# Patient Record
Sex: MALE | Race: WHITE | NOT HISPANIC OR LATINO | Employment: OTHER | URBAN - METROPOLITAN AREA
[De-identification: names, ages, dates, MRNs, and addresses within clinical notes are randomized per-mention and may not be internally consistent; named-entity substitution may affect disease eponyms.]

---

## 2017-04-13 ENCOUNTER — TELEPHONE (OUTPATIENT)
Dept: MEDICAL GROUP | Facility: MEDICAL CENTER | Age: 72
End: 2017-04-13

## 2017-04-13 NOTE — TELEPHONE ENCOUNTER
1. Caller Name: Enoch Mistry                        Call Back Number: [phone] (home)       2. Message: Pt wants to switch to [facility] for his oxygen. Having issues with [facility] and their billing.  Please D/C 02 from [facility]. I advised pt that he was not a [facility] pt, that his chart says [facility]. He said he was told last time he was here that [facility] would not accept him and his Dr would be [facility]. Says his card from [facility] says [facility] is his provider. Sending this to both Dr's.     3. Patient approves office to leave a detailed voicemail/MyChart message: no

## 2017-04-13 NOTE — TELEPHONE ENCOUNTER
I have seen the patient over one year ago, he needs to make a follow-up appointment, he has been seen Candy Mccarthy, and I am not his PCP, his insurance card in [facility] states  is his PCP.  He can follow up with Candy Mccarthy to re-establish  He can follow up with me to renew his oxygen but I am not taking new patients, and he will need to establish with somebody else

## 2017-04-18 ENCOUNTER — OFFICE VISIT (OUTPATIENT)
Dept: MEDICAL GROUP | Facility: MEDICAL CENTER | Age: 72
End: 2017-04-18
Payer: MEDICARE

## 2017-04-18 VITALS
HEIGHT: 66 IN | HEART RATE: 116 BPM | SYSTOLIC BLOOD PRESSURE: 156 MMHG | WEIGHT: 245 LBS | TEMPERATURE: 98.2 F | DIASTOLIC BLOOD PRESSURE: 92 MMHG | BODY MASS INDEX: 39.37 KG/M2 | OXYGEN SATURATION: 88 %

## 2017-04-18 DIAGNOSIS — R91.1 PULMONARY NODULE: ICD-10-CM

## 2017-04-18 DIAGNOSIS — I10 ESSENTIAL HYPERTENSION: Chronic | ICD-10-CM

## 2017-04-18 DIAGNOSIS — Z12.5 PROSTATE CANCER SCREENING: ICD-10-CM

## 2017-04-18 DIAGNOSIS — E78.5 DYSLIPIDEMIA: ICD-10-CM

## 2017-04-18 PROCEDURE — 4040F PNEUMOC VAC/ADMIN/RCVD: CPT | Mod: 8P | Performed by: INTERNAL MEDICINE

## 2017-04-18 PROCEDURE — G8419 CALC BMI OUT NRM PARAM NOF/U: HCPCS | Performed by: INTERNAL MEDICINE

## 2017-04-18 PROCEDURE — 1101F PT FALLS ASSESS-DOCD LE1/YR: CPT | Performed by: INTERNAL MEDICINE

## 2017-04-18 PROCEDURE — 3017F COLORECTAL CA SCREEN DOC REV: CPT | Mod: 8P | Performed by: INTERNAL MEDICINE

## 2017-04-18 PROCEDURE — G8432 DEP SCR NOT DOC, RNG: HCPCS | Performed by: INTERNAL MEDICINE

## 2017-04-18 PROCEDURE — 99213 OFFICE O/P EST LOW 20 MIN: CPT | Performed by: INTERNAL MEDICINE

## 2017-04-18 PROCEDURE — 1036F TOBACCO NON-USER: CPT | Performed by: INTERNAL MEDICINE

## 2017-04-18 RX ORDER — AMLODIPINE BESYLATE 5 MG/1
5 TABLET ORAL DAILY
Qty: 30 TAB | Refills: 2 | Status: SHIPPED | OUTPATIENT
Start: 2017-04-18 | End: 2017-07-13 | Stop reason: SDUPTHER

## 2017-04-18 ASSESSMENT — PATIENT HEALTH QUESTIONNAIRE - PHQ9: CLINICAL INTERPRETATION OF PHQ2 SCORE: 0

## 2017-04-18 NOTE — PROGRESS NOTES
Subjective:      Enoch Mistry is a 71 y.o. male who presents with oxygen          HPI     Dr. Garrido was initial PCP but has not seen him in the past. He saw Candy aguilar initially. Patient has been on oxygen 2 liters per [facility], would like to change to [facility], on oxygen 2 liters continuous. Was smoking but quit 9 years ago, smoked 1 pack per day at least for over 30 years, patient does have shortness of breath without oxygen, denies any cough, productive sputum, fevers, chills, night sweats.  I saw him one year ago and discussed starting lisinopril for blood pressure, he did not do so, I asked him to get an echocardiogram and CT thorax lung follow-up nodule, he did not do so, and he did not follow-up with his PCP.  Chronic lower extremity edema.  No regular exercise.  Tries to limit sodium.  History of bladder cancer has been seen by urology previously  No tobacco, no alcohol           Current Outpatient Prescriptions   Medication Sig Dispense Refill   • lisinopril (PRINIVIL) 10 MG Tab Take 1 Tab by mouth every day. 30 Tab 3   • sulfamethoxazole-trimethoprim (BACTRIM DS,SEPTRA DS) 800-160 MG Tab oral tablet Take 1 Tab by mouth every 12 hours. 28 Tab 0     No current facility-administered medications for this visit.     Patient Active Problem List    Diagnosis Date Noted   • Obesity 01/07/2016   • Preventative health care 01/07/2016   • Hypoxia 01/07/2016   • Adrenal adenoma 01/07/2016   • Asymptomatic cholelithiasis 01/07/2016   • Pulmonary nodule 04/03/2015   • History of bladder cancer 04/02/2015   • Hypertension 04/01/2015     Depression Screening    Little interest or pleasure in doing things?  0 - not at all  Feeling down, depressed , or hopeless? 0 - not at all  Patient Health Questionnaire Score: 0       ROS       Objective:     Room air oxygen saturation 88%  2 L oxygen at rest 93% saturation    Physical Exam   Constitutional: He appears well-developed and well-nourished. No distress.   HENT:    Head: Normocephalic and atraumatic.   Eyes: Conjunctivae are normal. Right eye exhibits no discharge. Left eye exhibits no discharge.   Neck: No thyromegaly present.   Cardiovascular: Normal rate and regular rhythm.    No murmur heard.  Pulmonary/Chest: Effort normal and breath sounds normal.   Abdominal: He exhibits no distension.   Musculoskeletal: He exhibits edema.   Skin: He is not diaphoretic.   Psychiatric: He has a normal mood and affect. His behavior is normal.   Nursing note and vitals reviewed.    Lower extremity edema, chronic venous stasis changes, no open sores, lesions, ulcerations          Assessment/Plan:     Assessment  #!  Chronic hypoxia question related to COPD with extensive smoking history, patient is on 2 L of oxygen continuously, room air saturation 88%, with 2 L oxygen, saturation 93%, declines echo, pulmonary function testing    #2 Hypertension, patient did not start lisinopril, patient did not get labs last year    #3 pulmonary nodule did not get follow-up CT scan as ordered last year    #4 obesity BMI 38.9    #5 lower extremity edema question cor pulmonale, consider diastolic or systolic dysfunction, did not get echo    #6 history of bladder cancer followed by urology    #7 chronic respiratory failure on oxygen 2 L through [facility]      Plan  #! Needs to follow up with  or [facility] aguilar, or establish with a new provider I am not his PCP, I expressed to him the importance of follow-up with the PCP on a regular basis given his medical history. This was related to him over a year ago and again today    #2 continue oxygen 2 liters and change to [facility]    #3 for blood pressure I recommend starting Norvasc since he has declined to get labs, I am not sure what his renal function is, we will start with Norvasc 5 mg understand this could contribute to lower extremity edema, check blood pressure daily and record, medication may cause edema, constipation, lightheadedness, follow-up with  PCP     #4 declines all vaccines    #5 declines colonoscopy    #6 declines pulmonary function testing, echo, CT follow-up pulmonary nodule importance of these tests were expressed to him and recommended he declines    #7 follow-up urology    #8 labs ordered again fasting    #9 follow-up PCP 4 weeks    #10 continue no tobacco    #11 diet, exercise, weight loss emphasized with regards to high blood pressure, development of cardiovascular disease

## 2017-04-18 NOTE — MR AVS SNAPSHOT
"        Enoch Mistry   2017 7:40 AM   Office Visit   MRN: [id number]    Department:  [facility]   Dept Phone:  [phone]    Description:  Male : 1945   Provider:  Navin Wilcox M.D.           Allergies as of 2017     No Known Allergies      You were diagnosed with     Essential hypertension   [5922828]       Dyslipidemia   [105204]       Prostate cancer screening   [481172]       Body mass index (BMI) of 38.0-38.9 in adult   [406315]       Pulmonary nodule   [998205]         Vital Signs     Blood Pressure Pulse Temperature Height Weight Body Mass Index    156/92 mmHg 116 36.8 °C (98.2 °F) 1.689 m (5' 6.5\") 111.131 kg (245 lb) 38.96 kg/m2    Oxygen Saturation Smoking Status                88% Former Smoker          Basic Information     Date Of Birth Sex Race Ethnicity Preferred Language    1945 Male White Non- English      Problem List              ICD-10-CM Priority Class Noted - Resolved    Hypertension (Chronic) I10   2015 - Present    History of bladder cancer Z85.51   2015 - Present    Pulmonary nodule R91.1   4/3/2015 - Present    Obesity E66.9   2016 - Present    Preventative health care Z00.00   2016 - Present    Hypoxia R09.02   2016 - Present    Adrenal adenoma D35.00   2016 - Present    Asymptomatic cholelithiasis K80.20   2016 - Present      Health Maintenance        Date Due Completion Dates    IMM DTaP/Tdap/Td Vaccine (1 - Tdap) 10/11/1964 ---    IMM ZOSTER VACCINE 10/11/2005 ---    IMM PNEUMOCOCCAL 65+ (ADULT) LOW/MEDIUM RISK SERIES (1 of 2 - PCV13) 10/11/2010 ---    COLONOSCOPY 2016 (Declined)    Override on 2015: Patient Declined            Current Immunizations     No immunizations on file.      Below and/or attached are the medications your provider expects you to take. Review all of your home medications and newly ordered medications with your provider and/or pharmacist. Follow medication " instructions as directed by your provider and/or pharmacist. Please keep your medication list with you and share with your provider. Update the information when medications are discontinued, doses are changed, or new medications (including over-the-counter products) are added; and carry medication information at all times in the event of emergency situations     Allergies:  No Known Allergies          Medications  Valid as of: April 18, 2017 -  8:40 AM    Generic Name Brand Name Tablet Size Instructions for use    AmLODIPine Besylate (Tab) NORVASC 5 MG Take 1 Tab by mouth every day.        Sulfamethoxazole-Trimethoprim (Tab) BACTRIM DS,SEPTRA -160 MG Take 1 Tab by mouth every 12 hours.        .                 Medicines prescribed today were sent to:     [facility] [address] [facility]    [address] [place] [address]    Phone: [phone] Fax: [phone]    Open 24 Hours?: \[facility]\"" [facility] #[id number] [facility], [place] - [address] [facility] [address]    [address] [facility] [address]    Phone: [phone] Fax: [phone]    Open 24 Hours?: No      Medication refill instructions:       If your prescription bottle indicates you have medication refills left, it is not necessary to call your provider’s office. Please contact your pharmacy and they will refill your medication.    If your prescription bottle indicates you do not have any refills left, you may request refills at any time through one of the following ways: The online MTPV system (except Urgent Care), by calling your provider’s office, or by asking your pharmacy to contact your provider’s office with a refill request. Medication refills are processed only during regular business hours and may not be available until the next business day. Your provider may request additional information or to have a follow-up visit with you prior to refilling your medication.   *Please Note: Medication refills are assigned a new  Rx number when refilled electronically. Your pharmacy may indicate that no refills were authorized even though a new prescription for the same medication is available at the pharmacy. Please request the medicine by name with the pharmacy before contacting your provider for a refill.        Your To Do List     Future Labs/Procedures Complete By Expires    CBC WITH DIFFERENTIAL  As directed 4/19/2018    COMP METABOLIC PANEL  As directed 4/19/2018    LIPID PROFILE  As directed 4/19/2018    MICROALBUMIN CREAT RATIO URINE  As directed 4/19/2018    PROSTATE SPECIFIC AG SCREENING  As directed 4/19/2018    TSH  As directed 4/19/2018    URINALYSIS,CULTURE IF INDICATED  As directed 4/19/2018      Other Notes About Your Plan     1/7/16 CT thorax follow up nodule, pulmonary function testing, echo pending           [employer] Access Code: [email or web address]  Expires: 5/18/2017  8:40 AM    [employer]  A secure, online tool to manage your health information     [employer]® is a secure, online tool that connects you to your personalized health information from the privacy of your home -- day or night - making it very easy for you to manage your healthcare. Once the activation process is completed, you can even access your medical information using the [employer] emily, which is available for free in the Apple Emily store or Google Play store.     [employer] provides the following levels of access (as shown below):   My Chart Features   [facility] Doctor [facility]  Specialists [facility]  Urgent  Care Non-[facility]  Primary Care  Doctor   Email your healthcare team securely and privately 24/7 X X X    Manage appointments: schedule your next appointment; view details of past/upcoming appointments X      Request prescription refills. X      View recent personal medical records, including lab and immunizations X X X X   View health record, including health history, allergies, medications X X X X   Read reports about your outpatient visits,  procedures, consult and ER notes X X X X   See your discharge summary, which is a recap of your hospital and/or ER visit that includes your diagnosis, lab results, and care plan. X X       How to register for [employer]:  1. Go to  [email or web address].  2. Click on the Sign Up Now box, which takes you to the New Member Sign Up page. You will need to provide the following information:  a. Enter your [employer] Access Code exactly as it appears at the top of this page. (You will not need to use this code after you’ve completed the sign-up process. If you do not sign up before the expiration date, you must request a new code.)   b. Enter your date of birth.   c. Enter your home email address.   d. Click Submit, and follow the next screen’s instructions.  3. Create a RingTut ID. This will be your RingTut login ID and cannot be changed, so think of one that is secure and easy to remember.  4. Create a RingTut password. You can change your password at any time.  5. Enter your Password Reset Question and Answer. This can be used at a later time if you forget your password.   6. Enter your e-mail address. This allows you to receive e-mail notifications when new information is available in [employer].  7. Click Sign Up. You can now view your health information.    For assistance activating your [employer] account, call [phone]

## 2017-05-31 ENCOUNTER — HOSPITAL ENCOUNTER (OUTPATIENT)
Dept: LAB | Facility: MEDICAL CENTER | Age: 72
End: 2017-05-31
Attending: FAMILY MEDICINE
Payer: MEDICARE

## 2017-05-31 DIAGNOSIS — Z12.5 PROSTATE CANCER SCREENING: ICD-10-CM

## 2017-05-31 DIAGNOSIS — I10 ESSENTIAL HYPERTENSION: Chronic | ICD-10-CM

## 2017-05-31 DIAGNOSIS — E78.5 DYSLIPIDEMIA: ICD-10-CM

## 2017-05-31 LAB
ALBUMIN SERPL BCP-MCNC: 4.1 G/DL (ref 3.2–4.9)
ALBUMIN/GLOB SERPL: 1.3 G/DL
ALP SERPL-CCNC: 60 U/L (ref 30–99)
ALT SERPL-CCNC: 19 U/L (ref 2–50)
ANION GAP SERPL CALC-SCNC: 10 MMOL/L (ref 0–11.9)
AST SERPL-CCNC: 20 U/L (ref 12–45)
BASOPHILS # BLD AUTO: 0.9 % (ref 0–1.8)
BASOPHILS # BLD: 0.09 K/UL (ref 0–0.12)
BILIRUB SERPL-MCNC: 1.6 MG/DL (ref 0.1–1.5)
BUN SERPL-MCNC: 12 MG/DL (ref 8–22)
CALCIUM SERPL-MCNC: 9.2 MG/DL (ref 8.5–10.5)
CHLORIDE SERPL-SCNC: 104 MMOL/L (ref 96–112)
CHOLEST SERPL-MCNC: 99 MG/DL (ref 100–199)
CO2 SERPL-SCNC: 24 MMOL/L (ref 20–33)
CREAT SERPL-MCNC: 0.67 MG/DL (ref 0.5–1.4)
EOSINOPHIL # BLD AUTO: 0.25 K/UL (ref 0–0.51)
EOSINOPHIL NFR BLD: 2.6 % (ref 0–6.9)
ERYTHROCYTE [DISTWIDTH] IN BLOOD BY AUTOMATED COUNT: 42.8 FL (ref 35.9–50)
GFR SERPL CREATININE-BSD FRML MDRD: >60 ML/MIN/1.73 M 2
GLOBULIN SER CALC-MCNC: 3.2 G/DL (ref 1.9–3.5)
GLUCOSE SERPL-MCNC: 239 MG/DL (ref 65–99)
HCT VFR BLD AUTO: 48.8 % (ref 42–52)
HDLC SERPL-MCNC: 35 MG/DL
HGB BLD-MCNC: 16.5 G/DL (ref 14–18)
IMM GRANULOCYTES # BLD AUTO: 0.02 K/UL (ref 0–0.11)
IMM GRANULOCYTES NFR BLD AUTO: 0.2 % (ref 0–0.9)
LDLC SERPL CALC-MCNC: 39 MG/DL
LYMPHOCYTES # BLD AUTO: 2.2 K/UL (ref 1–4.8)
LYMPHOCYTES NFR BLD: 23.1 % (ref 22–41)
MCH RBC QN AUTO: 29.7 PG (ref 27–33)
MCHC RBC AUTO-ENTMCNC: 33.8 G/DL (ref 33.7–35.3)
MCV RBC AUTO: 87.9 FL (ref 81.4–97.8)
MONOCYTES # BLD AUTO: 0.62 K/UL (ref 0–0.85)
MONOCYTES NFR BLD AUTO: 6.5 % (ref 0–13.4)
NEUTROPHILS # BLD AUTO: 6.33 K/UL (ref 1.82–7.42)
NEUTROPHILS NFR BLD: 66.7 % (ref 44–72)
NRBC # BLD AUTO: 0 K/UL
NRBC BLD AUTO-RTO: 0 /100 WBC
PLATELET # BLD AUTO: 201 K/UL (ref 164–446)
PMV BLD AUTO: 11.5 FL (ref 9–12.9)
POTASSIUM SERPL-SCNC: 4 MMOL/L (ref 3.6–5.5)
PROT SERPL-MCNC: 7.3 G/DL (ref 6–8.2)
PSA SERPL-MCNC: 5.88 NG/ML (ref 0–4)
RBC # BLD AUTO: 5.55 M/UL (ref 4.7–6.1)
SODIUM SERPL-SCNC: 138 MMOL/L (ref 135–145)
TRIGL SERPL-MCNC: 124 MG/DL (ref 0–149)
TSH SERPL DL<=0.005 MIU/L-ACNC: 2.66 UIU/ML (ref 0.3–3.7)
WBC # BLD AUTO: 9.5 K/UL (ref 4.8–10.8)

## 2017-05-31 PROCEDURE — 80061 LIPID PANEL: CPT

## 2017-05-31 PROCEDURE — 80053 COMPREHEN METABOLIC PANEL: CPT

## 2017-05-31 PROCEDURE — 85025 COMPLETE CBC W/AUTO DIFF WBC: CPT

## 2017-05-31 PROCEDURE — 36415 COLL VENOUS BLD VENIPUNCTURE: CPT

## 2017-05-31 PROCEDURE — 84153 ASSAY OF PSA TOTAL: CPT

## 2017-05-31 PROCEDURE — 84443 ASSAY THYROID STIM HORMONE: CPT

## 2017-06-01 ENCOUNTER — TELEPHONE (OUTPATIENT)
Dept: MEDICAL GROUP | Facility: MEDICAL CENTER | Age: 72
End: 2017-06-01

## 2017-06-01 NOTE — TELEPHONE ENCOUNTER
----- Message from Navin Wilcox M.D. sent at 5/31/2017  8:06 PM PDT -----  Please notify the patient that his blood sugar is elevated at 239 which is diabetic range.  His liver function, kidney function, thyroid test, cholesterol are within range  His PSA prostate cancer blood test is slightly high  Have him keep his appointment with his new primary care provider, he had been seeing Candy Mccarthy in our office and has an upcoming appointment with Dr. Duke upstairs next Tuesday.  He can discuss the results with  at that visit.  Please make sure the patient understands importance of keeping that appointment given his elevated blood sugar and elevated PSA level.  In the interim, the patient should avoid sweets, candies, sodas, fruit juices, and limit his carbohydrate portions serving sizes until he is seen next week.

## 2017-06-06 ENCOUNTER — TELEPHONE (OUTPATIENT)
Dept: MEDICAL GROUP | Facility: MEDICAL CENTER | Age: 72
End: 2017-06-06

## 2017-06-06 ENCOUNTER — OFFICE VISIT (OUTPATIENT)
Dept: MEDICAL GROUP | Facility: MEDICAL CENTER | Age: 72
End: 2017-06-06
Payer: MEDICARE

## 2017-06-06 VITALS
BODY MASS INDEX: 38.12 KG/M2 | SYSTOLIC BLOOD PRESSURE: 158 MMHG | DIASTOLIC BLOOD PRESSURE: 68 MMHG | HEIGHT: 66 IN | TEMPERATURE: 99.5 F | HEART RATE: 101 BPM | OXYGEN SATURATION: 90 % | WEIGHT: 237.2 LBS

## 2017-06-06 DIAGNOSIS — I10 ESSENTIAL HYPERTENSION: ICD-10-CM

## 2017-06-06 DIAGNOSIS — J96.11 CHRONIC HYPOXEMIC RESPIRATORY FAILURE (HCC): ICD-10-CM

## 2017-06-06 DIAGNOSIS — N40.1 BENIGN NON-NODULAR PROSTATIC HYPERPLASIA WITH LOWER URINARY TRACT SYMPTOMS: ICD-10-CM

## 2017-06-06 DIAGNOSIS — R60.0 BILATERAL LOWER EXTREMITY EDEMA: ICD-10-CM

## 2017-06-06 DIAGNOSIS — R73.01 ELEVATED FASTING BLOOD SUGAR: ICD-10-CM

## 2017-06-06 DIAGNOSIS — E11.8 TYPE 2 DIABETES MELLITUS WITH COMPLICATION, WITHOUT LONG-TERM CURRENT USE OF INSULIN (HCC): ICD-10-CM

## 2017-06-06 DIAGNOSIS — Z99.81 REQUIRES CONTINUOUS AT HOME SUPPLEMENTAL OXYGEN: ICD-10-CM

## 2017-06-06 DIAGNOSIS — S46.811A STRAIN OF RIGHT TRAPEZIUS MUSCLE, INITIAL ENCOUNTER: ICD-10-CM

## 2017-06-06 LAB
HBA1C MFR BLD: 10.4 % (ref ?–5.8)
INT CON NEG: NEGATIVE
INT CON POS: POSITIVE

## 2017-06-06 PROCEDURE — 92250 FUNDUS PHOTOGRAPHY W/I&R: CPT | Performed by: FAMILY MEDICINE

## 2017-06-06 PROCEDURE — 83036 HEMOGLOBIN GLYCOSYLATED A1C: CPT | Performed by: FAMILY MEDICINE

## 2017-06-06 PROCEDURE — 99215 OFFICE O/P EST HI 40 MIN: CPT | Performed by: FAMILY MEDICINE

## 2017-06-06 RX ORDER — BLOOD-GLUCOSE METER
1 KIT MISCELLANEOUS DAILY
Qty: 1 KIT | Refills: 0 | Status: SHIPPED | OUTPATIENT
Start: 2017-06-06 | End: 2017-09-11

## 2017-06-06 NOTE — ASSESSMENT & PLAN NOTE
Patient with recent fasting blood sugar elevation to 239 on CMP drawn on 5/31/2017. Patient denies any obvious signs of hyperglycemia. Dietary issues as below.    ROS is NEGATIVE for blurred vision, polydipsia, polyuria, diaphoresis, palpitations, fatigue, irritability, flank pain, BLE paresthesias.    B: Coffee, Toast OR English Muffin (either one, with butter or jam)  L: (Hawkins & Eggs) OR (Burger + [place])  S: Possibly microwave popcorn   D: Meat (Steak, pork, or chicken), Potato (Baked, mashed), sweet corn on the cob

## 2017-06-06 NOTE — ASSESSMENT & PLAN NOTE
Patient has bilateral lower extremity edema, right greater than left, and has presented to the hospital for this. When patient was in the hospital, patient was given antibiotics, 2 different types. Thereafter he had follow-up with annette as well as Dr. Millan, gave him different antibiotics each time, to no effective resolution of bilateral lower extremity edema and erythema. Patient suspects, as do I, that this is not due to cellulitis, but rather bilateral lower extremity venous stasis/dermatitis secondary to diabetes as well as possible chronic congestive heart failure.     Of note, patient has not had any diagnostic imaging performed echocardiogram. Patient has not had a cardiac stress test on file either. At present, patient denies any chest pain with her typical or atypical at rest or with exertion.    ROS is NEGATIVE for dizziness, generalized weakness/fatigue, vision/hearing changes, jaw pain/paresthesias, BUE pain/paresthesias/numbness/weakness, chest pain/pressure, palpitations, dyspnea, RUQ abdominal pain, oliguria/anuria, BLE edema.

## 2017-06-06 NOTE — PATIENT INSTRUCTIONS
"Dr. Duke's tips for \"Lifestyle Medicine:\"     Check out the talk/documentary on \"How not to die\" by Dr. Johan Jaffe (on his website [email or web address], he also authored a book with this title).       1) Make SMART lifestyle changes: Specific, Measurable, Attainable, Relevant, Time-sensitive.  The lifestyle changes that you need to make are with regards to: nutrition, cardiovascular exercise, sleep, stress management.  Make these changes every 2 weeks, revisiting the previous goals and perhaps revising them and/or setting new ones.       2) Nutrition: Make as many changes as you can to increase the amount of whole-foods (not Whole Foods, necessarily!  ;-)), plant-based diet as possible:   A) Books: Eat to Live (Dr. Miguel Patel), The Spectrum (Dr. Navin Sibley), The Starch Solution (Dr. Chandler Yeung)      B) Documentaries (can usually be found on Alere): [place].  Fat, Sick, and Nearly Dead.  Fed Up.           3) Cardiovascular Exercise: The center for disease control recommends a minimum of 150 minutes per week of moderate intensity cardiovascular exercise for weight maintenance and cardiovascular health.  Set this as your initial goal, with at least 30 minutes per session. Types of exercise can include 30 minutes of elliptical, 30 minutes of decently fast jog, 30 minutes of swimming, 30 minutes of heavy gardening (lifting big bags of fertilizer, digging deep holes/ditches).  He can cut down the minute requirements to half, by doing higher intensity sports such as a game of tennis, or soccer.  He notes the library and check out with they have for home exercise programs, as well.       4) Sleep:    A) Goal: Obtain a minimum of 7-8hours of continuous, uninterrupted, restful sleep per night.    B) Tips for Sleep Hygiene:    I) Go to bed and wake up at consistent times whether work/school day or not.     II) Keep room dark, quiet, and comfortable.  Increase exposure to sunlight during awake times and " "avoid bright lights (especially anything with a backlight) at least the last 1-2hours before going to sleep.     III) Don't nap.     IV) Avoid stimulant or caffeine use more than 4 hours after wake time.        5) Stress Management: You cannot change the stresses of life dizziness necessarily, but you can change how he responds of them. One good way to manage stress is to write things down in order to help you process how to approach things in general or specifically. Another good way is to talk it out with someone you trusts, specifically your significant other or good friend. A definite great way to deal with stress is to have cardiovascular exercise!    DASH Eating Plan  DASH stands for \"Dietary Approaches to Stop Hypertension.\" The DASH eating plan is a healthy eating plan that has been shown to reduce high blood pressure (hypertension). Additional health benefits may include reducing the risk of type 2 diabetes mellitus, heart disease, and stroke. The DASH eating plan may also help with weight loss.  WHAT DO I NEED TO KNOW ABOUT THE DASH EATING PLAN?  For the DASH eating plan, you will follow these general guidelines:  · Choose foods with a percent daily value for sodium of less than 5% (as listed on the food label).  · Use salt-free seasonings or herbs instead of table salt or sea salt.  · Check with your health care provider or pharmacist before using salt substitutes.  · Eat lower-sodium products, often labeled as \"lower sodium\" or \"no salt added.\"  · Eat fresh foods.  · Eat more vegetables, fruits, and low-fat dairy products.  · Choose whole grains. Look for the word \"whole\" as the first word in the ingredient list.  · Choose fish and skinless chicken or turkey more often than red meat. Limit fish, poultry, and meat to 6 oz (170 g) each day.  · Limit sweets, desserts, sugars, and sugary drinks.  · Choose heart-healthy fats.  · Limit cheese to 1 oz (28 g) per day.  · Eat more home-cooked food and less " restaurant, buffet, and fast food.  · Limit fried foods.  · Cook foods using methods other than frying.  · Limit canned vegetables. If you do use them, rinse them well to decrease the sodium.  · When eating at a restaurant, ask that your food be prepared with less salt, or no salt if possible.  WHAT FOODS CAN I EAT?  Seek help from a dietitian for individual calorie needs.  Grains  Whole grain or whole wheat bread. Brown rice. Whole grain or whole wheat pasta. Quinoa, bulgur, and whole grain cereals. Low-sodium cereals. Corn or whole wheat flour tortillas. Whole grain cornbread. Whole grain crackers. Low-sodium crackers.  Vegetables  Fresh or frozen vegetables (raw, steamed, roasted, or grilled). Low-sodium or reduced-sodium tomato and vegetable juices. Low-sodium or reduced-sodium tomato sauce and paste. Low-sodium or reduced-sodium canned vegetables.   Fruits  All fresh, canned (in natural juice), or frozen fruits.  Meat and Other Protein Products  Ground beef (85% or leaner), grass-fed beef, or beef trimmed of fat. Skinless chicken or turkey. Ground chicken or turkey. Pork trimmed of fat. All fish and seafood. Eggs. Dried beans, peas, or lentils. Unsalted nuts and seeds. Unsalted canned beans.  Dairy  Low-fat dairy products, such as skim or 1% milk, 2% or reduced-fat cheeses, low-fat ricotta or cottage cheese, or plain low-fat yogurt. Low-sodium or reduced-sodium cheeses.  Fats and Oils  Tub margarines without trans fats. Light or reduced-fat mayonnaise and salad dressings (reduced sodium). Avocado. Safflower, olive, or canola oils. Natural peanut or almond butter.  Other  Unsalted popcorn and pretzels.  The items listed above may not be a complete list of recommended foods or beverages. Contact your dietitian for more options.  WHAT FOODS ARE NOT RECOMMENDED?  Grains  White bread. White pasta. White rice. Refined cornbread. Bagels and croissants. Crackers that contain trans fat.  Vegetables  Creamed or fried  vegetables. Vegetables in a cheese sauce. Regular canned vegetables. Regular canned tomato sauce and paste. Regular tomato and vegetable juices.  Fruits  Dried fruits. Canned fruit in light or heavy syrup. Fruit juice.  Meat and Other Protein Products  Fatty cuts of meat. Ribs, chicken wings, marino, sausage, bologna, salami, chitterlings, fatback, hot dogs, bratwurst, and packaged luncheon meats. Salted nuts and seeds. Canned beans with salt.  Dairy  Whole or 2% milk, cream, half-and-half, and cream cheese. Whole-fat or sweetened yogurt. Full-fat cheeses or blue cheese. Nondairy creamers and whipped toppings. Processed cheese, cheese spreads, or cheese curds.  Condiments  Onion and garlic salt, seasoned salt, table salt, and sea salt. Canned and packaged gravies. Worcestershire sauce. Tartar sauce. Barbecue sauce. Teriyaki sauce. Soy sauce, including reduced sodium. Steak sauce. Fish sauce. Oyster sauce. Cocktail sauce. Horseradish. Ketchup and mustard. Meat flavorings and tenderizers. Bouillon cubes. Hot sauce. Tabasco sauce. Marinades. Taco seasonings. Relishes.  Fats and Oils  Butter, stick margarine, lard, shortening, ghee, and marino fat. Coconut, palm kernel, or palm oils. Regular salad dressings.  Other  Pickles and olives. Salted popcorn and pretzels.  The items listed above may not be a complete list of foods and beverages to avoid. Contact your dietitian for more information.  WHERE CAN I FIND MORE INFORMATION?  National Heart, Lung, and Blood [place]: www.nhlbi.nih.gov/health/health-topics/topics/dash/     This information is not intended to replace advice given to you by your health care provider. Make sure you discuss any questions you have with your health care provider.     Document Released: 12/06/2012 Document Revised: 01/08/2016 Document Reviewed: 10/22/2014  [employer] Interactive Patient Education ©2016 [employer]

## 2017-06-06 NOTE — TELEPHONE ENCOUNTER
Brand is whatever they have there that's covered by [facility].  Check his blood sugar once daily, first thing in the morning, fasting.

## 2017-06-06 NOTE — ASSESSMENT & PLAN NOTE
Patient with chronic hypoxemic respiratory failure requiring continuous use of supplemental oxygen. At home, patient reports that when on oxygen concentrator his O2 saturations go up to 95-96%. However, on his small oxygen tank while ambulating outside the house, he is typically around 90%.    Of note, patient does have significant smoking history, please see social history as documented elsewhere in this note. Additionally, patient cannot recall having had PFTs last 1-2 years, but believes he has had in the past. Patient is not taking any medications to control his lung conditions, patient denies any recent hospitalizations for lung infections.     ROS is negative for fevers, chills, cough, wheezing/rhonchi/rales, chest congestion, nausea, emesis, generalized weakness/fatigue above normal.

## 2017-06-06 NOTE — MR AVS SNAPSHOT
"        Enoch Mistry   2017 7:40 AM   Office Visit   MRN: [id number]    Department:  [place] [address]   Dept Phone:  [phone]    Description:  Male : 1945   Provider:  Charles Duke M.D.           Reason for Visit     Establish Care     Medication Refill     Hypertension           Allergies as of 2017     No Known Allergies      You were diagnosed with     Type 2 diabetes mellitus with complication, without long-term current use of insulin (CMS-HCC)   [7170555]       Elevated fasting blood sugar   [752350]       Bilateral lower extremity edema   [808138]       Chronic hypoxemic respiratory failure (CMS-HCC)   [706272]       Requires continuous at home supplemental oxygen   [6538269]       Benign non-nodular prostatic hyperplasia with lower urinary tract symptoms   [6373851]       Essential hypertension   [8650071]         Vital Signs     Blood Pressure Pulse Temperature Height Weight Body Mass Index    158/68 mmHg 101 37.5 °C (99.5 °F) 1.689 m (5' 6.5\") 107.593 kg (237 lb 3.2 oz) 37.72 kg/m2    Oxygen Saturation Smoking Status                90% Former Smoker          Basic Information     Date Of Birth Sex Race Ethnicity Preferred Language    1945 Male White Non- English      Your appointments     2017  8:00 AM   Established Patient with Charles Duke M.D.   [facility])    [address]  [address]  [facility] [id number] [phone]           You will be receiving a confirmation call a few days before your appointment from our automated call confirmation system.              Problem List              ICD-10-CM Priority Class Noted - Resolved    Essential hypertension I10   2015 - Present    History of bladder cancer Z85.51   2015 - Present    Pulmonary nodule R91.1   4/3/2015 - Present    Obesity E66.9   2016 - Present    Preventative health care Z00.00   2016 - Present    Chronic hypoxemic " respiratory failure (CMS-HCC) J96.11   1/7/2016 - Present    Adrenal adenoma D35.00   1/7/2016 - Present    Asymptomatic cholelithiasis K80.20   1/7/2016 - Present    Requires continuous at home supplemental oxygen Z99.81   6/6/2017 - Present    Type 2 diabetes mellitus with complication, without long-term current use of insulin (CMS-HCC) E11.8   6/6/2017 - Present    Benign non-nodular prostatic hyperplasia with lower urinary tract symptoms N40.1   6/6/2017 - Present    Bilateral lower extremity edema R60.0   6/6/2017 - Present      Health Maintenance        Date Due Completion Dates    IMM DTaP/Tdap/Td Vaccine (1 - Tdap) 10/11/1964 ---    IMM ZOSTER VACCINE 10/11/2005 ---    IMM PNEUMOCOCCAL 65+ (ADULT) LOW/MEDIUM RISK SERIES (1 of 2 - PCV13) 10/11/2010 ---    COLONOSCOPY 4/20/2016 4/20/2015 (Declined)    Override on 4/20/2015: Patient Declined            Results     POCT Hemoglobin A1C      Component    Glycohemoglobin    10.4    Internal Control Negative    Negative    Internal Control Positive    Positive                        Current Immunizations     No immunizations on file.      Below and/or attached are the medications your provider expects you to take. Review all of your home medications and newly ordered medications with your provider and/or pharmacist. Follow medication instructions as directed by your provider and/or pharmacist. Please keep your medication list with you and share with your provider. Update the information when medications are discontinued, doses are changed, or new medications (including over-the-counter products) are added; and carry medication information at all times in the event of emergency situations     Allergies:  No Known Allergies          Medications  Valid as of: June 06, 2017 -  8:36 AM    Generic Name Brand Name Tablet Size Instructions for use    AmLODIPine Besylate (Tab) NORVASC 5 MG Take 1 Tab by mouth every day.        Blood Glucose Monitoring Suppl (Kit) B-D  LATITUDE DIABETES  1 Units by Does not apply route every day.        .                 Medicines prescribed today were sent to:     [id number] - [place], [place] - [address]    [address] [place] [address]    Phone: [phone] Fax: [phone]    Open 24 Hours?: No    [facility] #[id number] - [place], [place] - [address]    [address] [place] [address]    Phone: [phone] Fax: [phone]    Open 24 Hours?: No      Medication refill instructions:       If your prescription bottle indicates you have medication refills left, it is not necessary to call your provider’s office. Please contact your pharmacy and they will refill your medication.    If your prescription bottle indicates you do not have any refills left, you may request refills at any time through one of the following ways: The online Adm[employer] system (except Urgent Care), by calling your provider’s office, or by asking your pharmacy to contact your provider’s office with a refill request. Medication refills are processed only during regular business hours and may not be available until the next business day. Your provider may request additional information or to have a follow-up visit with you prior to refilling your medication.   *Please Note: Medication refills are assigned a new Rx number when refilled electronically. Your pharmacy may indicate that no refills were authorized even though a new prescription for the same medication is available at the pharmacy. Please request the medicine by name with the pharmacy before contacting your provider for a refill.        Your To Do List     Future Labs/Procedures Complete By Expires    ECHOCARDIOGRAM COMP W/O CONT  As directed 6/6/2018    Scheduling Instructions:    Echocardiogram for evaluation of R heart pressures due to history of TRENA.      Referral     A referral request has been sent to our patient care coordination department. Please allow 3-5 business  "days for us to process this request and contact you either by phone or mail. If you do not hear from us by the 5th business day, please call us at [phone].        Instructions    Dr. Duke's tips for \"Lifestyle Medicine:\"     Check out the talk/documentary on \"How not to die\" by Dr. Johan Jaffe (on his website [email or web address], he also authored a book with this title).       1) Make SMART lifestyle changes: Specific, Measurable, Attainable, Relevant, Time-sensitive.  The lifestyle changes that you need to make are with regards to: nutrition, cardiovascular exercise, sleep, stress management.  Make these changes every 2 weeks, revisiting the previous goals and perhaps revising them and/or setting new ones.       2) Nutrition: Make as many changes as you can to increase the amount of whole-foods (not Whole Foods, necessarily!  ;-)), plant-based diet as possible:   A) Books: Eat to Live (Dr. Miguel Patel), The Spectrum (Dr. Navin Sibley), The Starch Solution (Dr. Chandler Yeung)      B) Documentaries (can usually be found on [employer]): [place].  Fat, Sick, and Nearly Dead.  Fed Up.           3) Cardiovascular Exercise: The center for disease control recommends a minimum of 150 minutes per week of moderate intensity cardiovascular exercise for weight maintenance and cardiovascular health.  Set this as your initial goal, with at least 30 minutes per session. Types of exercise can include 30 minutes of elliptical, 30 minutes of decently fast jog, 30 minutes of swimming, 30 minutes of heavy gardening (lifting big bags of fertilizer, digging deep holes/ditches).  He can cut down the minute requirements to half, by doing higher intensity sports such as a game of tennis, or soccer.  He notes the library and check out with they have for home exercise programs, as well.       4) Sleep:    A) Goal: Obtain a minimum of 7-8hours of continuous, uninterrupted, restful sleep per night.    B) Tips for Sleep " "Hygiene:    I) Go to bed and wake up at consistent times whether work/school day or not.     II) Keep room dark, quiet, and comfortable.  Increase exposure to sunlight during awake times and avoid bright lights (especially anything with a backlight) at least the last 1-2hours before going to sleep.     III) Don't nap.     IV) Avoid stimulant or caffeine use more than 4 hours after wake time.        5) Stress Management: You cannot change the stresses of life dizziness necessarily, but you can change how he responds of them. One good way to manage stress is to write things down in order to help you process how to approach things in general or specifically. Another good way is to talk it out with someone you trusts, specifically your significant other or good friend. A definite great way to deal with stress is to have cardiovascular exercise!    DASH Eating Plan  DASH stands for \"Dietary Approaches to Stop Hypertension.\" The DASH eating plan is a healthy eating plan that has been shown to reduce high blood pressure (hypertension). Additional health benefits may include reducing the risk of type 2 diabetes mellitus, heart disease, and stroke. The DASH eating plan may also help with weight loss.  WHAT DO I NEED TO KNOW ABOUT THE DASH EATING PLAN?  For the DASH eating plan, you will follow these general guidelines:  · Choose foods with a percent daily value for sodium of less than 5% (as listed on the food label).  · Use salt-free seasonings or herbs instead of table salt or sea salt.  · Check with your health care provider or pharmacist before using salt substitutes.  · Eat lower-sodium products, often labeled as \"lower sodium\" or \"no salt added.\"  · Eat fresh foods.  · Eat more vegetables, fruits, and low-fat dairy products.  · Choose whole grains. Look for the word \"whole\" as the first word in the ingredient list.  · Choose fish and skinless chicken or turkey more often than red meat. Limit fish, poultry, and meat to " 6 oz (170 g) each day.  · Limit sweets, desserts, sugars, and sugary drinks.  · Choose heart-healthy fats.  · Limit cheese to 1 oz (28 g) per day.  · Eat more home-cooked food and less restaurant, buffet, and fast food.  · Limit fried foods.  · Cook foods using methods other than frying.  · Limit canned vegetables. If you do use them, rinse them well to decrease the sodium.  · When eating at a restaurant, ask that your food be prepared with less salt, or no salt if possible.  WHAT FOODS CAN I EAT?  Seek help from a dietitian for individual calorie needs.  Grains  Whole grain or whole wheat bread. Brown rice. Whole grain or whole wheat pasta. Quinoa, bulgur, and whole grain cereals. Low-sodium cereals. Corn or whole wheat flour tortillas. Whole grain cornbread. Whole grain crackers. Low-sodium crackers.  Vegetables  Fresh or frozen vegetables (raw, steamed, roasted, or grilled). Low-sodium or reduced-sodium tomato and vegetable juices. Low-sodium or reduced-sodium tomato sauce and paste. Low-sodium or reduced-sodium canned vegetables.   Fruits  All fresh, canned (in natural juice), or frozen fruits.  Meat and Other Protein Products  Ground beef (85% or leaner), grass-fed beef, or beef trimmed of fat. Skinless chicken or turkey. Ground chicken or turkey. Pork trimmed of fat. All fish and seafood. Eggs. Dried beans, peas, or lentils. Unsalted nuts and seeds. Unsalted canned beans.  Dairy  Low-fat dairy products, such as skim or 1% milk, 2% or reduced-fat cheeses, low-fat ricotta or cottage cheese, or plain low-fat yogurt. Low-sodium or reduced-sodium cheeses.  Fats and Oils  Tub margarines without trans fats. Light or reduced-fat mayonnaise and salad dressings (reduced sodium). Avocado. Safflower, olive, or canola oils. Natural peanut or almond butter.  Other  Unsalted popcorn and pretzels.  The items listed above may not be a complete list of recommended foods or beverages. Contact your dietitian for more  options.  WHAT FOODS ARE NOT RECOMMENDED?  Grains  White bread. White pasta. White rice. Refined cornbread. Bagels and croissants. Crackers that contain trans fat.  Vegetables  Creamed or fried vegetables. Vegetables in a cheese sauce. Regular canned vegetables. Regular canned tomato sauce and paste. Regular tomato and vegetable juices.  Fruits  Dried fruits. Canned fruit in light or heavy syrup. Fruit juice.  Meat and Other Protein Products  Fatty cuts of meat. Ribs, chicken wings, marino, sausage, bologna, salami, chitterlings, fatback, hot dogs, bratwurst, and packaged luncheon meats. Salted nuts and seeds. Canned beans with salt.  Dairy  Whole or 2% milk, cream, half-and-half, and cream cheese. Whole-fat or sweetened yogurt. Full-fat cheeses or blue cheese. Nondairy creamers and whipped toppings. Processed cheese, cheese spreads, or cheese curds.  Condiments  Onion and garlic salt, seasoned salt, table salt, and sea salt. Canned and packaged gravies. Worcestershire sauce. Tartar sauce. Barbecue sauce. Teriyaki sauce. Soy sauce, including reduced sodium. Steak sauce. Fish sauce. Oyster sauce. Cocktail sauce. Horseradish. Ketchup and mustard. Meat flavorings and tenderizers. Bouillon cubes. Hot sauce. Tabasco sauce. Marinades. Taco seasonings. Relishes.  Fats and Oils  Butter, stick margarine, lard, shortening, ghee, and marino fat. Coconut, palm kernel, or palm oils. Regular salad dressings.  Other  Pickles and olives. Salted popcorn and pretzels.  The items listed above may not be a complete list of foods and beverages to avoid. Contact your dietitian for more information.  WHERE CAN I FIND MORE INFORMATION?  National Heart, Lung, and [employer] [place]: www.nhlbi.nih.gov/health/health-topics/topics/dash/     This information is not intended to replace advice given to you by your health care provider. Make sure you discuss any questions you have with your health care provider.     Document Released: 12/06/2012  Document Revised: 01/08/2016 Document Reviewed: 10/22/2014  [employer] Interactive Patient Education ©2016 [employer]         Other Notes About Your Plan     1/7/16 CT thorax follow up nodule, pulmonary function testing, echo pending           [employer] Access Code: [email or web address]  Expires: 7/6/2017  8:36 AM    [employer]  A secure, online tool to manage your health information     [employer]® is a secure, online tool that connects you to your personalized health information from the privacy of your home -- day or night - making it very easy for you to manage your healthcare. Once the activation process is completed, you can even access your medical information using the [employer] emily, which is available for free in the Apple Emily store or Google Play store.     [employer] provides the following levels of access (as shown below):   My Chart Features   [facility] Primary Care Doctor [facility]  Specialists [facility]  Urgent  Care Non-[facility]  Primary Care  Doctor   Email your healthcare team securely and privately 24/7 X X X    Manage appointments: schedule your next appointment; view details of past/upcoming appointments X      Request prescription refills. X      View recent personal medical records, including lab and immunizations X X X X   View health record, including health history, allergies, medications X X X X   Read reports about your outpatient visits, procedures, consult and ER notes X X X X   See your discharge summary, which is a recap of your hospital and/or ER visit that includes your diagnosis, lab results, and care plan. X X       How to register for [employer]:  1. Go to  [email or web address].  2. Click on the Sign Up Now box, which takes you to the New Member Sign Up page. You will need to provide the following information:  a. Enter your [employer] Access Code exactly as it appears at the top of this page. (You will not need to use this code after you’ve completed the sign-up process. If you do not  sign up before the expiration date, you must request a new code.)   b. Enter your date of birth.   c. Enter your home email address.   d. Click Submit, and follow the next screen’s instructions.  3. Create a Appiterate ID. This will be your App[employer] login ID and cannot be changed, so think of one that is secure and easy to remember.  4. Create a [employer] password. You can change your password at any time.  5. Enter your Password Reset Question and Answer. This can be used at a later time if you forget your password.   6. Enter your e-mail address. This allows you to receive e-mail notifications when new information is available in Appiterate.  7. Click Sign Up. You can now view your health information.    For assistance activating your [employer] account, call [phone]

## 2017-06-06 NOTE — PROGRESS NOTES
Subjective:     Chief Complaint   Patient presents with   • Establish Care   • Medication Refill   • Hypertension       History of Present Illness:  Enoch Mistry is a 71 y.o. male established patient who presents today to establish primary medical care with me:    Type 2 diabetes mellitus with complication, without long-term current use of insulin (CMS-HCC)  Patient with recent fasting blood sugar elevation to 239 on CMP drawn on 5/31/2017. Patient denies any obvious signs of hyperglycemia. Dietary issues as below.    ROS is NEGATIVE for blurred vision, polydipsia, polyuria, diaphoresis, palpitations, fatigue, irritability, flank pain, BLE paresthesias.    B: Coffee, Toast OR English Muffin (either one, with butter or jam)  L: (Hawkins & Eggs) OR (Burger + [place])  S: Possibly microwave popcorn   D: Meat (Steak, pork, or chicken), Potato (Baked, mashed), sweet corn on the cob    Strain of right trapezius muscle  Patient concerned about right-sided neck pain that he has been experiencing for the past few weeks. Patient has reduced rotation of neck to right, however does not have any pain that radiates down his bilateral upper extremity is, and also does not have any bilateral upper extremity weakness/numbness/paresthesias.    Patient is not taking medications to address this. Patient cannot recall any clear history of trauma/accidents/falls.    Bilateral lower extremity edema  Patient has bilateral lower extremity edema, right greater than left, and has presented to the hospital for this. When patient was in the hospital, patient was given antibiotics, 2 different types. Thereafter he had follow-up with caking as well as Dr. Millan, gave him different antibiotics each time, to no effective resolution of bilateral lower extremity edema and erythema. Patient suspects, as do I, that this is not due to cellulitis, but rather bilateral lower extremity venous stasis/dermatitis secondary to diabetes as well as  "possible chronic congestive heart failure.     Of note, patient has not had any diagnostic imaging performed echocardiogram. Patient has not had a cardiac stress test on file either. At present, patient denies any chest pain with her typical or atypical at rest or with exertion.    ROS is NEGATIVE for dizziness, generalized weakness/fatigue, vision/hearing changes, jaw pain/paresthesias, BUE pain/paresthesias/numbness/weakness, chest pain/pressure, palpitations, dyspnea, RUQ abdominal pain, oliguria/anuria, BLE edema.    Chronic hypoxemic respiratory failure (CMS-[facility])  Patient with chronic hypoxemic respiratory failure requiring continuous use of supplemental oxygen. At home, patient reports that when on oxygen concentrator his O2 saturations go up to 95-96%. However, on his small oxygen tank while ambulating outside the house, he is typically around 90%.    Of note, patient does have significant smoking history, please see social history as documented elsewhere in this note. Additionally, patient cannot recall having had PFTs last 1-2 years, but believes he has had in the past. Patient is not taking any medications to control his lung conditions, patient denies any recent hospitalizations for lung infections.     ROS is negative for fevers, chills, cough, wheezing/rhonchi/rales, chest congestion, nausea, emesis, generalized weakness/fatigue above normal.    Requires continuous at home supplemental oxygen  Please see note from same date of service 06/06/17 regarding chronic hypoxemic respiratory failure    Benign non-nodular prostatic hyperplasia with lower urinary tract symptoms  Former diagnosis of BPH + history of bladder cancer with recent PSA on 05/31/17 of 5.88.  Patient has had history of transurethral resection by \"green laser.\"    ROS is POSITIVE for nocturia, urinary hesitancy, NEGATIVE for polyuria, increased frequency of urination, feeling of incomplete voiding, difficulty initiation urine stream, " hematuria, pyuria    Essential hypertension  Patient with hypertension, wasn't trialed on ACE-Inhibitor or ARB d/t previously refusing to get labs.  Patient brought in his blood pressure log over the last 3 weeks, which reveals that his blood pressure does dip down into the somewhat acceptable range of 140-150 systolic over 60s to 70s diastolic.    ROS is NEGATIVE for dizziness, generalized weakness/fatigue, vision/hearing changes, jaw pain/paresthesias, BUE pain/paresthesias/numbness/weakness, chest pain/pressure, palpitations, dyspnea, RUQ abdominal pain, oliguria/anuria, BLE edema.      Patient Active Problem List    Diagnosis Date Noted   • Requires continuous at home supplemental oxygen 06/06/2017   • Type 2 diabetes mellitus with complication, without long-term current use of insulin (CMS-HCC) 06/06/2017   • Benign non-nodular prostatic hyperplasia with lower urinary tract symptoms 06/06/2017   • Bilateral lower extremity edema 06/06/2017   • Strain of right trapezius muscle 06/06/2017   • Obesity 01/07/2016   • Preventative health care 01/07/2016   • Chronic hypoxemic respiratory failure (CMS-HCC) 01/07/2016   • Adrenal adenoma 01/07/2016   • Asymptomatic cholelithiasis 01/07/2016   • Pulmonary nodule 04/03/2015   • History of bladder cancer 04/02/2015   • Essential hypertension 04/01/2015       Additional History:   Allergies:    Review of patient's allergies indicates no known allergies.     Current Medications:     Current Outpatient Prescriptions   Medication Sig Dispense Refill   • Blood Glucose Monitoring Suppl (B-D LATITUDE DIABETES) Kit 1 Units by Does not apply route every day. 1 Kit 0   • amlodipine (NORVASC) 5 MG Tab Take 1 Tab by mouth every day. 30 Tab 2     No current facility-administered medications for this visit.        Social History:     Social History   Substance Use Topics   • Smoking status: Former Smoker -- 2.00 packs/day for 40 years     Types: Cigarettes     Quit date: 01/01/2006   •  "Smokeless tobacco: Never Used   • Alcohol Use: 0.0 oz/week     0 Standard drinks or equivalent per week      Comment: 7 beers per week       ROS:     - NOTE: All other systems reviewed and are negative, except as in HPI.     Objective:   Physical Exam:    Vitals: Blood pressure 158/68, pulse 101, temperature 37.5 °C (99.5 °F), height 1.689 m (5' 6.5\"), weight 107.593 kg (237 lb 3.2 oz), SpO2 90 %.   BMI: Body mass index is 37.72 kg/(m^2).   General/Constitutional: Vitals as above, Well nourished, well developed male in no acute distress   Head/Eyes: Head is grossly normal & atraumatic, bilateral conjunctivae clear and not injected, bilateral EOMI, bilateral PERRL   ENT: Bilateral external ears grossly normal in appearance, Hearing grossly intact, External nares normal in appearance and without discharge/bleeding   Respiratory: No respiratory distress, bilateral lungs are clear to ausculation in all lung fields (anterior/lateral/posterior), no wheezing/rhonchi/rales   Cardiovascular: Regular rate and rhythm without murmur/gallops/rubs, distal pulses are intact and equal bilaterally (radial, posterior tibial), no bilateral lower extremity edema   MSK: Tenderness to palpation of the upper portion of right trapezius beginning at right occiput and extending to distal lateral margin near the acromion, no tenderness to palpation nor abnormal step-offs of the vertebrae from C-spine down to T-spine, BUE gross motor movement intact, Gait grossly normal & not antalgic   Integumentary: No apparent rashes   Psych: Judgment grossly appropriate, no apparent depression/anxiety    Health Maintenance:      - Patient declines vaccinations as well as colonoscopy at this time, we will discuss this at next visit.    Imaging/Labs:      - Point-of-care A1c elevated at 10.4%, indicative of diabetes type 2 that is uncontrolled     - PSA level slightly high but <10, likely exacerbation of BPH     - CMP WNL --> liver function & kidney function " within normal limits     - TSH WNL --> likely no thyroid abnormality     - Total and LDL cholesterol levels are within normal limits, good cholesterol (HDL) low    Component Value Ref Range & Units Status     Cholesterol,Tot 99 (L) 100 - 199 mg/dL Final     Triglycerides 124 0 - 149 mg/dL Final     HDL 35 (A) >=40 mg/dL Final     LDL 39 <100 mg/dL Final     Assessment and Plan:   1. Type 2 diabetes mellitus with complication, without long-term current use of insulin (CMS-[facility])  2. Elevated fasting blood sugar  Uncontrolled.   A) Education: Patient and I discussed the importance of lifestyle changes, with particular emphasis on plant-based nutrition (for the purposes of weight loss, general health, DM2, DLD, BPH), as well as cardiovascular exercise, proper sleep, and stress management.  This discussion is briefly summarized in the patient instruction section below.  Patient verbalized understanding.     B) Evaluation: A1c elevated, patient to schedule retinal eye exam, as well as obtain urine microalbumin to creatinine ratio.    - POCT Hemoglobin A1C    - POCT Retinal Eye Exam    - MICROALB/CREAT RATIO RAND. UR   C) Management: Patient to check fasting blood sugars every day, and to log this, that we will review at next visit. Additionally, referring patient to diabetic education.    - Blood Glucose Monitoring Suppl (B-D LATITUDE DIABETES) Kit; 1 Units by Does not apply route every day.  Dispense: 1 Kit; Refill: 0    - REFERRAL TO DIABETIC EDUCATION Diabetes Self Management Education / Training (DSME/T) and Medical Nutrition Therapy (MNT): Initial Group DSME/MNT as authorized by payor; DSME/T Content: Monitoring Diabetes, Diabetes as disease process, Psychological     3. Strain of right trapezius muscle, initial encounter  Strain of her trapezius muscle, patient given conservative care instructions, including cautious use of NSAIDs (ibuprofen 400-600mg every 6 hours as needed), use of warm and cold compresses, gentle  stretching as well as gentle massages. We will follow-up on this in approximately 2 weeks when we review the rest of his labs as well as possibly his echocardiogram.    4. Bilateral lower extremity edema  Uncontrolled, likely secondary to diabetes, diabetic venous stasis dermatitis, as well as possible chronic congestive heart failure. Evaluate with echocardiogram.   - ECHOCARDIOGRAM COMP W/O CONT; Future    5. Chronic hypoxemic respiratory failure (CMS-HCC)  6. Requires continuous at home supplemental oxygen  Unknown control, patient to obtain PFTs. Patient advised that he should use supplemental oxygen to maintain oxygen levels at or above 90-92%.   - PULMONARY FUNCTION TESTS Test requested: Complete Pulmonary Function Test    7. Benign non-nodular prostatic hyperplasia with lower urinary tract symptoms  Uncontrolled, with elevated PSA to 5.88. Patient probably has BPH, but is currently refusing alpha-1 receptor antagonists as well as 5 alpha reductase inhibitors.  Therefore, I am suggesting and patient agrees to follow up with urologist specialist.   - REFERRAL TO UROLOGY    8. Essential hypertension  Uncontrolled, patient not in hypertensive urgency versus emergency ranges, and otherwise asymptomatic. I'll consider an increase of his medication by also using an ACE inhibitor at a relatively low to medium dose. We will reevaluate this at next clinic visit.    NOTE: A total of 40minutes was spent in direct face-to-face time with the patient, of which over 50% of the time was spent in counseling and/or coordination of care, the contents of which are described in this note.    RTC: 2 weeks for labs follow, imaging follow-up, and PFTs follow-up.    PLEASE NOTE: This dictation was created using voice recognition software. I have made every reasonable attempt to correct obvious errors, but I expect that there are errors of grammar and possibly content that I did not discover before finalizing the note.

## 2017-06-06 NOTE — ASSESSMENT & PLAN NOTE
Patient concerned about right-sided neck pain that he has been experiencing for the past few weeks. Patient has reduced rotation of neck to right, however does not have any pain that radiates down his bilateral upper extremity is, and also does not have any bilateral upper extremity weakness/numbness/paresthesias.    Patient is not taking medications to address this. Patient cannot recall any clear history of trauma/accidents/falls.

## 2017-06-06 NOTE — ASSESSMENT & PLAN NOTE
Please see note from same date of service 06/06/17 regarding chronic hypoxemic respiratory failure

## 2017-06-06 NOTE — ASSESSMENT & PLAN NOTE
"Former diagnosis of BPH + history of bladder cancer with recent PSA on 05/31/17 of 5.88.  Patient has had history of transurethral resection by \"green laser.\"    ROS is POSITIVE for nocturia, urinary hesitancy, NEGATIVE for polyuria, increased frequency of urination, feeling of incomplete voiding, difficulty initiation urine stream, hematuria, pyuria  "

## 2017-06-06 NOTE — TELEPHONE ENCOUNTER
Pt wants his glucose monitor to be sent to [facility] [place]. Also, I tried to call in the Rx but the pharmacy requires the brand and the frequency that the pt needs to test his glucose.

## 2017-06-06 NOTE — ASSESSMENT & PLAN NOTE
Patient with hypertension, wasn't trialed on ACE-Inhibitor or ARB d/t previously refusing to get labs.  Patient brought in his blood pressure log over the last 3 weeks, which reveals that his blood pressure does dip down into the somewhat acceptable range of 140-150 systolic over 60s to 70s diastolic.    ROS is NEGATIVE for dizziness, generalized weakness/fatigue, vision/hearing changes, jaw pain/paresthesias, BUE pain/paresthesias/numbness/weakness, chest pain/pressure, palpitations, dyspnea, RUQ abdominal pain, oliguria/anuria, BLE edema.

## 2017-06-07 NOTE — TELEPHONE ENCOUNTER
Rx was called in to pharmacy. Onetouch glucometer. Check fasting blood sugar once daily in the morning, fasting. No refill

## 2017-06-08 ENCOUNTER — NON-PROVIDER VISIT (OUTPATIENT)
Dept: MEDICAL GROUP | Facility: MEDICAL CENTER | Age: 72
End: 2017-06-08
Payer: MEDICARE

## 2017-06-08 NOTE — PROGRESS NOTES
Enoch Mitsry is a 71 y.o. male here for a non-provider visit for Retinal Exam.     If abnormal was an in office provider notified today (if so, indicate provider)? Yes  Routed to PCP? No    Pt notified his PCP would contact him for results.

## 2017-06-08 NOTE — MR AVS SNAPSHOT
Enoch Mistry   2017 8:00 AM   Non-Provider Visit   MRN: [id number]    Department:  [facility]   Dept Phone:  [phone]    Description:  Male : 1945   Provider:  [facility] [place] [facility]           Reason for Visit     Eye Exam Retinal       Allergies as of 2017     No Known Allergies      Vital Signs     Smoking Status                   Former Smoker           Basic Information     Date Of Birth Sex Race Ethnicity Preferred Language    1945 Male White Non- English      Your appointments     Josue 15, 2017  9:00 AM   Pulmonary Function Test with PULMONARY FUNCTION LAB   [place] [facility]    [address]  [place] [address]   [phone]            2017  8:00 AM   Established Patient with Charles Duke M.D.   [facility])    [address]  [address]  [place] [id number] [phone]           You will be receiving a confirmation call a few days before your appointment from our automated call confirmation system.            2017  9:15 AM   ECHO with ECHO [facility]   ECHOCARDIOLOGY [facility] ([facility])    [address]  [place] [address]   [phone]              Problem List              ICD-10-CM Priority Class Noted - Resolved    Essential hypertension I10   2015 - Present    History of bladder cancer Z85.51   2015 - Present    Pulmonary nodule R91.1   4/3/2015 - Present    Obesity E66.9   2016 - Present    Preventative health care Z00.00   2016 - Present    Chronic hypoxemic respiratory failure (CMS-HCC) J96.11   2016 - Present    Adrenal adenoma D35.00   2016 - Present    Asymptomatic cholelithiasis K80.20   2016 - Present    Requires continuous at home supplemental oxygen Z99.81   2017 - Present    Type 2 diabetes mellitus with complication, without long-term current use of insulin (CMS-HCC) E11.8   2017 - Present    Benign non-nodular prostatic  hyperplasia with lower urinary tract symptoms N40.1   6/6/2017 - Present    Bilateral lower extremity edema R60.0   6/6/2017 - Present    Strain of right trapezius muscle S46.811A   6/6/2017 - Present      Health Maintenance        Date Due Completion Dates    DIABETES MONOFILAMENT / LE EXAM 4/11/1946 ---    RETINAL SCREENING 10/11/1963 ---    URINE ACR / MICROALBUMIN 10/11/1963 ---    IMM DTaP/Tdap/Td Vaccine (1 - Tdap) 10/11/1964 ---    IMM ZOSTER VACCINE 10/11/2005 ---    IMM PNEUMOCOCCAL 65+ (ADULT) LOW/MEDIUM RISK SERIES (1 of 2 - PCV13) 10/11/2010 ---    COLONOSCOPY 4/20/2016 4/20/2015 (Declined)    Override on 4/20/2015: Patient Declined    A1C SCREENING 12/6/2017 6/6/2017    FASTING LIPID PROFILE 5/31/2018 5/31/2017    SERUM CREATININE 5/31/2018 5/31/2017, 6/19/2015, 5/8/2015, 5/5/2015, 4/6/2015, 4/5/2015, 4/3/2015, 4/2/2015, 4/1/2015, 4/18/2014, 8/4/2013            Current Immunizations     No immunizations on file.      Below and/or attached are the medications your provider expects you to take. Review all of your home medications and newly ordered medications with your provider and/or pharmacist. Follow medication instructions as directed by your provider and/or pharmacist. Please keep your medication list with you and share with your provider. Update the information when medications are discontinued, doses are changed, or new medications (including over-the-counter products) are added; and carry medication information at all times in the event of emergency situations     Allergies:  No Known Allergies          Medications  Valid as of: June 08, 2017 -  8:14 AM    Generic Name Brand Name Tablet Size Instructions for use    AmLODIPine Besylate (Tab) NORVASC 5 MG Take 1 Tab by mouth every day.        Blood Glucose Monitoring Suppl (Kit) B-D LATITUDE DIABETES  1 Units by Does not apply route every day.        .                 Medicines prescribed today were sent to:     [facility][id number] - [place]  [address]    [address] [place] [address]    Phone: [phone] Fax: [phone]    Open 24 Hours?: No      Medication refill instructions:       If your prescription bottle indicates you have medication refills left, it is not necessary to call your provider’s office. Please contact your pharmacy and they will refill your medication.    If your prescription bottle indicates you do not have any refills left, you may request refills at any time through one of the following ways: The online E-Generator system (except Urgent Care), by calling your provider’s office, or by asking your pharmacy to contact your provider’s office with a refill request. Medication refills are processed only during regular business hours and may not be available until the next business day. Your provider may request additional information or to have a follow-up visit with you prior to refilling your medication.   *Please Note: Medication refills are assigned a new Rx number when refilled electronically. Your pharmacy may indicate that no refills were authorized even though a new prescription for the same medication is available at the pharmacy. Please request the medicine by name with the pharmacy before contacting your provider for a refill.        Other Notes About Your Plan     1/7/16 CT thorax follow up nodule, pulmonary function testing, echo pending           E-Generator Access Code: [email or web address]  Expires: 7/6/2017  8:36 AM    E-Generator  A secure, online tool to manage your health information     [employer]’s E-Generator® is a secure, online tool that connects you to your personalized health information from the privacy of your home -- day or night - making it very easy for you to manage your healthcare. Once the activation process is completed, you can even access your medical information using the E-Generator emily, which is available for free in the Apple Emily store or Google Play store.     E-Generator provides the  following levels of access (as shown below):   My Chart Features   [facility] Doctor [facility]  Specialists [facility]  Urgent  Care Non-[facility]  Primary Care  Doctor   Email your healthcare team securely and privately 24/7 X X X    Manage appointments: schedule your next appointment; view details of past/upcoming appointments X      Request prescription refills. X      View recent personal medical records, including lab and immunizations X X X X   View health record, including health history, allergies, medications X X X X   Read reports about your outpatient visits, procedures, consult and ER notes X X X X   See your discharge summary, which is a recap of your hospital and/or ER visit that includes your diagnosis, lab results, and care plan. X X       How to register for [employer]:  1. Go to  [email or web address].  2. Click on the Sign Up Now box, which takes you to the New Member Sign Up page. You will need to provide the following information:  a. Enter your [employer]on Access Code exactly as it appears at the top of this page. (You will not need to use this code after you’ve completed the sign-up process. If you do not sign up before the expiration date, you must request a new code.)   b. Enter your date of birth.   c. Enter your home email address.   d. Click Submit, and follow the next screen’s instructions.  3. Create a Avieon ID. This will be your [employer]on login ID and cannot be changed, so think of one that is secure and easy to remember.  4. Create a [employer]on password. You can change your password at any time.  5. Enter your Password Reset Question and Answer. This can be used at a later time if you forget your password.   6. Enter your e-mail address. This allows you to receive e-mail notifications when new information is available in [employer]on.  7. Click Sign Up. You can now view your health information.    For assistance activating your [employer] account, call [phone]

## 2017-06-15 ENCOUNTER — HOSPITAL ENCOUNTER (OUTPATIENT)
Dept: OTHER | Facility: MEDICAL CENTER | Age: 72
End: 2017-06-15
Attending: FAMILY MEDICINE
Payer: MEDICARE

## 2017-06-15 DIAGNOSIS — J96.11 CHRONIC HYPOXEMIC RESPIRATORY FAILURE (HCC): ICD-10-CM

## 2017-06-15 DIAGNOSIS — Z99.81 REQUIRES CONTINUOUS AT HOME SUPPLEMENTAL OXYGEN: ICD-10-CM

## 2017-06-15 PROCEDURE — 94729 DIFFUSING CAPACITY: CPT | Mod: 26 | Performed by: INTERNAL MEDICINE

## 2017-06-15 PROCEDURE — 94729 DIFFUSING CAPACITY: CPT

## 2017-06-15 PROCEDURE — 94726 PLETHYSMOGRAPHY LUNG VOLUMES: CPT

## 2017-06-15 PROCEDURE — 94060 EVALUATION OF WHEEZING: CPT

## 2017-06-15 PROCEDURE — 94060 EVALUATION OF WHEEZING: CPT | Mod: 26 | Performed by: INTERNAL MEDICINE

## 2017-06-15 PROCEDURE — 94726 PLETHYSMOGRAPHY LUNG VOLUMES: CPT | Mod: 26 | Performed by: INTERNAL MEDICINE

## 2017-06-15 ASSESSMENT — PULMONARY FUNCTION TESTS
FEV1/FVC_PERCENT_PREDICTED: 87
FVC_PREDICTED: 3.63
FEV1_PERCENT_CHANGE: 8
FVC_PERCENT_PREDICTED: 95
FVC: 3.18
FVC: 3.44
FEV1_PERCENT_PREDICTED: 83
FEV1/FVC_PERCENT_CHANGE: 100
FEV1_PREDICTED: 2.65
FEV1: 2.04
FVC_PERCENT_PREDICTED: 88
FEV1/FVC: 63.95
FEV1: 2.2
FEV1_PERCENT_PREDICTED: 77
FEV1/FVC_PERCENT_PREDICTED: 73
FEV1/FVC: 64
FEV1/FVC_PERCENT_PREDICTED: 88
FEV1_PERCENT_CHANGE: 8

## 2017-06-18 NOTE — PROCEDURES
DATE OF SERVICE:  06/15/2017    FINDINGS:  1.  Baseline FEV1 is 2.07 liters, which is 77% of predicted.  The FEV1/FVC   ratio is reduced at 64%.  MVV is reduced at 58% of predicted.  After   administration of an inhaled bronchodilator, there is a relative 8%   improvement in FEV1.  2.  Lung volumes demonstrate hyperinflation.  3.  DLCO is 62% of predicted.  4.  Airway resistance is normal.    IMPRESSION:  Mild-to-moderate obstructive lung disease.  The decrease in DLCO   would be consistent with a clinical diagnosis of emphysema.       ____________________________________     MD JUSTA SHELDON / JOAQUÍN    DD:  06/18/2017 06:55:46  DT:  06/18/2017 07:21:08    D#:  [id number]  Job#:  [id number]

## 2017-06-19 ENCOUNTER — TELEPHONE (OUTPATIENT)
Dept: MEDICAL GROUP | Facility: MEDICAL CENTER | Age: 72
End: 2017-06-19

## 2017-06-19 NOTE — TELEPHONE ENCOUNTER
Phone Number Called: [phone]    Message: Left message for patient to call back.     Left Message for patient to call back: yes

## 2017-06-19 NOTE — TELEPHONE ENCOUNTER
----- Message from Charles Duke M.D. sent at 6/16/2017  5:35 PM PDT -----  MA to call patient to relate the following:     - No diabetic retinal changes detected.       - If patient would like to discuss them in further detail, we can discuss this at clinic visit on 06/21/17@8am.

## 2017-06-20 ENCOUNTER — TELEPHONE (OUTPATIENT)
Dept: MEDICAL GROUP | Facility: MEDICAL CENTER | Age: 72
End: 2017-06-20

## 2017-06-20 NOTE — TELEPHONE ENCOUNTER
ESTABLISHED PATIENT PRE-VISIT PLANNING     Note: Patient will not be contacted if there is no indication to call.     1.  Reviewed notes from the last few office visits within the medical group: Yes    2.  If any orders were placed at last visit or intended to be done for this visit (i.e. 6 mos follow-up), do we have Results/Consult Notes?        •  Labs - Labs ordered, completed and results are in chart.       •  Imaging - Echo scheduled        •  Referrals - Urology pending     3. Is this appointment scheduled as a Hospital Follow-Up? Yes, visit was at [facility].     4.  Immunizations were updated in Epic using WebIZ?: No WebIZ record       •  Web Iz Recommendations: n/a    5.  Patient is due for the following Health Maintenance Topics:   Health Maintenance Due   Topic Date Due   • Annual Wellness Visit  1945   • DIABETES MONOFILAMENT / LE EXAM  04/11/1946   • URINE ACR / MICROALBUMIN  10/11/1963   • IMM DTaP/Tdap/Td Vaccine (1 - Tdap) 10/11/1964   • IMM ZOSTER VACCINE  10/11/2005   • IMM PNEUMOCOCCAL 65+ (ADULT) LOW/MEDIUM RISK SERIES (1 of 2 - PCV13) 10/11/2010   • COLONOSCOPY  04/20/2016           6.  Patient was informed to arrive 15 min prior to their scheduled appointment and bring in their medication bottles.      Left msg for patient.

## 2017-06-20 NOTE — TELEPHONE ENCOUNTER
Phone Number Called: [phone] (home)     Message: left msg for patient to call back.    Left Message for patient to call back: yes

## 2017-06-21 ENCOUNTER — OFFICE VISIT (OUTPATIENT)
Dept: MEDICAL GROUP | Facility: MEDICAL CENTER | Age: 72
End: 2017-06-21
Payer: MEDICARE

## 2017-06-21 VITALS
HEART RATE: 103 BPM | WEIGHT: 235 LBS | OXYGEN SATURATION: 89 % | SYSTOLIC BLOOD PRESSURE: 144 MMHG | BODY MASS INDEX: 37.77 KG/M2 | TEMPERATURE: 99.1 F | DIASTOLIC BLOOD PRESSURE: 60 MMHG | HEIGHT: 66 IN

## 2017-06-21 DIAGNOSIS — E11.8 TYPE 2 DIABETES MELLITUS WITH COMPLICATION, WITHOUT LONG-TERM CURRENT USE OF INSULIN (HCC): ICD-10-CM

## 2017-06-21 DIAGNOSIS — J44.9 STAGE 2 MODERATE COPD BY GOLD CLASSIFICATION (HCC): ICD-10-CM

## 2017-06-21 DIAGNOSIS — Z78.9 HEALTH MAINTENANCE ALTERATION: ICD-10-CM

## 2017-06-21 DIAGNOSIS — S46.811A STRAIN OF RIGHT TRAPEZIUS MUSCLE, INITIAL ENCOUNTER: ICD-10-CM

## 2017-06-21 DIAGNOSIS — J96.11 CHRONIC HYPOXEMIC RESPIRATORY FAILURE (HCC): ICD-10-CM

## 2017-06-21 PROCEDURE — 99215 OFFICE O/P EST HI 40 MIN: CPT | Performed by: FAMILY MEDICINE

## 2017-06-21 RX ORDER — BLOOD-GLUCOSE METER
KIT MISCELLANEOUS
COMMUNITY
Start: 2017-06-07 | End: 2017-08-15 | Stop reason: SDUPTHER

## 2017-06-21 RX ORDER — TIOTROPIUM BROMIDE 18 UG/1
18 CAPSULE ORAL; RESPIRATORY (INHALATION) DAILY
Qty: 90 CAP | Refills: 1 | Status: SHIPPED | OUTPATIENT
Start: 2017-06-21 | End: 2017-09-11

## 2017-06-21 RX ORDER — LANCETS 28 GAUGE
EACH MISCELLANEOUS
COMMUNITY
Start: 2017-06-07 | End: 2017-09-11

## 2017-06-21 RX ORDER — BLOOD-GLUCOSE METER
KIT MISCELLANEOUS
COMMUNITY
Start: 2017-06-07 | End: 2017-09-11

## 2017-06-21 NOTE — ASSESSMENT & PLAN NOTE
"Patient has brought in his diabetic fasting blood sugar logs. Patient's fasting blood sugars range around 180, but has a low 156 and a high of 203.  Patient states he has made some changes (has cut out all excess sugars, as no longer drinking juice or service, and is \"trying to eat more veggies and more healthier.\").  We titrated historical A1c should he maintain these changes, and his A1c should be between 8.3  and 8.7%.    ROS is NEGATIVE for blurred vision, polydipsia, polyuria, diaphoresis, palpitations, fatigue, irritability, flank pain, BLE paresthesias.  "

## 2017-06-21 NOTE — ASSESSMENT & PLAN NOTE
Pain is mildly improved since last visit, however patient states that this is still able to wake him up at night. He is not waking up at night gasping for air, is not waking up with pounding headaches or respiratory distress.    Patient states he has been taking ibuprofen either 400 or 600 mg by mouth.  Patient has not been doing massages, states that warm and cold compresses don't really help, and is declining physical therapy.    Patient rotation of neck to right is improved.    ROS is NEGATIVE for bilateral upper extremity radicular pain, bilateral upper extremity weakness/numbness/paresthesias.

## 2017-06-21 NOTE — PROGRESS NOTES
"Subjective:     Chief Complaint   Patient presents with   • Diabetes     follow-up visit       History of Present Illness:  Enoch Mistry is a 71 y.o. male established patient who presents today to follow-up re: diabetes & COPD management:    Stage 2 moderate COPD by GOLD classification (CMS-HCC)  Patient with PFTs on Sasha 15 217 which showed an FEV1 over FVC ratio of 64%, an FEV1 of 77%. This qualifies him for stage II gold criteria, and CAT scoring here in clinic today was 20, which is class B.      Patient continues to have chronic hypoxemic respiratory failure, needing to use supplemental oxygen at all times. Patient is currently using 4 L/m, and getting an O2 sat of 89%. Patient states he typically saturates at 90% outside of the house on his oxygen tank, however can get up to 95% with his oxygen culture at home.    ROS is negative for fevers, chills, cough, wheezing/rhonchi/rales, chest congestion, nausea, emesis, generalized weakness/fatigue above normal.    Chronic hypoxemic respiratory failure (CMS-HCC)  Please see notes from same date of service 6/21/2017 regarding Stage 2 moderate COPD.    Type 2 diabetes mellitus with complication, without long-term current use of insulin (CMS-HCC)  Patient has brought in his diabetic fasting blood sugar logs. Patient's fasting blood sugars range around 180, but has a low 156 and a high of 203.  Patient states he has made some changes (has cut out all excess sugars, as no longer drinking juice or service, and is \"trying to eat more veggies and more healthier.\").  We titrated historical A1c should he maintain these changes, and his A1c should be between 8.3  and 8.7%.    ROS is NEGATIVE for blurred vision, polydipsia, polyuria, diaphoresis, palpitations, fatigue, irritability, flank pain, BLE paresthesias.    Strain of right trapezius muscle  Pain is mildly improved since last visit, however patient states that this is still able to wake him up at night. He is not " waking up at night gasping for air, is not waking up with pounding headaches or respiratory distress.    Patient states he has been taking ibuprofen either 400 or 600 mg by mouth.  Patient has not been doing massages, states that warm and cold compresses don't really help, and is declining physical therapy.    Patient rotation of neck to right is improved.    ROS is NEGATIVE for bilateral upper extremity radicular pain, bilateral upper extremity weakness/numbness/paresthesias.      Patient Active Problem List    Diagnosis Date Noted   • Stage 2 moderate COPD by GOLD classification (CMS-HCC) 06/21/2017   • Health maintenance alteration 06/21/2017   • Requires continuous at home supplemental oxygen 06/06/2017   • Type 2 diabetes mellitus with complication, without long-term current use of insulin (CMS-HCC) 06/06/2017   • Benign non-nodular prostatic hyperplasia with lower urinary tract symptoms 06/06/2017   • Bilateral lower extremity edema 06/06/2017   • Strain of right trapezius muscle 06/06/2017   • Obesity 01/07/2016   • Preventative health care 01/07/2016   • Chronic hypoxemic respiratory failure (CMS-HCC) 01/07/2016   • Adrenal adenoma 01/07/2016   • Asymptomatic cholelithiasis 01/07/2016   • Pulmonary nodule 04/03/2015   • History of bladder cancer 04/02/2015   • Essential hypertension 04/01/2015       Additional History:   Allergies:    Review of patient's allergies indicates no known allergies.     Current Medications:     Current Outpatient Prescriptions   Medication Sig Dispense Refill   • tiotropium (SPIRIVA) 18 MCG Cap Inhale 1 Cap by mouth every day. 90 Cap 1   • Albuterol Sulfate 108 (90 BASE) MCG/ACT AEROSOL POWDER, BREATH ACTIVATED Inhale 2 Puffs by mouth every 6 hours as needed. 1 Each 3   • Blood Glucose Monitoring Suppl (FREESTYLE LITE) Device      • FREESTYLE LITE strip      • FREESTYLE LANCETS Misc      • Blood Glucose Monitoring Suppl (B-D LATITUDE DIABETES) Kit 1 Units by Does not apply route  "every day. 1 Kit 0   • amlodipine (NORVASC) 5 MG Tab Take 1 Tab by mouth every day. 30 Tab 2     No current facility-administered medications for this visit.        Social History:     Social History   Substance Use Topics   • Smoking status: Former Smoker -- 2.00 packs/day for 40 years     Types: Cigarettes     Quit date: 01/01/2006   • Smokeless tobacco: Never Used   • Alcohol Use: 0.0 oz/week     0 Standard drinks or equivalent per week      Comment: 7 beers per week       ROS:     - NOTE: All other systems reviewed and are negative, except as in HPI.     Objective:   Physical Exam:    Vitals: Blood pressure 144/60, pulse 103, temperature 37.3 °C (99.1 °F), height 1.689 m (5' 6.5\"), weight 106.595 kg (235 lb), SpO2 89 %.   BMI: Body mass index is 37.37 kg/(m^2).   General/Constitutional: Vitals as above, Well nourished, well developed male in no acute distress   Head/Eyes: Head is grossly normal & atraumatic, bilateral conjunctivae clear and not injected, bilateral EOMI, bilateral PERRL   ENT: Bilateral external ears grossly normal in appearance, Hearing grossly intact, External nares normal in appearance and without discharge/bleeding   Respiratory: No respiratory distress, bilateral lungs are clear to ausculation in all lung fields (anterior/lateral/posterior), no wheezing/rhonchi/rales, nasal canula in place   Cardiovascular: Regular rate and rhythm without murmur/gallops/rubs, distal pulses are intact and equal bilaterally (radial, posterior tibial), no bilateral lower extremity edema   MSK: Minimal tenderness to palpation of the upper portion of right trapezius beginning at right occiput and extending to distal lateral margin near the acromion, no tenderness to palpation nor abnormal step-offs of the vertebrae from C-spine down to T-spine, BUE gross motor movement intact, Gait grossly normal & not antalgic   Integumentary: No apparent rashes   Psych: Judgment grossly appropriate, no apparent " depression/anxiety    Health Maintenance:      - declines vaccines + colonoscopy    Imaging/Labs:      - 06/13/17 -- No DM Retinopathy     - PFTs discussed as in history of present illness above    Assessment and Plan:   1. Stage 2 moderate COPD by GOLD classification (CMS-HCC)  2. Chronic hypoxemic respiratory failure (CMS-HCC)  Uncontrolled. Patient to continue the supplemental oxygen. Based off of COPD staging, I am recommended patient start Spiriva on a daily basis, and to use albuterol as rescue inhaler as needed.   - tiotropium (SPIRIVA) 18 MCG Cap; Inhale 1 Cap by mouth every day.  Dispense: 90 Cap; Refill: 1   - Albuterol Sulfate 108 (90 BASE) MCG/ACT AEROSOL POWDER, BREATH ACTIVATED; Inhale 2 Puffs by mouth every 6 hours as needed.  Dispense: 1 Each; Refill: 3    3. Type 2 diabetes mellitus with complication, without long-term current use of insulin (CMS-HCC)  Uncontrolled, however improving based off of rough coagulations as in history of present illness above. Patient given instructions to use resources such as nutrition facts that ordered as well as eat to live to help pursue further changes to his nutrition.    4. Strain of right trapezius muscle, initial encounter  Uncontrolled, improving, with improved range of motion and decreased pain. Patient continues to defer/decline PT.    5. Health maintenance alteration  See health maintenance as an objective section above.      RTC:  3 months for general checkup including diabetes management and COPD management.    PLEASE NOTE: This dictation was created using voice recognition software. I have made every reasonable attempt to correct obvious errors, but I expect that there are errors of grammar and possibly content that I did not discover before finalizing the note.

## 2017-06-21 NOTE — PATIENT INSTRUCTIONS
UROLOGY [address]     [place]  [address]     Phone:[phone]     Some resources for you to make more healthy changes to diet:     1) https://nutritionfacts.org/  -- Online videos about nutrition, short (2-5minutes), easy to understand     2) Eat to Live by Dr. Miguel Patel

## 2017-06-21 NOTE — MR AVS SNAPSHOT
"        Enoch Mistry   2017 8:00 AM   Office Visit   MRN: [id number]    Department:  [place] [address]   Dept Phone:  [phone]    Description:  Male : 1945   Provider:  Charles Duke M.D.           Reason for Visit     Diabetes follow-up visit      Allergies as of 2017     No Known Allergies      You were diagnosed with     Stage 2 moderate COPD by GOLD classification (CMS-HCC)   [8589201]       Health maintenance alteration   [424552]       Type 2 diabetes mellitus with complication, without long-term current use of insulin (CMS-HCC)   [8236325]       Strain of right trapezius muscle, initial encounter   [133580]       Chronic hypoxemic respiratory failure (CMS-HCC)   [616473]         Vital Signs     Blood Pressure Pulse Temperature Height Weight Body Mass Index    144/60 mmHg 103 37.3 °C (99.1 °F) 1.689 m (5' 6.5\") 106.595 kg (235 lb) 37.37 kg/m2    Oxygen Saturation Smoking Status                89% Former Smoker          Basic Information     Date Of Birth Sex Race Ethnicity Preferred Language    1945 Male White Non- English      Your appointments     2017  9:15 AM   ECHO with [facility]   [facility]ARD[facility]OLOGY [facility])    [address]  [place] [address]   [phone]            Sep 21, 2017  8:00 AM   Established Patient with Charles Duke M.D.   [facility])    [address]  [address]  [place] [id number] [phone]           You will be receiving a confirmation call a few days before your appointment from our automated call confirmation system.              Problem List              ICD-10-CM Priority Class Noted - Resolved    Essential hypertension I10   2015 - Present    History of bladder cancer Z85.51   2015 - Present    Pulmonary nodule R91.1   4/3/2015 - Present    Obesity E66.9   2016 - Present    Preventative health care Z00.00   2016 - Present    Chronic hypoxemic " respiratory failure (CMS-HCC) J96.11   1/7/2016 - Present    Adrenal adenoma D35.00   1/7/2016 - Present    Asymptomatic cholelithiasis K80.20   1/7/2016 - Present    Requires continuous at home supplemental oxygen Z99.81   6/6/2017 - Present    Type 2 diabetes mellitus with complication, without long-term current use of insulin (CMS-HCC) E11.8   6/6/2017 - Present    Benign non-nodular prostatic hyperplasia with lower urinary tract symptoms N40.1   6/6/2017 - Present    Bilateral lower extremity edema R60.0   6/6/2017 - Present    Strain of right trapezius muscle S46.811A   6/6/2017 - Present    Stage 2 moderate COPD by GOLD classification (CMS-HCC) J44.9   6/21/2017 - Present    Health maintenance alteration Z78.9   6/21/2017 - Present      Health Maintenance        Date Due Completion Dates    DIABETES MONOFILAMENT / LE EXAM 4/11/1946 ---    URINE ACR / MICROALBUMIN 10/11/1963 ---    IMM DTaP/Tdap/Td Vaccine (1 - Tdap) 10/11/1964 ---    IMM ZOSTER VACCINE 10/11/2005 ---    IMM PNEUMOCOCCAL 65+ (ADULT) LOW/MEDIUM RISK SERIES (1 of 2 - PCV13) 10/11/2010 ---    COLONOSCOPY 4/20/2016 4/20/2015 (Declined)    Override on 4/20/2015: Patient Declined    A1C SCREENING 12/6/2017 6/6/2017    FASTING LIPID PROFILE 5/31/2018 5/31/2017    SERUM CREATININE 5/31/2018 5/31/2017, 6/19/2015, 5/8/2015, 5/5/2015, 4/6/2015, 4/5/2015, 4/3/2015, 4/2/2015, 4/1/2015, 4/18/2014, 8/4/2013    RETINAL SCREENING 6/8/2018 6/8/2017            Current Immunizations     No immunizations on file.      Below and/or attached are the medications your provider expects you to take. Review all of your home medications and newly ordered medications with your provider and/or pharmacist. Follow medication instructions as directed by your provider and/or pharmacist. Please keep your medication list with you and share with your provider. Update the information when medications are discontinued, doses are changed, or new medications (including over-the-counter  products) are added; and carry medication information at all times in the event of emergency situations     Allergies:  No Known Allergies          Medications  Valid as of: June 21, 2017 -  8:53 AM    Generic Name Brand Name Tablet Size Instructions for use    Albuterol Sulfate (AEROSOL POWDER, BREATH ACTIVATED) Albuterol Sulfate 108 (90 BASE) MCG/ACT Inhale 2 Puffs by mouth every 6 hours as needed.        AmLODIPine Besylate (Tab) NORVASC 5 MG Take 1 Tab by mouth every day.        Blood Glucose Monitoring Suppl (Kit) B-D LATITUDE DIABETES  1 Units by Does not apply route every day.        Blood Glucose Monitoring Suppl (Device) FREESTYLE LITE          Glucose Blood (Strip) FREESTYLE LITE          Lancets (Misc) FREESTYLE LANCETS          Tiotropium Bromide Monohydrate (Cap) SPIRIVA 18 MCG Inhale 1 Cap by mouth every day.        .                 Medicines prescribed today were sent to:     [facility][id number] - [place], [place] - [address]    [address] [place] [address]    Phone: [phone] Fax: [phone]    Open 24 Hours?: No      Medication refill instructions:       If your prescription bottle indicates you have medication refills left, it is not necessary to call your provider’s office. Please contact your pharmacy and they will refill your medication.    If your prescription bottle indicates you do not have any refills left, you may request refills at any time through one of the following ways: The online [employer] system (except Urgent Care), by calling your provider’s office, or by asking your pharmacy to contact your provider’s office with a refill request. Medication refills are processed only during regular business hours and may not be available until the next business day. Your provider may request additional information or to have a follow-up visit with you prior to refilling your medication.   *Please Note: Medication refills are assigned a new Rx number when refilled  electronically. Your pharmacy may indicate that no refills were authorized even though a new prescription for the same medication is available at the pharmacy. Please request the medicine by name with the pharmacy before contacting your provider for a refill.        Instructions       UROLOGY [address]     [place]  [address]     Phone:[phone]     Some resources for you to make more healthy changes to diet:     1) https://nutritionfacts.org/  -- Online videos about nutrition, short (2-5minutes), easy to understand     2) Eat to Live by Dr. Miguel Patel       Other Notes About Your Plan     1/7/16 CT thorax follow up nodule, pulmonary function testing, echo pending           [employer] Access Code: [email or web address]  Expires: 7/6/2017  8:36 AM    [employer]  A secure, online tool to manage your health information     [employer]’s [employer]® is a secure, online tool that connects you to your personalized health information from the privacy of your home -- day or night - making it very easy for you to manage your healthcare. Once the activation process is completed, you can even access your medical information using the [employer] emily, which is available for free in the Apple Emily store or Google Play store.     [employer] provides the following levels of access (as shown below):   My Chart Features   [facility] Doctor [facility]  Specialists [facility]  Urgent  Care Non-[facility]  Primary Care  Doctor   Email your healthcare team securely and privately 24/7 X X X    Manage appointments: schedule your next appointment; view details of past/upcoming appointments X      Request prescription refills. X      View recent personal medical records, including lab and immunizations X X X X   View health record, including health history, allergies, medications X X X X   Read reports about your outpatient visits, procedures, consult and ER notes X X X X   See your discharge summary, which is a recap of your  hospital and/or ER visit that includes your diagnosis, lab results, and care plan. X X       How to register for [employer]:  1. Go to  [email or web address].  2. Click on the Sign Up Now box, which takes you to the New Member Sign Up page. You will need to provide the following information:  a. Enter your [employer] Access Code exactly as it appears at the top of this page. (You will not need to use this code after you’ve completed the sign-up process. If you do not sign up before the expiration date, you must request a new code.)   b. Enter your date of birth.   c. Enter your home email address.   d. Click Submit, and follow the next screen’s instructions.  3. Create a [employer] ID. This will be your [employer] login ID and cannot be changed, so think of one that is secure and easy to remember.  4. Create a [employer] password. You can change your password at any time.  5. Enter your Password Reset Question and Answer. This can be used at a later time if you forget your password.   6. Enter your e-mail address. This allows you to receive e-mail notifications when new information is available in [employer].  7. Click Sign Up. You can now view your health information.    For assistance activating your [employer] account, call [phone]

## 2017-06-21 NOTE — ASSESSMENT & PLAN NOTE
Patient with PFTs on Sasha 15 217 which showed an FEV1 over FVC ratio of 64%, an FEV1 of 77%. This qualifies him for stage II gold criteria, and CAT scoring here in clinic today was 20, which is class B.      Patient continues to have chronic hypoxemic respiratory failure, needing to use supplemental oxygen at all times. Patient is currently using 4 L/m, and getting an O2 sat of 89%. Patient states he typically saturates at 90% outside of the house on his oxygen tank, however can get up to 95% with his oxygen culture at home.    ROS is negative for fevers, chills, cough, wheezing/rhonchi/rales, chest congestion, nausea, emesis, generalized weakness/fatigue above normal.

## 2017-07-13 RX ORDER — AMLODIPINE BESYLATE 5 MG/1
5 TABLET ORAL DAILY
Qty: 90 TAB | Refills: 1 | Status: SHIPPED | OUTPATIENT
Start: 2017-07-13 | End: 2018-01-12 | Stop reason: SDUPTHER

## 2017-07-13 NOTE — TELEPHONE ENCOUNTER
Was the patient seen in the last year in this department? Yes     Does patient have an active prescription for medications requested? Yes     Received Request Via: Pharmacy     Last office visit: 06/17/2017  Last labs: 06/06/2017

## 2017-07-14 ENCOUNTER — HOSPITAL ENCOUNTER (OUTPATIENT)
Dept: CARDIOLOGY | Facility: MEDICAL CENTER | Age: 72
End: 2017-07-14
Attending: FAMILY MEDICINE
Payer: MEDICARE

## 2017-07-14 DIAGNOSIS — R60.0 BILATERAL LOWER EXTREMITY EDEMA: ICD-10-CM

## 2017-07-14 PROCEDURE — 93306 TTE W/DOPPLER COMPLETE: CPT

## 2017-07-15 LAB
LV EJECT FRACT MOD 2C 99903: 67.77
LV EJECT FRACT MOD 4C 99902: 65.55
LV EJECT FRACT MOD BP 99901: 66.08

## 2017-08-07 ENCOUNTER — PATIENT MESSAGE (OUTPATIENT)
Dept: MEDICAL GROUP | Facility: MEDICAL CENTER | Age: 72
End: 2017-08-07

## 2017-08-07 NOTE — TELEPHONE ENCOUNTER
From: Enoch Mistry  To: Charles Duke M.D.  Sent: 8/7/2017 1:00 PM PDT  Subject: Non-Urgent Medical Question    I recently sent blood pressure records, are they close enough to expected, should I stay on the same dose of medications, increase or decrease? Do I need to take two a day readings?

## 2017-08-08 NOTE — TELEPHONE ENCOUNTER
Please let patient know that BP appears to be well-controlled.  Continue medications at current dosage, and I'll see him at next visit on 09/21/17 @8am

## 2017-08-15 RX ORDER — BLOOD-GLUCOSE METER
KIT MISCELLANEOUS
Qty: 100 STRIP | Refills: 2 | Status: SHIPPED | OUTPATIENT
Start: 2017-08-15 | End: 2017-09-11

## 2017-08-15 NOTE — TELEPHONE ENCOUNTER
Was the patient seen in the last year in this department? Yes     Does patient have an active prescription for medications requested? Yes     Received Request Via: Pharmacy       Last office visit: 06/21/2017  Last labs: 06/06/2017

## 2017-09-11 ENCOUNTER — HOSPITAL ENCOUNTER (OUTPATIENT)
Facility: MEDICAL CENTER | Age: 72
End: 2017-09-13
Attending: EMERGENCY MEDICINE | Admitting: INTERNAL MEDICINE
Payer: MEDICARE

## 2017-09-11 ENCOUNTER — RESOLUTE PROFESSIONAL BILLING HOSPITAL PROF FEE (OUTPATIENT)
Dept: HOSPITALIST | Facility: MEDICAL CENTER | Age: 72
End: 2017-09-11
Payer: MEDICARE

## 2017-09-11 ENCOUNTER — APPOINTMENT (OUTPATIENT)
Dept: RADIOLOGY | Facility: MEDICAL CENTER | Age: 72
End: 2017-09-11
Attending: EMERGENCY MEDICINE
Payer: MEDICARE

## 2017-09-11 DIAGNOSIS — R31.9 HEMATURIA: ICD-10-CM

## 2017-09-11 PROBLEM — E11.9 TYPE 2 DIABETES MELLITUS WITHOUT COMPLICATION (HCC): Status: ACTIVE | Noted: 2017-09-11

## 2017-09-11 PROBLEM — Z87.438 HISTORY OF BPH: Status: ACTIVE | Noted: 2017-09-11

## 2017-09-11 LAB
ALBUMIN SERPL BCP-MCNC: 4.1 G/DL (ref 3.2–4.9)
ALBUMIN/GLOB SERPL: 1.2 G/DL
ALP SERPL-CCNC: 71 U/L (ref 30–99)
ALT SERPL-CCNC: 35 U/L (ref 2–50)
ANION GAP SERPL CALC-SCNC: 8 MMOL/L (ref 0–11.9)
APPEARANCE UR: ABNORMAL
APTT PPP: 57.2 SEC (ref 24.7–36)
AST SERPL-CCNC: 35 U/L (ref 12–45)
BACTERIA #/AREA URNS HPF: ABNORMAL /HPF
BASOPHILS # BLD AUTO: 0.7 % (ref 0–1.8)
BASOPHILS # BLD: 0.07 K/UL (ref 0–0.12)
BILIRUB SERPL-MCNC: 1.9 MG/DL (ref 0.1–1.5)
BILIRUB UR QL STRIP.AUTO: ABNORMAL
BUN SERPL-MCNC: 9 MG/DL (ref 8–22)
CALCIUM SERPL-MCNC: 9.1 MG/DL (ref 8.4–10.2)
CHLORIDE SERPL-SCNC: 105 MMOL/L (ref 96–112)
CO2 SERPL-SCNC: 24 MMOL/L (ref 20–33)
COLOR UR: ABNORMAL
CREAT SERPL-MCNC: 0.87 MG/DL (ref 0.5–1.4)
CULTURE IF INDICATED INDCX: YES UA CULTURE
EOSINOPHIL # BLD AUTO: 0.22 K/UL (ref 0–0.51)
EOSINOPHIL NFR BLD: 2.1 % (ref 0–6.9)
ERYTHROCYTE [DISTWIDTH] IN BLOOD BY AUTOMATED COUNT: 40.8 FL (ref 35.9–50)
GFR SERPL CREATININE-BSD FRML MDRD: >60 ML/MIN/1.73 M 2
GLOBULIN SER CALC-MCNC: 3.4 G/DL (ref 1.9–3.5)
GLUCOSE BLD-MCNC: 165 MG/DL (ref 65–99)
GLUCOSE BLD-MCNC: 205 MG/DL (ref 65–99)
GLUCOSE SERPL-MCNC: 212 MG/DL (ref 65–99)
GLUCOSE UR STRIP.AUTO-MCNC: 100 MG/DL
HCT VFR BLD AUTO: 47.5 % (ref 42–52)
HGB BLD-MCNC: 16.3 G/DL (ref 14–18)
IMM GRANULOCYTES # BLD AUTO: 0.04 K/UL (ref 0–0.11)
IMM GRANULOCYTES NFR BLD AUTO: 0.4 % (ref 0–0.9)
INR PPP: 0.97 (ref 0.87–1.13)
KETONES UR STRIP.AUTO-MCNC: NEGATIVE MG/DL
LEUKOCYTE ESTERASE UR QL STRIP.AUTO: ABNORMAL
LYMPHOCYTES # BLD AUTO: 2.28 K/UL (ref 1–4.8)
LYMPHOCYTES NFR BLD: 21.6 % (ref 22–41)
MCH RBC QN AUTO: 29.7 PG (ref 27–33)
MCHC RBC AUTO-ENTMCNC: 34.3 G/DL (ref 33.7–35.3)
MCV RBC AUTO: 86.5 FL (ref 81.4–97.8)
MICRO URNS: ABNORMAL
MONOCYTES # BLD AUTO: 0.6 K/UL (ref 0–0.85)
MONOCYTES NFR BLD AUTO: 5.7 % (ref 0–13.4)
NEUTROPHILS # BLD AUTO: 7.34 K/UL (ref 1.82–7.42)
NEUTROPHILS NFR BLD: 69.5 % (ref 44–72)
NITRITE UR QL STRIP.AUTO: POSITIVE
NRBC # BLD AUTO: 0 K/UL
NRBC BLD AUTO-RTO: 0 /100 WBC
PH UR STRIP.AUTO: 6.5 [PH]
PLATELET # BLD AUTO: 266 K/UL (ref 164–446)
PMV BLD AUTO: 11.3 FL (ref 9–12.9)
POTASSIUM SERPL-SCNC: 3.9 MMOL/L (ref 3.6–5.5)
PROT SERPL-MCNC: 7.5 G/DL (ref 6–8.2)
PROT UR QL STRIP: >=300 MG/DL
PROTHROMBIN TIME: 12.7 SEC (ref 12–14.6)
RBC # BLD AUTO: 5.49 M/UL (ref 4.7–6.1)
RBC # URNS HPF: >150 /HPF
RBC UR QL AUTO: ABNORMAL
SODIUM SERPL-SCNC: 137 MMOL/L (ref 135–145)
SP GR UR STRIP.AUTO: 1.02
WBC # BLD AUTO: 10.6 K/UL (ref 4.8–10.8)

## 2017-09-11 PROCEDURE — 501329 HCHG SET, CYSTO IRRIG Y TUR: Performed by: UROLOGY

## 2017-09-11 PROCEDURE — 700111 HCHG RX REV CODE 636 W/ 250 OVERRIDE (IP): Performed by: EMERGENCY MEDICINE

## 2017-09-11 PROCEDURE — 85610 PROTHROMBIN TIME: CPT

## 2017-09-11 PROCEDURE — 500509 HCHG ELECTRODE, ROLLER: Performed by: UROLOGY

## 2017-09-11 PROCEDURE — 99285 EMERGENCY DEPT VISIT HI MDM: CPT

## 2017-09-11 PROCEDURE — 160009 HCHG ANES TIME/MIN: Performed by: UROLOGY

## 2017-09-11 PROCEDURE — G0378 HOSPITAL OBSERVATION PER HR: HCPCS

## 2017-09-11 PROCEDURE — 700111 HCHG RX REV CODE 636 W/ 250 OVERRIDE (IP): Performed by: INTERNAL MEDICINE

## 2017-09-11 PROCEDURE — 700105 HCHG RX REV CODE 258: Performed by: EMERGENCY MEDICINE

## 2017-09-11 PROCEDURE — 96365 THER/PROPH/DIAG IV INF INIT: CPT | Mod: XU

## 2017-09-11 PROCEDURE — A9270 NON-COVERED ITEM OR SERVICE: HCPCS | Performed by: INTERNAL MEDICINE

## 2017-09-11 PROCEDURE — 700102 HCHG RX REV CODE 250 W/ 637 OVERRIDE(OP): Performed by: INTERNAL MEDICINE

## 2017-09-11 PROCEDURE — 700101 HCHG RX REV CODE 250

## 2017-09-11 PROCEDURE — 700111 HCHG RX REV CODE 636 W/ 250 OVERRIDE (IP)

## 2017-09-11 PROCEDURE — 160039 HCHG SURGERY MINUTES - EA ADDL 1 MIN LEVEL 3: Performed by: UROLOGY

## 2017-09-11 PROCEDURE — A4346 CATH INDW FOLEY 3 WAY: HCPCS | Performed by: UROLOGY

## 2017-09-11 PROCEDURE — 82962 GLUCOSE BLOOD TEST: CPT

## 2017-09-11 PROCEDURE — 160028 HCHG SURGERY MINUTES - 1ST 30 MINS LEVEL 3: Performed by: UROLOGY

## 2017-09-11 PROCEDURE — 85025 COMPLETE CBC W/AUTO DIFF WBC: CPT

## 2017-09-11 PROCEDURE — 304561 HCHG STAT O2

## 2017-09-11 PROCEDURE — 87086 URINE CULTURE/COLONY COUNT: CPT

## 2017-09-11 PROCEDURE — 36415 COLL VENOUS BLD VENIPUNCTURE: CPT

## 2017-09-11 PROCEDURE — 83036 HEMOGLOBIN GLYCOSYLATED A1C: CPT

## 2017-09-11 PROCEDURE — 74176 CT ABD & PELVIS W/O CONTRAST: CPT

## 2017-09-11 PROCEDURE — 304562 HCHG STAT O2 MASK/CANNULA

## 2017-09-11 PROCEDURE — 80053 COMPREHEN METABOLIC PANEL: CPT

## 2017-09-11 PROCEDURE — 160002 HCHG RECOVERY MINUTES (STAT): Performed by: UROLOGY

## 2017-09-11 PROCEDURE — 160035 HCHG PACU - 1ST 60 MINS PHASE I: Performed by: UROLOGY

## 2017-09-11 PROCEDURE — 500879 HCHG PACK, CYSTO: Performed by: UROLOGY

## 2017-09-11 PROCEDURE — 160048 HCHG OR STATISTICAL LEVEL 1-5: Performed by: UROLOGY

## 2017-09-11 PROCEDURE — 99219 PR INITIAL OBSERVATION CARE,LEVL II: CPT | Performed by: INTERNAL MEDICINE

## 2017-09-11 PROCEDURE — 81001 URINALYSIS AUTO W/SCOPE: CPT

## 2017-09-11 PROCEDURE — 85730 THROMBOPLASTIN TIME PARTIAL: CPT

## 2017-09-11 PROCEDURE — 500042 HCHG BAG, URINARY DRAINAGE (CLOSED): Performed by: UROLOGY

## 2017-09-11 RX ORDER — CEFTRIAXONE 2 G/1
2 INJECTION, POWDER, FOR SOLUTION INTRAMUSCULAR; INTRAVENOUS ONCE
Status: COMPLETED | OUTPATIENT
Start: 2017-09-11 | End: 2017-09-11

## 2017-09-11 RX ORDER — SODIUM CHLORIDE 9 MG/ML
INJECTION, SOLUTION INTRAVENOUS CONTINUOUS
Status: DISCONTINUED | OUTPATIENT
Start: 2017-09-11 | End: 2017-09-13 | Stop reason: HOSPADM

## 2017-09-11 RX ORDER — AMLODIPINE BESYLATE 5 MG/1
5 TABLET ORAL DAILY
Status: DISCONTINUED | OUTPATIENT
Start: 2017-09-11 | End: 2017-09-13 | Stop reason: HOSPADM

## 2017-09-11 RX ORDER — ONDANSETRON 2 MG/ML
4 INJECTION INTRAMUSCULAR; INTRAVENOUS EVERY 4 HOURS PRN
Status: DISCONTINUED | OUTPATIENT
Start: 2017-09-11 | End: 2017-09-13 | Stop reason: HOSPADM

## 2017-09-11 RX ORDER — AMOXICILLIN 250 MG
2 CAPSULE ORAL 2 TIMES DAILY
Status: DISCONTINUED | OUTPATIENT
Start: 2017-09-11 | End: 2017-09-13 | Stop reason: HOSPADM

## 2017-09-11 RX ORDER — SODIUM CHLORIDE 9 MG/ML
INJECTION, SOLUTION INTRAVENOUS ONCE
Status: COMPLETED | OUTPATIENT
Start: 2017-09-11 | End: 2017-09-12

## 2017-09-11 RX ORDER — ALBUTEROL SULFATE 90 UG/1
2 AEROSOL, METERED RESPIRATORY (INHALATION) EVERY 6 HOURS PRN
Status: DISCONTINUED | OUTPATIENT
Start: 2017-09-11 | End: 2017-09-13 | Stop reason: HOSPADM

## 2017-09-11 RX ORDER — ACETAMINOPHEN 325 MG/1
650 TABLET ORAL EVERY 6 HOURS PRN
Status: DISCONTINUED | OUTPATIENT
Start: 2017-09-11 | End: 2017-09-13 | Stop reason: HOSPADM

## 2017-09-11 RX ORDER — DEXTROSE MONOHYDRATE 25 G/50ML
25 INJECTION, SOLUTION INTRAVENOUS
Status: DISCONTINUED | OUTPATIENT
Start: 2017-09-11 | End: 2017-09-13 | Stop reason: HOSPADM

## 2017-09-11 RX ORDER — POLYETHYLENE GLYCOL 3350 17 G/17G
1 POWDER, FOR SOLUTION ORAL
Status: DISCONTINUED | OUTPATIENT
Start: 2017-09-11 | End: 2017-09-11

## 2017-09-11 RX ORDER — ONDANSETRON 4 MG/1
4 TABLET, ORALLY DISINTEGRATING ORAL EVERY 4 HOURS PRN
Status: DISCONTINUED | OUTPATIENT
Start: 2017-09-11 | End: 2017-09-13 | Stop reason: HOSPADM

## 2017-09-11 RX ORDER — BISACODYL 10 MG
10 SUPPOSITORY, RECTAL RECTAL
Status: DISCONTINUED | OUTPATIENT
Start: 2017-09-11 | End: 2017-09-13 | Stop reason: HOSPADM

## 2017-09-11 RX ORDER — POLYETHYLENE GLYCOL 3350 17 G/17G
1 POWDER, FOR SOLUTION ORAL
Status: DISCONTINUED | OUTPATIENT
Start: 2017-09-11 | End: 2017-09-13 | Stop reason: HOSPADM

## 2017-09-11 RX ORDER — AMOXICILLIN 250 MG
2 CAPSULE ORAL 2 TIMES DAILY
Status: DISCONTINUED | OUTPATIENT
Start: 2017-09-11 | End: 2017-09-11

## 2017-09-11 RX ORDER — BISACODYL 10 MG
10 SUPPOSITORY, RECTAL RECTAL
Status: DISCONTINUED | OUTPATIENT
Start: 2017-09-11 | End: 2017-09-11

## 2017-09-11 RX ADMIN — CEFTRIAXONE SODIUM 2 G: 2 INJECTION, POWDER, FOR SOLUTION INTRAMUSCULAR; INTRAVENOUS at 11:50

## 2017-09-11 RX ADMIN — SODIUM CHLORIDE: 9 INJECTION, SOLUTION INTRAVENOUS at 11:50

## 2017-09-11 ASSESSMENT — ENCOUNTER SYMPTOMS
HEARTBURN: 0
NERVOUS/ANXIOUS: 0
FOCAL WEAKNESS: 0
SHORTNESS OF BREATH: 0
EYE PAIN: 0
WEIGHT LOSS: 0
SEIZURES: 0
STRIDOR: 0
NECK PAIN: 0
BACK PAIN: 0
NAUSEA: 0
INSOMNIA: 0
DEPRESSION: 0
DIARRHEA: 0
SPUTUM PRODUCTION: 0
COUGH: 0
MYALGIAS: 0
EYE DISCHARGE: 0
HEADACHES: 0
EYE REDNESS: 0
BLURRED VISION: 0
CHILLS: 0
VOMITING: 0
ORTHOPNEA: 0
FEVER: 0
ABDOMINAL PAIN: 0
PALPITATIONS: 0
DIZZINESS: 0

## 2017-09-11 ASSESSMENT — PAIN SCALES - GENERAL
PAINLEVEL_OUTOF10: 0

## 2017-09-11 ASSESSMENT — PATIENT HEALTH QUESTIONNAIRE - PHQ9
1. LITTLE INTEREST OR PLEASURE IN DOING THINGS: NOT AT ALL
SUM OF ALL RESPONSES TO PHQ QUESTIONS 1-9: 0
2. FEELING DOWN, DEPRESSED, IRRITABLE, OR HOPELESS: NOT AT ALL
SUM OF ALL RESPONSES TO PHQ9 QUESTIONS 1 AND 2: 0

## 2017-09-11 ASSESSMENT — LIFESTYLE VARIABLES
DO YOU DRINK ALCOHOL: NO
EVER_SMOKED: YES

## 2017-09-11 NOTE — ED NOTES
Continuous bladder irrigation still flowing, patient provided coffee, okay by ERP, patient stated he had no other needs at this time. ERP informed of irrigation status.

## 2017-09-11 NOTE — OP REPORT
DATE OF SERVICE:  09/11/2017    PREOPERATIVE DIAGNOSIS:  Gross hematuria with clot retention.    POSTOPERATIVE DIAGNOSIS:  Gross hematuria with clot retention.    PROCEDURE PERFORMED:  Cystoscopy, clot evacuation of the bladder, and   fulguration of bleeding prostatic varices.    SURGEON:  Srini Giles MD    ANESTHESIOLOGIST:  Finesse Conway MD    ANESTHESIA:  General.    INDICATIONS FOR PROCEDURE:  The patient is a 71-year-old male with a history   of bladder tumor, now with gross hematuria and clot retention.    DESCRIPTION OF PROCEDURE:  After obtaining informed consent, the patient was   brought to the operating room.  After the induction of general anesthesia, the   patient was placed in dorsal lithotomy position and his genitalia were   prepped and draped in sterile fashion.  Patient has already received   ceftriaxone prior to coming to the operating room, was adequately covered for   this procedure.  A 26-Cambodian resectoscope was then passed per urethra into the   bladder under direct vision.  There was moderate amount of bleeding from the   prostate, high bladder neck, and moderately enlarged prostate.  The scope was   passed into the bladder where a moderate amount of clot was seen.  The clot   was irrigated clear of his bladder and the bladder was examined closely and no   tumors were seen.  There were several bleeding vessels of the prostate.  A   rollerball was used to cauterize the bladder neck and prostatic urethra back   to the level of the verumontanum.  The patient's bladder was then irrigated   out again and bleeding points within the prostatic urethra were again   cauterized.  There was good hemostasis at this point of the case.  The scope   was removed and a 24-Cambodian 3-way Best catheter was passed using a catheter   guide, 30 mL of sterile water placed in the balloon and irrigated to clear and   hooked up to continuous his bladder irrigation.  Patient was then awoken from   anesthesia and  taken to recovery room in stable condition.    COMPLICATIONS:  None.    ESTIMATED BLOOD LOSS:  100 mL    SPECIMENS:  None.    DRAINS:  24-[place]ian 3-way Best catheter.       ____________________________________     MD DEANNA Mata / JOAQUÍN    DD:  09/11/2017 16:33:23  DT:  09/11/2017 16:45:58    D#:  [id number]  Job#:  [id number]

## 2017-09-11 NOTE — PROGRESS NOTES
Urology Pre-op Note:  70 yo M with h/o bladder cancer - last tumor resection 5/7/15 - low grade superficial tumor.  Pt did not follow up over the past 2 years.  Now presents to ER with several day h/o gross hematuria with clots - retention today.  20Fr perera in place with CBI running.  I hand irrigated a few small clots, but hematuria still dark red without CBI.  CT shows clot in bladder, no other identifiable source of bleeding.    After discussing options with pt, he has elected to go to OR for cysto, clot evacuation, fulgeration.      Care discussed with pt, pt's nurse, and OR.

## 2017-09-11 NOTE — ED NOTES
Called house Supervisor and notified him that we need a 3- way perera for bladder irrigation. House supervisor unable to obtain at this time, called and left message with patient supply. Awaiting return phone call.

## 2017-09-11 NOTE — OR NURSING
1515- Pt to Pre-op holding, prepared for surgery.  Dr. Giles and Elizabeth are in with Pt.  All questions answered, Pt states no allergies.    1520- taken to surgery.

## 2017-09-11 NOTE — PROGRESS NOTES
Arrived to unit.  CBI running cherry red with no noted clots at present. Awake and alert, Settled into bed and oriented to unit.

## 2017-09-11 NOTE — ASSESSMENT & PLAN NOTE
Continuous bladder irrigation  Cystoscopy, clot evacuation of the bladder, and   fulguration of bleeding prostatic varices.  UROLOGY INPUT IS NOTED  CBC IN AM

## 2017-09-11 NOTE — ED NOTES
Bladder catheter bag emptied with 2300ml pale red fluid, minimal clots now. Patient alerted no food or drink until further notice.

## 2017-09-11 NOTE — H&P
" Hospital Medicine History and Physical      Date of Service  9/11/2017    Chief Complaint  Chief Complaint   Patient presents with   • Blood in Urine     Since Saturday, worsened this am, pt states \"I am passing clots.\" Hx Bladder CA       History of Presenting Illness  Fede is a 71 y.o. male PMH of Bladder tumor, BPH who presents with hematuria for the past few weeks. The patient said he has history of hematuria in the past in 2015. He said it is bright red in color with blood clot in it. The patient was started on continuous bladder irrigation in ER. Urology was consulted and recommended to admit the patient to the hospital.  the patient denied any fever or chills abdominal pain.    Primary Care Physician  Charles Duke M.D.      Code Status  Full code    Review of Systems  Review of Systems   Constitutional: Negative for chills, fever and weight loss.   HENT: Negative for congestion and nosebleeds.    Eyes: Negative for blurred vision, pain, discharge and redness.   Respiratory: Negative for cough, sputum production, shortness of breath and stridor.    Cardiovascular: Negative for chest pain, palpitations and orthopnea.   Gastrointestinal: Negative for abdominal pain, diarrhea, heartburn, nausea and vomiting.   Genitourinary: Positive for hematuria. Negative for dysuria, frequency and urgency.   Musculoskeletal: Negative for back pain, myalgias and neck pain.   Skin: Negative for itching and rash.   Neurological: Negative for dizziness, focal weakness, seizures and headaches.   Psychiatric/Behavioral: Negative for depression. The patient is not nervous/anxious and does not have insomnia.      Please see HPI, all other systems were reviewed and are negative (AMA/CMS criteria)     Past Medical History  Past Medical History:   Diagnosis Date   • Cancer (CMS-HCC)     bladder cancer   • Hiatus hernia syndrome    • Hypertension    • Other specified disorder of intestines     constipation   • Urinary bladder " disorder     retention, perera cath       Surgical History  Past Surgical History:   Procedure Laterality Date   • CYSTOSCOPY  2015    Procedure: CYSTOSCOPY [57.32] ;  Surgeon: Srini Giles M.D.;  Location: SURGERY [facility];  Service:    • TRANS URETHRAL RESECTION PROSTATE  2015    Procedure: TRANS URETHRAL RESECTION PROSTATE [60.20];  Surgeon: Srini Giles M.D.;  Location: SURGERY [facility];  Service:    • TRANS URETHRAL RESECTION BLADDER  2015    Procedure: TRANS URETHRAL RESECTION BLADDER [57.49A];  Surgeon: Srini Giles M.D.;  Location: SURGERY [facility];  Service:        Medications  No current facility-administered medications on file prior to encounter.      Current Outpatient Prescriptions on File Prior to Encounter   Medication Sig Dispense Refill   • amlodipine (NORVASC) 5 MG Tab Take 1 Tab by mouth every day. 90 Tab 1   • Albuterol Sulfate 108 (90 BASE) MCG/ACT AEROSOL POWDER, BREATH ACTIVATED Inhale 2 Puffs by mouth every 6 hours as needed. 1 Each 3     Family History  History reviewed. No pertinent family history.      Social History  Social History   Substance Use Topics   • Smoking status: Former Smoker     Packs/day: 2.00     Years: 40.00     Types: Cigarettes     Quit date: 2006   • Smokeless tobacco: Never Used   • Alcohol use 0.0 oz/week      Comment: 7 beers per week       Allergies  No Known Allergies     Physical Exam  Laboratory   Hemodynamics  Temp (24hrs), Av °C (98.6 °F), Min:37 °C (98.6 °F), Max:37 °C (98.6 °F)   Temperature: 37 °C (98.6 °F)  Pulse  Av  Min: 77  Max: 111    Blood Pressure : (!) 165/72, NIBP: 128/66      Respiratory      Respiration: 18, Pulse Oximetry: 93 %             Physical Exam   Constitutional: He is oriented to person, place, and time. No distress.   HENT:   Head: Normocephalic and atraumatic.   Mouth/Throat: Oropharynx is clear and moist.   Eyes: Conjunctivae and EOM are normal. Pupils are equal, round, and  reactive to light.   Neck: Normal range of motion. Neck supple. No tracheal deviation present. No thyromegaly present.   Cardiovascular: Normal rate and regular rhythm.    No murmur heard.  Pulmonary/Chest: Effort normal and breath sounds normal. No respiratory distress. He has no wheezes.   Abdominal: Soft. Bowel sounds are normal. He exhibits no distension. There is no tenderness.   Musculoskeletal: He exhibits no edema or tenderness.   Neurological: He is alert and oriented to person, place, and time. No cranial nerve deficit.   Skin: Skin is warm and dry. He is not diaphoretic. No erythema.   Psychiatric: He has a normal mood and affect. His behavior is normal. Thought content normal.       Recent Labs      09/11/17   0611   WBC  10.6   RBC  5.49   HEMOGLOBIN  16.3   HEMATOCRIT  47.5   MCV  86.5   MCH  29.7   MCHC  34.3   RDW  40.8   PLATELETCT  266   MPV  11.3     Recent Labs      09/11/17   0611   SODIUM  137   POTASSIUM  3.9   CHLORIDE  105   CO2  24   GLUCOSE  212*   BUN  9   CREATININE  0.87   CALCIUM  9.1     Recent Labs      09/11/17   0611   ALTSGPT  35   ASTSGOT  35   ALKPHOSPHAT  71   TBILIRUBIN  1.9*   GLUCOSE  212*     Recent Labs      09/11/17   0611   APTT  57.2*   INR  0.97             Lab Results   Component Value Date    TROPONINI 0.02 04/01/2015       Imaging  CT-RENAL COLIC EVALUATION(A/P W/O)   Final Result      No urolithiasis or hydronephrosis. There is hemorrhage in the decompressed bladder with Best catheter present      Stable marked prostate gland enlargement is most likely from BPH. Clinical correlation is recommended      Cholelithiasis      Right adrenal mass most likely is an adenoma given minimal change from April 2015      Left renal cyst is also without significant change             Assessment/Plan     I anticipate this patient is appropriate for observation status at this time.    Hematuria- (present on admission)   Assessment & Plan    Continuous bladder irrigation  Urology  consulted and will likely need cystoscopy  Follow CBC closely        Type 2 diabetes mellitus without complication (CMS-HCC)   Assessment & Plan    Check a1c  On insulin management.        History of BPH- (present on admission)   Assessment & Plan    Follow-up urology as an outpatient        History of bladder cancer- (present on admission)   Assessment & Plan    Status post chemotherapy in 2009  Urology consulted            Prophylaxis:  SCDs

## 2017-09-11 NOTE — ED NOTES
Dr notified of continued bleeding with clots  Bladder irrigation with 5000 CC continuously done and another bag hung.

## 2017-09-11 NOTE — ED PROVIDER NOTES
"ED Provider Note    CHIEF COMPLAINT  Bloody urine    HPI  Enoch Mistry is a 71 y.o. male who presents complaining of blood in the urine. He noticed this on Saturday, some blood-tinged urine. However last night, he had an incredible amount of pressure in his abdomen, lower down, could not urinate other than a few dribbles. Upon getting here however, everything gushed out. He now feels significantly better, since about normal. He has been having some pain in his right side which radiates to his right flank. Nothing makes it better or worse. He's been eating well, his appetite has been good, there is been no nausea or vomiting. He has had no stool changes. No fever. The patient has a history of what sounds to bladder cancer which was treated and he is told that they \"got it all.\" He's had some swelling in his legs for the last 2 years, really no changes here. The patient has been diagnosed with elevated blood sugar/diabetes, and has been losing weight intentionally after this diagnosis. He denies any antiplatelet or anticoagulant medication. There is no other complaint.    PAST MEDICAL HISTORY  Past Medical History:   Diagnosis Date   • Cancer (CMS-HCC)     bladder cancer   • Hiatus hernia syndrome    • Hypertension    • Other specified disorder of intestines     constipation   • Urinary bladder disorder     retention, perera cath       FAMILY HISTORY  No family history on file.    SOCIAL HISTORY  Social History   Substance Use Topics   • Smoking status: Former Smoker     Packs/day: 2.00     Years: 40.00     Types: Cigarettes     Quit date: 1/1/2006   • Smokeless tobacco: Never Used   • Alcohol use 0.0 oz/week      Comment: 7 beers per week         SURGICAL HISTORY  Past Surgical History:   Procedure Laterality Date   • CYSTOSCOPY  5/7/2015    Procedure: CYSTOSCOPY [57.32] ;  Surgeon: Srini Giles M.D.;  Location: SURGERY [facility];  Service:    • TRANS URETHRAL RESECTION PROSTATE  5/7/2015    Procedure: " "TRANS URETHRAL RESECTION PROSTATE [60.20];  Surgeon: Srini Giles M.D.;  Location: SURGERY [facility];  Service:    • TRANS URETHRAL RESECTION BLADDER  5/7/2015    Procedure: TRANS URETHRAL RESECTION BLADDER [57.49A];  Surgeon: Srini Giles M.D.;  Location: SURGERY [facility];  Service:        CURRENT MEDICATIONS    I have reviewed the nurses notes and/or the list brought with the patient.    ALLERGIES  No Known Allergies    REVIEW OF SYSTEMS  See HPI for further details. Review of systems as above, otherwise all other systems are negative.     PHYSICAL EXAM  VITAL SIGNS: BP (!) 165/72   Pulse (!) 111   Temp 37 °C (98.6 °F)   Resp 18   Ht 1.702 m (5' 7\")   Wt 104.2 kg (229 lb 11.5 oz)   SpO2 91% Comment: pt. takes home 02 at 2lpm  BMI 35.98 kg/m²   Constitutional: Well appearing patient in no acute distress.  Not toxic, nor ill in appearance. Beside the bed, there are some soaked sheets with what appears to be blood, which apparently was urination.  HENT: Mucus membranes moist.  Oropharynx is clear.  Eyes: Pupils equally round.  No scleral icterus.   Neck: Full nontender range of motion.  Lymphatic: No cervical lymphadenopathy noted.   Cardiovascular: Mildly tachycardic, 102 on the monitor.  No murmurs, rubs, nor gallop appreciated.   Thorax & Lungs: Chest is nontender.  Lungs are clear to auscultation with good air movement bilaterally.  No wheeze, rhonchi, nor rales.   Abdomen: Soft, with no tenderness, rebound nor guarding.  No mass, pulsatile mass, nor hepatosplenomegaly appreciated.No rash. No Wolf sign. No CVA tenderness  : Benign scrotum. Phallus is unremarkable.  Skin: No purpura nor petechia noted.  Extremities/Musculoskeletal: No sign of trauma.  Calves are nontender with no cords. There is brawny bilateral edema with chronic vascular changes.  No Aj's sign.  Pulses are intact all around.   Neurologic: Alert & oriented.  Strength and sensation is intact all around.  Gait is " normal.  Psychiatric: Normal affect appropriate for the clinical situation.      LABS  Labs Reviewed   URINALYSIS,CULTURE IF INDICATED - Abnormal; Notable for the following:        Result Value    Character Turbid (*)     Glucose 100 (*)     Protein >=300 (*)     Bilirubin Small (*)     Nitrite Positive (*)     Leukocyte Esterase Trace (*)     Occult Blood Large (*)     All other components within normal limits   CBC WITH DIFFERENTIAL - Abnormal; Notable for the following:     Lymphocytes 21.60 (*)     All other components within normal limits    Narrative:     Indicate which anticoagulants the patient is on:->NONE   COMP METABOLIC PANEL - Abnormal; Notable for the following:     Glucose 212 (*)     Total Bilirubin 1.9 (*)     All other components within normal limits    Narrative:     Indicate which anticoagulants the patient is on:->NONE   APTT - Abnormal; Notable for the following:     APTT 57.2 (*)     All other components within normal limits    Narrative:     Indicate which anticoagulants the patient is on:->NONE   URINE MICROSCOPIC (W/UA) - Abnormal; Notable for the following:     RBC >150 (*)     Bacteria Few (*)     All other components within normal limits   PROTHROMBIN TIME    Narrative:     Indicate which anticoagulants the patient is on:->NONE   ESTIMATED GFR    Narrative:     Indicate which anticoagulants the patient is on:->NONE   URINE CULTURE(NEW)   HEMOGLOBIN A1C         RADIOLOGY/PROCEDURES  I have reviewed the patient's film interpretations myself, and they are read out by the radiologist as:   CT-RENAL COLIC EVALUATION(A/P W/O)   Final Result      No urolithiasis or hydronephrosis. There is hemorrhage in the decompressed bladder with Best catheter present      Stable marked prostate gland enlargement is most likely from BPH. Clinical correlation is recommended      Cholelithiasis      Right adrenal mass most likely is an adenoma given minimal change from April 2015      Left renal cyst is also  without significant change        .    MEDICAL RECORD  I have reviewed patient's medical record and pertinent results are listed above.    COURSE & MEDICAL DECISION MAKING  I have reviewed any medical record information, laboratory studies and radiographic results as noted above.  This patient presents with abdominal discomforts and some difficult with urination. He does. Significant hematuria. We have placed continuous bladder irrigation and been doing this for several hours, however he still has fairly bright red blood that is draining from his catheter. Nursing notes that he has had several clots that they've had to manually removed. As noted, he is not on any medications to give him a bleeding diaphysis. A CT scan is obtained which shows no evidence of acute mass or inflammatory changes. There is no evidence of stone. His basic chemistries are unrevealing. At this point I think that we should admit the patient to hospital for ongoing bladder irrigation. The urinalysis does show nitrites and some bacteria, we will go ahead and treat him empirically for urinary tract infection. I discussed the patient's case with the [facility] hospitalist, Dr. Falk, will be admitting him. Also discussed with Dr. Walden, urology, who will be seeing him shortly. He is asked the patient be kept nothing by mouth in case he is taken to the operating room today.    FINAL IMPRESSION  1. Hematuria           This dictation was created using voice recognition software.    Electronically signed by: Sandip Cruz, 9/11/2017 6:04 AM

## 2017-09-11 NOTE — OR NURSING
1620: To PACU from OR via bed, sleeping, respirations spontaneous and non-labored via LMA. CBI switched to 0.9% Saline irrigant from water. CBI return pale pink.   1635: Rouses to name, LMA d/lala, pt very drowsy but denies pain before returning to sleep  1640: S/b surgeon who decreased rate of CBI due to pale return  1650: Sitting up in bed at pt request, taking po water  1705: CBI returns remains pale pink w/o clots, tolerates po water, meets criteria to transfer to [facility]

## 2017-09-11 NOTE — ED NOTES
"Chief Complaint   Patient presents with   • Blood in Urine     Since Saturday, worsened this am, pt states \"I am passing clots.\" Hx Bladder CA     BP (!) 165/72   Pulse (!) 111   Temp 37 °C (98.6 °F)   Resp 18   Ht 1.702 m (5' 7\")   Wt 104.2 kg (229 lb 11.5 oz)   SpO2 91% Comment: pt. takes home 02 at 2lpm  BMI 35.98 kg/m²     "

## 2017-09-11 NOTE — ED NOTES
Continuous bladder irrigation in progress, progress slowed by numerous clot. Clots removed manually by syringe. 2700ml emptied from catheter bag.

## 2017-09-11 NOTE — OR SURGEON
Operative Report    PreOp Diagnosis: hematuria with clot retention    PostOp Diagnosis: same    Procedure(s):  CYSTOSCOPY- CLOT EVACUATION, FULGURATION, OF PROSTATE - Wound Class: Contaminated    Surgeon(s):  Srini Giles M.D.    Anesthesiologist/Type of Anesthesia:  Anesthesiologist: Chris Conway M.D./General    Surgical Staff:  Circulator: Rin Carolina R.N.  Scrub Person: Nakul Castellano    Specimens:  none    Estimated Blood Loss: 100ml    Findings: moderate amount of clot in the bladder; no bladder tumor seen; bleeding prostate varices cauterized    Complications: none    Drain:  24Fr 3-way perera with CBI running    9/11/2017 4:20 PM Srini Giles

## 2017-09-12 PROBLEM — N30.90 CYSTITIS: Status: ACTIVE | Noted: 2017-09-12

## 2017-09-12 LAB
ALBUMIN SERPL BCP-MCNC: 3.3 G/DL (ref 3.2–4.9)
ALBUMIN/GLOB SERPL: 1.1 G/DL
ALP SERPL-CCNC: 62 U/L (ref 30–99)
ALT SERPL-CCNC: 26 U/L (ref 2–50)
ANION GAP SERPL CALC-SCNC: 8 MMOL/L (ref 0–11.9)
AST SERPL-CCNC: 24 U/L (ref 12–45)
BILIRUB SERPL-MCNC: 1.4 MG/DL (ref 0.1–1.5)
BUN SERPL-MCNC: 8 MG/DL (ref 8–22)
CALCIUM SERPL-MCNC: 8.3 MG/DL (ref 8.4–10.2)
CHLORIDE SERPL-SCNC: 107 MMOL/L (ref 96–112)
CO2 SERPL-SCNC: 23 MMOL/L (ref 20–33)
CREAT SERPL-MCNC: 0.72 MG/DL (ref 0.5–1.4)
ERYTHROCYTE [DISTWIDTH] IN BLOOD BY AUTOMATED COUNT: 40.8 FL (ref 35.9–50)
EST. AVERAGE GLUCOSE BLD GHB EST-MCNC: 206 MG/DL
GFR SERPL CREATININE-BSD FRML MDRD: >60 ML/MIN/1.73 M 2
GLOBULIN SER CALC-MCNC: 2.9 G/DL (ref 1.9–3.5)
GLUCOSE BLD-MCNC: 156 MG/DL (ref 65–99)
GLUCOSE BLD-MCNC: 157 MG/DL (ref 65–99)
GLUCOSE BLD-MCNC: 166 MG/DL (ref 65–99)
GLUCOSE BLD-MCNC: 192 MG/DL (ref 65–99)
GLUCOSE SERPL-MCNC: 221 MG/DL (ref 65–99)
HBA1C MFR BLD: 8.8 % (ref 0–5.6)
HCT VFR BLD AUTO: 43.5 % (ref 42–52)
HGB BLD-MCNC: 14.6 G/DL (ref 14–18)
MCH RBC QN AUTO: 29.3 PG (ref 27–33)
MCHC RBC AUTO-ENTMCNC: 33.6 G/DL (ref 33.7–35.3)
MCV RBC AUTO: 87.3 FL (ref 81.4–97.8)
PLATELET # BLD AUTO: 225 K/UL (ref 164–446)
PMV BLD AUTO: 12 FL (ref 9–12.9)
POTASSIUM SERPL-SCNC: 4 MMOL/L (ref 3.6–5.5)
PROT SERPL-MCNC: 6.2 G/DL (ref 6–8.2)
RBC # BLD AUTO: 4.98 M/UL (ref 4.7–6.1)
SODIUM SERPL-SCNC: 138 MMOL/L (ref 135–145)
WBC # BLD AUTO: 13.7 K/UL (ref 4.8–10.8)

## 2017-09-12 PROCEDURE — 99225 PR SUBSEQUENT OBSERVATION CARE,LEVEL II: CPT | Performed by: FAMILY MEDICINE

## 2017-09-12 PROCEDURE — 36415 COLL VENOUS BLD VENIPUNCTURE: CPT

## 2017-09-12 PROCEDURE — 700105 HCHG RX REV CODE 258: Performed by: FAMILY MEDICINE

## 2017-09-12 PROCEDURE — A9270 NON-COVERED ITEM OR SERVICE: HCPCS | Performed by: INTERNAL MEDICINE

## 2017-09-12 PROCEDURE — 94760 N-INVAS EAR/PLS OXIMETRY 1: CPT

## 2017-09-12 PROCEDURE — 85027 COMPLETE CBC AUTOMATED: CPT

## 2017-09-12 PROCEDURE — 80053 COMPREHEN METABOLIC PANEL: CPT

## 2017-09-12 PROCEDURE — 700111 HCHG RX REV CODE 636 W/ 250 OVERRIDE (IP): Performed by: FAMILY MEDICINE

## 2017-09-12 PROCEDURE — 82962 GLUCOSE BLOOD TEST: CPT | Mod: 91

## 2017-09-12 PROCEDURE — 96366 THER/PROPH/DIAG IV INF ADDON: CPT

## 2017-09-12 PROCEDURE — 700102 HCHG RX REV CODE 250 W/ 637 OVERRIDE(OP): Performed by: INTERNAL MEDICINE

## 2017-09-12 PROCEDURE — G0378 HOSPITAL OBSERVATION PER HR: HCPCS

## 2017-09-12 RX ADMIN — CEFTRIAXONE 2 G: 2 INJECTION, POWDER, FOR SOLUTION INTRAMUSCULAR; INTRAVENOUS at 12:09

## 2017-09-12 RX ADMIN — AMLODIPINE BESYLATE 5 MG: 5 TABLET ORAL at 08:00

## 2017-09-12 ASSESSMENT — ENCOUNTER SYMPTOMS
VOMITING: 0
HEMOPTYSIS: 0
ORTHOPNEA: 0
DOUBLE VISION: 0
BLURRED VISION: 0
COUGH: 0
MYALGIAS: 0
TINGLING: 0
BACK PAIN: 1
NECK PAIN: 0
WEIGHT LOSS: 0
DIZZINESS: 0
PHOTOPHOBIA: 0
SPUTUM PRODUCTION: 0
NAUSEA: 0
TREMORS: 0
HEADACHES: 0
PALPITATIONS: 0
FEVER: 0
HEARTBURN: 0
CHILLS: 0

## 2017-09-12 ASSESSMENT — PAIN SCALES - GENERAL
PAINLEVEL_OUTOF10: 0
PAINLEVEL_OUTOF10: 0

## 2017-09-12 ASSESSMENT — PATIENT HEALTH QUESTIONNAIRE - PHQ9
SUM OF ALL RESPONSES TO PHQ QUESTIONS 1-9: 0
1. LITTLE INTEREST OR PLEASURE IN DOING THINGS: NOT AT ALL
SUM OF ALL RESPONSES TO PHQ9 QUESTIONS 1 AND 2: 0
2. FEELING DOWN, DEPRESSED, IRRITABLE, OR HOPELESS: NOT AT ALL

## 2017-09-12 NOTE — CONSULTS
DATE OF SERVICE:  09/11/2017    REQUESTING PHYSICIAN:  Sandip Cruz MD, from the emergency room.    CONSULTING PHYSICIAN:  Srini Giles MD with urology.    REASON FOR CONSULTATION:  Gross hematuria with clot retention.    HISTORY OF PRESENT ILLNESS:  The patient is a 71-year-old male with a history   of bladder cancer and BPH status post transurethral resection of bladder tumor   and of the prostate 2 years ago in May 2015.  The patient did not follow up   for further cystoscopies afterwards, but presented today with a several day   history of gross hematuria and clot retention.  A 20-Faroese Best catheter was   placed in the ER and irrigated to clear and was started on continuous bladder   irrigation.  However, when I irrigated it today, it was still grossly bloody   with some clots in it.  The patient denies any fevers, chills, chest pain,   shortness of breath, nausea, vomiting, dysuria, just endorses the gross   hematuria and difficulty voiding.    PAST MEDICAL HISTORY:  Patient's past medical history is significant for   hypertension, diabetes, COPD, and tobacco use.    PAST SURGICAL HISTORY:  Transurethral resection of bladder tumor and prostate   on 05/07/2015.    ALLERGIES:  No known drug allergies.    MEDICATIONS:  On admission are amlodipine 5 mg daily and albuterol.  Of note,   the patient got ceftriaxone in the emergency room.    REVIEW OF SYSTEMS:  See HPI, otherwise complete review of systems is negative.    PHYSICAL EXAMINATION:  VITAL SIGNS:  Showed temperature of 36.7, pulse 92, blood pressure 152/77,   respiratory rate was not recorded and saturations 93% on 2 liters nasal   cannula.  Of note, the patient is on oxygen at home.  GENERAL:  Patient is alert, in no acute distress.  Breathing is nonlabored.    Pulse is regular.  ABDOMEN:  Obese, soft, nontender, nondistended.  No CVA tenderness.  GENITOURINARY:  Shows uncircumcised phallus with some phimosis.  Bilaterally   testes are down, no  masses.  EXTREMITIES:  The patient moves all extremities  normally.  NEUROLOGICAL:  Grossly intact.    LABORATORY DATA:  Show white blood cell count of 10.6, hemoglobin of 16.3,   hematocrit of 47%, and platelets 266.  Sodium 137, potassium 3.9, chloride   105, bicarbonate 24, BUN 9 and creatinine 0.87 with a glucose of 212.    Urinalysis shows a large amount of blood, trace leukocyte esterase, positive   nitrites.    CT of the abdomen and pelvis without contrast shows no stones and no hydro.    There is some clot within the bladder, enlarged prostate,  right adrenal   adenoma, and a left renal cyst.    ASSESSMENT AND PLAN:  This is a 71-year-old male with clot urinary retention   from gross hematuria.  Plan is to continue with the continuous bladder   irrigation and take the patient to the operating room for cystoscopy, clot   evacuation and fulguration of bleeding vessels and possible tumor resection if   there is a bladder tumor seen.  Alternatives have been discussed with the   patient, his nurse and with the operating room.       ____________________________________     MD DEANNA Mata / JOAQUÍN    DD:  09/11/2017 16:30:49  DT:  09/11/2017 18:19:17    D#:  [id number]  Job#:  [id number]

## 2017-09-12 NOTE — DIETARY
Nutrition Services Consult for diabetic diet education.  Visited with patient.  Discussed diabetic diet.  Given handouts on carbohydrate counting, portion sizes, label reading, and sample menu.  No questions asked.

## 2017-09-12 NOTE — RESPIRATORY CARE
COPD EDUCATION by COPD CLINICAL EDUCATOR  9/12/2017 at 4:17 PM by Clair Vinson     Patient interviewed by COPD education team. Patient refused COPD program at this time.

## 2017-09-12 NOTE — PROGRESS NOTES
1900 Received report from regino Daigle RN. Pt is comfortable. No current complaints.  W/ CBI running, initial output color =  Cherry red    2000 CBI output now running watermelon red. No pain.  Glucose Accucheck result = 205, pt refused insulin despite education provided  Placed on 4L of o2 via nasal cannula, now saturating low 90s.     Will continue to monitor.

## 2017-09-12 NOTE — CARE PLAN
Problem: Safety  Goal: Will remain free from injury  Outcome: PROGRESSING AS EXPECTED  Bed locked and low, controls on call light button within reach. Pt calls appropriately for needs.  Will continue to monitor.    Problem: Infection  Goal: Will remain free from infection  Remains afebrile.    Problem: Knowledge Deficit  Goal: Knowledge of disease process/condition, treatment plan, diagnostic tests, and medications will improve  Outcome: PROGRESSING SLOWER THAN EXPECTED  Updated with POCs, questions answered.  Educated about insulin regimen and DM care regimen.

## 2017-09-12 NOTE — PROGRESS NOTES
Pt requesting RN call Dr. Giles to see if MD will be rounding on pt today as he is anxious to go home. Perera draining clear yellow urine, no clots or blood noted. Page to Dr. Giles's office regarding pt updates. Spoke with Dr. Giles, new orders received for voiding trial. Requesting RN to clamp perera and let bladder fill from CBI, when pt feels full pull perera and have pt void. If pt able to void post perera removal, ok to d/c pt urology standpoint. RN instructed to call MD with any further updates or needs.     1410: Spoke with Dr. Ramon, pt to stay another night for elevated WBC and rocephin. Call to Dr. Chan MD updated on pt staying overnight. MD requesting bladder training program this evening, leaving decision up to pt if he would like to go home with perera or d/c before home. Pt requesting to have perera d/c'd before going home but understands if he needs to have one to go home. If pt still having bleeding, clots, or retention, orders from Dr. Giles to place 20F coude.

## 2017-09-12 NOTE — PROGRESS NOTES
Report received, pt up in recliner chair. VSS this AM. No signs of distress noted. Best to DD with CBI running. Urine slightly pink, no clots noted. Dr. Ramon at the bedside. IS placed a the bedside and use encouraged.

## 2017-09-12 NOTE — PROGRESS NOTES
[facility] Hospitalist Progress Note    Date of Service: 2017    Chief Complaint  71 y.o. male admitted 2017 with HEMATURIA AND CYSTITIS  AND HYPERGLYCEMIA.    Interval Problem Update  NONE    Consultants/Specialty  UROLOGY    Disposition  PENDING        Review of Systems   Constitutional: Negative for chills, fever and weight loss.   HENT: Negative for hearing loss and tinnitus.    Eyes: Negative for blurred vision, double vision and photophobia.   Respiratory: Negative for cough, hemoptysis and sputum production.    Cardiovascular: Negative for chest pain, palpitations and orthopnea.   Gastrointestinal: Negative for heartburn, nausea and vomiting.   Genitourinary: Positive for hematuria.   Musculoskeletal: Positive for back pain. Negative for myalgias and neck pain.   Skin: Negative for itching and rash.   Neurological: Negative for dizziness, tingling, tremors and headaches.      Physical Exam  Laboratory/Imaging   Hemodynamics  Temp (24hrs), Av.8 °C (98.2 °F), Min:36.3 °C (97.3 °F), Max:37 °C (98.6 °F)   Temperature: 36.8 °C (98.3 °F)  Pulse  Av.3  Min: 77  Max: 111 Heart Rate (Monitored): 86  Blood Pressure : 130/56, NIBP: 118/51      Respiratory      Respiration: 18, Pulse Oximetry: 91 %             Fluids    Intake/Output Summary (Last 24 hours) at 17 1022  Last data filed at 17 0841   Gross per 24 hour   Intake             3040 ml   Output            56408 ml   Net           -96185 ml       Nutrition  Orders Placed This Encounter   Procedures   • DIET ORDER     Standing Status:   Standing     Number of Occurrences:   1     Order Specific Question:   Diet:     Answer:   Diabetic [3]     Physical Exam   Constitutional: He is oriented to person, place, and time. No distress.   HENT:   Head: Normocephalic and atraumatic.   Eyes: Right eye exhibits no discharge. Left eye exhibits no discharge.   Neck: Neck supple. No JVD present.   Cardiovascular: Normal rate and regular rhythm.     Pulmonary/Chest: No stridor. No respiratory distress. He has no wheezes. He has no rales.   Abdominal: Soft. He exhibits no distension. There is no tenderness. There is no rebound.   Musculoskeletal: He exhibits no edema or tenderness.   Neurological: He is alert and oriented to person, place, and time.   Skin: Skin is warm and dry. He is not diaphoretic.       Recent Labs      09/11/17 0611 09/12/17   0425   WBC  10.6  13.7*   RBC  5.49  4.98   HEMOGLOBIN  16.3  14.6   HEMATOCRIT  47.5  43.5   MCV  86.5  87.3   MCH  29.7  29.3   MCHC  34.3  33.6*   RDW  40.8  40.8   PLATELETCT  266  225   MPV  11.3  12.0     Recent Labs      09/11/17 0611 09/12/17 0425   SODIUM  137  138   POTASSIUM  3.9  4.0   CHLORIDE  105  107   CO2  24  23   GLUCOSE  212*  221*   BUN  9  8   CREATININE  0.87  0.72   CALCIUM  9.1  8.3*     Recent Labs      09/11/17 0611   APTT  57.2*   INR  0.97                  Assessment/Plan     Hematuria- (present on admission)   Assessment & Plan    Continuous bladder irrigation  Cystoscopy, clot evacuation of the bladder, and   fulguration of bleeding prostatic varices.  UROLOGY INPUT IS NOTED  CBC IN AM        Cystitis   Assessment & Plan    STARTED THE PT ON ROCEPHIN        Type 2 diabetes mellitus without complication (CMS-HCC)   Assessment & Plan    HEMA1C IS PENDING  S.S.INSULIN        History of BPH- (present on admission)   Assessment & Plan    Follow-up urology as an outpatient        History of bladder cancer- (present on admission)   Assessment & Plan    Status post chemotherapy in 2009  Urology consulted          Quality-Core Measures

## 2017-09-12 NOTE — PROGRESS NOTES
Primary RN unable to obtain IV access, second RN Evelin attempted as well with no access. Call to PICC RN Saray, will be up in the next 15 minutes to retry IV access.    1150: Saray PICC RN at bedside.

## 2017-09-12 NOTE — DISCHARGE PLANNING
Care Transition Team Assessment    SW intern met with patient and wife at bedside. No Advance Directive on file. Patient will provide at later date. Patient discharge plan is to return home with wife when medically able. Patient has no question or concerns at this time.     Information Source  Orientation : Oriented x 4  Information Given By: Patient  Informant's Name: Enoch Mistry   Who is responsible for making decisions for patient? : Patient    Readmission Evaluation  Is this a readmission?: No    Elopement Risk  Legal Hold: No  Ambulatory or Self Mobile in Wheelchair: Yes  Disoriented: No  Psychiatric Symptoms: None  History of Wandering: No  Elopement this Admit: No  Vocalizing Wanting to Leave: No  Displays Behaviors, Body Language Wanting to Leave: No-Not at Risk for Elopement  Elopement Risk: Not at Risk for Elopement    Interdisciplinary Discharge Planning  Does Admitting Nurse Feel This Could be a Complex Discharge?: No  Primary Care Physician: chuckie  Lives with - Patient's Self Care Capacity: Spouse  Patient or legal guardian wants to designate a caregiver (see row info): No  Support Systems: Family Member(s), Friends / Neighbors  Housing / Facility: 1 Story Apartment / Condo  Do You Take your Prescribed Medications Regularly: Yes  Able to Return to Previous ADL's: Yes  Mobility Issues: No    Discharge Preparedness  What is your plan after discharge?: Home with help  What are your discharge supports?: Spouse  Prior Functional Level: Independent with Activities of Daily Living  Difficulity with ADLs: None  Difficulity with IADLs: None    Functional Assesment  Prior Functional Level: Independent with Activities of Daily Living    Finances  Financial Barriers to Discharge: No  Prescription Coverage: Yes    Vision / Hearing Impairment  Vision Impairment : Yes  Right Eye Vision: Wears Glasses  Left Eye Vision: Wears Glasses  Hearing Impairment : No         Advance Directive  Advance Directive?: None (Patient has  AD left at home will bring another time. )    Domestic Abuse  Have you ever been the victim of abuse or violence?: No  Physical Abuse or Sexual Abuse: No  Verbal Abuse or Emotional Abuse: No  Possible Abuse Reported to:: Not Applicable    Psychological Assessment  History of Substance Abuse: None  History of Psychiatric Problems: No  Non-compliant with Treatment: No  Newly Diagnosed Illness: No    Discharge Risks or Barriers  Discharge risks or barriers?: No    Anticipated Discharge Information  Anticipated discharge disposition: Home  Discharge Address: [address] [place] [address]  [address]  Discharge Contact Phone Number: [phone]      This note has been reviewed by Marjan Sanchez SW

## 2017-09-12 NOTE — PROGRESS NOTES
Assumed care of pt, report received from Yanique OGDEN. Pt A&Ox4, sitting up in recliner. VSS, IV infiltrated, pt requesting to wait on new IV placement and encouraged po intake. Best CDI, CBI running, draining clear yellow urine, no clots noted. Pt taught to inform nurse if experiencing pain above comfort goal, SOB, chest pain, ambulating out of bed, or for any further assistance. Fall precautions in place, call light within reach. Will continue with care.

## 2017-09-13 VITALS
RESPIRATION RATE: 18 BRPM | TEMPERATURE: 97.8 F | HEIGHT: 67 IN | BODY MASS INDEX: 36.06 KG/M2 | WEIGHT: 229.72 LBS | HEART RATE: 59 BPM | SYSTOLIC BLOOD PRESSURE: 140 MMHG | DIASTOLIC BLOOD PRESSURE: 66 MMHG | OXYGEN SATURATION: 94 %

## 2017-09-13 LAB
ANION GAP SERPL CALC-SCNC: 7 MMOL/L (ref 0–11.9)
BACTERIA UR CULT: NORMAL
BASOPHILS # BLD AUTO: 0.4 % (ref 0–1.8)
BASOPHILS # BLD: 0.06 K/UL (ref 0–0.12)
BUN SERPL-MCNC: 13 MG/DL (ref 8–22)
CALCIUM SERPL-MCNC: 8.5 MG/DL (ref 8.4–10.2)
CHLORIDE SERPL-SCNC: 106 MMOL/L (ref 96–112)
CO2 SERPL-SCNC: 25 MMOL/L (ref 20–33)
CREAT SERPL-MCNC: 0.76 MG/DL (ref 0.5–1.4)
EOSINOPHIL # BLD AUTO: 0.13 K/UL (ref 0–0.51)
EOSINOPHIL NFR BLD: 0.9 % (ref 0–6.9)
ERYTHROCYTE [DISTWIDTH] IN BLOOD BY AUTOMATED COUNT: 40.8 FL (ref 35.9–50)
GFR SERPL CREATININE-BSD FRML MDRD: >60 ML/MIN/1.73 M 2
GLUCOSE BLD-MCNC: 175 MG/DL (ref 65–99)
GLUCOSE SERPL-MCNC: 176 MG/DL (ref 65–99)
HCT VFR BLD AUTO: 43.6 % (ref 42–52)
HGB BLD-MCNC: 14.9 G/DL (ref 14–18)
IMM GRANULOCYTES # BLD AUTO: 0.05 K/UL (ref 0–0.11)
IMM GRANULOCYTES NFR BLD AUTO: 0.3 % (ref 0–0.9)
LYMPHOCYTES # BLD AUTO: 3.02 K/UL (ref 1–4.8)
LYMPHOCYTES NFR BLD: 20.4 % (ref 22–41)
MCH RBC QN AUTO: 29.1 PG (ref 27–33)
MCHC RBC AUTO-ENTMCNC: 34.2 G/DL (ref 33.7–35.3)
MCV RBC AUTO: 85.2 FL (ref 81.4–97.8)
MONOCYTES # BLD AUTO: 1.16 K/UL (ref 0–0.85)
MONOCYTES NFR BLD AUTO: 7.8 % (ref 0–13.4)
NEUTROPHILS # BLD AUTO: 10.42 K/UL (ref 1.82–7.42)
NEUTROPHILS NFR BLD: 70.2 % (ref 44–72)
NRBC # BLD AUTO: 0 K/UL
NRBC BLD AUTO-RTO: 0 /100 WBC
PLATELET # BLD AUTO: 266 K/UL (ref 164–446)
PMV BLD AUTO: 11.2 FL (ref 9–12.9)
POTASSIUM SERPL-SCNC: 3.7 MMOL/L (ref 3.6–5.5)
RBC # BLD AUTO: 5.12 M/UL (ref 4.7–6.1)
SIGNIFICANT IND 70042: NORMAL
SITE SITE: NORMAL
SODIUM SERPL-SCNC: 138 MMOL/L (ref 135–145)
SOURCE SOURCE: NORMAL
WBC # BLD AUTO: 14.8 K/UL (ref 4.8–10.8)

## 2017-09-13 PROCEDURE — 82962 GLUCOSE BLOOD TEST: CPT

## 2017-09-13 PROCEDURE — 36415 COLL VENOUS BLD VENIPUNCTURE: CPT

## 2017-09-13 PROCEDURE — G0378 HOSPITAL OBSERVATION PER HR: HCPCS

## 2017-09-13 PROCEDURE — 96366 THER/PROPH/DIAG IV INF ADDON: CPT

## 2017-09-13 PROCEDURE — 80048 BASIC METABOLIC PNL TOTAL CA: CPT

## 2017-09-13 PROCEDURE — 700105 HCHG RX REV CODE 258: Performed by: INTERNAL MEDICINE

## 2017-09-13 PROCEDURE — 700105 HCHG RX REV CODE 258: Performed by: FAMILY MEDICINE

## 2017-09-13 PROCEDURE — A9270 NON-COVERED ITEM OR SERVICE: HCPCS | Performed by: INTERNAL MEDICINE

## 2017-09-13 PROCEDURE — 99217 PR OBSERVATION CARE DISCHARGE: CPT | Performed by: FAMILY MEDICINE

## 2017-09-13 PROCEDURE — 700102 HCHG RX REV CODE 250 W/ 637 OVERRIDE(OP): Performed by: INTERNAL MEDICINE

## 2017-09-13 PROCEDURE — 700111 HCHG RX REV CODE 636 W/ 250 OVERRIDE (IP): Performed by: FAMILY MEDICINE

## 2017-09-13 PROCEDURE — 85025 COMPLETE CBC W/AUTO DIFF WBC: CPT

## 2017-09-13 RX ORDER — CEFDINIR 300 MG/1
300 CAPSULE ORAL 2 TIMES DAILY
Qty: 14 CAP | Refills: 0 | Status: SHIPPED | OUTPATIENT
Start: 2017-09-13 | End: 2017-09-21

## 2017-09-13 RX ADMIN — SODIUM CHLORIDE: 9 INJECTION, SOLUTION INTRAVENOUS at 04:00

## 2017-09-13 RX ADMIN — CEFTRIAXONE 2 G: 2 INJECTION, POWDER, FOR SOLUTION INTRAMUSCULAR; INTRAVENOUS at 08:49

## 2017-09-13 RX ADMIN — AMLODIPINE BESYLATE 5 MG: 5 TABLET ORAL at 08:49

## 2017-09-13 RX ADMIN — STANDARDIZED SENNA CONCENTRATE AND DOCUSATE SODIUM 2 TABLET: 8.6; 5 TABLET, FILM COATED ORAL at 08:49

## 2017-09-13 ASSESSMENT — LIFESTYLE VARIABLES: EVER_SMOKED: YES

## 2017-09-13 NOTE — DISCHARGE SUMMARY
"CHIEF COMPLAINT ON ADMISSION  Chief Complaint   Patient presents with   • Blood in Urine     Since Saturday, worsened this am, pt states \"I am passing clots.\" Hx Bladder CA     PT IS LEAVING AMA.  CODE STATUS  Full Code    HPI & HOSPITAL COURSE  This is a 71 y.o. male here with   Hematuria- (present on admission)   Assessment & Plan       Cystoscopy, clot evacuation of the bladder, and   fulguration of bleeding prostatic varices.  UROLOGY HAS CLEARED THE PT    LEUKOCYTOSIS  WAS STARTED ON ROCEPHIN  I SPOKE to the pt and explained to him about HIS ELEVATED WBC AND THAT HE NEEDS TO STAY IN THE HOSPITAL UNTIL HIS WBC STARTS COMING DOWN.HE REFUSED AND STATED THAT HE UNDERSTANDS THAT HE CAN GET SICKER AND WANTS TO LEAVE ANY WAYS AMA.       Therefore, he is discharged in guarded and stable condition with close outpatient follow-up.    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 *      FOLLOW UP  Future Appointments  Date Time Provider Department Center   9/21/2017 8:00 AM LYNDA Stewart M.D.  [address] [place] [address]  [phone]    Schedule an appointment as soon as possible for a visit in 2 weeks  Urology follow up      MEDICATIONS ON DISCHARGE   Enoch Mistry   Home Medication Instructions ADORE:[id number]    Printed on:09/13/17 0831   Medication Information                      Albuterol Sulfate 108 (90 BASE) MCG/ACT AEROSOL POWDER, BREATH ACTIVATED  Inhale 2 Puffs by mouth every 6 hours as needed.             amlodipine (NORVASC) 5 MG Tab  Take 1 Tab by mouth every day.             cefdinir (OMNICEF) 300 MG Cap  Take 1 Cap by mouth 2 times a day.                 DIET  Orders Placed This Encounter   Procedures   • DIET ORDER     Standing Status:   Standing     Number of Occurrences:   1     Order Specific Question:   Diet:     Answer:   Diabetic [3]       ACTIVITY  As tolerated.  Weight bearing as tolerated      CONSULTATIONS  UROLOGY    PROCEDURES  ystoscopy, clot evacuation of the bladder, and   fulguration of bleeding prostatic varices.      LABORATORY  Lab Results   Component Value Date/Time    SODIUM 138 09/13/2017 05:31 AM    POTASSIUM 3.7 09/13/2017 05:31 AM    CHLORIDE 106 09/13/2017 05:31 AM    CO2 25 09/13/2017 05:31 AM    GLUCOSE 176 (H) 09/13/2017 05:31 AM    BUN 13 09/13/2017 05:31 AM    CREATININE 0.76 09/13/2017 05:31 AM        Lab Results   Component Value Date/Time    WBC 14.8 (H) 09/13/2017 05:31 AM    HEMOGLOBIN 14.9 09/13/2017 05:31 AM    HEMATOCRIT 43.6 09/13/2017 05:31 AM    PLATELETCT 266 09/13/2017 05:31 AM      10:05 AM  I WAS JUST NOTIFIED BY THE NURSE THAT PT IS RETAINING URINE  600CC.I ASKED HER TO CALL UROLOGIST TO SEE THEIR RECOMMENDATION.I HAVE ASKED THE PT TO STAY AGAIN AND HE IS REFUSING.  Total time of the discharge process exceeds 40 minutes

## 2017-09-13 NOTE — CARE PLAN
Problem: Safety  Goal: Will remain free from injury  Outcome: PROGRESSING AS EXPECTED  Bed locked and low, controls on call light button within reach. Pt instructed to call appropriately for needs.  Will continue to monitor    Problem: Infection  Goal: Will remain free from infection  Outcome: PROGRESSING AS EXPECTED  Remains afebrile.    Problem: Knowledge Deficit  Goal: Knowledge of disease process/condition, treatment plan, diagnostic tests, and medications will improve  Outcome: PROGRESSING AS EXPECTED  Pt updated with POCs, questions answered will continue to monitor.

## 2017-09-13 NOTE — PROGRESS NOTES
Patient cleared for discharge, and was given all instructions regarding follow-up care with urology, Dr. Giles and primary care Dr. Duke. Patient understands discharge instructions. All personal belongings are with patient, he was discharged into the care of his spouse via wheelchair escorted by CNA.

## 2017-09-13 NOTE — PROGRESS NOTES
"Bladder scan ranges from 350 to 450 mL. Discussed with the pt replacing perera for home. The pt states \"No I am going just fine. I do not want that thing for home. I'm peeing more now and there are no clots or blood.\" The pt is adamant that he will not go home with a perera catheter. Encouraged the pt to monitor his urine output and color and to call the Urologist if his urine slows or if any blood is noted. Pt states understanding.   "

## 2017-09-13 NOTE — PROGRESS NOTES
Dr. Ramon at bedside, discussed discharge plan regarding urination prior to discharge vs. Going home with catheter. Patient wants to go home but is continue to try urinating. Also discussed elevated white blood cell count. Dr. Ramon cleared patient for discharge in guarded and stable condition with close follow-up.

## 2017-09-13 NOTE — PROGRESS NOTES
Pt has void-ed multiple times since bladder scan. Has voided enough to make up for scan amount. Urine is clear, jean carlos colored, no blood or clots noted.

## 2017-09-13 NOTE — DISCHARGE INSTRUCTIONS
Discharge Instructions    Discharged to home by car with relative. Discharged via wheelchair, hospital escort: Yes.  Special equipment needed: Not Applicable    Be sure to schedule a follow-up appointment with your primary care doctor or any specialists as instructed.     Discharge Plan:   Diet Plan: Discussed  Activity Level: Discussed  Smoking Cessation Offered: Patient Refused  Confirmed Follow up Appointment: Patient to Call and Schedule Appointment  Confirmed Symptoms Management: Discussed  Medication Reconciliation Updated: Yes  Influenza Vaccine Indication: Patient Refuses    I understand that a diet low in cholesterol, fat, and sodium is recommended for good health. Unless I have been given specific instructions below for another diet, I accept this instruction as my diet prescription.   Other diet:Diabetic    Special Instructions:     · Is patient discharged on Warfarin / Coumadin?   No     · Is patient Post Blood Transfusion?  No      Hematuria, Adult  Hematuria is blood in your urine. It can be caused by a bladder infection, kidney infection, prostate infection, kidney stone, or cancer of your urinary tract. Infections can usually be treated with medicine, and a kidney stone usually will pass through your urine. If neither of these is the cause of your hematuria, further workup to find out the reason may be needed.  It is very important that you tell your health care provider about any blood you see in your urine, even if the blood stops without treatment or happens without causing pain. Blood in your urine that happens and then stops and then happens again can be a symptom of a very serious condition. Also, pain is not a symptom in the initial stages of many urinary cancers.  HOME CARE INSTRUCTIONS   · Drink lots of fluid, 3-4 quarts a day. If you have been diagnosed with an infection, cranberry juice is especially recommended, in addition to large amounts of water.  · Avoid caffeine, tea, and carbonated  beverages because they tend to irritate the bladder.  · Avoid alcohol because it may irritate the prostate.  · Take all medicines as directed by your health care provider.  · If you were prescribed an antibiotic medicine, finish it all even if you start to feel better.  · If you have been diagnosed with a kidney stone, follow your health care provider's instructions regarding straining your urine to catch the stone.  · Empty your bladder often. Avoid holding urine for long periods of time.  · After a bowel movement, women should cleanse front to back. Use each tissue only once.  · Empty your bladder before and after sexual intercourse if you are a female.  SEEK MEDICAL CARE IF:  · You develop back pain.  · You have a fever.  · You have a feeling of sickness in your stomach (nausea) or vomiting.  · Your symptoms are not better in 3 days. Return sooner if you are getting worse.  SEEK IMMEDIATE MEDICAL CARE IF:   · You develop severe vomiting and are unable to keep the medicine down.  · You develop severe back or abdominal pain despite taking your medicines.  · You begin passing a large amount of blood or clots in your urine.  · You feel extremely weak or faint, or you pass out.  MAKE SURE YOU:   · Understand these instructions.  · Will watch your condition.  · Will get help right away if you are not doing well or get worse.     This information is not intended to replace advice given to you by your health care provider. Make sure you discuss any questions you have with your health care provider.     Document Released: 12/18/2006 Document Revised: 01/08/2016 Document Reviewed: 08/18/2014  [employer] Interactive Patient Education ©2016 [employer]      Depression / Suicide Risk    As you are discharged from this [facility] facility, it is important to learn how to keep safe from harming yourself.    Recognize the warning signs:  · Abrupt changes in personality, positive or negative- including increase in energy    · Giving away possessions  · Change in eating patterns- significant weight changes-  positive or negative  · Change in sleeping patterns- unable to sleep or sleeping all the time   · Unwillingness or inability to communicate  · Depression  · Unusual sadness, discouragement and loneliness  · Talk of wanting to die  · Neglect of personal appearance   · Rebelliousness- reckless behavior  · Withdrawal from people/activities they love  · Confusion- inability to concentrate     If you or a loved one observes any of these behaviors or has concerns about self-harm, here's what you can do:  · Talk about it- your feelings and reasons for harming yourself  · Remove any means that you might use to hurt yourself (examples: pills, rope, extension cords, firearm)  · Get professional help from the community (Mental Health, Substance Abuse, psychological counseling)  · Do not be alone:Call your Safe Contact- someone whom you trust who will be there for you.  · Call your local CRISIS HOTLINE 810-8578 or 813-196-7509  · Call your local Children's Mobile Crisis Response Team [place] (747) 514-8158 or www.[employer]  · Call the toll free National Suicide Prevention Hotlines   · National Suicide Prevention Lifeline 513-155-RNYZ (3042)  · National Hope Line Network 800-SUICIDE (072-2291)

## 2017-09-13 NOTE — CARE PLAN
Problem: Safety  Goal: Will remain free from injury  Outcome: PROGRESSING AS EXPECTED  Bed in low position, treaded slipper socks on, and call light in reach. Pt instructed to call nurse if ambulating out of bed.     Problem: Fluid Volume:  Goal: Will maintain balanced intake and output  Outcome: PROGRESSING AS EXPECTED  I/Os per protocol, pt maintaining adequate po intake, CBI running with clear urine.

## 2017-09-13 NOTE — PROGRESS NOTES
1900 Received report from day RN. Plans for bladder training tonight. Still with 3way perera, CBI clamped.  Pt called since his bladder was feeling full, pt called, perera repositioned, pt able to have urine flow down to perera bag but is blood tinged.    Pt updated with the bladder training plans for tonight and possible perera placement.  pt refuses to do bladder training tonight. Pt states he would rather do it tomorrow and wants to continue CBI for tonight.    1940 Pt called this RN in, stated that he will call when he is ready to clamp it later tonight.    2100 CBI clamped.     0040 Perera clamped.Pt instructed to let this RN know if feeling full    0312 Pt feeling full. Perera unclamped, jean carlos urine passed freely, no clots noted, pt reports relief.     0415 3 way Perera taken out, void of 400 jean carlos urine. Waiting for next void. If w/ clots or any bleeding, will put 20F coude. Pt made aware and verbalized understanding.

## 2017-09-21 ENCOUNTER — OFFICE VISIT (OUTPATIENT)
Dept: MEDICAL GROUP | Facility: MEDICAL CENTER | Age: 72
End: 2017-09-21
Payer: MEDICARE

## 2017-09-21 VITALS
TEMPERATURE: 98.6 F | SYSTOLIC BLOOD PRESSURE: 158 MMHG | WEIGHT: 217 LBS | RESPIRATION RATE: 18 BRPM | HEIGHT: 67 IN | OXYGEN SATURATION: 88 % | HEART RATE: 108 BPM | BODY MASS INDEX: 34.06 KG/M2 | DIASTOLIC BLOOD PRESSURE: 80 MMHG

## 2017-09-21 DIAGNOSIS — R31.9 HEMATURIA: ICD-10-CM

## 2017-09-21 DIAGNOSIS — K80.20 ASYMPTOMATIC CHOLELITHIASIS: ICD-10-CM

## 2017-09-21 DIAGNOSIS — E11.8 TYPE 2 DIABETES MELLITUS WITH COMPLICATION, WITHOUT LONG-TERM CURRENT USE OF INSULIN (HCC): ICD-10-CM

## 2017-09-21 DIAGNOSIS — Z85.51 HISTORY OF BLADDER CANCER: ICD-10-CM

## 2017-09-21 DIAGNOSIS — R97.20 ELEVATED PSA: ICD-10-CM

## 2017-09-21 PROCEDURE — 99215 OFFICE O/P EST HI 40 MIN: CPT | Performed by: FAMILY MEDICINE

## 2017-09-29 ENCOUNTER — PATIENT OUTREACH (OUTPATIENT)
Dept: HEALTH INFORMATION MANAGEMENT | Facility: OTHER | Age: 72
End: 2017-09-29

## 2017-09-29 PROBLEM — N30.90 CYSTITIS: Status: RESOLVED | Noted: 2017-09-12 | Resolved: 2017-09-29

## 2017-09-29 PROBLEM — E11.9 TYPE 2 DIABETES MELLITUS WITHOUT COMPLICATION (HCC): Status: RESOLVED | Noted: 2017-09-11 | Resolved: 2017-09-29

## 2017-09-29 NOTE — PROGRESS NOTES
Subjective:   Chief Complaint/History of Present Illness:  Enoch Mistry is a 71 y.o. male established patient who presents today for Posthospitalization follow-up as well as discussion of diabetes management:    Hematuria  Patient was recently admitted to the hospital for hematuria, also with passing clots. Patient then underwent bladder irrigation as well as fulguration of prostatic varices.  Patient was then discharged home with outpatient oral antibiotic therapy. Patient has since completed this therapy. Patient is unsure if he had follow-up scheduled with his urologist.    Given that patient has a history of bladder cancer, as well as having elevated PSA, I'm recommending that he follow up with urologist for further evaluation and management. Patient is in agreement.    Incidentally, during evaluation for his hematuria, patient was also found to have asymptomatic cholelithiasis on CT of abdomen.    ROS is NEGATIVE for fevers, chills, rigors, flank pain, dysuria, hematuria, pyuria, polyuria, increased frequency of urination, diarrhea, constipation.     ROS is NEGATIVE for generalized weakness/fatigue, generalized pruritis, jaundice, RUQ pain (colicky, sharp, or otherwise).    History of bladder cancer  Please see notes from same date of service re: Hematuria.    Elevated PSA  Please see notes from same date of service re: Hematuria.    Uncontrolled type 2 diabetes mellitus (CMS-[facility])  Unstable, however improving with interval improvement of A1c from 10.4% down to 8.8%.      ROS is NEGATIVE for blurred vision, polydipsia, polyuria, diaphoresis, palpitations, fatigue, irritability, flank pain, BLE paresthesias.      Patient Active Problem List    Diagnosis Date Noted   • Hematuria 09/11/2017     Priority: High   • Elevated PSA 09/21/2017   • History of BPH 09/11/2017   • Stage 2 moderate COPD by GOLD classification (CMS-[facility]) 06/21/2017   • Health maintenance alteration - declines vaccines + colonoscopy  "06/21/2017   • Requires continuous at home supplemental oxygen 06/06/2017   • Uncontrolled type 2 diabetes mellitus (CMS-HCC) 06/06/2017   • Benign non-nodular prostatic hyperplasia with lower urinary tract symptoms 06/06/2017   • Bilateral lower extremity edema 06/06/2017   • Strain of right trapezius muscle 06/06/2017   • Obesity 01/07/2016   • Preventative health care 01/07/2016   • Chronic hypoxemic respiratory failure (CMS-HCC) 01/07/2016   • Adrenal adenoma 01/07/2016   • Asymptomatic cholelithiasis 01/07/2016   • Pulmonary nodule 04/03/2015   • History of bladder cancer 04/02/2015   • Essential hypertension 04/01/2015       Additional History:   Allergies:    Review of patient's allergies indicates no known allergies.     Current Medications:     Current Outpatient Prescriptions   Medication Sig Dispense Refill   • amlodipine (NORVASC) 5 MG Tab Take 1 Tab by mouth every day. 90 Tab 1   • Albuterol Sulfate 108 (90 BASE) MCG/ACT AEROSOL POWDER, BREATH ACTIVATED Inhale 2 Puffs by mouth every 6 hours as needed. 1 Each 3     No current facility-administered medications for this visit.         Social History:     Social History   Substance Use Topics   • Smoking status: Former Smoker     Packs/day: 2.00     Years: 40.00     Types: Cigarettes     Quit date: 1/1/2006   • Smokeless tobacco: Never Used   • Alcohol use 0.0 oz/week      Comment: 7 beers per week       ROS:     - NOTE: All other systems reviewed and are negative, except as in HPI.     Objective:   Physical Exam:    Vitals: Blood pressure 158/80, pulse (!) 108, temperature 37 °C (98.6 °F), resp. rate 18, height 1.689 m (5' 6.5\"), weight 98.4 kg (217 lb), SpO2 88 %.   BMI: Body mass index is 34.5 kg/m².   General/Constitutional: Vitals as above, Well nourished, well developed male in no acute distress   Head/Eyes: Head is grossly normal & atraumatic, bilateral conjunctivae clear and not injected, bilateral EOMI, bilateral PERRL   ENT: Bilateral external " ears grossly normal in appearance, Hearing grossly intact, External nares normal in appearance and without discharge/bleeding   Respiratory: No respiratory distress, bilateral lungs are clear to ausculation in all lung fields (anterior/lateral/posterior), no wheezing/rhonchi/rales   Cardiovascular: Regular rate and rhythm without murmur/gallops/rubs, distal pulses are intact and equal bilaterally (radial, posterior tibial), no bilateral lower extremity edema   MSK: Gait grossly normal & not antalgic   Integumentary: No apparent rashes   Psych: Judgment grossly appropriate, no apparent depression/anxiety    Health Maintenance:      - Declines flu vaccine at present    Imaging/Labs:      - Reviewed and interpreted as in history of present illness above    Assessment and Plan:   1. Hematuria  2. History of bladder cancer  3. Elevated PSA  Uncontrolled, PSA to track changes, and referral to urology for further evaluation and management due to recent prostatic varices as well as history of prostate cancer and elevated PSA.   - REFERRAL TO UROLOGY   - PROSTATE SPECIFIC AG DIAGNOSTIC; Future    4. Asymptomatic cholelithiasis  Uncontrolled. Patient advised to improve his diet, to decrease proportion and dietary fat. Patient verbalizes understanding.    5. Uncontrolled type 2 diabetes mellitus with complication, without long-term current use of insulin (CMS-HCC)  Uncontrolled, however improving. Urine microalbumin to creatinine ratio to evaluate for diabetic nephropathy   - MICROALBUMIN CREAT RATIO URINE; Future    RTC: in 3 months for general check-up.    PLEASE NOTE: This dictation was created using voice recognition software. I have made every reasonable attempt to correct obvious errors, but I expect that there are errors of grammar and possibly content that I did not discover before finalizing the note.

## 2017-09-29 NOTE — PROGRESS NOTES
Outcome: No Answer    Please transfer to Patient Outreach Team at [phone] when patient returns call.    WebIZ Checked & Epic Updated:  yes    [facility] Verified: yes    Attempt # 1

## 2017-09-29 NOTE — ASSESSMENT & PLAN NOTE
Unstable, however improving with interval improvement of A1c from 10.4% down to 8.8%.      ROS is NEGATIVE for blurred vision, polydipsia, polyuria, diaphoresis, palpitations, fatigue, irritability, flank pain, BLE paresthesias.

## 2017-09-29 NOTE — ASSESSMENT & PLAN NOTE
Patient was recently admitted to the hospital for hematuria, also with passing clots. Patient then underwent bladder irrigation as well as fulguration of prostatic varices.  Patient was then discharged home with outpatient oral antibiotic therapy. Patient has since completed this therapy. Patient is unsure if he had follow-up scheduled with his urologist.    Given that patient has a history of bladder cancer, as well as having elevated PSA, I'm recommending that he follow up with urologist for further evaluation and management. Patient is in agreement.    Incidentally, during evaluation for his hematuria, patient was also found to have asymptomatic cholelithiasis on CT of abdomen.    ROS is NEGATIVE for fevers, chills, rigors, flank pain, dysuria, hematuria, pyuria, polyuria, increased frequency of urination, diarrhea, constipation.     ROS is NEGATIVE for generalized weakness/fatigue, generalized pruritis, jaundice, RUQ pain (colicky, sharp, or otherwise).

## 2017-10-03 NOTE — PROGRESS NOTES
Outcome: No Answer    Please transfer to Patient Outreach Team at [phone] when patient returns call.    Attempt # 2

## 2017-10-05 NOTE — PROGRESS NOTES
Outcome: No Answer    Please transfer to Patient Outreach Team at [phone] when patient returns call.    Attempt # 3

## 2017-10-06 NOTE — PROGRESS NOTES
Outcome: No Answer    Please transfer to Patient Outreach Team at [phone] when patient returns call.    Attempt # 4

## 2017-12-17 RX ORDER — LANCETS 28 GAUGE
EACH MISCELLANEOUS
Qty: 100 EACH | Refills: 3 | Status: SHIPPED | OUTPATIENT
Start: 2017-12-17 | End: 2018-09-12

## 2017-12-26 ENCOUNTER — OFFICE VISIT (OUTPATIENT)
Dept: MEDICAL GROUP | Facility: MEDICAL CENTER | Age: 72
End: 2017-12-26
Payer: MEDICARE

## 2017-12-26 VITALS
WEIGHT: 223 LBS | TEMPERATURE: 99.7 F | SYSTOLIC BLOOD PRESSURE: 140 MMHG | BODY MASS INDEX: 35.45 KG/M2 | OXYGEN SATURATION: 90 % | HEART RATE: 99 BPM | DIASTOLIC BLOOD PRESSURE: 68 MMHG

## 2017-12-26 DIAGNOSIS — E66.09 CLASS 2 OBESITY DUE TO EXCESS CALORIES WITHOUT SERIOUS COMORBIDITY WITH BODY MASS INDEX (BMI) OF 35.0 TO 35.9 IN ADULT: ICD-10-CM

## 2017-12-26 DIAGNOSIS — Z12.11 COLON CANCER SCREENING: ICD-10-CM

## 2017-12-26 LAB
HBA1C MFR BLD: 8.2 % (ref ?–5.8)
INT CON NEG: NEGATIVE
INT CON POS: POSITIVE

## 2017-12-26 PROCEDURE — 99214 OFFICE O/P EST MOD 30 MIN: CPT | Performed by: FAMILY MEDICINE

## 2017-12-26 PROCEDURE — 83036 HEMOGLOBIN GLYCOSYLATED A1C: CPT | Performed by: FAMILY MEDICINE

## 2017-12-26 NOTE — PROGRESS NOTES
Subjective:   Chief Complaint/History of Present Illness:  Enoch Mistry is a 72 y.o. male established patient who presents today to discuss the following medical conditions:    Uncontrolled type 2 diabetes mellitus (CMS-[facility])  We discussed that patient is diagnosed with diabetes, uncontrolled, given that the point of care A1c today is:   Lab Results   Component Value Date/Time    HBA1C 8.2 12/26/2017 09:37 AM      Of note, patient states that he believes that his fasting blood sugars at home were approximately 150s to 158. While this is typically equivalent to an A1c of approximately 7.5%, this is not consistent with A1c listed above. Therefore, patient I discussed that he needs to be more mindful what foods he is eating, may need to check his blood sugars more regularly. Patient verbalizes understanding, and although we did discuss pharmacotherapy including sulfonylureas, metformin, and insulin, patient is declining medication prescriptions at this time.    We discussed that prediabetes/diabetes is a medical condition of lifestyle habits (less than optimal dietary choices, insufficient cardiovascular exercise).     ROS is NEGATIVE for blurred vision, polydipsia, polyuria, diaphoresis, palpitations, fatigue, irritability, flank pain, BLE paresthesias.    Obesity  Chronic, uncontrolled, patient declines dietitian referral to this time. Overall, patient's weight is less than in June 2017, however is slightly more than in September 2017    ROS is NEGATIVE for: cold or heat intolerance, anxiety/depression, chest pain/pressure, palpitations, hair thickening/coarsening/falling out/thinning, skin changes, diarrhea/constipation, unexpected weight change.    ROS is NEGATIVE for blurred vision, polydipsia, polyuria, diaphoresis, palpitations, fatigue, irritability, flank pain, BLE paresthesias.      Patient Active Problem List    Diagnosis Date Noted   • Hematuria 09/11/2017     Priority: High   • Elevated PSA  09/21/2017   • History of BPH 09/11/2017   • Stage 2 moderate COPD by GOLD classification (CMS-HCC) 06/21/2017   • Health maintenance alteration - declines vaccines + colonoscopy 06/21/2017   • Requires continuous at home supplemental oxygen 06/06/2017   • Uncontrolled type 2 diabetes mellitus (CMS-HCC) 06/06/2017   • Benign non-nodular prostatic hyperplasia with lower urinary tract symptoms 06/06/2017   • Bilateral lower extremity edema 06/06/2017   • Strain of right trapezius muscle 06/06/2017   • Obesity 01/07/2016   • Preventative health care 01/07/2016   • Chronic hypoxemic respiratory failure (CMS-HCC) 01/07/2016   • Adrenal adenoma 01/07/2016   • Asymptomatic cholelithiasis 01/07/2016   • Pulmonary nodule 04/03/2015   • History of bladder cancer 04/02/2015   • Essential hypertension 04/01/2015       Additional History:   Allergies:    Patient has no known allergies.     Current Medications:     Current Outpatient Prescriptions   Medication Sig Dispense Refill   • FREESTYLE LANCETS Misc USE EVERY MORNING TO TEST FASTING BLOOD SUGAR 100 Each 3   • amlodipine (NORVASC) 5 MG Tab Take 1 Tab by mouth every day. 90 Tab 1   • Albuterol Sulfate 108 (90 BASE) MCG/ACT AEROSOL POWDER, BREATH ACTIVATED Inhale 2 Puffs by mouth every 6 hours as needed. 1 Each 3     No current facility-administered medications for this visit.         Social History:     Social History   Substance Use Topics   • Smoking status: Former Smoker     Packs/day: 2.00     Years: 40.00     Types: Cigarettes     Quit date: 1/1/2006   • Smokeless tobacco: Never Used   • Alcohol use 0.0 oz/week      Comment: 7 beers per week       ROS:     - NOTE: All other systems reviewed and are negative, except as in HPI.     Objective:   Physical Exam:    Vitals: Blood pressure 140/68, pulse 99, temperature 37.6 °C (99.7 °F), weight 101.2 kg (223 lb), SpO2 90 %.   BMI: Body mass index is 35.45 kg/m².   General/Constitutional: Vitals as above, Well nourished, well  developed male in no acute distress   Head/Eyes: Head is grossly normal & atraumatic, bilateral conjunctivae clear and not injected, bilateral EOMI, bilateral PERRL   ENT: Bilateral external ears grossly normal in appearance, Hearing grossly intact, External nares normal in appearance and without discharge/bleeding   Respiratory: No respiratory distress, bilateral lungs are clear to ausculation in all lung fields (anterior/lateral/posterior), no wheezing/rhonchi/rales   Cardiovascular: Regular rate and rhythm without murmur/gallops/rubs, distal pulses are intact and equal bilaterally (radial, posterior tibial), no bilateral lower extremity edema   MSK: Gait grossly normal & not antalgic   Diabetic Foot Exam: No ulcers/laceration/blisters present on bilateral feet, normal gross anatomy of bilateral feet without abnormal curvature or arch, no toe deformities, increased toenail thickness due to long nails, no ingrown toenails, no increase in skin temperature bilaterally, mild skin  erythema to bilateral feet, bilateral dorsalis pedis and posterior tibial pulses 2+ and equal, Refill less than 2 seconds bilaterally, Jack 10 g monofilament testing normal in right foot (great toe, ball of foot at medial/lateral/mid ball of foot) but diminished in left foot at medial & mid ball of foot   Integumentary: No apparent rashes   Psych: Judgment grossly appropriate, no apparent depression/anxiety    Health Maintenance:     - Cancer screening ordered as below by FIT    - Diabetic foot exam as above    - Urine microalbumin to creatinine ratio as above    Imaging/Labs:     - Point of care A1c reviewed as above    Assessment and Plan:   1. Uncontrolled type 2 diabetes mellitus with complication, without long-term current use of insulin (CMS-[facility])  Uncontrolled, patient declines pharmacotherapy, declines dietitian referral.  Urine test strongly encouraged, but patient states that he is unlikely to complete this.   - POCT  A1C   -  MICROALBUMIN CREAT RATIO URINE; Future    2. Class 2 obesity due to excess calories without serious comorbidity with body mass index (BMI) of 35.0 to 35.9 in adult  Uncontrolled, Patient and I discussed the importance of lifestyle changes, with particular emphasis on plant-based nutrition (for the purposes of weight loss, general health, diabetes), as well as cardiovascular exercise, proper sleep, and stress management.  This discussion is briefly summarized in the patient instruction section below.  Patient verbalized understanding.    3. Colon cancer screening  Unknown control, FIT ordered as below, patient strongly encouraged to complete this test, but he states he is unlikely to do so.   - OCCULT BLOOD FECES IMMUNOASSAY; Future    RTC: in 3months for diabetes management.    PLEASE NOTE: This dictation was created using voice recognition software. I have made every reasonable attempt to correct obvious errors, but I expect that there are errors of grammar and possibly content that I did not discover before finalizing the note.

## 2017-12-26 NOTE — ASSESSMENT & PLAN NOTE
Chronic, uncontrolled, patient declines dietitian referral to this time. Overall, patient's weight is less than in June 2017, however is slightly more than in September 2017    ROS is NEGATIVE for: cold or heat intolerance, anxiety/depression, chest pain/pressure, palpitations, hair thickening/coarsening/falling out/thinning, skin changes, diarrhea/constipation, unexpected weight change.    ROS is NEGATIVE for blurred vision, polydipsia, polyuria, diaphoresis, palpitations, fatigue, irritability, flank pain, BLE paresthesias.

## 2017-12-26 NOTE — ASSESSMENT & PLAN NOTE
We discussed that patient is diagnosed with diabetes, uncontrolled, given that the point of care A1c today is:   Lab Results   Component Value Date/Time    HBA1C 8.2 12/26/2017 09:37 AM      Of note, patient states that he believes that his fasting blood sugars at home were approximately 150s to 158. While this is typically equivalent to an A1c of approximately 7.5%, this is not consistent with A1c listed above. Therefore, patient I discussed that he needs to be more mindful what foods he is eating, may need to check his blood sugars more regularly. Patient verbalizes understanding, and although we did discuss pharmacotherapy including sulfonylureas, metformin, and insulin, patient is declining medication prescriptions at this time.    We discussed that prediabetes/diabetes is a medical condition of lifestyle habits (less than optimal dietary choices, insufficient cardiovascular exercise).     ROS is NEGATIVE for blurred vision, polydipsia, polyuria, diaphoresis, palpitations, fatigue, irritability, flank pain, BLE paresthesias.

## 2018-01-12 DIAGNOSIS — I10 ESSENTIAL HYPERTENSION: ICD-10-CM

## 2018-01-12 RX ORDER — AMLODIPINE BESYLATE 5 MG/1
TABLET ORAL
Qty: 90 TAB | Refills: 2 | Status: SHIPPED | OUTPATIENT
Start: 2018-01-12 | End: 2018-10-02

## 2018-05-18 ENCOUNTER — PATIENT OUTREACH (OUTPATIENT)
Dept: HEALTH INFORMATION MANAGEMENT | Facility: OTHER | Age: 73
End: 2018-05-18

## 2018-08-06 NOTE — PROGRESS NOTES
Outcome:No answer    WebIZ Checked & Epic Updated:  yes    [facility] Verified: yes    Attempt # 1

## 2018-09-03 NOTE — PROGRESS NOTES
Outcome: no answer    Please transfer to Patient Outreach Team at [phone] when patient returns call.    Attempt # 2

## 2018-09-12 ENCOUNTER — APPOINTMENT (OUTPATIENT)
Dept: RADIOLOGY | Facility: MEDICAL CENTER | Age: 73
DRG: 853 | End: 2018-09-12
Attending: EMERGENCY MEDICINE
Payer: MEDICARE

## 2018-09-12 ENCOUNTER — HOSPITAL ENCOUNTER (INPATIENT)
Facility: MEDICAL CENTER | Age: 73
LOS: 13 days | DRG: 853 | End: 2018-09-25
Attending: EMERGENCY MEDICINE | Admitting: INTERNAL MEDICINE
Payer: MEDICARE

## 2018-09-12 DIAGNOSIS — E66.09 CLASS 2 OBESITY DUE TO EXCESS CALORIES WITHOUT SERIOUS COMORBIDITY WITH BODY MASS INDEX (BMI) OF 35.0 TO 35.9 IN ADULT: ICD-10-CM

## 2018-09-12 DIAGNOSIS — K81.9 CHOLECYSTITIS: ICD-10-CM

## 2018-09-12 DIAGNOSIS — R79.89 ELEVATED LFTS: ICD-10-CM

## 2018-09-12 DIAGNOSIS — K83.09 CHOLANGITIS: ICD-10-CM

## 2018-09-12 DIAGNOSIS — J96.11 CHRONIC HYPOXEMIC RESPIRATORY FAILURE (HCC): ICD-10-CM

## 2018-09-12 DIAGNOSIS — K26.4 GASTROINTESTINAL HEMORRHAGE ASSOCIATED WITH DUODENAL ULCER: ICD-10-CM

## 2018-09-12 DIAGNOSIS — A41.9 SEPSIS, DUE TO UNSPECIFIED ORGANISM: ICD-10-CM

## 2018-09-12 DIAGNOSIS — E11.00 UNCONTROLLED TYPE 2 DIABETES MELLITUS WITH HYPEROSMOLARITY WITHOUT COMA, WITHOUT LONG-TERM CURRENT USE OF INSULIN (HCC): ICD-10-CM

## 2018-09-12 DIAGNOSIS — R60.0 LOWER EXTREMITY EDEMA: ICD-10-CM

## 2018-09-12 DIAGNOSIS — J18.9 PNEUMONIA DUE TO INFECTIOUS ORGANISM, UNSPECIFIED LATERALITY, UNSPECIFIED PART OF LUNG: ICD-10-CM

## 2018-09-12 PROBLEM — Z91.199 NON-COMPLIANCE: Status: ACTIVE | Noted: 2018-09-12

## 2018-09-12 PROBLEM — K76.0 HEPATIC STEATOSIS: Status: ACTIVE | Noted: 2018-09-12

## 2018-09-12 PROBLEM — I82.509 CHRONIC DEEP VEIN THROMBOSIS (DVT) (HCC): Status: ACTIVE | Noted: 2018-09-12

## 2018-09-12 PROBLEM — J96.20 ACUTE ON CHRONIC RESPIRATORY FAILURE (HCC): Status: ACTIVE | Noted: 2018-09-12

## 2018-09-12 LAB
ALBUMIN SERPL BCP-MCNC: 3.6 G/DL (ref 3.2–4.9)
ALBUMIN/GLOB SERPL: 1.2 G/DL
ALP SERPL-CCNC: 109 U/L (ref 30–99)
ALT SERPL-CCNC: 178 U/L (ref 2–50)
AMMONIA PLAS-SCNC: 29 UMOL/L (ref 11–45)
ANION GAP SERPL CALC-SCNC: 13 MMOL/L (ref 0–11.9)
APPEARANCE UR: CLEAR
APTT PPP: 47.8 SEC (ref 24.7–36)
APTT PPP: 99.8 SEC (ref 24.7–36)
AST SERPL-CCNC: 166 U/L (ref 12–45)
BACTERIA #/AREA URNS HPF: NEGATIVE /HPF
BASE EXCESS BLDA CALC-SCNC: -4 MMOL/L (ref -4–3)
BASOPHILS # BLD AUTO: 0.2 % (ref 0–1.8)
BASOPHILS # BLD: 0.05 K/UL (ref 0–0.12)
BILIRUB CONJ SERPL-MCNC: 7.8 MG/DL (ref 0.1–0.5)
BILIRUB SERPL-MCNC: 14.2 MG/DL (ref 0.1–1.5)
BILIRUB UR QL STRIP.AUTO: ABNORMAL
BODY TEMPERATURE: 36.7 CENTIGRADE
BUN SERPL-MCNC: 18 MG/DL (ref 8–22)
CALCIUM SERPL-MCNC: 8.3 MG/DL (ref 8.4–10.2)
CHLORIDE SERPL-SCNC: 99 MMOL/L (ref 96–112)
CO2 SERPL-SCNC: 19 MMOL/L (ref 20–33)
COLOR UR: YELLOW
CORTIS SERPL-MCNC: 34.8 UG/DL (ref 0–23)
CREAT SERPL-MCNC: 0.95 MG/DL (ref 0.5–1.4)
DEPRECATED D DIMER PPP IA-ACNC: 954 NG/ML(D-DU)
EKG IMPRESSION: NORMAL
EOSINOPHIL # BLD AUTO: 0.01 K/UL (ref 0–0.51)
EOSINOPHIL NFR BLD: 0 % (ref 0–6.9)
EPI CELLS #/AREA URNS HPF: ABNORMAL /HPF
ERYTHROCYTE [DISTWIDTH] IN BLOOD BY AUTOMATED COUNT: 41.7 FL (ref 35.9–50)
FLUAV RNA SPEC QL NAA+PROBE: NEGATIVE
FLUAV+FLUBV AG SPEC QL IA: NORMAL
FLUBV RNA SPEC QL NAA+PROBE: NEGATIVE
GLOBULIN SER CALC-MCNC: 3.1 G/DL (ref 1.9–3.5)
GLUCOSE SERPL-MCNC: 367 MG/DL (ref 65–99)
GLUCOSE UR STRIP.AUTO-MCNC: >=1000 MG/DL
HCO3 BLDA-SCNC: 21 MMOL/L (ref 17–25)
HCT VFR BLD AUTO: 44.3 % (ref 42–52)
HGB BLD-MCNC: 15.2 G/DL (ref 14–18)
IMM GRANULOCYTES # BLD AUTO: 0.15 K/UL (ref 0–0.11)
IMM GRANULOCYTES NFR BLD AUTO: 0.7 % (ref 0–0.9)
INR PPP: 1.3 (ref 0.87–1.13)
KETONES UR STRIP.AUTO-MCNC: 15 MG/DL
LACTATE BLD-SCNC: 1.8 MMOL/L (ref 0.5–2)
LACTATE BLD-SCNC: 1.9 MMOL/L (ref 0.5–2)
LACTATE BLD-SCNC: 3.9 MMOL/L (ref 0.5–2)
LACTATE BLD-SCNC: 5.3 MMOL/L (ref 0.5–2)
LEUKOCYTE ESTERASE UR QL STRIP.AUTO: NEGATIVE
LYMPHOCYTES # BLD AUTO: 0.56 K/UL (ref 1–4.8)
LYMPHOCYTES NFR BLD: 2.7 % (ref 22–41)
MAGNESIUM SERPL-MCNC: 1.5 MG/DL (ref 1.5–2.5)
MAGNESIUM SERPL-MCNC: 1.7 MG/DL (ref 1.5–2.5)
MCH RBC QN AUTO: 29.5 PG (ref 27–33)
MCHC RBC AUTO-ENTMCNC: 34.3 G/DL (ref 33.7–35.3)
MCV RBC AUTO: 85.9 FL (ref 81.4–97.8)
MICRO URNS: ABNORMAL
MONOCYTES # BLD AUTO: 1.22 K/UL (ref 0–0.85)
MONOCYTES NFR BLD AUTO: 5.9 % (ref 0–13.4)
NEUTROPHILS # BLD AUTO: 18.61 K/UL (ref 1.82–7.42)
NEUTROPHILS NFR BLD: 90.5 % (ref 44–72)
NITRITE UR QL STRIP.AUTO: NEGATIVE
NRBC # BLD AUTO: 0 K/UL
NRBC BLD-RTO: 0 /100 WBC
PCO2 BLDA: 34.9 MMHG (ref 26–37)
PCO2 TEMP ADJ BLDA: 34.4 MMHG (ref 26–37)
PH BLDA: 7.39 [PH] (ref 7.4–7.5)
PH TEMP ADJ BLDA: 7.39 [PH] (ref 7.4–7.5)
PH UR STRIP.AUTO: 5.5 [PH]
PHOSPHATE SERPL-MCNC: 1.6 MG/DL (ref 2.5–4.5)
PHOSPHATE SERPL-MCNC: 1.8 MG/DL (ref 2.5–4.5)
PLATELET # BLD AUTO: 156 K/UL (ref 164–446)
PMV BLD AUTO: 11.4 FL (ref 9–12.9)
PO2 BLDA: 62.9 MMHG (ref 64–87)
PO2 TEMP ADJ BLDA: 61.6 MMHG (ref 64–87)
POTASSIUM SERPL-SCNC: 3.4 MMOL/L (ref 3.6–5.5)
PROCALCITONIN SERPL-MCNC: 6.73 NG/ML
PROT SERPL-MCNC: 6.7 G/DL (ref 6–8.2)
PROT UR QL STRIP: NEGATIVE MG/DL
PROTHROMBIN TIME: 16.1 SEC (ref 12–14.6)
RBC # BLD AUTO: 5.16 M/UL (ref 4.7–6.1)
RBC # URNS HPF: ABNORMAL /HPF
RBC UR QL AUTO: ABNORMAL
SAO2 % BLDA: 92.5 % (ref 93–99)
SIGNIFICANT IND 70042: NORMAL
SITE SITE: NORMAL
SODIUM SERPL-SCNC: 131 MMOL/L (ref 135–145)
SOURCE SOURCE: NORMAL
SP GR UR STRIP.AUTO: 1.01
TROPONIN I SERPL-MCNC: 0.03 NG/ML (ref 0–0.04)
TROPONIN I SERPL-MCNC: 0.04 NG/ML (ref 0–0.04)
TROPONIN I SERPL-MCNC: 0.04 NG/ML (ref 0–0.04)
TSH SERPL DL<=0.005 MIU/L-ACNC: 1.19 UIU/ML (ref 0.38–5.33)
WBC # BLD AUTO: 20.6 K/UL (ref 4.8–10.8)
WBC #/AREA URNS HPF: ABNORMAL /HPF

## 2018-09-12 PROCEDURE — 700111 HCHG RX REV CODE 636 W/ 250 OVERRIDE (IP): Performed by: EMERGENCY MEDICINE

## 2018-09-12 PROCEDURE — A9270 NON-COVERED ITEM OR SERVICE: HCPCS | Performed by: INTERNAL MEDICINE

## 2018-09-12 PROCEDURE — 700102 HCHG RX REV CODE 250 W/ 637 OVERRIDE(OP): Performed by: INTERNAL MEDICINE

## 2018-09-12 PROCEDURE — 85025 COMPLETE CBC W/AUTO DIFF WBC: CPT

## 2018-09-12 PROCEDURE — 84484 ASSAY OF TROPONIN QUANT: CPT

## 2018-09-12 PROCEDURE — 99221 1ST HOSP IP/OBS SF/LOW 40: CPT | Performed by: INTERNAL MEDICINE

## 2018-09-12 PROCEDURE — 93308 TTE F-UP OR LMTD: CPT | Mod: 26 | Performed by: INTERNAL MEDICINE

## 2018-09-12 PROCEDURE — 82248 BILIRUBIN DIRECT: CPT

## 2018-09-12 PROCEDURE — 700111 HCHG RX REV CODE 636 W/ 250 OVERRIDE (IP): Performed by: INTERNAL MEDICINE

## 2018-09-12 PROCEDURE — 700101 HCHG RX REV CODE 250: Performed by: INTERNAL MEDICINE

## 2018-09-12 PROCEDURE — 85379 FIBRIN DEGRADATION QUANT: CPT

## 2018-09-12 PROCEDURE — 99291 CRITICAL CARE FIRST HOUR: CPT

## 2018-09-12 PROCEDURE — 71045 X-RAY EXAM CHEST 1 VIEW: CPT

## 2018-09-12 PROCEDURE — 80053 COMPREHEN METABOLIC PANEL: CPT

## 2018-09-12 PROCEDURE — 85610 PROTHROMBIN TIME: CPT

## 2018-09-12 PROCEDURE — 94640 AIRWAY INHALATION TREATMENT: CPT

## 2018-09-12 PROCEDURE — 700105 HCHG RX REV CODE 258: Performed by: INTERNAL MEDICINE

## 2018-09-12 PROCEDURE — 700105 HCHG RX REV CODE 258: Performed by: EMERGENCY MEDICINE

## 2018-09-12 PROCEDURE — 87502 INFLUENZA DNA AMP PROBE: CPT

## 2018-09-12 PROCEDURE — 81001 URINALYSIS AUTO W/SCOPE: CPT

## 2018-09-12 PROCEDURE — 96365 THER/PROPH/DIAG IV INF INIT: CPT

## 2018-09-12 PROCEDURE — 85730 THROMBOPLASTIN TIME PARTIAL: CPT

## 2018-09-12 PROCEDURE — 770022 HCHG ROOM/CARE - ICU (200)

## 2018-09-12 PROCEDURE — 82533 TOTAL CORTISOL: CPT

## 2018-09-12 PROCEDURE — 76705 ECHO EXAM OF ABDOMEN: CPT

## 2018-09-12 PROCEDURE — 82140 ASSAY OF AMMONIA: CPT

## 2018-09-12 PROCEDURE — 99223 1ST HOSP IP/OBS HIGH 75: CPT | Mod: AI | Performed by: INTERNAL MEDICINE

## 2018-09-12 PROCEDURE — 93005 ELECTROCARDIOGRAM TRACING: CPT | Performed by: EMERGENCY MEDICINE

## 2018-09-12 PROCEDURE — 83735 ASSAY OF MAGNESIUM: CPT

## 2018-09-12 PROCEDURE — 87641 MR-STAPH DNA AMP PROBE: CPT

## 2018-09-12 PROCEDURE — 36600 WITHDRAWAL OF ARTERIAL BLOOD: CPT

## 2018-09-12 PROCEDURE — 84443 ASSAY THYROID STIM HORMONE: CPT

## 2018-09-12 PROCEDURE — 84145 PROCALCITONIN (PCT): CPT

## 2018-09-12 PROCEDURE — 94760 N-INVAS EAR/PLS OXIMETRY 1: CPT

## 2018-09-12 PROCEDURE — 83036 HEMOGLOBIN GLYCOSYLATED A1C: CPT

## 2018-09-12 PROCEDURE — 304561 HCHG STAT O2

## 2018-09-12 PROCEDURE — 82803 BLOOD GASES ANY COMBINATION: CPT

## 2018-09-12 PROCEDURE — 87077 CULTURE AEROBIC IDENTIFY: CPT | Mod: 91

## 2018-09-12 PROCEDURE — 93970 EXTREMITY STUDY: CPT

## 2018-09-12 PROCEDURE — 87040 BLOOD CULTURE FOR BACTERIA: CPT

## 2018-09-12 PROCEDURE — 83605 ASSAY OF LACTIC ACID: CPT

## 2018-09-12 PROCEDURE — 82962 GLUCOSE BLOOD TEST: CPT

## 2018-09-12 PROCEDURE — 87640 STAPH A DNA AMP PROBE: CPT

## 2018-09-12 PROCEDURE — 84100 ASSAY OF PHOSPHORUS: CPT

## 2018-09-12 PROCEDURE — 93005 ELECTROCARDIOGRAM TRACING: CPT

## 2018-09-12 PROCEDURE — 87186 SC STD MICRODIL/AGAR DIL: CPT

## 2018-09-12 PROCEDURE — 87400 INFLUENZA A/B EACH AG IA: CPT

## 2018-09-12 PROCEDURE — 36415 COLL VENOUS BLD VENIPUNCTURE: CPT

## 2018-09-12 PROCEDURE — 87086 URINE CULTURE/COLONY COUNT: CPT

## 2018-09-12 RX ORDER — HEPARIN SODIUM 1000 [USP'U]/ML
6000 INJECTION, SOLUTION INTRAVENOUS; SUBCUTANEOUS ONCE
Status: COMPLETED | OUTPATIENT
Start: 2018-09-12 | End: 2018-09-12

## 2018-09-12 RX ORDER — SODIUM CHLORIDE 9 MG/ML
30 INJECTION, SOLUTION INTRAVENOUS
Status: COMPLETED | OUTPATIENT
Start: 2018-09-12 | End: 2018-09-12

## 2018-09-12 RX ORDER — DEXTROSE MONOHYDRATE 25 G/50ML
25 INJECTION, SOLUTION INTRAVENOUS
Status: DISCONTINUED | OUTPATIENT
Start: 2018-09-12 | End: 2018-09-13

## 2018-09-12 RX ORDER — AMOXICILLIN 250 MG
2 CAPSULE ORAL 2 TIMES DAILY
Status: DISCONTINUED | OUTPATIENT
Start: 2018-09-12 | End: 2018-09-16

## 2018-09-12 RX ORDER — POTASSIUM CHLORIDE 7.45 MG/ML
10 INJECTION INTRAVENOUS
Status: DISCONTINUED | OUTPATIENT
Start: 2018-09-12 | End: 2018-09-12

## 2018-09-12 RX ORDER — IPRATROPIUM BROMIDE AND ALBUTEROL SULFATE 2.5; .5 MG/3ML; MG/3ML
3 SOLUTION RESPIRATORY (INHALATION)
Status: DISCONTINUED | OUTPATIENT
Start: 2018-09-12 | End: 2018-09-25 | Stop reason: HOSPADM

## 2018-09-12 RX ORDER — HEPARIN SODIUM 1000 [USP'U]/ML
3200 INJECTION, SOLUTION INTRAVENOUS; SUBCUTANEOUS PRN
Status: DISCONTINUED | OUTPATIENT
Start: 2018-09-12 | End: 2018-09-15

## 2018-09-12 RX ORDER — SODIUM CHLORIDE AND POTASSIUM CHLORIDE 150; 900 MG/100ML; MG/100ML
INJECTION, SOLUTION INTRAVENOUS CONTINUOUS
Status: DISCONTINUED | OUTPATIENT
Start: 2018-09-12 | End: 2018-09-14

## 2018-09-12 RX ORDER — INSULIN GLARGINE 100 [IU]/ML
0.2 INJECTION, SOLUTION SUBCUTANEOUS EVERY EVENING
Status: DISCONTINUED | OUTPATIENT
Start: 2018-09-12 | End: 2018-09-13

## 2018-09-12 RX ORDER — BUDESONIDE AND FORMOTEROL FUMARATE DIHYDRATE 160; 4.5 UG/1; UG/1
2 AEROSOL RESPIRATORY (INHALATION)
Status: DISCONTINUED | OUTPATIENT
Start: 2018-09-12 | End: 2018-09-17

## 2018-09-12 RX ORDER — POLYETHYLENE GLYCOL 3350 17 G/17G
1 POWDER, FOR SOLUTION ORAL
Status: DISCONTINUED | OUTPATIENT
Start: 2018-09-12 | End: 2018-09-16

## 2018-09-12 RX ORDER — SODIUM CHLORIDE 9 MG/ML
500 INJECTION, SOLUTION INTRAVENOUS
Status: DISCONTINUED | OUTPATIENT
Start: 2018-09-12 | End: 2018-09-25 | Stop reason: HOSPADM

## 2018-09-12 RX ORDER — BISACODYL 10 MG
10 SUPPOSITORY, RECTAL RECTAL
Status: DISCONTINUED | OUTPATIENT
Start: 2018-09-12 | End: 2018-09-16

## 2018-09-12 RX ORDER — IPRATROPIUM BROMIDE AND ALBUTEROL SULFATE 2.5; .5 MG/3ML; MG/3ML
3 SOLUTION RESPIRATORY (INHALATION)
Status: DISCONTINUED | OUTPATIENT
Start: 2018-09-12 | End: 2018-09-16

## 2018-09-12 RX ADMIN — PIPERACILLIN SODIUM AND TAZOBACTAM SODIUM 4.5 G: 4; .5 INJECTION, POWDER, FOR SOLUTION INTRAVENOUS at 15:07

## 2018-09-12 RX ADMIN — SODIUM CHLORIDE 2760 ML: 9 INJECTION, SOLUTION INTRAVENOUS at 09:30

## 2018-09-12 RX ADMIN — HEPARIN SODIUM 1200 UNITS: 5000 INJECTION, SOLUTION INTRAVENOUS at 11:47

## 2018-09-12 RX ADMIN — INSULIN GLARGINE 20 UNITS: 100 INJECTION, SOLUTION SUBCUTANEOUS at 18:30

## 2018-09-12 RX ADMIN — PIPERACILLIN SODIUM AND TAZOBACTAM SODIUM 4.5 G: 4; .5 INJECTION, POWDER, FOR SOLUTION INTRAVENOUS at 22:10

## 2018-09-12 RX ADMIN — INSULIN LISPRO 1 UNITS: 100 INJECTION, SOLUTION INTRAVENOUS; SUBCUTANEOUS at 23:33

## 2018-09-12 RX ADMIN — IPRATROPIUM BROMIDE AND ALBUTEROL SULFATE 3 ML: .5; 3 SOLUTION RESPIRATORY (INHALATION) at 19:45

## 2018-09-12 RX ADMIN — VANCOMYCIN HYDROCHLORIDE 1500 MG: 10 INJECTION, POWDER, LYOPHILIZED, FOR SOLUTION INTRAVENOUS at 23:21

## 2018-09-12 RX ADMIN — SODIUM CHLORIDE 3 G: 900 INJECTION INTRAVENOUS at 10:11

## 2018-09-12 RX ADMIN — SODIUM CHLORIDE 2760 ML: 9 INJECTION, SOLUTION INTRAVENOUS at 11:25

## 2018-09-12 RX ADMIN — POTASSIUM CHLORIDE AND SODIUM CHLORIDE: 900; 150 INJECTION, SOLUTION INTRAVENOUS at 20:57

## 2018-09-12 RX ADMIN — INSULIN LISPRO 2 UNITS: 100 INJECTION, SOLUTION INTRAVENOUS; SUBCUTANEOUS at 18:28

## 2018-09-12 RX ADMIN — POTASSIUM CHLORIDE AND SODIUM CHLORIDE: 900; 150 INJECTION, SOLUTION INTRAVENOUS at 11:26

## 2018-09-12 RX ADMIN — IPRATROPIUM BROMIDE AND ALBUTEROL SULFATE 3 ML: .5; 3 SOLUTION RESPIRATORY (INHALATION) at 16:38

## 2018-09-12 RX ADMIN — HEPARIN SODIUM 6000 UNITS: 1000 INJECTION, SOLUTION INTRAVENOUS; SUBCUTANEOUS at 11:42

## 2018-09-12 RX ADMIN — BUDESONIDE AND FORMOTEROL FUMARATE DIHYDRATE 2 PUFF: 160; 4.5 AEROSOL RESPIRATORY (INHALATION) at 16:33

## 2018-09-12 RX ADMIN — INSULIN LISPRO 2 UNITS: 100 INJECTION, SOLUTION INTRAVENOUS; SUBCUTANEOUS at 15:13

## 2018-09-12 RX ADMIN — VANCOMYCIN HYDROCHLORIDE 2300 MG: 5 INJECTION, POWDER, LYOPHILIZED, FOR SOLUTION INTRAVENOUS at 11:24

## 2018-09-12 RX ADMIN — POTASSIUM PHOSPHATE, MONOBASIC AND POTASSIUM PHOSPHATE, DIBASIC 30 MMOL: 224; 236 INJECTION, SOLUTION, CONCENTRATE INTRAVENOUS at 15:19

## 2018-09-12 ASSESSMENT — COGNITIVE AND FUNCTIONAL STATUS - GENERAL
SUGGESTED CMS G CODE MODIFIER DAILY ACTIVITY: CI
MOVING TO AND FROM BED TO CHAIR: A LITTLE
DAILY ACTIVITIY SCORE: 23
DRESSING REGULAR LOWER BODY CLOTHING: A LITTLE
MOVING TO AND FROM BED TO CHAIR: A LITTLE
SUGGESTED CMS G CODE MODIFIER MOBILITY: CI
MOBILITY SCORE: 23

## 2018-09-12 ASSESSMENT — ENCOUNTER SYMPTOMS
EYE REDNESS: 1
PALPITATIONS: 0
CONSTIPATION: 0
ROS GI COMMENTS: NO APPETITE
FEVER: 1
DIZZINESS: 1
VOMITING: 1
FOCAL WEAKNESS: 0
WHEEZING: 0
SPUTUM PRODUCTION: 0
MYALGIAS: 0
LOSS OF CONSCIOUSNESS: 0
DIAPHORESIS: 0
DIARRHEA: 0
CHILLS: 1
COUGH: 1
SORE THROAT: 0
ABDOMINAL PAIN: 1
WEAKNESS: 1
NAUSEA: 1
SHORTNESS OF BREATH: 1
FALLS: 0
EYE DISCHARGE: 0
HEADACHES: 1

## 2018-09-12 ASSESSMENT — LIFESTYLE VARIABLES
SUBSTANCE_ABUSE: 0
EVER_SMOKED: YES

## 2018-09-12 ASSESSMENT — PATIENT HEALTH QUESTIONNAIRE - PHQ9
SUM OF ALL RESPONSES TO PHQ9 QUESTIONS 1 AND 2: 0
2. FEELING DOWN, DEPRESSED, IRRITABLE, OR HOPELESS: NOT AT ALL
1. LITTLE INTEREST OR PLEASURE IN DOING THINGS: NOT AT ALL

## 2018-09-12 ASSESSMENT — COPD QUESTIONNAIRES
COPD SCREENING SCORE: 7
DO YOU EVER COUGH UP ANY MUCUS OR PHLEGM?: YES, A FEW DAYS A WEEK OR MONTH
DURING THE PAST 4 WEEKS HOW MUCH DID YOU FEEL SHORT OF BREATH: SOME OF THE TIME
IN THE PAST 12 MONTHS DO YOU DO LESS THAN YOU USED TO BECAUSE OF YOUR BREATHING PROBLEMS: AGREE
HAVE YOU SMOKED AT LEAST 100 CIGARETTES IN YOUR ENTIRE LIFE: YES

## 2018-09-12 ASSESSMENT — PAIN SCALES - GENERAL
PAINLEVEL_OUTOF10: 2
PAINLEVEL_OUTOF10: 0
PAINLEVEL_OUTOF10: 0
PAINLEVEL_OUTOF10: 2
PAINLEVEL_OUTOF10: 0

## 2018-09-12 NOTE — ED NOTES
Admitting provider is at bedside and state to start the Unasyn even though he has not given a urine sample. Pt refused perera.

## 2018-09-12 NOTE — ED NOTES
First set of blood cultures and blood work sent to the lab. WALTER Arteaga is aware to draw 2 nd srt

## 2018-09-12 NOTE — ED PROVIDER NOTES
ED Provider Note    CHIEF COMPLAINT  Chief Complaint   Patient presents with   • Leg Swelling     for about 3-4 yrs   • N/V     yesterday       HPI  Enoch Mistry is a 72 y.o. male who presents complaining of his oxygen saturations dropping over the last few days.  He states that he has had a cough that is nonproductive.  He has had intermittent fevers at home.  He also is complaining of this constant right upper quadrant abdominal pain for the last several days.  He has not had any diarrhea or vomiting.  He is on 2 L of oxygen all the time and states he has had to turn it up over the last few days.  He has not vomited.  He does have chronic leg swelling for the last few years with chronic DVTs in the past.  He denies taking any blood thinning medications.  Currently he states his pain is 5/10.  Nothing makes it better or worse.  He feels short of breath with laying flat.  He denies any chest pains.    REVIEW OF SYSTEMS    HEENT:  No ear pain, congestion or sore throat   EYES: no discharge redness or vision changes  CARDIAC: no chest pain, palpitations    PULMONARY: Positive dyspnea, cough or congestion   GI: Positive vomiting no diarrhea positive abdominal pain   : no dysuria, back pain or hematuria   Neuro: no weakness, numbness aphasia or headache  Musculoskeletal: Positive for chronic bilateral lower extremity swelling no deformity or pain no joint swelling  Endocrine: Positive fevers, no sweating, weight loss   SKIN: no rash, erythema or contusions     See history of present illness all other systems are negative     PAST MEDICAL HISTORY  Past Medical History:   Diagnosis Date   • Cancer (HCC)     bladder cancer   • Hiatus hernia syndrome    • Hypertension    • Other specified disorder of intestines     constipation   • Urinary bladder disorder     retention, perera cath       FAMILY HISTORY  History reviewed. No pertinent family history.    SOCIAL HISTORY  Social History     Social History   • Marital  "status:      Spouse name: N/A   • Number of children: N/A   • Years of education: N/A     Social History Main Topics   • Smoking status: Former Smoker     Packs/day: 2.00     Years: 40.00     Types: Cigarettes     Quit date: 1/1/2006   • Smokeless tobacco: Never Used   • Alcohol use 0.0 oz/week      Comment: 7 beers per week   • Drug use: No   • Sexual activity: Not Currently     Partners: Female     Other Topics Concern   • Not on file     Social History Narrative   • No narrative on file       SURGICAL HISTORY  Past Surgical History:   Procedure Laterality Date   • CYSTOSCOPY N/A 9/11/2017    Procedure: CYSTOSCOPY- CLOT EVACUATION, FULGURATION, OF PROSTATE;  Surgeon: Srini Giles M.D.;  Location: [facility];  Service: Urology   • CYSTOSCOPY  5/7/2015    Procedure: CYSTOSCOPY [57.32] ;  Surgeon: Srini Giles M.D.;  Location: [facility];  Service:    • TRANS URETHRAL RESECTION PROSTATE  5/7/2015    Procedure: TRANS URETHRAL RESECTION PROSTATE [60.20];  Surgeon: Srini Giles M.D.;  Location: SURGERY [facility];  Service:    • TRANS URETHRAL RESECTION BLADDER  5/7/2015    Procedure: TRANS URETHRAL RESECTION BLADDER [57.49A];  Surgeon: Srini Giles M.D.;  Location: SURGERY [facility];  Service:        CURRENT MEDICATIONS  Home Medications     Reviewed by Jordana Valdez (Pharmacy Tech) on 09/12/18 at 0945  Med List Status: Complete   Medication Last Dose Status   amlodipine (NORVASC) 5 MG Tab 9/11/2018 Active                ALLERGIES  No Known Allergies    PHYSICAL EXAM  VITAL SIGNS: /68   Pulse (!) 117   Temp 37 °C (98.6 °F)   Resp 20   Ht 1.702 m (5' 7\")   Wt 100.4 kg (221 lb 5.5 oz)   SpO2 91%   BMI 34.67 kg/m²   Constitutional: Well developed, Well nourished, chronically ill-appearing  HEENT: Normocephalic, Atraumatic,  external ears normal, pharynx pink,  Mucous  Membranes moist, No rhinorrhea or mucosal edema  Eyes: PERRL, EOMI, " Conjunctiva normal, No discharge.   Neck: Normal range of motion, No tenderness, Supple, No stridor.   Lymphatic: No lymphadenopathy    Cardiovascular: Tachycardic regular rhythm no murmurs rubs or gallops  Thorax & Lungs: Diffuse rhonchi in all lung fields bilaterally speaking full sentences without respiratory distress   Abdomen: Bowel sounds normal, Soft, positive right upper quadrant tenderness to palpation positive obesity, non distended,  No pulsatile masses., no rebound guarding or peritoneal signs.   Skin: Warm, Dry, No erythema, No rash,   Back:  No CVA tenderness,  No spinal tenderness, bony crepitance step offs or instability.   Extremities: Equal, intact distal pulses, No cyanosis, clubbing or edema,  No tenderness.   Musculoskeletal: Good range of motion in all major joints. No tenderness to palpation or major deformities noted.   Neurologic: Alert & oriented x 3,No focal deficits noted.   Psychiatric: Affect normal, Judgment normal, Mood normal.           RADIOLOGY/PROCEDURES  LE VENOUS DUPLEX (Specify in Comments Left, Right Or Bilateral)   Final Result      DX-CHEST-PORTABLE (1 VIEW)   Final Result      1.  Cardiomegaly.      2.  Bibasilar atelectasis.      CT-CTA CHEST PULMONARY ARTERY W/ RECONS    (Results Pending)   US-GALLBLADDER    (Results Pending)   ECHOCARDIOGRAM-COMP W/ CONT    (Results Pending)         COURSE & MEDICAL DECISION MAKING  Pertinent Labs & Imaging studies reviewed. (See chart for details)  Differential diagnosis: Sepsis, pneumonia, cholecystitis, pulmonary embolism, congestive heart failure    Results for orders placed or performed during the hospital encounter of 09/12/18   Lactic acid (lactate)   Result Value Ref Range    Lactic Acid 5.3 (HH) 0.5 - 2.0 mmol/L   Lactic acid (lactate)   Result Value Ref Range    Lactic Acid 3.9 (H) 0.5 - 2.0 mmol/L   CBC WITH DIFFERENTIAL   Result Value Ref Range    WBC 20.6 (H) 4.8 - 10.8 K/uL    RBC 5.16 4.70 - 6.10 M/uL    Hemoglobin 15.2  14.0 - 18.0 g/dL    Hematocrit 44.3 42.0 - 52.0 %    MCV 85.9 81.4 - 97.8 fL    MCH 29.5 27.0 - 33.0 pg    MCHC 34.3 33.7 - 35.3 g/dL    RDW 41.7 35.9 - 50.0 fL    Platelet Count 156 (L) 164 - 446 K/uL    MPV 11.4 9.0 - 12.9 fL    Neutrophils-Polys 90.50 (H) 44.00 - 72.00 %    Lymphocytes 2.70 (L) 22.00 - 41.00 %    Monocytes 5.90 0.00 - 13.40 %    Eosinophils 0.00 0.00 - 6.90 %    Basophils 0.20 0.00 - 1.80 %    Immature Granulocytes 0.70 0.00 - 0.90 %    Nucleated RBC 0.00 /100 WBC    Neutrophils (Absolute) 18.61 (H) 1.82 - 7.42 K/uL    Lymphs (Absolute) 0.56 (L) 1.00 - 4.80 K/uL    Monos (Absolute) 1.22 (H) 0.00 - 0.85 K/uL    Eos (Absolute) 0.01 0.00 - 0.51 K/uL    Baso (Absolute) 0.05 0.00 - 0.12 K/uL    Immature Granulocytes (abs) 0.15 (H) 0.00 - 0.11 K/uL    NRBC (Absolute) 0.00 K/uL   COMP METABOLIC PANEL   Result Value Ref Range    Sodium 131 (L) 135 - 145 mmol/L    Potassium 3.4 (L) 3.6 - 5.5 mmol/L    Chloride 99 96 - 112 mmol/L    Co2 19 (L) 20 - 33 mmol/L    Anion Gap 13.0 (H) 0.0 - 11.9    Glucose 367 (H) 65 - 99 mg/dL    Bun 18 8 - 22 mg/dL    Creatinine 0.95 0.50 - 1.40 mg/dL    Calcium 8.3 (L) 8.4 - 10.2 mg/dL    AST(SGOT) 166 (H) 12 - 45 U/L    ALT(SGPT) 178 (H) 2 - 50 U/L    Alkaline Phosphatase 109 (H) 30 - 99 U/L    Total Bilirubin 14.2 (H) 0.1 - 1.5 mg/dL    Albumin 3.6 3.2 - 4.9 g/dL    Total Protein 6.7 6.0 - 8.2 g/dL    Globulin 3.1 1.9 - 3.5 g/dL    A-G Ratio 1.2 g/dL   URINALYSIS   Result Value Ref Range    Color Yellow     Character Clear     Specific Gravity 1.010 <1.035    Ph 5.5 5.0 - 8.0    Glucose >=1000 (A) Negative mg/dL    Ketones 15 (A) Negative mg/dL    Protein Negative Negative mg/dL    Bilirubin Large (A) Negative    Nitrite Negative Negative    Leukocyte Esterase Negative Negative    Occult Blood Small (A) Negative    Micro Urine Req Microscopic    D-DIMER   Result Value Ref Range    D-Dimer Screen 954 (H) <250 ng/mL(D-DU)   TROPONIN   Result Value Ref Range    Troponin I  0.04 0.00 - 0.04 ng/mL   PHOSPHORUS   Result Value Ref Range    Phosphorus 1.8 (L) 2.5 - 4.5 mg/dL   MAGNESIUM   Result Value Ref Range    Magnesium 1.7 1.5 - 2.5 mg/dL   Influenza Rapid   Result Value Ref Range    Significant Indicator NEG     Source RESP     Site RESPIRATORY     Rapid Influenza A-B       Negative for Influenza A and Influenza B antigens.  Infection due to influenza A or B cannot be ruled out  since the antigen present in the specimen may be below the  detection limit of the test. Culture confirmation of  negative samples is recommended.     Influenza By PCR, A/B   Result Value Ref Range    Influenza virus A RNA Negative Negative    Influenza virus B, PCR Negative Negative   PHOSPHORUS   Result Value Ref Range    Phosphorus 1.6 (L) 2.5 - 4.5 mg/dL   MAGNESIUM   Result Value Ref Range    Magnesium 1.5 1.5 - 2.5 mg/dL   CORTISOL   Result Value Ref Range    Cortisol 34.8 (H) 0.0 - 23.0 ug/dL   ESTIMATED GFR   Result Value Ref Range    GFR If African American >60 >60 mL/min/1.73 m 2    GFR If Non African American >60 >60 mL/min/1.73 m 2   AMMONIA   Result Value Ref Range    Ammonia 29 11 - 45 umol/L   PT/INR   Result Value Ref Range    PT 16.1 (H) 12.0 - 14.6 sec    INR 1.30 (H) 0.87 - 1.13   PTT   Result Value Ref Range    APTT 47.8 (H) 24.7 - 36.0 sec   TSH (Thyroid Stimulating Hormone)   Result Value Ref Range    TSH 1.190 0.380 - 5.330 uIU/mL   URINE MICROSCOPIC (W/UA)   Result Value Ref Range    WBC 0-2 (A) /hpf    RBC 2-5 (A) /hpf    Bacteria Negative None /hpf    Epithelial Cells Few Few /hpf   EKG NOW   Result Value Ref Range    Report       [facility] Emergency Dept.    Test Date:  2018  Pt Name:    MATTHIEU MCKEON                   Department: [facility]  MRN:        [id number]                      Room:       -ROOM 8  Gender:     Male                         Technician: MIGUEL  :        1945                   Requested By:ER TRIAGE PROTOCOL  Order #:    [id number]                     Reading MD: KELECHI MCDONALD MD    Measurements  Intervals                                Axis  Rate:       127                          P:          58  WY:         141                          QRS:        -41  QRSD:       104                          T:          39  QT:         320  QTc:        466    Interpretive Statements  Sinus tachycardia  Ventricular premature complex  Incomplete RBBB and LAFB  RSR' in V1 or V2, probably normal variant  Compared to ECG 05/05/2015 14:31:26  Ventricular premature complex(es) now present  Left anterior fascicular block now present  Incomplete right bundle-branch block now present  Ri ght bundle-branch block now present  Sinus rhythm no longer present  Atrial premature complex(es) no longer present  Myocardial infarct finding no longer present    Electronically Signed On 9- 10:35:52 PDT by KELECHI MCDONALD MD          Intravenous fluids ordered for sepsis protocol.  I also gave him antibiotics per sepsis protocol as well.    10:18 AM Fluid response is positive with the patient being less tachycardic than before.    10:18 AM I spoke with the hospitalist Dr. Hidalgo who has accepted the patient for admission to the intensive care unit.  If his ultrasound is positive surgery will be consulted for cholecystectomy.    Critical Care  Due to the real possibility of a deterioration of this patient's condition required the highest level of my preparedness for sudden emergent intervention. I provided critical care services which included medication orders, frequent reevaluations of the patient's condition and response to treatment, ordering and reviewing test results and discussing the case with various consultants. The critical care time associated with the care of the patient was 40 minutes. Review chart for interventions. This time is exclusive of any other billable procedures.    FINAL IMPRESSION  1. Sepsis, due to unspecified organism (HCC)    2. Pneumonia due to  infectious organism, unspecified laterality, unspecified part of lung    3. Cholecystitis    4. Elevated LFTs           PLAN/DISPOSITION  Admitted in serious condition to ICU          Electronically signed by: Britni Shah, 9/12/2018 10:18 AM

## 2018-09-12 NOTE — ED NOTES
Yifan from Lab called with critical result of Lactic Acid 5.3 at 0939. Critical lab result read back to Yifan.   Dr. Shah notified of critical lab result at 0939.  Critical lab result read back by Dr. Shah.

## 2018-09-12 NOTE — ED NOTES
Pt was [facility] ambulance with CO of right leg swelling, and N/V. Pt sates that he has had leg swelling for about 3-4 yrs. Pt states that he has vomited a few times over the past few days. Pt BS by EMS was 360. SpO2 was 89 % at home on 2 L O2. Pt is on 2 L O2 24/7

## 2018-09-12 NOTE — CONSULTS
Critical Care/Pulmonary Consultation    Date of Service: 9/12/2018    Date of Admission:  9/12/2018  8:58 AM    Consulting Physician: BOSSMAN Morales*    Chief Complaint:  Leg Swelling (for about 3-4 yrs) and N/V (yesterday)      History of Present Illness: Enoch Mistry is a 72 y.o. male with a past medical history of chronic alcohol use, alcoholic liver disease, chronic DVT, COPD on home oxygen at 2 L/min, and obesity presented complaining of dyspnea at rest as well as right upper quadrant pain.  The patient was found to be septic with sinus tachycardia, white blood cell of 20, and worsening liver function.  He denies change in the pattern of cough or sputum production.  He states that his pain is located in the right upper quadrant with no radiation, colicky in nature, moderate in severity, with no relieving or aggravating factors.  He denies history of any abdominal surgery.  He does not take any inhaler for his COPD and does not have a pulmonologist.  He is unable to provide further history.    Review of Systems   Unable to perform ROS: Critical illness       Home Medications     Reviewed by Jordana Valdez (Pharmacy Tech) on 09/12/18 at 0945  Med List Status: Complete   Medication Last Dose Status   amlodipine (NORVASC) 5 MG Tab 9/11/2018 Active                Social History   Substance Use Topics   • Smoking status: Former Smoker     Packs/day: 2.00     Years: 40.00     Types: Cigarettes     Quit date: 1/1/2006   • Smokeless tobacco: Never Used   • Alcohol use 0.0 oz/week      Comment: 7 beers per week        Past Medical History:   Diagnosis Date   • Cancer (HCC)     bladder cancer   • Hiatus hernia syndrome    • Hypertension    • Other specified disorder of intestines     constipation   • Urinary bladder disorder     retention, perera cath       Past Surgical History:   Procedure Laterality Date   • CYSTOSCOPY N/A 9/11/2017    Procedure: CYSTOSCOPY- CLOT EVACUATION, FULGURATION, OF  "PROSTATE;  Surgeon: Srini Giles M.D.;  Location: SURGERY [facility];  Service: Urology   • CYSTOSCOPY  5/7/2015    Procedure: CYSTOSCOPY [57.32] ;  Surgeon: Srini Giles M.D.;  Location: SURGERY [facility];  Service:    • TRANS URETHRAL RESECTION PROSTATE  5/7/2015    Procedure: TRANS URETHRAL RESECTION PROSTATE [60.20];  Surgeon: Srini Giles M.D.;  Location: SURGERY [facility];  Service:    • TRANS URETHRAL RESECTION BLADDER  5/7/2015    Procedure: TRANS URETHRAL RESECTION BLADDER [57.49A];  Surgeon: Srini Giles M.D.;  Location: SURGERY [facility];  Service:        Allergies: Patient has no known allergies.    History reviewed. No pertinent family history.    Encounter Vitals  Standard Vitals  Vitals  Blood Pressure : 137/68  Temperature: 37 °C (98.6 °F)  Pulse: (!) 117  Respiration: 20  Pulse Oximetry: 91 %  Height: 170.2 cm (5' 7\")  Weight: 100.4 kg (221 lb 5.5 oz)              Physical Examination  General: Looks slightly uncomfortable, tachypneic  Neuro/Psych: Conscious alert and oriented x3, lethargic, takes some time to answer questions, follows commands and appropriate.  HEENT: No cervical or supraclavicular lymphadenopathy, trachea midline no thyromegaly.  Jaundiced.  CVS: Tachycardic, normal S1 plus S2 no murmurs  Respiratory: Good air entry bilaterally with no added sounds  Abdomen/: Soft, lax, with right upper quadrant thick tenderness on superficial palpation, reduced bowel sounds  Extremities: +1 edema bilateral lower extremities, varicose veins both feet, intact peripheral pulses  Skin: No erythema or rash      Intake/Output Summary (Last 24 hours) at 09/12/18 1232  Last data filed at 09/12/18 1200   Gross per 24 hour   Intake           3390.2 ml   Output              250 ml   Net           3140.2 ml       Recent Labs      09/12/18   0922   WBC  20.6*   NEUTSPOLYS  90.50*   LYMPHOCYTES  2.70*   MONOCYTES  5.90   EOSINOPHILS  0.00   BASOPHILS  0.20   ASTSGOT  166* "   ALTSGPT  178*   ALKPHOSPHAT  109*   TBILIRUBIN  14.2*     Recent Labs      09/12/18   0922   SODIUM  131*   POTASSIUM  3.4*   CHLORIDE  99   CO2  19*   BUN  18   CREATININE  0.95   MAGNESIUM  1.7  1.5   PHOSPHORUS  1.8*  1.6*   CALCIUM  8.3*     Recent Labs      09/12/18   0922   ALTSGPT  178*   ASTSGOT  166*   ALKPHOSPHAT  109*   TBILIRUBIN  14.2*   GLUCOSE  367*           Invalid input(s): [id number]  LE VENOUS DUPLEX (Specify in Comments Left, Right Or Bilateral)   Final Result      DX-CHEST-PORTABLE (1 VIEW)   Final Result      1.  Cardiomegaly.      2.  Bibasilar atelectasis.      CT-CTA CHEST PULMONARY ARTERY W/ RECONS    (Results Pending)   US-GALLBLADDER    (Results Pending)   ECHOCARDIOGRAM-COMP W/ CONT    (Results Pending)       Patient Active Problem List   Diagnosis   • Essential hypertension   • History of bladder cancer   • Pulmonary nodule   • Obesity   • Preventative health care   • Chronic hypoxemic respiratory failure (HCC)   • Adrenal adenoma   • Asymptomatic cholelithiasis   • Requires continuous at home supplemental oxygen   • Uncontrolled type 2 diabetes mellitus (HCC)   • Benign non-nodular prostatic hyperplasia with lower urinary tract symptoms   • Bilateral lower extremity edema   • Strain of right trapezius muscle   • Stage 2 moderate COPD by GOLD classification (HCC)   • Health maintenance alteration - declines vaccines + colonoscopy   • Hematuria   • History of BPH   • Elevated PSA       CT abdomen today:    No urolithiasis or hydronephrosis. There is hemorrhage in the decompressed bladder with Best catheter present    Stable marked prostate gland enlargement is most likely from BPH. Clinical correlation is recommended    Cholelithiasis    Right adrenal mass most likely is an adenoma given minimal change from April 2015    Left renal cyst is also without significant change    PFT 7/2017  FINDINGS:  1.  Baseline FEV1 is 2.07 liters, which is 77% of predicted.  The FEV1/FVC   ratio  is reduced at 64%.  MVV is reduced at 58% of predicted.  After   administration of an inhaled bronchodilator, there is a relative 8%   improvement in FEV1.  2.  Lung volumes demonstrate hyperinflation.  3.  DLCO is 62% of predicted.  4.  Airway resistance is normal.     IMPRESSION:  Mild-to-moderate obstructive lung disease.  The decrease in DLCO   would be consistent with a clinical diagnosis of emphysema.      Assessment and Plan:  1. Sepsis, suspect biliary source, SOFA score 6  Agree with Greg & Ernie for now  F/U cultures  F/U GBUS  F/U with surgery   Lactate q4, started to trend down with IV fluids     2. GOLD C-D COPD/emphysema on home oxygen   Not on inhalers at home;  Nebulized DuoNeb every 6 as needed  Start symbicort 160/4.5 bid     3. Chronic DVT on Heparin gtt  4. Lactic acidosis 2ry to sepsis, improving from 5 to 3.9 this afternoon- per 1  5. Chronic alcoholic liver disease, Discriminant score 37 however sepsis precludes steroid use   6.  Chronic hypoxemic respiratory failure on home oxygen 2 L/min & currently at 3 LPM  ABG for A-a gradient and pH     7. Ex-smoker, quit 10 years ago     Critical Care Time not including procedures: 45

## 2018-09-12 NOTE — PROGRESS NOTES
"Pharmacy Kinetics 72 y.o. male on vancomycin day # 1 2018    Currently on Vancomycin 2300 mg iv loading dose given 1124    Indication for Treatment: Pneumonia    Pertinent history per medical record: Admitted on 2018 for right leg swelling and N/V.  Home O2 2 L with SpO2 89% with non-productive cough.  Sepsis with pneumonia, cholecystitis and elevated LFTs.  PMH: DVTs (no current anticoagulation),    Other antibiotics: Zosyn 4.5 gm IV Q8H extended infusion    Allergies: Patient has no known allergies.     List concerns for renal function: nephrotoxic drugs (Vanc/Zosyn combination)    Pertinent cultures to date:    BCp x 2 pending   UC pending   Nasal swab pending    Recent Labs      18   09   WBC  20.6*   NEUTSPOLYS  90.50*     Recent Labs      18   0922   BUN  18   CREATININE  0.95   ALBUMIN  3.6     No results for input(s): VANCOTROUGH, VANCOPEAK, VANCORANDOM in the last 72 hours.  Intake/Output Summary (Last 24 hours) at 18 1351  Last data filed at 18 1300   Gross per 24 hour   Intake           3390.2 ml   Output              700 ml   Net           2690.2 ml      Blood pressure 137/68, pulse (!) 117, temperature 37 °C (98.6 °F), resp. rate 20, height 1.702 m (5' 7\"), weight 100.4 kg (221 lb 5.5 oz), SpO2 91 %. Temp (24hrs), Av.6 °C (99.6 °F), Min:37 °C (98.6 °F), Max:38.1 °C (100.5 °F)      A/P   1. Vancomycin dose change: 1500 mg IV Q12H to start this evening  2. Next vancomycin level:  1030  3. Goal trough: 16 - 20 mcg/ml  4. Comments: Plan to check trough  and adjust dose if needed per protocol. Will monitor daily per protocol.    DAVID SpenceD    "

## 2018-09-12 NOTE — FLOWSHEET NOTE
09/12/18 1234   Events/Summary/Plan   Events/Summary/Plan Initial RCP assessment   Interdisciplinary Plan of Care-Goals (Indications)   Bronchodilator Indications History / Diagnosis   Hyperinflation Protocol Indications Atelectasis Documented by Chest X-Ray   Interdisciplinary Plan of Care-Outcomes    Hyperinflation Protocol Goals/Outcome Improvement in Repeat CXR;Stable Vital Capacity x24 hrs and Patient Understands / uses I.S.   Education   Education Yes - Pt. / Family has been Instructed in use of Respiratory Equipment   Incentive Spirometry Group   Incentive Spirometry Instruction Yes   Breathing Exercises Yes   Incentive Spirometer Volume 1000 mL   Chest Exam   Work Of Breathing / Effort Mild   Respiration 20   Pulse (!) 117   Heart Rate (Monitored) (!) 117   Breath Sounds   Pre/Post Intervention Post Intervention Assessment   RUL Breath Sounds Clear;Diminished   RML Breath Sounds Clear;Diminished   RLL Breath Sounds Diminished   LORNA Breath Sounds Clear;Diminished   LLL Breath Sounds Diminished   Oximetry   #Pulse Oximetry (Single Determination) Yes   Oxygen   Home O2 Use Prior To Admission? Yes   Home O2 LPM Flow 2 LPM   Home O2 Delivery Method Nasal Cannula   Home O2 Frequency of Use Continuous   Pulse Oximetry 91 %   O2 (LPM) 3.5   O2 Daily Delivery Respiratory  Nasal Cannula

## 2018-09-12 NOTE — CARE PLAN
Problem: Safety  Goal: Will remain free from injury  Outcome: PROGRESSING AS EXPECTED  Call bell within reach, bed in low and locked position. All appropriate safety precautions in place.     Problem: Bowel/Gastric:  Goal: Normal bowel function is maintained or improved  Outcome: PROGRESSING SLOWER THAN EXPECTED  Pt NPO at this time education given on diet status. Awaiting US of abdomen. Pt denies N/V at this time. C/O dull achy pain in RUQ.

## 2018-09-12 NOTE — PROGRESS NOTES
Pt arrived from ER via stretcher. Pt denies pain at this time. Belongings including glasses and cell phone. Pt SOB with exertion and requiring to sit up on 4L NC lungs diminished throughout no cough present. Pt appears to be jaundice throughout body including sclarea. The remainder of sepsis protocol infusion without complication. Bed in low and locked position, call bell within reach, bedside table within reach. Updated pt on POC no questions at this time.

## 2018-09-13 ENCOUNTER — APPOINTMENT (OUTPATIENT)
Dept: RADIOLOGY | Facility: MEDICAL CENTER | Age: 73
DRG: 853 | End: 2018-09-13
Attending: INTERNAL MEDICINE
Payer: MEDICARE

## 2018-09-13 ENCOUNTER — APPOINTMENT (OUTPATIENT)
Dept: RADIOLOGY | Facility: MEDICAL CENTER | Age: 73
DRG: 853 | End: 2018-09-13
Attending: EMERGENCY MEDICINE
Payer: MEDICARE

## 2018-09-13 LAB
ALBUMIN SERPL BCP-MCNC: 3 G/DL (ref 3.2–4.9)
ALBUMIN/GLOB SERPL: 1.2 G/DL
ALP SERPL-CCNC: 86 U/L (ref 30–99)
ALT SERPL-CCNC: 123 U/L (ref 2–50)
AMYLASE SERPL-CCNC: 160 U/L (ref 25–125)
ANION GAP SERPL CALC-SCNC: 5 MMOL/L (ref 0–11.9)
APTT PPP: 86.1 SEC (ref 24.7–36)
APTT PPP: 87.1 SEC (ref 24.7–36)
AST SERPL-CCNC: 88 U/L (ref 12–45)
BASE EXCESS BLDA CALC-SCNC: -5 MMOL/L (ref -4–3)
BILIRUB SERPL-MCNC: 10.9 MG/DL (ref 0.1–1.5)
BNP SERPL-MCNC: 122 PG/ML (ref 0–100)
BODY TEMPERATURE: 36.8 CENTIGRADE
BUN SERPL-MCNC: 11 MG/DL (ref 8–22)
CALCIUM SERPL-MCNC: 7.3 MG/DL (ref 8.4–10.2)
CHLORIDE SERPL-SCNC: 111 MMOL/L (ref 96–112)
CO2 SERPL-SCNC: 22 MMOL/L (ref 20–33)
CREAT SERPL-MCNC: 0.57 MG/DL (ref 0.5–1.4)
ERYTHROCYTE [DISTWIDTH] IN BLOOD BY AUTOMATED COUNT: 43.6 FL (ref 35.9–50)
EST. AVERAGE GLUCOSE BLD GHB EST-MCNC: 226 MG/DL
GLOBULIN SER CALC-MCNC: 2.6 G/DL (ref 1.9–3.5)
GLUCOSE BLD-MCNC: 192 MG/DL (ref 65–99)
GLUCOSE BLD-MCNC: 197 MG/DL (ref 65–99)
GLUCOSE BLD-MCNC: 259 MG/DL (ref 65–99)
GLUCOSE SERPL-MCNC: 176 MG/DL (ref 65–99)
HBA1C MFR BLD: 9.5 % (ref 0–5.6)
HCO3 BLDA-SCNC: 19 MMOL/L (ref 17–25)
HCT VFR BLD AUTO: 40.5 % (ref 42–52)
HGB BLD-MCNC: 13.8 G/DL (ref 14–18)
LIPASE SERPL-CCNC: 51 U/L (ref 7–58)
LV EJECT FRACT  99904: 65
MAGNESIUM SERPL-MCNC: 1.7 MG/DL (ref 1.5–2.5)
MCH RBC QN AUTO: 29.6 PG (ref 27–33)
MCHC RBC AUTO-ENTMCNC: 34.1 G/DL (ref 33.7–35.3)
MCV RBC AUTO: 86.7 FL (ref 81.4–97.8)
O2/TOTAL GAS SETTING VFR VENT: 50 % (ref 30–60)
PCO2 BLDA: 35.8 MMHG (ref 26–37)
PCO2 TEMP ADJ BLDA: 35.5 MMHG (ref 26–37)
PH BLDA: 7.35 [PH] (ref 7.4–7.5)
PH TEMP ADJ BLDA: 7.35 [PH] (ref 7.4–7.5)
PHOSPHATE SERPL-MCNC: 2 MG/DL (ref 2.5–4.5)
PLATELET # BLD AUTO: 132 K/UL (ref 164–446)
PMV BLD AUTO: 11.7 FL (ref 9–12.9)
PO2 BLDA: 74.6 MMHG (ref 64–87)
PO2 TEMP ADJ BLDA: 73.6 MMHG (ref 64–87)
POTASSIUM SERPL-SCNC: 3.7 MMOL/L (ref 3.6–5.5)
PROT SERPL-MCNC: 5.6 G/DL (ref 6–8.2)
RBC # BLD AUTO: 4.67 M/UL (ref 4.7–6.1)
SAO2 % BLDA: 95.1 % (ref 93–99)
SCCMEC + MECA PNL NOSE NAA+PROBE: NEGATIVE
SCCMEC + MECA PNL NOSE NAA+PROBE: NEGATIVE
SODIUM SERPL-SCNC: 138 MMOL/L (ref 135–145)
TROPONIN I SERPL-MCNC: 0.05 NG/ML (ref 0–0.04)
WBC # BLD AUTO: 10.9 K/UL (ref 4.8–10.8)

## 2018-09-13 PROCEDURE — 700102 HCHG RX REV CODE 250 W/ 637 OVERRIDE(OP): Performed by: INTERNAL MEDICINE

## 2018-09-13 PROCEDURE — 83690 ASSAY OF LIPASE: CPT

## 2018-09-13 PROCEDURE — 83735 ASSAY OF MAGNESIUM: CPT

## 2018-09-13 PROCEDURE — 99233 SBSQ HOSP IP/OBS HIGH 50: CPT | Performed by: INTERNAL MEDICINE

## 2018-09-13 PROCEDURE — 700111 HCHG RX REV CODE 636 W/ 250 OVERRIDE (IP): Performed by: INTERNAL MEDICINE

## 2018-09-13 PROCEDURE — 85730 THROMBOPLASTIN TIME PARTIAL: CPT

## 2018-09-13 PROCEDURE — 71045 X-RAY EXAM CHEST 1 VIEW: CPT

## 2018-09-13 PROCEDURE — 770022 HCHG ROOM/CARE - ICU (200)

## 2018-09-13 PROCEDURE — 700111 HCHG RX REV CODE 636 W/ 250 OVERRIDE (IP): Performed by: EMERGENCY MEDICINE

## 2018-09-13 PROCEDURE — 700105 HCHG RX REV CODE 258: Performed by: INTERNAL MEDICINE

## 2018-09-13 PROCEDURE — 85027 COMPLETE CBC AUTOMATED: CPT

## 2018-09-13 PROCEDURE — 36600 WITHDRAWAL OF ARTERIAL BLOOD: CPT

## 2018-09-13 PROCEDURE — 84484 ASSAY OF TROPONIN QUANT: CPT

## 2018-09-13 PROCEDURE — 94640 AIRWAY INHALATION TREATMENT: CPT

## 2018-09-13 PROCEDURE — 82803 BLOOD GASES ANY COMBINATION: CPT

## 2018-09-13 PROCEDURE — 82962 GLUCOSE BLOOD TEST: CPT | Mod: 91

## 2018-09-13 PROCEDURE — 94760 N-INVAS EAR/PLS OXIMETRY 1: CPT

## 2018-09-13 PROCEDURE — 82150 ASSAY OF AMYLASE: CPT

## 2018-09-13 PROCEDURE — 83880 ASSAY OF NATRIURETIC PEPTIDE: CPT

## 2018-09-13 PROCEDURE — 700101 HCHG RX REV CODE 250: Performed by: INTERNAL MEDICINE

## 2018-09-13 PROCEDURE — 700105 HCHG RX REV CODE 258: Performed by: EMERGENCY MEDICINE

## 2018-09-13 PROCEDURE — 84100 ASSAY OF PHOSPHORUS: CPT

## 2018-09-13 PROCEDURE — 93306 TTE W/DOPPLER COMPLETE: CPT

## 2018-09-13 PROCEDURE — 80053 COMPREHEN METABOLIC PANEL: CPT

## 2018-09-13 RX ORDER — AZITHROMYCIN 500 MG/1
500 INJECTION, POWDER, LYOPHILIZED, FOR SOLUTION INTRAVENOUS DAILY
Status: DISCONTINUED | OUTPATIENT
Start: 2018-09-13 | End: 2018-09-13

## 2018-09-13 RX ORDER — DEXTROSE MONOHYDRATE 25 G/50ML
25 INJECTION, SOLUTION INTRAVENOUS
Status: DISCONTINUED | OUTPATIENT
Start: 2018-09-13 | End: 2018-09-15

## 2018-09-13 RX ORDER — INSULIN GLARGINE 100 [IU]/ML
25 INJECTION, SOLUTION SUBCUTANEOUS EVERY EVENING
Status: DISCONTINUED | OUTPATIENT
Start: 2018-09-14 | End: 2018-09-15

## 2018-09-13 RX ORDER — METHYLPREDNISOLONE SODIUM SUCCINATE 40 MG/ML
40 INJECTION, POWDER, LYOPHILIZED, FOR SOLUTION INTRAMUSCULAR; INTRAVENOUS EVERY 6 HOURS
Status: DISCONTINUED | OUTPATIENT
Start: 2018-09-13 | End: 2018-09-14

## 2018-09-13 RX ORDER — ONDANSETRON 2 MG/ML
4 INJECTION INTRAMUSCULAR; INTRAVENOUS EVERY 4 HOURS PRN
Status: DISCONTINUED | OUTPATIENT
Start: 2018-09-13 | End: 2018-09-25 | Stop reason: HOSPADM

## 2018-09-13 RX ADMIN — INSULIN LISPRO 1 UNITS: 100 INJECTION, SOLUTION INTRAVENOUS; SUBCUTANEOUS at 06:15

## 2018-09-13 RX ADMIN — IPRATROPIUM BROMIDE AND ALBUTEROL SULFATE 3 ML: .5; 3 SOLUTION RESPIRATORY (INHALATION) at 10:34

## 2018-09-13 RX ADMIN — HEPARIN SODIUM 1200 UNITS/HR: 5000 INJECTION, SOLUTION INTRAVENOUS at 09:33

## 2018-09-13 RX ADMIN — IPRATROPIUM BROMIDE AND ALBUTEROL SULFATE 3 ML: .5; 3 SOLUTION RESPIRATORY (INHALATION) at 06:31

## 2018-09-13 RX ADMIN — POTASSIUM CHLORIDE AND SODIUM CHLORIDE: 900; 150 INJECTION, SOLUTION INTRAVENOUS at 06:22

## 2018-09-13 RX ADMIN — METHYLPREDNISOLONE SODIUM SUCCINATE 40 MG: 40 INJECTION, POWDER, FOR SOLUTION INTRAMUSCULAR; INTRAVENOUS at 23:59

## 2018-09-13 RX ADMIN — PIPERACILLIN SODIUM AND TAZOBACTAM SODIUM 4.5 G: 4; .5 INJECTION, POWDER, FOR SOLUTION INTRAVENOUS at 12:27

## 2018-09-13 RX ADMIN — INSULIN LISPRO 2 UNITS: 100 INJECTION, SOLUTION INTRAVENOUS; SUBCUTANEOUS at 11:59

## 2018-09-13 RX ADMIN — IPRATROPIUM BROMIDE AND ALBUTEROL SULFATE 3 ML: .5; 3 SOLUTION RESPIRATORY (INHALATION) at 18:28

## 2018-09-13 RX ADMIN — PIPERACILLIN SODIUM AND TAZOBACTAM SODIUM 4.5 G: 4; .5 INJECTION, POWDER, FOR SOLUTION INTRAVENOUS at 20:55

## 2018-09-13 RX ADMIN — PIPERACILLIN SODIUM AND TAZOBACTAM SODIUM 4.5 G: 4; .5 INJECTION, POWDER, FOR SOLUTION INTRAVENOUS at 05:59

## 2018-09-13 RX ADMIN — BUDESONIDE AND FORMOTEROL FUMARATE DIHYDRATE 2 PUFF: 160; 4.5 AEROSOL RESPIRATORY (INHALATION) at 06:31

## 2018-09-13 RX ADMIN — INSULIN GLARGINE 20 UNITS: 100 INJECTION, SOLUTION SUBCUTANEOUS at 17:38

## 2018-09-13 RX ADMIN — AZITHROMYCIN MONOHYDRATE 500 MG: 500 INJECTION, POWDER, LYOPHILIZED, FOR SOLUTION INTRAVENOUS at 09:33

## 2018-09-13 RX ADMIN — POTASSIUM CHLORIDE AND SODIUM CHLORIDE: 900; 150 INJECTION, SOLUTION INTRAVENOUS at 15:56

## 2018-09-13 RX ADMIN — METHYLPREDNISOLONE SODIUM SUCCINATE 40 MG: 40 INJECTION, POWDER, FOR SOLUTION INTRAMUSCULAR; INTRAVENOUS at 17:35

## 2018-09-13 RX ADMIN — VANCOMYCIN HYDROCHLORIDE 1500 MG: 10 INJECTION, POWDER, LYOPHILIZED, FOR SOLUTION INTRAVENOUS at 12:27

## 2018-09-13 RX ADMIN — ONDANSETRON HYDROCHLORIDE 4 MG: 2 INJECTION INTRAMUSCULAR; INTRAVENOUS at 09:32

## 2018-09-13 RX ADMIN — IPRATROPIUM BROMIDE AND ALBUTEROL SULFATE 3 ML: .5; 3 SOLUTION RESPIRATORY (INHALATION) at 14:52

## 2018-09-13 RX ADMIN — BUDESONIDE AND FORMOTEROL FUMARATE DIHYDRATE 2 PUFF: 160; 4.5 AEROSOL RESPIRATORY (INHALATION) at 18:29

## 2018-09-13 RX ADMIN — METHYLPREDNISOLONE SODIUM SUCCINATE 40 MG: 40 INJECTION, POWDER, FOR SOLUTION INTRAMUSCULAR; INTRAVENOUS at 11:59

## 2018-09-13 RX ADMIN — METHYLPREDNISOLONE SODIUM SUCCINATE 40 MG: 40 INJECTION, POWDER, FOR SOLUTION INTRAMUSCULAR; INTRAVENOUS at 08:09

## 2018-09-13 RX ADMIN — INSULIN LISPRO 3 UNITS: 100 INJECTION, SOLUTION INTRAVENOUS; SUBCUTANEOUS at 17:36

## 2018-09-13 ASSESSMENT — ENCOUNTER SYMPTOMS
FEVER: 0
NAUSEA: 0
DEPRESSION: 0
ABDOMINAL PAIN: 1
DIZZINESS: 0
EYE REDNESS: 0
PALPITATIONS: 0
COUGH: 1
SINUS PAIN: 0
SHORTNESS OF BREATH: 1
SORE THROAT: 0
WEAKNESS: 1
HEADACHES: 0
SPUTUM PRODUCTION: 0
VOMITING: 0
CONSTIPATION: 0
EYE DISCHARGE: 0

## 2018-09-13 ASSESSMENT — PAIN SCALES - GENERAL
PAINLEVEL_OUTOF10: 0

## 2018-09-13 ASSESSMENT — PATIENT HEALTH QUESTIONNAIRE - PHQ9
2. FEELING DOWN, DEPRESSED, IRRITABLE, OR HOPELESS: NOT AT ALL
1. LITTLE INTEREST OR PLEASURE IN DOING THINGS: NOT AT ALL
SUM OF ALL RESPONSES TO PHQ9 QUESTIONS 1 AND 2: 0

## 2018-09-13 NOTE — PROGRESS NOTES
Received report from Moon OGDEN. Patient a/o x4, no complaints of pain or discomfort. Fluids and heparin drip verified. Patient sat up to edge of bed for 15 minutes with assistance of one staff member, provided cool wash cloth for comfort. Patient then repositioned on right side to sleep for the night, encouraged to call for assistance.

## 2018-09-13 NOTE — PROGRESS NOTES
2 RN skin check. Pt has blanchable redness noted on coccyx. Bilateral shins are reddened luis shiny shins. Right shin has dry dark scabs on anterior surface. Small areas of blanchable redness noted behind ears.

## 2018-09-13 NOTE — ASSESSMENT & PLAN NOTE
S/p ercp and stent.   Repeat ERCP as outpatient in 4-6 weeks for sphincterotomy and stone removal.
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
## 2018-09-13 NOTE — FLOWSHEET NOTE
09/13/18 1035   Events/Summary/Plan   Events/Summary/Plan pt sitting up in cardiac chair, svn given   Interdisciplinary Plan of Care-Goals (Indications)   Bronchodilator Indications History / Diagnosis   Hyperinflation Protocol Indications Atelectasis Documented by Chest X-Ray   Interdisciplinary Plan of Care-Outcomes    Bronchodilator Outcome Improved Vital Signs and Measures of Gas Exchange;Patient at Stable Baseline   Hyperinflation Protocol Goals/Outcome Improvement in Repeat CXR;Stable Vital Capacity x24 hrs and Patient Understands / uses I.S.   SVN Group   #SVN Performed Yes   Given By: Mouthpiece   Date SVN Next Change Due (Q 7 Days) 09/19/18   Incentive Spirometry Group   Breathing Exercises (refused IS)   Heated Hi Flow Nasal Cannula   Heated Hi Flow Nasal Cannula (HHFNC) Yes   FiO2 (HHFNC) 50   Flowrate (HHFNC) 50   Humidifier Temperature (HHFNC) 31   Chest Exam   Work Of Breathing / Effort Mild   Respiration 20  (post 20)   Pulse (!) 104  (post 108)   Heart Rate (Monitored) (!) 104   Breath Sounds   Pre/Post Intervention Pre Intervention Assessment  (no significant increase in aeration following svn)   RUL Breath Sounds Diminished   RML Breath Sounds Diminished   RLL Breath Sounds Diminished   LORNA Breath Sounds Diminished   LLL Breath Sounds Diminished   Oximetry   #Pulse Oximetry (Single Determination) Yes   Oxygen   Pulse Oximetry 97 %   O2 (LPM) 50   FiO2% 50 %   O2 Daily Delivery Respiratory  Highflow Nasal Cannula

## 2018-09-13 NOTE — RESPIRATORY CARE
COPD EDUCATION by COPD CLINICAL EDUCATOR  9/13/2018 at 10:20 AM by Krysta Holguin     Patient interviewed by COPD education team. Patient refused COPD program at this time.

## 2018-09-13 NOTE — CARE PLAN
Problem: Safety  Goal: Will remain free from injury  Outcome: PROGRESSING AS EXPECTED  All safety measures in place at this time. Pt calls appropriately for assistance, all belongings within reach. Pt more comfortable in chair.     Problem: Bowel/Gastric:  Goal: Normal bowel function is maintained or improved  Outcome: PROGRESSING AS EXPECTED  Pt has hypoactive BS x 4, small episode of nausea this AM PRN medication given see MAR with good effect. Pt had BM overnight. Tenderness in RUQ has improved.

## 2018-09-13 NOTE — PROGRESS NOTES
Report received from Lisa OGDEN pt placed on high flow. Pt appears to be diaphrotic and anxious at this time. Pt requesting water advised and educated on NPO diet. Offered mouth swabs. All safety protocols in place at this time. Heparin gtt and all IVF checked at bedside with off going RN.

## 2018-09-13 NOTE — PROGRESS NOTES
Morning medications administered, FSBS 192. Patient is slightly dyspneic, satting from 87-89%. Placed on high flow oxygen per Intensivist Cecilia.

## 2018-09-13 NOTE — PROGRESS NOTES
Report to Laverne OGDEN, POC discussed. Respiratory therapist present at bedside, aptt being drawn by lab. Heparin rate verified.

## 2018-09-13 NOTE — FLOWSHEET NOTE
09/12/18 1633   Events/Summary/Plan   Events/Summary/Plan SVN and MDI given   Interdisciplinary Plan of Care-Goals (Indications)   Bronchodilator Indications History / Diagnosis   Hyperinflation Protocol Indications Atelectasis Documented by Chest X-Ray   Interdisciplinary Plan of Care-Outcomes    Bronchodilator Outcome Improved Vital Signs and Measures of Gas Exchange   Hyperinflation Protocol Goals/Outcome Improvement in Repeat CXR   Education   Education Yes - Pt. / Family has been Instructed in use of Respiratory Equipment   SVN Group   #SVN Performed Yes   Given By: Mask   Date SVN Last Changed 09/12/18   Date SVN Next Change Due (Q 7 Days) 09/19/18   MDI/DPI Group   #MDI/DPI Given MDI/DPI x 1   Incentive Spirometry Group   Incentive Spirometer Volume 1000 mL   Chest Exam   Work Of Breathing / Effort Moderate;Shallow   Respiration (!) 24  (post 22)   Pulse (!) 103  (post 106)   Heart Rate (Monitored) (!) 103   Breath Sounds   Pre/Post Intervention Pre Intervention Assessment   RUL Breath Sounds Diminished   RML Breath Sounds Diminished   RLL Breath Sounds Diminished   LORNA Breath Sounds Diminished   LLL Breath Sounds Diminished   Oximetry   #Pulse Oximetry (Single Determination) Yes   Oxygen   Pulse Oximetry 90 %  (02 sat using finger ox, 93% using ear ox)   O2 (LPM) 4   O2 Daily Delivery Respiratory  Nasal Cannula

## 2018-09-13 NOTE — PROGRESS NOTES
Pulmonary Critical Care Progress Note        Chief Complaint: Shortness of breath    History of Present Illness: 72 y.o. male admitted for sepsis with sinus tachycardia, white blood cell of 20, and worsening liver function.  He denies change in the pattern of cough or sputum production.  He states that his pain is located in the right upper quadrant with no radiation, colicky in nature, moderate in severity, with no relieving or aggravating factors.  He denies history of any abdominal surgery.  He does not take any inhaler for his COPD and does not have a pulmonologist.  He is unable to provide further history.     ROS:  Respiratory: negative cough, Cardiac: negative chest pain, GI: negative heartburn, negative nausea, negative vomiting and positive abdominal pain.  All other systems negative.    Interval Events:  new hypoxia    PFSH:  No change.    Respiratory:     Pulse Oximetry: 90 %  Chest Tube Drains:          Exam: tachypnea, labored breathing and wheezing bibasilar  Imaging chest x-ray from this morning shows an evolving right middle lobe infiltrate  Recent Labs      09/12/18   1626   BINHN50W  7.39*   [id number]  34.9   [id number]  62.9*   [id number]  92.5*   ARTHCO3  21   ARTBE  -4       HemoDynamics:  Pulse: (!) 127, Heart Rate (Monitored): (!) 122  Blood Pressure : 137/68, NIBP: 108/48       Exam: regular rate and rhythm, regular rhythm (Sinus), no ectopy  Imaging: None - Reviewed  Recent Labs      09/12/18   1455  09/12/18   1842  09/13/18   0055   TROPONINI  0.03  0.04  0.05*   BNPBTYPENAT   --    --   122*       Neuro:  GCS  15       Exam: no focal deficits noted, conscious alert and oriented, fully verbal.  Imaging: None - Reviewed    Fluids:  Intake/Output       09/11/18 0700 - 09/12/18 0659 (Not Admitted) 09/12/18 0700 - 09/13/18 0659 09/13/18 0700 - 09/14/18 0659      [id number] 9488-5696 Total 7370-7795 7152-7575 Total 2684-5052 1305-0316 Total       Intake    I.V.  --  -- --  4556.2  2772 7328.2  --   -- --    Heparin Volume -- -- -- 149.2 240 389.2 -- -- --    IV Piggyback Volume (IV Piggyback) -- -- -- 600 -- 600 -- -- --    IV Piggyback Volume (Antibiotics) -- -- -- -- 700 700 -- -- --    IV Volume (NS) -- -- -- 2760 -- 2760 -- -- --    IV Volume (NS 20 KCL) -- -- -- 875 1500 2375 -- -- --    IV Volume (Phos) -- -- -- 172 332 504 -- -- --    Total Intake -- -- -- 4556.2 2772 7328.2 -- -- --       Output    Urine  --  -- --  1850  1250 3100  --  -- --    Urine Void (mL) (non-catheter) -- -- -- 1850 1250 3100 -- -- --    Stool  --  -- --  --  -- --  --  -- --    Number of Times Stooled -- -- -- -- 1 x 1 x -- -- --    Total Output -- -- -- 1850 1250 3100 -- -- --       Net I/O     -- -- -- 2706.2 1522 4228.2 -- -- --        Weight: 100.4 kg (221 lb 5.5 oz)  Recent Labs      18   0055   SODIUM  131*  138   POTASSIUM  3.4*  3.7   CHLORIDE  99  111   CO2  19*  22   BUN  18  11   CREATININE  0.95  0.57   MAGNESIUM  1.7  1.5  1.7   PHOSPHORUS  1.8*  1.6*  2.0*   CALCIUM  8.3*  7.3*       GI/Nutrition:  Exam: normal active bowel sounds, diffusely tender  Imaging: None - Reviewed  NPO  Liver Function  Recent Labs      18   0918   1842  18   0055   ALTSGPT  178*   --   123*   ASTSGOT  166*   --   88*   ALKPHOSPHAT  109*   --   86   TBILIRUBIN  14.2*   --   10.9*   DBILIRUBIN   --   7.8*   --    GLUCOSE  367*   --   176*       Heme:  Recent Labs      18   0918   1842  18   RBC  5.16   --   4.67*   HEMOGLOBIN  15.2   --   13.8*   HEMATOCRIT  44.3   --   40.5*   PLATELETCT  156*   --   132*   PROTHROMBTM  16.1*   --    --    APTT  47.8*  99.8*  86.1*   INR  1.30*   --    --        Infectious Disease:  Temp  Av.2 °C (99 °F)  Min: 36.7 °C (98.1 °F)  Max: 38.1 °C (100.5 °F)  Micro: none  Recent Labs      18   WBC  20.6*  10.9*   NEUTSPOLYS  90.50*   --    LYMPHOCYTES  2.70*   --    MONOCYTES  5.90   --    EOSINOPHILS   0.00   --    BASOPHILS  0.20   --    ASTSGOT  166*  88*   ALTSGPT  178*  123*   ALKPHOSPHAT  109*  86   TBILIRUBIN  14.2*  10.9*     Current Facility-Administered Medications   Medication Dose Frequency Provider Last Rate Last Dose   • MD Alert...Vancomycin per Pharmacy   pharmacy to dose Sharon Hidalgo M.D.       • senna-docusate (PERICOLACE or SENOKOT S) 8.6-50 MG per tablet 2 Tab  2 Tab BID Sharon Hidalgo M.D.   Stopped at 09/12/18 1800    And   • polyethylene glycol/lytes (MIRALAX) PACKET 1 Packet  1 Packet QDAY PRN Sharon Hidalgo M.D.        And   • magnesium hydroxide (MILK OF MAGNESIA) suspension 30 mL  30 mL QDAY PRN Sharon Hidalgo M.D.        And   • bisacodyl (DULCOLAX) suppository 10 mg  10 mg QDAY PRN Sharon Hidalgo M.D.       • Respiratory Care per Protocol   Continuous RT Sharon Hidalgo M.D.       • 0.9 % NaCl with KCl 20 mEq infusion   Continuous Sharon Hidalgo M.D. 125 mL/hr at 09/13/18 0622     • heparin injection 3,200 Units  3,200 Units PRN Sharon Hidalgo M.D.        And   • heparin infusion 25,000 units in 500 ml 0.45% nacl   Continuous Sharon Hidalgo M.D. 24 mL/hr at 09/13/18 0701 1,200 Units/hr at 09/13/18 0701   • NS (BOLUS) infusion 500 mL  500 mL Once PRN Sharon Hidalgo M.D.       • piperacillin-tazobactam (ZOSYN) 4.5 g in  mL IVPB  4.5 g Once Britni Shah M.D.   Stopped at 09/12/18 1100    And   • piperacillin-tazobactam (ZOSYN) 4.5 g in  mL IVPB  4.5 g Q8HRS Britni Shah M.D. 25 mL/hr at 09/13/18 0559 4.5 g at 09/13/18 0559   • vancomycin 1,500 mg in  mL IVPB  15 mg/kg Q12HR Sharon Hidalgo M.D.   Stopped at 09/13/18 0121   • ipratropium-albuterol (DUONEB) nebulizer solution  3 mL Q4H PRN (RT) Sharon Hidalgo M.D.   3 mL at 09/12/18 1638   • budesonide-formoterol (SYMBICORT) 160-4.5 MCG/ACT inhaler 2 Puff  2 Puff BID (RT) Ceferino Urena M.D.   2 Puff at 09/13/18 0631   • insulin  glargine (LANTUS) injection 20 Units  0.2 Units/kg/day Q EVENING Sharon Hidalgo M.D.   20 Units at 09/12/18 1830    And   • insulin lispro (HUMALOG) injection 1-6 Units  1-6 Units Q6HRS Sharon Hidalgo M.D.   1 Units at 09/13/18 0615    And   • glucose 4 g chewable tablet 16 g  16 g Q15 MIN PRN Sharon Hidalgo M.D.        And   • dextrose 50% (D50W) injection 25 mL  25 mL Q15 MIN PRN Sharon Hidalgo M.D.       • ipratropium-albuterol (DUONEB) nebulizer solution  3 mL 4X/DAY (RT) Sharon Hidalgo M.D.   3 mL at 09/13/18 0631     Last reviewed on 9/12/2018  9:45 AM by Jordana Valdez    Quality  Measures:  Core Measures & Quality Metrics      Assessment / Plan  1. Sepsis, suspect biliary source, SOFA score 6  Continue with Vanc & Zosyn for now  F/U cultures, so far blood no growth 2/2  GBUS no evidence of acute cholecystitis, waiting on HIDA scan   Add amylase and lipase to AM labs   F/U with surgery   Lactate normalized last evening     2.  Acute on chronic hypoxemic respiratory failure  Desaturating on 6 L nasal cannula to 85%  Start high flow nasal cannula at 55%  STAT CXR, ABG  CXR shows a new RML evolving infiltrate, pt covered with abx     3. GOLD C-D COPD/emphysema on home oxygen   Currently in exacerbation  Already on antibiotics; vancomycin and Zosyn  Add azithromycin to cover atypicals   Start Solu-Medrol 40 mg IV every 6  Continue to nebulizers  ABG yesterday showed 7.39/35/63/21     4. Chronic DVT on Heparin gtt  5. Lactic acidosis 2ry to sepsis,  Resolved   6. Chronic alcoholic liver disease, Discriminant score 37  7. Ex-smoker, quit 10 years ago        The patient remains critically ill.  Critical care time = 30 minutes in directly providing and coordinating critical care and extensive data review.  No time overlap and excludes procedures.

## 2018-09-13 NOTE — DISCHARGE PLANNING
Care Transition Team Assessment    Information Source Patient  Orientation : Oriented x 4  Information Given By: Patient  Who is responsible for making decisions for patient? : Patient    Elopement Risk  Legal Hold: No  Ambulatory or Self Mobile in Wheelchair: No-Not an Elopement Risk  Elopement Risk: Not at Risk for Elopement    Interdisciplinary Discharge Planning  Patient or legal guardian wants to designate a caregiver (see row info): No  Durable Medical Equipment: Home Oxygen  DME Provider / Phone:  (Preferred)    Discharge Preparedness  What is your plan after discharge?: Home with help  What are your discharge supports?: Spouse  Prior Functional Level: Ambulatory    Functional Assesment  Prior Functional Level: Ambulatory    Finances  Financial Barriers to Discharge: No  Prescription Coverage: Yes    Vision / Hearing Impairment  Right Eye Vision: Wears Glasses  Left Eye Vision: Wears Glasses    Advance Directive  Advance Directive?: None  Advance Directive offered?: AD Booklet refused/Pt has one at home already.   SW requested that pt bring it in to be notarized and scanned into chart.    Domestic Abuse  Have you ever been the victim of abuse or violence?: No    Discharge Risks or Barriers  Discharge risks or barriers?: No    Anticipated Discharge Information  Anticipated discharge disposition: HHC, Home, SNF

## 2018-09-13 NOTE — PROGRESS NOTES
Report given to Salome OGDEN. Checked IVF and heparin gtt at bedside. All safety precautions in place. Photos taken of wound and uploaded. Pt denies pain at this time.

## 2018-09-13 NOTE — CARE PLAN
Problem: Communication  Goal: The ability to communicate needs accurately and effectively will improve  Outcome: PROGRESSING AS EXPECTED    Intervention: [place] patient and significant other/support system to call light to alert staff of needs  Patient oriented to call light system and all relevant hospital policies. Call light within reach and used appropriately when in need of assistance.        Problem: Mobility  Goal: Risk for activity intolerance will decrease  Outcome: PROGRESSING AS EXPECTED    Intervention: Assess and monitor signs of activity intolerance  Activity for patient includes sitting edge of bed, which requires assistance from staff. Patient tolerates well and is provided ample rest periods.

## 2018-09-13 NOTE — FLOWSHEET NOTE
09/13/18 1232   Events/Summary/Plan   Events/Summary/Plan FI02 titrated to 45%   Heated Hi Flow Nasal Cannula   FiO2 (HHFNC) 45   Flowrate (HHFNC) 50   Oximetry   #Pulse Oximetry (Single Determination) Yes   Oxygen   Pulse Oximetry 97 %   O2 (LPM) 50   FiO2% 50 %   O2 Daily Delivery Respiratory  Highflow Nasal Cannula

## 2018-09-13 NOTE — FLOWSHEET NOTE
09/12/18 1946   Events/Summary/Plan   Events/Summary/Plan SVN    Interdisciplinary Plan of Care-Goals (Indications)   Bronchodilator Indications History / Diagnosis   Hyperinflation Protocol Indications Atelectasis Documented by Chest X-Ray   Interdisciplinary Plan of Care-Outcomes    Bronchodilator Outcome Improved Vital Signs and Measures of Gas Exchange   Hyperinflation Protocol Goals/Outcome Improvement in Repeat CXR   Education   Education Yes - Pt. / Family has been Instructed in use of Respiratory Equipment;Yes - Pt. / Family has been Instructed in use of Respiratory Medications and Adverse Reactions   RT Assessment of Delivered Medications   Evaluation of Medication Delivery Daily Yes-- Pt /Family has been Instructed in use of Respiratory Medications and Adverse Reactions   SVN Group   #SVN Performed Yes   Given By: Mask   Incentive Spirometry Group   Incentive Spirometry Instruction Yes   Breathing Exercises Yes   Incentive Spirometer Volume 1250 mL   Respiratory WDL   Respiratory (WDL) X   Chest Exam   Respiration (!) 27   Pulse (!) 101   Heart Rate (Monitored) (!) 101   Breath Sounds   Pre/Post Intervention Post Intervention Assessment   RUL Breath Sounds Diminished   RML Breath Sounds Diminished   RLL Breath Sounds Diminished   LORNA Breath Sounds Diminished   LLL Breath Sounds Diminished   Oximetry   #Pulse Oximetry (Single Determination) Yes   Oxygen   Pulse Oximetry 95 %   O2 (LPM) 4   O2 Daily Delivery Respiratory  Nasal Cannula

## 2018-09-13 NOTE — PROGRESS NOTES
Updated Dr. Hidalgo that NM is unable to do the Vicki scan because there is no CCK and that the pt bilirubin is to high. Advised that PE study not done as well MD aware on heparin gtt.  Updated  That pt HR continues to be tachycardiac. No new orders at this time.

## 2018-09-13 NOTE — FLOWSHEET NOTE
09/13/18 0630   Events/Summary/Plan   Events/Summary/Plan Dr Agustin requested pt be placed on HHF. He stated he had increased to 02 to 10L, 10L=86-87%. SVN and mdi given after HHFNC setup done   Interdisciplinary Plan of Care-Goals (Indications)   Bronchodilator Indications History / Diagnosis   Hyperinflation Protocol Indications Atelectasis Documented by Chest X-Ray   Interdisciplinary Plan of Care-Outcomes    Bronchodilator Outcome Improved Vital Signs and Measures of Gas Exchange   Hyperinflation Protocol Goals/Outcome Improvement in Repeat CXR   SVN Group   #SVN Performed Yes   Given By: Mask   Date SVN Next Change Due (Q 7 Days) 09/19/18   MDI/DPI Group   #MDI/DPI Given MDI/DPI x 1   Incentive Spirometry Group   Breathing Exercises Yes   Incentive Spirometer Volume 1000 mL   Heated Hi Flow Nasal Cannula   Heated Hi Flow Nasal Cannula (HHFNC) Yes   FiO2 (HHFNC) 58.8   Flowrate (HHFNC) 50   Humidifier Temperature (FNC) 28   Date of Last Circuit Change 09/13/18   Circuit Change Next Due Date (Q 7 Days) 09/20/18   Respiratory WDL   Respiratory (WDL) X   Chest Exam   Work Of Breathing / Effort Moderate;Shallow   Respiration (!) 22  (post 22)   Pulse 65  (post 64)   Heart Rate (Monitored) 65   Breath Sounds   Pre/Post Intervention Pre Intervention Assessment  (increased aeration following svn and IS)   RUL Breath Sounds Diminished   RML Breath Sounds Diminished   RLL Breath Sounds Diminished   LORNA Breath Sounds Diminished   LLL Breath Sounds Diminished   Oximetry   #Pulse Oximetry (Single Determination) Yes   Oxygen   Home O2 Use Prior To Admission? Yes   Home O2 LPM Flow 2 LPM   Home O2 Delivery Method Nasal Cannula   Home O2 Frequency of Use Continuous   Pulse Oximetry 97 %   O2 (LPM) 50   FiO2% 58.8 %   O2 Daily Delivery Respiratory  Highflow Nasal Cannula

## 2018-09-13 NOTE — FLOWSHEET NOTE
09/13/18 0753   Blood Gas / Site draw   Blood Gas / Site draw  #R Radial   Site Pressure Held X 5 Min Yes

## 2018-09-13 NOTE — PROGRESS NOTES
Maria M from Lab called with critical result of positive blood cultures with gram negative rods at 0120. Critical lab result read back to Maria M. Moon OGDEN received this call.   Dr. Tom notified of critical lab result at 0132.  Critical lab result read back by Dr. Tom. Will continue current treatment.

## 2018-09-13 NOTE — PROGRESS NOTES
Assumed pt care. AAOx4. Pt denied pain or SOB at rest. Assessment completed. Repositioned pt in bed. Pt denied any needs. Reminded pt to call for assistance. Call light within reach, bed in lowest position, bed alarm on, will continue to monitor.

## 2018-09-13 NOTE — CARE PLAN
Problem: Knowledge Deficit  Goal: Knowledge of disease process/condition, treatment plan, diagnostic tests, and medications will improve    Intervention: Explain information regarding disease process/condition, treatment plan, diagnostic tests, and medications and document in education  Updated pt on POC. Explained the purpose of pm meds, labs, and monitoring equipment. Answered pt's questions. Pt verbalized understanding.       Problem: Respiratory:  Goal: Respiratory status will improve    Intervention: Administer and titrate oxygen therapy  Pt became anxious around 2200. Pt normally sleeps on the couch on his side. Pt said he couldn't breathe comfortably in the ICU bed. Pt's SpO2 dropped to the mid 70s. Pt's oxygen was increased to 6L nasal cannula. Lung sounds were just diminished throughout. Staff boosted pt up in bed and helped him turn onto his side. Pt's SpO2 improved to 95% and pt said he felt much better. Pt remains on 6L O2 via nasal cannula.

## 2018-09-13 NOTE — DIETARY
"Nutrition services: Day 1 of admit. Enoch Mistry is a 72 y.o. male with admitting dx of sepsis.    Pt noted to have poor PO PTA d/t diminished appetite with increasing SOB and RUQ pain. Pt also found to be jaundiced and with chronic leg swelling. He is currently NPO x1 day. Per MD notes, suspect that source of sepsis is biliary I nature, however ultrasound was not conclusive and HIDA scan cannot be completed at this time d/t elevated bilirubin. Current wt is 100.4kg and BMI is 34.67 indicating that he is obese. Will CTM clinical course for diet advancement and provide additional recommendations as appropriate.    Assessment:  Height: 170.2 cm (5' 7\")  Weight: 100.4 kg (221 lb 5.5 oz)  Body mass index is 34.67 kg/m² - obese class I  Diet/Intake: NPO x1 day    Evaluation:   Labs: Calcium 7.3, AST 88, , Tbili 10.9, Albumin 3.0, Phosphorus 2.0, Amylase 160, , FSBS (1 reading) = 192mg/dL, A1C 9.5%   Meds: Zithromax, Lantus, Humalog 1-6U Q 6 hours, Solu-medrol, Zosyn, Pericolace, Vancomycin, (PRN: Heparin, Miralax, MOM, Dulcolax, Zofran)  Fluids: 0.9% NaCl with KCl 20mEq infusion @ 125mL/hr  Skin: Venous ulcer R anterior shin POA  GI/: last BM 9/13, UOP orange  I/O's: None recorded yet today (per records, +4.2L fluid positive since admit)    Malnutrition Risk: Hx of chronic EtOH use, ALD, DVT, COPD, uncontrolled T2DM with A1C 9.5%, obesity, medical no-compliance.    Recommendations/Plan:  1. Diet advancement when medically feasible.   2. Monitor weight.  3. Once diet is advanced, nutrition rep to see patient for ongoing meal and snack preferences.     RD monitoring.    "

## 2018-09-13 NOTE — ASSESSMENT & PLAN NOTE
2/2 cholecystitis/cholangitis  Cholecystectomy performed on 9/20 then ERCP with biliary stent and biopsies on 9/21.  Biopsies negative for H. pylori   Continue IV abx per ID  Repeat cultures neg to date.   Estimated stop date 10/01/18 with transition to PO upon discharge.

## 2018-09-13 NOTE — FLOWSHEET NOTE
09/12/18 1626   Blood Gas / Site draw   Blood Gas / Site draw  #R Radial   Site Pressure Held X 5 Min Yes

## 2018-09-14 PROBLEM — E83.39 HYPOPHOSPHATEMIA: Status: ACTIVE | Noted: 2018-09-14

## 2018-09-14 LAB
ALBUMIN SERPL BCP-MCNC: 3 G/DL (ref 3.2–4.9)
ALBUMIN/GLOB SERPL: 0.9 G/DL
ALP SERPL-CCNC: 115 U/L (ref 30–99)
ALT SERPL-CCNC: 109 U/L (ref 2–50)
ANION GAP SERPL CALC-SCNC: 11 MMOL/L (ref 0–11.9)
APTT PPP: 56.8 SEC (ref 24.7–36)
AST SERPL-CCNC: 68 U/L (ref 12–45)
BASOPHILS # BLD AUTO: 0.1 % (ref 0–1.8)
BASOPHILS # BLD: 0.01 K/UL (ref 0–0.12)
BILIRUB SERPL-MCNC: 9.2 MG/DL (ref 0.1–1.5)
BNP SERPL-MCNC: 293 PG/ML (ref 0–100)
BUN SERPL-MCNC: 16 MG/DL (ref 8–22)
CALCIUM SERPL-MCNC: 7.6 MG/DL (ref 8.4–10.2)
CHLORIDE SERPL-SCNC: 106 MMOL/L (ref 96–112)
CO2 SERPL-SCNC: 21 MMOL/L (ref 20–33)
CREAT SERPL-MCNC: 0.74 MG/DL (ref 0.5–1.4)
EOSINOPHIL # BLD AUTO: 0 K/UL (ref 0–0.51)
EOSINOPHIL NFR BLD: 0 % (ref 0–6.9)
ERYTHROCYTE [DISTWIDTH] IN BLOOD BY AUTOMATED COUNT: 45.5 FL (ref 35.9–50)
GLOBULIN SER CALC-MCNC: 3.4 G/DL (ref 1.9–3.5)
GLUCOSE BLD-MCNC: 202 MG/DL (ref 65–99)
GLUCOSE BLD-MCNC: 226 MG/DL (ref 65–99)
GLUCOSE BLD-MCNC: 231 MG/DL (ref 65–99)
GLUCOSE BLD-MCNC: 235 MG/DL (ref 65–99)
GLUCOSE BLD-MCNC: 256 MG/DL (ref 65–99)
GLUCOSE BLD-MCNC: 270 MG/DL (ref 65–99)
GLUCOSE BLD-MCNC: 322 MG/DL (ref 65–99)
GLUCOSE SERPL-MCNC: 232 MG/DL (ref 65–99)
HCT VFR BLD AUTO: 43.4 % (ref 42–52)
HGB BLD-MCNC: 14.4 G/DL (ref 14–18)
IMM GRANULOCYTES # BLD AUTO: 0.05 K/UL (ref 0–0.11)
IMM GRANULOCYTES NFR BLD AUTO: 0.5 % (ref 0–0.9)
LYMPHOCYTES # BLD AUTO: 0.47 K/UL (ref 1–4.8)
LYMPHOCYTES NFR BLD: 5.1 % (ref 22–41)
MAGNESIUM SERPL-MCNC: 2.3 MG/DL (ref 1.5–2.5)
MCH RBC QN AUTO: 29.2 PG (ref 27–33)
MCHC RBC AUTO-ENTMCNC: 33.2 G/DL (ref 33.7–35.3)
MCV RBC AUTO: 88 FL (ref 81.4–97.8)
MONOCYTES # BLD AUTO: 0.34 K/UL (ref 0–0.85)
MONOCYTES NFR BLD AUTO: 3.7 % (ref 0–13.4)
NEUTROPHILS # BLD AUTO: 8.37 K/UL (ref 1.82–7.42)
NEUTROPHILS NFR BLD: 90.6 % (ref 44–72)
NRBC # BLD AUTO: 0 K/UL
NRBC BLD-RTO: 0 /100 WBC
PHOSPHATE SERPL-MCNC: 1.6 MG/DL (ref 2.5–4.5)
PLATELET # BLD AUTO: 151 K/UL (ref 164–446)
PMV BLD AUTO: 11.9 FL (ref 9–12.9)
POTASSIUM SERPL-SCNC: 3.5 MMOL/L (ref 3.6–5.5)
PROT SERPL-MCNC: 6.4 G/DL (ref 6–8.2)
RBC # BLD AUTO: 4.93 M/UL (ref 4.7–6.1)
SODIUM SERPL-SCNC: 138 MMOL/L (ref 135–145)
WBC # BLD AUTO: 9.2 K/UL (ref 4.8–10.8)

## 2018-09-14 PROCEDURE — 80053 COMPREHEN METABOLIC PANEL: CPT

## 2018-09-14 PROCEDURE — 700111 HCHG RX REV CODE 636 W/ 250 OVERRIDE (IP): Performed by: INTERNAL MEDICINE

## 2018-09-14 PROCEDURE — 85025 COMPLETE CBC W/AUTO DIFF WBC: CPT

## 2018-09-14 PROCEDURE — 700105 HCHG RX REV CODE 258: Performed by: EMERGENCY MEDICINE

## 2018-09-14 PROCEDURE — 83880 ASSAY OF NATRIURETIC PEPTIDE: CPT

## 2018-09-14 PROCEDURE — 94640 AIRWAY INHALATION TREATMENT: CPT

## 2018-09-14 PROCEDURE — 84100 ASSAY OF PHOSPHORUS: CPT

## 2018-09-14 PROCEDURE — 700101 HCHG RX REV CODE 250: Performed by: INTERNAL MEDICINE

## 2018-09-14 PROCEDURE — 85730 THROMBOPLASTIN TIME PARTIAL: CPT

## 2018-09-14 PROCEDURE — 99233 SBSQ HOSP IP/OBS HIGH 50: CPT | Performed by: INTERNAL MEDICINE

## 2018-09-14 PROCEDURE — 700102 HCHG RX REV CODE 250 W/ 637 OVERRIDE(OP): Performed by: INTERNAL MEDICINE

## 2018-09-14 PROCEDURE — 82962 GLUCOSE BLOOD TEST: CPT

## 2018-09-14 PROCEDURE — 99232 SBSQ HOSP IP/OBS MODERATE 35: CPT | Performed by: INTERNAL MEDICINE

## 2018-09-14 PROCEDURE — A9270 NON-COVERED ITEM OR SERVICE: HCPCS | Performed by: INTERNAL MEDICINE

## 2018-09-14 PROCEDURE — 700105 HCHG RX REV CODE 258: Performed by: INTERNAL MEDICINE

## 2018-09-14 PROCEDURE — 770022 HCHG ROOM/CARE - ICU (200)

## 2018-09-14 PROCEDURE — 83735 ASSAY OF MAGNESIUM: CPT

## 2018-09-14 PROCEDURE — 700111 HCHG RX REV CODE 636 W/ 250 OVERRIDE (IP): Performed by: EMERGENCY MEDICINE

## 2018-09-14 PROCEDURE — 94760 N-INVAS EAR/PLS OXIMETRY 1: CPT

## 2018-09-14 PROCEDURE — 94762 N-INVAS EAR/PLS OXIMTRY CONT: CPT

## 2018-09-14 RX ORDER — FUROSEMIDE 10 MG/ML
30 INJECTION INTRAMUSCULAR; INTRAVENOUS
Status: COMPLETED | OUTPATIENT
Start: 2018-09-14 | End: 2018-09-15

## 2018-09-14 RX ORDER — METHYLPREDNISOLONE SODIUM SUCCINATE 40 MG/ML
40 INJECTION, POWDER, LYOPHILIZED, FOR SOLUTION INTRAMUSCULAR; INTRAVENOUS EVERY 12 HOURS
Status: DISCONTINUED | OUTPATIENT
Start: 2018-09-14 | End: 2018-09-17

## 2018-09-14 RX ORDER — FUROSEMIDE 10 MG/ML
40 INJECTION INTRAMUSCULAR; INTRAVENOUS ONCE
Status: COMPLETED | OUTPATIENT
Start: 2018-09-14 | End: 2018-09-14

## 2018-09-14 RX ADMIN — FUROSEMIDE 30 MG: 10 INJECTION, SOLUTION INTRAMUSCULAR; INTRAVENOUS at 16:30

## 2018-09-14 RX ADMIN — PIPERACILLIN SODIUM AND TAZOBACTAM SODIUM 4.5 G: 4; .5 INJECTION, POWDER, FOR SOLUTION INTRAVENOUS at 12:27

## 2018-09-14 RX ADMIN — METHYLPREDNISOLONE SODIUM SUCCINATE 40 MG: 40 INJECTION, POWDER, FOR SOLUTION INTRAMUSCULAR; INTRAVENOUS at 05:16

## 2018-09-14 RX ADMIN — BUDESONIDE AND FORMOTEROL FUMARATE DIHYDRATE 2 PUFF: 160; 4.5 AEROSOL RESPIRATORY (INHALATION) at 06:58

## 2018-09-14 RX ADMIN — IPRATROPIUM BROMIDE AND ALBUTEROL SULFATE 3 ML: .5; 3 SOLUTION RESPIRATORY (INHALATION) at 06:50

## 2018-09-14 RX ADMIN — POTASSIUM PHOSPHATE, MONOBASIC AND POTASSIUM PHOSPHATE, DIBASIC 30 MMOL: 224; 236 INJECTION, SOLUTION, CONCENTRATE INTRAVENOUS at 15:09

## 2018-09-14 RX ADMIN — HEPARIN SODIUM 1200 UNITS/HR: 5000 INJECTION, SOLUTION INTRAVENOUS at 06:21

## 2018-09-14 RX ADMIN — METHYLPREDNISOLONE SODIUM SUCCINATE 40 MG: 40 INJECTION, POWDER, FOR SOLUTION INTRAMUSCULAR; INTRAVENOUS at 17:49

## 2018-09-14 RX ADMIN — POTASSIUM CHLORIDE AND SODIUM CHLORIDE: 900; 150 INJECTION, SOLUTION INTRAVENOUS at 00:11

## 2018-09-14 RX ADMIN — BUDESONIDE AND FORMOTEROL FUMARATE DIHYDRATE 2 PUFF: 160; 4.5 AEROSOL RESPIRATORY (INHALATION) at 18:56

## 2018-09-14 RX ADMIN — IPRATROPIUM BROMIDE AND ALBUTEROL SULFATE 3 ML: .5; 3 SOLUTION RESPIRATORY (INHALATION) at 18:55

## 2018-09-14 RX ADMIN — DILTIAZEM HYDROCHLORIDE 30 MG: 30 TABLET, FILM COATED ORAL at 17:49

## 2018-09-14 RX ADMIN — IPRATROPIUM BROMIDE AND ALBUTEROL SULFATE 3 ML: .5; 3 SOLUTION RESPIRATORY (INHALATION) at 10:57

## 2018-09-14 RX ADMIN — INSULIN GLARGINE 25 UNITS: 100 INJECTION, SOLUTION SUBCUTANEOUS at 17:44

## 2018-09-14 RX ADMIN — FUROSEMIDE 40 MG: 10 INJECTION, SOLUTION INTRAMUSCULAR; INTRAVENOUS at 07:22

## 2018-09-14 RX ADMIN — PIPERACILLIN SODIUM AND TAZOBACTAM SODIUM 4.5 G: 4; .5 INJECTION, POWDER, FOR SOLUTION INTRAVENOUS at 05:16

## 2018-09-14 RX ADMIN — DILTIAZEM HYDROCHLORIDE 30 MG: 30 TABLET, FILM COATED ORAL at 12:13

## 2018-09-14 RX ADMIN — IPRATROPIUM BROMIDE AND ALBUTEROL SULFATE 3 ML: .5; 3 SOLUTION RESPIRATORY (INHALATION) at 14:43

## 2018-09-14 RX ADMIN — PIPERACILLIN SODIUM AND TAZOBACTAM SODIUM 4.5 G: 4; .5 INJECTION, POWDER, FOR SOLUTION INTRAVENOUS at 21:07

## 2018-09-14 ASSESSMENT — ENCOUNTER SYMPTOMS
VOMITING: 0
SORE THROAT: 0
DEPRESSION: 0
EYE REDNESS: 0
WEAKNESS: 0
SHORTNESS OF BREATH: 1
EYE DISCHARGE: 0
HEADACHES: 0
SINUS PAIN: 0
FEVER: 0
SPUTUM PRODUCTION: 0
DIZZINESS: 0
PALPITATIONS: 0
NAUSEA: 0
CONSTIPATION: 0

## 2018-09-14 ASSESSMENT — PAIN SCALES - GENERAL
PAINLEVEL_OUTOF10: 0

## 2018-09-14 NOTE — PROGRESS NOTES
[facility] Hospitalist Progress Note    Date of Service: 2018    Chief Complaint  72 y.o. male with past medical history of hypertension, COPD, diabetes and noncompliant with meds and diet admitted 2018 with right upper quadrant pain, hypoxemia, sepsis    Interval Problem Update  Overnight O2 requirements increased and he was started on high flow nasal cannula  He is unable to lie flat so further studies such as MRI and CT have been deferred  Discussed with critical care.  Patient overall looks better this morning and less toxic however    Consultants/Specialty  Critical care    Disposition  TBD        Review of Systems   Constitutional: Positive for malaise/fatigue (Improved). Negative for fever.   HENT: Negative for sinus pain and sore throat.    Eyes: Negative for discharge and redness.   Respiratory: Positive for cough and shortness of breath (Mild). Negative for sputum production.    Cardiovascular: Negative for chest pain and palpitations.   Gastrointestinal: Positive for abdominal pain (Improved). Negative for constipation, nausea and vomiting.        Large BM last night   Genitourinary: Negative for dysuria.   Musculoskeletal: Negative for joint pain.   Skin: Negative for itching and rash.   Neurological: Positive for weakness (Improved). Negative for dizziness and headaches.   Psychiatric/Behavioral: Negative for depression.      Physical Exam  Laboratory/Imaging   Hemodynamics  Temp (24hrs), Av.8 °C (98.3 °F), Min:36.6 °C (97.8 °F), Max:37 °C (98.6 °F)   Temperature: 36.9 °C (98.4 °F)  Pulse  Av.9  Min: 65  Max: 128 Heart Rate (Monitored): (!) 102  NIBP: 109/69      Respiratory      Respiration: 16, Pulse Oximetry: 91 %, O2 Daily Delivery Respiratory : Highflow Nasal Cannula     Given By:: Mouthpiece, #MDI/DPI Given: MDI/DPI x 1, Work Of Breathing / Effort: Mild  RUL Breath Sounds: Diminished, RML Breath Sounds: Diminished, RLL Breath Sounds: Diminished, LORNA Breath Sounds: Diminished, LLL  Breath Sounds: Diminished    Fluids    Intake/Output Summary (Last 24 hours) at 09/13/18 2059  Last data filed at 09/13/18 2000   Gross per 24 hour   Intake             6442 ml   Output             2225 ml   Net             4217 ml       Nutrition  Orders Placed This Encounter   Procedures   • Diet Order Clear Liquids - No Red Foods (advance as tolerated ), Full Liquid, Hepatic     Standing Status:   Standing     Number of Occurrences:   1     Order Specific Question:   Diet:     Answer:   Clear Liquids - No Red Foods [12]     Comments:   advance as tolerated      Order Specific Question:   Diet:     Answer:   Full Liquid [11]     Order Specific Question:   Diet:     Answer:   Hepatic [9]     Physical Exam   Constitutional: He is oriented to person, place, and time. He appears well-developed and well-nourished.   Less toxic and ill-appearing more alert   HENT:   Head: Normocephalic and atraumatic.   Right Ear: External ear normal.   Left Ear: External ear normal.   Nose: Nose normal.   Mouth/Throat: Oropharynx is clear and moist. No oropharyngeal exudate.   High flow nasal cannula in place   Eyes: Pupils are equal, round, and reactive to light. Conjunctivae and EOM are normal. Right eye exhibits no discharge. Left eye exhibits no discharge.   Neck: Neck supple.   Cardiovascular: Normal rate and regular rhythm.    Pulmonary/Chest: Effort normal. No respiratory distress. He has no wheezes. He has no rales. He exhibits no tenderness.   Remains markedly diminished particularly in the bases   Abdominal: Soft. He exhibits distension (Still distended but softer). He exhibits no mass. There is tenderness (Minimal right upper quadrant much improved). There is no rebound and no guarding.   Normal bowel sounds   Musculoskeletal: He exhibits no edema (Improved to resolve) or tenderness.   Neurological: He is alert and oriented to person, place, and time. No cranial nerve deficit.   Skin: Skin is warm and dry. He is not  diaphoretic.   Stasis dermatitis   Psychiatric: He has a normal mood and affect.   Much less irritable   Nursing note and vitals reviewed.      Recent Labs      09/12/18 0922 09/13/18   0055   WBC  20.6*  10.9*   RBC  5.16  4.67*   HEMOGLOBIN  15.2  13.8*   HEMATOCRIT  44.3  40.5*   MCV  85.9  86.7   MCH  29.5  29.6   MCHC  34.3  34.1   RDW  41.7  43.6   PLATELETCT  156*  132*   MPV  11.4  11.7     Recent Labs      09/12/18 0922 09/13/18   0055   SODIUM  131*  138   POTASSIUM  3.4*  3.7   CHLORIDE  99  111   CO2  19*  22   GLUCOSE  367*  176*   BUN  18  11   CREATININE  0.95  0.57   CALCIUM  8.3*  7.3*     Recent Labs      09/12/18 0922 09/12/18   1842  09/13/18   0055  09/13/18   0702   APTT  47.8*  99.8*  86.1*  87.1*   INR  1.30*   --    --    --      Recent Labs      09/13/18 0055   BNPBTYPENAT  122*              Assessment/Plan     Chronic deep vein thrombosis (DVT) ([facility])   Assessment & Plan    On heparin drip  CTA canceled as it does not         Abnormal LFTs   Assessment & Plan    Patient with known gallstones, sonographic positive Wolf sign and right upper quadrant pain and tenderness  Continue to monitor        Hepatic steatosis   Assessment & Plan    Due to morbid obesity and uncontrolled diabetes  Does not explain current level of LFT abnormalities        Acute on chronic respiratory failure (HCC)   Assessment & Plan    Patient chronically on 2 L oxygen at home  Now on high flow nasal cannula  Bibasilar fluffy infiltrates and positive pro calcitonin but lack of significant sputum or wheezing        Non-compliance   Assessment & Plan    Does not follow up with outpatient physician instructions          Sepsis due to undetermined organism ([facility])   Assessment & Plan    This is sepsis (without associated acute organ dysfunction).   2 out of 2 cultures positive for gram-negative rods  Vancomycin discontinued  Still suspect GI source although his abdominal symptoms are improved  Due  to respiratory failure and high flow oxygen he is not a good surgical candidate, nor is there convincing evidence of acute cholecystitis for a surgeon  May try to get CT versus MRI when he is able to lie flat  Continue to trend LFTs  Amylase mildly elevated        Stage 2 moderate COPD by GOLD classification ([facility])- (present on admission)   Assessment & Plan    Mild exacerbation with worsening respiratory failure  RT protocol increased        Uncontrolled type 2 diabetes mellitus (HCC)- (present on admission)   Assessment & Plan    Noncompliant with follow-up and home fingersticks  A1c 9.5  Sugars 200s-Lantus increased          Quality-Core Measures   Reviewed items::  Labs reviewed and Radiology images reviewed  Best catheter::  No Best  DVT prophylaxis pharmacological::  Heparin  Ulcer Prophylaxis::  Not indicated  Antibiotics:  Treating active infection/contamination beyond 24 hours perioperative coverage  Assessed for rehabilitation services:  Patient unable to tolerate rehabilitation therapeutic regimen

## 2018-09-14 NOTE — FLOWSHEET NOTE
09/14/18 0650   Interdisciplinary Plan of Care-Goals (Indications)   Bronchodilator Indications History / Diagnosis   RT Assessment of Delivered Medications   Evaluation of Medication Delivery Daily Yes-- Pt /Family has been Instructed in use of Respiratory Medications and Adverse Reactions   SVN Group   #SVN Performed Yes   Given By: Mouthpiece   MDI/DPI Group   #MDI/DPI Given MDI/DPI x 1   Incentive Spirometry Group   Breathing Exercises Yes   Incentive Spirometer Volume 1500 mL  (1700 max)   Chest Exam   Respiration 19   Pulse 96   Heart Rate (Monitored) (!) 103   Breath Sounds   RUL Breath Sounds Diminished   RML Breath Sounds Diminished   RLL Breath Sounds Diminished   LORNA Breath Sounds Diminished   LLL Breath Sounds Diminished   Oxygen   Home O2 Use Prior To Admission? Yes   Home O2 LPM Flow 2 LPM   Home O2 Delivery Method Nasal Cannula   Home O2 Frequency of Use Continuous   Pulse Oximetry 93 %   O2 (LPM) 3   O2 Daily Delivery Respiratory  Silicone Nasal Cannula

## 2018-09-14 NOTE — FLOWSHEET NOTE
09/13/18 1520   Oxygen   O2 (LPM) 45   FiO2% 35 %   O2 Daily Delivery Respiratory  Highflow Nasal Cannula

## 2018-09-14 NOTE — FLOWSHEET NOTE
09/13/18 1831   Interdisciplinary Plan of Care-Goals (Indications)   Bronchodilator Indications History / Diagnosis   Hyperinflation Protocol Indications Atelectasis Documented by Chest X-Ray   Interdisciplinary Plan of Care-Outcomes    Bronchodilator Outcome Patient at Stable Baseline   Hyperinflation Protocol Goals/Outcome Improvement in Repeat CXR   RT Assessment of Delivered Medications   Evaluation of Medication Delivery Daily Yes-- Pt /Family has been Instructed in use of Respiratory Medications and Adverse Reactions   SVN Group   #SVN Performed Yes   Given By: Mouthpiece   MDI/DPI Group   #MDI/DPI Given MDI/DPI x 1   Heated Hi Flow Nasal Cannula   Heated Hi Flow Nasal Cannula (HHFNC) Yes   FiO2 (HHFNC) 35   Flowrate (HHFNC) 45   Humidifier Temperature (HHFNC) 31   Respiratory WDL   Respiratory (WDL) X   Chest Exam   Respiration 20   Pulse (!) 106   Heart Rate (Monitored) (!) 105   Breath Sounds   Pre/Post Intervention Pre Intervention Assessment   RUL Breath Sounds Diminished   RML Breath Sounds Diminished   RLL Breath Sounds Diminished   LORNA Breath Sounds Diminished   LLL Breath Sounds Diminished   Oximetry   #Pulse Oximetry (Single Determination) Yes   Oxygen   Pulse Oximetry 93 %   O2 (LPM) 45   FiO2% 35 %   O2 Daily Delivery Respiratory  Highflow Nasal Cannula

## 2018-09-14 NOTE — CARE PLAN
Problem: Safety  Goal: Will remain free from falls    Intervention: Implement fall precautions  Bed in low position, pt near nurses station, treaded slipper socks in place, personal belongings in place, and call light in reach. Pt demonstrates correct use of call light. Pt will remain free from falls.       Problem: Venous Thromboembolism (VTW)/Deep Vein Thrombosis (DVT) Prevention:  Goal: Patient will participate in Venous Thrombosis (VTE)/Deep Vein Thrombosis (DVT)Prevention Measures    Intervention: Assess and monitor for anticoagulation complications  Heparin weight based protcol in place. ptt therapeutic, monitor daily ptt.       Problem: Knowledge Deficit  Goal: Knowledge of disease process/condition, treatment plan, diagnostic tests, and medications will improve    Intervention: Explain information regarding disease process/condition, treatment plan, diagnostic tests, and medications and document in education  POC discussed with pt by RN and Dr. Urena.

## 2018-09-14 NOTE — CARE PLAN
Problem: Oxygenation:  Goal: Maintain adequate oxygenation dependent on patient condition  Outcome: PROGRESSING SLOWER THAN EXPECTED  Pt placed on HHFNC per VO from Dr Hdez. Pt initially placed on 58.8% FI02, 50L flow. 02 saturation 97%. FI02 has been titrated to 35%, Flow is at 45L. Per VO from DR Hdez 02 sat is to be [id number]%. Placed pt on 4L NC at 1309. He stated he felt like his WOB had increased. He was then placed back on HHFNC  Intervention: Levels of oxygenation will improve to baseline  Pt states he continually uses 2 liters of 02. Will continue to titrate 02 to get pt to his baseline as he is able to tolerate      Problem: Bronchoconstriction:  Goal: Improve in air movement and diminished wheezing  Outcome: PROGRESSING AS EXPECTED  BS diminished throughout with increased aeration following svn and IS  Intervention: Implement inhaled treatments  Duoneb Qid and prn. Symbicort bid  Intervention: Evaluate and manage medication effects  Pt states benefit from treatments. Strong subjective/objective improvement      Problem: Hyperinflation:  Goal: Prevent or improve atelectasis  Outcome: PROGRESSING SLOWER THAN EXPECTED  IS is being administered QID following svn. [id number]  Intervention: Instruct incentive spirometry usage  IS has been instructed to be used Q1

## 2018-09-14 NOTE — PROGRESS NOTES
Updated Dr. Hidalgo on pt troponin of .05 which was an increase from .04.  No new orders at this time Pt remains asymptomatic.

## 2018-09-14 NOTE — FLOWSHEET NOTE
09/14/18 1057   SVN Group   #SVN Performed Yes   Given By: Mouthpiece   Chest Exam   Respiration 18   Pulse (!) 103   Heart Rate (Monitored) (!) 102   Breath Sounds   RUL Breath Sounds Diminished   RML Breath Sounds Diminished   RLL Breath Sounds Diminished   LORNA Breath Sounds Diminished   LLL Breath Sounds Diminished   Oxygen   Home O2 LPM Flow 2 LPM   Pulse Oximetry 94 %   O2 (LPM) 3   O2 Daily Delivery Respiratory  Silicone Nasal Cannula

## 2018-09-14 NOTE — FLOWSHEET NOTE
09/13/18 1515   Heated Hi Flow Nasal Cannula   FiO2 (HHFNC) 45   Flowrate (HHFNC) 40   Humidifier Temperature (FNC) 31   Oximetry   #Pulse Oximetry (Single Determination) Yes   Oxygen   Pulse Oximetry 98 %   O2 (LPM) 40  (pt stated he wanted more flow, increased flow to 45)   FiO2% 45 %   O2 Daily Delivery Respiratory  Highflow Nasal Cannula

## 2018-09-14 NOTE — CARE PLAN
Problem: Respiratory:  Goal: Respiratory status will improve    Intervention: Assess and monitor pulmonary status  Pt appears to be resting comfortably on HFNC at 35% and 45L.   Intervention: Educate and encourage incentive spirometry usage  Encouraged IS use. Pt demonstrated proper use and verbalized understanding of its purpose.

## 2018-09-14 NOTE — PROGRESS NOTES
Report received from night RN, POC discussed. Pt sitting up in chair. Dr. Urnea at bedside this AM, POC discussed. New orders placed and implemented. Assessment completed. Lines and gtts verified. No c/o pain. Pt up in chair eating breakfast. Call light in reach, pt demonstrates correct use of call light.

## 2018-09-14 NOTE — PROGRESS NOTES
"Critical care progress Note    Patient ID:   Name:             Enoch Mistry   YOB: 1945  Age:                 72 y.o.  male   MRN:               [id number]                                                  Subjective: Patient is feeling better today, breathing easier and abdominal pain has resolved.  Complaining of urinary and fecal incontinence.    Interval Changes: No evidence overnight, blood pressure on the higher side in the 150s.    Objective:   Vitals/ General Appearance:   Weight/BMI: Body mass index is 36.46 kg/m².  Blood pressure 137/68, pulse 96, temperature 36.8 °C (98.2 °F), resp. rate 19, height 1.702 m (5' 7\"), weight 105.6 kg (232 lb 12.9 oz), SpO2 93 %.  Vitals:    09/14/18 0400 09/14/18 0500 09/14/18 0600 09/14/18 0650   BP:       Pulse: 96 100 (!) 102 96   Resp: 15 (!) 22 (!) 21 19   Temp: 36.8 °C (98.2 °F)      SpO2: 94% 92% 92% 93%   Weight:       Height:         Oxygen Therapy:  Pulse Oximetry: 93 %, O2 (LPM): 3, O2 Delivery: Silicone Nasal Cannula    Constitutional:   Well developed, Well nourished, No acute distress  HENMT:  Normocephalic, Atraumatic, Oropharynx moist mucous membranes, No oral exudates, Nose normal.  No thyromegaly.  Neck:  Normal range of motion, No cervical tenderness,  no JVD.  Cardiovascular:  Normal heart rate, Normal rhythm, No murmurs, No rubs, No gallops.   Extremitites with intact distal pulses, but +2 edema noticed bilateral lower ext.   Lungs:  Normal breath sounds, breath sounds clear to auscultation bilaterally,  Bibasilar inspiratory rales   Abdomen: Soft, No tenderness, No guarding, No rebound, No masses, distended.   Skin: Warm, Dry, No erythema, No rash, no induration.  Neurologic: Alert & oriented x 3, No focal deficits noted, cranial nerves II through X are grossly intact.  Psychiatric: Affect normal, Judgment normal, Mood normal.    Labs:  Recent Labs      09/12/18   0922  09/13/18   0055  09/14/18   0445   WBC  20.6*  10.9*  9.2 "   RBC  5.16  4.67*  4.93   HEMOGLOBIN  15.2  13.8*  14.4   HEMATOCRIT  44.3  40.5*  43.4   MCV  85.9  86.7  88.0   MCH  29.5  29.6  29.2   MCHC  34.3  34.1  33.2*   RDW  41.7  43.6  45.5   PLATELETCT  156*  132*  151*   MPV  11.4  11.7  11.9     Recent Labs      09/12/18   0922   09/13/18   0055  09/13/18   0702  09/14/18   0445   APTT  47.8*   < >  86.1*  87.1*  56.8*   INR  1.30*   --    --    --    --     < > = values in this interval not displayed.     Recent Labs      09/13/18   0055  09/14/18   0445   BNPBTYPENAT  122*  293*     Recent Labs      09/12/18   1455  09/12/18   1842  09/13/18   0055  09/14/18   0445   TROPONINI  0.03  0.04  0.05*   --    BNPBTYPENAT   --    --   122*  293*     Recent Labs      09/12/18   0922  09/13/18   0055  09/14/18   0445   SODIUM  131*  138  138   POTASSIUM  3.4*  3.7  3.5*   CHLORIDE  99  111  106   CO2  19*  22  21   GLUCOSE  367*  176*  232*   BUN  18  11  16     Recent Labs      09/12/18   0922  09/13/18   0055  09/14/18   0445   SODIUM  131*  138  138   POTASSIUM  3.4*  3.7  3.5*   CHLORIDE  99  111  106   CO2  19*  22  21   BUN  18  11  16   CREATININE  0.95  0.57  0.74   MAGNESIUM  1.7  1.5  1.7  2.3   PHOSPHORUS  1.8*  1.6*  2.0*  1.6*   CALCIUM  8.3*  7.3*  7.6*     Results for orders placed or performed during the hospital encounter of 07/14/17   ECHOCARDIOGRAM COMP W/O CONT   Result Value Ref Range    Eject.Frac. MOD BP 66.08     Eject.Frac. MOD 4C 65.55     Eject.Frac. MOD 2C 67.77          Imaging:   ECHOCARDIOGRAM-COMP W/ CONT   Final Result      DX-CHEST-PORTABLE (1 VIEW)   Final Result      Mild pulmonary edema and bibasilar atelectasis, without significant change.      US-GALLBLADDER   Final Result      1.  Limited evaluation of pancreas and abdominal aorta.   2.  Echogenic liver consistent with fatty infiltration with focal sparing adjacent to gallbladder fossa.   3.  Cholelithiasis with pain over the gallbladder.  No other evidence to indicate acute  cholecystitis.  No biliary dilation.   4.  Distended bladder and mild RIGHT hydronephrosis, possibly indicating urinary retention.      LE VENOUS DUPLEX (Specify in Comments Left, Right Or Bilateral)   Final Result      DX-CHEST-PORTABLE (1 VIEW)   Final Result      1.  Cardiomegaly.      2.  Bibasilar atelectasis.      NM-BILIARY (HIDA) SCAN WITH CCK    (Results Pending)       Hospital Medications:    Current Facility-Administered Medications:   •  [START ON 12/7/2018] methylPREDNISolone (SOLU-MEDROL) 40 MG injection 40 mg, 40 mg, Intravenous, EVERY 12 WEEKS, Ceferino Urena M.D.  •  furosemide (LASIX) injection 40 mg, 40 mg, Intravenous, Once, Ceferino Urena M.D.  •  ondansetron (ZOFRAN) syringe/vial injection 4 mg, 4 mg, Intravenous, Q4HRS PRN, Ceferino Urena M.D., 4 mg at 09/13/18 0932  •  insulin glargine (LANTUS) injection 25 Units, 25 Units, Subcutaneous, Q EVENING **AND** insulin lispro (HUMALOG) injection 1-6 Units, 1-6 Units, Subcutaneous, Q6HRS, 2 Units at 09/14/18 0619 **AND** Accu-Chek Q6 if NPO, , , Q6H **AND** NOTIFY MD and PharmD, , , Once **AND** glucose 4 g chewable tablet 16 g, 16 g, Oral, Q15 MIN PRN **AND** dextrose 50% (D50W) injection 25 mL, 25 mL, Intravenous, Q15 MIN PRN, Sharon Hidalgo M.D.  •  senna-docusate (PERICOLACE or SENOKOT S) 8.6-50 MG per tablet 2 Tab, 2 Tab, Oral, BID, Stopped at 09/12/18 1800 **AND** polyethylene glycol/lytes (MIRALAX) PACKET 1 Packet, 1 Packet, Oral, QDAY PRN **AND** magnesium hydroxide (MILK OF MAGNESIA) suspension 30 mL, 30 mL, Oral, QDAY PRN **AND** bisacodyl (DULCOLAX) suppository 10 mg, 10 mg, Rectal, QDAY PRN, Sharon Hidalgo M.D.  •  Respiratory Care per Protocol, , Nebulization, Continuous RT, Sharon Hidalgo M.D.  •  [COMPLETED] heparin injection 6,000 Units, 6,000 Units, Intravenous, Once, 6,000 Units at 09/12/18 1142 **AND** heparin injection 3,200 Units, 3,200 Units, Intravenous, PRN **AND** heparin infusion 25,000 units in 500 ml  0.45% nacl, , Intravenous, Continuous, Last Rate: 24 mL/hr at 18 0621, 1,200 Units/hr at 18 0621 **AND** Protocol 440 Heparin Weight Based DO NOT GIVE ANY HEPARIN BOLUS TO STROKE PATIENT, , , CONTINUOUS **AND** Protocol 440 Heparin Weight Based Discontinue Enoxaparin (Lovenox), Dabigatran (Pradaxa), Rivaroxaban (Xarelto), Apixaban (Eliquis), Edoxaban (Savaysa, Lixiana), Fondaparinux (Arixtra) and Argatroban prior to heparin administration, , , CONTINUOUS **AND** Protocol 440 Heparin Weight Based Draw baseline aPTT, PT, and platelet count if not already done, , , CONTINUOUS **AND** Protocol 440 Heparin Weight Based Draw aPTT 6 hours after beginning infusion. , , , CONTINUOUS **AND** Protocol 440 Heparin Weight Based Draw Platelet count every three days. Contact MD if platelet is 50% lower than baseline count., , , CONTINUOUS **AND** Protocol 440 Heparin Weight Based Record Patient Data, , , CONTINUOUS **AND** Protocol 440 Heparin Weight Based INSTRUCTIONS, , , CONTINUOUS **AND** Protocol 440 Heparin Weight Based Review aPTT results 6 hours after infusion is begun as detailed, , , CONTINUOUS **AND** Protocol 440 Heparin Weight Based Adjust heparin to maintain aPTT between 55-96 sec, , , CONTINUOUS **AND** Protocol 440 Heparin Weight Based Order aPTT 6 hours after any rate change or hold until aPTT is therapeutic (55-96 seconds), , , CONTINUOUS **AND** Protocol 440 Heparin Weight Based Documentation and verification, , , CONTINUOUS, Sharon Hidalgo M.D.  •  NS (BOLUS) infusion 500 mL, 500 mL, Intravenous, Once PRN, Sharon Hidalgo M.D.  •  [] piperacillin-tazobactam (ZOSYN) 4.5 g in  mL IVPB, 4.5 g, Intravenous, Once, Stopped at 18 1100 **AND** piperacillin-tazobactam (ZOSYN) 4.5 g in  mL IVPB, 4.5 g, Intravenous, Q8HRS, Britni Shah M.D., Last Rate: 25 mL/hr at 18, 4.5 g at 18  •  ipratropium-albuterol (DUONEB) nebulizer solution, 3 mL,  Nebulization, Q4H PRN (RT), Sharon Hidalgo M.D., 3 mL at 09/12/18 1638  •  budesonide-formoterol (SYMBICORT) 160-4.5 MCG/ACT inhaler 2 Puff, 2 Puff, Inhalation, BID (RT), Ceferino Urena M.D., 2 Puff at 09/14/18 0658  •  ipratropium-albuterol (DUONEB) nebulizer solution, 3 mL, Nebulization, 4X/DAY (RT), Sharon Hidalgo M.D., 3 mL at 09/14/18 0650    Current Outpatient Medications:  Prescriptions Prior to Admission   Medication Sig Dispense Refill Last Dose   • amlodipine (NORVASC) 5 MG Tab TAKE ONE TABLET BY MOUTH DAILY (NORVASC) 90 Tab 2 9/11/2018 at am       Medication Allergy:  No Known Allergies      Assessment / Plan  1. Gram negative rods bacteremia/Sepsis, suspect biliary/intra-abdominal source, SOFA score 6  D/C Vancomycin  Continue zosyn, f/u final culture results   F/U cultures  GBUS no evidence of acute cholecystitis, waiting on HIDA scan   Add amylase and lipase to AM labs   F/U with surgery   Lactate normalized 9/12/18  I will scan his abdomen to look for ascites today     2.  Acute on chronic hypoxemic respiratory failure  Improved today, down from HFNC to NC 3-4 LPM     3. 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 Volume overload by exam & I/O of 4 liters +ve  D/C IVF   One time lasix 40 mg IV, pt's SBP is in the 150s      5. Chronic DVT on Heparin gtt  6. Lactic acidosis 2ry to sepsis,  Resolved   7. Chronic alcoholic liver disease, Discriminant score 37, receiving steroids for COPD exacerbation   8. Ex-smoker, quit 10 years ago

## 2018-09-14 NOTE — PROGRESS NOTES
[facility] Hospitalist Progress Note    Date of Service: 2018    Chief Complaint  72 y.o. male with past medical history of hypertension, COPD, diabetes and noncompliant with meds and diet admitted 2018 with right upper quadrant pain, hypoxemia, sepsis    Interval Problem Update  Looks much better  Discussed POC  Discussed w/ ID  DIscussed DC planning and f/u      Consultants/Specialty  Critical care    Disposition  TBD        Review of Systems   Constitutional: Negative for fever and malaise/fatigue.   HENT: Negative for sinus pain and sore throat.    Eyes: Negative for discharge and redness.   Respiratory: Positive for cough (minmal) and shortness of breath (improved). Negative for sputum production and wheezing.    Cardiovascular: Positive for leg swelling (continues). Negative for chest pain and palpitations.   Gastrointestinal: Negative for abdominal pain, constipation, nausea and vomiting.   Genitourinary: Negative for dysuria.   Musculoskeletal: Negative for joint pain.   Skin: Negative for itching and rash.   Neurological: Negative for dizziness, weakness and headaches.   Psychiatric/Behavioral: Negative for depression. The patient is not nervous/anxious.       Physical Exam  Laboratory/Imaging   Hemodynamics  Temp (24hrs), Av.8 °C (98.2 °F), Min:36.5 °C (97.7 °F), Max:36.9 °C (98.5 °F)   Temperature: 36.9 °C (98.5 °F)  Pulse  Av.1  Min: 65  Max: 128 Heart Rate (Monitored): (!) 103  Blood Pressure : 146/72, NIBP: 142/73      Respiratory      Respiration: 18, Pulse Oximetry: 93 %, O2 Daily Delivery Respiratory : Silicone Nasal Cannula     Given By:: Mouthpiece, #MDI/DPI Given: MDI/DPI x 1, Work Of Breathing / Effort: Mild  RUL Breath Sounds: Clear, RML Breath Sounds: Diminished, RLL Breath Sounds: Diminished, LORNA Breath Sounds: Clear, LLL Breath Sounds: Diminished    Fluids    Intake/Output Summary (Last 24 hours) at 18 1520  Last data filed at 18 1500   Gross per 24 hour   Intake              4166 ml   Output             4200 ml   Net              -34 ml       Nutrition  Orders Placed This Encounter   Procedures   • Diet Order Full Liquid (advance as tolerated ), Hepatic, Diabetic     Standing Status:   Standing     Number of Occurrences:   1     Order Specific Question:   Diet:     Answer:   Full Liquid [11]     Comments:   advance as tolerated      Order Specific Question:   Diet:     Answer:   Hepatic [9]     Order Specific Question:   Diet:     Answer:   Diabetic [3]     Physical Exam   Constitutional: He is oriented to person, place, and time. He appears well-developed and well-nourished.   HENT:   Head: Normocephalic and atraumatic.   Right Ear: External ear normal.   Left Ear: External ear normal.   Nose: Nose normal.   Mouth/Throat: Oropharynx is clear and moist. No oropharyngeal exudate.   High flow nasal cannula in place   Eyes: Pupils are equal, round, and reactive to light. Conjunctivae and EOM are normal. Right eye exhibits no discharge. Left eye exhibits no discharge. Scleral icterus is present.   Neck: Neck supple.   Cardiovascular: Normal rate and regular rhythm.    Pulmonary/Chest: Effort normal. No respiratory distress. He has no wheezes. He has no rales. He exhibits no tenderness.   Improved breath sounds and excursion, no wheezes   Abdominal: Soft. Bowel sounds are normal. He exhibits distension (softer). He exhibits no mass. There is no tenderness. There is no rebound and no guarding.   Musculoskeletal: He exhibits no edema (3+ bilateral) or tenderness.   Neurological: He is alert and oriented to person, place, and time. No cranial nerve deficit.   Skin: Skin is warm and dry. He is not diaphoretic.   Stasis dermatitis, trace jaundice   Psychiatric: He has a normal mood and affect.   Nursing note and vitals reviewed.      Recent Labs      09/12/18   0922  09/13/18   0055  09/14/18   0445   WBC  20.6*  10.9*  9.2   RBC  5.16  4.67*  4.93   HEMOGLOBIN  15.2  13.8*  14.4    HEMATOCRIT  44.3  40.5*  43.4   MCV  85.9  86.7  88.0   MCH  29.5  29.6  29.2   MCHC  34.3  34.1  33.2*   RDW  41.7  43.6  45.5   PLATELETCT  156*  132*  151*   MPV  11.4  11.7  11.9     Recent Labs      09/12/18 0922 09/13/18 0055  09/14/18   0445   SODIUM  131*  138  138   POTASSIUM  3.4*  3.7  3.5*   CHLORIDE  99  111  106   CO2  19*  22  21   GLUCOSE  367*  176*  232*   BUN  18  11  16   CREATININE  0.95  0.57  0.74   CALCIUM  8.3*  7.3*  7.6*     Recent Labs      09/12/18 0922 09/13/18 0055 09/13/18   0702  09/14/18   0445   APTT  47.8*   < >  86.1*  87.1*  56.8*   INR  1.30*   --    --    --    --     < > = values in this interval not displayed.     Recent Labs      09/13/18 0055 09/14/18   0445   BNPBTYPENAT  122*  293*              Assessment/Plan     Acute on chronic respiratory failure (HCC)   Assessment & Plan    Patient chronically on 2 L oxygen at home and was up to high flow nasal cannula.  Now down to 3 L  COPD, improving          Sepsis due to Enterobacter species ([facility])   Assessment & Plan    This is sepsis (without associated acute organ dysfunction).   2 out of 2 cultures positive for Enterobacter  Likely GI source, with obstructive picture with LFTs and t bili- still jaundiced  Pt refused HIDA scan- will repeat sono- discussed w/ patient  WBC increased w/ levaquin> ID called> changed to fortaz/ flagyl          Diarrhea   Assessment & Plan    c diff negative  improved        Hyponatremia   Assessment & Plan    resolved        Hypokalemia   Assessment & Plan    resolved          Hypophosphatemia   Assessment & Plan    resolved        Abnormal LFTs   Assessment & Plan    Presenting with RUQ pain, now resolved  US with gallstone but no sign of inflammation/fluid  LFTs and T bili down trending without intervention  Repeat sono        Uncontrolled type 2 diabetes mellitus (HCC)- (present on admission)   Assessment & Plan    Noncompliant with follow-up and home fingersticks  A1c  9.5  Changed to 20 BID  Notified patient will need insulin at DC        Chronic deep vein thrombosis (DVT) (HCC)   Assessment & Plan    On eliquis now        Hepatic steatosis   Assessment & Plan    Due to morbid obesity and uncontrolled diabetes          Non-compliance   Assessment & Plan    Discussed importance of compliance and f/u        Stage 2 moderate COPD by GOLD classification ([facility])- (present on admission)   Assessment & Plan    On 2 L home oxygen  Improved  Change to PO steroids          Quality-Core Measures   Reviewed items::  Labs reviewed and Medications reviewed  Best catheter::  No Best  DVT: eliquis.  Ulcer Prophylaxis::  Not indicated  Antibiotics:  Treating active infection/contamination beyond 24 hours perioperative coverage  Assessed for rehabilitation services:  Patient unable to tolerate rehabilitation therapeutic regimen

## 2018-09-14 NOTE — PROGRESS NOTES
Assumed pt care. AAOx4. Pt denied pain or SOB at rest. Assessment completed. Pt sitting up in a recliner. Reminded pt to call for assistance back to bed. Call light within reach. Will continue to monitor.

## 2018-09-14 NOTE — FLOWSHEET NOTE
"   09/13/18 2724   Events/Summary/Plan   Events/Summary/Plan pt resting in cardiac chair states that he is getting enough \"air\" and is not sob   Heated Hi Flow Nasal Cannula   FiO2 (HHFNC) 35   Flowrate (HHFNC) 45   Humidifier Temperature (HHFNC) 31   Oxygen   Pulse Oximetry 91 %   O2 (LPM) 45   FiO2% 35 %   O2 Daily Delivery Respiratory  Highflow Nasal Cannula     "

## 2018-09-15 PROBLEM — R19.7 DIARRHEA: Status: ACTIVE | Noted: 2018-09-15

## 2018-09-15 PROBLEM — E87.1 HYPONATREMIA: Status: ACTIVE | Noted: 2018-09-15

## 2018-09-15 PROBLEM — E87.6 HYPOKALEMIA: Status: ACTIVE | Noted: 2018-09-15

## 2018-09-15 PROBLEM — A41.59 SEPSIS DUE TO ENTEROBACTER SPECIES (HCC): Status: ACTIVE | Noted: 2018-09-12

## 2018-09-15 LAB
ALBUMIN SERPL BCP-MCNC: 3.1 G/DL (ref 3.2–4.9)
ALBUMIN/GLOB SERPL: 0.9 G/DL
ALP SERPL-CCNC: 119 U/L (ref 30–99)
ALT SERPL-CCNC: 93 U/L (ref 2–50)
ANION GAP SERPL CALC-SCNC: 10 MMOL/L (ref 0–11.9)
APTT PPP: 52.8 SEC (ref 24.7–36)
AST SERPL-CCNC: 48 U/L (ref 12–45)
BACTERIA BLD CULT: ABNORMAL
BACTERIA UR CULT: ABNORMAL
BACTERIA UR CULT: ABNORMAL
BASOPHILS # BLD AUTO: 0.1 % (ref 0–1.8)
BASOPHILS # BLD: 0.01 K/UL (ref 0–0.12)
BILIRUB SERPL-MCNC: 6.8 MG/DL (ref 0.1–1.5)
BNP SERPL-MCNC: 104 PG/ML (ref 0–100)
BUN SERPL-MCNC: 19 MG/DL (ref 8–22)
C DIFF DNA SPEC QL NAA+PROBE: NEGATIVE
C DIFF TOX GENS STL QL NAA+PROBE: NEGATIVE
CALCIUM SERPL-MCNC: 8.2 MG/DL (ref 8.4–10.2)
CHLORIDE SERPL-SCNC: 101 MMOL/L (ref 96–112)
CO2 SERPL-SCNC: 23 MMOL/L (ref 20–33)
CREAT SERPL-MCNC: 0.7 MG/DL (ref 0.5–1.4)
EOSINOPHIL # BLD AUTO: 0 K/UL (ref 0–0.51)
EOSINOPHIL NFR BLD: 0 % (ref 0–6.9)
ERYTHROCYTE [DISTWIDTH] IN BLOOD BY AUTOMATED COUNT: 43.6 FL (ref 35.9–50)
GLOBULIN SER CALC-MCNC: 3.3 G/DL (ref 1.9–3.5)
GLUCOSE BLD-MCNC: 239 MG/DL (ref 65–99)
GLUCOSE BLD-MCNC: 260 MG/DL (ref 65–99)
GLUCOSE BLD-MCNC: 300 MG/DL (ref 65–99)
GLUCOSE BLD-MCNC: 319 MG/DL (ref 65–99)
GLUCOSE BLD-MCNC: 322 MG/DL (ref 65–99)
GLUCOSE SERPL-MCNC: 252 MG/DL (ref 65–99)
HCT VFR BLD AUTO: 42.4 % (ref 42–52)
HGB BLD-MCNC: 14.6 G/DL (ref 14–18)
IMM GRANULOCYTES # BLD AUTO: 0.09 K/UL (ref 0–0.11)
IMM GRANULOCYTES NFR BLD AUTO: 0.8 % (ref 0–0.9)
LYMPHOCYTES # BLD AUTO: 0.72 K/UL (ref 1–4.8)
LYMPHOCYTES NFR BLD: 6 % (ref 22–41)
MAGNESIUM SERPL-MCNC: 2.4 MG/DL (ref 1.5–2.5)
MCH RBC QN AUTO: 29.4 PG (ref 27–33)
MCHC RBC AUTO-ENTMCNC: 34.4 G/DL (ref 33.7–35.3)
MCV RBC AUTO: 85.3 FL (ref 81.4–97.8)
MONOCYTES # BLD AUTO: 0.62 K/UL (ref 0–0.85)
MONOCYTES NFR BLD AUTO: 5.2 % (ref 0–13.4)
NEUTROPHILS # BLD AUTO: 10.5 K/UL (ref 1.82–7.42)
NEUTROPHILS NFR BLD: 87.9 % (ref 44–72)
NRBC # BLD AUTO: 0 K/UL
NRBC BLD-RTO: 0 /100 WBC
PHOSPHATE SERPL-MCNC: 2 MG/DL (ref 2.5–4.5)
PLATELET # BLD AUTO: 178 K/UL (ref 164–446)
PMV BLD AUTO: 11.8 FL (ref 9–12.9)
POTASSIUM SERPL-SCNC: 3 MMOL/L (ref 3.6–5.5)
PROT SERPL-MCNC: 6.4 G/DL (ref 6–8.2)
RBC # BLD AUTO: 4.97 M/UL (ref 4.7–6.1)
SIGNIFICANT IND 70042: ABNORMAL
SITE SITE: ABNORMAL
SODIUM SERPL-SCNC: 134 MMOL/L (ref 135–145)
SOURCE SOURCE: ABNORMAL
WBC # BLD AUTO: 11.9 K/UL (ref 4.8–10.8)

## 2018-09-15 PROCEDURE — 700105 HCHG RX REV CODE 258: Performed by: EMERGENCY MEDICINE

## 2018-09-15 PROCEDURE — 94760 N-INVAS EAR/PLS OXIMETRY 1: CPT

## 2018-09-15 PROCEDURE — A9270 NON-COVERED ITEM OR SERVICE: HCPCS | Performed by: HOSPITALIST

## 2018-09-15 PROCEDURE — 85730 THROMBOPLASTIN TIME PARTIAL: CPT

## 2018-09-15 PROCEDURE — 83880 ASSAY OF NATRIURETIC PEPTIDE: CPT

## 2018-09-15 PROCEDURE — 700102 HCHG RX REV CODE 250 W/ 637 OVERRIDE(OP): Performed by: HOSPITALIST

## 2018-09-15 PROCEDURE — 700101 HCHG RX REV CODE 250: Performed by: INTERNAL MEDICINE

## 2018-09-15 PROCEDURE — 87493 C DIFF AMPLIFIED PROBE: CPT

## 2018-09-15 PROCEDURE — 700102 HCHG RX REV CODE 250 W/ 637 OVERRIDE(OP): Performed by: INTERNAL MEDICINE

## 2018-09-15 PROCEDURE — 700111 HCHG RX REV CODE 636 W/ 250 OVERRIDE (IP): Performed by: INTERNAL MEDICINE

## 2018-09-15 PROCEDURE — 700111 HCHG RX REV CODE 636 W/ 250 OVERRIDE (IP): Performed by: EMERGENCY MEDICINE

## 2018-09-15 PROCEDURE — 94640 AIRWAY INHALATION TREATMENT: CPT

## 2018-09-15 PROCEDURE — A9270 NON-COVERED ITEM OR SERVICE: HCPCS | Performed by: INTERNAL MEDICINE

## 2018-09-15 PROCEDURE — 82962 GLUCOSE BLOOD TEST: CPT | Mod: 91

## 2018-09-15 PROCEDURE — 84100 ASSAY OF PHOSPHORUS: CPT

## 2018-09-15 PROCEDURE — 770020 HCHG ROOM/CARE - TELE (206)

## 2018-09-15 PROCEDURE — 99233 SBSQ HOSP IP/OBS HIGH 50: CPT | Performed by: HOSPITALIST

## 2018-09-15 PROCEDURE — 97161 PT EVAL LOW COMPLEX 20 MIN: CPT

## 2018-09-15 PROCEDURE — 99233 SBSQ HOSP IP/OBS HIGH 50: CPT | Performed by: INTERNAL MEDICINE

## 2018-09-15 PROCEDURE — 83735 ASSAY OF MAGNESIUM: CPT

## 2018-09-15 PROCEDURE — 306397 CUSHION, WAFFLE ADULT 19X19: Performed by: INTERNAL MEDICINE

## 2018-09-15 PROCEDURE — 80053 COMPREHEN METABOLIC PANEL: CPT

## 2018-09-15 PROCEDURE — 85025 COMPLETE CBC W/AUTO DIFF WBC: CPT

## 2018-09-15 RX ORDER — POTASSIUM CHLORIDE 20 MEQ/1
40 TABLET, EXTENDED RELEASE ORAL 2 TIMES DAILY
Status: DISCONTINUED | OUTPATIENT
Start: 2018-09-15 | End: 2018-09-19

## 2018-09-15 RX ORDER — DEXTROSE MONOHYDRATE 25 G/50ML
25 INJECTION, SOLUTION INTRAVENOUS
Status: DISCONTINUED | OUTPATIENT
Start: 2018-09-15 | End: 2018-09-19

## 2018-09-15 RX ORDER — INSULIN GLARGINE 100 [IU]/ML
30 INJECTION, SOLUTION SUBCUTANEOUS EVERY EVENING
Status: DISCONTINUED | OUTPATIENT
Start: 2018-09-15 | End: 2018-09-17

## 2018-09-15 RX ADMIN — INSULIN HUMAN 5 UNITS: 100 INJECTION, SOLUTION PARENTERAL at 21:54

## 2018-09-15 RX ADMIN — DIBASIC SODIUM PHOSPHATE, MONOBASIC POTASSIUM PHOSPHATE AND MONOBASIC SODIUM PHOSPHATE 1 TABLET: 852; 155; 130 TABLET ORAL at 23:56

## 2018-09-15 RX ADMIN — APIXABAN 5 MG: 5 TABLET, FILM COATED ORAL at 18:25

## 2018-09-15 RX ADMIN — APIXABAN 5 MG: 5 TABLET, FILM COATED ORAL at 07:06

## 2018-09-15 RX ADMIN — DILTIAZEM HYDROCHLORIDE 30 MG: 30 TABLET, FILM COATED ORAL at 05:33

## 2018-09-15 RX ADMIN — IPRATROPIUM BROMIDE AND ALBUTEROL SULFATE 3 ML: .5; 3 SOLUTION RESPIRATORY (INHALATION) at 06:30

## 2018-09-15 RX ADMIN — DILTIAZEM HYDROCHLORIDE 30 MG: 30 TABLET, FILM COATED ORAL at 23:56

## 2018-09-15 RX ADMIN — METHYLPREDNISOLONE SODIUM SUCCINATE 40 MG: 40 INJECTION, POWDER, FOR SOLUTION INTRAMUSCULAR; INTRAVENOUS at 18:24

## 2018-09-15 RX ADMIN — POTASSIUM CHLORIDE 40 MEQ: 1500 TABLET, EXTENDED RELEASE ORAL at 08:40

## 2018-09-15 RX ADMIN — HEPARIN SODIUM 1200 UNITS/HR: 5000 INJECTION, SOLUTION INTRAVENOUS at 04:24

## 2018-09-15 RX ADMIN — FUROSEMIDE 30 MG: 10 INJECTION, SOLUTION INTRAMUSCULAR; INTRAVENOUS at 05:33

## 2018-09-15 RX ADMIN — PIPERACILLIN SODIUM AND TAZOBACTAM SODIUM 4.5 G: 4; .5 INJECTION, POWDER, FOR SOLUTION INTRAVENOUS at 12:47

## 2018-09-15 RX ADMIN — PIPERACILLIN SODIUM AND TAZOBACTAM SODIUM 4.5 G: 4; .5 INJECTION, POWDER, FOR SOLUTION INTRAVENOUS at 05:49

## 2018-09-15 RX ADMIN — INSULIN GLARGINE 30 UNITS: 100 INJECTION, SOLUTION SUBCUTANEOUS at 21:53

## 2018-09-15 RX ADMIN — DILTIAZEM HYDROCHLORIDE 30 MG: 30 TABLET, FILM COATED ORAL at 12:12

## 2018-09-15 RX ADMIN — DIBASIC SODIUM PHOSPHATE, MONOBASIC POTASSIUM PHOSPHATE AND MONOBASIC SODIUM PHOSPHATE 1 TABLET: 852; 155; 130 TABLET ORAL at 18:25

## 2018-09-15 RX ADMIN — HEPARIN SODIUM 3200 UNITS: 1000 INJECTION, SOLUTION INTRAVENOUS; SUBCUTANEOUS at 06:16

## 2018-09-15 RX ADMIN — DILTIAZEM HYDROCHLORIDE 30 MG: 30 TABLET, FILM COATED ORAL at 18:25

## 2018-09-15 RX ADMIN — BENZOCAINE AND MENTHOL 1 LOZENGE: 15; 3.6 LOZENGE ORAL at 10:14

## 2018-09-15 RX ADMIN — PIPERACILLIN SODIUM AND TAZOBACTAM SODIUM 4.5 G: 4; .5 INJECTION, POWDER, FOR SOLUTION INTRAVENOUS at 20:52

## 2018-09-15 RX ADMIN — BUDESONIDE AND FORMOTEROL FUMARATE DIHYDRATE 2 PUFF: 160; 4.5 AEROSOL RESPIRATORY (INHALATION) at 06:30

## 2018-09-15 RX ADMIN — IPRATROPIUM BROMIDE AND ALBUTEROL SULFATE 3 ML: .5; 3 SOLUTION RESPIRATORY (INHALATION) at 10:58

## 2018-09-15 RX ADMIN — DILTIAZEM HYDROCHLORIDE 30 MG: 30 TABLET, FILM COATED ORAL at 00:59

## 2018-09-15 RX ADMIN — METHYLPREDNISOLONE SODIUM SUCCINATE 40 MG: 40 INJECTION, POWDER, FOR SOLUTION INTRAMUSCULAR; INTRAVENOUS at 05:32

## 2018-09-15 RX ADMIN — INSULIN HUMAN 6 UNITS: 100 INJECTION, SOLUTION PARENTERAL at 19:25

## 2018-09-15 RX ADMIN — POTASSIUM CHLORIDE 40 MEQ: 1500 TABLET, EXTENDED RELEASE ORAL at 18:24

## 2018-09-15 ASSESSMENT — PAIN SCALES - GENERAL
PAINLEVEL_OUTOF10: 3
PAINLEVEL_OUTOF10: 0
PAINLEVEL_OUTOF10: 0

## 2018-09-15 ASSESSMENT — ENCOUNTER SYMPTOMS
NAUSEA: 0
DOUBLE VISION: 0
DIZZINESS: 0
HEADACHES: 0
BLURRED VISION: 0
FALLS: 0
LOSS OF CONSCIOUSNESS: 0
BACK PAIN: 0
ABDOMINAL PAIN: 0
FEVER: 0
SORE THROAT: 0
COUGH: 0
DIARRHEA: 1
SHORTNESS OF BREATH: 0
HEARTBURN: 0
PALPITATIONS: 0
CHILLS: 0
SPUTUM PRODUCTION: 0

## 2018-09-15 NOTE — WOUND TEAM
"[facility]  Inpatient Services  Initial Wound and Skin Care Evaluation    Admission Date:  9/12/2018   HPI, PMH, SH: Reviewed  Unit where seen by Wound Team: 3320/00    WOUND CONSULT RELATED TO:  Bilateral leg edema, right leg discoloration    SUBJECTIVE:  \"Allie had leg swelling for a long time\"      Self Report / Pain Level:  0/10       OBJECTIVE:  Pt sitting up in chair, legs dependent    WOUND TYPE, LOCATION, CHARACTERISTICS (Pressure ulcers: location, stage, POA or date identified)    Location and type of wound:bilateral legs R>L, edema related to venous insufficiency and fluid overload         Periwound:    Varicose veins, hemosiderin staining, pitting edema,erythema along shin      Drainage:     none      Tissue Type and %:    Right leg anterior proximal shin dark purple raised crust     Wound Edges:   flat  Odor:     none  Exposed structure(s):   none  S&S of Infection:   minimal      Measurements: Girth measurements not taken 9/15/18    Vascular:    Dorsal Pedal pulses:  Right biphasic 3+, left triphasic 3+  Posterior tib pulses:  Right and left triphasic 3+    JAY:     Not taken    Lab Values:    WBC:       WBC   Date/Time Value Ref Range Status   09/15/2018 04:55 AM 11.9 (H) 4.8 - 10.8 K/uL Final     AIC:      Lab Results   Component Value Date/Time    HBA1C 9.5 (H) 09/12/2018 09:22 AM         Culture:   Completed no open wound bed to culture    INTERVENTIONS BY WOUND TEAM:  Tubi  G placed on bilateral legs base of toes to fibular head.  Instructed pt to inform RN if stocking roll down or become uncomfortable    Dressing selection:  Tubi G         Interdisciplinary consultation:  RN, patient CNA     EVALUATION:  Pt presented to the ED 9/12/18 with right UQ pain, found to be septic due to intra abdominal source and with respiratory failure. Pt with s&s of CVI and a hx of DVT right LE.  Edema related to CVI, chronic DVT and fluid overload.  Pt with a deeply pitting edema that should " respond to light compression.  Light compression selected due to recent RF and COPD exacerbation.    Scabs on right anterior leg will be allowed to slough off over time.      Factors affecting wound healing:  COPD, DM, ex smoker, CVI, hx of DVT right LE, hx of non compliance with medications  Goals:  Steady decrease in wound area and depth weekly     NURSING PLAN OF CARE ORDERS (X):    Dressing changes: See Dressing Care orders:   X  Skin care: See Skin Care orders:   Rectal tube care: See Rectal Tube Care orders:   Other orders:    RSKIN: CURRENT (X) ORDERED (O)  Q shift Johnathan:  X  Q shift pressure point assessments:  X  Pressure redistribution mattress        Waffle overlay  MARIANNE      Bariatric MARIANNE      Bariatric foam        Heel float boots       Heels floated on pillows      Barrier wipes      Barrier Cream      Barrier paste      Sacral silicone dressing      Silicone O2 tubing      Anchorfast      Trach with Optifoam split foam       Waffle cushion      Rectal tube or BMS      Antifungal tx    Turn q 2 hours     Up to chair   X  Ambulate   PT/OT     Dietician      PO     TF   TPN   NPO   # days   Other       WOUND TEAM PLAN OF CARE (X):   NPWT change 3 x week:        Dressing changes by wound team:       Follow up as needed:     X  Other (explain):    Anticipated discharge plans (X):  SNF:           Home Care:           Outpatient Wound Center:            Self Care:            Other:

## 2018-09-15 NOTE — CARE PLAN
Problem: Knowledge Deficit  Goal: Knowledge of disease process/condition, treatment plan, diagnostic tests, and medications will improve    Intervention: Explain information regarding disease process/condition, treatment plan, diagnostic tests, and medications and document in education  POC discussed with pt. Kristen ravi'can. Eliquis started. High fall risk precautions in place.       Problem: Fluid Volume:  Goal: Will maintain balanced intake and output    Intervention: Monitor, educate, and encourage compliance with therapeutic intake of liquids  Strict I/O's.       Problem: Skin Integrity  Goal: Risk for impaired skin integrity will decrease    Intervention: Assess risk factors for impaired skin integrity and/or pressure ulcers  Pt sitting up in chair, waffle cushion under patient. Pt verbalizes understanding to reposition at least every 2 hours.

## 2018-09-15 NOTE — FLOWSHEET NOTE
09/15/18 1530   Therapy Not Performed   Type of Therapy Not Performed SVN   Reason Therapy Not Performed Refused  (pt wants to go PRN, will reasses later as pt just out of ICU)   Chest Exam   Respiration 18   Pulse 98   Breath Sounds   RUL Breath Sounds Clear   RML Breath Sounds Diminished   RLL Breath Sounds Diminished   LORNA Breath Sounds Clear   LLL Breath Sounds Diminished   Oxygen   Home O2 LPM Flow 2 LPM   Home O2 Frequency of Use Continuous   Pulse Oximetry 91 %   O2 (LPM) 3   O2 Daily Delivery Respiratory  Silicone Nasal Cannula

## 2018-09-15 NOTE — FLOWSHEET NOTE
09/15/18 0630   Interdisciplinary Plan of Care-Goals (Indications)   Bronchodilator Indications History / Diagnosis   Hyperinflation Protocol Indications Atelectasis Documented by Chest X-Ray   RT Assessment of Delivered Medications   Evaluation of Medication Delivery Daily Yes-- Pt /Family has been Instructed in use of Respiratory Medications and Adverse Reactions   SVN Group   #SVN Performed Yes   Given By: Mouthseal   MDI/DPI Group   #MDI/DPI Given MDI/DPI x 1   Incentive Spirometry Group   Incentive Spirometer Volume 1750 mL   Chest Exam   Respiration 18   Pulse (!) 102   Heart Rate (Monitored) (!) 101   Breath Sounds   RUL Breath Sounds Clear;Diminished   RML Breath Sounds Diminished   RLL Breath Sounds Diminished   LORNA Breath Sounds Clear;Diminished   LLL Breath Sounds Diminished   Oximetry   Continuous Oximetry Yes   Oxygen   Home O2 Use Prior To Admission? Yes   Home O2 LPM Flow 2 LPM   Home O2 Delivery Method Nasal Cannula   Home O2 Frequency of Use Continuous   Pulse Oximetry 95 %   O2 (LPM) 3   O2 Daily Delivery Respiratory  Silicone Nasal Cannula

## 2018-09-15 NOTE — PROGRESS NOTES
Pt transferred to 306 by wheelchair with all personal belongings. Report given to ALIN Kim. Wife at bedside and aware of transfer.

## 2018-09-15 NOTE — PROGRESS NOTES
Received report from ALIN Son ICU. Patient transferred, tolerated well. Up to bathroom and then sitting up in the chair. No c/o pain. On 3L via NC. VSS.

## 2018-09-15 NOTE — PROGRESS NOTES
Received bedside report from Adelaida OGDEN.  Patient AO x 4 sitting up in a chair without signs / symptoms of distress or discomfort.  Patient is on 3L NC with spO2 97% and RR 18.  Lung sounds diminished in all fields after breathing treatment.  Gtts verified (heparin) with AM RN, POC reviewed with patient, and safety protocols in place.  Will continue to monitor and coordinate care.

## 2018-09-15 NOTE — PROGRESS NOTES
"Critical Care Progress Note    Patient ID:   Name:             Enoch Mistry   YOB: 1945  Age:                 72 y.o.  male   MRN:               [id number]                                                  Subjective: Patient feels better today but not back to baseline, still short of breath with exertion, no dyspnea at rest.  Complains of diarrhea.  Abdominal pain has resolved.    Interval Changes: No evidence overnight, oxygen has been weaned down to 3 L/min.    Objective:   Vitals/ General Appearance:   Weight/BMI: Body mass index is 35.84 kg/m².  Blood pressure 133/70, pulse 94, temperature 36.8 °C (98.2 °F), resp. rate 19, height 1.702 m (5' 7\"), weight 103.8 kg (228 lb 13.4 oz), SpO2 95 %.  Vitals:    09/15/18 0300 09/15/18 0400 09/15/18 0500 09/15/18 0600   BP:       Pulse: 89 90 94    Resp: 15 13 19    Temp:  36.7 °C (98.1 °F)  36.8 °C (98.2 °F)   SpO2:       Weight:       Height:         Oxygen Therapy:  Pulse Oximetry: 95 %, O2 (LPM): 3, O2 Delivery: Nasal Cannula    Constitutional:   Well developed, Well nourished, No acute distress  HENMT:  Normocephalic, Atraumatic, Oropharynx moist mucous membranes, No oral exudates, Nose normal.  No thyromegaly.  Neck:  Normal range of motion, No cervical tenderness,  no JVD.  Cardiovascular:  Normal heart rate, Normal rhythm, No murmurs, No rubs, No gallops.   Extremitites with intact distal pulses, no cyanosis, but +2 edema noted bilateral lower extremity.  Lungs:  Normal but slightly reduced breath sounds, breath sounds clear to auscultation bilaterally,  no crackles, no wheezing.   Abdomen: Soft, No tenderness, No guarding, No rebound, No masses.  Skin: Warm, Dry, No erythema, No rash, no induration.  Neurologic: Alert & oriented x 3, No focal deficits noted, cranial nerves II through X are grossly intact.  Psychiatric: Affect normal, Judgment normal, Mood normal.    Labs:  Recent Labs      09/13/18   0055  09/14/18   0445  09/15/18   0455 "   WBC  10.9*  9.2  11.9*   RBC  4.67*  4.93  4.97   HEMOGLOBIN  13.8*  14.4  14.6   HEMATOCRIT  40.5*  43.4  42.4   MCV  86.7  88.0  85.3   MCH  29.6  29.2  29.4   MCHC  34.1  33.2*  34.4   RDW  43.6  45.5  43.6   PLATELETCT  132*  151*  178   MPV  11.7  11.9  11.8     Recent Labs      09/12/18   0922   09/13/18   0702  09/14/18   0445  09/15/18   0455   APTT  47.8*   < >  87.1*  56.8*  52.8*   INR  1.30*   --    --    --    --     < > = values in this interval not displayed.     Recent Labs      09/13/18   0055  09/14/18   0445  09/15/18   0455   BNPBTYPENAT  122*  293*  104*     Recent Labs      09/12/18   1455  09/12/18   1842  09/13/18   0055  09/14/18 0445  09/15/18   0455   TROPONINI  0.03  0.04  0.05*   --    --    BNPBTYPENAT   --    --   122*  293*  104*     Recent Labs      09/13/18   0055  09/14/18   0445  09/15/18   0455   SODIUM  138  138  134*   POTASSIUM  3.7  3.5*  3.0*   CHLORIDE  111  106  101   CO2  22  21  23   GLUCOSE  176*  232*  252*   BUN  11  16  19     Recent Labs      09/13/18   0055  09/14/18   0445  09/15/18   0455   SODIUM  138  138  134*   POTASSIUM  3.7  3.5*  3.0*   CHLORIDE  111  106  101   CO2  22  21  23   BUN  11  16  19   CREATININE  0.57  0.74  0.70   MAGNESIUM  1.7  2.3  2.4   PHOSPHORUS  2.0*  1.6*  2.0*   CALCIUM  7.3*  7.6*  8.2*     Results for orders placed or performed during the hospital encounter of 07/14/17   ECHOCARDIOGRAM COMP W/O CONT   Result Value Ref Range    Eject.Frac. MOD BP 66.08     Eject.Frac. MOD 4C 65.55     Eject.Frac. MOD 2C 67.77          Imaging:   ECHOCARDIOGRAM-COMP W/ CONT   Final Result      DX-CHEST-PORTABLE (1 VIEW)   Final Result      Mild pulmonary edema and bibasilar atelectasis, without significant change.      US-GALLBLADDER   Final Result      1.  Limited evaluation of pancreas and abdominal aorta.   2.  Echogenic liver consistent with fatty infiltration with focal sparing adjacent to gallbladder fossa.   3.  Cholelithiasis with pain  over the gallbladder.  No other evidence to indicate acute cholecystitis.  No biliary dilation.   4.  Distended bladder and mild RIGHT hydronephrosis, possibly indicating urinary retention.      LE VENOUS DUPLEX (Specify in Comments Left, Right Or Bilateral)   Final Result      DX-CHEST-PORTABLE (1 VIEW)   Final Result      1.  Cardiomegaly.      2.  Bibasilar atelectasis.          Hospital Medications:    Current Facility-Administered Medications:   •  methylPREDNISolone (SOLU-MEDROL) 40 MG injection 40 mg, 40 mg, Intravenous, Q12HRS, Cefeirno Urena M.D., 40 mg at 09/15/18 0532  •  DILTIAZem (CARDIZEM) tablet 30 mg, 30 mg, Oral, Q6HRS, Sharon Hidalgo M.D., 30 mg at 09/15/18 0533  •  ondansetron (ZOFRAN) syringe/vial injection 4 mg, 4 mg, Intravenous, Q4HRS PRN, Ceferino Urena M.D., 4 mg at 09/13/18 0932  •  insulin glargine (LANTUS) injection 25 Units, 25 Units, Subcutaneous, Q EVENING, 25 Units at 09/14/18 1744 **AND** insulin lispro (HUMALOG) injection 1-6 Units, 1-6 Units, Subcutaneous, Q6HRS, 2 Units at 09/15/18 0546 **AND** Accu-Chek Q6 if NPO, , , Q6H **AND** NOTIFY MD and PharmD, , , Once **AND** glucose 4 g chewable tablet 16 g, 16 g, Oral, Q15 MIN PRN **AND** dextrose 50% (D50W) injection 25 mL, 25 mL, Intravenous, Q15 MIN PRN, Sharon Hidalgo M.D.  •  senna-docusate (PERICOLACE or SENOKOT S) 8.6-50 MG per tablet 2 Tab, 2 Tab, Oral, BID, Stopped at 09/12/18 1800 **AND** polyethylene glycol/lytes (MIRALAX) PACKET 1 Packet, 1 Packet, Oral, QDAY PRN **AND** magnesium hydroxide (MILK OF MAGNESIA) suspension 30 mL, 30 mL, Oral, QDAY PRN **AND** bisacodyl (DULCOLAX) suppository 10 mg, 10 mg, Rectal, QDAY PRN, Sharon Hidalgo M.D.  •  Respiratory Care per Protocol, , Nebulization, Continuous RT, Sharon Hidalgo M.D.  •  [COMPLETED] heparin injection 6,000 Units, 6,000 Units, Intravenous, Once, 6,000 Units at 09/12/18 1142 **AND** heparin injection 3,200 Units, 3,200 Units, Intravenous, PRN  **AND** heparin infusion 25,000 units in 500 ml 0.45% nacl, , Intravenous, Continuous, Last Rate: 24 mL/hr at 09/15/18 0424, 1,200 Units/hr at 09/15/18 0424 **AND** Protocol 440 Heparin Weight Based DO NOT GIVE ANY HEPARIN BOLUS TO STROKE PATIENT, , , CONTINUOUS **AND** Protocol 440 Heparin Weight Based Discontinue Enoxaparin (Lovenox), Dabigatran (Pradaxa), Rivaroxaban (Xarelto), Apixaban (Eliquis), Edoxaban (Savaysa, Lixiana), Fondaparinux (Arixtra) and Argatroban prior to heparin administration, , , CONTINUOUS **AND** Protocol 440 Heparin Weight Based Draw baseline aPTT, PT, and platelet count if not already done, , , CONTINUOUS **AND** Protocol 440 Heparin Weight Based Draw aPTT 6 hours after beginning infusion. , , , CONTINUOUS **AND** Protocol 440 Heparin Weight Based Draw Platelet count every three days. Contact MD if platelet is 50% lower than baseline count., , , CONTINUOUS **AND** Protocol 440 Heparin Weight Based Record Patient Data, , , CONTINUOUS **AND** Protocol 440 Heparin Weight Based INSTRUCTIONS, , , CONTINUOUS **AND** Protocol 440 Heparin Weight Based Review aPTT results 6 hours after infusion is begun as detailed, , , CONTINUOUS **AND** Protocol 440 Heparin Weight Based Adjust heparin to maintain aPTT between 55-96 sec, , , CONTINUOUS **AND** Protocol 440 Heparin Weight Based Order aPTT 6 hours after any rate change or hold until aPTT is therapeutic (55-96 seconds), , , CONTINUOUS **AND** Protocol 440 Heparin Weight Based Documentation and verification, , , CONTINUOUS, Sharon Hidalgo M.D.  •  NS (BOLUS) infusion 500 mL, 500 mL, Intravenous, Once PRN, Sharon Hidalgo M.D.  •  [] piperacillin-tazobactam (ZOSYN) 4.5 g in  mL IVPB, 4.5 g, Intravenous, Once, Stopped at 18 1100 **AND** piperacillin-tazobactam (ZOSYN) 4.5 g in  mL IVPB, 4.5 g, Intravenous, Q8HRS, Britni Shah M.D., Last Rate: 25 mL/hr at 09/15/18 0549, 4.5 g at 09/15/18 0549  •   ipratropium-albuterol (DUONEB) nebulizer solution, 3 mL, Nebulization, Q4H PRN (RT), Sharon Hidalgo M.D., 3 mL at 09/12/18 1638  •  budesonide-formoterol (SYMBICORT) 160-4.5 MCG/ACT inhaler 2 Puff, 2 Puff, Inhalation, BID (RT), Ceferino Urena M.D., 2 Puff at 09/14/18 1856  •  ipratropium-albuterol (DUONEB) nebulizer solution, 3 mL, Nebulization, 4X/DAY (RT), Sharon Hidalgo M.D., 3 mL at 09/14/18 1855    Current Outpatient Medications:  Prescriptions Prior to Admission   Medication Sig Dispense Refill Last Dose   • amlodipine (NORVASC) 5 MG Tab TAKE ONE TABLET BY MOUTH DAILY (NORVASC) 90 Tab 2 9/11/2018 at am       Medication Allergy:  No Known Allergies       Assessment / Plan  1. Non-lactose fermenting bacteremia/Sepsis, suspect biliary/intra-abdominal source,  Improving  Continue zosyn, f/u final culture results   GBUS no evidence of acute cholecystitis  Amylase & lipase were normal   F/U with surgery   Lactate normalized 9/12/18  May transfer to stepdown today       2.  Acute on chronic hypoxemic respiratory failure  Improved today, down from HFNC to NC 3 LPM     3. 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 Volume overload by exam & I/O of 5.5 liters +ve since admission  Improved with lasix 30 IV q12  D/C Lasix    5. Chronic unprovoked DVT on Heparin gtt  Transition to Eliquis 5 mg p.o. twice daily today.  Age-appropriate cancer screening as outpatient    6. Lactic acidosis 2ry to sepsis,  Resolved   7. Chronic alcoholic liver disease, Discriminant score 37, receiving steroids for COPD exacerbation   8. Ex-smoker, quit 10 years ago   9.  Diabetes mellitus type 2, controlled in house  A1c 9.5.    Critical care time not including procedures was 32 minutes

## 2018-09-15 NOTE — PROGRESS NOTES
[facility] Medicine Daily Progress Note    Date of Service  9/15/2018    Chief Complaint  72 y.o. Male with past medical history of hypertension, COPD, diabetes and noncompliant with meds and diet  admitted 9/12/2018 with with right upper quadrant pain, hypoxemia, sepsis    Interval Problem Update  Patient notes marked improvement in breathing since his admission.  He denies any cough, sputum production, wheezing  He also feels his abdominal pain has completely resolved  He does state he has been having frequent liquid stools which have been increasing in frequency during his admission    Consultants/Specialty  Critical care    Disposition  tbd    Review of Systems  Review of Systems   Constitutional: Negative for chills and fever.   HENT: Negative for congestion, hearing loss and sore throat.    Eyes: Negative for blurred vision and double vision.   Respiratory: Negative for cough, sputum production and shortness of breath.    Cardiovascular: Negative for chest pain and palpitations.   Gastrointestinal: Positive for diarrhea. Negative for abdominal pain, heartburn and nausea.   Genitourinary: Negative for dysuria and urgency.   Musculoskeletal: Negative for back pain and falls.   Skin: Negative for itching and rash.   Neurological: Negative for dizziness, loss of consciousness and headaches.        Physical Exam  Blood Pressure : 152/61   Temperature: 36.7 °C (98 °F)   Pulse: (!) 110   Respiration: 18   Pulse Oximetry: 95 %     Physical Exam   Constitutional: He is oriented to person, place, and time. He appears well-developed and well-nourished.   HENT:   Head: Normocephalic and atraumatic.   Eyes: Pupils are equal, round, and reactive to light. EOM are normal.   Neck: Normal range of motion. Neck supple.   Cardiovascular: Normal rate, regular rhythm and normal heart sounds.    No murmur heard.  Pulmonary/Chest: Effort normal and breath sounds normal. No respiratory distress. He has no wheezes.   Abdominal: Soft.  Bowel sounds are normal. He exhibits no distension. There is no tenderness.   Musculoskeletal: Normal range of motion. He exhibits no edema.   Neurological: He is alert and oriented to person, place, and time.   Skin: Skin is warm and dry.   Nursing note and vitals reviewed.      Fluids    Intake/Output Summary (Last 24 hours) at 09/15/18 1632  Last data filed at 09/15/18 1300   Gross per 24 hour   Intake           2282.8 ml   Output             3750 ml   Net          -1467.2 ml       Laboratory  Recent Labs      09/13/18   0055  09/14/18   0445  09/15/18   0455   WBC  10.9*  9.2  11.9*   RBC  4.67*  4.93  4.97   HEMOGLOBIN  13.8*  14.4  14.6   HEMATOCRIT  40.5*  43.4  42.4   MCV  86.7  88.0  85.3   MCH  29.6  29.2  29.4   MCHC  34.1  33.2*  34.4   RDW  43.6  45.5  43.6   PLATELETCT  132*  151*  178   MPV  11.7  11.9  11.8     Recent Labs      09/13/18   0055  09/14/18 0445  09/15/18   0455   SODIUM  138  138  134*   POTASSIUM  3.7  3.5*  3.0*   CHLORIDE  111  106  101   CO2  22  21  23   GLUCOSE  176*  232*  252*   BUN  11  16  19   CREATININE  0.57  0.74  0.70   CALCIUM  7.3*  7.6*  8.2*     Recent Labs      09/13/18   0702  09/14/18 0445  09/15/18   0455   APTT  87.1*  56.8*  52.8*     Recent Labs      09/13/18   0055  09/14/18 0445  09/15/18   0455   BNPBTYPENAT  122*  293*  104*           Imaging  ECHOCARDIOGRAM-COMP W/ CONT   Final Result      DX-CHEST-PORTABLE (1 VIEW)   Final Result      Mild pulmonary edema and bibasilar atelectasis, without significant change.      US-GALLBLADDER   Final Result      1.  Limited evaluation of pancreas and abdominal aorta.   2.  Echogenic liver consistent with fatty infiltration with focal sparing adjacent to gallbladder fossa.   3.  Cholelithiasis with pain over the gallbladder.  No other evidence to indicate acute cholecystitis.  No biliary dilation.   4.  Distended bladder and mild RIGHT hydronephrosis, possibly indicating urinary retention.      LE VENOUS DUPLEX  (Specify in Comments Left, Right Or Bilateral)   Final Result      DX-CHEST-PORTABLE (1 VIEW)   Final Result      1.  Cardiomegaly.      2.  Bibasilar atelectasis.      NM-BILIARY (HIDA) SCAN WITH CCK    (Results Pending)        Assessment/Plan  Acute on chronic respiratory failure (HCC)   Assessment & Plan    Patient chronically on 2 L oxygen at home and was up to high flow nasal cannula.  Now down to 3 L          Sepsis due to Enterobacter species ([facility])   Assessment & Plan    This is sepsis (without associated acute organ dysfunction).   2 out of 2 cultures positive for Enterobacter  GI versus pulmonary source due to concurrent hypoxemic respiratory failure          Diarrhea   Assessment & Plan    Risk factors, on abx  Will check c diff        Hyponatremia   Assessment & Plan    Mild, ctm        Hypokalemia   Assessment & Plan    ctm and replete          Hypophosphatemia   Assessment & Plan    Kphos and f/u ordered        Abnormal LFTs   Assessment & Plan    Presenting with RUQ pain, no resolved  US with gallstone but no sign of inflammation/infection  LFTs and T bili down trending without intervention  Will order HIDA scan to further evaluate        Uncontrolled type 2 diabetes mellitus (HCC)- (present on admission)   Assessment & Plan    Noncompliant with follow-up and home fingersticks  A1c 9.5  Sugars 300s-Lantus increased        Chronic deep vein thrombosis (DVT) ([facility])   Assessment & Plan    On eliquis now        Hepatic steatosis   Assessment & Plan    Due to morbid obesity and uncontrolled diabetes  Does not explain current level of LFT abnormalities        Non-compliance   Assessment & Plan    Does not follow up with outpatient physician instructions          Stage 2 moderate COPD by GOLD classification ([facility])- (present on admission)   Assessment & Plan    On 2 L home oxygen  RT protocol increased

## 2018-09-15 NOTE — CARE PLAN
Problem: Nutritional:  Goal: Achieve adequate nutritional intake  Pt will consume >/= 50% of meals consistently  Outcome: PROGRESSING AS EXPECTED  Diet advanced full liquids --> diabetic / hepatic diet this morning. PO intake still variable at this time.

## 2018-09-15 NOTE — PROGRESS NOTES
Report received from night RN, POC discussed. Pt sitting up in chair. Dr. Urena at bedside this AM, new orders placed and implemented. Heparin gtt dc'd. Assessment completed. Lines and gtts verified. POC discussed with pt. No c/o pain or SOB. Call light in reach and pt able to demonstrate correct use of call light. Special contact isolation precautions in place, pt r/o C. Diff, sample needs collecting.

## 2018-09-15 NOTE — FLOWSHEET NOTE
09/14/18 1856   Interdisciplinary Plan of Care-Goals (Indications)   Bronchodilator Indications History / Diagnosis   Hyperinflation Protocol Indications Atelectasis Documented by Chest X-Ray   Interdisciplinary Plan of Care-Outcomes    Bronchodilator Outcome Patient at Stable Baseline   Hyperinflation Protocol Goals/Outcome Greater Than 60% of Predicted I.S. Volume x 24 hrs   Education   Education Yes - Pt. / Family has been Instructed in use of Respiratory Equipment;Yes - Pt. / Family has been Instructed in use of Respiratory Medications and Adverse Reactions   RT Assessment of Delivered Medications   Evaluation of Medication Delivery Daily Yes-- Pt /Family has been Instructed in use of Respiratory Medications and Adverse Reactions   SVN Group   #SVN Performed Yes   Given By: Mouthpiece   MDI/DPI Group   #MDI/DPI Given MDI/DPI x 1   Incentive Spirometry Group   Incentive Spirometry Instruction Yes   Breathing Exercises Yes   Incentive Spirometer Volume 2000 mL   Respiratory WDL   Respiratory (WDL) X   Chest Exam   Chest Observation Barrel Chest   Respiration 15   Pulse 97   Heart Rate (Monitored) (!) 109   Breath Sounds   Pre/Post Intervention Pre Intervention Assessment   RUL Breath Sounds Diminished   RML Breath Sounds Diminished   RLL Breath Sounds Diminished   LORNA Breath Sounds Diminished   LLL Breath Sounds Diminished   Oximetry   #Pulse Oximetry (Single Determination) Yes   Continuous Oximetry Yes   Oxygen   Home O2 Use Prior To Admission? Yes   Home O2 LPM Flow 2 LPM   Home O2 Delivery Method Nasal Cannula   Home O2 Frequency of Use Continuous   Pulse Oximetry 95 %   O2 (LPM) 3   O2 Daily Delivery Respiratory  Silicone Nasal Cannula

## 2018-09-15 NOTE — FLOWSHEET NOTE
09/14/18 1443   Interdisciplinary Plan of Care-Goals (Indications)   Bronchodilator Indications History / Diagnosis   Hyperinflation Protocol Indications Atelectasis Documented by Chest X-Ray   Interdisciplinary Plan of Care-Outcomes    Bronchodilator Outcome Patient at Stable Baseline   Hyperinflation Protocol Goals/Outcome Greater Than 60% of Predicted I.S. Volume x 24 hrs   Education   Education Yes - Pt. / Family has been Instructed in use of Respiratory Equipment;Yes - Pt. / Family has been Instructed in use of Respiratory Medications and Adverse Reactions   RT Assessment of Delivered Medications   Evaluation of Medication Delivery Daily Yes-- Pt /Family has been Instructed in use of Respiratory Medications and Adverse Reactions   SVN Group   #SVN Performed Yes   Given By: Mouthpiece   Incentive Spirometry Group   Incentive Spirometer Volume 2000 mL   Incentive Spirometer Date Last Changed 09/12/18   Incentive Spirometer Next Change Date (Q 30 Days) 10/12/18   Respiratory WDL   Respiratory (WDL) X   Chest Exam   Respiration 18   Breath Sounds   RUL Breath Sounds Clear   RML Breath Sounds Diminished   RLL Breath Sounds Diminished   LORNA Breath Sounds Clear   LLL Breath Sounds Diminished   Oximetry   #Pulse Oximetry (Single Determination) Yes   Continuous Oximetry Yes   Oxygen   Home O2 Use Prior To Admission? Yes   Home O2 LPM Flow 2 LPM   Home O2 Delivery Method Nasal Cannula   Home O2 Frequency of Use Continuous   O2 (LPM) 3   O2 Daily Delivery Respiratory  Silicone Nasal Cannula

## 2018-09-15 NOTE — CARE PLAN
Problem: Safety  Goal: Will remain free from falls  Outcome: PROGRESSING AS EXPECTED  Patient spends majority of time up in chair.  Patient educated on fall prevention and uses the call light for help when appropriate.    Problem: Respiratory:  Goal: Respiratory status will improve  Outcome: PROGRESSING AS EXPECTED  Patient recently switched from HFNC to 3L NC and tolerating it well.  Lung sounds still severely diminished.  IS at bedside.  RT in collaboration administering treatments / working with patient on IS.    Problem: Fluid Volume:  Goal: Will maintain balanced intake and output  Outcome: PROGRESSING AS EXPECTED  ICU I/Os in place.  Patient is currently receiving diuretics.  Net +5.5L since admission.

## 2018-09-16 ENCOUNTER — APPOINTMENT (OUTPATIENT)
Dept: RADIOLOGY | Facility: MEDICAL CENTER | Age: 73
DRG: 853 | End: 2018-09-16
Attending: HOSPITALIST
Payer: MEDICARE

## 2018-09-16 LAB
ALBUMIN SERPL BCP-MCNC: 2.9 G/DL (ref 3.2–4.9)
ALBUMIN/GLOB SERPL: 0.9 G/DL
ALP SERPL-CCNC: 107 U/L (ref 30–99)
ALT SERPL-CCNC: 82 U/L (ref 2–50)
ANION GAP SERPL CALC-SCNC: 9 MMOL/L (ref 0–11.9)
AST SERPL-CCNC: 45 U/L (ref 12–45)
BASOPHILS # BLD AUTO: 0.2 % (ref 0–1.8)
BASOPHILS # BLD: 0.02 K/UL (ref 0–0.12)
BILIRUB SERPL-MCNC: 5.3 MG/DL (ref 0.1–1.5)
BNP SERPL-MCNC: 71 PG/ML (ref 0–100)
BUN SERPL-MCNC: 25 MG/DL (ref 8–22)
CALCIUM SERPL-MCNC: 8.1 MG/DL (ref 8.4–10.2)
CHLORIDE SERPL-SCNC: 104 MMOL/L (ref 96–112)
CO2 SERPL-SCNC: 23 MMOL/L (ref 20–33)
CREAT SERPL-MCNC: 0.68 MG/DL (ref 0.5–1.4)
EOSINOPHIL # BLD AUTO: 0 K/UL (ref 0–0.51)
EOSINOPHIL NFR BLD: 0 % (ref 0–6.9)
ERYTHROCYTE [DISTWIDTH] IN BLOOD BY AUTOMATED COUNT: 43.1 FL (ref 35.9–50)
GLOBULIN SER CALC-MCNC: 3.3 G/DL (ref 1.9–3.5)
GLUCOSE BLD-MCNC: 226 MG/DL (ref 65–99)
GLUCOSE BLD-MCNC: 233 MG/DL (ref 65–99)
GLUCOSE BLD-MCNC: 243 MG/DL (ref 65–99)
GLUCOSE BLD-MCNC: 257 MG/DL (ref 65–99)
GLUCOSE SERPL-MCNC: 218 MG/DL (ref 65–99)
HCT VFR BLD AUTO: 38.5 % (ref 42–52)
HGB BLD-MCNC: 13.2 G/DL (ref 14–18)
IMM GRANULOCYTES # BLD AUTO: 0.17 K/UL (ref 0–0.11)
IMM GRANULOCYTES NFR BLD AUTO: 1.3 % (ref 0–0.9)
LYMPHOCYTES # BLD AUTO: 1.09 K/UL (ref 1–4.8)
LYMPHOCYTES NFR BLD: 8.5 % (ref 22–41)
MAGNESIUM SERPL-MCNC: 2.4 MG/DL (ref 1.5–2.5)
MCH RBC QN AUTO: 29.3 PG (ref 27–33)
MCHC RBC AUTO-ENTMCNC: 34.3 G/DL (ref 33.7–35.3)
MCV RBC AUTO: 85.6 FL (ref 81.4–97.8)
MONOCYTES # BLD AUTO: 0.69 K/UL (ref 0–0.85)
MONOCYTES NFR BLD AUTO: 5.4 % (ref 0–13.4)
NEUTROPHILS # BLD AUTO: 10.89 K/UL (ref 1.82–7.42)
NEUTROPHILS NFR BLD: 84.6 % (ref 44–72)
NRBC # BLD AUTO: 0 K/UL
NRBC BLD-RTO: 0 /100 WBC
PHOSPHATE SERPL-MCNC: 2.3 MG/DL (ref 2.5–4.5)
PLATELET # BLD AUTO: 185 K/UL (ref 164–446)
PMV BLD AUTO: 11.9 FL (ref 9–12.9)
POTASSIUM SERPL-SCNC: 3.7 MMOL/L (ref 3.6–5.5)
PROT SERPL-MCNC: 6.2 G/DL (ref 6–8.2)
RBC # BLD AUTO: 4.5 M/UL (ref 4.7–6.1)
SODIUM SERPL-SCNC: 136 MMOL/L (ref 135–145)
WBC # BLD AUTO: 12.9 K/UL (ref 4.8–10.8)

## 2018-09-16 PROCEDURE — 82962 GLUCOSE BLOOD TEST: CPT | Mod: 91

## 2018-09-16 PROCEDURE — A9270 NON-COVERED ITEM OR SERVICE: HCPCS | Performed by: INTERNAL MEDICINE

## 2018-09-16 PROCEDURE — 80053 COMPREHEN METABOLIC PANEL: CPT

## 2018-09-16 PROCEDURE — 700111 HCHG RX REV CODE 636 W/ 250 OVERRIDE (IP): Performed by: INTERNAL MEDICINE

## 2018-09-16 PROCEDURE — 94760 N-INVAS EAR/PLS OXIMETRY 1: CPT

## 2018-09-16 PROCEDURE — 84100 ASSAY OF PHOSPHORUS: CPT

## 2018-09-16 PROCEDURE — 770001 HCHG ROOM/CARE - MED/SURG/GYN PRIV*

## 2018-09-16 PROCEDURE — A9270 NON-COVERED ITEM OR SERVICE: HCPCS | Performed by: HOSPITALIST

## 2018-09-16 PROCEDURE — 700105 HCHG RX REV CODE 258: Performed by: EMERGENCY MEDICINE

## 2018-09-16 PROCEDURE — 700102 HCHG RX REV CODE 250 W/ 637 OVERRIDE(OP): Performed by: HOSPITALIST

## 2018-09-16 PROCEDURE — 700102 HCHG RX REV CODE 250 W/ 637 OVERRIDE(OP): Performed by: INTERNAL MEDICINE

## 2018-09-16 PROCEDURE — 700111 HCHG RX REV CODE 636 W/ 250 OVERRIDE (IP): Performed by: EMERGENCY MEDICINE

## 2018-09-16 PROCEDURE — 83735 ASSAY OF MAGNESIUM: CPT

## 2018-09-16 PROCEDURE — 85025 COMPLETE CBC W/AUTO DIFF WBC: CPT

## 2018-09-16 PROCEDURE — 99232 SBSQ HOSP IP/OBS MODERATE 35: CPT | Performed by: HOSPITALIST

## 2018-09-16 PROCEDURE — 83880 ASSAY OF NATRIURETIC PEPTIDE: CPT

## 2018-09-16 RX ORDER — LEVOFLOXACIN 250 MG/1
750 TABLET, FILM COATED ORAL EVERY 24 HOURS
Status: DISCONTINUED | OUTPATIENT
Start: 2018-09-16 | End: 2018-09-17

## 2018-09-16 RX ORDER — CIPROFLOXACIN 500 MG/1
500 TABLET, FILM COATED ORAL EVERY 12 HOURS
Status: DISCONTINUED | OUTPATIENT
Start: 2018-09-16 | End: 2018-09-16

## 2018-09-16 RX ADMIN — INSULIN HUMAN 5 UNITS: 100 INJECTION, SOLUTION PARENTERAL at 11:36

## 2018-09-16 RX ADMIN — DIBASIC SODIUM PHOSPHATE, MONOBASIC POTASSIUM PHOSPHATE AND MONOBASIC SODIUM PHOSPHATE 1 TABLET: 852; 155; 130 TABLET ORAL at 11:28

## 2018-09-16 RX ADMIN — INSULIN HUMAN 3 UNITS: 100 INJECTION, SOLUTION PARENTERAL at 07:00

## 2018-09-16 RX ADMIN — METHYLPREDNISOLONE SODIUM SUCCINATE 40 MG: 40 INJECTION, POWDER, FOR SOLUTION INTRAMUSCULAR; INTRAVENOUS at 05:12

## 2018-09-16 RX ADMIN — DIBASIC SODIUM PHOSPHATE, MONOBASIC POTASSIUM PHOSPHATE AND MONOBASIC SODIUM PHOSPHATE 1 TABLET: 852; 155; 130 TABLET ORAL at 18:26

## 2018-09-16 RX ADMIN — DILTIAZEM HYDROCHLORIDE 30 MG: 30 TABLET, FILM COATED ORAL at 18:26

## 2018-09-16 RX ADMIN — BUDESONIDE AND FORMOTEROL FUMARATE DIHYDRATE 2 PUFF: 160; 4.5 AEROSOL RESPIRATORY (INHALATION) at 21:08

## 2018-09-16 RX ADMIN — APIXABAN 5 MG: 5 TABLET, FILM COATED ORAL at 05:12

## 2018-09-16 RX ADMIN — APIXABAN 5 MG: 5 TABLET, FILM COATED ORAL at 18:25

## 2018-09-16 RX ADMIN — DILTIAZEM HYDROCHLORIDE 30 MG: 30 TABLET, FILM COATED ORAL at 05:12

## 2018-09-16 RX ADMIN — INSULIN HUMAN 3 UNITS: 100 INJECTION, SOLUTION PARENTERAL at 18:31

## 2018-09-16 RX ADMIN — INSULIN GLARGINE 30 UNITS: 100 INJECTION, SOLUTION SUBCUTANEOUS at 18:33

## 2018-09-16 RX ADMIN — LEVOFLOXACIN 750 MG: 250 TABLET, FILM COATED ORAL at 11:25

## 2018-09-16 RX ADMIN — DILTIAZEM HYDROCHLORIDE 30 MG: 30 TABLET, FILM COATED ORAL at 11:26

## 2018-09-16 RX ADMIN — PIPERACILLIN SODIUM AND TAZOBACTAM SODIUM 4.5 G: 4; .5 INJECTION, POWDER, FOR SOLUTION INTRAVENOUS at 05:12

## 2018-09-16 RX ADMIN — POTASSIUM CHLORIDE 40 MEQ: 1500 TABLET, EXTENDED RELEASE ORAL at 18:25

## 2018-09-16 RX ADMIN — INSULIN HUMAN 3 UNITS: 100 INJECTION, SOLUTION PARENTERAL at 21:11

## 2018-09-16 RX ADMIN — METHYLPREDNISOLONE SODIUM SUCCINATE 40 MG: 40 INJECTION, POWDER, FOR SOLUTION INTRAMUSCULAR; INTRAVENOUS at 18:26

## 2018-09-16 ASSESSMENT — ENCOUNTER SYMPTOMS
BACK PAIN: 0
DIARRHEA: 1
DIZZINESS: 0
HEADACHES: 0
NAUSEA: 0
FEVER: 0
BLURRED VISION: 0
COUGH: 0
LOSS OF CONSCIOUSNESS: 0
DOUBLE VISION: 0
SORE THROAT: 0
SPUTUM PRODUCTION: 0
ABDOMINAL PAIN: 0
SHORTNESS OF BREATH: 0
CHILLS: 0
PALPITATIONS: 0
FALLS: 0
HEARTBURN: 0

## 2018-09-16 ASSESSMENT — PATIENT HEALTH QUESTIONNAIRE - PHQ9
1. LITTLE INTEREST OR PLEASURE IN DOING THINGS: NOT AT ALL
SUM OF ALL RESPONSES TO PHQ9 QUESTIONS 1 AND 2: 0
2. FEELING DOWN, DEPRESSED, IRRITABLE, OR HOPELESS: NOT AT ALL

## 2018-09-16 ASSESSMENT — PAIN SCALES - GENERAL
PAINLEVEL_OUTOF10: 0
PAINLEVEL_OUTOF10: 3

## 2018-09-16 NOTE — PROGRESS NOTES
Telemetry summary:    Rhythm: Sinus Tachycardia  Ectopy: frequent PVC's  HR: 100's    WI: 0.16  QRS: 0.08  QT: 0.36

## 2018-09-16 NOTE — PROGRESS NOTES
Patient up in chair most of the afternoon. Tolerating well. Educated to keep BLE elevated due to edema. Patient complied but only for a short time, states it is too hard to use the urinal due to his urinary frequency to keep them lifted up.     Report given to ALIN John. Patient resting comfortably in chair. Declines any needs at this time. Call light in reach.

## 2018-09-16 NOTE — PROGRESS NOTES
Tele Rhythm Strip    SR    HR: 80-90  TN Interval: 0.16  QRS Interval: 0.08  QT Interval: 0.40     Ectopy: F-PVC,R-bigeminy, R-to-O trigeminy, R-couplets, R-triplets

## 2018-09-16 NOTE — PROGRESS NOTES
"0700 Report received from [facility] nurseKyle, at bedside. Pt is resting in bed.    0758 Pt is informed about HIDA scan @0815 but refuses to get test done today.    0925 Order received from Dr. Almonte to \"D/c cardiac monitor\", read back.    1130 FSBS 257    1150 Tele Strip at 0710 shows SR with frequent PVCs with HR of 89  Measurements: 0.16/ 0.08/ 0.40    Tele Shift Summary:  Rhythm: SR/ST w frequent PVCs and occasional couplets  Rate: 's     1740 FSBS 226    1900 Report given to [facility] nurseLesia, at bedside.  "

## 2018-09-16 NOTE — CARE PLAN
Problem: Knowledge Deficit  Goal: Knowledge of disease process/condition, treatment plan, diagnostic tests, and medications will improve  POC discussed with pt-medications (ABX), lab (positive blood cx, negative c-diff), tests (HIDA scan), disease process-verbalized understanding.    Problem: Skin Integrity  Goal: Risk for impaired skin integrity will decrease  Assess and monitor skin integrity and appearance due to hx of DM & ulcer.

## 2018-09-16 NOTE — PROGRESS NOTES
[facility] Medicine Daily Progress Note    Date of Service  9/16/2018    Chief Complaint  72 y.o. Male with past medical history of hypertension, COPD, diabetes and noncompliant with meds and diet  admitted 9/12/2018 with with right upper quadrant pain, hypoxemia, sepsis    Interval Problem Update  Pt refused HIDA scan   LFTs stable, T bili trending down. No abdominal pain  Diarrhea stable. 1-2 watery BM per day. Non bloody. No fevers.   Breathing well, not coughing.      Consultants/Specialty  Critical care    Disposition  Likely home, lives with wife. Ambulating without difficulty    Review of Systems  Review of Systems   Constitutional: Negative for chills and fever.   HENT: Negative for congestion, hearing loss and sore throat.    Eyes: Negative for blurred vision and double vision.   Respiratory: Negative for cough, sputum production and shortness of breath.    Cardiovascular: Negative for chest pain and palpitations.   Gastrointestinal: Positive for diarrhea. Negative for abdominal pain, heartburn and nausea.   Genitourinary: Negative for dysuria and urgency.   Musculoskeletal: Negative for back pain and falls.   Skin: Negative for itching and rash.   Neurological: Negative for dizziness, loss of consciousness and headaches.        Physical Exam  Blood Pressure : 152/61   Temperature: 36.7 °C (98 °F)   Pulse: (!) 110   Respiration: 18   Pulse Oximetry: 95 %     Physical Exam   Constitutional: He is oriented to person, place, and time. He appears well-developed and well-nourished.   HENT:   Head: Normocephalic and atraumatic.   Eyes: Pupils are equal, round, and reactive to light. EOM are normal.   Neck: Normal range of motion. Neck supple.   Cardiovascular: Normal rate, regular rhythm and normal heart sounds.    No murmur heard.  Pulmonary/Chest: Effort normal and breath sounds normal. No respiratory distress. He has no wheezes.   Abdominal: Soft. Bowel sounds are normal. He exhibits no distension. There is no  tenderness.   Musculoskeletal: Normal range of motion. He exhibits no edema.   Neurological: He is alert and oriented to person, place, and time.   Skin: Skin is warm and dry.   Nursing note and vitals reviewed.      Fluids    Intake/Output Summary (Last 24 hours) at 09/16/18 0934  Last data filed at 09/16/18 0800   Gross per 24 hour   Intake              740 ml   Output              800 ml   Net              -60 ml       Laboratory  Recent Labs      09/14/18   0445  09/15/18   0455  09/16/18   0455   WBC  9.2  11.9*  12.9*   RBC  4.93  4.97  4.50*   HEMOGLOBIN  14.4  14.6  13.2*   HEMATOCRIT  43.4  42.4  38.5*   MCV  88.0  85.3  85.6   MCH  29.2  29.4  29.3   MCHC  33.2*  34.4  34.3   RDW  45.5  43.6  43.1   PLATELETCT  151*  178  185   MPV  11.9  11.8  11.9     Recent Labs      09/14/18   0445  09/15/18   0455  09/16/18   0455   SODIUM  138  134*  136   POTASSIUM  3.5*  3.0*  3.7   CHLORIDE  106  101  104   CO2  21  23  23   GLUCOSE  232*  252*  218*   BUN  16  19  25*   CREATININE  0.74  0.70  0.68   CALCIUM  7.6*  8.2*  8.1*     Recent Labs      09/14/18   0445  09/15/18   0455   APTT  56.8*  52.8*     Recent Labs      09/14/18   0445  09/15/18   0455  09/16/18   0455   BNPBTYPENAT  293*  104*  71           Imaging  ECHOCARDIOGRAM-COMP W/ CONT   Final Result      DX-CHEST-PORTABLE (1 VIEW)   Final Result      Mild pulmonary edema and bibasilar atelectasis, without significant change.      US-GALLBLADDER   Final Result      1.  Limited evaluation of pancreas and abdominal aorta.   2.  Echogenic liver consistent with fatty infiltration with focal sparing adjacent to gallbladder fossa.   3.  Cholelithiasis with pain over the gallbladder.  No other evidence to indicate acute cholecystitis.  No biliary dilation.   4.  Distended bladder and mild RIGHT hydronephrosis, possibly indicating urinary retention.      LE VENOUS DUPLEX (Specify in Comments Left, Right Or Bilateral)   Final Result      DX-CHEST-PORTABLE (1  VIEW)   Final Result      1.  Cardiomegaly.      2.  Bibasilar atelectasis.           Assessment/Plan  Acute on chronic respiratory failure (HCC)   Assessment & Plan    Patient chronically on 2 L oxygen at home and was up to high flow nasal cannula.  Now down to 3 L  Respiratory failure likely due to sepsis vs pulmonary infection. However, levaquin will also cover pulmonary source          Sepsis due to Enterobacter species ([facility])   Assessment & Plan    This is sepsis (without associated acute organ dysfunction).   2 out of 2 cultures positive for Enterobacter  Likely GI source, with obstructive picture with LFTs and t bili. Pt refusing HIDA scan  Will change zosyn to PO levofloxacin and complete 14 day course of antibiotics          Diarrhea   Assessment & Plan    c diff negative  Improving, ctm        Hyponatremia   Assessment & Plan    resolved, ctm        Hypokalemia   Assessment & Plan    ctm and replete          Hypophosphatemia   Assessment & Plan    Improving, cont Kphos        Abnormal LFTs   Assessment & Plan    Presenting with RUQ pain, now resolved  US with gallstone but no sign of inflammation/infection  LFTs and T bili down trending without intervention  Pt refused HIDA scan        Uncontrolled type 2 diabetes mellitus ([facility])- (present on admission)   Assessment & Plan    Noncompliant with follow-up and home fingersticks  A1c 9.5  Sugars better controlled with lantus 30U but will need further optimization of dose        Chronic deep vein thrombosis (DVT) ([facility])   Assessment & Plan    On eliquis now        Hepatic steatosis   Assessment & Plan    Due to morbid obesity and uncontrolled diabetes  Does not explain current level of LFT abnormalities        Non-compliance   Assessment & Plan    Does not follow up with outpatient physician instructions          Stage 2 moderate COPD by GOLD classification ([facility])- (present on admission)   Assessment & Plan    On 2 L home oxygen  RT protocol increased

## 2018-09-16 NOTE — PROGRESS NOTES
Patient resting comfortably at this time.  Call bell in reach.  Hourly rounding performed.  Safety maintained.  Hourly rounding performed.

## 2018-09-16 NOTE — PROGRESS NOTES
Patient resting comfortably in chair.  Denies chest pain, SOB, dizziness.  3L NC.  LS with fine crackles to bilateral bases.  Legs with +3 swelling, wraps in place, recommended patient keep legs elevated, patient states he will try when he sleeps.  Call bell in reach.  CDIFF rule out.

## 2018-09-16 NOTE — CARE PLAN
Problem: Respiratory:  Goal: Respiratory status will improve  Outcome: PROGRESSING AS EXPECTED  Cough/deep breathing exercises encouraged, performed.    Problem: Skin Integrity  Goal: Risk for impaired skin integrity will decrease  Outcome: PROGRESSING AS EXPECTED  Patient strongly encouraged to turn and reposition, prefers to stay in bed.  Pressure ulcer education provided.

## 2018-09-16 NOTE — PROGRESS NOTES
Patient resting comfortably at this time.  Call bell in reach.  Hourly rounding performed.  Safety maintained.  Patient encouraged on need to reposition self.

## 2018-09-16 NOTE — FLOWSHEET NOTE
09/15/18 1953   Therapy Not Performed   Type of Therapy Not Performed SVN;MDI   Reason Therapy Not Performed Refused  (encouraged patient to take treatment, still refused)

## 2018-09-17 ENCOUNTER — APPOINTMENT (OUTPATIENT)
Dept: RADIOLOGY | Facility: MEDICAL CENTER | Age: 73
DRG: 853 | End: 2018-09-17
Attending: INTERNAL MEDICINE
Payer: MEDICARE

## 2018-09-17 LAB
ALBUMIN SERPL BCP-MCNC: 3.2 G/DL (ref 3.2–4.9)
ALBUMIN/GLOB SERPL: 1.1 G/DL
ALP SERPL-CCNC: 102 U/L (ref 30–99)
ALT SERPL-CCNC: 84 U/L (ref 2–50)
ANION GAP SERPL CALC-SCNC: 9 MMOL/L (ref 0–11.9)
AST SERPL-CCNC: 47 U/L (ref 12–45)
BASOPHILS # BLD AUTO: 0.3 % (ref 0–1.8)
BASOPHILS # BLD: 0.05 K/UL (ref 0–0.12)
BILIRUB SERPL-MCNC: 4.1 MG/DL (ref 0.1–1.5)
BNP SERPL-MCNC: 25 PG/ML (ref 0–100)
BUN SERPL-MCNC: 32 MG/DL (ref 8–22)
CALCIUM SERPL-MCNC: 8.2 MG/DL (ref 8.4–10.2)
CHLORIDE SERPL-SCNC: 101 MMOL/L (ref 96–112)
CO2 SERPL-SCNC: 25 MMOL/L (ref 20–33)
CREAT SERPL-MCNC: 0.59 MG/DL (ref 0.5–1.4)
EOSINOPHIL # BLD AUTO: 0 K/UL (ref 0–0.51)
EOSINOPHIL NFR BLD: 0 % (ref 0–6.9)
ERYTHROCYTE [DISTWIDTH] IN BLOOD BY AUTOMATED COUNT: 45 FL (ref 35.9–50)
GLOBULIN SER CALC-MCNC: 3 G/DL (ref 1.9–3.5)
GLUCOSE BLD-MCNC: 239 MG/DL (ref 65–99)
GLUCOSE BLD-MCNC: 254 MG/DL (ref 65–99)
GLUCOSE BLD-MCNC: 275 MG/DL (ref 65–99)
GLUCOSE BLD-MCNC: 277 MG/DL (ref 65–99)
GLUCOSE SERPL-MCNC: 244 MG/DL (ref 65–99)
HCT VFR BLD AUTO: 37.9 % (ref 42–52)
HGB BLD-MCNC: 12.7 G/DL (ref 14–18)
IMM GRANULOCYTES # BLD AUTO: 0.49 K/UL (ref 0–0.11)
IMM GRANULOCYTES NFR BLD AUTO: 2.6 % (ref 0–0.9)
LYMPHOCYTES # BLD AUTO: 2.06 K/UL (ref 1–4.8)
LYMPHOCYTES NFR BLD: 11.1 % (ref 22–41)
MAGNESIUM SERPL-MCNC: 2.4 MG/DL (ref 1.5–2.5)
MCH RBC QN AUTO: 29.5 PG (ref 27–33)
MCHC RBC AUTO-ENTMCNC: 33.5 G/DL (ref 33.7–35.3)
MCV RBC AUTO: 87.9 FL (ref 81.4–97.8)
MONOCYTES # BLD AUTO: 0.98 K/UL (ref 0–0.85)
MONOCYTES NFR BLD AUTO: 5.3 % (ref 0–13.4)
NEUTROPHILS # BLD AUTO: 14.93 K/UL (ref 1.82–7.42)
NEUTROPHILS NFR BLD: 80.7 % (ref 44–72)
NRBC # BLD AUTO: 0 K/UL
NRBC BLD-RTO: 0 /100 WBC
PHOSPHATE SERPL-MCNC: 2.8 MG/DL (ref 2.5–4.5)
PLATELET # BLD AUTO: 244 K/UL (ref 164–446)
PMV BLD AUTO: 12 FL (ref 9–12.9)
POTASSIUM SERPL-SCNC: 4.2 MMOL/L (ref 3.6–5.5)
PROT SERPL-MCNC: 6.2 G/DL (ref 6–8.2)
RBC # BLD AUTO: 4.31 M/UL (ref 4.7–6.1)
SODIUM SERPL-SCNC: 135 MMOL/L (ref 135–145)
WBC # BLD AUTO: 18.5 K/UL (ref 4.8–10.8)

## 2018-09-17 PROCEDURE — 80053 COMPREHEN METABOLIC PANEL: CPT

## 2018-09-17 PROCEDURE — 770001 HCHG ROOM/CARE - MED/SURG/GYN PRIV*

## 2018-09-17 PROCEDURE — 700102 HCHG RX REV CODE 250 W/ 637 OVERRIDE(OP): Performed by: HOSPITALIST

## 2018-09-17 PROCEDURE — 87040 BLOOD CULTURE FOR BACTERIA: CPT

## 2018-09-17 PROCEDURE — 700102 HCHG RX REV CODE 250 W/ 637 OVERRIDE(OP): Performed by: INTERNAL MEDICINE

## 2018-09-17 PROCEDURE — A9270 NON-COVERED ITEM OR SERVICE: HCPCS | Performed by: INTERNAL MEDICINE

## 2018-09-17 PROCEDURE — 83735 ASSAY OF MAGNESIUM: CPT

## 2018-09-17 PROCEDURE — 700111 HCHG RX REV CODE 636 W/ 250 OVERRIDE (IP): Performed by: INTERNAL MEDICINE

## 2018-09-17 PROCEDURE — 85025 COMPLETE CBC W/AUTO DIFF WBC: CPT

## 2018-09-17 PROCEDURE — A9270 NON-COVERED ITEM OR SERVICE: HCPCS | Performed by: HOSPITALIST

## 2018-09-17 PROCEDURE — 82962 GLUCOSE BLOOD TEST: CPT | Mod: 91

## 2018-09-17 PROCEDURE — 700101 HCHG RX REV CODE 250: Performed by: INTERNAL MEDICINE

## 2018-09-17 PROCEDURE — 83880 ASSAY OF NATRIURETIC PEPTIDE: CPT

## 2018-09-17 PROCEDURE — 700105 HCHG RX REV CODE 258: Performed by: INTERNAL MEDICINE

## 2018-09-17 PROCEDURE — 99223 1ST HOSP IP/OBS HIGH 75: CPT | Performed by: INTERNAL MEDICINE

## 2018-09-17 PROCEDURE — 84100 ASSAY OF PHOSPHORUS: CPT

## 2018-09-17 RX ORDER — BUDESONIDE AND FORMOTEROL FUMARATE DIHYDRATE 160; 4.5 UG/1; UG/1
2 AEROSOL RESPIRATORY (INHALATION) 2 TIMES DAILY
Status: DISCONTINUED | OUTPATIENT
Start: 2018-09-17 | End: 2018-09-25 | Stop reason: HOSPADM

## 2018-09-17 RX ORDER — METHYLPREDNISOLONE 4 MG/1
4 TABLET ORAL 2 TIMES DAILY
Status: DISCONTINUED | OUTPATIENT
Start: 2018-09-17 | End: 2018-09-19

## 2018-09-17 RX ORDER — INSULIN GLARGINE 100 [IU]/ML
20 INJECTION, SOLUTION SUBCUTANEOUS 2 TIMES DAILY
Status: DISCONTINUED | OUTPATIENT
Start: 2018-09-17 | End: 2018-09-19

## 2018-09-17 RX ADMIN — DILTIAZEM HYDROCHLORIDE 30 MG: 30 TABLET, FILM COATED ORAL at 13:12

## 2018-09-17 RX ADMIN — LEVOFLOXACIN 750 MG: 250 TABLET, FILM COATED ORAL at 05:57

## 2018-09-17 RX ADMIN — APIXABAN 5 MG: 5 TABLET, FILM COATED ORAL at 17:37

## 2018-09-17 RX ADMIN — INSULIN HUMAN 5 UNITS: 100 INJECTION, SOLUTION PARENTERAL at 21:33

## 2018-09-17 RX ADMIN — METHYLPREDNISOLONE SODIUM SUCCINATE 40 MG: 40 INJECTION, POWDER, FOR SOLUTION INTRAMUSCULAR; INTRAVENOUS at 05:56

## 2018-09-17 RX ADMIN — APIXABAN 5 MG: 5 TABLET, FILM COATED ORAL at 05:57

## 2018-09-17 RX ADMIN — DILTIAZEM HYDROCHLORIDE 30 MG: 30 TABLET, FILM COATED ORAL at 00:52

## 2018-09-17 RX ADMIN — DILTIAZEM HYDROCHLORIDE 30 MG: 30 TABLET, FILM COATED ORAL at 05:57

## 2018-09-17 RX ADMIN — DIBASIC SODIUM PHOSPHATE, MONOBASIC POTASSIUM PHOSPHATE AND MONOBASIC SODIUM PHOSPHATE 1 TABLET: 852; 155; 130 TABLET ORAL at 05:57

## 2018-09-17 RX ADMIN — INSULIN HUMAN 3 UNITS: 100 INJECTION, SOLUTION PARENTERAL at 06:09

## 2018-09-17 RX ADMIN — INSULIN GLARGINE 20 UNITS: 100 INJECTION, SOLUTION SUBCUTANEOUS at 17:42

## 2018-09-17 RX ADMIN — CEFTAZIDIME 1 G: 1 INJECTION, POWDER, FOR SOLUTION INTRAMUSCULAR; INTRAVENOUS at 21:24

## 2018-09-17 RX ADMIN — CEFTAZIDIME 1 G: 1 INJECTION, POWDER, FOR SOLUTION INTRAMUSCULAR; INTRAVENOUS at 13:11

## 2018-09-17 RX ADMIN — METRONIDAZOLE 500 MG: 500 INJECTION, SOLUTION INTRAVENOUS at 22:00

## 2018-09-17 RX ADMIN — DILTIAZEM HYDROCHLORIDE 30 MG: 30 TABLET, FILM COATED ORAL at 23:26

## 2018-09-17 RX ADMIN — POTASSIUM CHLORIDE 40 MEQ: 1500 TABLET, EXTENDED RELEASE ORAL at 05:57

## 2018-09-17 RX ADMIN — INSULIN HUMAN 5 UNITS: 100 INJECTION, SOLUTION PARENTERAL at 11:58

## 2018-09-17 RX ADMIN — METHYLPREDNISOLONE 4 MG: 4 TABLET ORAL at 17:37

## 2018-09-17 RX ADMIN — DILTIAZEM HYDROCHLORIDE 30 MG: 30 TABLET, FILM COATED ORAL at 17:37

## 2018-09-17 RX ADMIN — METRONIDAZOLE 500 MG: 500 INJECTION, SOLUTION INTRAVENOUS at 13:08

## 2018-09-17 RX ADMIN — INSULIN HUMAN 5 UNITS: 100 INJECTION, SOLUTION PARENTERAL at 17:40

## 2018-09-17 RX ADMIN — DIBASIC SODIUM PHOSPHATE, MONOBASIC POTASSIUM PHOSPHATE AND MONOBASIC SODIUM PHOSPHATE 1 TABLET: 852; 155; 130 TABLET ORAL at 00:52

## 2018-09-17 ASSESSMENT — PAIN SCALES - GENERAL
PAINLEVEL_OUTOF10: 0

## 2018-09-17 ASSESSMENT — ENCOUNTER SYMPTOMS
ABDOMINAL PAIN: 0
NERVOUS/ANXIOUS: 0
COUGH: 1
WHEEZING: 0

## 2018-09-17 NOTE — CARE PLAN
Problem: Infection  Goal: Will remain free from infection  Outcome: PROGRESSING AS EXPECTED  Discussed infection prevention with patient. Pt understood and verbalized understanding.     Problem: Knowledge Deficit  Goal: Knowledge of disease process/condition, treatment plan, diagnostic tests, and medications will improve  Outcome: PROGRESSING AS EXPECTED  Pt was confused on plan of care. Plan of care was discussed with patient and all questions answered.

## 2018-09-17 NOTE — FLOWSHEET NOTE
09/16/18 2108   Interdisciplinary Plan of Care-Goals (Indications)   Bronchodilator Indications History / Diagnosis   Hyperinflation Protocol Indications Atelectasis Documented by Chest X-Ray   Interdisciplinary Plan of Care-Outcomes    Bronchodilator Outcome Patient at Stable Baseline   Hyperinflation Protocol Goals/Outcome Stable Vital Capacity x24 hrs and Patient Understands / uses I.S.   Education   Education Yes - Pt. / Family has been Instructed in use of Respiratory Equipment   RT Assessment of Delivered Medications   Evaluation of Medication Delivery Daily Yes-- Pt /Family has been Instructed in use of Respiratory Medications and Adverse Reactions   MDI/DPI Group   #MDI/DPI Given MDI/DPI x 1   Incentive Spirometry Group   Incentive Spirometry Instruction Yes   Breathing Exercises Yes   Incentive Spirometer Volume 2250 mL   Respiratory WDL   Respiratory (WDL) X   Chest Exam   Respiration 16   Pulse 94   Breath Sounds   Pre/Post Intervention Post Intervention Assessment   RUL Breath Sounds Clear   RML Breath Sounds Clear;Fine Crackles   RLL Breath Sounds Clear;Diminished   LORNA Breath Sounds Clear   LLL Breath Sounds Diminished;Fine Crackles   Oximetry   #Pulse Oximetry (Single Determination) Yes   Oxygen   Pulse Oximetry 91 %   O2 (LPM) 3   O2 Daily Delivery Respiratory  Silicone Nasal Cannula

## 2018-09-17 NOTE — PROGRESS NOTES
0815Report received, plan of care reviewed and discussed, assessment complete, oriented to room, bed alarm on, nonskid socks applied, advised to call for assistance.   1015 Discussed plan of care, encouraged and answered all questions, will continue to monitor.  1215 Up in chair, MD at bedside to discuss plan of care, all needs met at this time.  1415 Resting, IV antibiotics infusing, call light within reach.  1615 Sitting up in chair, call light within reach.  1845 Report given to next shift.

## 2018-09-17 NOTE — PROGRESS NOTES
Talked to Dr. Whitlock about tele being d/c. Got in report that the pt was d/c from tele but there was no order in the computer.     Dr. JACOBSON was ok with the pt not being on tele.

## 2018-09-17 NOTE — PROGRESS NOTES
Received report from Rosie OGDEN. Discussed plan of care and safety measures in place. No further needs at this time.

## 2018-09-17 NOTE — PROGRESS NOTES
Pt is A&Ox4, resting comfortably in bed. Assessment completed and denies any pain at this time. Due medication have been given. Bed is in lowest postion, bed alarm is on, and side rails up x2, pt calls when needs assistance and call light within reach.

## 2018-09-18 ENCOUNTER — APPOINTMENT (OUTPATIENT)
Dept: RADIOLOGY | Facility: MEDICAL CENTER | Age: 73
DRG: 853 | End: 2018-09-18
Attending: INTERNAL MEDICINE
Payer: MEDICARE

## 2018-09-18 ENCOUNTER — TELEPHONE (OUTPATIENT)
Dept: MEDICAL GROUP | Facility: MEDICAL CENTER | Age: 73
End: 2018-09-18

## 2018-09-18 ENCOUNTER — APPOINTMENT (OUTPATIENT)
Dept: RADIOLOGY | Facility: MEDICAL CENTER | Age: 73
DRG: 853 | End: 2018-09-18
Attending: HOSPITALIST
Payer: MEDICARE

## 2018-09-18 LAB
ALBUMIN SERPL BCP-MCNC: 2.9 G/DL (ref 3.2–4.9)
ALBUMIN/GLOB SERPL: 1.1 G/DL
ALP SERPL-CCNC: 84 U/L (ref 30–99)
ALT SERPL-CCNC: 68 U/L (ref 2–50)
ANION GAP SERPL CALC-SCNC: 6 MMOL/L (ref 0–11.9)
ANISOCYTOSIS BLD QL SMEAR: ABNORMAL
AST SERPL-CCNC: 39 U/L (ref 12–45)
BASOPHILS # BLD AUTO: 0 % (ref 0–1.8)
BASOPHILS # BLD: 0 K/UL (ref 0–0.12)
BILIRUB SERPL-MCNC: 2.7 MG/DL (ref 0.1–1.5)
BNP SERPL-MCNC: 15 PG/ML (ref 0–100)
BUN SERPL-MCNC: 40 MG/DL (ref 8–22)
CALCIUM SERPL-MCNC: 7.8 MG/DL (ref 8.4–10.2)
CHLORIDE SERPL-SCNC: 105 MMOL/L (ref 96–112)
CO2 SERPL-SCNC: 22 MMOL/L (ref 20–33)
CREAT SERPL-MCNC: 0.57 MG/DL (ref 0.5–1.4)
EOSINOPHIL # BLD AUTO: 0 K/UL (ref 0–0.51)
EOSINOPHIL NFR BLD: 0 % (ref 0–6.9)
ERYTHROCYTE [DISTWIDTH] IN BLOOD BY AUTOMATED COUNT: 45.4 FL (ref 35.9–50)
GLOBULIN SER CALC-MCNC: 2.6 G/DL (ref 1.9–3.5)
GLUCOSE BLD-MCNC: 222 MG/DL (ref 65–99)
GLUCOSE BLD-MCNC: 269 MG/DL (ref 65–99)
GLUCOSE BLD-MCNC: 282 MG/DL (ref 65–99)
GLUCOSE BLD-MCNC: 307 MG/DL (ref 65–99)
GLUCOSE SERPL-MCNC: 219 MG/DL (ref 65–99)
HCT VFR BLD AUTO: 34.2 % (ref 42–52)
HGB BLD-MCNC: 11.2 G/DL (ref 14–18)
LYMPHOCYTES # BLD AUTO: 4.45 K/UL (ref 1–4.8)
LYMPHOCYTES NFR BLD: 14 % (ref 22–41)
MACROCYTES BLD QL SMEAR: ABNORMAL
MAGNESIUM SERPL-MCNC: 2.3 MG/DL (ref 1.5–2.5)
MANUAL DIFF BLD: NORMAL
MCH RBC QN AUTO: 29.1 PG (ref 27–33)
MCHC RBC AUTO-ENTMCNC: 32.7 G/DL (ref 33.7–35.3)
MCV RBC AUTO: 88.8 FL (ref 81.4–97.8)
METAMYELOCYTES NFR BLD MANUAL: 1 %
MICROCYTES BLD QL SMEAR: ABNORMAL
MONOCYTES # BLD AUTO: 3.5 K/UL (ref 0–0.85)
MONOCYTES NFR BLD AUTO: 11 % (ref 0–13.4)
MYELOCYTES NFR BLD MANUAL: 1 %
NEUTROPHILS # BLD AUTO: 23.21 K/UL (ref 1.82–7.42)
NEUTROPHILS NFR BLD: 68 % (ref 44–72)
NEUTS BAND NFR BLD MANUAL: 5 % (ref 0–10)
NRBC # BLD AUTO: 0.04 K/UL
NRBC BLD-RTO: 0.1 /100 WBC
PHOSPHATE SERPL-MCNC: 3.1 MG/DL (ref 2.5–4.5)
PLATELET # BLD AUTO: 362 K/UL (ref 164–446)
PLATELET BLD QL SMEAR: NORMAL
PMV BLD AUTO: 12 FL (ref 9–12.9)
POLYCHROMASIA BLD QL SMEAR: NORMAL
POTASSIUM SERPL-SCNC: 3.8 MMOL/L (ref 3.6–5.5)
PROT SERPL-MCNC: 5.5 G/DL (ref 6–8.2)
RBC # BLD AUTO: 3.85 M/UL (ref 4.7–6.1)
RBC BLD AUTO: PRESENT
SODIUM SERPL-SCNC: 133 MMOL/L (ref 135–145)
WBC # BLD AUTO: 31.8 K/UL (ref 4.8–10.8)

## 2018-09-18 PROCEDURE — A9270 NON-COVERED ITEM OR SERVICE: HCPCS | Performed by: HOSPITALIST

## 2018-09-18 PROCEDURE — 85027 COMPLETE CBC AUTOMATED: CPT

## 2018-09-18 PROCEDURE — 700111 HCHG RX REV CODE 636 W/ 250 OVERRIDE (IP): Performed by: INTERNAL MEDICINE

## 2018-09-18 PROCEDURE — 84100 ASSAY OF PHOSPHORUS: CPT

## 2018-09-18 PROCEDURE — 770020 HCHG ROOM/CARE - TELE (206)

## 2018-09-18 PROCEDURE — A9270 NON-COVERED ITEM OR SERVICE: HCPCS | Performed by: INTERNAL MEDICINE

## 2018-09-18 PROCEDURE — 99233 SBSQ HOSP IP/OBS HIGH 50: CPT | Performed by: INTERNAL MEDICINE

## 2018-09-18 PROCEDURE — 700102 HCHG RX REV CODE 250 W/ 637 OVERRIDE(OP): Performed by: INTERNAL MEDICINE

## 2018-09-18 PROCEDURE — 71045 X-RAY EXAM CHEST 1 VIEW: CPT

## 2018-09-18 PROCEDURE — 94640 AIRWAY INHALATION TREATMENT: CPT

## 2018-09-18 PROCEDURE — 700101 HCHG RX REV CODE 250: Performed by: INTERNAL MEDICINE

## 2018-09-18 PROCEDURE — 83880 ASSAY OF NATRIURETIC PEPTIDE: CPT

## 2018-09-18 PROCEDURE — 76705 ECHO EXAM OF ABDOMEN: CPT

## 2018-09-18 PROCEDURE — 700117 HCHG RX CONTRAST REV CODE 255: Performed by: HOSPITALIST

## 2018-09-18 PROCEDURE — 700105 HCHG RX REV CODE 258: Performed by: INTERNAL MEDICINE

## 2018-09-18 PROCEDURE — 83735 ASSAY OF MAGNESIUM: CPT

## 2018-09-18 PROCEDURE — 99233 SBSQ HOSP IP/OBS HIGH 50: CPT | Performed by: HOSPITALIST

## 2018-09-18 PROCEDURE — 82962 GLUCOSE BLOOD TEST: CPT | Mod: 91

## 2018-09-18 PROCEDURE — 700102 HCHG RX REV CODE 250 W/ 637 OVERRIDE(OP): Performed by: HOSPITALIST

## 2018-09-18 PROCEDURE — 85007 BL SMEAR W/DIFF WBC COUNT: CPT

## 2018-09-18 PROCEDURE — 80053 COMPREHEN METABOLIC PANEL: CPT

## 2018-09-18 PROCEDURE — 74177 CT ABD & PELVIS W/CONTRAST: CPT

## 2018-09-18 PROCEDURE — 700111 HCHG RX REV CODE 636 W/ 250 OVERRIDE (IP): Performed by: HOSPITALIST

## 2018-09-18 PROCEDURE — 94760 N-INVAS EAR/PLS OXIMETRY 1: CPT

## 2018-09-18 RX ORDER — MORPHINE SULFATE 4 MG/ML
1-4 INJECTION, SOLUTION INTRAMUSCULAR; INTRAVENOUS
Status: DISCONTINUED | OUTPATIENT
Start: 2018-09-18 | End: 2018-09-22

## 2018-09-18 RX ORDER — LORAZEPAM 2 MG/ML
1 INJECTION INTRAMUSCULAR EVERY 4 HOURS PRN
Status: DISCONTINUED | OUTPATIENT
Start: 2018-09-18 | End: 2018-09-25 | Stop reason: HOSPADM

## 2018-09-18 RX ORDER — METRONIDAZOLE 500 MG/1
500 TABLET ORAL EVERY 8 HOURS
Status: DISCONTINUED | OUTPATIENT
Start: 2018-09-18 | End: 2018-09-19

## 2018-09-18 RX ORDER — DIPHENOXYLATE HYDROCHLORIDE AND ATROPINE SULFATE 2.5; .025 MG/1; MG/1
1 TABLET ORAL 4 TIMES DAILY PRN
Status: DISCONTINUED | OUTPATIENT
Start: 2018-09-18 | End: 2018-09-25 | Stop reason: HOSPADM

## 2018-09-18 RX ADMIN — DILTIAZEM HYDROCHLORIDE 30 MG: 30 TABLET, FILM COATED ORAL at 23:36

## 2018-09-18 RX ADMIN — METHYLPREDNISOLONE 4 MG: 4 TABLET ORAL at 17:59

## 2018-09-18 RX ADMIN — MORPHINE SULFATE 2 MG: 4 INJECTION INTRAVENOUS at 12:58

## 2018-09-18 RX ADMIN — DILTIAZEM HYDROCHLORIDE 30 MG: 30 TABLET, FILM COATED ORAL at 17:59

## 2018-09-18 RX ADMIN — METHYLPREDNISOLONE 4 MG: 4 TABLET ORAL at 08:11

## 2018-09-18 RX ADMIN — BUDESONIDE AND FORMOTEROL FUMARATE DIHYDRATE 2 PUFF: 160; 4.5 AEROSOL RESPIRATORY (INHALATION) at 17:59

## 2018-09-18 RX ADMIN — METRONIDAZOLE 500 MG: 500 TABLET ORAL at 14:43

## 2018-09-18 RX ADMIN — APIXABAN 5 MG: 5 TABLET, FILM COATED ORAL at 17:59

## 2018-09-18 RX ADMIN — INSULIN HUMAN 6 UNITS: 100 INJECTION, SOLUTION PARENTERAL at 11:00

## 2018-09-18 RX ADMIN — CEFTAZIDIME 1 G: 1 INJECTION, POWDER, FOR SOLUTION INTRAMUSCULAR; INTRAVENOUS at 05:09

## 2018-09-18 RX ADMIN — CEFTAZIDIME 1 G: 1 INJECTION, POWDER, FOR SOLUTION INTRAMUSCULAR; INTRAVENOUS at 14:44

## 2018-09-18 RX ADMIN — APIXABAN 5 MG: 5 TABLET, FILM COATED ORAL at 05:09

## 2018-09-18 RX ADMIN — INSULIN HUMAN 3 UNITS: 100 INJECTION, SOLUTION PARENTERAL at 08:05

## 2018-09-18 RX ADMIN — INSULIN GLARGINE 20 UNITS: 100 INJECTION, SOLUTION SUBCUTANEOUS at 18:00

## 2018-09-18 RX ADMIN — INSULIN GLARGINE 20 UNITS: 100 INJECTION, SOLUTION SUBCUTANEOUS at 08:05

## 2018-09-18 RX ADMIN — DILTIAZEM HYDROCHLORIDE 30 MG: 30 TABLET, FILM COATED ORAL at 12:35

## 2018-09-18 RX ADMIN — ONDANSETRON HYDROCHLORIDE 4 MG: 2 INJECTION INTRAMUSCULAR; INTRAVENOUS at 12:35

## 2018-09-18 RX ADMIN — CEFTAZIDIME 1 G: 1 INJECTION, POWDER, FOR SOLUTION INTRAMUSCULAR; INTRAVENOUS at 21:23

## 2018-09-18 RX ADMIN — INSULIN HUMAN 5 UNITS: 100 INJECTION, SOLUTION PARENTERAL at 18:02

## 2018-09-18 RX ADMIN — METRONIDAZOLE 500 MG: 500 TABLET ORAL at 21:20

## 2018-09-18 RX ADMIN — DILTIAZEM HYDROCHLORIDE 30 MG: 30 TABLET, FILM COATED ORAL at 05:09

## 2018-09-18 RX ADMIN — INSULIN HUMAN 5 UNITS: 100 INJECTION, SOLUTION PARENTERAL at 21:26

## 2018-09-18 RX ADMIN — METRONIDAZOLE 500 MG: 500 INJECTION, SOLUTION INTRAVENOUS at 05:50

## 2018-09-18 RX ADMIN — IPRATROPIUM BROMIDE AND ALBUTEROL SULFATE 3 ML: .5; 3 SOLUTION RESPIRATORY (INHALATION) at 15:02

## 2018-09-18 RX ADMIN — IOHEXOL 100 ML: 350 INJECTION, SOLUTION INTRAVENOUS at 15:59

## 2018-09-18 ASSESSMENT — ENCOUNTER SYMPTOMS
VOMITING: 0
NAUSEA: 1
TINGLING: 0
NECK PAIN: 0
SHORTNESS OF BREATH: 1
SHORTNESS OF BREATH: 0
PALPITATIONS: 0
EYE PAIN: 0
INSOMNIA: 0
WEAKNESS: 1
NAUSEA: 0
BLURRED VISION: 0
HEADACHES: 0
BACK PAIN: 0
COUGH: 0
SORE THROAT: 0
DIZZINESS: 0
FEVER: 0
CHILLS: 0
DEPRESSION: 0
ABDOMINAL PAIN: 1

## 2018-09-18 ASSESSMENT — PAIN SCALES - GENERAL
PAINLEVEL_OUTOF10: 3
PAINLEVEL_OUTOF10: 6
PAINLEVEL_OUTOF10: 0
PAINLEVEL_OUTOF10: 0

## 2018-09-18 NOTE — PROGRESS NOTES
[facility] Medicine Daily Progress Note    Date of Service  9/18/2018    Chief Complaint  72 y.o. Male admitted 9/12/2018 with with right upper quadrant pain found to have possible cholecystitis and related sepsis and bacteremia.      Interval Problem Update  We discussed possible diagnosis of cholangitis today and his willingness to proceed with care. He is reluctant but has had mor pain postprandial today.       Consultants/Specialty  ID    Disposition  Likely home, lives with wife. Ambulating without difficulty    Us abd:  1.  Cholelithiasis without other sonographic findings of biliary disease  2.  Echogenic liver, a nonspecific finding that often is found to represent steatosis.  Other infiltrative processes could have an identical appearance.      Review of Systems  Review of Systems   Constitutional: Negative for chills and fever.   HENT: Negative for sore throat.    Eyes: Negative for blurred vision and pain.   Respiratory: Negative for cough and shortness of breath.    Cardiovascular: Negative for chest pain and palpitations.   Gastrointestinal: Positive for abdominal pain. Negative for nausea and vomiting.   Genitourinary: Negative for dysuria and urgency.   Musculoskeletal: Negative for back pain and neck pain.   Skin: Negative for itching and rash.   Neurological: Negative for dizziness, tingling and headaches.   Psychiatric/Behavioral: Negative for depression. The patient does not have insomnia.    All other systems reviewed and are negative.       Physical Exam  Blood Pressure : 152/61   Temperature: 36.7 °C (98 °F)   Pulse: (!) 110   Respiration: 18   Pulse Oximetry: 95 %     Physical Exam   Constitutional: He is oriented to person, place, and time. He appears well-developed and well-nourished. No distress.   Patient seen and examined  Plan discussed with ALIN NARANJO:   Right Ear: External ear normal.   Left Ear: External ear normal.   Nose: Nose normal.   Eyes: Right eye exhibits no discharge. Left eye  exhibits no discharge. No scleral icterus.   Neck: No JVD present. No tracheal deviation present.   Cardiovascular: Normal rate, normal heart sounds and intact distal pulses.    No murmur heard.  Cap refill 2sec  Pulses 2+ throughout     Pulmonary/Chest: Effort normal and breath sounds normal. No respiratory distress. He has no wheezes. He has no rales.   Abdominal: Soft. Bowel sounds are normal. He exhibits no distension. There is tenderness. There is no guarding.   Positive tomlinson's   Musculoskeletal: He exhibits no edema or tenderness.   Neurological: He is alert and oriented to person, place, and time.   Skin: Skin is warm and dry. He is not diaphoretic. No erythema.   Normal skin color   Psychiatric: He has a normal mood and affect. His behavior is normal.   Nursing note and vitals reviewed.      Fluids    Intake/Output Summary (Last 24 hours) at 09/18/18 0900  Last data filed at 09/18/18 0756   Gross per 24 hour   Intake             1540 ml   Output             2745 ml   Net            -1205 ml       Laboratory  Recent Labs      09/16/18 0455 09/17/18 0518 09/18/18   0445   WBC  12.9*  18.5*  31.8*   RBC  4.50*  4.31*  3.85*   HEMOGLOBIN  13.2*  12.7*  11.2*   HEMATOCRIT  38.5*  37.9*  34.2*   MCV  85.6  87.9  88.8   MCH  29.3  29.5  29.1   MCHC  34.3  33.5*  32.7*   RDW  43.1  45.0  45.4   PLATELETCT  185  244  362   MPV  11.9  12.0  12.0     Recent Labs      09/16/18 0455 09/17/18 0518 09/18/18   0445   SODIUM  136  135  133*   POTASSIUM  3.7  4.2  3.8   CHLORIDE  104  101  105   CO2  23  25  22   GLUCOSE  218*  244*  219*   BUN  25*  32*  40*   CREATININE  0.68  0.59  0.57   CALCIUM  8.1*  8.2*  7.8*         Recent Labs      09/16/18 0455 09/17/18 0518 09/18/18   0445   BNPBTYPENAT  71  25  15           Imaging  US-GALLBLADDER   Final Result      1.  Cholelithiasis without other sonographic findings of biliary disease   2.  Echogenic liver, a nonspecific finding that often is found to  represent steatosis.  Other infiltrative processes could have an identical appearance.      ECHOCARDIOGRAM-COMP W/ CONT   Final Result      DX-CHEST-PORTABLE (1 VIEW)   Final Result      Mild pulmonary edema and bibasilar atelectasis, without significant change.      US-GALLBLADDER   Final Result      1.  Limited evaluation of pancreas and abdominal aorta.   2.  Echogenic liver consistent with fatty infiltration with focal sparing adjacent to gallbladder fossa.   3.  Cholelithiasis with pain over the gallbladder.  No other evidence to indicate acute cholecystitis.  No biliary dilation.   4.  Distended bladder and mild RIGHT hydronephrosis, possibly indicating urinary retention.      LE VENOUS DUPLEX (Specify in Comments Left, Right Or Bilateral)   Final Result      DX-CHEST-PORTABLE (1 VIEW)   Final Result      1.  Cardiomegaly.      2.  Bibasilar atelectasis.           Assessment/Plan  Acute on chronic respiratory failure (HCC)   Assessment & Plan    Patient chronically on 2 L oxygen at home and was up to high flow nasal cannula.  Now down to 3 L  COPD, improving          Sepsis due to Enterobacter species (HCC)   Assessment & Plan    This is sepsis (without associated acute organ dysfunction).   2 out of 2 cultures positive for Enterobacter  Likely GI source, with obstructive picture with LFTs and t bili  Suspect possible passed stone and ?cholangitis  LFT are improved today. Possible passed stone.   Discussed with ID- ct abd recommended and ordered  MRCP ordered- GI consulted  Consider surgery consult but ultrasound looks ok            Diarrhea   Assessment & Plan    c diff negative  improved        Hyponatremia   Assessment & Plan    resolved        Hypokalemia   Assessment & Plan    resolved          Hypophosphatemia   Assessment & Plan    resolved        Abnormal LFTs   Assessment & Plan    Presenting with RUQ pain, now resolved  US with gallstone but no sign of inflammation/fluid  LFTs and T bili down  trending without intervention  Repeat sono        Uncontrolled type 2 diabetes mellitus (HCC)- (present on admission)   Assessment & Plan    Noncompliant with follow-up and home fingersticks  With hyperglycemia and vascular complications.   A1c 9.5  Changed to 20 BID  Notified patient will need insulin at DC        Chronic deep vein thrombosis (DVT) ([facility])   Assessment & Plan    On eliquis now        Hepatic steatosis   Assessment & Plan    Due to morbid obesity and uncontrolled diabetes          Non-compliance   Assessment & Plan    Discussed importance of compliance and f/u        Stage 2 moderate COPD by GOLD classification ([facility])- (present on admission)   Assessment & Plan    On 2 L home oxygen  Improved  Change to PO steroids

## 2018-09-18 NOTE — FLOWSHEET NOTE
09/18/18 1502   Events/Summary/Plan   Events/Summary/Plan Patient called for TX   Interdisciplinary Plan of Care-Goals (Indications)   Bronchodilator Indications History / Diagnosis   Interdisciplinary Plan of Care-Outcomes    Bronchodilator Outcome Patient at Stable Baseline   Education   Education Yes - Pt. / Family has been Instructed in use of Respiratory Equipment;Yes - Pt. / Family has been Instructed in use of Respiratory Medications and Adverse Reactions   RT Assessment of Delivered Medications   Evaluation of Medication Delivery Daily Yes-- Pt /Family has been Instructed in use of Respiratory Medications and Adverse Reactions   SVN Group   #SVN Performed Yes   Given By: Mouthpiece   Respiratory WDL   Respiratory (WDL) X   Chest Exam   Heart Rate (Monitored) (!) 102   Breath Sounds   RUL Breath Sounds Diminished   RML Breath Sounds Diminished   RLL Breath Sounds Diminished   LORNA Breath Sounds Diminished   LLL Breath Sounds Diminished   Oximetry   #Pulse Oximetry (Single Determination) Yes   Oxygen   Pulse Oximetry 94 %   O2 (LPM) 3   O2 Daily Delivery Respiratory  Silicone Nasal Cannula

## 2018-09-18 NOTE — CARE PLAN
Problem: Respiratory:  Goal: Respiratory status will improve  Outcome: PROGRESSING AS EXPECTED  Satting 94% on 3L 02, which is patients baseline at home, 02 dependent

## 2018-09-18 NOTE — FLOWSHEET NOTE
09/18/18 0528   Therapy Not Performed   Type of Therapy Not Performed SVN   Reason Therapy Not Performed Refused

## 2018-09-18 NOTE — CARE PLAN
Problem: Safety  Goal: Will remain free from falls  Outcome: PROGRESSING AS EXPECTED  No falls this shift. Up ad yumiko in room to bathroom and OOB to chair. Gait steady, ambulating independently.Patient calls   appropriately for assistance. Falls precautions in place: bed in lowest and locked position, side rails raised x 2, call light   within reach, nonslip socks on bilaterally, room near nurses station, purposeful hourly rounding.     Problem: Infection  Goal: Will remain free from infection  Outcome: PROGRESSING AS EXPECTED  Afebrile throughout shift. Remains on scheduled IV Flagyl and IV Fortaz.     Problem: Skin Integrity  Goal: Risk for impaired skin integrity will decrease  Outcome: PROGRESSING AS EXPECTED  No further s/s of skin breakdown noted at this time. Wound to RLE DELFIN as patient is refusing ordered tubigrip.   Small wound, dry, no drainage. Sacrum erythematous, blanching. Patient refusing prophylactic mepilex. Encouraging   patient to turn/reposition frequently in bed and chair. Patient agreeable.

## 2018-09-18 NOTE — PROGRESS NOTES
Patient A+Ox4, sitting up in chair. On 3L 02 satting 94%. No chest pain or SOB at this time. M/S status, no monitor. Denies pain, denies nausea. NPO from gallbladder ultrasound. Voiding in urinal. Using call bell approp, whiteboard updated, patient updated on plan of care, bed locked and in lowest position.

## 2018-09-18 NOTE — PROGRESS NOTES
0620  Phone call from Lab called with critical result of WBC at 31.8. Critical lab result read back to Dania in Lab.   Page sent to Dr. Mcmahon at 0628  Dr. Mcmahon notified of critical lab result at 0633.  Critical lab result read back by Dr. Mcmahon. No new orders at this time.

## 2018-09-18 NOTE — CONSULTS
[facility] INFECTIOUS DISEASES INPATIENT CONSULT NOTE     Date of Service: 9/17/2018    Consult Requested By: Sharon Hidalgo M.D.    Reason for Consultation: Bacteremia    Chief Complaint: Abdominal pain    History of Present Illness:     Enoch Mistry is a 72 y.o. male admitted 9/12/2018.  Patient has a history of poorly controlled diabetes and hypertension, COPD on home oxygen.  Patient presented with right upper quadrant pain, shortness of breath, fevers and chills for the prior few days.  He was found to have a significant conjugated hyperbilirubinemia.  He reports that he has had off-and-on abdominal pain secondary to gallstones for years.  Ultrasound showed cholelithiasis with pain over the gallbladder, but no evidence of acute cholecystitis.  Blood culture growing Enterobacter.  Patient currently on ceftazidime and Flagyl.  He reports that he feels significantly better compared to admission.  The hyperbilirubinemia is improving. An attempt was made yesterday to switch to p.o. fluoroquinolone but his white count increased this morning.  He is also on IV methylprednisolone for COPD exacerbation.    Review of Systems:  All other systems reviewed and are negative expect as noted in HPI    Past Medical History:   Diagnosis Date   • Cancer (HCC)     bladder cancer   • Hiatus hernia syndrome    • Hypertension    • Other specified disorder of intestines     constipation   • Urinary bladder disorder     retention, perera cath       Past Surgical History:   Procedure Laterality Date   • CYSTOSCOPY N/A 9/11/2017    Procedure: CYSTOSCOPY- CLOT EVACUATION, FULGURATION, OF PROSTATE;  Surgeon: Srini Giles M.D.;  Location: SURGERY [facility];  Service: Urology   • CYSTOSCOPY  5/7/2015    Procedure: CYSTOSCOPY [57.32] ;  Surgeon: Srini Giles M.D.;  Location: SURGERY [facility];  Service:    • TRANS URETHRAL RESECTION PROSTATE  5/7/2015    Procedure: TRANS URETHRAL RESECTION PROSTATE [60.20];   Surgeon: Srini Giles M.D.;  Location: SURGERY [facility];  Service:    • TRANS URETHRAL RESECTION BLADDER  5/7/2015    Procedure: TRANS URETHRAL RESECTION BLADDER [57.49A];  Surgeon: Srini Giles M.D.;  Location: SURGERY [facility];  Service:        History reviewed. No pertinent family history.    Social History     Social History   • Marital status:      Spouse name: N/A   • Number of children: N/A   • Years of education: N/A     Occupational History   • Not on file.     Social History Main Topics   • Smoking status: Former Smoker     Packs/day: 2.00     Years: 40.00     Types: Cigarettes     Quit date: 1/1/2006   • Smokeless tobacco: Never Used   • Alcohol use 0.0 oz/week      Comment: 7 beers per week   • Drug use: No   • Sexual activity: Not Currently     Partners: Female     Other Topics Concern   • Not on file     Social History Narrative   • No narrative on file       No Known Allergies    Medications:    Current Facility-Administered Medications:   •  budesonide-formoterol (SYMBICORT) 160-4.5 MCG/ACT inhaler 2 Puff, 2 Puff, Inhalation, BID, Sharon Hidalgo M.D.  •  insulin glargine (LANTUS) injection 20 Units, 20 Units, Subcutaneous, BID, 20 Units at 09/17/18 1742 **AND** [DISCONTINUED] insulin lispro (HUMALOG) injection 1-6 Units, 1-6 Units, Subcutaneous, Q6HRS, 4 Units at 09/15/18 1215 **AND** [CANCELED] Accu-Chek Q6 if NPO, , , Q6H **AND** [CANCELED] NOTIFY MD and PharmD, , , Once **AND** [DISCONTINUED] glucose 4 g chewable tablet 16 g, 16 g, Oral, Q15 MIN PRN **AND** [DISCONTINUED] dextrose 50% (D50W) injection 25 mL, 25 mL, Intravenous, Q15 MIN PRN, Shraon Hidalgo M.D.  •  ceftAZIDime (FORTAZ) 1 g in  mL IVPB, 1 g, Intravenous, Q8HRS, Sharon Hidalgo M.D., Stopped at 09/17/18 1341  •  metroNIDAZOLE (FLAGYL) IVPB 500 mg, 500 mg, Intravenous, Q8HRS, Sharon Hidalgo M.D., Stopped at 09/17/18 1408  •  methylPREDNISolone (MEDROL) tablet 4 mg, 4 mg,  "Oral, BID, Sharon Hidalgo M.D., 4 mg at 18  •  [CANCELED] Special Contact Isolation, , , CONTINUOUS **AND** C Diff by PCR rflx Toxin, , , Once **AND** Pharmacy Consult Request, 1 Each, Other, PRN, Ceferino Urena M.D.  •  benzocaine-menthol (CEPACOL) lozenge 1 Lozenge, 1 Lozenge, Mouth/Throat, Q2HRS PRN, Ceferino Urena M.D., 1 Lozenge at 09/15/18 1014  •  apixaban (ELIQUIS) tablet 5 mg, 5 mg, Oral, BID, Ceferino Urena M.D., 5 mg at 18  •  potassium chloride SA (Kdur) tablet 40 mEq, 40 mEq, Oral, BID, Ceferino Urena M.D., 40 mEq at 18 0557  •  insulin regular (HUMULIN R) injection 2-9 Units, 2-9 Units, Subcutaneous, 4X/DAY ACHS, 5 Units at 18 1740 **AND** Accu-Chek ACHS, , , Q AC AND BEDTIME(S) **AND** NOTIFY MD and PharmD, , , Once **AND** glucose 4 g chewable tablet 16 g, 16 g, Oral, Q15 MIN PRN **AND** dextrose 50% (D50W) injection 25 mL, 25 mL, Intravenous, Q15 MIN PRN, Narciso Almonte M.D.  •  phosphorus (K-PHOS-NEUTRAL, PHOSPHA 250 NEUTRAL) per tablet 1 Tab, 1 Tab, Oral, Q6HRS, Narciso Almonte M.D., 1 Tab at 18 0557  •  DILTIAZem (CARDIZEM) tablet 30 mg, 30 mg, Oral, Q6HRS, Sharon Hidalgo M.D., 30 mg at 18  •  ondansetron (ZOFRAN) syringe/vial injection 4 mg, 4 mg, Intravenous, Q4HRS PRN, Ceferino Urena M.D., 4 mg at 18 0932  •  Respiratory Care per Protocol, , Nebulization, Continuous RT, Sharon Hidalgo M.D.  •  NS (BOLUS) infusion 500 mL, 500 mL, Intravenous, Once PRN, Sharon Hidalgo M.D.  •  ipratropium-albuterol (DUONEB) nebulizer solution, 3 mL, Nebulization, Q4H PRN (RT), Sharon Hidalgo M.D., 3 mL at 18 1638    Physical Exam:   Vital Signs: /54   Pulse (!) 102   Temp 36.4 °C (97.5 °F)   Resp 18   Ht 1.702 m (5' 7\")   Wt 102.2 kg (225 lb 5 oz)   SpO2 93%   BMI 35.29 kg/m²   Temp  Av.8 °C (98.2 °F)  Min: 36.2 °C (97.2 °F)  Max: 38.1 °C (100.5 °F)  Vital signs reviewed  Constitutional: Patient is " oriented to person, place, and time. Appears well-developed and well-nourished. No distress  Head: Atraumatic, normocephalic  Eyes: Conjunctivae normal, EOM intact. Pupils are equal, round, and reactive to light.  Icterus+  Mouth/Throat: Lips without lesions, oropharynx is clear and moist.  Neck: Neck supple. No masses/lymphadenopathy  Cardiovascular: Tachycardic, regular rhythm, normal S1S2 and intact distal pulses. No murmur, gallop, or friction rub.  Bilateral pedal edema 3+  Pulmonary/Chest: No respiratory distress. Unlabored respiratory effort, lungs clear to auscultation. No wheezes or rales.   Abdominal: Soft, protuberant, right upper quadrant tenderness on deep palpation, Wolf's negative. BS + x 4. No masses or hepatosplenomegaly appreciable.   Musculoskeletal: No joint tenderness, swelling, erythema, or restriction of motion noted.  Neurological: Alert and oriented to person, place, and time. No gross cranial nerve deficit. No focal neural deficit noted  Skin: Skin is warm and dry. No rashes or embolic phenomena noted on exposed skin  Psychiatric: Normal mood and affect. Behavior is normal.     LABS:  Recent Labs      09/15/18   0455  09/16/18   0455  09/17/18   0518   WBC  11.9*  12.9*  18.5*      Recent Labs      09/15/18   0455  09/16/18   0455  09/17/18   0518   HEMOGLOBIN  14.6  13.2*  12.7*   HEMATOCRIT  42.4  38.5*  37.9*   MCV  85.3  85.6  87.9   MCH  29.4  29.3  29.5   PLATELETCT  178  185  244       Recent Labs      09/15/18   0455  09/16/18   0455  09/17/18   0518   SODIUM  134*  136  135   POTASSIUM  3.0*  3.7  4.2   CHLORIDE  101  104  101   CO2  23  23  25   CREATININE  0.70  0.68  0.59        Recent Labs      09/15/18   0455  09/16/18   0455  09/17/18   0518   ALBUMIN  3.1*  2.9*  3.2        MICRO:  Blood Culture   Date Value Ref Range Status   09/12/2018 (A)  Final    Blood culture testing and Gram stain, if indicated, are  performed at [facility],  "[id number]  [place], [place].  Positive blood cultures are  sent to [facility], [address] [place] [address], for organism identification and  susceptibility testing.  Growth detected by Bactec instrument.     09/12/2018 (A)  Final    Enterobacter cloacae  See previous culture for sensitivity report.          Latest pertinent labs were reviewed    IMAGING STUDIES:  9/12 ultrasound \"1.  Limited evaluation of pancreas and abdominal aorta.  2.  Echogenic liver consistent with fatty infiltration with focal sparing adjacent to gallbladder fossa.  3.  Cholelithiasis with pain over the gallbladder.  No other evidence to indicate acute cholecystitis.  No biliary dilation.  4.  Distended bladder and mild RIGHT hydronephrosis, possibly indicating urinary retention.\"    Hospital Course/Assessment:   Enoch Mistry is a 72 y.o. male with a history of poorly controlled diabetes, hypertension, known cholelithiasis, who presented with abdominal pain and significant conjugated hyperbilirubinemia.  His blood culture is positive for Enterobacter.  The hyperbilirubinemia is spontaneously improving, suggestive of a gallstone that passed.    Pertinent Diagnoses:  1.  Sepsis  2.  Enterobacter bacteremia   3.  Acute cholecystitis, likely passed an obstructive gallstone  4.  Conjugated hyperbilirubinemia, improving  5.  Poorly controlled diabetes  6.  Cholelithiasis      Plan:   -Agree with IV ceftazidime and Flagyl  -Repeat blood cultures  -Trend LFTs  -Would consider CT scan if patient clinically worsens.  However he reports that he has significant orthopnea.  If patient refuses, may perform repeat ultrasound    Plan was discussed with the primary team, Dr. Hidalgo    ID will follow.  Please feel free to call with questions.    Frankie Olivares M.D.    "

## 2018-09-18 NOTE — PROGRESS NOTES
Infectious Disease Progress Note    Author: Frankie Olivares M.D. Date & Time of service: 2018  1:47 PM    Chief Complaint:  Follow-up of Enterobacter bacteremia    Interval History:   afebrile, white count now up to 31.8.  Reports that he was feeling terrible this morning after breakfast, feels a little bit better now.    Labs Reviewed and Medications Reviewed.    Review of Systems:  Review of Systems   Constitutional: Positive for malaise/fatigue. Negative for fever.   Respiratory: Positive for shortness of breath.    Cardiovascular: Positive for leg swelling.   Gastrointestinal: Positive for abdominal pain and nausea.   Genitourinary: Negative for dysuria.   Skin: Negative for itching and rash.   Neurological: Positive for weakness.   All other systems reviewed and are negative.      Hemodynamics:  Temp (24hrs), Av.3 °C (97.3 °F), Min:36.2 °C (97.1 °F), Max:36.4 °C (97.5 °F)  Temperature: 36.3 °C (97.3 °F)  Pulse  Av.4  Min: 65  Max: 128  Blood Pressure : 125/52       Physical Exam:  Physical Exam   Constitutional: He is oriented to person, place, and time. He appears well-developed and well-nourished.   HENT:   Head: Normocephalic and atraumatic.   Eyes: Pupils are equal, round, and reactive to light. Scleral icterus ( Improved) is present.   Neck: Normal range of motion.   Cardiovascular: Regular rhythm and normal heart sounds.  Exam reveals no gallop and no friction rub.    No murmur heard.  Tachycardic   Pulmonary/Chest: Effort normal and breath sounds normal. He has no wheezes. He has no rales.   Abdominal: Soft. He exhibits distension. There is tenderness ( Right upper quadrant, negative Wolf sign.). There is no rebound.   Musculoskeletal: Normal range of motion. He exhibits edema ( 3+ bilateral lower extremity).   Lymphadenopathy:     He has no cervical adenopathy.   Neurological: He is alert and oriented to person, place, and time. No cranial nerve deficit.   No gross focal neuro  deficit   Skin: Skin is warm and dry. No rash noted.   Psychiatric: He has a normal mood and affect. His behavior is normal.       Meds:    Current Facility-Administered Medications:   •  diphenoxylate-atropine  •  morphine injection  •  LORazepam  •  budesonide-formoterol  •  insulin glargine **AND** [DISCONTINUED] insulin lispro **AND** [CANCELED] Accu-Chek Q6 if NPO **AND** [CANCELED] NOTIFY MD and PharmD **AND** [DISCONTINUED] glucose 4 g **AND** [DISCONTINUED] dextrose 50%  •  cefTAZidime  •  metroNIDAZOLE  •  methylPREDNISolone  •  [CANCELED] Special Contact Isolation **AND** C Diff by PCR rflx Toxin **AND** Pharmacy  •  benzocaine-menthol  •  apixaban  •  potassium chloride SA  •  insulin regular **AND** Accu-Chek ACHS **AND** NOTIFY MD and PharmD **AND** glucose 4 g **AND** dextrose 50%  •  phosphorus  •  DILTIAZem  •  ondansetron  •  Respiratory Care per Protocol  •  NS  •  ipratropium-albuterol    Labs:  Recent Labs      09/16/18 0455 09/17/18 0518 09/18/18   0445   WBC  12.9*  18.5*  31.8*   RBC  4.50*  4.31*  3.85*   HEMOGLOBIN  13.2*  12.7*  11.2*   HEMATOCRIT  38.5*  37.9*  34.2*   MCV  85.6  87.9  88.8   MCH  29.3  29.5  29.1   RDW  43.1  45.0  45.4   PLATELETCT  185  244  362   MPV  11.9  12.0  12.0   NEUTSPOLYS  84.60*  80.70*  68.00   LYMPHOCYTES  8.50*  11.10*  14.00*   MONOCYTES  5.40  5.30  11.00   EOSINOPHILS  0.00  0.00  0.00   BASOPHILS  0.20  0.30  0.00   RBCMORPHOLO   --    --   Present     Recent Labs      09/16/18 0455  09/17/18 0518 09/18/18   0445   SODIUM  136  135  133*   POTASSIUM  3.7  4.2  3.8   CHLORIDE  104  101  105   CO2  23  25  22   GLUCOSE  218*  244*  219*   BUN  25*  32*  40*     Recent Labs      09/16/18   0455  09/17/18   0518  09/18/18   0445   ALBUMIN  2.9*  3.2  2.9*   TBILIRUBIN  5.3*  4.1*  2.7*   ALKPHOSPHAT  107*  102*  84   TOTPROTEIN  6.2  6.2  5.5*   ALTSGPT  82*  84*  68*   ASTSGOT  45  47*  39   CREATININE  0.68  0.59  0.57        Imaging:  Dx-chest-portable (1 View)    Result Date: 9/13/2018 9/13/2018 6:39 AM HISTORY/REASON FOR EXAM:  Shortness of Breath TECHNIQUE/EXAM DESCRIPTION AND NUMBER OF VIEWS: Single portable view of the chest. COMPARISON: Yesterday FINDINGS: Interstitial opacities persist in the mid and lower lung fields bilaterally, and there is bibasilar atelectasis. Heart and mediastinum are normal.     Mild pulmonary edema and bibasilar atelectasis, without significant change.    Dx-chest-portable (1 View)    Result Date: 9/12/2018 9/12/2018 9:10 AM HISTORY/REASON FOR EXAM: Rapid heart rate. TECHNIQUE/EXAM DESCRIPTION AND NUMBER OF VIEWS: Single portable view of the chest. COMPARISON: 5/5/2015 FINDINGS: There is bibasilar atelectasis. There is no pleural effusion. The heart is enlarged.     1.  Cardiomegaly. 2.  Bibasilar atelectasis.    Us-gallbladder    Result Date: 9/18/2018 9/18/2018 6:28 AM HISTORY/REASON FOR EXAM: Pain, leukocytosis TECHNIQUE/EXAM DESCRIPTION AND NUMBER OF VIEWS:  Real-time sonography of the gallbladder. COMPARISON: 9/12/2018 FINDINGS: There is no ascites. The liver is normal in contour. There is no evidence of solid mass lesion. The liver is diffusely increased in echogenicity. Gallstones are present. There is a wall echo shadow sign. The gallbladder is not well distended. Its wall measures 2 mm in thickness. There is no sonographic Wolf sign. 0.23 cm There is no pericholecystic fluid. The common duct measures 1 cm. The visualized pancreas is unremarkable. The visualized aorta is normal in caliber. Intrahepatic IVC is patent. The portal vein is patent with hepatopetal flow. The right kidney measures 11.6 cm. There is RIGHT pelvocaliectasis.     1.  Cholelithiasis without other sonographic findings of biliary disease 2.  Echogenic liver, a nonspecific finding that often is found to represent steatosis.  Other infiltrative processes could have an identical appearance.    Us-gallbladder    Result  Date: 9/12/2018 9/12/2018 2:37 PM HISTORY/REASON FOR EXAM:  Abdominal pain, nausea and vomiting. TECHNIQUE/EXAM DESCRIPTION AND NUMBER OF VIEWS:  Real-time sonography of the gallbladder. COMPARISON: 4/18/2014 FINDINGS: There is no ascites. The liver is diffusely echogenic with decreased echogenicity in the gallbladder fossa. The liver measures 24.33 cm. Gallbladder shows echogenic shadowing focus consistent with stone.  Gallbladder is contracted.  Gallbladder wall measures 0.32 cm.  Pain reported with scanning over the gallbladder. There is no pericholecystic fluid. The common duct measures 0.50 cm. The pancreas is obscured by bowel gas. The aorta is obscured by bowel gas. Intrahepatic IVC is patent. The portal vein is patent with hepatopetal flow. The right kidney measures 12.94 cm and shows mildly prominent collecting system. Note made of distended bladder.     1.  Limited evaluation of pancreas and abdominal aorta. 2.  Echogenic liver consistent with fatty infiltration with focal sparing adjacent to gallbladder fossa. 3.  Cholelithiasis with pain over the gallbladder.  No other evidence to indicate acute cholecystitis.  No biliary dilation. 4.  Distended bladder and mild RIGHT hydronephrosis, possibly indicating urinary retention.    Echocardiogram-comp W/ Cont    Result Date: 9/13/2018  Transthoracic Echo Report Echocardiography Laboratory CONCLUSIONS Prior echo 7/4/17.  No significant changes noted. Normal left ventricular systolic function. Left ventricular ejection fraction is visually estimated to be 65%. A reliable estimation of diastolic function cannot be made due to tachycardia. Dilated inferior vena cava with inspiratory collapse. MATTHIEU MCKEON Exam Date:         09/13/2018                    07:16 Exam Location:     Inpatient Priority:          Routine Ordering Physician:        VIKI TORO Referring Physician:       [id number] Oneal Sonographer:               Chandler  Reji ANGELINA Age:    72     Gender:    M MRN:    [id number] :    1945 BSA:    2.03   Ht (in):    67     Wt (lb):    202 Exam Type:     Complete, Contrast Indications:     Dyspnea ICD Codes:       786.09 CPT Codes:       31725,  BP:          /          HR: Technical Quality:       Technically difficult study -                          adequate information is obtained MEASUREMENTS  (Male / Female) Normal Values 2D ECHO LV Diastolic Diameter PLAX        5.5 cm                4.2 - 5.9 / 3.9 - 5.3 cm LV Systolic Diameter PLAX         2.8 cm                2.1 - 4.0 cm IVS Diastolic Thickness           1.2 cm                LVPW Diastolic Thickness          0.92 cm               LVOT Diameter                     2.3 cm                Estimated LV Ejection Fraction    65 %                  DOPPLER AV Peak Velocity                  1.2 m/s               AV Peak Gradient                  6.2 mmHg              AV Mean Gradient                  3.5 mmHg              LVOT Peak Velocity                1.1 m/s               AV Area Cont Eq vti               3 cm²                 * Indicates values subject to auto-interpretation LV EF:  65    % FINDINGS Left Ventricle Normal left ventricular chamber size. Normal left ventricular wall thickness. Normal left ventricular systolic function. Left ventricular ejection fraction is visually estimated to be 65%. Normal regional wall motion. A reliable estimation of diastolic function cannot be made due to tachycardia. Right Ventricle Right ventricle not well visualized. Right Atrium Normal right atrial size. Dilated inferior vena cava with inspiratory collapse. Left Atrium Normal left atrial size. Left atrial volume index is 25  mL/sq m. Mitral Valve Structurally normal mitral valve. No mitral stenosis. Trace mitral regurgitation. Aortic Valve The aortic valve is not well visualized. No aortic stenosis. No aortic insufficiency. Tricuspid Valve The tricuspid valve is not well  visualized.  No tricuspid regurgitation. Pulmonic Valve The pulmonic valve is not well visualized. Pericardium Normal pericardium without effusion. Aorta Aortic root not well visualized.  Ascending aorta not well visualized. Whitley Costello MD (Electronically Signed) Final Date:     2018                 08:17    Le Venous Duplex (specify In Comments Left, Right Or Bilateral)    Result Date: 2018   Vascular Laboratory  CONCLUSIONS  No acute thrombosis is identified.  Chronic deep venous thrombosis in the distal right superficial femoral vein  and popliteal vein.  Suspect chronic deep venous thrombosis in the proximal peroneal and  posterior tibial veins, though veins were not well visualized.  MATTHIEU MCKEON  Exam Date:     2018 10:01  Room #:     Inpatient  Priority:     Stat  Ht (in):             Wt (lb):  Ordering Physician:        KELECHI MCDONALD  Referring Physician:       [id number] Hurtado  Sonographer:               Stephanie Hills RVT  Study Type:                Complete Bilateral  Technical Quality:         Adequate  Age:    72    Gender:     M  MRN:    [id number]  :    1945      BSA:  Indications:     Edema, unspecified  CPT Codes:       11969  ICD Codes:       R609  History:         Edema and swelling of both legs for 3-4 years. No prior                   exams.  Limitations:  PROCEDURES:  Bilateral lower extremity venous duplex imaging.  The following venous structures were evaluated: common femoral, profunda  femoral, greater saphenous, femoral, popliteal, peroneal and posterior  tibial veins.  Serial compression, augmentation maneuvers, color and spectral Doppler flow  evaluations were performed.  FINDINGS:  Right lower extremity -  Chronic deep venous thrombosis in the distal superficial femoral vein and  popliteal vein.  Suspect chronic deep venous thrombosis in the proximal peroneal and  posterior tibial veins, though veins were not well visualized.  All other veins of the right  "lower extremity demonstrate normal flow  dynamics with no evidence of deep vein thrombosis or superficial phlebitis.  Left lower extremity -  Complete color filling and compressibility with normal venous flow dynamics  including spontaneous flow, response to augmentation maneuvers, and  respiratory phasicity.  No evidence of superficial or deep venous thrombosis.  Antonio Alvares MD  (Electronically Signed)  Final Date:      12 September 2018                   11:01      Micro:  Results     Procedure Component Value Units Date/Time    BLOOD CULTURE [[id number]] Collected:  09/17/18 1509    Order Status:  Completed Specimen:  Blood from Peripheral Updated:  09/18/18 0621     Significant Indicator NEG     Source BLD     Site Peripheral     Blood Culture No Growth    Note: Blood cultures are incubated for 5 days and  are monitored continuously.Positive blood cultures  are called to the RN and reported as soon as  they are identified.     Blood culture testing and Gram stain, if indicated, are  performed at [facility], [facility]  [place], [place].  Positive blood cultures are  sent to [facility], [address] [place] [address], for organism identification and  susceptibility testing.      Narrative:       Per Hospital Policy: Only change Specimen Src: to \"Line\" if  specified by physician order.    BLOOD CULTURE [[id number]] Collected:  09/17/18 1509    Order Status:  Completed Specimen:  Blood from Peripheral Updated:  09/18/18 0621     Significant Indicator NEG     Source BLD     Site Peripheral     Blood Culture No Growth    Note: Blood cultures are incubated for 5 days and  are monitored continuously.Positive blood cultures  are called to the RN and reported as soon as  they are identified.     Blood culture testing and Gram stain, if indicated, are  performed at [facility], [facility]  [employer] [facility].[place], [place].  Positive blood cultures " "are  sent to [facility], [address] [place] [address], for organism identification and  susceptibility testing.      Narrative:       Per Hospital Policy: Only change Specimen Src: to \"Line\" if  specified by physician order.    C Diff by PCR rflx Toxin [[id number]] Collected:  09/15/18 1155    Order Status:  Completed Specimen:  Stool from Stool Updated:  09/15/18 2225     C Diff by PCR Negative     Comment: C. difficile NOT detected by PCR.  Treatment not indicated per guidelines.  Repeat testing not indicated within 7 days.          027-NAP1-BI Presumptive Negative     Comment: Presumptive 027/NAP1/BI target DNA sequences are NOT DETECTED.       Narrative:       Special Contact IsolationSURMXC LENORE HERNANDEZ  Does this patient have risk factors for C-diff?->Yes  C-Diff Risk Factors->antibiotic exposure    URINE CULTURE(NEW) [[id number]]  (Abnormal) Collected:  09/12/18 1216    Order Status:  Completed Specimen:  Urine from Urine, Clean Catch Updated:  09/15/18 0943     Significant Indicator POS (POS)     Source UR     Site URINE, CLEAN CATCH     Urine Culture Mixed skin tay 10-50,000 cfu/mL (A)      Yeast  <10,000 cfu/mL   (A)    Narrative:       Indication for culture:->Emergency Room Patient    BLOOD CULTURE [[id number]]  (Abnormal) Collected:  09/12/18 0958    Order Status:  Completed Specimen:  Blood from Peripheral Updated:  09/15/18 0837     Significant Indicator POS (POS)     Source BLD     Site PERIPHERAL     Blood Culture Blood culture testing and Gram stain, if indicated, are  performed at [facility], [address] [place] [address].  Positive blood cultures are  sent to [facility], [address] [place] [address], for organism identification and  susceptibility testing.  Growth detected by Bactec instrument.   (A)      Enterobacter cloacae  See previous culture for sensitivity report.   (A)    Narrative:       CALL  [facility]  ICU tel. " ",  CALLED  ICU tel.  09/13/2018, 03:56, RB PERF. RESULTS CALLED TO: [phone] rn  Per Hospital Policy: Only change Specimen Src: to \"Line\" if  specified by physician order.    BLOOD CULTURE [[id number]]  (Abnormal)  (Susceptibility) Collected:  09/12/18 0922    Order Status:  Completed Specimen:  Blood from Peripheral Updated:  09/15/18 0835     Significant Indicator POS (POS)     Source BLD     Site Peripheral     Blood Culture Blood culture testing and Gram stain, if indicated, are  performed at [facility], [address] [place] [address].  Positive blood cultures are  sent to [facility], [address] [place] [address], for organism identification and  susceptibility testing.  Growth detected by Bactec instrument.   (A)      Enterobacter cloacae (A)    Narrative:       CALL  [facility]  ICU tel. ,  CALLED  ICU tel.  09/13/2018, 01:19, RB PERF. RESULTS CALLED TO: [id number], rn  Per Hospital Policy: Only change Specimen Src: to \"Line\" if  specified by physician order.    Culture & Susceptibility     ENTEROBACTER CLOACAE     Antibiotic Sensitivity Microscan Unit Status    Ampicillin Resistant >16 mcg/mL Final    Method: SENSITIVITY, YANELY    Cefepime Sensitive <=8 mcg/mL Final    Method: SENSITIVITY, YANELY    Cefotaxime Sensitive <=2 mcg/mL Final    Method: SENSITIVITY, YANELY    Cefotetan Resistant >32 mcg/mL Final    Method: SENSITIVITY, YANELY    Ceftazidime Sensitive 4 mcg/mL Final    Method: SENSITIVITY, YANELY    Ceftriaxone Sensitive <=8 mcg/mL Final    Method: SENSITIVITY, YANELY    Cefuroxime Intermediate 16 mcg/mL Final    Method: SENSITIVITY, YANELY    Ciprofloxacin Sensitive <=1 mcg/mL Final    Method: SENSITIVITY, YANELY    Ertapenem Sensitive <=1 mcg/mL Final    Method: SENSITIVITY, YANELY    Gentamicin Sensitive <=4 mcg/mL Final    Method: SENSITIVITY, YANELY    Pip/Tazobactam Sensitive <=16 mcg/mL Final    Method: SENSITIVITY, YANELY    Tigecycline Sensitive <=2 mcg/mL Final    Method: " SENSITIVITY, YANELY    Tobramycin Sensitive <=4 mcg/mL Final    Method: SENSITIVITY, YANELY    Trimeth/Sulfa Sensitive <=2/38 mcg/mL Final    Method: SENSITIVITY, YANELY                       S. AUREUS BY PCR, NASAL COMPLETE [[id number]] Collected:  09/12/18 1530    Order Status:  Completed Updated:  09/13/18 1345     Staph aureus by PCR Negative     MRSA by PCR Negative    URINALYSIS [[id number]]  (Abnormal) Collected:  09/12/18 1216    Order Status:  Completed Specimen:  Urine from Urine, Clean Catch Updated:  09/12/18 1311     Color Yellow     Character Clear     Specific Gravity 1.010     Ph 5.5     Glucose >=1000 (A) mg/dL      Ketones 15 (A) mg/dL      Protein Negative mg/dL      Bilirubin Large (A)     Nitrite Negative     Leukocyte Esterase Negative     Occult Blood Small (A)     Micro Urine Req Microscopic    Narrative:       Indication for culture:->Emergency Room Patient    Influenza By PCR, A/B [[id number]] Collected:  09/12/18 0958    Order Status:  Completed Specimen:  Respirate from Nasopharyngeal Updated:  09/12/18 1155     Influenza virus A RNA Negative     Influenza virus B, PCR Negative    Influenza Rapid [[id number]] Collected:  09/12/18 0958    Order Status:  Completed Specimen:  Respirate from Respiratory Updated:  09/12/18 1025     Significant Indicator NEG     Source RESP     Site RESPIRATORY     Rapid Influenza A-B Negative for Influenza A and Influenza B antigens.  Infection due to influenza A or B cannot be ruled out  since the antigen present in the specimen may be below the  detection limit of the test. Culture confirmation of  negative samples is recommended.      INFLUENZA A/B BY PCR [[id number]] Collected:  09/12/18 0000    Order Status:  Canceled Specimen:  Nasal from Nasopharyngeal     Urinalysis [[id number]] Collected:  09/12/18 0000    Order Status:  Canceled Specimen:  Urine     Culture Respiratory W/ GRM STN [[id number]] Collected:  09/12/18 0000    Order Status:  Canceled Specimen:  Sputum  from Sputum           Assessment:  Enoch Mistry is a 72 y.o. male with a history of poorly controlled diabetes, hypertension, known cholelithiasis, who presented with abdominal pain and significant conjugated hyperbilirubinemia.  His blood culture is positive for Enterobacter.  The hyperbilirubinemia is spontaneously improving, suggestive of a gallstone that passed.  Unfortunately, his white count continues to worsen, now up to 30,000.  This is concerning for an acute cholangitis or intra-abdominal abscess.     Pertinent Diagnoses:  1.  Sepsis, worsening  2.  Enterobacter bacteremia   3.  Acute cholecystitis, likely passed an obstructive gallstone  4.  Conjugated hyperbilirubinemia, improving  5.  Poorly controlled diabetes  6.  Cholelithiasis    Active Hospital Problems    Diagnosis   • Sepsis due to Enterobacter species ([facility]) [A41.59]   • Acute on chronic respiratory failure ([facility]) [J96.20]   • Hypokalemia [E87.6]   • Hyponatremia [E87.1]   • Diarrhea [R19.7]   • Hypophosphatemia [E83.39]   • Abnormal LFTs [R94.5]   • Uncontrolled type 2 diabetes mellitus ([facility]) [E11.65]   • Non-compliance [Z91.19]   • Hepatic steatosis [K76.0]   • Chronic deep vein thrombosis (DVT) ([facility]) [I82.509]   • Stage 2 moderate COPD by GOLD classification ([facility]) [J44.9]       Plan:  Worsening sepsis  Will obtain CT abdomen and pelvis (ordered)  MRCP is also planned.  Patient unsure if he can lay flat that long but will try  Continue IV ceftazidime.  Switch IV Flagyl to p.o. (done)  Surveillance blood cultures from 9/17 are no growth to date    Enterobacter bacteremia  As above  Follow surveillance blood cultures    Acute cholecystitis, likely passed an obstructive gallstone  LFTs and T bili continue to improve  Worsening leukocytosis is concerning for possible cholangitis or abscess.  Plan as above    Poorly controlled diabetes  A1c 9.5  Contributing to immunosuppression    Discussed with internal medicine, Dr. Omalley

## 2018-09-18 NOTE — DIETARY
"Nutrition services: Day 6 of admit.  Enoch Mistry is a 72 y.o. male with admitting DX of sepsis.     Pt with diet upgraded on 9/15 to Hepatic Diabetic Regular. PO intake was poor; 25-50% x 3 and <25% x 2 and inadequate. This RD visited pt in room with RN (Jana) present. Pt actively taking anti-emetic and nurse noted pt has been having some nausea after eating. Pt stated the portions are too large for him and he may classify himself more as a \"snacker\". Pt requesting small portions and amenable to boost GC. During interview, nurse noted pt made NPO for testing/procedure. RD to continue to follow pt and adjust interventions as appropriate.     Assessment:  Height: 170.2 cm (5' 7\")  Weight: 106 kg (233 lb 11 oz)  Body mass index is 36.6 kg/m².   Diet/Intake: NPO on day #1     Eval:   1. Labs (WBC 31.8 H Hgb 11.2 L Na 133 L Glucose 219 H BUN 40 H Ca 7.8 L Alb 2.9 L ALT 68 H Tot Protein 5.5L  2. MAR (methylprednisolone 4mg BID, Flagyl starting this afternoon, Phosphorus refused 1235, Kdur 40 mEq BID, SSI Humulin QID, Lantus 20 units, Fortaz  3. Chart reviewed (last BM 9/18 small/loose/watery).     Malnutrition Risk: Poor PO intake in acute.     Recommendations/Plan:  1. Nutrition per MD discretion. Advance diet when medically feasible.   2. RD to follow Pt's clinical course.           "

## 2018-09-18 NOTE — PROGRESS NOTES
1915: Bedside report received from ALIN Delacruz. Patient resting comfortably in chair. No questions/concerns/needs verbalized at this time. Falls precautions remain in place.    2015: Rounded on patient, sitting up in chair, watching TV. Alert and oriented x 4. VSS and afebrile. SpO2 WNL on RA. Denies pain/discomfort at this time. IV patent and SL'd. Ambulating in room independently with steady gait. Plan of care discussed and questions answered. Needs addressed. Falls precautions in place. No further questions or concerns at this time. Will continue to monitor.     0635: US Gallbladder being done at bedside.

## 2018-09-18 NOTE — CARE PLAN
Problem: Nutritional:  Goal: Achieve adequate nutritional intake  Pt will consume >/= 50% of meals consistently   Outcome: NOT MET

## 2018-09-18 NOTE — CARE PLAN
Problem: Communication  Goal: The ability to communicate needs accurately and effectively will improve  Outcome: MET Date Met: 09/18/18  Patient calls approp for needs, A+ox4, english speaking, uses call bell

## 2018-09-18 NOTE — TELEPHONE ENCOUNTER
VOICEMAIL  1. Caller Name: Enoch Mistry                          Call Back Number: [phone] (home)         2. Message: Pt called stating her  is in the hospital and and was told we needed to change his oxygen from a on demand to a full flow unit.    3. Patient approves office to leave a detailed voicemail/MyChart message: N\A

## 2018-09-19 ENCOUNTER — APPOINTMENT (OUTPATIENT)
Dept: RADIOLOGY | Facility: MEDICAL CENTER | Age: 73
DRG: 853 | End: 2018-09-19
Attending: INTERNAL MEDICINE
Payer: MEDICARE

## 2018-09-19 ENCOUNTER — APPOINTMENT (OUTPATIENT)
Dept: RADIOLOGY | Facility: MEDICAL CENTER | Age: 73
DRG: 853 | End: 2018-09-19
Attending: HOSPITALIST
Payer: MEDICARE

## 2018-09-19 PROBLEM — K92.2 GI BLEEDING: Status: ACTIVE | Noted: 2018-09-19

## 2018-09-19 PROBLEM — K83.09 CHOLANGITIS: Status: ACTIVE | Noted: 2018-09-12

## 2018-09-19 PROBLEM — R65.21 SEVERE SEPSIS WITH SEPTIC SHOCK (CODE) (HCC): Status: ACTIVE | Noted: 2018-09-12

## 2018-09-19 PROBLEM — K80.50 COMMON BILE DUCT STONE: Status: ACTIVE | Noted: 2018-09-19

## 2018-09-19 LAB
ABO GROUP BLD: NORMAL
ABO GROUP BLD: NORMAL
ALBUMIN SERPL BCP-MCNC: 2.3 G/DL (ref 3.2–4.9)
ALBUMIN/GLOB SERPL: 1.2 G/DL
ALP SERPL-CCNC: 57 U/L (ref 30–99)
ALT SERPL-CCNC: 54 U/L (ref 2–50)
ANION GAP SERPL CALC-SCNC: 5 MMOL/L (ref 0–11.9)
ANION GAP SERPL CALC-SCNC: 8 MMOL/L (ref 0–11.9)
ANISOCYTOSIS BLD QL SMEAR: ABNORMAL
ANISOCYTOSIS BLD QL SMEAR: ABNORMAL
AST SERPL-CCNC: 32 U/L (ref 12–45)
BARCODED ABORH UBTYP: 600
BARCODED PRD CODE UBPRD: NORMAL
BARCODED UNIT NUM UBUNT: NORMAL
BASO STIPL BLD QL SMEAR: NORMAL
BASOPHILS # BLD AUTO: 0 % (ref 0–1.8)
BASOPHILS # BLD AUTO: 0 % (ref 0–1.8)
BASOPHILS # BLD: 0 K/UL (ref 0–0.12)
BASOPHILS # BLD: 0 K/UL (ref 0–0.12)
BILIRUB SERPL-MCNC: 1.9 MG/DL (ref 0.1–1.5)
BLD GP AB SCN SERPL QL: NORMAL
BUN SERPL-MCNC: 51 MG/DL (ref 8–22)
BUN SERPL-MCNC: 53 MG/DL (ref 8–22)
CALCIUM SERPL-MCNC: 7.4 MG/DL (ref 8.4–10.2)
CALCIUM SERPL-MCNC: 7.7 MG/DL (ref 8.4–10.2)
CHLORIDE SERPL-SCNC: 105 MMOL/L (ref 96–112)
CHLORIDE SERPL-SCNC: 106 MMOL/L (ref 96–112)
CO2 SERPL-SCNC: 20 MMOL/L (ref 20–33)
CO2 SERPL-SCNC: 22 MMOL/L (ref 20–33)
COMPONENT R 8504R: NORMAL
CREAT SERPL-MCNC: 0.78 MG/DL (ref 0.5–1.4)
CREAT SERPL-MCNC: 0.87 MG/DL (ref 0.5–1.4)
EOSINOPHIL # BLD AUTO: 0 K/UL (ref 0–0.51)
EOSINOPHIL # BLD AUTO: 0 K/UL (ref 0–0.51)
EOSINOPHIL NFR BLD: 0 % (ref 0–6.9)
EOSINOPHIL NFR BLD: 0 % (ref 0–6.9)
ERYTHROCYTE [DISTWIDTH] IN BLOOD BY AUTOMATED COUNT: 44.1 FL (ref 35.9–50)
ERYTHROCYTE [DISTWIDTH] IN BLOOD BY AUTOMATED COUNT: 46.5 FL (ref 35.9–50)
ERYTHROCYTE [DISTWIDTH] IN BLOOD BY AUTOMATED COUNT: 49 FL (ref 35.9–50)
GIANT PLATELETS BLD QL SMEAR: NORMAL
GLOBULIN SER CALC-MCNC: 1.9 G/DL (ref 1.9–3.5)
GLUCOSE BLD-MCNC: 244 MG/DL (ref 65–99)
GLUCOSE BLD-MCNC: 281 MG/DL (ref 65–99)
GLUCOSE BLD-MCNC: 304 MG/DL (ref 65–99)
GLUCOSE BLD-MCNC: 320 MG/DL (ref 65–99)
GLUCOSE BLD-MCNC: 332 MG/DL (ref 65–99)
GLUCOSE BLD-MCNC: 343 MG/DL (ref 65–99)
GLUCOSE BLD-MCNC: 352 MG/DL (ref 65–99)
GLUCOSE BLD-MCNC: 357 MG/DL (ref 65–99)
GLUCOSE BLD-MCNC: 375 MG/DL (ref 65–99)
GLUCOSE SERPL-MCNC: 276 MG/DL (ref 65–99)
GLUCOSE SERPL-MCNC: 334 MG/DL (ref 65–99)
HCT VFR BLD AUTO: 18 % (ref 42–52)
HCT VFR BLD AUTO: 22.5 % (ref 42–52)
HCT VFR BLD AUTO: 24.4 % (ref 42–52)
HGB BLD-MCNC: 5.7 G/DL (ref 14–18)
HGB BLD-MCNC: 7.5 G/DL (ref 14–18)
HGB BLD-MCNC: 8.4 G/DL (ref 14–18)
LG PLATELETS BLD QL SMEAR: NORMAL
LYMPHOCYTES # BLD AUTO: 6.96 K/UL (ref 1–4.8)
LYMPHOCYTES # BLD AUTO: 7.48 K/UL (ref 1–4.8)
LYMPHOCYTES NFR BLD: 13 % (ref 22–41)
LYMPHOCYTES NFR BLD: 17 % (ref 22–41)
MANUAL DIFF BLD: NORMAL
MANUAL DIFF BLD: NORMAL
MCH RBC QN AUTO: 29.5 PG (ref 27–33)
MCH RBC QN AUTO: 30 PG (ref 27–33)
MCH RBC QN AUTO: 30.2 PG (ref 27–33)
MCHC RBC AUTO-ENTMCNC: 31.5 G/DL (ref 33.7–35.3)
MCHC RBC AUTO-ENTMCNC: 33.3 G/DL (ref 33.7–35.3)
MCHC RBC AUTO-ENTMCNC: 34.4 G/DL (ref 33.7–35.3)
MCV RBC AUTO: 87.1 FL (ref 81.4–97.8)
MCV RBC AUTO: 90.7 FL (ref 81.4–97.8)
MCV RBC AUTO: 93.8 FL (ref 81.4–97.8)
METAMYELOCYTES NFR BLD MANUAL: 1 %
METAMYELOCYTES NFR BLD MANUAL: 3 %
MICROCYTES BLD QL SMEAR: ABNORMAL
MONOCYTES # BLD AUTO: 3.52 K/UL (ref 0–0.85)
MONOCYTES # BLD AUTO: 3.75 K/UL (ref 0–0.85)
MONOCYTES NFR BLD AUTO: 7 % (ref 0–13.4)
MONOCYTES NFR BLD AUTO: 8 % (ref 0–13.4)
MYELOCYTES NFR BLD MANUAL: 1 %
NEUTROPHILS # BLD AUTO: 32.56 K/UL (ref 1.82–7.42)
NEUTROPHILS # BLD AUTO: 40.66 K/UL (ref 1.82–7.42)
NEUTROPHILS NFR BLD: 72 % (ref 44–72)
NEUTROPHILS NFR BLD: 76 % (ref 44–72)
NEUTS BAND NFR BLD MANUAL: 2 % (ref 0–10)
NRBC # BLD AUTO: 0.4 K/UL
NRBC # BLD AUTO: 0.87 K/UL
NRBC BLD-RTO: 0.9 /100 WBC
NRBC BLD-RTO: 1.6 /100 WBC
PLATELET # BLD AUTO: 357 K/UL (ref 164–446)
PLATELET # BLD AUTO: 376 K/UL (ref 164–446)
PLATELET # BLD AUTO: 412 K/UL (ref 164–446)
PLATELET BLD QL SMEAR: NORMAL
PLATELET BLD QL SMEAR: NORMAL
PMV BLD AUTO: 12 FL (ref 9–12.9)
PMV BLD AUTO: 12.2 FL (ref 9–12.9)
PMV BLD AUTO: 12.2 FL (ref 9–12.9)
POLYCHROMASIA BLD QL SMEAR: NORMAL
POLYCHROMASIA BLD QL SMEAR: NORMAL
POTASSIUM SERPL-SCNC: 3.4 MMOL/L (ref 3.6–5.5)
POTASSIUM SERPL-SCNC: 4.3 MMOL/L (ref 3.6–5.5)
PRODUCT TYPE UPROD: NORMAL
PROT SERPL-MCNC: 4.2 G/DL (ref 6–8.2)
RBC # BLD AUTO: 1.93 M/UL (ref 4.7–6.1)
RBC # BLD AUTO: 2.48 M/UL (ref 4.7–6.1)
RBC # BLD AUTO: 2.8 M/UL (ref 4.7–6.1)
RBC BLD AUTO: PRESENT
RBC BLD AUTO: PRESENT
RH BLD: NORMAL
RH BLD: NORMAL
SMUDGE CELLS BLD QL SMEAR: NORMAL
SODIUM SERPL-SCNC: 130 MMOL/L (ref 135–145)
SODIUM SERPL-SCNC: 136 MMOL/L (ref 135–145)
TOXIC GRANULES BLD QL SMEAR: SLIGHT
UNIT STATUS USTAT: NORMAL
VARIANT LYMPHS BLD QL SMEAR: NORMAL
WBC # BLD AUTO: 44 K/UL (ref 4.8–10.8)
WBC # BLD AUTO: 45 K/UL (ref 4.8–10.8)
WBC # BLD AUTO: 53.5 K/UL (ref 4.8–10.8)

## 2018-09-19 PROCEDURE — 02HV33Z INSERTION OF INFUSION DEVICE INTO SUPERIOR VENA CAVA, PERCUTANEOUS APPROACH: ICD-10-PCS | Performed by: INTERNAL MEDICINE

## 2018-09-19 PROCEDURE — 160036 HCHG PACU - EA ADDL 30 MINS PHASE I: Performed by: INTERNAL MEDICINE

## 2018-09-19 PROCEDURE — 700105 HCHG RX REV CODE 258: Performed by: HOSPITALIST

## 2018-09-19 PROCEDURE — 36415 COLL VENOUS BLD VENIPUNCTURE: CPT

## 2018-09-19 PROCEDURE — 700102 HCHG RX REV CODE 250 W/ 637 OVERRIDE(OP): Performed by: INTERNAL MEDICINE

## 2018-09-19 PROCEDURE — 80048 BASIC METABOLIC PNL TOTAL CA: CPT

## 2018-09-19 PROCEDURE — 86923 COMPATIBILITY TEST ELECTRIC: CPT

## 2018-09-19 PROCEDURE — 71045 X-RAY EXAM CHEST 1 VIEW: CPT

## 2018-09-19 PROCEDURE — C1751 CATH, INF, PER/CENT/MIDLINE: HCPCS

## 2018-09-19 PROCEDURE — 700111 HCHG RX REV CODE 636 W/ 250 OVERRIDE (IP): Performed by: INTERNAL MEDICINE

## 2018-09-19 PROCEDURE — 80500 HCHG CLINICAL PATH CONSULT-LIMITED: CPT

## 2018-09-19 PROCEDURE — 86850 RBC ANTIBODY SCREEN: CPT

## 2018-09-19 PROCEDURE — 160009 HCHG ANES TIME/MIN: Performed by: INTERNAL MEDICINE

## 2018-09-19 PROCEDURE — 99233 SBSQ HOSP IP/OBS HIGH 50: CPT | Performed by: INTERNAL MEDICINE

## 2018-09-19 PROCEDURE — 770022 HCHG ROOM/CARE - ICU (200)

## 2018-09-19 PROCEDURE — 500066 HCHG BITE BLOCK, ECT: Performed by: INTERNAL MEDICINE

## 2018-09-19 PROCEDURE — 700105 HCHG RX REV CODE 258: Performed by: INTERNAL MEDICINE

## 2018-09-19 PROCEDURE — 85027 COMPLETE CBC AUTOMATED: CPT | Mod: 91

## 2018-09-19 PROCEDURE — C9113 INJ PANTOPRAZOLE SODIUM, VIA: HCPCS | Performed by: HOSPITALIST

## 2018-09-19 PROCEDURE — 700101 HCHG RX REV CODE 250: Performed by: INTERNAL MEDICINE

## 2018-09-19 PROCEDURE — 36556 INSERT NON-TUNNEL CV CATH: CPT

## 2018-09-19 PROCEDURE — 0W3P8ZZ CONTROL BLEEDING IN GASTROINTESTINAL TRACT, VIA NATURAL OR ARTIFICIAL OPENING ENDOSCOPIC: ICD-10-PCS | Performed by: INTERNAL MEDICINE

## 2018-09-19 PROCEDURE — 86901 BLOOD TYPING SEROLOGIC RH(D): CPT

## 2018-09-19 PROCEDURE — 700111 HCHG RX REV CODE 636 W/ 250 OVERRIDE (IP): Performed by: HOSPITALIST

## 2018-09-19 PROCEDURE — 700111 HCHG RX REV CODE 636 W/ 250 OVERRIDE (IP): Performed by: ANESTHESIOLOGY

## 2018-09-19 PROCEDURE — 74181 MRI ABDOMEN W/O CONTRAST: CPT

## 2018-09-19 PROCEDURE — 160035 HCHG PACU - 1ST 60 MINS PHASE I: Performed by: INTERNAL MEDICINE

## 2018-09-19 PROCEDURE — 700102 HCHG RX REV CODE 250 W/ 637 OVERRIDE(OP): Performed by: HOSPITALIST

## 2018-09-19 PROCEDURE — 85007 BL SMEAR W/DIFF WBC COUNT: CPT | Mod: 91

## 2018-09-19 PROCEDURE — 160002 HCHG RECOVERY MINUTES (STAT): Performed by: INTERNAL MEDICINE

## 2018-09-19 PROCEDURE — 160203 HCHG ENDO MINUTES - 1ST 30 MINS LEVEL 4: Performed by: INTERNAL MEDICINE

## 2018-09-19 PROCEDURE — B548ZZA ULTRASONOGRAPHY OF SUPERIOR VENA CAVA, GUIDANCE: ICD-10-PCS | Performed by: INTERNAL MEDICINE

## 2018-09-19 PROCEDURE — 99291 CRITICAL CARE FIRST HOUR: CPT | Mod: 25 | Performed by: INTERNAL MEDICINE

## 2018-09-19 PROCEDURE — 36430 TRANSFUSION BLD/BLD COMPNT: CPT

## 2018-09-19 PROCEDURE — 82962 GLUCOSE BLOOD TEST: CPT | Mod: 91

## 2018-09-19 PROCEDURE — 86900 BLOOD TYPING SEROLOGIC ABO: CPT

## 2018-09-19 PROCEDURE — 160048 HCHG OR STATISTICAL LEVEL 1-5: Performed by: INTERNAL MEDICINE

## 2018-09-19 PROCEDURE — P9016 RBC LEUKOCYTES REDUCED: HCPCS

## 2018-09-19 PROCEDURE — 99291 CRITICAL CARE FIRST HOUR: CPT | Performed by: HOSPITALIST

## 2018-09-19 PROCEDURE — 80053 COMPREHEN METABOLIC PANEL: CPT

## 2018-09-19 PROCEDURE — 700111 HCHG RX REV CODE 636 W/ 250 OVERRIDE (IP)

## 2018-09-19 PROCEDURE — 30233N1 TRANSFUSION OF NONAUTOLOGOUS RED BLOOD CELLS INTO PERIPHERAL VEIN, PERCUTANEOUS APPROACH: ICD-10-PCS | Performed by: HOSPITALIST

## 2018-09-19 PROCEDURE — 503369 HCHG GOLDPROBE, 7 FR: Performed by: INTERNAL MEDICINE

## 2018-09-19 PROCEDURE — 36556 INSERT NON-TUNNEL CV CATH: CPT | Mod: RT | Performed by: INTERNAL MEDICINE

## 2018-09-19 RX ORDER — ONDANSETRON 2 MG/ML
4 INJECTION INTRAMUSCULAR; INTRAVENOUS
Status: DISCONTINUED | OUTPATIENT
Start: 2018-09-19 | End: 2018-09-19 | Stop reason: HOSPADM

## 2018-09-19 RX ORDER — NALOXONE HYDROCHLORIDE 0.4 MG/ML
0.1 INJECTION, SOLUTION INTRAMUSCULAR; INTRAVENOUS; SUBCUTANEOUS PRN
Status: DISCONTINUED | OUTPATIENT
Start: 2018-09-19 | End: 2018-09-19 | Stop reason: HOSPADM

## 2018-09-19 RX ORDER — HALOPERIDOL 5 MG/ML
1 INJECTION INTRAMUSCULAR
Status: DISCONTINUED | OUTPATIENT
Start: 2018-09-19 | End: 2018-09-19 | Stop reason: HOSPADM

## 2018-09-19 RX ORDER — DIPHENHYDRAMINE HYDROCHLORIDE 50 MG/ML
12.5 INJECTION INTRAMUSCULAR; INTRAVENOUS
Status: DISCONTINUED | OUTPATIENT
Start: 2018-09-19 | End: 2018-09-19 | Stop reason: HOSPADM

## 2018-09-19 RX ORDER — INSULIN GLARGINE 100 [IU]/ML
25 INJECTION, SOLUTION SUBCUTANEOUS 2 TIMES DAILY
Status: DISCONTINUED | OUTPATIENT
Start: 2018-09-19 | End: 2018-09-19

## 2018-09-19 RX ORDER — SUCRALFATE ORAL 1 G/10ML
1 SUSPENSION ORAL EVERY 6 HOURS
Status: DISCONTINUED | OUTPATIENT
Start: 2018-09-19 | End: 2018-09-25 | Stop reason: HOSPADM

## 2018-09-19 RX ORDER — DEXTROSE MONOHYDRATE 25 G/50ML
25 INJECTION, SOLUTION INTRAVENOUS
Status: DISCONTINUED | OUTPATIENT
Start: 2018-09-19 | End: 2018-09-25 | Stop reason: HOSPADM

## 2018-09-19 RX ORDER — POTASSIUM CHLORIDE 7.45 MG/ML
10 INJECTION INTRAVENOUS
Status: COMPLETED | OUTPATIENT
Start: 2018-09-19 | End: 2018-09-20

## 2018-09-19 RX ORDER — SODIUM CHLORIDE 9 MG/ML
INJECTION, SOLUTION INTRAVENOUS ONCE
Status: COMPLETED | OUTPATIENT
Start: 2018-09-19 | End: 2018-09-19

## 2018-09-19 RX ADMIN — METRONIDAZOLE 500 MG: 500 INJECTION, SOLUTION INTRAVENOUS at 16:58

## 2018-09-19 RX ADMIN — SODIUM CHLORIDE 5 UNITS/HR: 9 INJECTION, SOLUTION INTRAVENOUS at 17:23

## 2018-09-19 RX ADMIN — LORAZEPAM 1 MG: 2 INJECTION INTRAMUSCULAR; INTRAVENOUS at 08:13

## 2018-09-19 RX ADMIN — SODIUM CHLORIDE 8 MG/HR: 9 INJECTION, SOLUTION INTRAVENOUS at 16:59

## 2018-09-19 RX ADMIN — CEFTAZIDIME 1 G: 1 INJECTION, POWDER, FOR SOLUTION INTRAMUSCULAR; INTRAVENOUS at 21:38

## 2018-09-19 RX ADMIN — POTASSIUM CHLORIDE 10 MEQ: 7.46 INJECTION, SOLUTION INTRAVENOUS at 23:51

## 2018-09-19 RX ADMIN — INSULIN HUMAN 6 UNITS: 100 INJECTION, SOLUTION PARENTERAL at 06:17

## 2018-09-19 RX ADMIN — ONDANSETRON HYDROCHLORIDE 4 MG: 2 INJECTION INTRAMUSCULAR; INTRAVENOUS at 03:20

## 2018-09-19 RX ADMIN — MORPHINE SULFATE 4 MG: 4 INJECTION INTRAVENOUS at 06:15

## 2018-09-19 RX ADMIN — SODIUM CHLORIDE 4 UNITS/HR: 9 INJECTION, SOLUTION INTRAVENOUS at 16:32

## 2018-09-19 RX ADMIN — CEFTAZIDIME 1 G: 1 INJECTION, POWDER, FOR SOLUTION INTRAMUSCULAR; INTRAVENOUS at 16:56

## 2018-09-19 RX ADMIN — SODIUM CHLORIDE: 9 INJECTION, SOLUTION INTRAVENOUS at 12:52

## 2018-09-19 RX ADMIN — INSULIN HUMAN 12 UNITS: 100 INJECTION, SOLUTION PARENTERAL at 12:49

## 2018-09-19 RX ADMIN — METRONIDAZOLE 500 MG: 500 INJECTION, SOLUTION INTRAVENOUS at 21:06

## 2018-09-19 ASSESSMENT — ENCOUNTER SYMPTOMS
SHORTNESS OF BREATH: 0
SORE THROAT: 0
BLURRED VISION: 0
DEPRESSION: 0
BACK PAIN: 0
DIARRHEA: 1
INSOMNIA: 0
TINGLING: 0
PALPITATIONS: 0
EYE PAIN: 0
NAUSEA: 1
WEAKNESS: 1
NECK PAIN: 0
HEADACHES: 0
SHORTNESS OF BREATH: 1
COUGH: 0
ABDOMINAL PAIN: 1
VOMITING: 0
DIZZINESS: 0
CHILLS: 0
FEVER: 0

## 2018-09-19 ASSESSMENT — PAIN SCALES - GENERAL
PAINLEVEL_OUTOF10: 0
PAINLEVEL_OUTOF10: 0
PAINLEVEL_OUTOF10: 3
PAINLEVEL_OUTOF10: 1
PAINLEVEL_OUTOF10: 7
PAINLEVEL_OUTOF10: 0
PAINLEVEL_OUTOF10: 0
PAINLEVEL_OUTOF10: 2
PAINLEVEL_OUTOF10: 0

## 2018-09-19 ASSESSMENT — PATIENT HEALTH QUESTIONNAIRE - PHQ9
1. LITTLE INTEREST OR PLEASURE IN DOING THINGS: NOT AT ALL
2. FEELING DOWN, DEPRESSED, IRRITABLE, OR HOPELESS: NOT AT ALL
SUM OF ALL RESPONSES TO PHQ9 QUESTIONS 1 AND 2: 0

## 2018-09-19 NOTE — CARE PLAN
Problem: Safety  Goal: Will remain free from falls  Outcome: PROGRESSING AS EXPECTED  Pt encouraged to call for assistance as needed. Safety precautions in place. Regular rounding.    Problem: Pain Management  Goal: Pain level will decrease to patient's comfort goal  Outcome: PROGRESSING AS EXPECTED  Pain assessed throughout shift. Non pharmacologic interventions offered. Meds refused per MAR.

## 2018-09-19 NOTE — PROCEDURES
"Date: 9/19/2018    Procedure:  Central Venous Catheter Insertion    Indication: acute blood loss anaemia with hypotension and one IV    Physician:  La Garcia M.D.    Consent:  Obtained from patient and included in the medical record; time out performed    Procedure:  The patient was placed in the [place] position.  Appropriate \"time out\" was performed.  The right neck was prepped and draped in the usual sterile fashion.  Under full body sterile barrier precautions, a triple lumen central venous catheter was placed without difficulty in the right IJ position.  US was used for localization and placement.  All lumens were flushed with sterile saline and sterile caps were applied.  The line was sutured in place and a sterile dressing was applied.  There were no immediate complications.  A post-procedure CXR will be reviewed.  Estimated blood loss < 5cc.   "

## 2018-09-19 NOTE — CONSULTS
Consult  Pulmonary & Critical Care Medicine     Consulting Physician: Dr. RUBEN Omalley  Reason for consult: GI bleed and acute blood loss anaemia    Code status: DNAR, Ok to intubate    Assessment & Plan   Severe sepsis with septic shock (CODE) ([facility])   Assessment & Plan    Entorobact in blood 9/12 repeat negative 9.17  Cefepime and flagyl  Continue to have abdominal pain even though LFTs improved  3 stones in CBD and not dilated 5mm        Uncontrolled type 2 diabetes mellitus ([facility])   Assessment & Plan    FS q 4 and may need insulin drip to maintain FS < 180        GI bleeding   Assessment & Plan    Upper with melena and now hyptensive  Has been on eloquis and steroids  Both on hold  Gi bleed likely from PUdz verus gastritis  2 units stat RBCS  H and H q 6  NPO  IV fluids  GI aware        Chronic deep vein thrombosis (DVT) ([facility])   Assessment & Plan    Due to bleeding will hold eloquis        Stage 2 moderate COPD by GOLD classification ([facility])   Assessment & Plan    Not in exacerbation  Continue home inhalers            Feeding:  npo  Analgesia: morphine for pain prn  Sedation:  n/a  Thromboembolic prophylaxis:  scds  Head of bed elevation:  30 degrees  Ulcer prophylaxis: PPI drip   Glucose:  FS q 4 if maintains > 200 will start insulin drip  Spontaneous breathing trial:  n/a  Bowel regimen:  Yes -- melena today   Indwelling catheter:  yes  Descalation of Abx:no as +cholangitis  Code Status: DNAR ok to intubate   Disposition:  ICU  Restraints:  none    HPI  Enoch Mistry is an 72 y.o. male admitted on  9/12/18 with fevers, chills, shortness of breath,   and abdominal pain and elevated LFTS (elevated conjugated hyperbilirubinemia) ascending cholangitis. Ultrasound showed cholelithiasis with pain over the gallbladder, but no evidence of acute cholecystitis. Blood culture growing Enterobacter.  Her developed a copd exacerbation in hospital and was rx with steroids with improvement  He progressed with progressive  "abdominal pain  9/19 pt developed melena and has been on eloquis for chronic DVT right leg  Gi and surgery consulted and plan was for cholecystectomy 9/20/18 am. Pt had an MRI today which showed \"No significant intrahepatic ductal dilatation is identified. The common bile duct measures approximately 5 mm in diameter. There are at least 3 low signal foci in the distal common bile duct\"    Rx with ceftazidine and flagyl    Pt transferred to ICU with acute blood loss anaemia with hypotension  Stat right IJ placed  2 units of blood given for 5.7      9/12/18\" Bacterial cx enterobact cloaca and last blood cx 9/17/18 is negative  Hx of: history of chronic alcohol use, alcoholic liver disease, chronic DVT, poorly controlled diabetes, COPD on home oxygen at 2 L/min, and obesity    ROS:  Pt complains of nausea, abdominal pain and weakness. No dyspnea and no SOB. Rest of ROS is negative    Past Medical History:   Diagnosis Date   • Cancer (HCC)     bladder cancer   • Hiatus hernia syndrome    • Hypertension    • Other specified disorder of intestines     constipation   • Urinary bladder disorder     retention, perera cath       Past Surgical History:   Procedure Laterality Date   • CYSTOSCOPY N/A 9/11/2017    Procedure: CYSTOSCOPY- CLOT EVACUATION, FULGURATION, OF PROSTATE;  Surgeon: Srini Giles M.D.;  Location: [facility];  Service: Urology   • CYSTOSCOPY  5/7/2015    Procedure: CYSTOSCOPY [57.32] ;  Surgeon: Srini Giles M.D.;  Location: [facility];  Service:    • TRANS URETHRAL RESECTION PROSTATE  5/7/2015    Procedure: TRANS URETHRAL RESECTION PROSTATE [60.20];  Surgeon: Srini Giles M.D.;  Location: [facility];  Service:    • TRANS URETHRAL RESECTION BLADDER  5/7/2015    Procedure: TRANS URETHRAL RESECTION BLADDER [57.49A];  Surgeon: Srini Giles M.D.;  Location: [facility];  Service:         History reviewed. No pertinent family history.    Social History " "    Social History   • Marital status:      Spouse name: N/A   • Number of children: N/A   • Years of education: N/A     Occupational History   • Not on file.     Social History Main Topics   • Smoking status: Former Smoker     Packs/day: 2.00     Years: 40.00     Types: Cigarettes     Quit date: 1/1/2006   • Smokeless tobacco: Never Used   • Alcohol use 0.0 oz/week      Comment: 7 beers per week   • Drug use: No   • Sexual activity: Not Currently     Partners: Female     Other Topics Concern   • Not on file     Social History Narrative   • No narrative on file          Current Facility-Administered Medications:   •  insulin regular **AND** Accu-Chek ACHS **AND** NOTIFY MD and PharmD **AND** glucose 4 g **AND** dextrose 50%  •  pantoprazole (PROTONIX) infusion  •  metroNIDAZOLE  •  diphenoxylate-atropine  •  morphine injection  •  LORazepam  •  budesonide-formoterol  •  cefTAZidime  •  benzocaine-menthol  •  ondansetron  •  Respiratory Care per Protocol  •  NS  •  ipratropium-albuterol    Allergies: Patient has no known allergies.     Objective     PHYSICAL EXAM:   /59   Pulse (!) 105   Temp 36.3 °C (97.4 °F)   Resp 16   Ht 1.702 m (5' 7\")   Wt 100.5 kg (221 lb 9 oz)   SpO2 100%   BMI 34.70 kg/m²     Intake/Output Summary (Last 24 hours) at 09/19/18 1231  Last data filed at 09/19/18 0500   Gross per 24 hour   Intake              300 ml   Output              804 ml   Net             -504 ml       Gen: pale appearing male NAD  HEENT: Normocephalic atraumatic, PERRL, oropharynx clear no erythema or exudates; Nares patents, no discharge from the eyes and no discharge from either ear  No adenopathy and trachea midlines  Pale conjuctiva  Cardiovascular: tachycardic, no murmurs or rubs  Pulmonary: No apparent wheezes, rales or rhonchi   Abdomen: Soft, nondistended, tender on deep palpation but no guarding, normoactive bowel sounds; no organomegaly  Gu: no perera catheter present  Extremities: L > r lower " extremity lower extremity edema bilaterally, peripheral pulses +2 in all 4 extremities, no calf tenderness  Neuro: Alert and oriented ×3, moving all extremities equally, no focal deficits   Skin: stasis dermatitis      Labs:  Lab Results   Component Value Date/Time    SODIUM 130 (L) 09/19/2018 07:12 AM    POTASSIUM 4.3 09/19/2018 07:12 AM    CHLORIDE 105 09/19/2018 07:12 AM    CO2 20 09/19/2018 07:12 AM    GLUCOSE 334 (H) 09/19/2018 07:12 AM    BUN 53 (H) 09/19/2018 07:12 AM    CREATININE 0.78 09/19/2018 07:12 AM        Lab Results   Component Value Date/Time    WBC 45.0 (HH) 09/19/2018 12:59 PM    RBC 1.93 (L) 09/19/2018 12:59 PM    HEMOGLOBIN 5.7 (LL) 09/19/2018 12:59 PM    HEMATOCRIT 18.0 (L) 09/19/2018 12:59 PM    MCV 93.8 09/19/2018 12:59 PM    MCH 29.5 09/19/2018 12:59 PM    MCHC 31.5 (L) 09/19/2018 12:59 PM    MPV 12.0 09/19/2018 12:59 PM    NEUTSPOLYS 72.00 09/19/2018 07:12 AM    LYMPHOCYTES 17.00 (L) 09/19/2018 07:12 AM    MONOCYTES 8.00 09/19/2018 07:12 AM    EOSINOPHILS 0.00 09/19/2018 07:12 AM    BASOPHILS 0.00 09/19/2018 07:12 AM    ANISOCYTOSIS 1+ 09/19/2018 07:12 AM         My Critical Care Time not including procedures: 45 mins see assessment and plan above    La Garcia

## 2018-09-19 NOTE — ASSESSMENT & PLAN NOTE
"EGD 9/19/18 showed \"1. 1cm duodenal apex ulcer w/ bleeding vessel 2. Multiple shallow DUs 3. Gr D esophagitis\"  Continue PPI changed to bid and sucralafate  3 Units total of PRBCs and holding  "

## 2018-09-19 NOTE — PROGRESS NOTES
Telemetry Summary    Rhythm SR/ST  HR 90s-110s  Ectopy Frequent PVC, Rare Couplets, 45 beat run Vtach  MI 0.16  QRS 0.10  QT 0.36

## 2018-09-19 NOTE — ASSESSMENT & PLAN NOTE
From cholecystitis and cholelithiasis  Entorobact in blood 9/12 repeat negative 9/17  Cefepime and flagyl -- will discuss with ID how many days of Abx  Gall bladder removed on 9/20 and 9/21 ERCP with stent to decompress biliary system 9/21 - PPI drip recommended and prevpac 14 days  due to the multiple ulcers in ampulla  Wbc coming down  H pylori tests 9/21 is negative

## 2018-09-19 NOTE — PROGRESS NOTES
Patient left unit stable and in no distress in bed to ICU, IV infusing without any difficulties,O2 at 3 liters,report given to [place] ICU nurse.

## 2018-09-19 NOTE — PROGRESS NOTES
Infectious Disease Progress Note    Author: Frankie Olivares M.D. Date & Time of service: 2018  8:30 AM    Chief Complaint:  Follow-up of Enterobacter bacteremia    Interval History:   afebrile, white count now up to 31.8.  Reports that he was feeling terrible this morning after breakfast, feels a little bit better now.   afebrile, white count up to 44,000. CT with no e/o abscess or ductal dilatation. MRCP planned and he is scheduled for cholecystectomy tomorrow at 3 PM. His prostate is 6cm in size. No abscess.  Hemoglobin dropped from 11-7.5.  Patient reports off and on abdominal pain    Labs Reviewed and Medications Reviewed.    Review of Systems:  Review of Systems   Constitutional: Positive for malaise/fatigue. Negative for fever.   Respiratory: Positive for shortness of breath.    Cardiovascular: Positive for leg swelling.   Gastrointestinal: Positive for abdominal pain ( Off and on), diarrhea and nausea.   Genitourinary: Negative for dysuria.   Skin: Negative for itching and rash.   Neurological: Positive for weakness.   All other systems reviewed and are negative.      Hemodynamics:  Temp (24hrs), Av.2 °C (97.2 °F), Min:36.2 °C (97.2 °F), Max:36.3 °C (97.3 °F)  Temperature: 36.3 °C (97.3 °F)  Pulse  Av.4  Min: 65  Max: 128Heart Rate (Monitored): (!) 102  Blood Pressure : 113/52       Physical Exam:  Physical Exam   Constitutional: He is oriented to person, place, and time. He appears well-developed and well-nourished.   Appears quite pale today   HENT:   Head: Normocephalic and atraumatic.   Eyes: Pupils are equal, round, and reactive to light. Scleral icterus ( Improved) is present.   Neck: Normal range of motion.   Cardiovascular: Regular rhythm and normal heart sounds.  Exam reveals no gallop and no friction rub.    No murmur heard.  Tachycardic   Pulmonary/Chest: Effort normal and breath sounds normal. He has no wheezes. He has no rales.   Abdominal: Soft. He exhibits distension.  There is tenderness ( Right upper quadrant, negative Wolf sign.). There is no rebound.   Musculoskeletal: Normal range of motion. He exhibits edema ( 3+ bilateral lower extremity).   Lymphadenopathy:     He has no cervical adenopathy.   Neurological: He is alert and oriented to person, place, and time. No cranial nerve deficit.   No gross focal neuro deficit   Skin: Skin is warm and dry. No rash noted.   Psychiatric: He has a normal mood and affect. His behavior is normal.       Meds:    Current Facility-Administered Medications:   •  diphenoxylate-atropine  •  morphine injection  •  LORazepam  •  metroNIDAZOLE  •  budesonide-formoterol  •  insulin glargine **AND** [DISCONTINUED] insulin lispro **AND** [CANCELED] Accu-Chek Q6 if NPO **AND** [CANCELED] NOTIFY MD and PharmD **AND** [DISCONTINUED] glucose 4 g **AND** [DISCONTINUED] dextrose 50%  •  cefTAZidime  •  methylPREDNISolone  •  benzocaine-menthol  •  apixaban  •  potassium chloride SA  •  insulin regular **AND** Accu-Chek ACHS **AND** NOTIFY MD and PharmD **AND** glucose 4 g **AND** dextrose 50%  •  phosphorus  •  DILTIAZem  •  ondansetron  •  Respiratory Care per Protocol  •  NS  •  ipratropium-albuterol    Labs:  Recent Labs      09/17/18 0518 09/18/18 0445   WBC  18.5*  31.8*   RBC  4.31*  3.85*   HEMOGLOBIN  12.7*  11.2*   HEMATOCRIT  37.9*  34.2*   MCV  87.9  88.8   MCH  29.5  29.1   RDW  45.0  45.4   PLATELETCT  244  362   MPV  12.0  12.0   NEUTSPOLYS  80.70*  68.00   LYMPHOCYTES  11.10*  14.00*   MONOCYTES  5.30  11.00   EOSINOPHILS  0.00  0.00   BASOPHILS  0.30  0.00   RBCMORPHOLO   --   Present     Recent Labs      09/17/18 0518 09/18/18 0445  09/19/18   0712   SODIUM  135  133*  130*   POTASSIUM  4.2  3.8  4.3   CHLORIDE  101  105  105   CO2  25  22  20   GLUCOSE  244*  219*  334*   BUN  32*  40*  53*     Recent Labs      09/17/18 0518 09/18/18   0445  09/19/18   0712   ALBUMIN  3.2  2.9*  2.3*   TBILIRUBIN  4.1*  2.7*  1.9*    ALKPHOSPHAT  102*  84  57   TOTPROTEIN  6.2  5.5*  4.2*   ALTSGPT  84*  68*  54*   ASTSGOT  47*  39  32   CREATININE  0.59  0.57  0.78       Imaging:  Dx-chest-portable (1 View)    Result Date: 9/13/2018 9/13/2018 6:39 AM HISTORY/REASON FOR EXAM:  Shortness of Breath TECHNIQUE/EXAM DESCRIPTION AND NUMBER OF VIEWS: Single portable view of the chest. COMPARISON: Yesterday FINDINGS: Interstitial opacities persist in the mid and lower lung fields bilaterally, and there is bibasilar atelectasis. Heart and mediastinum are normal.     Mild pulmonary edema and bibasilar atelectasis, without significant change.    Dx-chest-portable (1 View)    Result Date: 9/12/2018 9/12/2018 9:10 AM HISTORY/REASON FOR EXAM: Rapid heart rate. TECHNIQUE/EXAM DESCRIPTION AND NUMBER OF VIEWS: Single portable view of the chest. COMPARISON: 5/5/2015 FINDINGS: There is bibasilar atelectasis. There is no pleural effusion. The heart is enlarged.     1.  Cardiomegaly. 2.  Bibasilar atelectasis.    Us-gallbladder    Result Date: 9/18/2018 9/18/2018 6:28 AM HISTORY/REASON FOR EXAM: Pain, leukocytosis TECHNIQUE/EXAM DESCRIPTION AND NUMBER OF VIEWS:  Real-time sonography of the gallbladder. COMPARISON: 9/12/2018 FINDINGS: There is no ascites. The liver is normal in contour. There is no evidence of solid mass lesion. The liver is diffusely increased in echogenicity. Gallstones are present. There is a wall echo shadow sign. The gallbladder is not well distended. Its wall measures 2 mm in thickness. There is no sonographic Wolf sign. 0.23 cm There is no pericholecystic fluid. The common duct measures 1 cm. The visualized pancreas is unremarkable. The visualized aorta is normal in caliber. Intrahepatic IVC is patent. The portal vein is patent with hepatopetal flow. The right kidney measures 11.6 cm. There is RIGHT pelvocaliectasis.     1.  Cholelithiasis without other sonographic findings of biliary disease 2.  Echogenic liver, a nonspecific finding  that often is found to represent steatosis.  Other infiltrative processes could have an identical appearance.    Us-gallbladder    Result Date: 9/12/2018 9/12/2018 2:37 PM HISTORY/REASON FOR EXAM:  Abdominal pain, nausea and vomiting. TECHNIQUE/EXAM DESCRIPTION AND NUMBER OF VIEWS:  Real-time sonography of the gallbladder. COMPARISON: 4/18/2014 FINDINGS: There is no ascites. The liver is diffusely echogenic with decreased echogenicity in the gallbladder fossa. The liver measures 24.33 cm. Gallbladder shows echogenic shadowing focus consistent with stone.  Gallbladder is contracted.  Gallbladder wall measures 0.32 cm.  Pain reported with scanning over the gallbladder. There is no pericholecystic fluid. The common duct measures 0.50 cm. The pancreas is obscured by bowel gas. The aorta is obscured by bowel gas. Intrahepatic IVC is patent. The portal vein is patent with hepatopetal flow. The right kidney measures 12.94 cm and shows mildly prominent collecting system. Note made of distended bladder.     1.  Limited evaluation of pancreas and abdominal aorta. 2.  Echogenic liver consistent with fatty infiltration with focal sparing adjacent to gallbladder fossa. 3.  Cholelithiasis with pain over the gallbladder.  No other evidence to indicate acute cholecystitis.  No biliary dilation. 4.  Distended bladder and mild RIGHT hydronephrosis, possibly indicating urinary retention.    Echocardiogram-comp W/ Cont    Result Date: 9/13/2018  Transthoracic Echo Report Echocardiography Laboratory CONCLUSIONS Prior echo 7/4/17.  No significant changes noted. Normal left ventricular systolic function. Left ventricular ejection fraction is visually estimated to be 65%. A reliable estimation of diastolic function cannot be made due to tachycardia. Dilated inferior vena cava with inspiratory collapse. MATTHIEU MCKEON Exam Date:         09/13/2018                    07:16 Exam Location:     Inpatient Priority:          Routine Ordering  Physician:        VIKI TORO Referring Physician:       [id number] Oneal Sonographer:               Chandler Mendoza RDCS Age:    72     Gender:    M MRN:    [id number] :    1945 BSA:    2.03   Ht (in):    67     Wt (lb):    202 Exam Type:     Complete, Contrast Indications:     Dyspnea ICD Codes:       786.09 CPT Codes:       26225,  BP:          /          HR: Technical Quality:       Technically difficult study -                          adequate information is obtained MEASUREMENTS  (Male / Female) Normal Values 2D ECHO LV Diastolic Diameter PLAX        5.5 cm                4.2 - 5.9 / 3.9 - 5.3 cm LV Systolic Diameter PLAX         2.8 cm                2.1 - 4.0 cm IVS Diastolic Thickness           1.2 cm                LVPW Diastolic Thickness          0.92 cm               LVOT Diameter                     2.3 cm                Estimated LV Ejection Fraction    65 %                  DOPPLER AV Peak Velocity                  1.2 m/s               AV Peak Gradient                  6.2 mmHg              AV Mean Gradient                  3.5 mmHg              LVOT Peak Velocity                1.1 m/s               AV Area Cont Eq vti               3 cm²                 * Indicates values subject to auto-interpretation LV EF:  65    % FINDINGS Left Ventricle Normal left ventricular chamber size. Normal left ventricular wall thickness. Normal left ventricular systolic function. Left ventricular ejection fraction is visually estimated to be 65%. Normal regional wall motion. A reliable estimation of diastolic function cannot be made due to tachycardia. Right Ventricle Right ventricle not well visualized. Right Atrium Normal right atrial size. Dilated inferior vena cava with inspiratory collapse. Left Atrium Normal left atrial size. Left atrial volume index is 25  mL/sq m. Mitral Valve Structurally normal mitral valve. No mitral stenosis. Trace mitral regurgitation. Aortic  Valve The aortic valve is not well visualized. No aortic stenosis. No aortic insufficiency. Tricuspid Valve The tricuspid valve is not well visualized.  No tricuspid regurgitation. Pulmonic Valve The pulmonic valve is not well visualized. Pericardium Normal pericardium without effusion. Aorta Aortic root not well visualized.  Ascending aorta not well visualized. Whitley Costello MD (Electronically Signed) Final Date:     2018                 08:17    Le Venous Duplex (specify In Comments Left, Right Or Bilateral)    Result Date: 2018   Vascular Laboratory  CONCLUSIONS  No acute thrombosis is identified.  Chronic deep venous thrombosis in the distal right superficial femoral vein  and popliteal vein.  Suspect chronic deep venous thrombosis in the proximal peroneal and  posterior tibial veins, though veins were not well visualized.  MATTHIEU MCKEON  Exam Date:     2018 10:01  Room #:     Inpatient  Priority:     Stat  Ht (in):             Wt (lb):  Ordering Physician:        KELECHI MCDONALD  Referring Physician:       [id number] Hurtado  Sonographer:               Stephanie Hills RVT  Study Type:                Complete Bilateral  Technical Quality:         Adequate  Age:    72    Gender:     M  MRN:    [id number]  :    1945      BSA:  Indications:     Edema, unspecified  CPT Codes:       18154  ICD Codes:       R609  History:         Edema and swelling of both legs for 3-4 years. No prior                   exams.  Limitations:  PROCEDURES:  Bilateral lower extremity venous duplex imaging.  The following venous structures were evaluated: common femoral, profunda  femoral, greater saphenous, femoral, popliteal, peroneal and posterior  tibial veins.  Serial compression, augmentation maneuvers, color and spectral Doppler flow  evaluations were performed.  FINDINGS:  Right lower extremity -  Chronic deep venous thrombosis in the distal superficial femoral vein and  popliteal vein.  Suspect chronic deep  "venous thrombosis in the proximal peroneal and  posterior tibial veins, though veins were not well visualized.  All other veins of the right lower extremity demonstrate normal flow  dynamics with no evidence of deep vein thrombosis or superficial phlebitis.  Left lower extremity -  Complete color filling and compressibility with normal venous flow dynamics  including spontaneous flow, response to augmentation maneuvers, and  respiratory phasicity.  No evidence of superficial or deep venous thrombosis.  Antonio Alvares MD  (Electronically Signed)  Final Date:      12 September 2018                   11:01      Micro:  Results     Procedure Component Value Units Date/Time    BLOOD CULTURE [[id number]] Collected:  09/17/18 1509    Order Status:  Completed Specimen:  Blood from Peripheral Updated:  09/18/18 0621     Significant Indicator NEG     Source BLD     Site Peripheral     Blood Culture No Growth    Note: Blood cultures are incubated for 5 days and  are monitored continuously.Positive blood cultures  are called to the RN and reported as soon as  they are identified.     Blood culture testing and Gram stain, if indicated, are  performed at [facility], [address] [place] [address].  Positive blood cultures are  sent to [facility], [address] [place] [address], for organism identification and  susceptibility testing.      Narrative:       Per Hospital Policy: Only change Specimen Src: to \"Line\" if  specified by physician order.    BLOOD CULTURE [[id number]] Collected:  09/17/18 1509    Order Status:  Completed Specimen:  Blood from Peripheral Updated:  09/18/18 0621     Significant Indicator NEG     Source BLD     Site Peripheral     Blood Culture No Growth    Note: Blood cultures are incubated for 5 days and  are monitored continuously.Positive blood cultures  are called to the RN and reported as soon as  they are identified.     Blood culture testing and Gram " "stain, if indicated, are  performed at [facility], [address]  [address] [facility].[place], [place].  Positive blood cultures are  sent to [facility], [address] [place] [address], for organism identification and  susceptibility testing.      Narrative:       Per Hospital Policy: Only change Specimen Src: to \"Line\" if  specified by physician order.    C Diff by PCR rflx Toxin [[id number]] Collected:  09/15/18 1155    Order Status:  Completed Specimen:  Stool from Stool Updated:  09/15/18 2225     C Diff by PCR Negative     Comment: C. difficile NOT detected by PCR.  Treatment not indicated per guidelines.  Repeat testing not indicated within 7 days.          027-NAP1-BI Presumptive Negative     Comment: Presumptive 027/NAP1/BI target DNA sequences are NOT DETECTED.       Narrative:       Special Contact IsolationSURMXC LENORE HERNANDEZ  Does this patient have risk factors for C-diff?->Yes  C-Diff Risk Factors->antibiotic exposure    URINE CULTURE(NEW) [[id number]]  (Abnormal) Collected:  09/12/18 1216    Order Status:  Completed Specimen:  Urine from Urine, Clean Catch Updated:  09/15/18 0943     Significant Indicator POS (POS)     Source UR     Site URINE, CLEAN CATCH     Urine Culture Mixed skin tay 10-50,000 cfu/mL (A)      Yeast  <10,000 cfu/mL   (A)    Narrative:       Indication for culture:->Emergency Room Patient    BLOOD CULTURE [[id number]]  (Abnormal) Collected:  09/12/18 0958    Order Status:  Completed Specimen:  Blood from Peripheral Updated:  09/15/18 0837     Significant Indicator POS (POS)     Source BLD     Site PERIPHERAL     Blood Culture Blood culture testing and Gram stain, if indicated, are  performed at [facility], [address] [facility].[place], [place].  Positive blood cultures are  sent to [facility], [address] [place] [address], for organism identification and  susceptibility testing.  Growth detected by " "Bactec instrument.   (A)      Enterobacter cloacae  See previous culture for sensitivity report.   (A)    Narrative:       CALL  [facility]  ICU tel. ,  CALLED  ICU tel.  09/13/2018, 03:56, RB PERF. RESULTS CALLED TO: [id number] rn  Per Hospital Policy: Only change Specimen Src: to \"Line\" if  specified by physician order.    BLOOD CULTURE [[id number]]  (Abnormal)  (Susceptibility) Collected:  09/12/18 0922    Order Status:  Completed Specimen:  Blood from Peripheral Updated:  09/15/18 0835     Significant Indicator POS (POS)     Source BLD     Site Peripheral     Blood Culture Blood culture testing and Gram stain, if indicated, are  performed at [facility], [address] [place] [address].  Positive blood cultures are  sent to [facility], [address] [place] [address], for organism identification and  susceptibility testing.  Growth detected by Bactec instrument.   (A)      Enterobacter cloacae (A)    Narrative:       CALL  [facility]  ICU tel. ,  CALLED  ICU tel.  09/13/2018, 01:19, RB PERF. RESULTS CALLED TO: [id number], rn  Per Hospital Policy: Only change Specimen Src: to \"Line\" if  specified by physician order.    Culture & Susceptibility     ENTEROBACTER CLOACAE     Antibiotic Sensitivity Microscan Unit Status    Ampicillin Resistant >16 mcg/mL Final    Method: SENSITIVITY, YANELY    Cefepime Sensitive <=8 mcg/mL Final    Method: SENSITIVITY, YANELY    Cefotaxime Sensitive <=2 mcg/mL Final    Method: SENSITIVITY, YANELY    Cefotetan Resistant >32 mcg/mL Final    Method: SENSITIVITY, YANELY    Ceftazidime Sensitive 4 mcg/mL Final    Method: SENSITIVITY, YANELY    Ceftriaxone Sensitive <=8 mcg/mL Final    Method: SENSITIVITY, YANELY    Cefuroxime Intermediate 16 mcg/mL Final    Method: SENSITIVITY, YANELY    Ciprofloxacin Sensitive <=1 mcg/mL Final    Method: SENSITIVITY, YANELY    Ertapenem Sensitive <=1 mcg/mL Final    Method: SENSITIVITY, YANELY    Gentamicin Sensitive <=4 mcg/mL Final    Method: " SENSITIVITY, YANELY    Pip/Tazobactam Sensitive <=16 mcg/mL Final    Method: SENSITIVITY, YANELY    Tigecycline Sensitive <=2 mcg/mL Final    Method: SENSITIVITY, YANELY    Tobramycin Sensitive <=4 mcg/mL Final    Method: SENSITIVITY, YANELY    Trimeth/Sulfa Sensitive <=2/38 mcg/mL Final    Method: SENSITIVITY, YANELY                       S. AUREUS BY PCR, NASAL COMPLETE [[id number]] Collected:  09/12/18 1530    Order Status:  Completed Updated:  09/13/18 1345     Staph aureus by PCR Negative     MRSA by PCR Negative    URINALYSIS [[id number]]  (Abnormal) Collected:  09/12/18 1216    Order Status:  Completed Specimen:  Urine from Urine, Clean Catch Updated:  09/12/18 1311     Color Yellow     Character Clear     Specific Gravity 1.010     Ph 5.5     Glucose >=1000 (A) mg/dL      Ketones 15 (A) mg/dL      Protein Negative mg/dL      Bilirubin Large (A)     Nitrite Negative     Leukocyte Esterase Negative     Occult Blood Small (A)     Micro Urine Req Microscopic    Narrative:       Indication for culture:->Emergency Room Patient    Influenza By PCR, A/B [[id number]] Collected:  09/12/18 0958    Order Status:  Completed Specimen:  Respirate from Nasopharyngeal Updated:  09/12/18 1155     Influenza virus A RNA Negative     Influenza virus B, PCR Negative    Influenza Rapid [[id number]] Collected:  09/12/18 0958    Order Status:  Completed Specimen:  Respirate from Respiratory Updated:  09/12/18 1025     Significant Indicator NEG     Source RESP     Site RESPIRATORY     Rapid Influenza A-B Negative for Influenza A and Influenza B antigens.  Infection due to influenza A or B cannot be ruled out  since the antigen present in the specimen may be below the  detection limit of the test. Culture confirmation of  negative samples is recommended.            Assessment:  Enoch Mistry is a 72 y.o. male with a history of poorly controlled diabetes, hypertension, known cholelithiasis, who presented with abdominal pain and significant  conjugated hyperbilirubinemia.  His blood culture is positive for Enterobacter.  The hyperbilirubinemia is spontaneously improving, suggestive of a gallstone that passed.  Unfortunately, his white count continues to worsen, now up to 44,000.  No intra-abdominal abscess or ductal dilatation on CT. MRCP planned.  The working diagnosis is acute cholangitis at this time.  He is scheduled for a cholecystectomy tomorrow at 3 PM.    Of note, he had a significant drop in hemoglobin today and appears pale.  Transfer to ICU is being considered for possible GI bleed and acute blood loss anemia.     Pertinent Diagnoses:  1.  Sepsis, worsening, possibly secondary to acute cholangitis  2.  Enterobacter bacteremia   3.  Conjugated hyperbilirubinemia, improving, likely passed an obstructive gallstone  4.  Poorly controlled diabetes  5.  Cholelithiasis  6.  Acute blood loss anemia    Active Hospital Problems    Diagnosis   • Sepsis due to Enterobacter species ([facility]) [A41.59]   • Acute on chronic respiratory failure ([facility]) [J96.20]   • Hypokalemia [E87.6]   • Hyponatremia [E87.1]   • Diarrhea [R19.7]   • Hypophosphatemia [E83.39]   • Abnormal LFTs [R94.5]   • Uncontrolled type 2 diabetes mellitus ([facility]) [E11.65]   • Non-compliance [Z91.19]   • Hepatic steatosis [K76.0]   • Chronic deep vein thrombosis (DVT) ([facility]) [I82.509]   • Stage 2 moderate COPD by GOLD classification ([facility]) [J44.9]       Plan:  Worsening sepsis  CT with no acute findings as above  MRCP pending  Continue IV ceftazidime, PO Flagyl  Surveillance blood cultures from 9/17 are no growth to date    Enterobacter bacteremia  As above  Follow surveillance blood cultures    Conjugated hyperbilirubinemia, improved, likely passed an obstructive gallstone  LFTs and T bili continue to improve  Plan as above    Poorly controlled diabetes  A1c 9.5  Contributing to immunosuppression    Acute blood loss anemia  Eliquis was discontinued.  Transfer to ICU is being considered    Markedly  enlarged prostate  No [place], no e/o abscess  Will check PSA    Discussed with internal medicine, Dr. Omalley

## 2018-09-19 NOTE — PROGRESS NOTES
Central line placed assisted Dr. Augustine.  Chest x-ray line in place. 2 units of red blood cells transfused.  Two liters of NS given per MD order.  GI consulted by Dr. Omalley.  Patient started on insulin ICU protocol.

## 2018-09-19 NOTE — PROGRESS NOTES
Hospital Medicine Daily Progress Note    Date of Service  9/19/2018    Chief Complaint  72 y.o. Male admitted 9/12/2018 with with right upper quadrant pain found to have possible cholecystitis and related sepsis and bacteremia.      Interval Problem Update  Went to MRI this am. Late rin the day had melena BM thqat was large. Drop in hgb noted.   Still with abdominal pain. Discussed with surgery this am and he has been scheduled for surgery tomorrow. Discussed with GI x2 today as well regarding MRI findings, labs and GI bleeding.   Patient became hypotensive later in PM today. Transferred emergently to ICU, bloused fluids.       Consultants/Specialty  ID  GI  Surgery    Disposition  unclear- ICU    critical care time with active management of severe sepsis, shock, GI bleeding 35min. No overlap.     Us abd:  1.  Cholelithiasis without other sonographic findings of biliary disease  2.  Echogenic liver, a nonspecific finding that often is found to represent steatosis.  Other infiltrative processes could have an identical appearance.    Ct abd:  6.6 mm noncalcified subpleural nodule in the right lower lobe.  [Prominent liver size.  Small gallbladder containing a small amount of stone material.  Stable 12 mm right adrenal gland nodule.  Stable 3 cm left renal cyst.  Atherosclerotic vascular disease.  Enlarged prostate gland.  No focal abscess appreciated.    MRI abdomen:  1.  Cholelithiasis with choledocholithiasis. No significant ductal dilatation is appreciated at this time.  2.  Indeterminate small right adrenal nodule as described on the recent CT.  3.  Bilateral renal cysts.          Review of Systems  Review of Systems   Constitutional: Positive for malaise/fatigue. Negative for chills and fever.   HENT: Negative for sore throat.    Eyes: Negative for blurred vision and pain.   Respiratory: Negative for cough and shortness of breath.    Cardiovascular: Negative for chest pain and palpitations.   Gastrointestinal:  Positive for abdominal pain and nausea. Negative for vomiting.   Genitourinary: Negative for dysuria and urgency.   Musculoskeletal: Negative for back pain and neck pain.   Skin: Negative for itching and rash.   Neurological: Positive for weakness. Negative for dizziness, tingling and headaches.   Psychiatric/Behavioral: Negative for depression. The patient does not have insomnia.    All other systems reviewed and are negative.       Physical Exam  Blood Pressure : 152/61   Temperature: 36.7 °C (98 °F)   Pulse: (!) 110   Respiration: 18   Pulse Oximetry: 95 %     Physical Exam   Constitutional: He is oriented to person, place, and time. He appears well-developed and well-nourished. No distress.   Patient seen and examined  Plan discussed with RN   HENT:   Right Ear: External ear normal.   Left Ear: External ear normal.   Nose: Nose normal.   Eyes: Right eye exhibits no discharge. Left eye exhibits no discharge. No scleral icterus.   Neck: No JVD present. No tracheal deviation present.   Cardiovascular: Normal rate, normal heart sounds and intact distal pulses.    No murmur heard.       Pulmonary/Chest: Effort normal and breath sounds normal. No respiratory distress. He has no rales.   Abdominal: Soft. Bowel sounds are normal. He exhibits no distension. There is tenderness.   Musculoskeletal: He exhibits no edema or tenderness.   Neurological: He is alert and oriented to person, place, and time.   Skin: Skin is warm and dry. He is not diaphoretic. No erythema. There is pallor.   Psychiatric: He has a normal mood and affect. His behavior is normal.   Nursing note and vitals reviewed.      Fluids    Intake/Output Summary (Last 24 hours) at 09/19/18 0836  Last data filed at 09/19/18 0500   Gross per 24 hour   Intake              400 ml   Output             1179 ml   Net             -779 ml       Laboratory  Recent Labs      09/17/18   0518  09/18/18   0445   WBC  18.5*  31.8*   RBC  4.31*  3.85*   HEMOGLOBIN  12.7*   11.2*   HEMATOCRIT  37.9*  34.2*   MCV  87.9  88.8   MCH  29.5  29.1   MCHC  33.5*  32.7*   RDW  45.0  45.4   PLATELETCT  244  362   MPV  12.0  12.0     Recent Labs      09/17/18   0518  09/18/18   0445  09/19/18   0712   SODIUM  135  133*  130*   POTASSIUM  4.2  3.8  4.3   CHLORIDE  101  105  105   CO2  25  22  20   GLUCOSE  244*  219*  334*   BUN  32*  40*  53*   CREATININE  0.59  0.57  0.78   CALCIUM  8.2*  7.8*  7.7*         Recent Labs      09/17/18   0518  09/18/18   0445   BNPBTYPENAT  25  15           Imaging  CT-ABDOMEN-PELVIS WITH   Final Result         6.6 mm noncalcified subpleural nodule in the right lower lobe.         [Prominent liver size.      Small gallbladder containing a small amount of stone material.      Stable 12 mm right adrenal gland nodule.      Stable 3 cm left renal cyst.      Atherosclerotic vascular disease.      Enlarged prostate gland.      No focal abscess appreciated.         DX-CHEST-PORTABLE (1 VIEW)   Final Result      Improved bibasilar atelectasis and pulmonary edema.      US-GALLBLADDER   Final Result      1.  Cholelithiasis without other sonographic findings of biliary disease   2.  Echogenic liver, a nonspecific finding that often is found to represent steatosis.  Other infiltrative processes could have an identical appearance.      ECHOCARDIOGRAM-COMP W/ CONT   Final Result      DX-CHEST-PORTABLE (1 VIEW)   Final Result      Mild pulmonary edema and bibasilar atelectasis, without significant change.      US-GALLBLADDER   Final Result      1.  Limited evaluation of pancreas and abdominal aorta.   2.  Echogenic liver consistent with fatty infiltration with focal sparing adjacent to gallbladder fossa.   3.  Cholelithiasis with pain over the gallbladder.  No other evidence to indicate acute cholecystitis.  No biliary dilation.   4.  Distended bladder and mild RIGHT hydronephrosis, possibly indicating urinary retention.      LE VENOUS DUPLEX (Specify in Comments Left, Right Or  Bilateral)   Final Result      DX-CHEST-PORTABLE (1 VIEW)   Final Result      1.  Cardiomegaly.      2.  Bibasilar atelectasis.      HR-GHVAJCM-E/O    (Results Pending)        Assessment/Plan  Acute on chronic respiratory failure (HCC)   Assessment & Plan    Patient chronically on 2 L oxygen   No wheezing now and given GI bleeding will stop steroids.           Severe sepsis with septic shock (CODE) ([facility])   Assessment & Plan    This is sepsis (without associated acute organ dysfunction).   2 out of 2 cultures positive for Enterobacter  GI source, with obstructive picture with LFTs and t bili initially markedly elevated and now improving.   MRI with 3 CBD stones. - paged GI and made them aware.   Discussed with ID- IV abx ongoing.   Discussed with surgery and on schedule for tomorrow.             Diarrhea   Assessment & Plan    c diff negative  improved        Hyponatremia   Assessment & Plan    Worse. Down to 130 today. Bolus fluids. Trend.         Hypokalemia   Assessment & Plan    Trend and replace          Hypophosphatemia   Assessment & Plan    resolved        Cholangitis   Assessment & Plan    With cholecystitis clinically.   GI aware and following.         Uncontrolled type 2 diabetes mellitus ([facility])- (present on admission)   Assessment & Plan    Noncompliant with follow-up and home fingersticks  With hyperglycemia and vascular complications.   A1c 9.5  Hold long acting and increase ssi given NPO          GI bleeding   Assessment & Plan    Suspect this is Upper GI bleed given presentation. HGB dropped from 11.2 to 7.5 then to 5.7 after a unit of blood. Transfused additional  2 Units and paged GI stat.         Chronic deep vein thrombosis (DVT) ([facility])   Assessment & Plan    Stop eliquis given bleeding.         Hepatic steatosis   Assessment & Plan    Due to morbid obesity and uncontrolled diabetes          Non-compliance   Assessment & Plan    Discussed importance of compliance and f/u        Stage 2 moderate COPD  by GOLD classification (HCC)- (present on admission)   Assessment & Plan    Stop steroids.

## 2018-09-19 NOTE — PROGRESS NOTES
Bedside report received from Jana OGDEN. POC discussed. Pt resting in bed with safety precautions in place.

## 2018-09-19 NOTE — PROGRESS NOTES
Page placed to Dr. Linda to inform her that pt had a 45 beat run of [employer]. Pt was nauseous with abdominal pain before and after run. Pt stated that he felt slightly light headed during run. No new orders at this time.

## 2018-09-19 NOTE — PROGRESS NOTES
Pt complaining of nausea. Pt requesting drinks. Pt informed he is still NPO for imaging in the AM. Meds given per MAR.

## 2018-09-19 NOTE — PROGRESS NOTES
Gastroenterology Progress Note     Author: Darnell Hernández   Date & Time Created: 9/19/2018 4:16 PM    Chief Complaint:      Interval History:  Developed GI bleed this afternoon manifest by melena, worsening anemia and hypotension. Transferred to ICU and resuscitated with IV fluids/transfusion.  Denies prior history of GI bleeding.    Review of Systems:  Review of Systems   Gastrointestinal: Positive for abdominal pain and melena.       Physical Exam:  Physical Exam   Constitutional: He is oriented to person, place, and time. No distress.   Cardiovascular: Normal rate and regular rhythm.    Pulmonary/Chest: Effort normal and breath sounds normal.   Abdominal: Soft. Bowel sounds are normal. He exhibits no distension. There is tenderness. There is no rebound.   Neurological: He is alert and oriented to person, place, and time.   Nursing note and vitals reviewed.      Labs:        Invalid input(s): [id number]  Recent Labs      09/17/18 0518 09/18/18 0445   BNPBTYPENAT  25  15     Recent Labs      09/17/18 0518 09/18/18 0445 09/19/18   0712   SODIUM  135  133*  130*   POTASSIUM  4.2  3.8  4.3   CHLORIDE  101  105  105   CO2  25  22  20   BUN  32*  40*  53*   CREATININE  0.59  0.57  0.78   MAGNESIUM  2.4  2.3   --    PHOSPHORUS  2.8  3.1   --    CALCIUM  8.2*  7.8*  7.7*     Recent Labs      09/17/18 0518 09/18/18 0445 09/19/18   0712   ALTSGPT  84*  68*  54*   ASTSGOT  47*  39  32   ALKPHOSPHAT  102*  84  57   TBILIRUBIN  4.1*  2.7*  1.9*   GLUCOSE  244*  219*  334*     Recent Labs      09/18/18 0445 09/19/18   0712  09/19/18   1259   RBC  3.85*  2.48*  1.93*   HEMOGLOBIN  11.2*  7.5*  5.7*   HEMATOCRIT  34.2*  22.5*  18.0*   PLATELETCT  362  412  376     Recent Labs      09/17/18 0518 09/18/18 0445 09/19/18   0712  09/19/18   1259   WBC  18.5*  31.8*  44.0*  45.0*   NEUTSPOLYS  80.70*  68.00  72.00   --    LYMPHOCYTES  11.10*  14.00*  17.00*   --    MONOCYTES  5.30  11.00  8.00   --     EOSINOPHILS  0.00  0.00  0.00   --    BASOPHILS  0.30  0.00  0.00   --    ASTSGOT  47*  39  32   --    ALTSGPT  84*  68*  54*   --    ALKPHOSPHAT  102*  84  57   --    TBILIRUBIN  4.1*  2.7*  1.9*   --      Hemodynamics:  Temp (24hrs), Av.3 °C (97.3 °F), Min:36.2 °C (97.1 °F), Max:36.5 °C (97.7 °F)  Temperature: 36.4 °C (97.6 °F)  Pulse  Av.2  Min: 65  Max: 128Heart Rate (Monitored): (!) 105  Blood Pressure : 133/59, NIBP: 133/59     Respiratory:    Respiration: 16, Pulse Oximetry: 100 %     Work Of Breathing / Effort: Mild  RUL Breath Sounds: Diminished, RML Breath Sounds: Diminished, RLL Breath Sounds: Diminished, LORNA Breath Sounds: Diminished, LLL Breath Sounds: Diminished  Fluids:    Intake/Output Summary (Last 24 hours) at 18 1616  Last data filed at 18 1600   Gross per 24 hour   Intake              300 ml   Output             1404 ml   Net            -1104 ml     Weight: 100.5 kg (221 lb 9 oz)  GI/Nutrition:  Orders Placed This Encounter   Procedures   • Diet NPO     Standing Status:   Standing     Number of Occurrences:   1     Order Specific Question:   Restrict to:     Answer:   Ice Chips [2]     Medical Decision Making, by Problem:  Active Hospital Problems    Diagnosis   • Severe sepsis with septic shock (CODE) ([facility]) [R65.21]   • Acute on chronic respiratory failure ([facility]) [J96.20]   • Uncontrolled type 2 diabetes mellitus ([facility]) [E11.65]   • Chronic deep vein thrombosis (DVT) ([facility]) [I82.509]   • Stage 2 moderate COPD by GOLD classification ([facility]) [J44.9]   • GI bleeding [K92.2]   • Common bile duct stone [K80.50]       Plan:  IV PPI bolus/infusion  Continue resuscitation  Serial HCTs  Urgent EGD  Will need ERCP and bud after bleeding controlled.     Quality-Core Measures

## 2018-09-19 NOTE — PROGRESS NOTES
Bedside report given to Moon OGDEN. Plan of care discussed. Pt resting in bed with safety precautions in place.

## 2018-09-19 NOTE — CONSULTS
DATE OF SERVICE:  09/18/2018    GASTROINTESTINAL CONSULTATION    REQUESTING PHYSICIAN:  Lalito Omalley MD    REASON FOR CONSULT:  Abnormal liver tests.    HISTORY OF PRESENT ILLNESS:  This is a 72-year-old male who was admitted on   09/12 after presenting with complaints of fevers, chills, shortness of breath,   and abdominal pain.    The patient is a vague historian, but describes a long history of intermittent   pain in the epigastrium and right upper quadrant that occurs after eating.    It occurs regardless of the type of food consumed and occasionally radiates   into the back.  He states this is related to his gallstones.  He is known he   has had gallstones for many years, but has never undergone surgical   intervention.    During his hospital course, he was initially found to have evidence of   obstructive jaundice.  ERCP revealed gallstones.  His bilirubin has trended   down.  However, during his hospital course, his white blood cell count has   climbed despite antibiotic therapy.  Blood cultures have been positive for   Enterobacter.    At present, the patient states he has no abdominal pain.  His only complaint   is of significant fatigue.  His baseline shortness of breath is present as   well.    REVIEW OF SYSTEMS:  PSYCHIATRIC:  He denies anxiety or depression.  MUSCULOSKELETAL:  Positive for back pain and joint stiffness.  DERMATOLOGIC:  No rash, pruritus.  GASTROINTESTINAL:  As above.  CARDIOVASCULAR:  Positive for dyspnea.  PULMONARY:  Positive for cough.  ALLERGY AND IMMUNOLOGY:  No hay fever.  ENDOCRINE:  Denies polyuria or polydipsia.  CONSTITUTIONAL:  Positive for fatigue.  Positive for subjective fevers and   chills.  GENITOURINARY:  Denies dysuria.    PAST MEDICAL HISTORY:  COPD, bladder cancer, obesity, diabetes,   cholelithiasis.    PAST SURGICAL HISTORY:  Cystoscopy, TURP, transurethral resection of bladder,   cystoscopy.    OUTPATIENT MEDICATIONS:  Amlodipine.    ALLERGIES:   None.    SOCIAL HISTORY:  He is .  He has no children.  He is not employed.    Quit smoking 12 years ago.  He drinks 6 beers per week.    FAMILY HISTORY:  Noncontributory.    PHYSICAL EXAMINATION:  VITAL SIGNS:  His temperature is 36.3, pulse 102, respirations 20, /85.  GENERAL APPEARANCE:  Obese male, in no acute distress.  He is alert and   oriented x3.  HEENT:  Sclerae are anicteric.  Oropharynx is moist.  NECK:  Supple, without adenopathy.  HEART:  Regular rate and rhythm.  LUNGS:  Have diminished breath sounds.  ABDOMEN:  Obese, soft with positive bowel sounds.  It is nontender.  There is   no guarding, rebound or rigidity.  EXTREMITIES:  Show 2+ lower extremity edema, bilateral.  SKIN:  Shows stasis dermatitis of the lower extremities.    LABORATORY DATA:  White count 31,000, hematocrit 34, platelet count 362,000,   5% bands.  Sodium 133, potassium 3.8, chloride 105, bicarbonate 22, glucose   219, BUN 40, creatinine 0.5, AST 39, ALT 68, alkaline phosphatase 84,   bilirubin 2.7, albumin 2.9, lipase 51, INR is 1.3.  Blood cultures positive   for Enterobacter.    IMAGING:  Abdominal ultrasound shows normal liver size and contour with   increased echogenicity, cholelithiasis, common bile duct measuring 1 cm.  CT   scan of the abdomen and pelvis shows a right lower lobe lung nodule,   hepatomegaly, adrenal nodule, small gallbladder with cholelithiasis, normal   bile duct.    ASSESSMENT AND PLAN:  1.  Obstructive jaundice.  The patient's bilirubin has fallen from 14 on   admission to 2.7, suggestive of resolution of an obstructive process, possibly   related to choledocholithiasis.  Most recent imaging with CT scan from today   does not show biliary ductal dilatation.  Plan:  We will proceed with MRCP to   rule out choledocholithiasis given his bacteremia and rising white count and   concern for biliary origin for his sepsis.  2.  Cholelithiasis.  His clinical picture is consistent with cholecystitis,    although he does not have a Wolf's sign on exam.  Plan:  If his MRCP is   negative, we would recommend surgical evaluation for cholecystectomy.  3.  Enterobacter bacteremia.  Plan:  He is receiving IV antibiotics and   infectious disease is evaluating the patient.  4.  Chronic obstructive pulmonary disease, O2 dependent.  5.  Obesity.       ____________________________________     SHAN PAULSON DO    PIS / NTS    DD:  09/18/2018 16:49:10  DT:  09/18/2018 18:02:59    D#:  [id number]  Job#:  [id number]

## 2018-09-20 LAB
ALBUMIN SERPL BCP-MCNC: 2.1 G/DL (ref 3.2–4.9)
ALBUMIN/GLOB SERPL: 1.2 G/DL
ALP SERPL-CCNC: 47 U/L (ref 30–99)
ALT SERPL-CCNC: 32 U/L (ref 2–50)
ANION GAP SERPL CALC-SCNC: 2 MMOL/L (ref 0–11.9)
AST SERPL-CCNC: 23 U/L (ref 12–45)
BASOPHILS # BLD AUTO: 0 % (ref 0–1.8)
BASOPHILS # BLD: 0 K/UL (ref 0–0.12)
BILIRUB SERPL-MCNC: 1.7 MG/DL (ref 0.1–1.5)
BUN SERPL-MCNC: 43 MG/DL (ref 8–22)
CALCIUM SERPL-MCNC: 7.2 MG/DL (ref 8.4–10.2)
CHLORIDE SERPL-SCNC: 109 MMOL/L (ref 96–112)
CO2 SERPL-SCNC: 24 MMOL/L (ref 20–33)
CREAT SERPL-MCNC: 0.59 MG/DL (ref 0.5–1.4)
EOSINOPHIL # BLD AUTO: 0 K/UL (ref 0–0.51)
EOSINOPHIL NFR BLD: 0 % (ref 0–6.9)
ERYTHROCYTE [DISTWIDTH] IN BLOOD BY AUTOMATED COUNT: 45.3 FL (ref 35.9–50)
GLOBULIN SER CALC-MCNC: 1.8 G/DL (ref 1.9–3.5)
GLUCOSE BLD-MCNC: 108 MG/DL (ref 65–99)
GLUCOSE BLD-MCNC: 116 MG/DL (ref 65–99)
GLUCOSE BLD-MCNC: 128 MG/DL (ref 65–99)
GLUCOSE BLD-MCNC: 135 MG/DL (ref 65–99)
GLUCOSE BLD-MCNC: 136 MG/DL (ref 65–99)
GLUCOSE BLD-MCNC: 136 MG/DL (ref 65–99)
GLUCOSE BLD-MCNC: 139 MG/DL (ref 65–99)
GLUCOSE BLD-MCNC: 143 MG/DL (ref 65–99)
GLUCOSE BLD-MCNC: 144 MG/DL (ref 65–99)
GLUCOSE BLD-MCNC: 147 MG/DL (ref 65–99)
GLUCOSE BLD-MCNC: 152 MG/DL (ref 65–99)
GLUCOSE BLD-MCNC: 164 MG/DL (ref 65–99)
GLUCOSE BLD-MCNC: 165 MG/DL (ref 65–99)
GLUCOSE BLD-MCNC: 172 MG/DL (ref 65–99)
GLUCOSE BLD-MCNC: 192 MG/DL (ref 65–99)
GLUCOSE BLD-MCNC: 199 MG/DL (ref 65–99)
GLUCOSE SERPL-MCNC: 124 MG/DL (ref 65–99)
HCT VFR BLD AUTO: 21.6 % (ref 42–52)
HGB BLD-MCNC: 7.5 G/DL (ref 14–18)
LYMPHOCYTES # BLD AUTO: 6.9 K/UL (ref 1–4.8)
LYMPHOCYTES NFR BLD: 13 % (ref 22–41)
MAGNESIUM SERPL-MCNC: 2 MG/DL (ref 1.5–2.5)
MANUAL DIFF BLD: NORMAL
MCH RBC QN AUTO: 30.5 PG (ref 27–33)
MCHC RBC AUTO-ENTMCNC: 34.7 G/DL (ref 33.7–35.3)
MCV RBC AUTO: 87.8 FL (ref 81.4–97.8)
MONOCYTES # BLD AUTO: 2.12 K/UL (ref 0–0.85)
MONOCYTES NFR BLD AUTO: 4 % (ref 0–13.4)
NEUTROPHILS # BLD AUTO: 44.07 K/UL (ref 1.82–7.42)
NEUTROPHILS NFR BLD: 81 % (ref 44–72)
NEUTS BAND NFR BLD MANUAL: 2 % (ref 0–10)
NRBC # BLD AUTO: 1.12 K/UL
NRBC BLD-RTO: 2.1 /100 WBC
PATH REV: NORMAL
PATH REV: NORMAL
PATHOLOGY CONSULT NOTE: NORMAL
PLATELET # BLD AUTO: 322 K/UL (ref 164–446)
PLATELET BLD QL SMEAR: NORMAL
PMV BLD AUTO: 11.2 FL (ref 9–12.9)
POLYCHROMASIA BLD QL SMEAR: NORMAL
POTASSIUM SERPL-SCNC: 3.9 MMOL/L (ref 3.6–5.5)
POTASSIUM SERPL-SCNC: 4.1 MMOL/L (ref 3.6–5.5)
PROT SERPL-MCNC: 3.9 G/DL (ref 6–8.2)
RBC # BLD AUTO: 2.46 M/UL (ref 4.7–6.1)
RBC BLD AUTO: PRESENT
SODIUM SERPL-SCNC: 135 MMOL/L (ref 135–145)
WBC # BLD AUTO: 53.1 K/UL (ref 4.8–10.8)

## 2018-09-20 PROCEDURE — 36430 TRANSFUSION BLD/BLD COMPNT: CPT

## 2018-09-20 PROCEDURE — 700102 HCHG RX REV CODE 250 W/ 637 OVERRIDE(OP): Performed by: INTERNAL MEDICINE

## 2018-09-20 PROCEDURE — 85027 COMPLETE CBC AUTOMATED: CPT

## 2018-09-20 PROCEDURE — 770022 HCHG ROOM/CARE - ICU (200)

## 2018-09-20 PROCEDURE — A9270 NON-COVERED ITEM OR SERVICE: HCPCS | Performed by: HOSPITALIST

## 2018-09-20 PROCEDURE — 82962 GLUCOSE BLOOD TEST: CPT | Mod: 91

## 2018-09-20 PROCEDURE — 160039 HCHG SURGERY MINUTES - EA ADDL 1 MIN LEVEL 3: Performed by: SURGERY

## 2018-09-20 PROCEDURE — 160035 HCHG PACU - 1ST 60 MINS PHASE I: Performed by: SURGERY

## 2018-09-20 PROCEDURE — 84153 ASSAY OF PSA TOTAL: CPT

## 2018-09-20 PROCEDURE — 160009 HCHG ANES TIME/MIN: Performed by: SURGERY

## 2018-09-20 PROCEDURE — 700111 HCHG RX REV CODE 636 W/ 250 OVERRIDE (IP)

## 2018-09-20 PROCEDURE — 501838 HCHG SUTURE GENERAL: Performed by: SURGERY

## 2018-09-20 PROCEDURE — 160002 HCHG RECOVERY MINUTES (STAT): Performed by: SURGERY

## 2018-09-20 PROCEDURE — 700101 HCHG RX REV CODE 250: Performed by: INTERNAL MEDICINE

## 2018-09-20 PROCEDURE — 84132 ASSAY OF SERUM POTASSIUM: CPT

## 2018-09-20 PROCEDURE — 160048 HCHG OR STATISTICAL LEVEL 1-5: Performed by: SURGERY

## 2018-09-20 PROCEDURE — 36592 COLLECT BLOOD FROM PICC: CPT

## 2018-09-20 PROCEDURE — P9016 RBC LEUKOCYTES REDUCED: HCPCS

## 2018-09-20 PROCEDURE — 700101 HCHG RX REV CODE 250

## 2018-09-20 PROCEDURE — A6402 STERILE GAUZE <= 16 SQ IN: HCPCS | Performed by: SURGERY

## 2018-09-20 PROCEDURE — 501572 HCHG TROCAR, SHIELD OBTU 5X100: Performed by: SURGERY

## 2018-09-20 PROCEDURE — A9270 NON-COVERED ITEM OR SERVICE: HCPCS | Performed by: INTERNAL MEDICINE

## 2018-09-20 PROCEDURE — 700111 HCHG RX REV CODE 636 W/ 250 OVERRIDE (IP): Performed by: HOSPITALIST

## 2018-09-20 PROCEDURE — 501399 HCHG SPECIMAN BAG, ENDO CATC: Performed by: SURGERY

## 2018-09-20 PROCEDURE — 700105 HCHG RX REV CODE 258: Performed by: ANESTHESIOLOGY

## 2018-09-20 PROCEDURE — 160028 HCHG SURGERY MINUTES - 1ST 30 MINS LEVEL 3: Performed by: SURGERY

## 2018-09-20 PROCEDURE — 700111 HCHG RX REV CODE 636 W/ 250 OVERRIDE (IP): Performed by: INTERNAL MEDICINE

## 2018-09-20 PROCEDURE — 700111 HCHG RX REV CODE 636 W/ 250 OVERRIDE (IP): Performed by: ANESTHESIOLOGY

## 2018-09-20 PROCEDURE — 700105 HCHG RX REV CODE 258: Performed by: INTERNAL MEDICINE

## 2018-09-20 PROCEDURE — 85007 BL SMEAR W/DIFF WBC COUNT: CPT

## 2018-09-20 PROCEDURE — 500800 HCHG LAPAROSCOPIC J/L HOOK: Performed by: SURGERY

## 2018-09-20 PROCEDURE — 501577 HCHG TROCAR, STEP 11MM: Performed by: SURGERY

## 2018-09-20 PROCEDURE — 700105 HCHG RX REV CODE 258: Performed by: HOSPITALIST

## 2018-09-20 PROCEDURE — 94760 N-INVAS EAR/PLS OXIMETRY 1: CPT

## 2018-09-20 PROCEDURE — C9113 INJ PANTOPRAZOLE SODIUM, VIA: HCPCS | Performed by: HOSPITALIST

## 2018-09-20 PROCEDURE — 80053 COMPREHEN METABOLIC PANEL: CPT

## 2018-09-20 PROCEDURE — 99233 SBSQ HOSP IP/OBS HIGH 50: CPT | Performed by: INTERNAL MEDICINE

## 2018-09-20 PROCEDURE — 700102 HCHG RX REV CODE 250 W/ 637 OVERRIDE(OP): Performed by: HOSPITALIST

## 2018-09-20 PROCEDURE — 0FT44ZZ RESECTION OF GALLBLADDER, PERCUTANEOUS ENDOSCOPIC APPROACH: ICD-10-PCS | Performed by: SURGERY

## 2018-09-20 PROCEDURE — 501583 HCHG TROCAR, THRD CAN&SEAL 5X100: Performed by: SURGERY

## 2018-09-20 PROCEDURE — 86923 COMPATIBILITY TEST ELECTRIC: CPT

## 2018-09-20 PROCEDURE — 88304 TISSUE EXAM BY PATHOLOGIST: CPT

## 2018-09-20 PROCEDURE — 502571 HCHG PACK, LAP CHOLE: Performed by: SURGERY

## 2018-09-20 PROCEDURE — 83735 ASSAY OF MAGNESIUM: CPT

## 2018-09-20 RX ORDER — POTASSIUM CHLORIDE 7.45 MG/ML
10 INJECTION INTRAVENOUS ONCE
Status: COMPLETED | OUTPATIENT
Start: 2018-09-20 | End: 2018-09-20

## 2018-09-20 RX ORDER — SODIUM CHLORIDE, SODIUM LACTATE, POTASSIUM CHLORIDE, CALCIUM CHLORIDE 600; 310; 30; 20 MG/100ML; MG/100ML; MG/100ML; MG/100ML
INJECTION, SOLUTION INTRAVENOUS CONTINUOUS
Status: DISCONTINUED | OUTPATIENT
Start: 2018-09-20 | End: 2018-09-20 | Stop reason: HOSPADM

## 2018-09-20 RX ORDER — ONDANSETRON 2 MG/ML
4 INJECTION INTRAMUSCULAR; INTRAVENOUS
Status: DISCONTINUED | OUTPATIENT
Start: 2018-09-20 | End: 2018-09-20 | Stop reason: HOSPADM

## 2018-09-20 RX ORDER — HALOPERIDOL 5 MG/ML
1 INJECTION INTRAMUSCULAR
Status: DISCONTINUED | OUTPATIENT
Start: 2018-09-20 | End: 2018-09-20 | Stop reason: HOSPADM

## 2018-09-20 RX ORDER — DIPHENHYDRAMINE HYDROCHLORIDE 50 MG/ML
6.25 INJECTION INTRAMUSCULAR; INTRAVENOUS
Status: DISCONTINUED | OUTPATIENT
Start: 2018-09-20 | End: 2018-09-20 | Stop reason: HOSPADM

## 2018-09-20 RX ORDER — SODIUM CHLORIDE 9 MG/ML
INJECTION, SOLUTION INTRAVENOUS CONTINUOUS
Status: DISCONTINUED | OUTPATIENT
Start: 2018-09-20 | End: 2018-09-20

## 2018-09-20 RX ORDER — BUPIVACAINE HYDROCHLORIDE AND EPINEPHRINE 2.5; 5 MG/ML; UG/ML
INJECTION, SOLUTION INFILTRATION; PERINEURAL
Status: DISCONTINUED | OUTPATIENT
Start: 2018-09-20 | End: 2018-09-20 | Stop reason: HOSPADM

## 2018-09-20 RX ORDER — MEPERIDINE HYDROCHLORIDE 25 MG/ML
12.5 INJECTION INTRAMUSCULAR; INTRAVENOUS; SUBCUTANEOUS
Status: DISCONTINUED | OUTPATIENT
Start: 2018-09-20 | End: 2018-09-20 | Stop reason: HOSPADM

## 2018-09-20 RX ORDER — DEXTROSE MONOHYDRATE 25 G/50ML
25 INJECTION, SOLUTION INTRAVENOUS
Status: DISCONTINUED | OUTPATIENT
Start: 2018-09-20 | End: 2018-09-22

## 2018-09-20 RX ORDER — ALBUTEROL SULFATE 2.5 MG/3ML
2.5 SOLUTION RESPIRATORY (INHALATION)
Status: DISCONTINUED | OUTPATIENT
Start: 2018-09-20 | End: 2018-09-20 | Stop reason: HOSPADM

## 2018-09-20 RX ORDER — NALOXONE HYDROCHLORIDE 0.4 MG/ML
0.1 INJECTION, SOLUTION INTRAMUSCULAR; INTRAVENOUS; SUBCUTANEOUS PRN
Status: DISCONTINUED | OUTPATIENT
Start: 2018-09-20 | End: 2018-09-20 | Stop reason: HOSPADM

## 2018-09-20 RX ADMIN — CEFTAZIDIME 1 G: 1 INJECTION, POWDER, FOR SOLUTION INTRAMUSCULAR; INTRAVENOUS at 05:49

## 2018-09-20 RX ADMIN — SODIUM CHLORIDE 8 MG/HR: 9 INJECTION, SOLUTION INTRAVENOUS at 14:20

## 2018-09-20 RX ADMIN — POTASSIUM CHLORIDE 10 MEQ: 7.46 INJECTION, SOLUTION INTRAVENOUS at 07:44

## 2018-09-20 RX ADMIN — METRONIDAZOLE 500 MG: 500 INJECTION, SOLUTION INTRAVENOUS at 13:40

## 2018-09-20 RX ADMIN — CEFTAZIDIME 1 G: 1 INJECTION, POWDER, FOR SOLUTION INTRAMUSCULAR; INTRAVENOUS at 22:08

## 2018-09-20 RX ADMIN — FENTANYL CITRATE 25 MCG: 50 INJECTION, SOLUTION INTRAMUSCULAR; INTRAVENOUS at 16:42

## 2018-09-20 RX ADMIN — INSULIN HUMAN 3 UNITS: 100 INJECTION, SOLUTION PARENTERAL at 23:30

## 2018-09-20 RX ADMIN — BUDESONIDE AND FORMOTEROL FUMARATE DIHYDRATE 2 PUFF: 160; 4.5 AEROSOL RESPIRATORY (INHALATION) at 21:00

## 2018-09-20 RX ADMIN — CEFTAZIDIME 1 G: 1 INJECTION, POWDER, FOR SOLUTION INTRAMUSCULAR; INTRAVENOUS at 14:16

## 2018-09-20 RX ADMIN — METRONIDAZOLE 500 MG: 500 INJECTION, SOLUTION INTRAVENOUS at 21:00

## 2018-09-20 RX ADMIN — SUCRALFATE 1 G: 1 SUSPENSION ORAL at 06:00

## 2018-09-20 RX ADMIN — SUCRALFATE 1 G: 1 SUSPENSION ORAL at 23:37

## 2018-09-20 RX ADMIN — INSULIN HUMAN 5 UNITS: 100 INJECTION, SOLUTION PARENTERAL at 18:33

## 2018-09-20 RX ADMIN — POTASSIUM CHLORIDE 10 MEQ: 7.46 INJECTION, SOLUTION INTRAVENOUS at 14:16

## 2018-09-20 RX ADMIN — SODIUM CHLORIDE 8 MG/HR: 9 INJECTION, SOLUTION INTRAVENOUS at 06:25

## 2018-09-20 RX ADMIN — SUCRALFATE 1 G: 1 SUSPENSION ORAL at 12:35

## 2018-09-20 RX ADMIN — POTASSIUM CHLORIDE 10 MEQ: 7.46 INJECTION, SOLUTION INTRAVENOUS at 00:58

## 2018-09-20 RX ADMIN — SUCRALFATE 1 G: 1 SUSPENSION ORAL at 18:34

## 2018-09-20 RX ADMIN — METRONIDAZOLE 500 MG: 500 INJECTION, SOLUTION INTRAVENOUS at 06:20

## 2018-09-20 ASSESSMENT — ENCOUNTER SYMPTOMS
FEVER: 0
CHILLS: 0
PSYCHIATRIC NEGATIVE: 1
MUSCULOSKELETAL NEGATIVE: 1
NAUSEA: 0
COUGH: 1
NEUROLOGICAL NEGATIVE: 1
WEAKNESS: 1
RESPIRATORY NEGATIVE: 1
STRIDOR: 0
EYES NEGATIVE: 1
SHORTNESS OF BREATH: 1
DIARRHEA: 1
ABDOMINAL PAIN: 1

## 2018-09-20 ASSESSMENT — PAIN SCALES - GENERAL
PAINLEVEL_OUTOF10: ASSUMED PAIN PRESENT
PAINLEVEL_OUTOF10: ASSUMED PAIN PRESENT
PAINLEVEL_OUTOF10: 2
PAINLEVEL_OUTOF10: 1
PAINLEVEL_OUTOF10: 3
PAINLEVEL_OUTOF10: 0
PAINLEVEL_OUTOF10: ASSUMED PAIN PRESENT
PAINLEVEL_OUTOF10: 3
PAINLEVEL_OUTOF10: 2

## 2018-09-20 NOTE — PROGRESS NOTES
Placed wound dressing bilateral lower extremity according to wound care instructions.  Warm blankets provided.

## 2018-09-20 NOTE — OR SURGEON
Immediate Post OP Note    PreOp Diagnosis: ac +     PostOp Diagnosis: same    Procedure(s):  DEWEY BY LAPAROSCOPY - Wound Class: Clean    Surgeon(s):  LYNDA Rangel M.D.    Anesthesiologist/Type of Anesthesia:  Anesthesiologist: Seamus Santos M.D./* No anesthesia type entered *    Surgical Staff:  Circulator: Navid Pulido R.N.  Scrub Person: Oc Payne R.N.; Kofi Albert    Specimens removed if any:  ID Type Source Tests Collected by Time Destination   A : gallbladder Tissue Gallbladder PATHOLOGY SPECIMEN Seamus Gaston M.D. 9/20/2018  3:57 PM        Estimated Blood Loss: 30    Findings: short cystic duct    Complications: o        9/20/2018 4:17 PM Seamus Gaston M.D.

## 2018-09-20 NOTE — PROGRESS NOTES
Gastroenterology Progress Note     Author: Anton Boucher   Date & Time Created: 9/20/2018 2:00 PM    Chief Complaint:  Awaiting bud    Interval History:  Bud pending  Denies pain    Review of Systems:  Review of Systems   Constitutional: Negative for chills and fever.   Respiratory: Positive for cough. Negative for stridor.    Cardiovascular: Negative for chest pain.   Musculoskeletal: Negative.    Neurological: Negative.    Endo/Heme/Allergies:        Leucocytosis   Psychiatric/Behavioral: Negative.        Physical Exam:  Physical Exam    Labs:        Invalid input(s): [id number]  Recent Labs      09/18/18 0445   BNPBTYPENAT  15     Recent Labs      09/18/18 0445 09/19/18 0712 09/19/18 2000 09/20/18 0220 09/20/18   1240   SODIUM  133*  130*  136  135   --    POTASSIUM  3.8  4.3  3.4*  4.1  3.9   CHLORIDE  105  105  106  109   --    CO2  22  20  22  24   --    BUN  40*  53*  51*  43*   --    CREATININE  0.57  0.78  0.87  0.59   --    MAGNESIUM  2.3   --    --   2.0   --    PHOSPHORUS  3.1   --    --    --    --    CALCIUM  7.8*  7.7*  7.4*  7.2*   --      Recent Labs      09/18/18 0445 09/19/18 0712 09/19/18 2000 09/20/18 0220   ALTSGPT  68*  54*   --   32   ASTSGOT  39  32   --   23   ALKPHOSPHAT  84  57   --   47   TBILIRUBIN  2.7*  1.9*   --   1.7*   GLUCOSE  219*  334*  276*  124*     Recent Labs      09/19/18 1259 09/19/18 2000 09/20/18 0220   RBC  1.93*  2.80*  2.46*   HEMOGLOBIN  5.7*  8.4*  7.5*   HEMATOCRIT  18.0*  24.4*  21.6*   PLATELETCT  376  357  322     Recent Labs      09/18/18 0445 09/19/18 0712 09/19/18 1259 09/19/18 2000 09/20/18 0220   WBC  31.8*  44.0*  45.0*  53.5*  53.1*   NEUTSPOLYS  68.00  72.00   --   76.00*  81.00*   LYMPHOCYTES  14.00*  17.00*   --   13.00*  13.00*   MONOCYTES  11.00  8.00   --   7.00  4.00   EOSINOPHILS  0.00  0.00   --   0.00  0.00   BASOPHILS  0.00  0.00   --   0.00  0.00   ASTSGOT  39  32   --    --   23    ALTSGPT  68*  54*   --    --   32   ALKPHOSPHAT  84  57   --    --   47   TBILIRUBIN  2.7*  1.9*   --    --   1.7*     Hemodynamics:  Temp (24hrs), Av.5 °C (97.7 °F), Min:36.1 °C (97 °F), Max:37.5 °C (99.5 °F)  Temperature: 37.5 °C (99.5 °F)  Pulse  Av  Min: 65  Max: 128Heart Rate (Monitored): (!) 102  Blood Pressure : 108/48, NIBP: 114/56     Respiratory:    Respiration: 15, Pulse Oximetry: 94 %, O2 Daily Delivery Respiratory : Nasal Cannula     Work Of Breathing / Effort: Mild  RUL Breath Sounds: Diminished, RML Breath Sounds: Diminished, RLL Breath Sounds: Diminished, LORNA Breath Sounds: Diminished, LLL Breath Sounds: Diminished  Fluids:    Intake/Output Summary (Last 24 hours) at 18 1400  Last data filed at 18 1200   Gross per 24 hour   Intake             4190 ml   Output             2350 ml   Net             1840 ml        GI/Nutrition:  Orders Placed This Encounter   Procedures   • Diet NPO     Standing Status:   Standing     Number of Occurrences:   8     Order Specific Question:   Restrict to:     Answer:   Strict [1]     Medical Decision Making, by Problem:  Active Hospital Problems    Diagnosis   • Severe sepsis with septic shock (CODE) ([facility]) [R65.21]   • Acute on chronic respiratory failure ([facility]) [J96.20]   • Hypokalemia [E87.6]   • Hyponatremia [E87.1]   • Diarrhea [R19.7]   • Hypophosphatemia [E83.39]   • Cholangitis [K83.0]   • Uncontrolled type 2 diabetes mellitus ([facility]) [E11.65]   • Non-compliance [Z91.19]   • Hepatic steatosis [K76.0]   • Chronic deep vein thrombosis (DVT) ([facility]) [I82.509]   • Stage 2 moderate COPD by GOLD classification ([facility]) [J44.9]   • GI bleeding [K92.2]   • Common bile duct stone [K80.50]       Plan:  Biliary obstruction ERCP tomorrow with Dr Michelle  Cholecystitis for bud today  Still as leucocytosis  GI bleed s'p EGD HGB  did drop to ~ 7 (apical bulbar ulcer and grade D esophagitis)      Procedure explained.   Continue abtx    Quality-Core Measures

## 2018-09-20 NOTE — PROGRESS NOTES
Infectious Disease Progress Note    Author: Frankie Olivares M.D. Date & Time of service: 2018  9:39 AM    Chief Complaint:  Follow-up of Enterobacter bacteremia    Interval History:   afebrile, white count now up to 31.8.  Reports that he was feeling terrible this morning after breakfast, feels a little bit better now.   afebrile, white count up to 44,000. CT with no e/o abscess or ductal dilatation. MRCP planned and he is scheduled for cholecystectomy tomorrow at 3 PM. His prostate is 6cm in size. No abscess.  Hemoglobin dropped from 11-7.5.  Patient reports off and on abdominal pain   afebrile, white count 50,000.  Patient was transferred to the ICU with hemodynamic instability and upper GI bleed.  EGD showed a bleeding duodenal ulcer.  General surgery was consulted and there is suspicion for a gangrenous cholecystitis.  He is scheduled to undergo a cholecystectomy today.  Patient reports that his abdominal pain has improved today    Labs Reviewed and Medications Reviewed.    Review of Systems:  Review of Systems   Constitutional: Positive for malaise/fatigue. Negative for fever.   Respiratory: Positive for shortness of breath.    Cardiovascular: Positive for leg swelling.   Gastrointestinal: Positive for abdominal pain ( Off and on, improved today), diarrhea and melena. Negative for nausea.   Genitourinary: Negative for dysuria.   Skin: Negative for itching and rash.   Neurological: Positive for weakness.   All other systems reviewed and are negative.    Hemodynamics:  Temp (24hrs), Av.4 °C (97.5 °F), Min:36.1 °C (97 °F), Max:37.2 °C (99 °F)  Temperature: 37.2 °C (99 °F)  Pulse  Av.7  Min: 65  Max: 128Heart Rate (Monitored): 95  Blood Pressure : 108/48, NIBP: 120/55       Physical Exam:  Physical Exam   Constitutional: He is oriented to person, place, and time. He appears well-developed and well-nourished.   Pallor, improved   HENT:   Head: Normocephalic and atraumatic.   Eyes: Pupils  are equal, round, and reactive to light. Scleral icterus ( Improved) is present.   Neck: Normal range of motion.   Cardiovascular: Regular rhythm and normal heart sounds.  Exam reveals no gallop and no friction rub.    No murmur heard.  Tachycardic   Pulmonary/Chest: Effort normal and breath sounds normal. He has no wheezes. He has no rales.   Abdominal: Soft. He exhibits distension. There is no tenderness ( Right upper quadrant, negative Wolf sign.). There is no rebound.   Musculoskeletal: Normal range of motion. He exhibits edema ( 3+ bilateral lower extremity).   Lymphadenopathy:     He has no cervical adenopathy.   Neurological: He is alert and oriented to person, place, and time. No cranial nerve deficit.   No gross focal neuro deficit   Skin: Skin is warm and dry. No rash noted.   Psychiatric: He has a normal mood and affect. His behavior is normal.       Meds:    Current Facility-Administered Medications:   •  NS  •  [DISCONTINUED] insulin regular **AND** Accu-Chek ACHS **AND** NOTIFY MD and PharmD **AND** glucose 4 g **AND** dextrose 50%  •  pantoprazole (PROTONIX) infusion  •  metroNIDAZOLE  •  insulin infusion for 150 protocol  •  sucralfate  •  K+ Scale: Goal of 4.5  •  diphenoxylate-atropine  •  morphine injection  •  LORazepam  •  budesonide-formoterol  •  cefTAZidime  •  benzocaine-menthol  •  ondansetron  •  Respiratory Care per Protocol  •  NS  •  ipratropium-albuterol    Labs:  Recent Labs      09/19/18   0712  09/19/18   1259  09/19/18 2000 09/20/18   0220   WBC  44.0*  45.0*  53.5*  53.1*   RBC  2.48*  1.93*  2.80*  2.46*   HEMOGLOBIN  7.5*  5.7*  8.4*  7.5*   HEMATOCRIT  22.5*  18.0*  24.4*  21.6*   MCV  90.7  93.8  87.1  87.8   MCH  30.2  29.5  30.0  30.5   RDW  46.5  49.0  44.1  45.3   PLATELETCT  412  376  357  322   MPV  12.2  12.0  12.2  11.2   NEUTSPOLYS  72.00   --   76.00*  81.00*   LYMPHOCYTES  17.00*   --   13.00*  13.00*   MONOCYTES  8.00   --   7.00  4.00   EOSINOPHILS  0.00   --    0.00  0.00   BASOPHILS  0.00   --   0.00  0.00   RBCMORPHOLO  Present   --   Present  Present     Recent Labs      09/19/18   0712 09/19/18 2000 09/20/18 0220   SODIUM  130*  136  135   POTASSIUM  4.3  3.4*  4.1   CHLORIDE  105  106  109   CO2  20  22  24   GLUCOSE  334*  276*  124*   BUN  53*  51*  43*     Recent Labs      09/18/18   0445  09/19/18 0712 09/19/18 2000 09/20/18 0220   ALBUMIN  2.9*  2.3*   --   2.1*   TBILIRUBIN  2.7*  1.9*   --   1.7*   ALKPHOSPHAT  84  57   --   47   TOTPROTEIN  5.5*  4.2*   --   3.9*   ALTSGPT  68*  54*   --   32   ASTSGOT  39  32   --   23   CREATININE  0.57  0.78  0.87  0.59       Imaging:  Dx-chest-portable (1 View)    Result Date: 9/13/2018 9/13/2018 6:39 AM HISTORY/REASON FOR EXAM:  Shortness of Breath TECHNIQUE/EXAM DESCRIPTION AND NUMBER OF VIEWS: Single portable view of the chest. COMPARISON: Yesterday FINDINGS: Interstitial opacities persist in the mid and lower lung fields bilaterally, and there is bibasilar atelectasis. Heart and mediastinum are normal.     Mild pulmonary edema and bibasilar atelectasis, without significant change.    Dx-chest-portable (1 View)    Result Date: 9/12/2018 9/12/2018 9:10 AM HISTORY/REASON FOR EXAM: Rapid heart rate. TECHNIQUE/EXAM DESCRIPTION AND NUMBER OF VIEWS: Single portable view of the chest. COMPARISON: 5/5/2015 FINDINGS: There is bibasilar atelectasis. There is no pleural effusion. The heart is enlarged.     1.  Cardiomegaly. 2.  Bibasilar atelectasis.    Us-gallbladder    Result Date: 9/18/2018 9/18/2018 6:28 AM HISTORY/REASON FOR EXAM: Pain, leukocytosis TECHNIQUE/EXAM DESCRIPTION AND NUMBER OF VIEWS:  Real-time sonography of the gallbladder. COMPARISON: 9/12/2018 FINDINGS: There is no ascites. The liver is normal in contour. There is no evidence of solid mass lesion. The liver is diffusely increased in echogenicity. Gallstones are present. There is a wall echo shadow sign. The gallbladder is not well distended.  Its wall measures 2 mm in thickness. There is no sonographic Wolf sign. 0.23 cm There is no pericholecystic fluid. The common duct measures 1 cm. The visualized pancreas is unremarkable. The visualized aorta is normal in caliber. Intrahepatic IVC is patent. The portal vein is patent with hepatopetal flow. The right kidney measures 11.6 cm. There is RIGHT pelvocaliectasis.     1.  Cholelithiasis without other sonographic findings of biliary disease 2.  Echogenic liver, a nonspecific finding that often is found to represent steatosis.  Other infiltrative processes could have an identical appearance.    Us-gallbladder    Result Date: 9/12/2018 9/12/2018 2:37 PM HISTORY/REASON FOR EXAM:  Abdominal pain, nausea and vomiting. TECHNIQUE/EXAM DESCRIPTION AND NUMBER OF VIEWS:  Real-time sonography of the gallbladder. COMPARISON: 4/18/2014 FINDINGS: There is no ascites. The liver is diffusely echogenic with decreased echogenicity in the gallbladder fossa. The liver measures 24.33 cm. Gallbladder shows echogenic shadowing focus consistent with stone.  Gallbladder is contracted.  Gallbladder wall measures 0.32 cm.  Pain reported with scanning over the gallbladder. There is no pericholecystic fluid. The common duct measures 0.50 cm. The pancreas is obscured by bowel gas. The aorta is obscured by bowel gas. Intrahepatic IVC is patent. The portal vein is patent with hepatopetal flow. The right kidney measures 12.94 cm and shows mildly prominent collecting system. Note made of distended bladder.     1.  Limited evaluation of pancreas and abdominal aorta. 2.  Echogenic liver consistent with fatty infiltration with focal sparing adjacent to gallbladder fossa. 3.  Cholelithiasis with pain over the gallbladder.  No other evidence to indicate acute cholecystitis.  No biliary dilation. 4.  Distended bladder and mild RIGHT hydronephrosis, possibly indicating urinary retention.    Echocardiogram-comp W/ Cont    Result Date:  2018  Transthoracic Echo Report Echocardiography Laboratory CONCLUSIONS Prior echo 17.  No significant changes noted. Normal left ventricular systolic function. Left ventricular ejection fraction is visually estimated to be 65%. A reliable estimation of diastolic function cannot be made due to tachycardia. Dilated inferior vena cava with inspiratory collapse. MATTHIEU MCKEON Exam Date:         2018                    07:16 Exam Location:     Inpatient Priority:          Routine Ordering Physician:        VIKI TORO Referring Physician:       MUNA Alvarenga Sonographer:               Chandler Mendoza RDCS Age:    72     Gender:    M MRN:    [id number] :    1945 BSA:    2.03   Ht (in):    67     Wt (lb):    202 Exam Type:     Complete, Contrast Indications:     Dyspnea ICD Codes:       786.09 CPT Codes:       55726,  BP:          /          HR: Technical Quality:       Technically difficult study -                          adequate information is obtained MEASUREMENTS  (Male / Female) Normal Values 2D ECHO LV Diastolic Diameter PLAX        5.5 cm                4.2 - 5.9 / 3.9 - 5.3 cm LV Systolic Diameter PLAX         2.8 cm                2.1 - 4.0 cm IVS Diastolic Thickness           1.2 cm                LVPW Diastolic Thickness          0.92 cm               LVOT Diameter                     2.3 cm                Estimated LV Ejection Fraction    65 %                  DOPPLER AV Peak Velocity                  1.2 m/s               AV Peak Gradient                  6.2 mmHg              AV Mean Gradient                  3.5 mmHg              LVOT Peak Velocity                1.1 m/s               AV Area Cont Eq vti               3 cm²                 * Indicates values subject to auto-interpretation LV EF:  65    % FINDINGS Left Ventricle Normal left ventricular chamber size. Normal left ventricular wall thickness. Normal left ventricular systolic  function. Left ventricular ejection fraction is visually estimated to be 65%. Normal regional wall motion. A reliable estimation of diastolic function cannot be made due to tachycardia. Right Ventricle Right ventricle not well visualized. Right Atrium Normal right atrial size. Dilated inferior vena cava with inspiratory collapse. Left Atrium Normal left atrial size. Left atrial volume index is 25  mL/sq m. Mitral Valve Structurally normal mitral valve. No mitral stenosis. Trace mitral regurgitation. Aortic Valve The aortic valve is not well visualized. No aortic stenosis. No aortic insufficiency. Tricuspid Valve The tricuspid valve is not well visualized.  No tricuspid regurgitation. Pulmonic Valve The pulmonic valve is not well visualized. Pericardium Normal pericardium without effusion. Aorta Aortic root not well visualized.  Ascending aorta not well visualized. Whitley Costello MD (Electronically Signed) Final Date:     2018                 08:17    Le Venous Duplex (specify In Comments Left, Right Or Bilateral)    Result Date: 2018   Vascular Laboratory  CONCLUSIONS  No acute thrombosis is identified.  Chronic deep venous thrombosis in the distal right superficial femoral vein  and popliteal vein.  Suspect chronic deep venous thrombosis in the proximal peroneal and  posterior tibial veins, though veins were not well visualized.  MATTHIEU MCKEON  Exam Date:     2018 10:01  Room #:     Inpatient  Priority:     Stat  Ht (in):             Wt (lb):  Ordering Physician:        KELECHI MCDONALD  Referring Physician:       [id number] Beavers  Sonographer:               Stephanie Hills RVT  Study Type:                Complete Bilateral  Technical Quality:         Adequate  Age:    72    Gender:     M  MRN:    [id number]  :    1945      BSA:  Indications:     Edema, unspecified  CPT Codes:       91409  ICD Codes:       R609  History:         Edema and swelling of both legs for 3-4 years. No prior                    exams.  Limitations:  PROCEDURES:  Bilateral lower extremity venous duplex imaging.  The following venous structures were evaluated: common femoral, profunda  femoral, greater saphenous, femoral, popliteal, peroneal and posterior  tibial veins.  Serial compression, augmentation maneuvers, color and spectral Doppler flow  evaluations were performed.  FINDINGS:  Right lower extremity -  Chronic deep venous thrombosis in the distal superficial femoral vein and  popliteal vein.  Suspect chronic deep venous thrombosis in the proximal peroneal and  posterior tibial veins, though veins were not well visualized.  All other veins of the right lower extremity demonstrate normal flow  dynamics with no evidence of deep vein thrombosis or superficial phlebitis.  Left lower extremity -  Complete color filling and compressibility with normal venous flow dynamics  including spontaneous flow, response to augmentation maneuvers, and  respiratory phasicity.  No evidence of superficial or deep venous thrombosis.  Antonio Alvares MD  (Electronically Signed)  Final Date:      12 September 2018                   11:01      Micro:  Results     Procedure Component Value Units Date/Time    BLOOD CULTURE [[id number]] Collected:  09/17/18 1509    Order Status:  Completed Specimen:  Blood from Peripheral Updated:  09/18/18 0621     Significant Indicator NEG     Source BLD     Site Peripheral     Blood Culture No Growth    Note: Blood cultures are incubated for 5 days and  are monitored continuously.Positive blood cultures  are called to the RN and reported as soon as  they are identified.     Blood culture testing and Gram stain, if indicated, are  performed at [facility], [address] [place] [address].  Positive blood cultures are  sent to [facility], [address] [place] [address], for organism identification and  susceptibility testing.      Narrative:       Per Hospital Policy: Only  "change Specimen Src: to \"Line\" if  specified by physician order.    BLOOD CULTURE [[id number]] Collected:  09/17/18 1509    Order Status:  Completed Specimen:  Blood from Peripheral Updated:  09/18/18 0621     Significant Indicator NEG     Source BLD     Site Peripheral     Blood Culture No Growth    Note: Blood cultures are incubated for 5 days and  are monitored continuously.Positive blood cultures  are called to the RN and reported as soon as  they are identified.     Blood culture testing and Gram stain, if indicated, are  performed at [facility], [address] [place] [address].  Positive blood cultures are  sent to [facility], [address] [place] [address], for organism identification and  susceptibility testing.      Narrative:       Per Hospital Policy: Only change Specimen Src: to \"Line\" if  specified by physician order.    C Diff by PCR rflx Toxin [[id number]] Collected:  09/15/18 1155    Order Status:  Completed Specimen:  Stool from Stool Updated:  09/15/18 2225     C Diff by PCR Negative     Comment: C. difficile NOT detected by PCR.  Treatment not indicated per guidelines.  Repeat testing not indicated within 7 days.          027-NAP1-BI Presumptive Negative     Comment: Presumptive 027/NAP1/BI target DNA sequences are NOT DETECTED.       Narrative:       Special Contact IsolationSURMXC LENORE HERNANDEZ  Does this patient have risk factors for C-diff?->Yes  C-Diff Risk Factors->antibiotic exposure    URINE CULTURE(NEW) [[id number]]  (Abnormal) Collected:  09/12/18 1216    Order Status:  Completed Specimen:  Urine from Urine, Clean Catch Updated:  09/15/18 0943     Significant Indicator POS (POS)     Source UR     Site URINE, CLEAN CATCH     Urine Culture Mixed skin tay 10-50,000 cfu/mL (A)      Yeast  <10,000 cfu/mL   (A)    Narrative:       Indication for culture:->Emergency Room Patient    BLOOD CULTURE [[id number]]  (Abnormal) Collected:  09/12/18 " "0958    Order Status:  Completed Specimen:  Blood from Peripheral Updated:  09/15/18 0837     Significant Indicator POS (POS)     Source BLD     Site PERIPHERAL     Blood Culture Blood culture testing and Gram stain, if indicated, are  performed at [facility], [address] [place] [address].[place], [place].  Positive blood cultures are  sent to [facility], [address] [place] [address], for organism identification and  susceptibility testing.  Growth detected by Bactec instrument.   (A)      Enterobacter cloacae  See previous culture for sensitivity report.   (A)    Narrative:       CALL  [facility]  ICU tel. ,  CALLED  ICU tel.  09/13/2018, 03:56, RB PERF. RESULTS CALLED TO: [id number] rn  Per Hospital Policy: Only change Specimen Src: to \"Line\" if  specified by physician order.    BLOOD CULTURE [[id number]]  (Abnormal)  (Susceptibility) Collected:  09/12/18 0922    Order Status:  Completed Specimen:  Blood from Peripheral Updated:  09/15/18 0835     Significant Indicator POS (POS)     Source BLD     Site Peripheral     Blood Culture Blood culture testing and Gram stain, if indicated, are  performed at [facility], [address] [place] [address].  Positive blood cultures are  sent to [facility], [address] [place] [address], for organism identification and  susceptibility testing.  Growth detected by Bactec instrument.   (A)      Enterobacter cloacae (A)    Narrative:       CALL  [facility]  ICU tel. ,  CALLED  ICU tel.  09/13/2018, 01:19, RB PERF. RESULTS CALLED TO: [id number], rn  Per Hospital Policy: Only change Specimen Src: to \"Line\" if  specified by physician order.    Culture & Susceptibility     ENTEROBACTER CLOACAE     Antibiotic Sensitivity Microscan Unit Status    Ampicillin Resistant >16 mcg/mL Final    Method: SENSITIVITY, YANELY    Cefepime Sensitive <=8 mcg/mL Final    Method: SENSITIVITY, YANELY    Cefotaxime Sensitive <=2 mcg/mL Final    " Method: SENSITIVITY, YANELY    Cefotetan Resistant >32 mcg/mL Final    Method: SENSITIVITY, YANELY    Ceftazidime Sensitive 4 mcg/mL Final    Method: SENSITIVITY, YANELY    Ceftriaxone Sensitive <=8 mcg/mL Final    Method: SENSITIVITY, YANELY    Cefuroxime Intermediate 16 mcg/mL Final    Method: SENSITIVITY, YANELY    Ciprofloxacin Sensitive <=1 mcg/mL Final    Method: SENSITIVITY, YANELY    Ertapenem Sensitive <=1 mcg/mL Final    Method: SENSITIVITY, YANELY    Gentamicin Sensitive <=4 mcg/mL Final    Method: SENSITIVITY, YANELY    Pip/Tazobactam Sensitive <=16 mcg/mL Final    Method: SENSITIVITY, YANELY    Tigecycline Sensitive <=2 mcg/mL Final    Method: SENSITIVITY, YANELY    Tobramycin Sensitive <=4 mcg/mL Final    Method: SENSITIVITY, YANELY    Trimeth/Sulfa Sensitive <=2/38 mcg/mL Final    Method: SENSITIVITY, YANELY                       S. AUREUS BY PCR, NASAL COMPLETE [[id number]] Collected:  09/12/18 1530    Order Status:  Completed Updated:  09/13/18 1345     Staph aureus by PCR Negative     MRSA by PCR Negative          Assessment:  Enoch Mistry is a 72 y.o. male with a history of poorly controlled diabetes, hypertension, known cholelithiasis, who presented with abdominal pain and significant conjugated hyperbilirubinemia.  His blood culture is positive for Enterobacter.  The hyperbilirubinemia is spontaneously improving, suggestive of a gallstone that passed.  Unfortunately, his white count continues to worsen, now up to 50,000.  No intra-abdominal abscess or ductal dilatation on CT or MRCP.  Given the worsening white count, surgery was consulted.  There is suspicion for an acute acute gangrenous cholecystitis that may not be as evident on imaging.  He is scheduled for a cholecystectomy.    Patient unfortunately also developed an acute blood loss anemia on 9/19, transferred to the ICU, found to have a bleeding duodenal ulcer.    Pertinent Diagnoses:  1.  Sepsis, worsening  2.  Enterobacter bacteremia   3.  Conjugated  hyperbilirubinemia, improving, likely passed an obstructive gallstone  4.  Poorly controlled diabetes  5.  Cholelithiasis  6.  Acute blood loss anemia, duodenal ulcer    Active Hospital Problems    Diagnosis   • Sepsis due to Enterobacter species ([facility]) [A41.59]   • Acute on chronic respiratory failure ([facility]) [J96.20]   • Hypokalemia [E87.6]   • Hyponatremia [E87.1]   • Diarrhea [R19.7]   • Hypophosphatemia [E83.39]   • Abnormal LFTs [R94.5]   • Uncontrolled type 2 diabetes mellitus ([facility]) [E11.65]   • Non-compliance [Z91.19]   • Hepatic steatosis [K76.0]   • Chronic deep vein thrombosis (DVT) ([facility]) [I82.509]   • Stage 2 moderate COPD by GOLD classification ([facility]) [J44.9]       Plan:  Worsening sepsis  Imaging unrevealing as above   Cholecystectomy planned today  continue IV ceftazidime, PO Flagyl  Surveillance blood cultures from 9/17 are no growth to date    Enterobacter bacteremia  As above  Follow surveillance blood cultures    Conjugated hyperbilirubinemia, improved, likely passed an obstructive gallstone  LFTs and T bili continue to improve  Plan as above    Poorly controlled diabetes  A1c 9.5  Contributing to immunosuppression    Acute blood loss anemia, duodenal ulcer  Eliquis was discontinued.  Bleeding control of duodenal ulcer done on EGD 9/19    Markedly enlarged prostate  No [place], no e/o abscess  PSA pending    Discussed with internal medicine, Dr. Omalley and intensivist, Dr. Augustine

## 2018-09-20 NOTE — PROGRESS NOTES
Planning to proceed with bud today   Hemodynamic status being permissive   Leukocytosis could be reaction to zosyn

## 2018-09-20 NOTE — PROGRESS NOTES
"PROGRESS NOTE  Pulmonary & Critical Care Medicine     Date of service: 9/20/2018    Assessment & Plan   Severe sepsis with septic shock (CODE) ([facility])   Assessment & Plan    Entorobact in blood 9/12 repeat negative 9/17  Cefepime and flagyl  3 stones in CBD and not dilated 5mm  Wbc continues to rise  Dr. Gaston saw the patient and very conc abt gangrenous gall bladder  Set for OR at 3:00 pm today  Will add 2 units of blood on hold        Uncontrolled type 2 diabetes mellitus ([facility])   Assessment & Plan    FS q 4 and may need insulin drip to maintain FS < 180        GI bleeding   Assessment & Plan    EGD 9/19/18 showed \"1. 1cm duodenal apex ulcer w/ bleeding vessel 2. Multiple shallow DUs 3. Gr D esophagitis\"  Continue PPI drip and sucralafate  Pt responded to 2 units of blood appropriately but as ging to OR and hgb 7.5 with events of Vtach intermittently monitored on monitor --> give one unit and hold one unit for OR        Lower extremity edema   Assessment & Plan    Progressing, he has venous stasis but this is from volume overload  Would like to give lasix but will address post OR  ECHO done 9/13 is normal EL and RV also looks nl with no valvular abnormalities        Chronic deep vein thrombosis (DVT) ([facility])   Assessment & Plan    Due to bleeding will hold eloquis        Stage 2 moderate COPD by GOLD classification ([facility])   Assessment & Plan    Not in exacerbation  Continue home inhalers            Feeding:  NPO for surgery today  Analgesia: has not needed any  Sedation:  none  Thromboembolic prophylaxis:  SCDs  Head of bed elevation:  Sitting in a chair  Ulcer prophylaxis:  PPI drip  Glucose:  Insulin drip holding at 100  Spontaneous breathing trial:  n/a  Bowel regimen:  yes  Indwelling catheter:  no  Descalation of Abx:  Code Status:  DNAR - ok to intubate  Disposition:  icu  Restraints:  n/a    Interval History  Enoch Mistry is an 72 y.o. male admitted on  9/12/18 with fevers, chills, shortness of " "breath,   and abdominal pain and elevated LFTS (elevated conjugated hyperbilirubinemia) ascending cholangitis. Ultrasound showed cholelithiasis with pain over the gallbladder, but no evidence of acute cholecystitis. Blood culture growing Enterobacter.  Her developed a copd exacerbation in hospital and was rx with steroids with improvement  He progressed with progressive abdominal pain  9/19 pt developed melena and has been on eloquis for chronic DVT right leg and transferred back to ICU on 9/19/18  EGD on 9/19/18 showed: \"1. 1cm duodenal apex ulcer w/ bleeding vessel 2. Multiple shallow DUs 3. Gr D esophagitis\"  Gi and surgery consulted and plan was for cholecystectomy 9/20/18 am. Pt had an MRI today which showed \"No significant intrahepatic ductal dilatation is identified. The common bile duct measures approximately 5 mm in diameter. There are at least 3 low signal foci in the distal common bile duct\"    General surgery consulted and concern for gangrenous bladder and going to surgery today     Rx with ceftazidine and flagyl       24-Hour Events: no events, one run of VTach on monitor 6 beats and IJ pulled out 3 cm    Review of Systems   Constitutional: Positive for malaise/fatigue.   HENT: Negative.    Eyes: Negative.    Respiratory: Negative.    Cardiovascular: Positive for leg swelling.   Gastrointestinal: Positive for abdominal pain.   Genitourinary: Negative.    Musculoskeletal: Negative.    Skin: Negative.    Neurological: Negative.    Endo/Heme/Allergies: Negative.    Psychiatric/Behavioral: Negative.              Objective     PHYSICAL EXAM:   /48   Pulse (!) 101   Temp 37.5 °C (99.5 °F)   Resp 15   Ht 1.702 m (5' 7\")   Wt 100.5 kg (221 lb 9 oz)   SpO2 94%   BMI 34.70 kg/m²     Intake/Output Summary (Last 24 hours) at 09/20/18 0794  Last data filed at 09/20/18 0600   Gross per 24 hour   Intake             4190 ml   Output             1550 ml   Net             2640 ml       Gen: no apparent " distress, resting comfortably and sitting in a chair  HEENT: Normocephalic atraumatic, PERRL, oropharynx clear no erythema or exudates; Nares patents, no discharge from the eyes and no discharge from either ear  Trachea midline, no adenopathy  Cardiovascular: Regular rate and rhythm, no murmurs or rubs  Pulmonary: No apparent wheezes, rales or rhonchi   Abdomen: Soft, nondistended, nontender, normoactive bowel sounds; no organomegaly  Gu: no perera catheter present  Extremities: ++ lower extremity edema bilaterally, peripheral pulses +2 in all 4 extremities, no calf tenderness  Neuro: Alert and oriented ×3, moving all extremities equally, no focal deficits   Skin: No rash      Labs:  Lab Results   Component Value Date/Time    SODIUM 135 09/20/2018 02:20 AM    POTASSIUM 3.9 09/20/2018 12:40 PM    CHLORIDE 109 09/20/2018 02:20 AM    CO2 24 09/20/2018 02:20 AM    GLUCOSE 124 (H) 09/20/2018 02:20 AM    BUN 43 (H) 09/20/2018 02:20 AM    CREATININE 0.59 09/20/2018 02:20 AM        Lab Results   Component Value Date/Time    WBC 53.1 (HH) 09/20/2018 02:20 AM    RBC 2.46 (L) 09/20/2018 02:20 AM    HEMOGLOBIN 7.5 (L) 09/20/2018 02:20 AM    HEMATOCRIT 21.6 (L) 09/20/2018 02:20 AM    MCV 87.8 09/20/2018 02:20 AM    MCH 30.5 09/20/2018 02:20 AM    MCHC 34.7 09/20/2018 02:20 AM    MPV 11.2 09/20/2018 02:20 AM    NEUTSPOLYS 81.00 (H) 09/20/2018 02:20 AM    LYMPHOCYTES 13.00 (L) 09/20/2018 02:20 AM    MONOCYTES 4.00 09/20/2018 02:20 AM    EOSINOPHILS 0.00 09/20/2018 02:20 AM    BASOPHILS 0.00 09/20/2018 02:20 AM    ANISOCYTOSIS 1+ 09/19/2018 08:00 PM         [place]

## 2018-09-20 NOTE — OP REPORT
DATE OF SERVICE:  09/19/2018    PREOPERATIVE DIAGNOSIS:  Acute upper gastrointestinal bleeding.    POSTOPERATIVE DIAGNOSES:  1.  1 cm duodenal apex ulcer with bleeding visible vessel.  2.  Multiple clean based duodenal ulcers.  3.  Grade D esophagitis.    PROCEDURE:  Esophagogastroduodenoscopy.    SUBPROCEDURE:  Hemostasis with epinephrine injection and Gold Probe cautery.    HISTORY:  This 72-year-old male admitted 1-week ago with evidence of biliary   sepsis.  He developed acute upper GI bleeding today.  Informed consent was   obtained after explanation of risks, benefits, and alternatives.    DESCRIPTION OF PROCEDURE:  The procedure was performed in the main endoscopy   unit at [facility].  The patient was placed under   deep sedation by anesthesiology and monitored throughout the procedure.  After   adequate sedation was achieved, the Olympus  endoscope was passed via   the oropharynx into the esophagus without difficulty.  In the distal   esophagus, grade D esophagitis was identified.  There is no bleeding from the   esophagus.  Then, the scope passed easily through the GE junction into the   stomach.  There was a small amount of old blood present within the gastric   lumen.  Otherwise, the gastric body and antrum were normal.  Retroflexion of   the endoscope revealed a normal gastric angularis, cardia, and fundus.    Endoscope was passed through the pylorus into the duodenum.  Beginning in the   duodenal bulb and extending to the second portion of the duodenum were   multiple clean based ulcers.  The endoscope was withdrawn to the duodenal   apex, where a 1 cm ulcer with actively bleeding visible vessel was identified.    Using a sclerotherapy needle, a total of 4 mL of 1:10,000 epinephrine were   injected around the vessel.  A 7-Maori Gold Probe catheter was then advanced   through the endoscope and coaptive coagulation was applied to the vessel which   was ablated.  The area  was washed and observed.  There was no further   bleeding.  The endoscope was then withdrawn.  The patient tolerated the   procedure well.  There were no apparent complications.    IMPRESSION:  A 72-year-old male with acute upper gastrointestinal bleeding.    Endoscopy shows a 1 cm duodenal apex ulcer with bleeding visible vessel,   multiple shallow duodenal ulcers and grade D esophagitis.  Recommend continue   therapy with IV proton pump inhibitor and start Carafate 1 g suspension q.i.d.   and hold all anticoagulation.       ____________________________________     SHAN PAULSON DO    PIS / NTS    DD:  09/19/2018 18:32:13  DT:  09/19/2018 20:27:48    D#:  [id number]  Job#:  [id number]

## 2018-09-20 NOTE — TELEPHONE ENCOUNTER
Why is the patient telling us to change that?  The physician (or the nurse practitioner, whomever is taking care of him) should be able to place the appropriate orders for his care after discharge OR they should be able to get home health on board.

## 2018-09-20 NOTE — OR NURSING
1822- Patient admitted to PACU from endoscopy. No distress noted at this time. Oxygen infusing via patient face mask. Breath sounds diminished throughout.     1835- Patient resting. Large incontinence bowel movement noted. Patient assisted with cleaning by staff.     1845- Patient cleansed again by nursing staff. No distress noted at this time. Liquids given per patient request and patient able to tolerate without difficulty.     1900- Patient resting in bed. Patient denies any pain or discomfort to abdomen. Patient with complaints of back pain noted prior to procedure.    1915- Patient resting in bed. Awaiting to give report to intensive care unit.     1930- Report called and patient ready for transfer to intensive care unit.     1940- Patient transferred to intensive care unit.

## 2018-09-20 NOTE — PROGRESS NOTES
Insulin check prior to patient going down to surgery.  Patient's insulin increased see MAR.  No insulin bolus given per Dr. Augustine.  Patient taken down to surgery.      1845 Report given to Laverne OGDEN. Patient still down in recovery at this time.

## 2018-09-20 NOTE — CARE PLAN
Problem: Safety  Goal: Will remain free from injury  Patient educated regarding the need for hourly blood sugar checks during Insulin Protocol. Patient verbalized understanding.      Problem: Skin Integrity  Goal: Risk for impaired skin integrity will decrease  Talked with patient about the importance of wrapping legs due to edema, weeping, and wounds. Patient verbalized understanding.

## 2018-09-20 NOTE — OR SURGEON
Immediate Post OP Note    * No Diagnosis Codes entered *    Post-Op Diagnosis Codes:     * Multiple gastric ulcers [K25.9]    Procedure(s):  GASTROSCOPY w/ bleeding control    Surgeon(s):  Darnell Hernández D.O.    Anesthesiologist/Type of Anesthesia:  Anesthesiologist: Jose Guaman M.D./* No anesthesia type entered *    Surgical Staff:  Circulator: Frankie Ladd R.N.  Endoscopy Technician: Hanna Marino    Specimens removed if any:  * No specimens in log *    Estimated Blood Loss: 5cc    Findings: 1. 1cm duodenal apex ulcer w/ bleeding vessel 2. Multiple shallow DUs 3. Gr D esophagitis    Complications: none    Rec: Continue IV PPI          Hold all anti coagulation        9/19/2018 6:22 PM Darnell Hernández D.O.

## 2018-09-20 NOTE — FLOWSHEET NOTE
09/20/18 1051   Events/Summary/Plan   Events/Summary/Plan prn check   Therapy Not Performed   Type of Therapy Not Performed SVN   Reason Therapy Not Performed PRN, No Indication   Chest Exam   Work Of Breathing / Effort Mild   Respiration 16   Pulse 98   Breath Sounds   RUL Breath Sounds Diminished   RML Breath Sounds Diminished   RLL Breath Sounds Diminished   LORNA Breath Sounds Diminished   LLL Breath Sounds Diminished   Oximetry   #Pulse Oximetry (Single Determination) Yes   Oxygen   Pulse Oximetry 97 %   O2 (LPM) 3  (02 titrated to 2 liters. RN notified)   O2 Daily Delivery Respiratory  Nasal Cannula

## 2018-09-20 NOTE — PROGRESS NOTES
Patient taken down to Pre-OP.  IV ABX and potassium running.  IV insulin stopped per Dr. Augustine, preop to check blood sugar Preop RN agreed with plan to check blood sugar.

## 2018-09-20 NOTE — OR NURSING
"1623 To PACU from OR via bed,side rails up x 3 for safety, lungs clear bilaterally, scds on patient and machine operational, 4 lap stabs noted to abdomen. Breathing easy and unlabored with OPA in place.   1635 Pt restless in bed, reports \"belly is sore\" but does not respond appropriately to questions regarding pain or pain scale. Pt lying on left side and does not follow commands.   1640 Pt continues to be restless and states \"I can't answer any of your questions until I'm more with it\". HOB elevated to 45 degrees.   1655 Pt arouses easily to voice and declines further pain medications. Pt reports pain improved \"but what I really want is one of those little emanuel drinks\". Denies nausea.   1705 Pain rated as 3-4/10 and tolerable to stomach. Tolerating clear liquids in PACU.   1710 Pain rated as tolerable and denies nausea. Meets criteria for transfer to ICU via bed with monitor.   "

## 2018-09-20 NOTE — ASSESSMENT & PLAN NOTE
Progressing, he has venous stasis but this is from volume overload  ECHO done 9/13 is normal EL and RV also looks nl with no valvular abnormalities  Now overall improved, lasix 20 mg * 1

## 2018-09-20 NOTE — CONSULTS
DATE OF SERVICE:  09/19/2018    REFERRING PHYSICIAN:  Dr. Omalley.    REASON FOR CONSULTATION:  Probable acute cholecystitis with   choledocholithiasis.    HISTORY OF PRESENT ILLNESS:  The patient is 72 years of age, was admitted a   week ago with abdominal pain and evidence of septicemia.  He did have positive   blood cultures for Enterobacter.  He was jaundiced, had elevated liver   function tests, was evaluated and had an ultrasound showing rather bland   appearing gallbladder, but a positive Wolf's sign.  Patient had leukocytosis   which subsequently has increased to fairly alarming levels for unclear   reasons, possibly related to medical therapy.  The patient was subsequently   found to have choledocholithiasis and was scheduled today for ERCP.  His   bilirubin has been decreasing over the last several days and he went to MRCP   to make sure that he had not passed the stones, which there were still   evidence of stones.  Later in the day, the patient developed melena and became   relatively hypotensive, was transferred to ICU for fluid resuscitation and   possible transfusion.  He underwent EGD this evening, which did show bleeding   duodenal ulcer.  The patient has some significant comorbidities.  These   include sleep apnea, COPD, oxygen dependency, chronic edema, possibly due to   right heart failure or cor pulmonale, essential hypertension, diabetes, which   has not been treated effectively, patient is noncompliant.  The patient has a   history of bladder cancer supposedly on admission, has not had other major   abdominal surgeries.    PAST MEDICAL HISTORY:  His medical problems should include obesity.    PAST SURGICAL HISTORY:  Primarily urologic.    FAMILY HISTORY:  Positive for diabetes.    SOCIAL HISTORY:  Quit smoking 12 years ago.  He had a fairly heavy smoking   history prior to that.  He continues to drink alcohol approximately 6 beers   per week.    ALLERGIES:  There are no known allergies to  medications.  The patient was not   on any medications at the time of admission.    REVIEW OF SYSTEMS:  Patient's review of systems is primarily positive for   melena this afternoon.  He is not obviously feeling well.  It is otherwise   negative and noncontributory.    PHYSICAL EXAMINATION:  GENERAL:  Currently shows him to have stable vital signs.  He is pale.  HEENT:  Reveals somewhat tired and fatigued looking individual.  He is   oriented to person, place and time.  He is minimally conversational.  The   patient is somewhat disheveled, unkempt in appearance.  He does not have   marked scleral icterus.  NECK:  Supple.  Neck veins are not distended.  LUNGS:  He is somewhat hypoventilatory.  He is on oxygen.  CARDIAC:  Shows a regular rhythm.  ABDOMEN:  Tender and somewhat distended, particularly tenderness in the right   upper quadrant.  He has no major extremity deformity other than chronic edema   of the lower extremities with venous stasis dermatitis.  The patient appears   to be intact neurologically.  He has untrimmed toenails.  NEUROLOGIC:  The patient's neurologic examination otherwise appears to be   intact.    LABORATORY DATA:  Show marked increase in leukocytosis into the 40,000 range.    He has had positive blood cultures for Enterobacter during this admission.    The patient's liver function tests have been improving.  He has known   choledocholithiasis.  He had a CT scan, which did not show other marked   intraabdominal pathology.    ASSESSMENT:  I suspect the patient has acute, most likely gangrenous,   cholecystitis.  His imaging is probably understates the severity of his   disease.  I think he would benefit from early cholecystectomy.  I will have   him scheduled for 3:00 o'clock tomorrow afternoon.  All of this of course is   predicated on resuscitation from his bleeding episode today.  He had a   duodenal ulcer, which was clipped and could conceivably that could also come   to surgical therapy,  although currently controlled.  The patient, I think he   has fairly significant risk for surgical intervention with unmanaged medical   problems of significant degree.  The risks and possible complications were   explained to him this evening and I will reiterate those to him tomorrow.    PLAN:  To proceed with surgical intervention provided the patient is a   candidate to proceed tomorrow.    Time of evaluation is 60 minutes on 09/19/2018.       ____________________________________     MD OMAR MANLEY / NTS    DD:  09/19/2018 19:12:42  DT:  09/19/2018 23:15:27    D#:  [id number]  Job#:  [id number]    cc: RADHA BALTAZAR MD

## 2018-09-21 ENCOUNTER — APPOINTMENT (OUTPATIENT)
Dept: RADIOLOGY | Facility: MEDICAL CENTER | Age: 73
DRG: 853 | End: 2018-09-21
Attending: INTERNAL MEDICINE
Payer: MEDICARE

## 2018-09-21 ENCOUNTER — TELEPHONE (OUTPATIENT)
Dept: MEDICAL GROUP | Facility: MEDICAL CENTER | Age: 73
End: 2018-09-21

## 2018-09-21 LAB
ANION GAP SERPL CALC-SCNC: 4 MMOL/L (ref 0–11.9)
ANISOCYTOSIS BLD QL SMEAR: ABNORMAL
BASOPHILS # BLD AUTO: 0 % (ref 0–1.8)
BASOPHILS # BLD: 0 K/UL (ref 0–0.12)
BUN SERPL-MCNC: 16 MG/DL (ref 8–22)
CALCIUM SERPL-MCNC: 7 MG/DL (ref 8.4–10.2)
CHLORIDE SERPL-SCNC: 106 MMOL/L (ref 96–112)
CO2 SERPL-SCNC: 24 MMOL/L (ref 20–33)
CREAT SERPL-MCNC: 0.72 MG/DL (ref 0.5–1.4)
EOSINOPHIL # BLD AUTO: 0 K/UL (ref 0–0.51)
EOSINOPHIL NFR BLD: 0 % (ref 0–6.9)
ERYTHROCYTE [DISTWIDTH] IN BLOOD BY AUTOMATED COUNT: 47.6 FL (ref 35.9–50)
GLUCOSE BLD-MCNC: 117 MG/DL (ref 65–99)
GLUCOSE BLD-MCNC: 183 MG/DL (ref 65–99)
GLUCOSE BLD-MCNC: 196 MG/DL (ref 65–99)
GLUCOSE BLD-MCNC: 212 MG/DL (ref 65–99)
GLUCOSE SERPL-MCNC: 195 MG/DL (ref 65–99)
HCT VFR BLD AUTO: 23.6 % (ref 42–52)
HGB BLD-MCNC: 7.9 G/DL (ref 14–18)
INR PPP: 1.49 (ref 0.87–1.13)
LYMPHOCYTES # BLD AUTO: 2.86 K/UL (ref 1–4.8)
LYMPHOCYTES NFR BLD: 6 % (ref 22–41)
MANUAL DIFF BLD: NORMAL
MCH RBC QN AUTO: 30.3 PG (ref 27–33)
MCHC RBC AUTO-ENTMCNC: 33.5 G/DL (ref 33.7–35.3)
MCV RBC AUTO: 90.4 FL (ref 81.4–97.8)
MONOCYTES # BLD AUTO: 2.38 K/UL (ref 0–0.85)
MONOCYTES NFR BLD AUTO: 5 % (ref 0–13.4)
NEUTROPHILS # BLD AUTO: 42.36 K/UL (ref 1.82–7.42)
NEUTROPHILS NFR BLD: 85 % (ref 44–72)
NEUTS BAND NFR BLD MANUAL: 4 % (ref 0–10)
NRBC # BLD AUTO: 0.48 K/UL
NRBC BLD-RTO: 1 /100 WBC
PATHOLOGY CONSULT NOTE: NORMAL
PLATELET # BLD AUTO: 290 K/UL (ref 164–446)
PLATELET BLD QL SMEAR: NORMAL
PMV BLD AUTO: 11.6 FL (ref 9–12.9)
POLYCHROMASIA BLD QL SMEAR: NORMAL
POTASSIUM SERPL-SCNC: 3.9 MMOL/L (ref 3.6–5.5)
PROTHROMBIN TIME: 17.8 SEC (ref 12–14.6)
PSA FREE MFR SERPL: ABNORMAL %
PSA FREE SERPL-MCNC: ABNORMAL NG/ML
PSA SERPL-MCNC: 49.3 NG/ML (ref 0–4)
RBC # BLD AUTO: 2.61 M/UL (ref 4.7–6.1)
RBC BLD AUTO: PRESENT
SMUDGE CELLS BLD QL SMEAR: NORMAL
SODIUM SERPL-SCNC: 134 MMOL/L (ref 135–145)
WBC # BLD AUTO: 47.6 K/UL (ref 4.8–10.8)

## 2018-09-21 PROCEDURE — C2617 STENT, NON-COR, TEM W/O DEL: HCPCS | Performed by: INTERNAL MEDICINE

## 2018-09-21 PROCEDURE — 82962 GLUCOSE BLOOD TEST: CPT

## 2018-09-21 PROCEDURE — 85027 COMPLETE CBC AUTOMATED: CPT

## 2018-09-21 PROCEDURE — 700111 HCHG RX REV CODE 636 W/ 250 OVERRIDE (IP): Performed by: HOSPITALIST

## 2018-09-21 PROCEDURE — 80048 BASIC METABOLIC PNL TOTAL CA: CPT

## 2018-09-21 PROCEDURE — 88305 TISSUE EXAM BY PATHOLOGIST: CPT

## 2018-09-21 PROCEDURE — 94760 N-INVAS EAR/PLS OXIMETRY 1: CPT

## 2018-09-21 PROCEDURE — 700111 HCHG RX REV CODE 636 W/ 250 OVERRIDE (IP): Performed by: INTERNAL MEDICINE

## 2018-09-21 PROCEDURE — 160048 HCHG OR STATISTICAL LEVEL 1-5: Performed by: INTERNAL MEDICINE

## 2018-09-21 PROCEDURE — 700105 HCHG RX REV CODE 258: Performed by: HOSPITALIST

## 2018-09-21 PROCEDURE — 160035 HCHG PACU - 1ST 60 MINS PHASE I: Performed by: INTERNAL MEDICINE

## 2018-09-21 PROCEDURE — 88312 SPECIAL STAINS GROUP 1: CPT

## 2018-09-21 PROCEDURE — 160002 HCHG RECOVERY MINUTES (STAT): Performed by: INTERNAL MEDICINE

## 2018-09-21 PROCEDURE — 700111 HCHG RX REV CODE 636 W/ 250 OVERRIDE (IP)

## 2018-09-21 PROCEDURE — 36592 COLLECT BLOOD FROM PICC: CPT

## 2018-09-21 PROCEDURE — 99233 SBSQ HOSP IP/OBS HIGH 50: CPT | Performed by: INTERNAL MEDICINE

## 2018-09-21 PROCEDURE — 99231 SBSQ HOSP IP/OBS SF/LOW 25: CPT | Performed by: INTERNAL MEDICINE

## 2018-09-21 PROCEDURE — 160203 HCHG ENDO MINUTES - 1ST 30 MINS LEVEL 4: Performed by: INTERNAL MEDICINE

## 2018-09-21 PROCEDURE — 85007 BL SMEAR W/DIFF WBC COUNT: CPT

## 2018-09-21 PROCEDURE — 770022 HCHG ROOM/CARE - ICU (200)

## 2018-09-21 PROCEDURE — 700101 HCHG RX REV CODE 250: Performed by: INTERNAL MEDICINE

## 2018-09-21 PROCEDURE — 500066 HCHG BITE BLOCK, ECT: Performed by: INTERNAL MEDICINE

## 2018-09-21 PROCEDURE — C9113 INJ PANTOPRAZOLE SODIUM, VIA: HCPCS | Performed by: HOSPITALIST

## 2018-09-21 PROCEDURE — 160208 HCHG ENDO MINUTES - EA ADDL 1 MIN LEVEL 4: Performed by: INTERNAL MEDICINE

## 2018-09-21 PROCEDURE — 700102 HCHG RX REV CODE 250 W/ 637 OVERRIDE(OP): Performed by: INTERNAL MEDICINE

## 2018-09-21 PROCEDURE — 74328 X-RAY BILE DUCT ENDOSCOPY: CPT

## 2018-09-21 PROCEDURE — 0DB78ZX EXCISION OF STOMACH, PYLORUS, VIA NATURAL OR ARTIFICIAL OPENING ENDOSCOPIC, DIAGNOSTIC: ICD-10-PCS | Performed by: INTERNAL MEDICINE

## 2018-09-21 PROCEDURE — 502240 HCHG MISC OR SUPPLY RC 0272: Performed by: INTERNAL MEDICINE

## 2018-09-21 PROCEDURE — BF101ZZ FLUOROSCOPY OF BILE DUCTS USING LOW OSMOLAR CONTRAST: ICD-10-PCS | Performed by: INTERNAL MEDICINE

## 2018-09-21 PROCEDURE — 85610 PROTHROMBIN TIME: CPT

## 2018-09-21 PROCEDURE — 700105 HCHG RX REV CODE 258: Performed by: INTERNAL MEDICINE

## 2018-09-21 PROCEDURE — A9270 NON-COVERED ITEM OR SERVICE: HCPCS | Performed by: INTERNAL MEDICINE

## 2018-09-21 PROCEDURE — 0F798DZ DILATION OF COMMON BILE DUCT WITH INTRALUMINAL DEVICE, VIA NATURAL OR ARTIFICIAL OPENING ENDOSCOPIC: ICD-10-PCS | Performed by: INTERNAL MEDICINE

## 2018-09-21 PROCEDURE — 160009 HCHG ANES TIME/MIN: Performed by: INTERNAL MEDICINE

## 2018-09-21 DEVICE — ADVANIX BILIARY CENTER BEND 10X5: Type: IMPLANTABLE DEVICE | Status: FUNCTIONAL

## 2018-09-21 RX ORDER — GLYCOPYRROLATE 0.2 MG/ML
0.2 INJECTION INTRAMUSCULAR; INTRAVENOUS
Status: DISCONTINUED | OUTPATIENT
Start: 2018-09-21 | End: 2018-09-21 | Stop reason: HOSPADM

## 2018-09-21 RX ORDER — MIDAZOLAM HYDROCHLORIDE 1 MG/ML
INJECTION INTRAMUSCULAR; INTRAVENOUS
Status: COMPLETED
Start: 2018-09-21 | End: 2018-09-21

## 2018-09-21 RX ORDER — OXYCODONE HYDROCHLORIDE AND ACETAMINOPHEN 5; 325 MG/1; MG/1
2 TABLET ORAL
Status: DISCONTINUED | OUTPATIENT
Start: 2018-09-21 | End: 2018-09-21 | Stop reason: HOSPADM

## 2018-09-21 RX ORDER — MIDAZOLAM HYDROCHLORIDE 1 MG/ML
1 INJECTION INTRAMUSCULAR; INTRAVENOUS
Status: DISCONTINUED | OUTPATIENT
Start: 2018-09-21 | End: 2018-09-21 | Stop reason: HOSPADM

## 2018-09-21 RX ORDER — DIPHENHYDRAMINE HYDROCHLORIDE 50 MG/ML
12.5 INJECTION INTRAMUSCULAR; INTRAVENOUS
Status: DISCONTINUED | OUTPATIENT
Start: 2018-09-21 | End: 2018-09-21 | Stop reason: HOSPADM

## 2018-09-21 RX ORDER — NALOXONE HYDROCHLORIDE 0.4 MG/ML
0.1 INJECTION, SOLUTION INTRAMUSCULAR; INTRAVENOUS; SUBCUTANEOUS PRN
Status: DISCONTINUED | OUTPATIENT
Start: 2018-09-21 | End: 2018-09-21 | Stop reason: HOSPADM

## 2018-09-21 RX ORDER — ONDANSETRON 2 MG/ML
4 INJECTION INTRAMUSCULAR; INTRAVENOUS
Status: DISCONTINUED | OUTPATIENT
Start: 2018-09-21 | End: 2018-09-21 | Stop reason: HOSPADM

## 2018-09-21 RX ORDER — SODIUM CHLORIDE, SODIUM LACTATE, POTASSIUM CHLORIDE, CALCIUM CHLORIDE 600; 310; 30; 20 MG/100ML; MG/100ML; MG/100ML; MG/100ML
INJECTION, SOLUTION INTRAVENOUS CONTINUOUS
Status: DISCONTINUED | OUTPATIENT
Start: 2018-09-21 | End: 2018-09-24

## 2018-09-21 RX ORDER — OXYCODONE HYDROCHLORIDE AND ACETAMINOPHEN 5; 325 MG/1; MG/1
1 TABLET ORAL
Status: DISCONTINUED | OUTPATIENT
Start: 2018-09-21 | End: 2018-09-21 | Stop reason: HOSPADM

## 2018-09-21 RX ORDER — HALOPERIDOL 5 MG/ML
1 INJECTION INTRAMUSCULAR
Status: DISCONTINUED | OUTPATIENT
Start: 2018-09-21 | End: 2018-09-21 | Stop reason: HOSPADM

## 2018-09-21 RX ORDER — SODIUM CHLORIDE, SODIUM LACTATE, POTASSIUM CHLORIDE, CALCIUM CHLORIDE 600; 310; 30; 20 MG/100ML; MG/100ML; MG/100ML; MG/100ML
INJECTION, SOLUTION INTRAVENOUS CONTINUOUS
Status: DISCONTINUED | OUTPATIENT
Start: 2018-09-21 | End: 2018-09-21 | Stop reason: HOSPADM

## 2018-09-21 RX ADMIN — MORPHINE SULFATE 2 MG: 4 INJECTION INTRAVENOUS at 20:33

## 2018-09-21 RX ADMIN — SUCRALFATE 1 G: 1 SUSPENSION ORAL at 17:36

## 2018-09-21 RX ADMIN — SODIUM CHLORIDE 8 MG/HR: 9 INJECTION, SOLUTION INTRAVENOUS at 01:38

## 2018-09-21 RX ADMIN — MORPHINE SULFATE 2 MG: 4 INJECTION INTRAVENOUS at 23:48

## 2018-09-21 RX ADMIN — BUDESONIDE AND FORMOTEROL FUMARATE DIHYDRATE 2 PUFF: 160; 4.5 AEROSOL RESPIRATORY (INHALATION) at 17:36

## 2018-09-21 RX ADMIN — SUCRALFATE 1 G: 1 SUSPENSION ORAL at 23:36

## 2018-09-21 RX ADMIN — CEFTAZIDIME 1 G: 1 INJECTION, POWDER, FOR SOLUTION INTRAMUSCULAR; INTRAVENOUS at 05:07

## 2018-09-21 RX ADMIN — SUCRALFATE 1 G: 1 SUSPENSION ORAL at 12:14

## 2018-09-21 RX ADMIN — SODIUM CHLORIDE 8 MG/HR: 9 INJECTION, SOLUTION INTRAVENOUS at 15:37

## 2018-09-21 RX ADMIN — MORPHINE SULFATE 2 MG: 4 INJECTION INTRAVENOUS at 10:37

## 2018-09-21 RX ADMIN — ONDANSETRON HYDROCHLORIDE 4 MG: 2 INJECTION INTRAMUSCULAR; INTRAVENOUS at 23:55

## 2018-09-21 RX ADMIN — INSULIN HUMAN 2 UNITS: 100 INJECTION, SOLUTION PARENTERAL at 23:39

## 2018-09-21 RX ADMIN — BUDESONIDE AND FORMOTEROL FUMARATE DIHYDRATE 2 PUFF: 160; 4.5 AEROSOL RESPIRATORY (INHALATION) at 05:57

## 2018-09-21 RX ADMIN — CEFTAZIDIME 1 G: 1 INJECTION, POWDER, FOR SOLUTION INTRAMUSCULAR; INTRAVENOUS at 14:00

## 2018-09-21 RX ADMIN — MORPHINE SULFATE 2 MG: 4 INJECTION INTRAVENOUS at 18:27

## 2018-09-21 RX ADMIN — METRONIDAZOLE 500 MG: 500 INJECTION, SOLUTION INTRAVENOUS at 14:31

## 2018-09-21 RX ADMIN — INSULIN HUMAN 2 UNITS: 100 INJECTION, SOLUTION PARENTERAL at 06:03

## 2018-09-21 RX ADMIN — SODIUM CHLORIDE, POTASSIUM CHLORIDE, SODIUM LACTATE AND CALCIUM CHLORIDE: 600; 310; 30; 20 INJECTION, SOLUTION INTRAVENOUS at 08:10

## 2018-09-21 RX ADMIN — METRONIDAZOLE 500 MG: 500 INJECTION, SOLUTION INTRAVENOUS at 05:57

## 2018-09-21 RX ADMIN — CEFTAZIDIME 1 G: 1 INJECTION, POWDER, FOR SOLUTION INTRAMUSCULAR; INTRAVENOUS at 21:12

## 2018-09-21 RX ADMIN — METRONIDAZOLE 500 MG: 500 INJECTION, SOLUTION INTRAVENOUS at 21:50

## 2018-09-21 RX ADMIN — INSULIN HUMAN 2 UNITS: 100 INJECTION, SOLUTION PARENTERAL at 17:39

## 2018-09-21 RX ADMIN — ONDANSETRON HYDROCHLORIDE 4 MG: 2 INJECTION INTRAMUSCULAR; INTRAVENOUS at 18:27

## 2018-09-21 ASSESSMENT — ENCOUNTER SYMPTOMS
PSYCHIATRIC NEGATIVE: 1
EYES NEGATIVE: 1
ABDOMINAL PAIN: 1
RESPIRATORY NEGATIVE: 1
NEUROLOGICAL NEGATIVE: 1
MUSCULOSKELETAL NEGATIVE: 1

## 2018-09-21 ASSESSMENT — PAIN SCALES - GENERAL
PAINLEVEL_OUTOF10: 0
PAINLEVEL_OUTOF10: 2
PAINLEVEL_OUTOF10: 5
PAINLEVEL_OUTOF10: 2
PAINLEVEL_OUTOF10: 0
PAINLEVEL_OUTOF10: 5
PAINLEVEL_OUTOF10: 0
PAINLEVEL_OUTOF10: 6
PAINLEVEL_OUTOF10: 3
PAINLEVEL_OUTOF10: 2
PAINLEVEL_OUTOF10: 2
PAINLEVEL_OUTOF10: 0
PAINLEVEL_OUTOF10: 5
PAINLEVEL_OUTOF10: 0

## 2018-09-21 NOTE — CARE PLAN
Problem: Nutritional:  Goal: Achieve adequate nutritional intake  Pt will consume >/= 50% of meals consistently   Outcome: PROGRESSING AS EXPECTED  Diet advanced to cardiac today. No PO records yet.

## 2018-09-21 NOTE — PROGRESS NOTES
Infectious Disease Progress Note    Author: Frankie Olivares M.D. Date & Time of service: 2018  8:38 AM    Chief Complaint:  Follow-up of Enterobacter bacteremia    Interval History:   afebrile, white count now up to 31.8.  Reports that he was feeling terrible this morning after breakfast, feels a little bit better now.   afebrile, white count up to 44,000. CT with no e/o abscess or ductal dilatation. MRCP planned and he is scheduled for cholecystectomy tomorrow at 3 PM. His prostate is 6cm in size. No abscess.  Hemoglobin dropped from 11-7.5.  Patient reports off and on abdominal pain   afebrile, white count 50,000.  Patient was transferred to the ICU with hemodynamic instability and upper GI bleed.  EGD showed a bleeding duodenal ulcer.  General surgery was consulted and there is suspicion for a gangrenous cholecystitis.  He is scheduled to undergo a cholecystectomy today.  Patient reports that his abdominal pain has improved today   afebrile, white count down some from 53,000 - 47,000.  Gallbladder was removed on .  ERCP today for multiple gallstones in common bile duct. Pt in OR    Labs Reviewed and Medications Reviewed.    Review of Systems:  Review of Systems   Unable to perform ROS: Other     Hemodynamics:  Temp (24hrs), Av.1 °C (98.7 °F), Min:36.7 °C (98.1 °F), Max:37.5 °C (99.5 °F)  Temperature: 36.9 °C (98.4 °F)  Pulse  Av.1  Min: 65  Max: 128Heart Rate (Monitored): 96  Blood Pressure : 154/55, NIBP: 137/73       Physical Exam:  Physical Exam   Nursing note and vitals reviewed.  Pt in the OR this morning. Did not examine.    Meds:    Current Facility-Administered Medications:   •  LR  •  [MAR Hold] cefTAZidime  •  [MAR Hold] insulin regular **AND** Accu-Chek Q6 if NPO **AND** NOTIFY MD and PharmD **AND** [MAR Hold] glucose 4 g **AND** [MAR Hold] dextrose 50%  •  [DISCONTINUED] insulin regular **AND** Accu-Chek ACHS **AND** NOTIFY MD and PharmD **AND** [MAR Hold] glucose 4  g **AND** [MAR Hold] dextrose 50%  •  pantoprazole (PROTONIX) infusion  •  [MAR Hold] metroNIDAZOLE  •  [MAR Hold] sucralfate  •  [MAR Hold] diphenoxylate-atropine  •  [MAR Hold] morphine injection  •  [MAR Hold] LORazepam  •  [MAR Hold] budesonide-formoterol  •  [MAR Hold] benzocaine-menthol  •  [MAR Hold] ondansetron  •  [MAR Hold] Respiratory Care per Protocol  •  [MAR Hold] NS  •  [MAR Hold] ipratropium-albuterol    Labs:  Recent Labs      09/19/18 2000 09/20/18 0220 09/21/18   0340   WBC  53.5*  53.1*  47.6*   RBC  2.80*  2.46*  2.61*   HEMOGLOBIN  8.4*  7.5*  7.9*   HEMATOCRIT  24.4*  21.6*  23.6*   MCV  87.1  87.8  90.4   MCH  30.0  30.5  30.3   RDW  44.1  45.3  47.6   PLATELETCT  357  322  290   MPV  12.2  11.2  11.6   NEUTSPOLYS  76.00*  81.00*  85.00*   LYMPHOCYTES  13.00*  13.00*  6.00*   MONOCYTES  7.00  4.00  5.00   EOSINOPHILS  0.00  0.00  0.00   BASOPHILS  0.00  0.00  0.00   RBCMORPHOLO  Present  Present  Present     Recent Labs      09/19/18 2000 09/20/18 0220 09/20/18 1240 09/21/18   0340   SODIUM  136  135   --   134*   POTASSIUM  3.4*  4.1  3.9  3.9   CHLORIDE  106  109   --   106   CO2  22  24   --   24   GLUCOSE  276*  124*   --   195*   BUN  51*  43*   --   16     Recent Labs      09/19/18 0712 09/19/18 2000 09/20/18 0220 09/21/18   0340   ALBUMIN  2.3*   --   2.1*   --    TBILIRUBIN  1.9*   --   1.7*   --    ALKPHOSPHAT  57   --   47   --    TOTPROTEIN  4.2*   --   3.9*   --    ALTSGPT  54*   --   32   --    ASTSGOT  32   --   23   --    CREATININE  0.78  0.87  0.59  0.72       Imaging:  Dx-chest-portable (1 View)    Result Date: 9/13/2018 9/13/2018 6:39 AM HISTORY/REASON FOR EXAM:  Shortness of Breath TECHNIQUE/EXAM DESCRIPTION AND NUMBER OF VIEWS: Single portable view of the chest. COMPARISON: Yesterday FINDINGS: Interstitial opacities persist in the mid and lower lung fields bilaterally, and there is bibasilar atelectasis. Heart and mediastinum are normal.     Mild  pulmonary edema and bibasilar atelectasis, without significant change.    Dx-chest-portable (1 View)    Result Date: 9/12/2018 9/12/2018 9:10 AM HISTORY/REASON FOR EXAM: Rapid heart rate. TECHNIQUE/EXAM DESCRIPTION AND NUMBER OF VIEWS: Single portable view of the chest. COMPARISON: 5/5/2015 FINDINGS: There is bibasilar atelectasis. There is no pleural effusion. The heart is enlarged.     1.  Cardiomegaly. 2.  Bibasilar atelectasis.    Us-gallbladder    Result Date: 9/18/2018 9/18/2018 6:28 AM HISTORY/REASON FOR EXAM: Pain, leukocytosis TECHNIQUE/EXAM DESCRIPTION AND NUMBER OF VIEWS:  Real-time sonography of the gallbladder. COMPARISON: 9/12/2018 FINDINGS: There is no ascites. The liver is normal in contour. There is no evidence of solid mass lesion. The liver is diffusely increased in echogenicity. Gallstones are present. There is a wall echo shadow sign. The gallbladder is not well distended. Its wall measures 2 mm in thickness. There is no sonographic Wolf sign. 0.23 cm There is no pericholecystic fluid. The common duct measures 1 cm. The visualized pancreas is unremarkable. The visualized aorta is normal in caliber. Intrahepatic IVC is patent. The portal vein is patent with hepatopetal flow. The right kidney measures 11.6 cm. There is RIGHT pelvocaliectasis.     1.  Cholelithiasis without other sonographic findings of biliary disease 2.  Echogenic liver, a nonspecific finding that often is found to represent steatosis.  Other infiltrative processes could have an identical appearance.    Us-gallbladder    Result Date: 9/12/2018 9/12/2018 2:37 PM HISTORY/REASON FOR EXAM:  Abdominal pain, nausea and vomiting. TECHNIQUE/EXAM DESCRIPTION AND NUMBER OF VIEWS:  Real-time sonography of the gallbladder. COMPARISON: 4/18/2014 FINDINGS: There is no ascites. The liver is diffusely echogenic with decreased echogenicity in the gallbladder fossa. The liver measures 24.33 cm. Gallbladder shows echogenic shadowing focus  consistent with stone.  Gallbladder is contracted.  Gallbladder wall measures 0.32 cm.  Pain reported with scanning over the gallbladder. There is no pericholecystic fluid. The common duct measures 0.50 cm. The pancreas is obscured by bowel gas. The aorta is obscured by bowel gas. Intrahepatic IVC is patent. The portal vein is patent with hepatopetal flow. The right kidney measures 12.94 cm and shows mildly prominent collecting system. Note made of distended bladder.     1.  Limited evaluation of pancreas and abdominal aorta. 2.  Echogenic liver consistent with fatty infiltration with focal sparing adjacent to gallbladder fossa. 3.  Cholelithiasis with pain over the gallbladder.  No other evidence to indicate acute cholecystitis.  No biliary dilation. 4.  Distended bladder and mild RIGHT hydronephrosis, possibly indicating urinary retention.    Echocardiogram-comp W/ Cont    Result Date: 2018  Transthoracic Echo Report Echocardiography Laboratory CONCLUSIONS Prior echo 17.  No significant changes noted. Normal left ventricular systolic function. Left ventricular ejection fraction is visually estimated to be 65%. A reliable estimation of diastolic function cannot be made due to tachycardia. Dilated inferior vena cava with inspiratory collapse. MATTHIEU MCKEON Exam Date:         2018                    07:16 Exam Location:     Inpatient Priority:          Routine Ordering Physician:        VIKI TORO Referring Physician:       [id number] Oneal Sonographer:               Chandler Mendoza RDCS Age:    72     Gender:    M MRN:    [id number] :    1945 BSA:    2.03   Ht (in):    67     Wt (lb):    202 Exam Type:     Complete, Contrast Indications:     Dyspnea ICD Codes:       786.09 CPT Codes:       64243,  BP:          /          HR: Technical Quality:       Technically difficult study -                          adequate information is obtained MEASUREMENTS  (Male /  Female) Normal Values 2D ECHO LV Diastolic Diameter PLAX        5.5 cm                4.2 - 5.9 / 3.9 - 5.3 cm LV Systolic Diameter PLAX         2.8 cm                2.1 - 4.0 cm IVS Diastolic Thickness           1.2 cm                LVPW Diastolic Thickness          0.92 cm               LVOT Diameter                     2.3 cm                Estimated LV Ejection Fraction    65 %                  DOPPLER AV Peak Velocity                  1.2 m/s               AV Peak Gradient                  6.2 mmHg              AV Mean Gradient                  3.5 mmHg              LVOT Peak Velocity                1.1 m/s               AV Area Cont Eq vti               3 cm²                 * Indicates values subject to auto-interpretation LV EF:  65    % FINDINGS Left Ventricle Normal left ventricular chamber size. Normal left ventricular wall thickness. Normal left ventricular systolic function. Left ventricular ejection fraction is visually estimated to be 65%. Normal regional wall motion. A reliable estimation of diastolic function cannot be made due to tachycardia. Right Ventricle Right ventricle not well visualized. Right Atrium Normal right atrial size. Dilated inferior vena cava with inspiratory collapse. Left Atrium Normal left atrial size. Left atrial volume index is 25  mL/sq m. Mitral Valve Structurally normal mitral valve. No mitral stenosis. Trace mitral regurgitation. Aortic Valve The aortic valve is not well visualized. No aortic stenosis. No aortic insufficiency. Tricuspid Valve The tricuspid valve is not well visualized.  No tricuspid regurgitation. Pulmonic Valve The pulmonic valve is not well visualized. Pericardium Normal pericardium without effusion. Aorta Aortic root not well visualized.  Ascending aorta not well visualized. Whitley Costello MD (Electronically Signed) Final Date:     13 September 2018                 08:17    Le Venous Duplex (specify In Comments Left, Right Or Bilateral)    Result Date:  2018   Vascular Laboratory  CONCLUSIONS  No acute thrombosis is identified.  Chronic deep venous thrombosis in the distal right superficial femoral vein  and popliteal vein.  Suspect chronic deep venous thrombosis in the proximal peroneal and  posterior tibial veins, though veins were not well visualized.  MATTHIEU MCKEON  Exam Date:     2018 10:01  Room #:     Inpatient  Priority:     Stat  Ht (in):             Wt (lb):  Ordering Physician:        KELECHI MCDONALD  Referring Physician:       [id number] Hurtado  Sonographer:               Stephanie Hills RVT  Study Type:                Complete Bilateral  Technical Quality:         Adequate  Age:    72    Gender:     M  MRN:    [id number]  :    1945      BSA:  Indications:     Edema, unspecified  CPT Codes:       68807  ICD Codes:       R609  History:         Edema and swelling of both legs for 3-4 years. No prior                   exams.  Limitations:  PROCEDURES:  Bilateral lower extremity venous duplex imaging.  The following venous structures were evaluated: common femoral, profunda  femoral, greater saphenous, femoral, popliteal, peroneal and posterior  tibial veins.  Serial compression, augmentation maneuvers, color and spectral Doppler flow  evaluations were performed.  FINDINGS:  Right lower extremity -  Chronic deep venous thrombosis in the distal superficial femoral vein and  popliteal vein.  Suspect chronic deep venous thrombosis in the proximal peroneal and  posterior tibial veins, though veins were not well visualized.  All other veins of the right lower extremity demonstrate normal flow  dynamics with no evidence of deep vein thrombosis or superficial phlebitis.  Left lower extremity -  Complete color filling and compressibility with normal venous flow dynamics  including spontaneous flow, response to augmentation maneuvers, and  respiratory phasicity.  No evidence of superficial or deep venous thrombosis.  Antonio Alvares MD  (Electronically  "Signed)  Final Date:      12 September 2018                   11:01      Micro:  Results     Procedure Component Value Units Date/Time    BLOOD CULTURE [[id number]] Collected:  09/17/18 1509    Order Status:  Completed Specimen:  Blood from Peripheral Updated:  09/18/18 0621     Significant Indicator NEG     Source BLD     Site Peripheral     Blood Culture No Growth    Note: Blood cultures are incubated for 5 days and  are monitored continuously.Positive blood cultures  are called to the RN and reported as soon as  they are identified.     Blood culture testing and Gram stain, if indicated, are  performed at [facility], [address] [place] [address].  Positive blood cultures are  sent to [facility], [address] [place] [address], for organism identification and  susceptibility testing.      Narrative:       Per Hospital Policy: Only change Specimen Src: to \"Line\" if  specified by physician order.    BLOOD CULTURE [[id number]] Collected:  09/17/18 1509    Order Status:  Completed Specimen:  Blood from Peripheral Updated:  09/18/18 0621     Significant Indicator NEG     Source BLD     Site Peripheral     Blood Culture No Growth    Note: Blood cultures are incubated for 5 days and  are monitored continuously.Positive blood cultures  are called to the RN and reported as soon as  they are identified.     Blood culture testing and Gram stain, if indicated, are  performed at [facility], [facility] [place], [place].  Positive blood cultures are  sent to [facility], [address] [place] [address], for organism identification and  susceptibility testing.      Narrative:       Per Hospital Policy: Only change Specimen Src: to \"Line\" if  specified by physician order.    C Diff by PCR rflx Toxin [[id number]] Collected:  09/15/18 1155    Order Status:  Completed Specimen:  Stool from Stool Updated:  09/15/18 2225     C Diff " "by PCR Negative     Comment: C. difficile NOT detected by PCR.  Treatment not indicated per guidelines.  Repeat testing not indicated within 7 days.          027-NAP1-BI Presumptive Negative     Comment: Presumptive 027/NAP1/BI target DNA sequences are NOT DETECTED.       Narrative:       Special Contact IsolationSURMXC LENORE HERNANDEZ  Does this patient have risk factors for C-diff?->Yes  C-Diff Risk Factors->antibiotic exposure    URINE CULTURE(NEW) [[id number]]  (Abnormal) Collected:  09/12/18 1216    Order Status:  Completed Specimen:  Urine from Urine, Clean Catch Updated:  09/15/18 0943     Significant Indicator POS (POS)     Source UR     Site URINE, CLEAN CATCH     Urine Culture Mixed skin tay 10-50,000 cfu/mL (A)      Yeast  <10,000 cfu/mL   (A)    Narrative:       Indication for culture:->Emergency Room Patient    BLOOD CULTURE [[id number]]  (Abnormal) Collected:  09/12/18 0958    Order Status:  Completed Specimen:  Blood from Peripheral Updated:  09/15/18 0837     Significant Indicator POS (POS)     Source BLD     Site PERIPHERAL     Blood Culture Blood culture testing and Gram stain, if indicated, are  performed at [facility], [address] [place] [address].  Positive blood cultures are  sent to [facility], [address] [place] [address], for organism identification and  susceptibility testing.  Growth detected by Bactec instrument.   (A)      Enterobacter cloacae  See previous culture for sensitivity report.   (A)    Narrative:       CALL  [facility]  ICU tel. ,  CALLED  ICU tel.  09/13/2018, 03:56, RB PERF. RESULTS CALLED TO: [id number] rn  Per Hospital Policy: Only change Specimen Src: to \"Line\" if  specified by physician order.    BLOOD CULTURE [[id number]]  (Abnormal)  (Susceptibility) Collected:  09/12/18 0922    Order Status:  Completed Specimen:  Blood from Peripheral Updated:  09/15/18 0835     Significant Indicator POS (POS)     Source BLD     Site " "Peripheral     Blood Culture Blood culture testing and Gram stain, if indicated, are  performed at [facility], [address] [place] [address].  Positive blood cultures are  sent to [facility], [address] [place] [address], for organism identification and  susceptibility testing.  Growth detected by Bactec instrument.   (A)      Enterobacter cloacae (A)    Narrative:       CALL  [facility]  ICU tel. ,  CALLED  ICU tel.  09/13/2018, 01:19, RB PERF. RESULTS CALLED TO: [id number], rn  Per Hospital Policy: Only change Specimen Src: to \"Line\" if  specified by physician order.    Culture & Susceptibility     ENTEROBACTER CLOACAE     Antibiotic Sensitivity Microscan Unit Status    Ampicillin Resistant >16 mcg/mL Final    Method: SENSITIVITY, YANELY    Cefepime Sensitive <=8 mcg/mL Final    Method: SENSITIVITY, YANELY    Cefotaxime Sensitive <=2 mcg/mL Final    Method: SENSITIVITY, YANELY    Cefotetan Resistant >32 mcg/mL Final    Method: SENSITIVITY, YANELY    Ceftazidime Sensitive 4 mcg/mL Final    Method: SENSITIVITY, YANELY    Ceftriaxone Sensitive <=8 mcg/mL Final    Method: SENSITIVITY, YANELY    Cefuroxime Intermediate 16 mcg/mL Final    Method: SENSITIVITY, YANELY    Ciprofloxacin Sensitive <=1 mcg/mL Final    Method: SENSITIVITY, YANELY    Ertapenem Sensitive <=1 mcg/mL Final    Method: SENSITIVITY, YANELY    Gentamicin Sensitive <=4 mcg/mL Final    Method: SENSITIVITY, YANELY    Pip/Tazobactam Sensitive <=16 mcg/mL Final    Method: SENSITIVITY, YANELY    Tigecycline Sensitive <=2 mcg/mL Final    Method: SENSITIVITY, YANELY    Tobramycin Sensitive <=4 mcg/mL Final    Method: SENSITIVITY, YANELY    Trimeth/Sulfa Sensitive <=2/38 mcg/mL Final    Method: SENSITIVITY, YANELY                             Assessment:  Enoch Mistry is a 72 y.o. male with a history of poorly controlled diabetes, hypertension, known cholelithiasis, who presented with abdominal pain and significant conjugated hyperbilirubinemia.  His " blood culture is positive for Enterobacter.  The hyperbilirubinemia is spontaneously improving, suggestive of a gallstone that passed.  Unfortunately, his white count continues to worsen, now up to 50,000.  No intra-abdominal abscess or ductal dilatation on CT or MRCP.  Given the worsening white count, surgery was consulted.  There was suspicion for an acute acute gangrenous cholecystitis that may not have been as evident on imaging.  Cholecystectomy performed on 9/20 (op note pending).  ERCP today for multiple gallstones in CBD.  White count down some today.    Patient unfortunately also developed an acute blood loss anemia on 9/19, transferred to the ICU, found to have a bleeding duodenal ulcer that was controlled.    Pertinent Diagnoses:  1.  Sepsis,  persistent  2.  Enterobacter bacteremia   3.  Conjugated hyperbilirubinemia, improving, likely passed an obstructive gallstone  4.  Poorly controlled diabetes  5.  Cholelithiasis  6.  Acute blood loss anemia, duodenal ulcer    Active Hospital Problems    Diagnosis   • Sepsis due to Enterobacter species ([facility]) [A41.59]   • Acute on chronic respiratory failure ([facility]) [J96.20]   • Hypokalemia [E87.6]   • Hyponatremia [E87.1]   • Diarrhea [R19.7]   • Hypophosphatemia [E83.39]   • Abnormal LFTs [R94.5]   • Uncontrolled type 2 diabetes mellitus ([facility]) [E11.65]   • Non-compliance [Z91.19]   • Hepatic steatosis [K76.0]   • Chronic deep vein thrombosis (DVT) ([facility]) [I82.509]   • Stage 2 moderate COPD by GOLD classification ([facility]) [J44.9]       Plan:  Persistent sepsis  Imaging unrevealing as above   Cholecystectomy performed on 9/20, op note pending  ERCP today for multiple gallstones in CBD  continue IV ceftazidime, PO Flagyl  Surveillance blood cultures from 9/17 are no growth to date    Enterobacter bacteremia  As above  Follow surveillance blood cultures    Conjugated hyperbilirubinemia, improved, likely passed an obstructive gallstone  LFTs and T bili continue to  improve  Plan as above    Poorly controlled diabetes  A1c 9.5  Contributing to immunosuppression    Acute blood loss anemia, duodenal ulcer  Eliquis was discontinued.  Bleeding control of duodenal ulcer done on EGD 9/19    Markedly enlarged prostate  No [place], no e/o abscess  PSA pending    Discussed with Dr. Augustine    ID will follow please call with questions.

## 2018-09-21 NOTE — PROGRESS NOTES
Mathew from Lab called with critical result of WBC of 47.6 at 0450. Critical lab result read back to Mathew.   This critical lab result is within parameters established by Dr. Augustine for this patient.    MDs are aware of critically high WBCs. Level is trending down.

## 2018-09-21 NOTE — PROGRESS NOTES
"PROGRESS NOTE  Pulmonary & Critical Care Medicine     Date of service: 9/21/2018    Assessment & Plan   Severe sepsis with septic shock (CODE) ([facility])   Assessment & Plan    Entorobact in blood 9/12 repeat negative 9/17  Cefepime and flagyl  3 stones in CBD and not dilated 5mm  Gall bladder removed on 9/20 and 9/21 is at ERCP due to the multiple stones  WBC still elevated but better than yesterday        Uncontrolled type 2 diabetes mellitus ([facility])   Assessment & Plan    FS q 6 off the drip        GI bleeding   Assessment & Plan    EGD 9/19/18 showed \"1. 1cm duodenal apex ulcer w/ bleeding vessel 2. Multiple shallow DUs 3. Gr D esophagitis\"  Continue PPI drip and sucralafate  3 Units total of PRBCs and holding        Lower extremity edema   Assessment & Plan    Progressing, he has venous stasis but this is from volume overload  Post EGD will give lasix -- quite fluid positive  ECHO done 9/13 is normal EL and RV also looks nl with no valvular abnormalities        Chronic deep vein thrombosis (DVT) ([facility])   Assessment & Plan    Due to bleeding will hold eloquis        Stage 2 moderate COPD by GOLD classification ([facility])   Assessment & Plan    Not in exacerbation  Continue home inhalers            Feeding:  NPO for ERCP today  Analgesia: prn morphine  Sedation:  none  Thromboembolic prophylaxis:  SCDs  Head of bed elevation:  Sitting in a chair  Ulcer prophylaxis:  PPI drip  Glucose:  FSSC  Spontaneous breathing trial:  n/a  Bowel regimen:  yes  Indwelling catheter:  no  Descalation of Abx:no  Code Status:  DNAR - ok to intubate  Disposition:  icu  Restraints:  n/a    Interval History  Enoch Mistry is an 72 y.o. male admitted on  9/12/18 with fevers, chills, shortness of breath,   and abdominal pain and elevated LFTS (elevated conjugated hyperbilirubinemia) ascending cholangitis. Ultrasound showed cholelithiasis with pain over the gallbladder, but no evidence of acute cholecystitis. Blood culture growing " "Enterobacter.  Her developed a copd exacerbation in hospital and was rx with steroids with improvement  He progressed with progressive abdominal pain  9/19 pt developed melena and has been on eloquis for chronic DVT right leg and transferred back to ICU on 9/19/18  EGD on 9/19/18 showed: \"1. 1cm duodenal apex ulcer w/ bleeding vessel 2. Multiple shallow DUs 3. Gr D esophagitis\"  Gi and surgery consulted and plan was for cholecystectomy 9/20/18 am. Pt had an MRI today which showed \"No significant intrahepatic ductal dilatation is identified. The common bile duct measures approximately 5 mm in diameter. There are at least 3 low signal foci in the distal common bile duct\"    Underwent a cholecystectomy on 9/20 and found to have a short CBD and multiple stones -- undergoing ERCP today     Rx with ceftazidine and flagyl       24-Hour Events: as above    Review of Systems   Constitutional: Positive for malaise/fatigue.   HENT: Negative.    Eyes: Negative.    Respiratory: Negative.    Cardiovascular: Positive for leg swelling.   Gastrointestinal: Positive for abdominal pain.        States his abd feels more sore   Genitourinary: Negative.    Musculoskeletal: Negative.    Skin: Negative.    Neurological: Negative.    Endo/Heme/Allergies: Negative.    Psychiatric/Behavioral: Negative.              Objective     PHYSICAL EXAM:   /55   Pulse 92   Temp 36.9 °C (98.4 °F)   Resp 14   Ht 1.702 m (5' 7\")   Wt 104 kg (229 lb 4.5 oz)   SpO2 93%   BMI 35.91 kg/m²     Intake/Output Summary (Last 24 hours) at 09/20/18 0745  Last data filed at 09/20/18 0600   Gross per 24 hour   Intake             4190 ml   Output             1550 ml   Net             2640 ml     unchanged  Gen: no apparent distress, resting comfortably and sitting in a chair  HEENT: Normocephalic atraumatic, PERRL, oropharynx clear no erythema or exudates; Nares patents, no discharge from the eyes and no discharge from either ear  Trachea midline, no " adenopathy  Cardiovascular: Regular rate and rhythm, no murmurs or rubs  Pulmonary: No apparent wheezes, rales or rhonchi   Abdomen: Soft, nondistended, + tender on palpation but no guarding or rigidity, normoactive bowel sounds; no organomegaly  Gu: no perera catheter present  Extremities: ++ lower extremity edema bilaterally, peripheral pulses +2 in all 4 extremities, no calf tenderness  Neuro: Alert and oriented ×3, moving all extremities equally, no focal deficits   Skin: No rash      Labs:  Lab Results   Component Value Date/Time    SODIUM 134 (L) 09/21/2018 03:40 AM    POTASSIUM 3.9 09/21/2018 03:40 AM    CHLORIDE 106 09/21/2018 03:40 AM    CO2 24 09/21/2018 03:40 AM    GLUCOSE 195 (H) 09/21/2018 03:40 AM    BUN 16 09/21/2018 03:40 AM    CREATININE 0.72 09/21/2018 03:40 AM        Lab Results   Component Value Date/Time    WBC 47.6 (HH) 09/21/2018 03:40 AM    RBC 2.61 (L) 09/21/2018 03:40 AM    HEMOGLOBIN 7.9 (L) 09/21/2018 03:40 AM    HEMATOCRIT 23.6 (L) 09/21/2018 03:40 AM    MCV 90.4 09/21/2018 03:40 AM    MCH 30.3 09/21/2018 03:40 AM    MCHC 33.5 (L) 09/21/2018 03:40 AM    MPV 11.6 09/21/2018 03:40 AM    NEUTSPOLYS 85.00 (H) 09/21/2018 03:40 AM    LYMPHOCYTES 6.00 (L) 09/21/2018 03:40 AM    MONOCYTES 5.00 09/21/2018 03:40 AM    EOSINOPHILS 0.00 09/21/2018 03:40 AM    BASOPHILS 0.00 09/21/2018 03:40 AM    ANISOCYTOSIS 1+ 09/21/2018 03:40 AM       INR 1.49    9/17/18 blood cultures negative    [place]

## 2018-09-21 NOTE — PROGRESS NOTES
Spoke with Dr. Augustine; new orders received to start SSI, DC continuous insulin and K scale. Pt resting comfortably in bed with eyes closed. Will continue with care.

## 2018-09-21 NOTE — FLOWSHEET NOTE
09/21/18 0551   Therapy Not Performed   Type of Therapy Not Performed SVN   Reason Therapy Not Performed PRN, No Indication   Chest Exam   Respiration 13   Pulse 89   Heart Rate (Monitored) 89   Oxygen   Pulse Oximetry 91 %

## 2018-09-21 NOTE — CARE PLAN
Problem: Skin Integrity  Goal: Risk for impaired skin integrity will decrease    Intervention: Assess and monitor skin integrity, appearance and/or temperature  BLE dressings were changed by day shift RN per report. Appropriate dressings are in place. Dressings are CDI. BLE are floating on pillows.

## 2018-09-21 NOTE — PROGRESS NOTES
Assumed pt care. AAOx4. Pt c/o 3/10 abdominal pain, but denied need for medication. Assessment completed. Pt was incontinent of loose, brown stool. CNA and RN assisted pt to the commode. Full bath and linen change was done. Pt requested to sit on the commode for a while. Reminded pt to call for assistance back to bed. Pt verbalized understanding. Call light within reach. Will continue to monitor.

## 2018-09-21 NOTE — PROGRESS NOTES
Report received. Assumed care of patient. Patient sitting up in chair with complaints of mild 2/10 pain in his abdomen, declines interventions. Pre-op nurses arrived, patient taken to surgery.

## 2018-09-21 NOTE — CARE PLAN
Problem: Knowledge Deficit  Goal: Knowledge of disease process/condition, treatment plan, diagnostic tests, and medications will improve    Intervention: Explain information regarding disease process/condition, treatment plan, diagnostic tests, and medications and document in education  Updated pt on POC. Explained the purpose of pm meds, labs, and monitoring equipment to pt. Also reminded pt that he will be NPO at midnight for ERCP in the am. Pt denied any questions and verbalized understanding.

## 2018-09-21 NOTE — FLOWSHEET NOTE
09/20/18 1740   Events/Summary/Plan   Events/Summary/Plan Pt returning from surgery   Therapy Not Performed   Type of Therapy Not Performed SVN   Reason Therapy Not Performed PRN, No Indication   Chest Exam   Work Of Breathing / Effort Mild   Respiration 18   Pulse 98   Heart Rate (Monitored) 98   Breath Sounds   RUL Breath Sounds Diminished   RML Breath Sounds Diminished   RLL Breath Sounds Diminished   LORNA Breath Sounds Diminished   LLL Breath Sounds Diminished   Oximetry   #Pulse Oximetry (Single Determination) Yes   Oxygen   Home O2 Use Prior To Admission? Yes   Home O2 LPM Flow 2 LPM   Home O2 Delivery Method Silicone Nasal Cannula   Home O2 Frequency of Use Continuous   Pulse Oximetry 91 %   O2 (LPM) 4   O2 Daily Delivery Respiratory  Nasal Cannula

## 2018-09-21 NOTE — PROGRESS NOTES
Pt arrival to room via hospital bed. VSS, pt in no apparent distress. States belly sore but declines need for pain medication. Taking sips of water without any difficulty. Lap stabs x 4, drainage to belly button lap stab. Pt resting comfortably and is instructed to notify RN with any needs. Will continue with care.

## 2018-09-21 NOTE — FLOWSHEET NOTE
09/21/18 1033   Events/Summary/Plan   Events/Summary/Plan Pt returning to the unit after stent placement   Therapy Not Performed   Type of Therapy Not Performed SVN   Reason Therapy Not Performed PRN, No Indication   Chest Exam   Work Of Breathing / Effort Mild   Respiration 16   Pulse 91   Heart Rate (Monitored) 91   Breath Sounds   RUL Breath Sounds Diminished   RML Breath Sounds Diminished   RLL Breath Sounds Diminished   LORNA Breath Sounds Diminished   LLL Breath Sounds Diminished   Oximetry   #Pulse Oximetry (Single Determination) Yes   Oxygen   Pulse Oximetry 91 %   O2 (LPM) 4   O2 Daily Delivery Respiratory  Nasal Cannula

## 2018-09-21 NOTE — CARE PLAN
Problem: Knowledge Deficit  Goal: Knowledge of disease process/condition, treatment plan, diagnostic tests, and medications will improve  POC discussed with patient. All questions answered. Patient verbalized understanding.     Problem: Pain Management  Goal: Pain level will decrease to patient's comfort goal  Patient will be medicated per MAR as needed.

## 2018-09-21 NOTE — PROGRESS NOTES
Received patient from PACU. Patient transferred from [facility] to bed via slide board. Patient complains of 5/10 pain, medicated per MAR. Patient given ice water per request and MD order. All needs are currently met. All safety precautions in place. Will continue to monitor.

## 2018-09-21 NOTE — FLOWSHEET NOTE
09/21/18 0656   Events/Summary/Plan   Events/Summary/Plan Sitting up in cardiac chair in no apparent respiratory distress at this time, PRN and 02 check   Therapy Not Performed   Type of Therapy Not Performed SVN   Reason Therapy Not Performed PRN, No Indication   Incentive Spirometry Group   Incentive Spirometer Volume 2000 mL   Chest Exam   Work Of Breathing / Effort Mild   Respiration 20   Pulse 98   Heart Rate (Monitored) 98   Breath Sounds   RUL Breath Sounds Diminished   RML Breath Sounds Diminished   RLL Breath Sounds Diminished   LORNA Breath Sounds Diminished   LLL Breath Sounds Diminished   Oximetry   #Pulse Oximetry (Single Determination) Yes   Oxygen   Pulse Oximetry 92 %   O2 (LPM) 4   O2 Daily Delivery Respiratory  Nasal Cannula

## 2018-09-21 NOTE — OP REPORT
DATE OF SERVICE:  09/21/2018    PROCEDURE PERFORMED:  Endoscopic retrograde cholangiography with common bile   duct stent placement, 10-Bulgarian 5 cm straight plastic biliary stent and biopsy   of the antrum.    PHYSICIAN:  Harman Michelle MD    ANESTHESIOLOGIST:  Bernard Strong MD    MEDICATIONS:  Deep sedation.    CONSENT:  Procedure risks and benefits reviewed thoroughly with the patient,   risks including but not limited to bleeding, perforation, side effects of   medication were informed.  The patient voiced understanding and agreed to   proceed.  Additional risks inherent to ERCP that being mild, moderate, severe   pancreatitis that could lead to postprocedural pain, prolonged   hospitalization, intensive care unit stay, and/or death were reviewed.  The   patient voiced understanding and agreed to proceed.    DESCRIPTION OF PROCEDURE:  The patient was placed in a left lateral decubitus   position after sedation was achieved.  Bite block was inserted in the mouth   and a side-viewing duodenoscope was passed carefully and easily under direct   vision to the esophagus into the stomach through a J-shaped stomach and into   the pyloric valve along duodenal sweep into the second portion of duodenum and   brought in a shortened position.  There was extensive ulcer disease   throughout the entire duodenum, erosions and ulcerations all clean based.  The   most concerning one was just superior to the ampulla.  There was no visible   vessel, but because there was an ulcer just in the trajectory where we need to   do a sphincterotomy, this could not be performed.  Therefore, a CleverCut   sphincterotome with a 0.035 wire was utilized to cannulate the ampulla and   upon first attempt, biliary cannulation was appreciated fluoroscopically and   aspiration by bile confirmed this placement.  At no time was the pancreatic   duct cannulated or pancreatogram performed.  Contrast was injected at the   distal common bile duct.   Numerous filling defects were appreciated consistent   with choledocholithiasis.  The wire was maintained, the tome was removed   because we could not do a sphincterotomy due to the ulcer located just   superior and the trajectory of where we need to do the sphincterotomy because   of risks for bleeding and perforation.  Therefore, the wire was maintained,   tome was removed and exchanged for a delivery device deployed a 10-Paraguayan 5 cm   straight plastic biliary stent with adequate drainage.  Aside from the   numerous stones in the biliary system, cholangiogram revealed no evidence for   marked right or left intrahepatic ductal dilatation.  There was mild common   bile duct dilatation.  Otherwise, no other filling defects were appreciated.    The scope was retracted.  The antrum was biopsied to rule out Helicobacter   pylori and the stomach was suctioned, desufflated, the scope was removed.    COMPLICATIONS:  None.    BLOOD LOSS:  None.    SPECIMENS:  Obtained.    RECOMMENDATIONS:  Because of the large ulcer at the location of ampulla,   sphincterotomy could not be performed and the stones could not be removed.  A   stent was placed to decompress the biliary system.  Because of numerous ulcers   in the duodenum, concern for H. pylori is raised.  We will need to review   results of biopsies.  If positive for H. pylori, we will need to review the   patient's allergies and if he has no allergies, Prevpac or equivalent x14 days   or if he has a penicillin allergy, then utilize Pylera for 10 days or   equivalent.  In the meantime, the patient should continue his proton pump   inhibitor in drip fashion.  He may start a regular diet.  The above findings   and recommendations will be reviewed thoroughly with the patient.  All   questions were answered to his satisfaction.  Orders were present in the   chart.       ____________________________________     MD MARY Davies / JOAQUÍN    DD:  09/21/2018 09:58:38  DT:   09/21/2018 10:13:46    D#:  [id number]  Job#:  [id number]

## 2018-09-21 NOTE — OP REPORT
DATE OF SERVICE:  09/20/2018    PREOPERATIVE DIAGNOSES:  Acute cholecystitis with cholelithiasis and   choledocholithiasis.    POSTOPERATIVE DIAGNOSES:  Acute cholecystitis with cholelithiasis and   choledocholithiasis.    OPERATION PERFORMED:  Laparoscopic cholecystectomy.    SURGEON:  Seamus Gaston MD    ANESTHESIA:  Seamus Santos MD    ANESTHESIA:  General anesthesia.    ASSISTANT:  Sneha Hare MD    OPERATIVE NOTE:  The patient, 72 years of age, has been ill with sepsis and   likely acute cholecystitis.  The patient's hospital course has been   complicated by GI bleed so far.  The patient had positive blood cultures.  He   was felt to be a candidate for laparoscopic cholecystectomy.  He does have   pending ERCP.  The risks and possible complications of operation were   explained to the patient in detail and he consented to proceed and signed   consents.  He was taken to the operating room and placed under anesthesia by   Dr. Santos.  Once anesthetized, the patient's abdomen was prepped with   ChloraPrep solution.  Sterile drapes were applied.  He has been on systemic   antibiotics.  Sequential stockings had been used as antiembolism prophylaxis.    The patient had a solution of 0.5% Marcaine with epinephrine infiltrated in   all wounds after time-out.  An infraumbilical incision was made.  The fascia   was elevated, was pierced with a Veress needle.  Saline drop test was   permissive to proceed with step pneumoinsufflation.  Once completely   pneumoinsufflated, a 5 mm trocar was placed.  The abdomen was surveyed.  There   was no injury related to entry.  The patient had gallbladder visualized.  A   10 mm trocar was placed in the epigastrium and two 5 mm trocars placed in   right upper quadrant.  The gallbladder was contracted and the liver was very   fatty replaced and heavy.  Gallbladder was grasped and elevated.  The patient   had this extreme turgor of the gallbladder.  This was due to inflammation and    the stones.  The patient had dissection down along the infundibulum.  The   patient had essentially a very, very short cystic duct.  The infundibulum was    from the common bile duct, doubly clipped and divided.  The cystic   artery was encountered, doubly clipped and divided.  The gallbladder was then   taken out in anterograde grade fashion using countertraction electrocautery   and ultimately delivered via an Endosac.  The patient had hemostasis secured   in the gallbladder fossa.  The right upper quadrant was irrigated and   decompressed.  Hemostasis appeared to be satisfactory.  The patient had the   trocars removed, their sites were hemostatic.  The fascia was closed in the   epigastrium using 0 Vicryl suture.  The subcutaneous tissues were irrigated   and the skin was closed using running 4-0 Monocryl subcuticular and sterile   dressings were applied.  The patient was awakened, extubated, and taken to   recovery room in stable satisfactory condition.  Estimated blood loss was 30   mL.  Sponge and instrument counts were reported as correct.  There were no   intraoperative complications.  The patient had been returned to ICU for   postoperative care.       ____________________________________     MD OMAR MANLEY / JOAQUÍN    DD:  09/20/2018 16:22:04  DT:  09/20/2018 16:59:48    D#:  [id number]  Job#:  [id number]    cc: ROX ARROYO MD, Sneha Hare MD

## 2018-09-22 LAB
ALBUMIN SERPL BCP-MCNC: 2 G/DL (ref 3.2–4.9)
ALBUMIN/GLOB SERPL: 1 G/DL
ALP SERPL-CCNC: 57 U/L (ref 30–99)
ALT SERPL-CCNC: 86 U/L (ref 2–50)
ANION GAP SERPL CALC-SCNC: 6 MMOL/L (ref 0–11.9)
AST SERPL-CCNC: 87 U/L (ref 12–45)
BACTERIA BLD CULT: NORMAL
BACTERIA BLD CULT: NORMAL
BASOPHILS # BLD AUTO: 0.2 % (ref 0–1.8)
BASOPHILS # BLD: 0.05 K/UL (ref 0–0.12)
BILIRUB SERPL-MCNC: 1.7 MG/DL (ref 0.1–1.5)
BUN SERPL-MCNC: 12 MG/DL (ref 8–22)
CALCIUM SERPL-MCNC: 7 MG/DL (ref 8.4–10.2)
CHLORIDE SERPL-SCNC: 102 MMOL/L (ref 96–112)
CO2 SERPL-SCNC: 23 MMOL/L (ref 20–33)
CREAT SERPL-MCNC: 0.73 MG/DL (ref 0.5–1.4)
EOSINOPHIL # BLD AUTO: 0.04 K/UL (ref 0–0.51)
EOSINOPHIL NFR BLD: 0.1 % (ref 0–6.9)
ERYTHROCYTE [DISTWIDTH] IN BLOOD BY AUTOMATED COUNT: 51.5 FL (ref 35.9–50)
GLOBULIN SER CALC-MCNC: 2.1 G/DL (ref 1.9–3.5)
GLUCOSE BLD-MCNC: 168 MG/DL (ref 65–99)
GLUCOSE BLD-MCNC: 175 MG/DL (ref 65–99)
GLUCOSE BLD-MCNC: 183 MG/DL (ref 65–99)
GLUCOSE BLD-MCNC: 192 MG/DL (ref 65–99)
GLUCOSE BLD-MCNC: 233 MG/DL (ref 65–99)
GLUCOSE SERPL-MCNC: 177 MG/DL (ref 65–99)
HCT VFR BLD AUTO: 24.2 % (ref 42–52)
HGB BLD-MCNC: 7.9 G/DL (ref 14–18)
IMM GRANULOCYTES # BLD AUTO: 1.42 K/UL (ref 0–0.11)
IMM GRANULOCYTES NFR BLD AUTO: 4.7 % (ref 0–0.9)
LYMPHOCYTES # BLD AUTO: 3.05 K/UL (ref 1–4.8)
LYMPHOCYTES NFR BLD: 10.1 % (ref 22–41)
MAGNESIUM SERPL-MCNC: 1.9 MG/DL (ref 1.5–2.5)
MCH RBC QN AUTO: 31.2 PG (ref 27–33)
MCHC RBC AUTO-ENTMCNC: 32.6 G/DL (ref 33.7–35.3)
MCV RBC AUTO: 95.7 FL (ref 81.4–97.8)
MONOCYTES # BLD AUTO: 1.77 K/UL (ref 0–0.85)
MONOCYTES NFR BLD AUTO: 5.9 % (ref 0–13.4)
NEUTROPHILS # BLD AUTO: 23.76 K/UL (ref 1.82–7.42)
NEUTROPHILS NFR BLD: 79 % (ref 44–72)
NRBC # BLD AUTO: 0.16 K/UL
NRBC BLD-RTO: 0.5 /100 WBC
PLATELET # BLD AUTO: 275 K/UL (ref 164–446)
PMV BLD AUTO: 12 FL (ref 9–12.9)
POTASSIUM SERPL-SCNC: 3.3 MMOL/L (ref 3.6–5.5)
POTASSIUM SERPL-SCNC: 3.3 MMOL/L (ref 3.6–5.5)
PROT SERPL-MCNC: 4.1 G/DL (ref 6–8.2)
RBC # BLD AUTO: 2.53 M/UL (ref 4.7–6.1)
SIGNIFICANT IND 70042: NORMAL
SIGNIFICANT IND 70042: NORMAL
SITE SITE: NORMAL
SITE SITE: NORMAL
SODIUM SERPL-SCNC: 131 MMOL/L (ref 135–145)
SOURCE SOURCE: NORMAL
SOURCE SOURCE: NORMAL
WBC # BLD AUTO: 30.1 K/UL (ref 4.8–10.8)

## 2018-09-22 PROCEDURE — G8978 MOBILITY CURRENT STATUS: HCPCS | Mod: CJ

## 2018-09-22 PROCEDURE — 82962 GLUCOSE BLOOD TEST: CPT

## 2018-09-22 PROCEDURE — 700111 HCHG RX REV CODE 636 W/ 250 OVERRIDE (IP): Performed by: HOSPITALIST

## 2018-09-22 PROCEDURE — 700111 HCHG RX REV CODE 636 W/ 250 OVERRIDE (IP): Performed by: INTERNAL MEDICINE

## 2018-09-22 PROCEDURE — 770020 HCHG ROOM/CARE - TELE (206)

## 2018-09-22 PROCEDURE — C9113 INJ PANTOPRAZOLE SODIUM, VIA: HCPCS | Performed by: INTERNAL MEDICINE

## 2018-09-22 PROCEDURE — 700105 HCHG RX REV CODE 258: Performed by: HOSPITALIST

## 2018-09-22 PROCEDURE — 80053 COMPREHEN METABOLIC PANEL: CPT

## 2018-09-22 PROCEDURE — 700101 HCHG RX REV CODE 250: Performed by: INTERNAL MEDICINE

## 2018-09-22 PROCEDURE — 97161 PT EVAL LOW COMPLEX 20 MIN: CPT

## 2018-09-22 PROCEDURE — 83735 ASSAY OF MAGNESIUM: CPT

## 2018-09-22 PROCEDURE — 700102 HCHG RX REV CODE 250 W/ 637 OVERRIDE(OP): Performed by: INTERNAL MEDICINE

## 2018-09-22 PROCEDURE — 99233 SBSQ HOSP IP/OBS HIGH 50: CPT | Performed by: INTERNAL MEDICINE

## 2018-09-22 PROCEDURE — G8979 MOBILITY GOAL STATUS: HCPCS | Mod: CI

## 2018-09-22 PROCEDURE — C9113 INJ PANTOPRAZOLE SODIUM, VIA: HCPCS | Performed by: HOSPITALIST

## 2018-09-22 PROCEDURE — 85025 COMPLETE CBC W/AUTO DIFF WBC: CPT

## 2018-09-22 PROCEDURE — 99232 SBSQ HOSP IP/OBS MODERATE 35: CPT | Performed by: INTERNAL MEDICINE

## 2018-09-22 PROCEDURE — 84132 ASSAY OF SERUM POTASSIUM: CPT

## 2018-09-22 PROCEDURE — A9270 NON-COVERED ITEM OR SERVICE: HCPCS | Performed by: INTERNAL MEDICINE

## 2018-09-22 PROCEDURE — 700105 HCHG RX REV CODE 258: Performed by: INTERNAL MEDICINE

## 2018-09-22 PROCEDURE — 36592 COLLECT BLOOD FROM PICC: CPT

## 2018-09-22 RX ORDER — OXYCODONE HYDROCHLORIDE AND ACETAMINOPHEN 5; 325 MG/1; MG/1
1 TABLET ORAL EVERY 4 HOURS PRN
Status: DISCONTINUED | OUTPATIENT
Start: 2018-09-22 | End: 2018-09-25 | Stop reason: HOSPADM

## 2018-09-22 RX ORDER — MAGNESIUM SULFATE 1 G/100ML
1 INJECTION INTRAVENOUS ONCE
Status: COMPLETED | OUTPATIENT
Start: 2018-09-22 | End: 2018-09-22

## 2018-09-22 RX ORDER — FUROSEMIDE 10 MG/ML
20 INJECTION INTRAMUSCULAR; INTRAVENOUS ONCE
Status: COMPLETED | OUTPATIENT
Start: 2018-09-22 | End: 2018-09-22

## 2018-09-22 RX ORDER — DEXTROSE MONOHYDRATE 25 G/50ML
25 INJECTION, SOLUTION INTRAVENOUS
Status: DISCONTINUED | OUTPATIENT
Start: 2018-09-22 | End: 2018-09-25 | Stop reason: HOSPADM

## 2018-09-22 RX ORDER — POTASSIUM CHLORIDE 20 MEQ/1
40 TABLET, EXTENDED RELEASE ORAL 2 TIMES DAILY
Status: DISCONTINUED | OUTPATIENT
Start: 2018-09-22 | End: 2018-09-22

## 2018-09-22 RX ORDER — PANTOPRAZOLE SODIUM 40 MG/10ML
40 INJECTION, POWDER, LYOPHILIZED, FOR SOLUTION INTRAVENOUS 2 TIMES DAILY
Status: DISCONTINUED | OUTPATIENT
Start: 2018-09-22 | End: 2018-09-25 | Stop reason: HOSPADM

## 2018-09-22 RX ADMIN — SUCRALFATE 1 G: 1 SUSPENSION ORAL at 11:42

## 2018-09-22 RX ADMIN — SODIUM CHLORIDE 8 MG/HR: 9 INJECTION, SOLUTION INTRAVENOUS at 01:16

## 2018-09-22 RX ADMIN — BUDESONIDE AND FORMOTEROL FUMARATE DIHYDRATE 2 PUFF: 160; 4.5 AEROSOL RESPIRATORY (INHALATION) at 06:04

## 2018-09-22 RX ADMIN — POTASSIUM CHLORIDE 40 MEQ: 1500 TABLET, EXTENDED RELEASE ORAL at 07:25

## 2018-09-22 RX ADMIN — METRONIDAZOLE 500 MG: 500 INJECTION, SOLUTION INTRAVENOUS at 05:58

## 2018-09-22 RX ADMIN — FUROSEMIDE 20 MG: 10 INJECTION, SOLUTION INTRAVENOUS at 07:24

## 2018-09-22 RX ADMIN — MAGNESIUM SULFATE IN DEXTROSE 1 G: 10 INJECTION, SOLUTION INTRAVENOUS at 19:54

## 2018-09-22 RX ADMIN — ONDANSETRON HYDROCHLORIDE 4 MG: 2 INJECTION INTRAMUSCULAR; INTRAVENOUS at 19:57

## 2018-09-22 RX ADMIN — PANTOPRAZOLE SODIUM 40 MG: 40 INJECTION, POWDER, LYOPHILIZED, FOR SOLUTION INTRAVENOUS at 17:39

## 2018-09-22 RX ADMIN — METRONIDAZOLE 500 MG: 500 INJECTION, SOLUTION INTRAVENOUS at 13:11

## 2018-09-22 RX ADMIN — MORPHINE SULFATE 2 MG: 4 INJECTION INTRAVENOUS at 09:18

## 2018-09-22 RX ADMIN — PANTOPRAZOLE SODIUM 40 MG: 40 INJECTION, POWDER, LYOPHILIZED, FOR SOLUTION INTRAVENOUS at 07:24

## 2018-09-22 RX ADMIN — POTASSIUM BICARBONATE 50 MEQ: 25 TABLET, EFFERVESCENT ORAL at 17:33

## 2018-09-22 RX ADMIN — BUDESONIDE AND FORMOTEROL FUMARATE DIHYDRATE 2 PUFF: 160; 4.5 AEROSOL RESPIRATORY (INHALATION) at 17:32

## 2018-09-22 RX ADMIN — CEFTAZIDIME 1 G: 1 INJECTION, POWDER, FOR SOLUTION INTRAMUSCULAR; INTRAVENOUS at 14:24

## 2018-09-22 RX ADMIN — CEFTAZIDIME 1 G: 1 INJECTION, POWDER, FOR SOLUTION INTRAMUSCULAR; INTRAVENOUS at 21:56

## 2018-09-22 RX ADMIN — CEFTAZIDIME 1 G: 1 INJECTION, POWDER, FOR SOLUTION INTRAMUSCULAR; INTRAVENOUS at 05:07

## 2018-09-22 RX ADMIN — SUCRALFATE 1 G: 1 SUSPENSION ORAL at 05:58

## 2018-09-22 RX ADMIN — METRONIDAZOLE 500 MG: 500 INJECTION, SOLUTION INTRAVENOUS at 22:35

## 2018-09-22 RX ADMIN — INSULIN HUMAN 2 UNITS: 100 INJECTION, SOLUTION PARENTERAL at 06:08

## 2018-09-22 RX ADMIN — SUCRALFATE 1 G: 1 SUSPENSION ORAL at 17:34

## 2018-09-22 ASSESSMENT — ENCOUNTER SYMPTOMS
NAUSEA: 0
NEUROLOGICAL NEGATIVE: 1
VOMITING: 0
SHORTNESS OF BREATH: 0
RESPIRATORY NEGATIVE: 1
CHILLS: 0
CARDIOVASCULAR NEGATIVE: 1
PSYCHIATRIC NEGATIVE: 1
GASTROINTESTINAL NEGATIVE: 1
ABDOMINAL PAIN: 1
COUGH: 0
FEVER: 0
EYES NEGATIVE: 1
MUSCULOSKELETAL NEGATIVE: 1
DIARRHEA: 1

## 2018-09-22 ASSESSMENT — PAIN SCALES - GENERAL
PAINLEVEL_OUTOF10: 1
PAINLEVEL_OUTOF10: 5
PAINLEVEL_OUTOF10: 0
PAINLEVEL_OUTOF10: 3

## 2018-09-22 ASSESSMENT — PATIENT HEALTH QUESTIONNAIRE - PHQ9
1. LITTLE INTEREST OR PLEASURE IN DOING THINGS: NOT AT ALL
SUM OF ALL RESPONSES TO PHQ9 QUESTIONS 1 AND 2: 0
1. LITTLE INTEREST OR PLEASURE IN DOING THINGS: NOT AT ALL
SUM OF ALL RESPONSES TO PHQ9 QUESTIONS 1 AND 2: 0
2. FEELING DOWN, DEPRESSED, IRRITABLE, OR HOPELESS: NOT AT ALL
2. FEELING DOWN, DEPRESSED, IRRITABLE, OR HOPELESS: NOT AT ALL

## 2018-09-22 ASSESSMENT — GAIT ASSESSMENTS
GAIT LEVEL OF ASSIST: CONTACT GUARD ASSIST
DISTANCE (FEET): 160
ASSISTIVE DEVICE: FRONT WHEEL WALKER

## 2018-09-22 NOTE — PROGRESS NOTES
Assumed pt care. Pt was asleep in the chair. Pt easily awakened. Pt denied pain or SOB at rest. Assessment completed. Reminded pt to call for assistance. Call light within reach. Will continue to monitor.

## 2018-09-22 NOTE — PROGRESS NOTES
Robin from Lab called with critical result of WBC of 30.1 at 0552. Critical lab result read back to Lexie.   This critical lab result is within parameters established by Dr. Augustine for this patient.    MDs are aware of critically high WBCs. Level is trending down.

## 2018-09-22 NOTE — CARE PLAN
Problem: Safety  Goal: Will remain free from injury  Outcome: PROGRESSING AS EXPECTED  Pt OOB to chair. All safety measures in place. Pt calls for assistance appropriately.     Problem: Bowel/Gastric:  Goal: Normal bowel function is maintained or improved  Outcome: PROGRESSING SLOWER THAN EXPECTED  Pt having loose black BM with incontinence at times.

## 2018-09-22 NOTE — PROGRESS NOTES
Received report from Moon OGDEN at bedside. TLC dressing C/D/I and infusing without complication. Pt sitting up in chair with 2.5 L NC denies difficulty breathing, and pain at this time. Call bell within reach and bedside table. All safety precautions in place at this time.

## 2018-09-22 NOTE — CARE PLAN
Problem: Infection  Goal: Will remain free from infection  CHG bath and complete linen change done.     Problem: Knowledge Deficit  Goal: Knowledge of disease process/condition, treatment plan, diagnostic tests, and medications will improve    Intervention: Explain information regarding disease process/condition, treatment plan, diagnostic tests, and medications and document in education  Updated pt on POC. Explained the purpose of pm meds, labs, and monitoring equipment to pt. Pt denied any questions and verbalized understanding.

## 2018-09-22 NOTE — PROGRESS NOTES
"PROGRESS NOTE  Pulmonary & Critical Care Medicine     Date of service: 9/22/2018    Assessment & Plan   Severe sepsis with septic shock (CODE) ([facility])   Assessment & Plan    From cholecystitis and cholelithiasis  Entorobact in blood 9/12 repeat negative 9/17  Cefepime and flagyl -- will discuss with ID how many days of Abx  Gall bladder removed on 9/20 and 9/21 ERCP with stent to decompress biliary system 9/21 - PPI drip recommended and prevpac 14 days  due to the multiple ulcers in ampulla  Wbc coming down  H pylori tests 9/21 is negative        Hypokalemia   Assessment & Plan    Added 40 mg bid Kdur        Uncontrolled type 2 diabetes mellitus ([facility])   Assessment & Plan    Glucose is adequately controlled so may be able to be diet controlled and oral agents        GI bleeding   Assessment & Plan    EGD 9/19/18 showed \"1. 1cm duodenal apex ulcer w/ bleeding vessel 2. Multiple shallow DUs 3. Gr D esophagitis\"  Continue PPI changed to bid and sucralafate  3 Units total of PRBCs and holding        Lower extremity edema   Assessment & Plan    Progressing, he has venous stasis but this is from volume overload  ECHO done 9/13 is normal EL and RV also looks nl with no valvular abnormalities  Now overall improved, lasix 20 mg * 1        Chronic deep vein thrombosis (DVT) ([facility])   Assessment & Plan    Due to gastritis and multiple ulcers  Hold eloquis for at least 2 weeks        Stage 2 moderate COPD by GOLD classification ([facility])   Assessment & Plan    Not in exacerbation  Continue home inhalers            Feeding:  Tolerating po  Analgesia: prn morphine  Sedation:  none  Thromboembolic prophylaxis:  SCDs  Head of bed elevation:  Sitting in a chair  Ulcer prophylaxis:  PPI bid  Glucose:  117 - 183 just on sliding scale  Spontaneous breathing trial:  n/a  Bowel regimen:  stooled  Indwelling catheter:  no  Descalation of Abx:no  Code Status:  DNAR - ok to intubate  Disposition:  Telemetry, PT consult and d/c planning  Restraints:  " "n/a    Interval History  Enoch Mistry is an 72 y.o. male admitted on  9/12/18 with fevers, chills, shortness of breath,   and abdominal pain and elevated LFTS (elevated conjugated hyperbilirubinemia) ascending cholangitis. Ultrasound showed cholelithiasis with pain over the gallbladder, but no evidence of acute cholecystitis. Blood culture growing Enterobacter.  Her developed a copd exacerbation in hospital and was rx with steroids with improvement  He progressed with progressive abdominal pain  9/19 pt developed melena and has been on eloquis for chronic DVT right leg and transferred back to ICU on 9/19/18  EGD on 9/19/18 showed: \"1. 1cm duodenal apex ulcer w/ bleeding vessel 2. Multiple shallow DUs 3. Gr D esophagitis\"  Gi and surgery consulted and plan was for cholecystectomy 9/20/18 am. Pt had an MRI today which showed \"No significant intrahepatic ductal dilatation is identified. The common bile duct measures approximately 5 mm in diameter. There are at least 3 low signal foci in the distal common bile duct\"    Underwent a cholecystectomy on 9/20 and found to have a short CBD and multiple stones -- undergoing ERCP 9/21 with stent place. Wbc Count coming down slowly     Rx with ceftazidine and flagyl  Holding on starting amoxacillin and clarithromycin for H pylori (pending results also ) as he is on broad spectrum     24-Hour Events: as above    Review of Systems   Constitutional: Positive for malaise/fatigue.   HENT: Negative.    Eyes: Negative.    Respiratory: Negative.    Cardiovascular: Positive for leg swelling.   Gastrointestinal:        States his abd feels more sore but def better   Genitourinary: Negative.    Musculoskeletal: Negative.    Skin: Negative.    Neurological: Negative.    Endo/Heme/Allergies: Negative.    Psychiatric/Behavioral: Negative.              Objective     PHYSICAL EXAM:   /55   Pulse (!) 105   Temp 36.8 °C (98.3 °F)   Resp 12   Ht 1.702 m (5' 7\")   Wt 104 kg (229 " lb 4.5 oz)   SpO2 90%   BMI 35.91 kg/m²     Intake/Output Summary (Last 24 hours) at 09/20/18 0745  Last data filed at 09/20/18 0600   Gross per 24 hour   Intake             4190 ml   Output             1550 ml   Net             2640 ml       Gen: no apparent distress, resting comfortably and sitting in a chair  HEENT: Normocephalic atraumatic, PERRL, oropharynx clear no erythema or exudates; Nares patents, no discharge from the eyes and no discharge from either ear  Trachea midline, no adenopathy  Cardiovascular: Regular rate and rhythm, no murmurs or rubs  Pulmonary: No apparent wheezes, rales or rhonchi   Abdomen: Soft, nondistended, + tender on palpation but no guarding or rigidity, normoactive bowel sounds; no organomegaly  Gu: no perera catheter present  Extremities: ++ lower extremity edema bilaterally, peripheral pulses +2 in all 4 extremities, no calf tenderness  Neuro: Alert and oriented ×3, moving all extremities equally, no focal deficits   Skin: No rash      Labs:  Lab Results   Component Value Date/Time    SODIUM 131 (L) 09/22/2018 04:20 AM    POTASSIUM 3.3 (L) 09/22/2018 04:20 AM    CHLORIDE 102 09/22/2018 04:20 AM    CO2 23 09/22/2018 04:20 AM    GLUCOSE 177 (H) 09/22/2018 04:20 AM    BUN 12 09/22/2018 04:20 AM    CREATININE 0.73 09/22/2018 04:20 AM        Lab Results   Component Value Date/Time    WBC 30.1 (HH) 09/22/2018 04:20 AM    RBC 2.53 (L) 09/22/2018 04:20 AM    HEMOGLOBIN 7.9 (L) 09/22/2018 04:20 AM    HEMATOCRIT 24.2 (L) 09/22/2018 04:20 AM    MCV 95.7 09/22/2018 04:20 AM    MCH 31.2 09/22/2018 04:20 AM    MCHC 32.6 (L) 09/22/2018 04:20 AM    MPV 12.0 09/22/2018 04:20 AM    NEUTSPOLYS 79.00 (H) 09/22/2018 04:20 AM    LYMPHOCYTES 10.10 (L) 09/22/2018 04:20 AM    MONOCYTES 5.90 09/22/2018 04:20 AM    EOSINOPHILS 0.10 09/22/2018 04:20 AM    BASOPHILS 0.20 09/22/2018 04:20 AM    ANISOCYTOSIS 1+ 09/21/2018 03:40 AM      Pathology 9/21 gastric mucosa: inflammation and negative for H  pylori    9/17/18 blood cultures negative    [place] Garcia

## 2018-09-22 NOTE — THERAPY
"Physical Therapy Evaluation completed.   Bed Mobility:     Transfers: Sit to Stand: Stand by Assist  Gait: Level Of Assist: Contact Guard Assist with Front-Wheel Walker       Plan of Care: Will benefit from Physical Therapy 3 times per week  Discharge Recommendations: Equipment: Will Continue to Assess for Equipment Needs. Post-acute therapy Discharge to home with outpatient or home health for additional skilled therapy services.    See \"Rehab Therapy-Acute\" Patient Summary Report for complete documentation.   Pt is a 72 y.o.m. referred to PT secondary to functional decline following prolonged hospitalization  for sepsis.  Pt lives with his wife.  He was indep with all mobility w/o AD.  Pt is alert and able to follow commands.  Pt demonstrated good safety judgement.  Pt is presents with good strength, but milding impaired balance requiring fww with gait.  Pt is not at his PLOF and would benfit from cont skilled PT in acute care setting to improve balance and gait alllowing pt to return home at his PLOF.  "

## 2018-09-22 NOTE — PROGRESS NOTES
Report given to Jeffrey OGDEN at bedside. Pt states that previous PRN pain medication given was effective. Pt moved via chair with all belongings wife at bedside. No further questions at this time.

## 2018-09-22 NOTE — PROGRESS NOTES
Infectious Disease Progress Note    Author: Marguerite uFchs M.D. Date & Time of service: 2018  12:32 PM    Chief Complaint:  Follow-up of Enterobacter bacteremia    Interval History:   afebrile, white count now up to 31.8.  Reports that he was feeling terrible this morning after breakfast, feels a little bit better now.   afebrile, white count up to 44,000. CT with no e/o abscess or ductal dilatation. MRCP planned and he is scheduled for cholecystectomy tomorrow at 3 PM. His prostate is 6cm in size. No abscess.  Hemoglobin dropped from 11-7.5.  Patient reports off and on abdominal pain   afebrile, white count 50,000.  Patient was transferred to the ICU with hemodynamic instability and upper GI bleed.  EGD showed a bleeding duodenal ulcer.  General surgery was consulted and there is suspicion for a gangrenous cholecystitis.  He is scheduled to undergo a cholecystectomy today.  Patient reports that his abdominal pain has improved today   afebrile, white count down some from 53,000 - 47,000.  Gallbladder was removed on .  ERCP today for multiple gallstones in common bile duct. Pt in OR   AF WBC 30.1 patient transferred out of the ICU today, ongoing abdominal pain but somewhat improved, status post ERCP with stent placement and biopsy yesterday, biopsy negative for H. pylori  Labs Reviewed and Medications Reviewed.    Review of Systems:  Review of Systems   Constitutional: Positive for malaise/fatigue. Negative for chills and fever.   Respiratory: Negative for cough and shortness of breath.    Gastrointestinal: Positive for abdominal pain and diarrhea. Negative for nausea and vomiting.     Hemodynamics:  Temp (24hrs), Av.9 °C (98.5 °F), Min:36.6 °C (97.8 °F), Max:37.3 °C (99.1 °F)  Temperature: 36.6 °C (97.8 °F)  Pulse  Av.6  Min: 65  Max: 128Heart Rate (Monitored): (!) 103  Blood Pressure : 130/53, NIBP: 128/55       Physical Exam:  Physical Exam   Constitutional: He is  oriented to person, place, and time. He appears well-developed.   Obese   HENT:   Head: Normocephalic and atraumatic.   Eyes: Pupils are equal, round, and reactive to light. EOM are normal.   Neck:   Right IJ catheter   Cardiovascular: Normal rate and regular rhythm.    Pulmonary/Chest: Effort normal. He has no wheezes.   Abdominal: He exhibits distension. There is tenderness.   Musculoskeletal: He exhibits edema.   Bilateral lower extremities and compression stockings   Neurological: He is alert and oriented to person, place, and time.   Nursing note and vitals reviewed.      Meds:    Current Facility-Administered Medications:   •  pantoprazole  •  potassium bicarbonate  •  insulin regular **AND** [CANCELED] Accu-Chek Q6 if NPO **AND** NOTIFY MD and PharmD **AND** glucose 4 g **AND** dextrose 50%  •  oxyCODONE-acetaminophen  •  LR  •  cefTAZidime  •  [DISCONTINUED] insulin regular **AND** Accu-Chek ACHS **AND** NOTIFY MD and PharmD **AND** glucose 4 g **AND** dextrose 50%  •  metroNIDAZOLE  •  sucralfate  •  diphenoxylate-atropine  •  LORazepam  •  budesonide-formoterol  •  benzocaine-menthol  •  ondansetron  •  Respiratory Care per Protocol  •  NS  •  ipratropium-albuterol    Labs:  Recent Labs      09/19/18 2000 09/20/18   0220  09/21/18   0340  09/22/18   0420   WBC  53.5*  53.1*  47.6*  30.1*   RBC  2.80*  2.46*  2.61*  2.53*   HEMOGLOBIN  8.4*  7.5*  7.9*  7.9*   HEMATOCRIT  24.4*  21.6*  23.6*  24.2*   MCV  87.1  87.8  90.4  95.7   MCH  30.0  30.5  30.3  31.2   RDW  44.1  45.3  47.6  51.5*   PLATELETCT  357  322  290  275   MPV  12.2  11.2  11.6  12.0   NEUTSPOLYS  76.00*  81.00*  85.00*  79.00*   LYMPHOCYTES  13.00*  13.00*  6.00*  10.10*   MONOCYTES  7.00  4.00  5.00  5.90   EOSINOPHILS  0.00  0.00  0.00  0.10   BASOPHILS  0.00  0.00  0.00  0.20   RBCMORPHOLO  Present  Present  Present   --      Recent Labs      09/20/18   0220  09/20/18   1240  09/21/18   0340  09/22/18   0420   SODIUM  135   --   134*   131*   POTASSIUM  4.1  3.9  3.9  3.3*   CHLORIDE  109   --   106  102   CO2  24   --   24  23   GLUCOSE  124*   --   195*  177*   BUN  43*   --   16  12     Recent Labs      09/20/18   0220  09/21/18   0340  09/22/18   0420   ALBUMIN  2.1*   --   2.0*   TBILIRUBIN  1.7*   --   1.7*   ALKPHOSPHAT  47   --   57   TOTPROTEIN  3.9*   --   4.1*   ALTSGPT  32   --   86*   ASTSGOT  23   --   87*   CREATININE  0.59  0.72  0.73       Imaging:  Dx-chest-portable (1 View)    Result Date: 9/13/2018 9/13/2018 6:39 AM HISTORY/REASON FOR EXAM:  Shortness of Breath TECHNIQUE/EXAM DESCRIPTION AND NUMBER OF VIEWS: Single portable view of the chest. COMPARISON: Yesterday FINDINGS: Interstitial opacities persist in the mid and lower lung fields bilaterally, and there is bibasilar atelectasis. Heart and mediastinum are normal.     Mild pulmonary edema and bibasilar atelectasis, without significant change.    Dx-chest-portable (1 View)    Result Date: 9/12/2018 9/12/2018 9:10 AM HISTORY/REASON FOR EXAM: Rapid heart rate. TECHNIQUE/EXAM DESCRIPTION AND NUMBER OF VIEWS: Single portable view of the chest. COMPARISON: 5/5/2015 FINDINGS: There is bibasilar atelectasis. There is no pleural effusion. The heart is enlarged.     1.  Cardiomegaly. 2.  Bibasilar atelectasis.    Us-gallbladder    Result Date: 9/18/2018 9/18/2018 6:28 AM HISTORY/REASON FOR EXAM: Pain, leukocytosis TECHNIQUE/EXAM DESCRIPTION AND NUMBER OF VIEWS:  Real-time sonography of the gallbladder. COMPARISON: 9/12/2018 FINDINGS: There is no ascites. The liver is normal in contour. There is no evidence of solid mass lesion. The liver is diffusely increased in echogenicity. Gallstones are present. There is a wall echo shadow sign. The gallbladder is not well distended. Its wall measures 2 mm in thickness. There is no sonographic Wolf sign. 0.23 cm There is no pericholecystic fluid. The common duct measures 1 cm. The visualized pancreas is unremarkable. The visualized aorta is  normal in caliber. Intrahepatic IVC is patent. The portal vein is patent with hepatopetal flow. The right kidney measures 11.6 cm. There is RIGHT pelvocaliectasis.     1.  Cholelithiasis without other sonographic findings of biliary disease 2.  Echogenic liver, a nonspecific finding that often is found to represent steatosis.  Other infiltrative processes could have an identical appearance.    Us-gallbladder    Result Date: 9/12/2018 9/12/2018 2:37 PM HISTORY/REASON FOR EXAM:  Abdominal pain, nausea and vomiting. TECHNIQUE/EXAM DESCRIPTION AND NUMBER OF VIEWS:  Real-time sonography of the gallbladder. COMPARISON: 4/18/2014 FINDINGS: There is no ascites. The liver is diffusely echogenic with decreased echogenicity in the gallbladder fossa. The liver measures 24.33 cm. Gallbladder shows echogenic shadowing focus consistent with stone.  Gallbladder is contracted.  Gallbladder wall measures 0.32 cm.  Pain reported with scanning over the gallbladder. There is no pericholecystic fluid. The common duct measures 0.50 cm. The pancreas is obscured by bowel gas. The aorta is obscured by bowel gas. Intrahepatic IVC is patent. The portal vein is patent with hepatopetal flow. The right kidney measures 12.94 cm and shows mildly prominent collecting system. Note made of distended bladder.     1.  Limited evaluation of pancreas and abdominal aorta. 2.  Echogenic liver consistent with fatty infiltration with focal sparing adjacent to gallbladder fossa. 3.  Cholelithiasis with pain over the gallbladder.  No other evidence to indicate acute cholecystitis.  No biliary dilation. 4.  Distended bladder and mild RIGHT hydronephrosis, possibly indicating urinary retention.    Echocardiogram-comp W/ Cont    Result Date: 9/13/2018  Transthoracic Echo Report Echocardiography Laboratory CONCLUSIONS Prior echo 7/4/17.  No significant changes noted. Normal left ventricular systolic function. Left ventricular ejection fraction is visually  estimated to be 65%. A reliable estimation of diastolic function cannot be made due to tachycardia. Dilated inferior vena cava with inspiratory collapse. MATTHIEU MCKEON Exam Date:         2018                    07:16 Exam Location:     Inpatient Priority:          Routine Ordering Physician:        VIKI TORO Referring Physician:       [id number] Oneal Sonographer:               Chandler Mendoza RDCS Age:    72     Gender:    M MRN:    [id number] :    1945 BSA:    2.03   Ht (in):    67     Wt (lb):    202 Exam Type:     Complete, Contrast Indications:     Dyspnea ICD Codes:       786.09 CPT Codes:       86023,  BP:          /          HR: Technical Quality:       Technically difficult study -                          adequate information is obtained MEASUREMENTS  (Male / Female) Normal Values 2D ECHO LV Diastolic Diameter PLAX        5.5 cm                4.2 - 5.9 / 3.9 - 5.3 cm LV Systolic Diameter PLAX         2.8 cm                2.1 - 4.0 cm IVS Diastolic Thickness           1.2 cm                LVPW Diastolic Thickness          0.92 cm               LVOT Diameter                     2.3 cm                Estimated LV Ejection Fraction    65 %                  DOPPLER AV Peak Velocity                  1.2 m/s               AV Peak Gradient                  6.2 mmHg              AV Mean Gradient                  3.5 mmHg              LVOT Peak Velocity                1.1 m/s               AV Area Cont Eq vti               3 cm²                 * Indicates values subject to auto-interpretation LV EF:  65    % FINDINGS Left Ventricle Normal left ventricular chamber size. Normal left ventricular wall thickness. Normal left ventricular systolic function. Left ventricular ejection fraction is visually estimated to be 65%. Normal regional wall motion. A reliable estimation of diastolic function cannot be made due to tachycardia. Right Ventricle Right ventricle not  well visualized. Right Atrium Normal right atrial size. Dilated inferior vena cava with inspiratory collapse. Left Atrium Normal left atrial size. Left atrial volume index is 25  mL/sq m. Mitral Valve Structurally normal mitral valve. No mitral stenosis. Trace mitral regurgitation. Aortic Valve The aortic valve is not well visualized. No aortic stenosis. No aortic insufficiency. Tricuspid Valve The tricuspid valve is not well visualized.  No tricuspid regurgitation. Pulmonic Valve The pulmonic valve is not well visualized. Pericardium Normal pericardium without effusion. Aorta Aortic root not well visualized.  Ascending aorta not well visualized. Whitley oCstello MD (Electronically Signed) Final Date:     2018                 08:17    Le Venous Duplex (specify In Comments Left, Right Or Bilateral)    Result Date: 2018   Vascular Laboratory  CONCLUSIONS  No acute thrombosis is identified.  Chronic deep venous thrombosis in the distal right superficial femoral vein  and popliteal vein.  Suspect chronic deep venous thrombosis in the proximal peroneal and  posterior tibial veins, though veins were not well visualized.  MATTHIEU MCKEON  Exam Date:     2018 10:01  Room #:     Inpatient  Priority:     Stat  Ht (in):             Wt (lb):  Ordering Physician:        KELECHI MCDONALD  Referring Physician:       [id number] Hurtado  Sonographer:               Stephanie Hills RVT  Study Type:                Complete Bilateral  Technical Quality:         Adequate  Age:    72    Gender:     M  MRN:    [id number]  :    1945      BSA:  Indications:     Edema, unspecified  CPT Codes:       29103  ICD Codes:       R609  History:         Edema and swelling of both legs for 3-4 years. No prior                   exams.  Limitations:  PROCEDURES:  Bilateral lower extremity venous duplex imaging.  The following venous structures were evaluated: common femoral, profunda  femoral, greater saphenous, femoral, popliteal, peroneal  "and posterior  tibial veins.  Serial compression, augmentation maneuvers, color and spectral Doppler flow  evaluations were performed.  FINDINGS:  Right lower extremity -  Chronic deep venous thrombosis in the distal superficial femoral vein and  popliteal vein.  Suspect chronic deep venous thrombosis in the proximal peroneal and  posterior tibial veins, though veins were not well visualized.  All other veins of the right lower extremity demonstrate normal flow  dynamics with no evidence of deep vein thrombosis or superficial phlebitis.  Left lower extremity -  Complete color filling and compressibility with normal venous flow dynamics  including spontaneous flow, response to augmentation maneuvers, and  respiratory phasicity.  No evidence of superficial or deep venous thrombosis.  Antonio Alvares MD  (Electronically Signed)  Final Date:      12 September 2018                   11:01      Micro:  Results     Procedure Component Value Units Date/Time    BLOOD CULTURE [[id number]] Collected:  09/17/18 1509    Order Status:  Completed Specimen:  Blood from Peripheral Updated:  09/18/18 0621     Significant Indicator NEG     Source BLD     Site Peripheral     Blood Culture No Growth    Note: Blood cultures are incubated for 5 days and  are monitored continuously.Positive blood cultures  are called to the RN and reported as soon as  they are identified.     Blood culture testing and Gram stain, if indicated, are  performed at [facility], [address] [place] [address].  Positive blood cultures are  sent to [facility], [address] [place] [address], for organism identification and  susceptibility testing.      Narrative:       Per Hospital Policy: Only change Specimen Src: to \"Line\" if  specified by physician order.    BLOOD CULTURE [[id number]] Collected:  09/17/18 1509    Order Status:  Completed Specimen:  Blood from Peripheral Updated:  09/18/18 0621     Significant " "Indicator NEG     Source BLD     Site Peripheral     Blood Culture No Growth    Note: Blood cultures are incubated for 5 days and  are monitored continuously.Positive blood cultures  are called to the RN and reported as soon as  they are identified.     Blood culture testing and Gram stain, if indicated, are  performed at [facility], [address] [place] [address].  Positive blood cultures are  sent to [facility], [address] [place] [address], for organism identification and  susceptibility testing.      Narrative:       Per Hospital Policy: Only change Specimen Src: to \"Line\" if  specified by physician order.    C Diff by PCR rflx Toxin [[id number]] Collected:  09/15/18 1155    Order Status:  Completed Specimen:  Stool from Stool Updated:  09/15/18 2225     C Diff by PCR Negative     Comment: C. difficile NOT detected by PCR.  Treatment not indicated per guidelines.  Repeat testing not indicated within 7 days.          027-NAP1-BI Presumptive Negative     Comment: Presumptive 027/NAP1/BI target DNA sequences are NOT DETECTED.       Narrative:       Special Contact IsolationSURMXC LENORE HERNANDEZ  Does this patient have risk factors for C-diff?->Yes  C-Diff Risk Factors->antibiotic exposure          Assessment:  Enoch Mistry is a 72 y.o. male with a history of poorly controlled diabetes, hypertension, known cholelithiasis, who presented with abdominal pain and significant conjugated hyperbilirubinemia.  His blood culture is positive for Enterobacter.  The hyperbilirubinemia is spontaneously improving, suggestive of a gallstone that passed.  Unfortunately, his white count continues to worsen, now up to 50,000.  No intra-abdominal abscess or ductal dilatation on CT or MRCP.  Given the worsening white count, surgery was consulted.  There was suspicion for an acute acute gangrenous cholecystitis that may not have been as evident on imaging.  Cholecystectomy " performed on 9/20 (op note pending).  ERCP today for multiple gallstones in CBD.  White count down some today.    Patient unfortunately also developed an acute blood loss anemia on 9/19, transferred to the ICU, found to have a bleeding duodenal ulcer that was controlled.    Pertinent Diagnoses:  1.  Sepsis,  persistent  2.  Enterobacter bacteremia   3.  Conjugated hyperbilirubinemia, improving, likely passed an obstructive gallstone  4.  Poorly controlled diabetes  5.  Cholelithiasis  6.  Acute blood loss anemia, duodenal ulcer    Active Hospital Problems    Diagnosis   • Sepsis due to Enterobacter species (HCC) [A41.59]   • Acute on chronic respiratory failure (HCC) [J96.20]   • Hypokalemia [E87.6]   • Hyponatremia [E87.1]   • Diarrhea [R19.7]   • Hypophosphatemia [E83.39]   • Abnormal LFTs [R94.5]   • Uncontrolled type 2 diabetes mellitus (HCC) [E11.65]   • Non-compliance [Z91.19]   • Hepatic steatosis [K76.0]   • Chronic deep vein thrombosis (DVT) ([facility]) [I82.509]   • Stage 2 moderate COPD by GOLD classification ([facility]) [J44.9]       Plan:  Sepsis  Low-grade temperatures  Significant leukocytosis, decreased overall  Cholecystectomy performed on 9/20,  Status post ERCP with biliary stent and biopsies on 9/21.  Biopsies negative for H. pylori   Continue IV ceftazidime, and PO Flagyl  Surveillance blood cultures from 9/17 are no growth to date    Enterobacter bacteremia  As above  blood cultures 9/17 - NGTD  Anticipate 2 weeks of antibiotics from 9/17    Conjugated hyperbilirubinemia, improved, likely passed an obstructive gallstone  LFTs and T bili continue to improve  Plan as above    Poorly controlled diabetes  A1c 9.5  Contributing to immunosuppression  Maintain blood sugars under 150 to control infection    Acute blood loss anemia, duodenal ulcer  Eliquis was discontinued.  Bleeding control of duodenal ulcer done on EGD 9/19  Hemoglobin stable  No evidence of active bleeding    Markedly enlarged prostate  No [place],  no e/o abscess  PSA pending    Discussed with Dr. Augustine

## 2018-09-23 PROBLEM — K27.9 PUD (PEPTIC ULCER DISEASE): Status: ACTIVE | Noted: 2018-09-23

## 2018-09-23 PROBLEM — R78.81 BACTEREMIA: Status: ACTIVE | Noted: 2018-09-23

## 2018-09-23 LAB
ALBUMIN SERPL BCP-MCNC: 2.2 G/DL (ref 3.2–4.9)
ALBUMIN/GLOB SERPL: 0.9 G/DL
ALP SERPL-CCNC: 60 U/L (ref 30–99)
ALT SERPL-CCNC: 65 U/L (ref 2–50)
ANION GAP SERPL CALC-SCNC: 5 MMOL/L (ref 0–11.9)
AST SERPL-CCNC: 51 U/L (ref 12–45)
BASOPHILS # BLD AUTO: 0.2 % (ref 0–1.8)
BASOPHILS # BLD: 0.04 K/UL (ref 0–0.12)
BILIRUB SERPL-MCNC: 1.3 MG/DL (ref 0.1–1.5)
BUN SERPL-MCNC: 8 MG/DL (ref 8–22)
CALCIUM SERPL-MCNC: 7 MG/DL (ref 8.4–10.2)
CHLORIDE SERPL-SCNC: 99 MMOL/L (ref 96–112)
CO2 SERPL-SCNC: 25 MMOL/L (ref 20–33)
CREAT SERPL-MCNC: 0.62 MG/DL (ref 0.5–1.4)
EOSINOPHIL # BLD AUTO: 0.12 K/UL (ref 0–0.51)
EOSINOPHIL NFR BLD: 0.5 % (ref 0–6.9)
ERYTHROCYTE [DISTWIDTH] IN BLOOD BY AUTOMATED COUNT: 57.5 FL (ref 35.9–50)
GLOBULIN SER CALC-MCNC: 2.5 G/DL (ref 1.9–3.5)
GLUCOSE BLD-MCNC: 156 MG/DL (ref 65–99)
GLUCOSE BLD-MCNC: 160 MG/DL (ref 65–99)
GLUCOSE BLD-MCNC: 167 MG/DL (ref 65–99)
GLUCOSE BLD-MCNC: 180 MG/DL (ref 65–99)
GLUCOSE SERPL-MCNC: 178 MG/DL (ref 65–99)
HCT VFR BLD AUTO: 25.4 % (ref 42–52)
HGB BLD-MCNC: 8.3 G/DL (ref 14–18)
IMM GRANULOCYTES # BLD AUTO: 0.62 K/UL (ref 0–0.11)
IMM GRANULOCYTES NFR BLD AUTO: 2.7 % (ref 0–0.9)
LYMPHOCYTES # BLD AUTO: 2.54 K/UL (ref 1–4.8)
LYMPHOCYTES NFR BLD: 11 % (ref 22–41)
MCH RBC QN AUTO: 30.9 PG (ref 27–33)
MCHC RBC AUTO-ENTMCNC: 32.7 G/DL (ref 33.7–35.3)
MCV RBC AUTO: 94.4 FL (ref 81.4–97.8)
MONOCYTES # BLD AUTO: 1.3 K/UL (ref 0–0.85)
MONOCYTES NFR BLD AUTO: 5.7 % (ref 0–13.4)
NEUTROPHILS # BLD AUTO: 18.37 K/UL (ref 1.82–7.42)
NEUTROPHILS NFR BLD: 79.9 % (ref 44–72)
NRBC # BLD AUTO: 0.05 K/UL
NRBC BLD-RTO: 0.2 /100 WBC
PLATELET # BLD AUTO: 324 K/UL (ref 164–446)
PMV BLD AUTO: 11.2 FL (ref 9–12.9)
POTASSIUM SERPL-SCNC: 3.6 MMOL/L (ref 3.6–5.5)
PROT SERPL-MCNC: 4.7 G/DL (ref 6–8.2)
RBC # BLD AUTO: 2.69 M/UL (ref 4.7–6.1)
SODIUM SERPL-SCNC: 129 MMOL/L (ref 135–145)
WBC # BLD AUTO: 23 K/UL (ref 4.8–10.8)

## 2018-09-23 PROCEDURE — A9270 NON-COVERED ITEM OR SERVICE: HCPCS | Performed by: INTERNAL MEDICINE

## 2018-09-23 PROCEDURE — 700102 HCHG RX REV CODE 250 W/ 637 OVERRIDE(OP): Performed by: HOSPITALIST

## 2018-09-23 PROCEDURE — 99232 SBSQ HOSP IP/OBS MODERATE 35: CPT | Performed by: INTERNAL MEDICINE

## 2018-09-23 PROCEDURE — 85025 COMPLETE CBC W/AUTO DIFF WBC: CPT

## 2018-09-23 PROCEDURE — 770020 HCHG ROOM/CARE - TELE (206)

## 2018-09-23 PROCEDURE — 82962 GLUCOSE BLOOD TEST: CPT

## 2018-09-23 PROCEDURE — 700105 HCHG RX REV CODE 258: Performed by: INTERNAL MEDICINE

## 2018-09-23 PROCEDURE — A9270 NON-COVERED ITEM OR SERVICE: HCPCS | Performed by: HOSPITALIST

## 2018-09-23 PROCEDURE — 94760 N-INVAS EAR/PLS OXIMETRY 1: CPT

## 2018-09-23 PROCEDURE — 700102 HCHG RX REV CODE 250 W/ 637 OVERRIDE(OP): Performed by: INTERNAL MEDICINE

## 2018-09-23 PROCEDURE — C9113 INJ PANTOPRAZOLE SODIUM, VIA: HCPCS | Performed by: INTERNAL MEDICINE

## 2018-09-23 PROCEDURE — 700111 HCHG RX REV CODE 636 W/ 250 OVERRIDE (IP): Performed by: HOSPITALIST

## 2018-09-23 PROCEDURE — 80053 COMPREHEN METABOLIC PANEL: CPT

## 2018-09-23 PROCEDURE — 99233 SBSQ HOSP IP/OBS HIGH 50: CPT | Performed by: HOSPITALIST

## 2018-09-23 PROCEDURE — 700111 HCHG RX REV CODE 636 W/ 250 OVERRIDE (IP): Performed by: INTERNAL MEDICINE

## 2018-09-23 PROCEDURE — 700101 HCHG RX REV CODE 250: Performed by: INTERNAL MEDICINE

## 2018-09-23 RX ORDER — POTASSIUM CHLORIDE 20 MEQ/1
40 TABLET, EXTENDED RELEASE ORAL 3 TIMES DAILY
Status: DISCONTINUED | OUTPATIENT
Start: 2018-09-23 | End: 2018-09-25 | Stop reason: HOSPADM

## 2018-09-23 RX ORDER — INSULIN GLARGINE 100 [IU]/ML
10 INJECTION, SOLUTION SUBCUTANEOUS EVERY EVENING
Status: DISCONTINUED | OUTPATIENT
Start: 2018-09-23 | End: 2018-09-25 | Stop reason: HOSPADM

## 2018-09-23 RX ORDER — METRONIDAZOLE 500 MG/1
500 TABLET ORAL EVERY 8 HOURS
Status: DISCONTINUED | OUTPATIENT
Start: 2018-09-23 | End: 2018-09-25 | Stop reason: HOSPADM

## 2018-09-23 RX ORDER — POTASSIUM CHLORIDE 20 MEQ/1
20 TABLET, EXTENDED RELEASE ORAL 2 TIMES DAILY
Status: DISCONTINUED | OUTPATIENT
Start: 2018-09-23 | End: 2018-09-23

## 2018-09-23 RX ORDER — FUROSEMIDE 10 MG/ML
10 INJECTION INTRAMUSCULAR; INTRAVENOUS
Status: DISCONTINUED | OUTPATIENT
Start: 2018-09-23 | End: 2018-09-25 | Stop reason: HOSPADM

## 2018-09-23 RX ADMIN — POTASSIUM CHLORIDE 40 MEQ: 1500 TABLET, EXTENDED RELEASE ORAL at 17:43

## 2018-09-23 RX ADMIN — METRONIDAZOLE 500 MG: 500 TABLET ORAL at 21:37

## 2018-09-23 RX ADMIN — OXYCODONE HYDROCHLORIDE AND ACETAMINOPHEN 1 TABLET: 5; 325 TABLET ORAL at 00:11

## 2018-09-23 RX ADMIN — METRONIDAZOLE 500 MG: 500 INJECTION, SOLUTION INTRAVENOUS at 06:25

## 2018-09-23 RX ADMIN — FUROSEMIDE 10 MG: 10 INJECTION, SOLUTION INTRAVENOUS at 17:28

## 2018-09-23 RX ADMIN — SUCRALFATE 1 G: 1 SUSPENSION ORAL at 05:35

## 2018-09-23 RX ADMIN — INSULIN GLARGINE 10 UNITS: 100 INJECTION, SOLUTION SUBCUTANEOUS at 17:32

## 2018-09-23 RX ADMIN — POTASSIUM BICARBONATE 50 MEQ: 25 TABLET, EFFERVESCENT ORAL at 05:35

## 2018-09-23 RX ADMIN — SUCRALFATE 1 G: 1 SUSPENSION ORAL at 00:11

## 2018-09-23 RX ADMIN — OXYCODONE HYDROCHLORIDE AND ACETAMINOPHEN 1 TABLET: 5; 325 TABLET ORAL at 21:47

## 2018-09-23 RX ADMIN — CEFTAZIDIME 1 G: 1 INJECTION, POWDER, FOR SOLUTION INTRAMUSCULAR; INTRAVENOUS at 13:54

## 2018-09-23 RX ADMIN — OXYCODONE HYDROCHLORIDE AND ACETAMINOPHEN 1 TABLET: 5; 325 TABLET ORAL at 10:13

## 2018-09-23 RX ADMIN — SUCRALFATE 1 G: 1 SUSPENSION ORAL at 11:34

## 2018-09-23 RX ADMIN — BUDESONIDE AND FORMOTEROL FUMARATE DIHYDRATE 2 PUFF: 160; 4.5 AEROSOL RESPIRATORY (INHALATION) at 05:46

## 2018-09-23 RX ADMIN — METRONIDAZOLE 500 MG: 500 TABLET ORAL at 13:55

## 2018-09-23 RX ADMIN — CEFTAZIDIME 1 G: 1 INJECTION, POWDER, FOR SOLUTION INTRAMUSCULAR; INTRAVENOUS at 05:36

## 2018-09-23 RX ADMIN — PANTOPRAZOLE SODIUM 40 MG: 40 INJECTION, POWDER, LYOPHILIZED, FOR SOLUTION INTRAVENOUS at 17:27

## 2018-09-23 RX ADMIN — CEFTAZIDIME 1 G: 1 INJECTION, POWDER, FOR SOLUTION INTRAMUSCULAR; INTRAVENOUS at 21:37

## 2018-09-23 RX ADMIN — BUDESONIDE AND FORMOTEROL FUMARATE DIHYDRATE 2 PUFF: 160; 4.5 AEROSOL RESPIRATORY (INHALATION) at 17:04

## 2018-09-23 RX ADMIN — SUCRALFATE 1 G: 1 SUSPENSION ORAL at 23:48

## 2018-09-23 RX ADMIN — SUCRALFATE 1 G: 1 SUSPENSION ORAL at 17:27

## 2018-09-23 RX ADMIN — PANTOPRAZOLE SODIUM 40 MG: 40 INJECTION, POWDER, LYOPHILIZED, FOR SOLUTION INTRAVENOUS at 05:35

## 2018-09-23 ASSESSMENT — ENCOUNTER SYMPTOMS
COUGH: 0
FEVER: 0
DIARRHEA: 1
CHILLS: 0
NAUSEA: 0
BLURRED VISION: 0
TINGLING: 0
WEAKNESS: 1
ABDOMINAL PAIN: 1
ABDOMINAL PAIN: 0
NECK PAIN: 0
HEADACHES: 0
BACK PAIN: 0
DEPRESSION: 0
PALPITATIONS: 0
EYE PAIN: 0
VOMITING: 0
INSOMNIA: 0
SORE THROAT: 0
DIZZINESS: 0
SHORTNESS OF BREATH: 0

## 2018-09-23 ASSESSMENT — PAIN SCALES - GENERAL
PAINLEVEL_OUTOF10: 7
PAINLEVEL_OUTOF10: 3
PAINLEVEL_OUTOF10: 0
PAINLEVEL_OUTOF10: 5
PAINLEVEL_OUTOF10: 0
PAINLEVEL_OUTOF10: 0

## 2018-09-23 NOTE — WOUND TEAM
Received call from pt's RN that dressing are saturated and that pt's legs are weeping.  RN states that legs are covered with xeroform, abd and tubigrip.  Pt currently has a consult for nail care and RN will put in a new consult for wound care.  RN states that she will inform Dr Omalley that pt is weeping from his legs.

## 2018-09-23 NOTE — PROGRESS NOTES
Dressing change to bilateral legs.  Dressing is completely saturated.  Wound care instructions do not match the dressing that is on patient.  Called wound care spoke with both Paulo and Krysta. Updated both that patient is weeping and dressing is saturated.    Both state new consult needs to be placed.  Wound consult placed.

## 2018-09-23 NOTE — PROGRESS NOTES
Updated Dr Omalley regarding bilateral weeping edema on patient, telephone order 20 meq Potassium PO BID and Lasix 10 mg IV BID.

## 2018-09-23 NOTE — PROGRESS NOTES
Report received from Laverne at pt's bedside in 318 about 1045 and pt transferred to 302-1 in the cardiac chair he was in. Lap stabs are cdi with tegaderm in place.  FS at lunchtime was 192 and he received coverage; pt not happy with lunch and ordered him cereal and milk.  Labs drawn for mag and potassium about 1420.  Pt did work with PT and they recommend home health and a walker.  Pt did sit with his legs reclined for about an hour but then he sat upright.   Dr. Michelle at bedside this evening before dinner and spoke to the pt and encouraged him to eat.  FS was 168 and pt eating a special order meal of grilled cheese and fries with tomato soup.  VSS.  Hr has been running SR to ST.

## 2018-09-23 NOTE — CARE PLAN
Problem: Safety  Goal: Will remain free from injury    Intervention: Provide assistance with mobility  1 person assist with ambulation.        Problem: Knowledge Deficit  Goal: Knowledge of disease process/condition, treatment plan, diagnostic tests, and medications will improve    Intervention: Assess knowledge level of disease process/condition, treatment plan, diagnostic tests, and medications  dg [place], work with PT/OT      Problem: Skin Integrity  Goal: Risk for impaired skin integrity will decrease    Intervention: Assess risk factors for impaired skin integrity and/or pressure ulcers  Bilateral leg dressing changes, up to chair for meals.

## 2018-09-23 NOTE — ASSESSMENT & PLAN NOTE
Severe with multiple ulcerations to the point of preventing ercp success  extensive periampullary ulcers preventing sphincterotomy

## 2018-09-23 NOTE — PROGRESS NOTES
Infectious Disease Progress Note    Author: Marguerite Fuchs M.D. Date & Time of service: 2018  2:56 PM    Chief Complaint:  Follow-up of Enterobacter bacteremia    Interval History:   afebrile, white count now up to 31.8.  Reports that he was feeling terrible this morning after breakfast, feels a little bit better now.   afebrile, white count up to 44,000. CT with no e/o abscess or ductal dilatation. MRCP planned and he is scheduled for cholecystectomy tomorrow at 3 PM. His prostate is 6cm in size. No abscess.  Hemoglobin dropped from 11-7.5.  Patient reports off and on abdominal pain   afebrile, white count 50,000.  Patient was transferred to the ICU with hemodynamic instability and upper GI bleed.  EGD showed a bleeding duodenal ulcer.  General surgery was consulted and there is suspicion for a gangrenous cholecystitis.  He is scheduled to undergo a cholecystectomy today.  Patient reports that his abdominal pain has improved today   afebrile, white count down some from 53,000 - 47,000.  Gallbladder was removed on .  ERCP today for multiple gallstones in common bile duct. Pt in OR   AF WBC 30.1 patient transferred out of the ICU today, ongoing abdominal pain but somewhat improved, status post ERCP with stent placement and biopsy yesterday, biopsy negative for H. Pylori   AF WBC 23 patient feels slightly better today but not yet back to baseline however he is anxious to go home tomorrow  Labs Reviewed and Medications Reviewed.    Review of Systems:  Review of Systems   Constitutional: Positive for malaise/fatigue. Negative for chills and fever.   Respiratory: Negative for cough and shortness of breath.    Gastrointestinal: Positive for abdominal pain and diarrhea. Negative for nausea and vomiting.     Hemodynamics:  Temp (24hrs), Av.4 °C (97.6 °F), Min:36.2 °C (97.1 °F), Max:36.8 °C (98.2 °F)  Temperature: 36.8 °C (98.2 °F)  Pulse  Av.4  Min: 65  Max: 128  Blood Pressure  : 129/46       Physical Exam:  Physical Exam   Constitutional: He is oriented to person, place, and time. He appears well-developed.   Obese   HENT:   Head: Normocephalic and atraumatic.   Eyes: Pupils are equal, round, and reactive to light. EOM are normal.   Neck:   Right IJ catheter   Cardiovascular: Normal rate and regular rhythm.    Pulmonary/Chest: Effort normal. He has no wheezes.   Abdominal: He exhibits distension. There is tenderness.   Musculoskeletal: He exhibits edema.   Bilateral lower extremities and compression stockings   Neurological: He is alert and oriented to person, place, and time.   Nursing note and vitals reviewed.      Meds:    Current Facility-Administered Medications:   •  metroNIDAZOLE  •  insulin glargine  •  pantoprazole  •  potassium bicarbonate  •  insulin regular **AND** [CANCELED] Accu-Chek Q6 if NPO **AND** NOTIFY MD and PharmD **AND** glucose 4 g **AND** dextrose 50%  •  oxyCODONE-acetaminophen  •  LR  •  cefTAZidime  •  [DISCONTINUED] insulin regular **AND** Accu-Chek ACHS **AND** NOTIFY MD and PharmD **AND** glucose 4 g **AND** dextrose 50%  •  sucralfate  •  diphenoxylate-atropine  •  LORazepam  •  budesonide-formoterol  •  benzocaine-menthol  •  ondansetron  •  Respiratory Care per Protocol  •  NS  •  ipratropium-albuterol    Labs:  Recent Labs      09/21/18   0340  09/22/18   0420  09/23/18   1015   WBC  47.6*  30.1*  23.0*   RBC  2.61*  2.53*  2.69*   HEMOGLOBIN  7.9*  7.9*  8.3*   HEMATOCRIT  23.6*  24.2*  25.4*   MCV  90.4  95.7  94.4   MCH  30.3  31.2  30.9   RDW  47.6  51.5*  57.5*   PLATELETCT  290  275  324   MPV  11.6  12.0  11.2   NEUTSPOLYS  85.00*  79.00*  79.90*   LYMPHOCYTES  6.00*  10.10*  11.00*   MONOCYTES  5.00  5.90  5.70   EOSINOPHILS  0.00  0.10  0.50   BASOPHILS  0.00  0.20  0.20   RBCMORPHOLO  Present   --    --      Recent Labs      09/21/18   0340  09/22/18   0420  09/22/18   1422  09/23/18   1015   SODIUM  134*  131*   --   129*   POTASSIUM  3.9   3.3*  3.3*  3.6   CHLORIDE  106  102   --   99   CO2  24  23   --   25   GLUCOSE  195*  177*   --   178*   BUN  16  12   --   8     Recent Labs      09/21/18   0340  09/22/18   0420  09/23/18   1015   ALBUMIN   --   2.0*  2.2*   TBILIRUBIN   --   1.7*  1.3   ALKPHOSPHAT   --   57  60   TOTPROTEIN   --   4.1*  4.7*   ALTSGPT   --   86*  65*   ASTSGOT   --   87*  51*   CREATININE  0.72  0.73  0.62       Imaging:  Dx-chest-portable (1 View)    Result Date: 9/13/2018 9/13/2018 6:39 AM HISTORY/REASON FOR EXAM:  Shortness of Breath TECHNIQUE/EXAM DESCRIPTION AND NUMBER OF VIEWS: Single portable view of the chest. COMPARISON: Yesterday FINDINGS: Interstitial opacities persist in the mid and lower lung fields bilaterally, and there is bibasilar atelectasis. Heart and mediastinum are normal.     Mild pulmonary edema and bibasilar atelectasis, without significant change.    Dx-chest-portable (1 View)    Result Date: 9/12/2018 9/12/2018 9:10 AM HISTORY/REASON FOR EXAM: Rapid heart rate. TECHNIQUE/EXAM DESCRIPTION AND NUMBER OF VIEWS: Single portable view of the chest. COMPARISON: 5/5/2015 FINDINGS: There is bibasilar atelectasis. There is no pleural effusion. The heart is enlarged.     1.  Cardiomegaly. 2.  Bibasilar atelectasis.    Us-gallbladder    Result Date: 9/18/2018 9/18/2018 6:28 AM HISTORY/REASON FOR EXAM: Pain, leukocytosis TECHNIQUE/EXAM DESCRIPTION AND NUMBER OF VIEWS:  Real-time sonography of the gallbladder. COMPARISON: 9/12/2018 FINDINGS: There is no ascites. The liver is normal in contour. There is no evidence of solid mass lesion. The liver is diffusely increased in echogenicity. Gallstones are present. There is a wall echo shadow sign. The gallbladder is not well distended. Its wall measures 2 mm in thickness. There is no sonographic Wolf sign. 0.23 cm There is no pericholecystic fluid. The common duct measures 1 cm. The visualized pancreas is unremarkable. The visualized aorta is normal in caliber.  Intrahepatic IVC is patent. The portal vein is patent with hepatopetal flow. The right kidney measures 11.6 cm. There is RIGHT pelvocaliectasis.     1.  Cholelithiasis without other sonographic findings of biliary disease 2.  Echogenic liver, a nonspecific finding that often is found to represent steatosis.  Other infiltrative processes could have an identical appearance.    Us-gallbladder    Result Date: 9/12/2018 9/12/2018 2:37 PM HISTORY/REASON FOR EXAM:  Abdominal pain, nausea and vomiting. TECHNIQUE/EXAM DESCRIPTION AND NUMBER OF VIEWS:  Real-time sonography of the gallbladder. COMPARISON: 4/18/2014 FINDINGS: There is no ascites. The liver is diffusely echogenic with decreased echogenicity in the gallbladder fossa. The liver measures 24.33 cm. Gallbladder shows echogenic shadowing focus consistent with stone.  Gallbladder is contracted.  Gallbladder wall measures 0.32 cm.  Pain reported with scanning over the gallbladder. There is no pericholecystic fluid. The common duct measures 0.50 cm. The pancreas is obscured by bowel gas. The aorta is obscured by bowel gas. Intrahepatic IVC is patent. The portal vein is patent with hepatopetal flow. The right kidney measures 12.94 cm and shows mildly prominent collecting system. Note made of distended bladder.     1.  Limited evaluation of pancreas and abdominal aorta. 2.  Echogenic liver consistent with fatty infiltration with focal sparing adjacent to gallbladder fossa. 3.  Cholelithiasis with pain over the gallbladder.  No other evidence to indicate acute cholecystitis.  No biliary dilation. 4.  Distended bladder and mild RIGHT hydronephrosis, possibly indicating urinary retention.    Echocardiogram-comp W/ Cont    Result Date: 9/13/2018  Transthoracic Echo Report Echocardiography Laboratory CONCLUSIONS Prior echo 7/4/17.  No significant changes noted. Normal left ventricular systolic function. Left ventricular ejection fraction is visually estimated to be 65%. A  reliable estimation of diastolic function cannot be made due to tachycardia. Dilated inferior vena cava with inspiratory collapse. MATTHIEU MCKEON Exam Date:         2018                    07:16 Exam Location:     Inpatient Priority:          Routine Ordering Physician:        VIKI TORO Referring Physician:       [id number] Oneal Sonographer:               Chandler Mendoza RDCS Age:    72     Gender:    M MRN:    [id number] :    1945 BSA:    2.03   Ht (in):    67     Wt (lb):    202 Exam Type:     Complete, Contrast Indications:     Dyspnea ICD Codes:       786.09 CPT Codes:       03867,  BP:          /          HR: Technical Quality:       Technically difficult study -                          adequate information is obtained MEASUREMENTS  (Male / Female) Normal Values 2D ECHO LV Diastolic Diameter PLAX        5.5 cm                4.2 - 5.9 / 3.9 - 5.3 cm LV Systolic Diameter PLAX         2.8 cm                2.1 - 4.0 cm IVS Diastolic Thickness           1.2 cm                LVPW Diastolic Thickness          0.92 cm               LVOT Diameter                     2.3 cm                Estimated LV Ejection Fraction    65 %                  DOPPLER AV Peak Velocity                  1.2 m/s               AV Peak Gradient                  6.2 mmHg              AV Mean Gradient                  3.5 mmHg              LVOT Peak Velocity                1.1 m/s               AV Area Cont Eq vti               3 cm²                 * Indicates values subject to auto-interpretation LV EF:  65    % FINDINGS Left Ventricle Normal left ventricular chamber size. Normal left ventricular wall thickness. Normal left ventricular systolic function. Left ventricular ejection fraction is visually estimated to be 65%. Normal regional wall motion. A reliable estimation of diastolic function cannot be made due to tachycardia. Right Ventricle Right ventricle not well visualized. Right  Atrium Normal right atrial size. Dilated inferior vena cava with inspiratory collapse. Left Atrium Normal left atrial size. Left atrial volume index is 25  mL/sq m. Mitral Valve Structurally normal mitral valve. No mitral stenosis. Trace mitral regurgitation. Aortic Valve The aortic valve is not well visualized. No aortic stenosis. No aortic insufficiency. Tricuspid Valve The tricuspid valve is not well visualized.  No tricuspid regurgitation. Pulmonic Valve The pulmonic valve is not well visualized. Pericardium Normal pericardium without effusion. Aorta Aortic root not well visualized.  Ascending aorta not well visualized. Whitley Costello MD (Electronically Signed) Final Date:     2018                 08:17    Le Venous Duplex (specify In Comments Left, Right Or Bilateral)    Result Date: 2018   Vascular Laboratory  CONCLUSIONS  No acute thrombosis is identified.  Chronic deep venous thrombosis in the distal right superficial femoral vein  and popliteal vein.  Suspect chronic deep venous thrombosis in the proximal peroneal and  posterior tibial veins, though veins were not well visualized.  MATTHIEU MCKEON  Exam Date:     2018 10:01  Room #:     Inpatient  Priority:     Stat  Ht (in):             Wt (lb):  Ordering Physician:        KELECHI MCDONALD  Referring Physician:       [id number] Hurtado  Sonographer:               Stephanie Hills RVT  Study Type:                Complete Bilateral  Technical Quality:         Adequate  Age:    72    Gender:     M  MRN:    [id number]  :    1945      BSA:  Indications:     Edema, unspecified  CPT Codes:       45588  ICD Codes:       R609  History:         Edema and swelling of both legs for 3-4 years. No prior                   exams.  Limitations:  PROCEDURES:  Bilateral lower extremity venous duplex imaging.  The following venous structures were evaluated: common femoral, profunda  femoral, greater saphenous, femoral, popliteal, peroneal and posterior  tibial  "veins.  Serial compression, augmentation maneuvers, color and spectral Doppler flow  evaluations were performed.  FINDINGS:  Right lower extremity -  Chronic deep venous thrombosis in the distal superficial femoral vein and  popliteal vein.  Suspect chronic deep venous thrombosis in the proximal peroneal and  posterior tibial veins, though veins were not well visualized.  All other veins of the right lower extremity demonstrate normal flow  dynamics with no evidence of deep vein thrombosis or superficial phlebitis.  Left lower extremity -  Complete color filling and compressibility with normal venous flow dynamics  including spontaneous flow, response to augmentation maneuvers, and  respiratory phasicity.  No evidence of superficial or deep venous thrombosis.  Antonio Alvares MD  (Electronically Signed)  Final Date:      12 September 2018                   11:01      Micro:  Results     Procedure Component Value Units Date/Time    BLOOD CULTURE [[id number]] Collected:  09/17/18 1509    Order Status:  Completed Specimen:  Blood from Peripheral Updated:  09/22/18 1817     Significant Indicator NEG     Source BLD     Site Peripheral     Blood Culture No growth after 5 days of incubation.  Blood culture testing and Gram stain, if indicated, are  performed at [facility], [address] [place] [address].  Positive blood cultures are  sent to [facility], [address] [place] [address], for organism identification and  susceptibility testing.      Narrative:       Per Hospital Policy: Only change Specimen Src: to \"Line\" if  specified by physician order.    BLOOD CULTURE [[id number]] Collected:  09/17/18 1509    Order Status:  Completed Specimen:  Blood from Peripheral Updated:  09/22/18 1817     Significant Indicator NEG     Source BLD     Site Peripheral     Blood Culture No growth after 5 days of incubation.  Blood culture testing and Gram stain, if indicated, " "are  performed at [facility], [address] [place] [address].  Positive blood cultures are  sent to [facility], [address] [place] [address], for organism identification and  susceptibility testing.      Narrative:       Per Hospital Policy: Only change Specimen Src: to \"Line\" if  specified by physician order.          Assessment:  Enoch Mistry is a 72 y.o. male with a history of poorly controlled diabetes, hypertension, known cholelithiasis, who presented with abdominal pain and significant conjugated hyperbilirubinemia.  His blood culture is positive for Enterobacter.  The hyperbilirubinemia is spontaneously improving, suggestive of a gallstone that passed.  Unfortunately, his white count continues to worsen, now up to 50,000.  No intra-abdominal abscess or ductal dilatation on CT or MRCP.  Given the worsening white count, surgery was consulted.  There was suspicion for an acute acute gangrenous cholecystitis that may not have been as evident on imaging.  Cholecystectomy performed on 9/20 (op note pending).  ERCP today for multiple gallstones in CBD.  White count down some today.    Patient unfortunately also developed an acute blood loss anemia on 9/19, transferred to the ICU, found to have a bleeding duodenal ulcer that was controlled.    Pertinent Diagnoses:  1.  Sepsis,  persistent  2.  Enterobacter bacteremia   3.  Conjugated hyperbilirubinemia, improving, likely passed an obstructive gallstone  4.  Poorly controlled diabetes  5.  Cholelithiasis  6.  Acute blood loss anemia, duodenal ulcer    Active Hospital Problems    Diagnosis   • Sepsis due to Enterobacter species (HCC) [A41.59]   • Acute on chronic respiratory failure (HCC) [J96.20]   • Hypokalemia [E87.6]   • Hyponatremia [E87.1]   • Diarrhea [R19.7]   • Hypophosphatemia [E83.39]   • Abnormal LFTs [R94.5]   • Uncontrolled type 2 diabetes mellitus (HCC) [E11.65]   • Non-compliance [Z91.19]   • " Hepatic steatosis [K76.0]   • Chronic deep vein thrombosis (DVT) (HCC) [I82.509]   • Stage 2 moderate COPD by GOLD classification (HCC) [J44.9]       Plan:  Sepsis  Low-grade temperatures improved  Significant leukocytosis, decreased overall  Cholecystectomy performed on 9/20,  Status post ERCP with biliary stent and biopsies on 9/21.  Biopsies negative for H. pylori   Continue IV ceftazidime, and PO Flagyl  Surveillance blood cultures from 9/17 are no growth to date    Enterobacter bacteremia  As above  blood cultures 9/17 - NGTD  Anticipate 2 weeks of antibiotics from 9/17/18  Estimated stop date 10/01/18  Anticipate transition to p.o. Cipro at discharge to complete antibiotic      Conjugated hyperbilirubinemia, improved, likely passed an obstructive gallstone  LFTs and T bili continue to improve  Plan as above    Poorly controlled diabetes  A1c 9.5  Contributing to immunosuppression  Maintain blood sugars under 150 to control infection    Acute blood loss anemia, duodenal ulcer  Eliquis was discontinued.  Bleeding control of duodenal ulcer done on EGD 9/19  Hemoglobin stable  No evidence of active bleeding    Markedly enlarged prostate  No [place], no e/o abscess  PSA pending    Patient is anxious for discharge however he is not cleared from infectious disease standpoint.  Patient still has significant leukocytosis and will like to see it more improved prior to discharge    Discussed with IM

## 2018-09-23 NOTE — CARE PLAN
Problem: Safety  Goal: Will remain free from injury  Outcome: PROGRESSING AS EXPECTED  Check on Pt hourly. Pt encouraged to call for assistance as needed. Bed in low position, upper side rails up, anti slippery socks on, call light within reach, bed alarm on.       Problem: Infection  Goal: Will remain free from infection  Outcome: PROGRESSING AS EXPECTED  Assess for signs and symptoms of infection. Monitor lab values that indicate infection. Implement standard precautions. Perform hand washing. Administer IV Abx as prescribed.

## 2018-09-23 NOTE — PROGRESS NOTES
Received report from day shift nurse. Assumed patient's cares Pt is up in cardiac chair. Pt denies any pain, only mild discomfort in abdomen at the surgical incisions, denies any needs at this time. Encouraged Pt to call for assistance if needed.

## 2018-09-23 NOTE — PROGRESS NOTES
[facility] Medicine Daily Progress Note    Date of Service  9/23/2018    Chief Complaint  72 y.o. Male admitted 9/12/2018 with with right upper quadrant pain found to have cholangitis/cholecystitis with related sepsis. Complicated by GI bleed and found to have severe PUD. S/p cholecystectomy and ercp.     Interval Problem Update  Transferred out of icu yesterday. Pain improved in abd and feeling improved in general. Discussed plan with his wife       Consultants/Specialty  ID  GI  Surgery    Disposition  Possible home with     Us abd:  1.  Cholelithiasis without other sonographic findings of biliary disease  2.  Echogenic liver, a nonspecific finding that often is found to represent steatosis.  Other infiltrative processes could have an identical appearance.    Ct abd:  6.6 mm noncalcified subpleural nodule in the right lower lobe.  [Prominent liver size.  Small gallbladder containing a small amount of stone material.  Stable 12 mm right adrenal gland nodule.  Stable 3 cm left renal cyst.  Atherosclerotic vascular disease.  Enlarged prostate gland.  No focal abscess appreciated.    MRI abdomen:  1.  Cholelithiasis with choledocholithiasis. No significant ductal dilatation is appreciated at this time.  2.  Indeterminate small right adrenal nodule as described on the recent CT.  3.  Bilateral renal cysts.          Review of Systems  Review of Systems   Constitutional: Positive for malaise/fatigue. Negative for chills and fever.   HENT: Negative for sore throat.    Eyes: Negative for blurred vision and pain.   Respiratory: Negative for cough and shortness of breath.    Cardiovascular: Negative for chest pain and palpitations.   Gastrointestinal: Negative for abdominal pain, nausea and vomiting.   Genitourinary: Negative for dysuria and urgency.   Musculoskeletal: Negative for back pain and neck pain.   Skin: Negative for itching and rash.   Neurological: Positive for weakness. Negative for dizziness, tingling and  headaches.   Psychiatric/Behavioral: Negative for depression. The patient does not have insomnia.    All other systems reviewed and are negative.       Physical Exam  Blood Pressure : 152/61   Temperature: 36.7 °C (98 °F)   Pulse: (!) 110   Respiration: 18   Pulse Oximetry: 95 %     Physical Exam   Constitutional: He is oriented to person, place, and time. He appears well-developed and well-nourished. No distress.   Patient seen and examined  Plan discussed with ALIN SOUZAT:   Right Ear: External ear normal.   Left Ear: External ear normal.   Nose: Nose normal.   Eyes: Right eye exhibits no discharge. Left eye exhibits no discharge. No scleral icterus.   Neck: No JVD present. No tracheal deviation present.   Cardiovascular: Normal rate, normal heart sounds and intact distal pulses.    No murmur heard.  Cap refill 2sec  Pulses 2+ throughout     Pulmonary/Chest: Effort normal and breath sounds normal. No respiratory distress. He has no wheezes. He has no rales.   Abdominal: Soft. Bowel sounds are normal. He exhibits distension. There is tenderness. There is no guarding.   Musculoskeletal: He exhibits edema. He exhibits no tenderness.   anasarca   Neurological: He is alert and oriented to person, place, and time.   Skin: Skin is warm and dry. He is not diaphoretic. No erythema.   Normal skin color   Psychiatric: He has a normal mood and affect. His behavior is normal.   Nursing note and vitals reviewed.      Fluids    Intake/Output Summary (Last 24 hours) at 09/23/18 0819  Last data filed at 09/23/18 0600   Gross per 24 hour   Intake             1438 ml   Output             3060 ml   Net            -1622 ml       Laboratory  Recent Labs      09/21/18   0340  09/22/18   0420   WBC  47.6*  30.1*   RBC  2.61*  2.53*   HEMOGLOBIN  7.9*  7.9*   HEMATOCRIT  23.6*  24.2*   MCV  90.4  95.7   MCH  30.3  31.2   MCHC  33.5*  32.6*   RDW  47.6  51.5*   PLATELETCT  290  275   MPV  11.6  12.0     Recent Labs      09/21/18   0340   09/22/18   0420  09/22/18   1422   SODIUM  134*  131*   --    POTASSIUM  3.9  3.3*  3.3*   CHLORIDE  106  102   --    CO2  24  23   --    GLUCOSE  195*  177*   --    BUN  16  12   --    CREATININE  0.72  0.73   --    CALCIUM  7.0*  7.0*   --      Recent Labs      09/21/18   0340   INR  1.49*               Imaging  DX-PORTABLE FLUOROSCOPY < 1 HOUR   Final Result         Portable fluoroscopy utilized for 0.1 minutes.         [id number] STENT - TUBE   Final Result      ERCP in progress      DX-CHEST-LIMITED (1 VIEW)   Final Result      No radiographic evidence of a pneumothorax following right IJ line placement.      Mild bibasilar atelectasis      [id number]   Final Result      1.  Cholelithiasis with choledocholithiasis. No significant ductal dilatation is appreciated at this time.      2.  Indeterminate small right adrenal nodule as described on the recent CT.      3.  Bilateral renal cysts.      CT-ABDOMEN-PELVIS WITH   Final Result         6.6 mm noncalcified subpleural nodule in the right lower lobe.         [Prominent liver size.      Small gallbladder containing a small amount of stone material.      Stable 12 mm right adrenal gland nodule.      Stable 3 cm left renal cyst.      Atherosclerotic vascular disease.      Enlarged prostate gland.      No focal abscess appreciated.         DX-CHEST-PORTABLE (1 VIEW)   Final Result      Improved bibasilar atelectasis and pulmonary edema.      US-GALLBLADDER   Final Result      1.  Cholelithiasis without other sonographic findings of biliary disease   2.  Echogenic liver, a nonspecific finding that often is found to represent steatosis.  Other infiltrative processes could have an identical appearance.      ECHOCARDIOGRAM-COMP W/ CONT   Final Result      DX-CHEST-PORTABLE (1 VIEW)   Final Result      Mild pulmonary edema and bibasilar atelectasis, without significant change.      US-GALLBLADDER   Final Result      1.  Limited evaluation of pancreas and  abdominal aorta.   2.  Echogenic liver consistent with fatty infiltration with focal sparing adjacent to gallbladder fossa.   3.  Cholelithiasis with pain over the gallbladder.  No other evidence to indicate acute cholecystitis.  No biliary dilation.   4.  Distended bladder and mild RIGHT hydronephrosis, possibly indicating urinary retention.      LE VENOUS DUPLEX (Specify in Comments Left, Right Or Bilateral)   Final Result      DX-CHEST-PORTABLE (1 VIEW)   Final Result      1.  Cardiomegaly.      2.  Bibasilar atelectasis.           Assessment/Plan  Acute on chronic respiratory failure (HCC)   Assessment & Plan    Patient chronically on 2 L oxygen   No wheezing now and given GI bleeding will stop steroids.           Severe sepsis with septic shock (CODE) ([facility])   Assessment & Plan    2/2 cholecystitis/cholangitis  Cholecystectomy performed on 9/20 then  ERCP with biliary stent and biopsies on 9/21.  Biopsies negative for H. pylori   Continue IV abx per ID  Repeat cultures neg to date.             Diarrhea   Assessment & Plan    resolved        Hyponatremia   Assessment & Plan    Adjust volume status as needed        Hypokalemia   Assessment & Plan    Trend and replace          Hypophosphatemia   Assessment & Plan    resolved        Cholangitis   Assessment & Plan    S/p ercp and stent.   Repeat ERCP as outpatient in 4-6 weeks for sphincterotomy and stone removal.        Uncontrolled type 2 diabetes mellitus (HCC)- (present on admission)   Assessment & Plan    Noncompliant with follow-up and home fingersticks  With hyperglycemia and vascular complications.   A1c 9.5  Continue ssi  Add back small dose of lantus.           Bacteremia   Assessment & Plan    2/2 cholangitis  Iv abx for enterobacter per ID        PUD (peptic ulcer disease)   Assessment & Plan    Severe with multiple ulcerations to the point of preventing ercp success  extensive periampullary ulcers preventing sphincterotomy              Common bile duct  stone- (present on admission)   Assessment & Plan    S/p ercp        GI bleeding   Assessment & Plan    2/2 PUD  S/p egd  ppi therapy.         Elevated PSA- (present on admission)   Assessment & Plan    Marked elevation at 49- concerning for prostate ccancer.   Outpatient workup.         Chronic deep vein thrombosis (DVT) (HCC)   Assessment & Plan    Stop eliquis given bleeding.         Hepatic steatosis   Assessment & Plan    Due to morbid obesity and uncontrolled diabetes          Non-compliance   Assessment & Plan    Discussed importance of compliance and f/u        Stage 2 moderate COPD by GOLD classification (HCC)- (present on admission)   Assessment & Plan    Stop steroids.

## 2018-09-23 NOTE — PROGRESS NOTES
Report received from NOC RN, POC discussed, walking rounds completed, pt awake sitting up in cardiac chair, all orders, medications and lines verified, no s/s distress, call light within reach and will continue to monitor.

## 2018-09-23 NOTE — PROGRESS NOTES
Telemetry summary:  Rhythm: SR, ST     HR: from 70s to 100s    KY: 0.16   QRS 0.08   QT 0.40   Ectopies: rare PAC, rare PVC, rare couplets.

## 2018-09-23 NOTE — PROGRESS NOTES
Gastroenterology Progress Note     Author: Harman Gorecade   Date & Time Created: 9/22/2018 5:14 PM    Chief Complaint:  Fevers    Interval History:  Since ERCP and stent placement marked clinical and serologic improvement.     Review of Systems:  Review of Systems   HENT: Negative.    Respiratory: Negative.    Cardiovascular: Negative.    Gastrointestinal: Negative.        Physical Exam:  Physical Exam   Constitutional: He is oriented to person, place, and time. He appears well-developed.   HENT:   Head: Normocephalic.   Cardiovascular: Normal rate.    Pulmonary/Chest: Effort normal.   Neurological: He is alert and oriented to person, place, and time.   Psychiatric: He has a normal mood and affect. His behavior is normal. Judgment and thought content normal.       Labs:        Invalid input(s): [id number]      Recent Labs      09/20/18 0220 09/21/18 0340 09/22/18 0420 09/22/18   1422   SODIUM  135   --   134*  131*   --    POTASSIUM  4.1   < >  3.9  3.3*  3.3*   CHLORIDE  109   --   106  102   --    CO2  24   --   24  23   --    BUN  43*   --   16  12   --    CREATININE  0.59   --   0.72  0.73   --    MAGNESIUM  2.0   --    --    --   1.9   CALCIUM  7.2*   --   7.0*  7.0*   --     < > = values in this interval not displayed.     Recent Labs      09/20/18 0220 09/21/18 0340 09/22/18 0420   ALTSGPT  32   --   86*   ASTSGOT  23   --   87*   ALKPHOSPHAT  47   --   57   TBILIRUBIN  1.7*   --   1.7*   GLUCOSE  124*  195*  177*     Recent Labs      09/20/18 0220 09/21/18 0340 09/22/18 0420   RBC  2.46*  2.61*  2.53*   HEMOGLOBIN  7.5*  7.9*  7.9*   HEMATOCRIT  21.6*  23.6*  24.2*   PLATELETCT  322  290  275   PROTHROMBTM   --   17.8*   --    INR   --   1.49*   --      Recent Labs      09/20/18 0220 09/21/18 0340 09/22/18 0420   WBC  53.1*  47.6*  30.1*   NEUTSPOLYS  81.00*  85.00*  79.00*   LYMPHOCYTES  13.00*  6.00*  10.10*   MONOCYTES  4.00  5.00  5.90   EOSINOPHILS  0.00  0.00   0.10   BASOPHILS  0.00  0.00  0.20   ASTSGOT  23   --   87*   ALTSGPT  32   --   86*   ALKPHOSPHAT  47   --   57   TBILIRUBIN  1.7*   --   1.7*     Hemodynamics:  Temp (24hrs), Av.9 °C (98.4 °F), Min:36.6 °C (97.8 °F), Max:37.3 °C (99.1 °F)  Temperature: 36.6 °C (97.8 °F)  Pulse  Av.6  Min: 65  Max: 128Heart Rate (Monitored): (!) 103  Blood Pressure : 130/53, NIBP: 128/55     Respiratory:    Respiration: 18, Pulse Oximetry: 93 %, O2 Daily Delivery Respiratory : Silicone Nasal Cannula     Work Of Breathing / Effort: Mild  RUL Breath Sounds: Diminished, RML Breath Sounds: Diminished, RLL Breath Sounds: Diminished, LORNA Breath Sounds: Diminished, LLL Breath Sounds: Diminished  Fluids:    Intake/Output Summary (Last 24 hours) at 18 1714  Last data filed at 18 1351   Gross per 24 hour   Intake             2465 ml   Output             2475 ml   Net              -10 ml     Weight: 104 kg (229 lb 4.5 oz)  GI/Nutrition:  Orders Placed This Encounter   Procedures   • Diet Order Diabetic     Standing Status:   Standing     Number of Occurrences:   1     Order Specific Question:   Diet:     Answer:   Diabetic [3]     Medical Decision Making, by Problem:  Active Hospital Problems    Diagnosis   • Severe sepsis with septic shock (CODE) (HCC) [R65.21]   • Hypokalemia [E87.6]   • Hyponatremia [E87.1]   • Diarrhea [R19.7]   • Hypophosphatemia [E83.39]   • Cholangitis [K83.0]   • Uncontrolled type 2 diabetes mellitus (HCC) [E11.65]   • Non-compliance [Z91.19]   • Hepatic steatosis [K76.0]   • Chronic deep vein thrombosis (DVT) (HCC) [I82.509]   • Stage 2 moderate COPD by GOLD classification (HCC) [J44.9]   • GI bleeding [K92.2]   • Common bile duct stone [K80.50]   • Lower extremity edema [R60.0]       Plan:  Abnormal liver tests secondary to choledocholithiasis. ERCP with stent placement. Reason stones could not be removed is extensive periampullary ulcers preventing sphincterotomy. Despite this biliary drainage  has improved his LTs. Repeat ERCP as outpatient in 4-6 weeks for sphincterotomy and stone removal. Email sent to [facility] for scheduling.  Until then PPI, omeprazole 40 mg po q12. Will adjust on day of procedure H. Pylori negative.   Nutrition: Advance diet.   GIB: likely secondary to extensive duodenal ulcer disease. PPI q12 as discussed.   Discussed with patient and RN.   Please call with any further questions.   Quality-Core Measures

## 2018-09-24 ENCOUNTER — PATIENT OUTREACH (OUTPATIENT)
Dept: HEALTH INFORMATION MANAGEMENT | Facility: OTHER | Age: 73
End: 2018-09-24

## 2018-09-24 ENCOUNTER — HOME HEALTH ADMISSION (OUTPATIENT)
Dept: HOME HEALTH SERVICES | Facility: HOME HEALTHCARE | Age: 73
End: 2018-09-24
Payer: MEDICARE

## 2018-09-24 LAB
ALBUMIN SERPL BCP-MCNC: 1.9 G/DL (ref 3.2–4.9)
ALBUMIN/GLOB SERPL: 0.8 G/DL
ALP SERPL-CCNC: 55 U/L (ref 30–99)
ALT SERPL-CCNC: 50 U/L (ref 2–50)
ANION GAP SERPL CALC-SCNC: 6 MMOL/L (ref 0–11.9)
AST SERPL-CCNC: 37 U/L (ref 12–45)
BASOPHILS # BLD AUTO: 0.1 % (ref 0–1.8)
BASOPHILS # BLD: 0.01 K/UL (ref 0–0.12)
BILIRUB SERPL-MCNC: 1.2 MG/DL (ref 0.1–1.5)
BUN SERPL-MCNC: <5 MG/DL (ref 8–22)
CALCIUM SERPL-MCNC: 7.1 MG/DL (ref 8.4–10.2)
CHLORIDE SERPL-SCNC: 101 MMOL/L (ref 96–112)
CO2 SERPL-SCNC: 27 MMOL/L (ref 20–33)
CREAT SERPL-MCNC: 0.64 MG/DL (ref 0.5–1.4)
EOSINOPHIL # BLD AUTO: 0.18 K/UL (ref 0–0.51)
EOSINOPHIL NFR BLD: 1.1 % (ref 0–6.9)
ERYTHROCYTE [DISTWIDTH] IN BLOOD BY AUTOMATED COUNT: 56.9 FL (ref 35.9–50)
GLOBULIN SER CALC-MCNC: 2.4 G/DL (ref 1.9–3.5)
GLUCOSE BLD-MCNC: 142 MG/DL (ref 65–99)
GLUCOSE BLD-MCNC: 145 MG/DL (ref 65–99)
GLUCOSE BLD-MCNC: 156 MG/DL (ref 65–99)
GLUCOSE BLD-MCNC: 175 MG/DL (ref 65–99)
GLUCOSE SERPL-MCNC: 149 MG/DL (ref 65–99)
HCT VFR BLD AUTO: 25.2 % (ref 42–52)
HGB BLD-MCNC: 8.2 G/DL (ref 14–18)
IMM GRANULOCYTES # BLD AUTO: 0.26 K/UL (ref 0–0.11)
IMM GRANULOCYTES NFR BLD AUTO: 1.5 % (ref 0–0.9)
LYMPHOCYTES # BLD AUTO: 2.61 K/UL (ref 1–4.8)
LYMPHOCYTES NFR BLD: 15.4 % (ref 22–41)
MAGNESIUM SERPL-MCNC: 1.9 MG/DL (ref 1.5–2.5)
MCH RBC QN AUTO: 30.5 PG (ref 27–33)
MCHC RBC AUTO-ENTMCNC: 32.5 G/DL (ref 33.7–35.3)
MCV RBC AUTO: 93.7 FL (ref 81.4–97.8)
MONOCYTES # BLD AUTO: 0.94 K/UL (ref 0–0.85)
MONOCYTES NFR BLD AUTO: 5.6 % (ref 0–13.4)
NEUTROPHILS # BLD AUTO: 12.93 K/UL (ref 1.82–7.42)
NEUTROPHILS NFR BLD: 76.3 % (ref 44–72)
NRBC # BLD AUTO: 0.02 K/UL
NRBC BLD-RTO: 0.1 /100 WBC
PLATELET # BLD AUTO: 334 K/UL (ref 164–446)
PMV BLD AUTO: 11 FL (ref 9–12.9)
POTASSIUM SERPL-SCNC: 3.1 MMOL/L (ref 3.6–5.5)
PROT SERPL-MCNC: 4.3 G/DL (ref 6–8.2)
RBC # BLD AUTO: 2.69 M/UL (ref 4.7–6.1)
SODIUM SERPL-SCNC: 134 MMOL/L (ref 135–145)
WBC # BLD AUTO: 16.9 K/UL (ref 4.8–10.8)

## 2018-09-24 PROCEDURE — 83735 ASSAY OF MAGNESIUM: CPT

## 2018-09-24 PROCEDURE — 700102 HCHG RX REV CODE 250 W/ 637 OVERRIDE(OP): Performed by: INTERNAL MEDICINE

## 2018-09-24 PROCEDURE — 82962 GLUCOSE BLOOD TEST: CPT | Mod: 91

## 2018-09-24 PROCEDURE — 80053 COMPREHEN METABOLIC PANEL: CPT

## 2018-09-24 PROCEDURE — 700105 HCHG RX REV CODE 258: Performed by: INTERNAL MEDICINE

## 2018-09-24 PROCEDURE — A9270 NON-COVERED ITEM OR SERVICE: HCPCS | Performed by: INTERNAL MEDICINE

## 2018-09-24 PROCEDURE — 700111 HCHG RX REV CODE 636 W/ 250 OVERRIDE (IP): Performed by: HOSPITALIST

## 2018-09-24 PROCEDURE — 770020 HCHG ROOM/CARE - TELE (206)

## 2018-09-24 PROCEDURE — A9270 NON-COVERED ITEM OR SERVICE: HCPCS | Performed by: HOSPITALIST

## 2018-09-24 PROCEDURE — 700102 HCHG RX REV CODE 250 W/ 637 OVERRIDE(OP): Performed by: HOSPITALIST

## 2018-09-24 PROCEDURE — 99232 SBSQ HOSP IP/OBS MODERATE 35: CPT | Performed by: INTERNAL MEDICINE

## 2018-09-24 PROCEDURE — 85025 COMPLETE CBC W/AUTO DIFF WBC: CPT

## 2018-09-24 PROCEDURE — 99233 SBSQ HOSP IP/OBS HIGH 50: CPT | Performed by: HOSPITALIST

## 2018-09-24 PROCEDURE — 76937 US GUIDE VASCULAR ACCESS: CPT

## 2018-09-24 PROCEDURE — C9113 INJ PANTOPRAZOLE SODIUM, VIA: HCPCS | Performed by: INTERNAL MEDICINE

## 2018-09-24 PROCEDURE — 700111 HCHG RX REV CODE 636 W/ 250 OVERRIDE (IP): Performed by: INTERNAL MEDICINE

## 2018-09-24 RX ORDER — POTASSIUM CHLORIDE 7.45 MG/ML
10 INJECTION INTRAVENOUS
Status: COMPLETED | OUTPATIENT
Start: 2018-09-24 | End: 2018-09-24

## 2018-09-24 RX ORDER — POTASSIUM CHLORIDE 29.8 MG/ML
40 INJECTION INTRAVENOUS ONCE
Status: DISCONTINUED | OUTPATIENT
Start: 2018-09-24 | End: 2018-09-24

## 2018-09-24 RX ADMIN — CEFTAZIDIME 1 G: 1 INJECTION, POWDER, FOR SOLUTION INTRAMUSCULAR; INTRAVENOUS at 21:56

## 2018-09-24 RX ADMIN — POTASSIUM CHLORIDE 10 MEQ: 7.46 INJECTION, SOLUTION INTRAVENOUS at 12:56

## 2018-09-24 RX ADMIN — POTASSIUM CHLORIDE 10 MEQ: 7.46 INJECTION, SOLUTION INTRAVENOUS at 11:28

## 2018-09-24 RX ADMIN — POTASSIUM CHLORIDE 10 MEQ: 7.46 INJECTION, SOLUTION INTRAVENOUS at 14:02

## 2018-09-24 RX ADMIN — POTASSIUM CHLORIDE 10 MEQ: 7.46 INJECTION, SOLUTION INTRAVENOUS at 10:11

## 2018-09-24 RX ADMIN — PANTOPRAZOLE SODIUM 40 MG: 40 INJECTION, POWDER, LYOPHILIZED, FOR SOLUTION INTRAVENOUS at 06:38

## 2018-09-24 RX ADMIN — PANTOPRAZOLE SODIUM 40 MG: 40 INJECTION, POWDER, LYOPHILIZED, FOR SOLUTION INTRAVENOUS at 17:39

## 2018-09-24 RX ADMIN — POTASSIUM CHLORIDE 40 MEQ: 1500 TABLET, EXTENDED RELEASE ORAL at 06:37

## 2018-09-24 RX ADMIN — OXYCODONE HYDROCHLORIDE AND ACETAMINOPHEN 1 TABLET: 5; 325 TABLET ORAL at 13:21

## 2018-09-24 RX ADMIN — METRONIDAZOLE 500 MG: 500 TABLET ORAL at 06:38

## 2018-09-24 RX ADMIN — SODIUM CHLORIDE, POTASSIUM CHLORIDE, SODIUM LACTATE AND CALCIUM CHLORIDE: 600; 310; 30; 20 INJECTION, SOLUTION INTRAVENOUS at 01:36

## 2018-09-24 RX ADMIN — METRONIDAZOLE 500 MG: 500 TABLET ORAL at 15:41

## 2018-09-24 RX ADMIN — SUCRALFATE 1 G: 1 SUSPENSION ORAL at 17:39

## 2018-09-24 RX ADMIN — METRONIDAZOLE 500 MG: 500 TABLET ORAL at 21:56

## 2018-09-24 RX ADMIN — INSULIN GLARGINE 10 UNITS: 100 INJECTION, SOLUTION SUBCUTANEOUS at 17:41

## 2018-09-24 RX ADMIN — POTASSIUM CHLORIDE 40 MEQ: 1500 TABLET, EXTENDED RELEASE ORAL at 17:40

## 2018-09-24 RX ADMIN — CEFTAZIDIME 1 G: 1 INJECTION, POWDER, FOR SOLUTION INTRAMUSCULAR; INTRAVENOUS at 15:45

## 2018-09-24 RX ADMIN — POTASSIUM CHLORIDE 40 MEQ: 1500 TABLET, EXTENDED RELEASE ORAL at 11:41

## 2018-09-24 RX ADMIN — SUCRALFATE 1 G: 1 SUSPENSION ORAL at 11:41

## 2018-09-24 RX ADMIN — FUROSEMIDE 10 MG: 10 INJECTION, SOLUTION INTRAVENOUS at 16:15

## 2018-09-24 RX ADMIN — CEFTAZIDIME 1 G: 1 INJECTION, POWDER, FOR SOLUTION INTRAMUSCULAR; INTRAVENOUS at 06:32

## 2018-09-24 RX ADMIN — BUDESONIDE AND FORMOTEROL FUMARATE DIHYDRATE 2 PUFF: 160; 4.5 AEROSOL RESPIRATORY (INHALATION) at 06:41

## 2018-09-24 RX ADMIN — SUCRALFATE 1 G: 1 SUSPENSION ORAL at 06:38

## 2018-09-24 RX ADMIN — FUROSEMIDE 10 MG: 10 INJECTION, SOLUTION INTRAVENOUS at 06:33

## 2018-09-24 RX ADMIN — BUDESONIDE AND FORMOTEROL FUMARATE DIHYDRATE 2 PUFF: 160; 4.5 AEROSOL RESPIRATORY (INHALATION) at 17:38

## 2018-09-24 ASSESSMENT — ENCOUNTER SYMPTOMS
SHORTNESS OF BREATH: 0
NAUSEA: 0
DIZZINESS: 0
SORE THROAT: 0
CHILLS: 0
BACK PAIN: 0
VOMITING: 0
BLURRED VISION: 0
ABDOMINAL PAIN: 1
FEVER: 0
EYE PAIN: 0
INSOMNIA: 0
NECK PAIN: 0
DEPRESSION: 0
DIARRHEA: 1
PALPITATIONS: 0
TINGLING: 0
COUGH: 0

## 2018-09-24 ASSESSMENT — PAIN SCALES - GENERAL
PAINLEVEL_OUTOF10: 3
PAINLEVEL_OUTOF10: 0
PAINLEVEL_OUTOF10: 5
PAINLEVEL_OUTOF10: 3
PAINLEVEL_OUTOF10: 3

## 2018-09-24 NOTE — PROGRESS NOTES
Infectious Disease Progress Note    Author: Marguerite Fuchs M.D. Date & Time of service: 9/24/2018  7:44 AM    Chief Complaint:  Follow-up of Enterobacter bacteremia    Interval History:  9/18 afebrile, white count now up to 31.8.  Reports that he was feeling terrible this morning after breakfast, feels a little bit better now.  9/19 afebrile, white count up to 44,000. CT with no e/o abscess or ductal dilatation. MRCP planned and he is scheduled for cholecystectomy tomorrow at 3 PM. His prostate is 6cm in size. No abscess.  Hemoglobin dropped from 11-7.5.  Patient reports off and on abdominal pain  9/20 afebrile, white count 50,000.  Patient was transferred to the ICU with hemodynamic instability and upper GI bleed.  EGD showed a bleeding duodenal ulcer.  General surgery was consulted and there is suspicion for a gangrenous cholecystitis.  He is scheduled to undergo a cholecystectomy today.  Patient reports that his abdominal pain has improved today  9/21 afebrile, white count down some from 53,000 - 47,000.  Gallbladder was removed on 9/20.  ERCP today for multiple gallstones in common bile duct. Pt in OR  9/22 AF WBC 30.1 patient transferred out of the ICU today, ongoing abdominal pain but somewhat improved, status post ERCP with stent placement and biopsy yesterday, biopsy negative for H. Pylori  9/23 AF WBC 23 patient feels slightly better today but not yet back to baseline however he is anxious to go home tomorrow  9/24 AF WBC 16.9 patient sitting up in chair and feeling much improved today, continues to have diarrhea, no nausea or vomiting over night  Labs Reviewed and Medications Reviewed.    Review of Systems:  Review of Systems   Constitutional: Positive for malaise/fatigue. Negative for chills and fever.   Respiratory: Negative for cough and shortness of breath.    Gastrointestinal: Positive for abdominal pain and diarrhea. Negative for nausea and vomiting.   Genitourinary: Negative for dysuria.      Hemodynamics:  Temp (24hrs), Av.6 °C (97.9 °F), Min:36.4 °C (97.5 °F), Max:36.8 °C (98.2 °F)  Temperature: 36.8 °C (98.2 °F)  Pulse  Av.1  Min: 65  Max: 128Heart Rate (Monitored): 88  Blood Pressure : 114/78       Physical Exam:  Physical Exam   Constitutional: He is oriented to person, place, and time. He appears well-developed.   Obese   HENT:   Head: Normocephalic and atraumatic.   Eyes: Pupils are equal, round, and reactive to light. EOM are normal.   Neck: Neck supple.   Right IJ catheter   Cardiovascular: Normal rate and regular rhythm.    Pulmonary/Chest: Effort normal. He has no wheezes.   Abdominal: He exhibits distension. There is tenderness.   Musculoskeletal: He exhibits edema.   Bilateral lower extremities and compression stockings   Neurological: He is alert and oriented to person, place, and time.   Nursing note and vitals reviewed.      Meds:    Current Facility-Administered Medications:   •  metroNIDAZOLE  •  insulin glargine  •  furosemide  •  potassium chloride SA  •  pantoprazole  •  insulin regular **AND** [CANCELED] Accu-Chek Q6 if NPO **AND** NOTIFY MD and PharmD **AND** glucose 4 g **AND** dextrose 50%  •  oxyCODONE-acetaminophen  •  LR  •  cefTAZidime  •  [DISCONTINUED] insulin regular **AND** Accu-Chek ACHS **AND** NOTIFY MD and PharmD **AND** glucose 4 g **AND** dextrose 50%  •  sucralfate  •  diphenoxylate-atropine  •  LORazepam  •  budesonide-formoterol  •  benzocaine-menthol  •  ondansetron  •  Respiratory Care per Protocol  •  NS  •  ipratropium-albuterol    Labs:  Recent Labs      18   0420  18   1015  18   0645   WBC  30.1*  23.0*  16.9*   RBC  2.53*  2.69*  2.69*   HEMOGLOBIN  7.9*  8.3*  8.2*   HEMATOCRIT  24.2*  25.4*  25.2*   MCV  95.7  94.4  93.7   MCH  31.2  30.9  30.5   RDW  51.5*  57.5*  56.9*   PLATELETCT  275  324  334   MPV  12.0  11.2  11.0   NEUTSPOLYS  79.00*  79.90*  76.30*   LYMPHOCYTES  10.10*  11.00*  15.40*   MONOCYTES  5.90  5.70   5.60   EOSINOPHILS  0.10  0.50  1.10   BASOPHILS  0.20  0.20  0.10     Recent Labs      09/22/18   0420  09/22/18   1422  09/23/18   1015  09/24/18   0645   SODIUM  131*   --   129*  134*   POTASSIUM  3.3*  3.3*  3.6  3.1*   CHLORIDE  102   --   99  101   CO2  23   --   25  27   GLUCOSE  177*   --   178*  149*   BUN  12   --   8  <5*     Recent Labs      09/22/18   0420  09/23/18   1015  09/24/18   0645   ALBUMIN  2.0*  2.2*  1.9*   TBILIRUBIN  1.7*  1.3  1.2   ALKPHOSPHAT  57  60  55   TOTPROTEIN  4.1*  4.7*  4.3*   ALTSGPT  86*  65*  50   ASTSGOT  87*  51*  37   CREATININE  0.73  0.62  0.64       Imaging:  Dx-chest-portable (1 View)    Result Date: 9/13/2018 9/13/2018 6:39 AM HISTORY/REASON FOR EXAM:  Shortness of Breath TECHNIQUE/EXAM DESCRIPTION AND NUMBER OF VIEWS: Single portable view of the chest. COMPARISON: Yesterday FINDINGS: Interstitial opacities persist in the mid and lower lung fields bilaterally, and there is bibasilar atelectasis. Heart and mediastinum are normal.     Mild pulmonary edema and bibasilar atelectasis, without significant change.    Dx-chest-portable (1 View)    Result Date: 9/12/2018 9/12/2018 9:10 AM HISTORY/REASON FOR EXAM: Rapid heart rate. TECHNIQUE/EXAM DESCRIPTION AND NUMBER OF VIEWS: Single portable view of the chest. COMPARISON: 5/5/2015 FINDINGS: There is bibasilar atelectasis. There is no pleural effusion. The heart is enlarged.     1.  Cardiomegaly. 2.  Bibasilar atelectasis.    Us-gallbladder    Result Date: 9/18/2018 9/18/2018 6:28 AM HISTORY/REASON FOR EXAM: Pain, leukocytosis TECHNIQUE/EXAM DESCRIPTION AND NUMBER OF VIEWS:  Real-time sonography of the gallbladder. COMPARISON: 9/12/2018 FINDINGS: There is no ascites. The liver is normal in contour. There is no evidence of solid mass lesion. The liver is diffusely increased in echogenicity. Gallstones are present. There is a wall echo shadow sign. The gallbladder is not well distended. Its wall measures 2 mm in  thickness. There is no sonographic Wolf sign. 0.23 cm There is no pericholecystic fluid. The common duct measures 1 cm. The visualized pancreas is unremarkable. The visualized aorta is normal in caliber. Intrahepatic IVC is patent. The portal vein is patent with hepatopetal flow. The right kidney measures 11.6 cm. There is RIGHT pelvocaliectasis.     1.  Cholelithiasis without other sonographic findings of biliary disease 2.  Echogenic liver, a nonspecific finding that often is found to represent steatosis.  Other infiltrative processes could have an identical appearance.    Us-gallbladder    Result Date: 9/12/2018 9/12/2018 2:37 PM HISTORY/REASON FOR EXAM:  Abdominal pain, nausea and vomiting. TECHNIQUE/EXAM DESCRIPTION AND NUMBER OF VIEWS:  Real-time sonography of the gallbladder. COMPARISON: 4/18/2014 FINDINGS: There is no ascites. The liver is diffusely echogenic with decreased echogenicity in the gallbladder fossa. The liver measures 24.33 cm. Gallbladder shows echogenic shadowing focus consistent with stone.  Gallbladder is contracted.  Gallbladder wall measures 0.32 cm.  Pain reported with scanning over the gallbladder. There is no pericholecystic fluid. The common duct measures 0.50 cm. The pancreas is obscured by bowel gas. The aorta is obscured by bowel gas. Intrahepatic IVC is patent. The portal vein is patent with hepatopetal flow. The right kidney measures 12.94 cm and shows mildly prominent collecting system. Note made of distended bladder.     1.  Limited evaluation of pancreas and abdominal aorta. 2.  Echogenic liver consistent with fatty infiltration with focal sparing adjacent to gallbladder fossa. 3.  Cholelithiasis with pain over the gallbladder.  No other evidence to indicate acute cholecystitis.  No biliary dilation. 4.  Distended bladder and mild RIGHT hydronephrosis, possibly indicating urinary retention.    Echocardiogram-comp W/ Cont    Result Date: 9/13/2018  Transthoracic Echo Report  Echocardiography Laboratory CONCLUSIONS Prior echo 17.  No significant changes noted. Normal left ventricular systolic function. Left ventricular ejection fraction is visually estimated to be 65%. A reliable estimation of diastolic function cannot be made due to tachycardia. Dilated inferior vena cava with inspiratory collapse. MATTHIEU MCKEON Exam Date:         2018                    07:16 Exam Location:     Inpatient Priority:          Routine Ordering Physician:        VIKI TORO Referring Physician:       MUNA Alvarenga Sonographer:               Chandler Mendoza RDCS Age:    72     Gender:    M MRN:    [id number] :    1945 BSA:    2.03   Ht (in):    67     Wt (lb):    202 Exam Type:     Complete, Contrast Indications:     Dyspnea ICD Codes:       786.09 CPT Codes:       51401,  BP:          /          HR: Technical Quality:       Technically difficult study -                          adequate information is obtained MEASUREMENTS  (Male / Female) Normal Values 2D ECHO LV Diastolic Diameter PLAX        5.5 cm                4.2 - 5.9 / 3.9 - 5.3 cm LV Systolic Diameter PLAX         2.8 cm                2.1 - 4.0 cm IVS Diastolic Thickness           1.2 cm                LVPW Diastolic Thickness          0.92 cm               LVOT Diameter                     2.3 cm                Estimated LV Ejection Fraction    65 %                  DOPPLER AV Peak Velocity                  1.2 m/s               AV Peak Gradient                  6.2 mmHg              AV Mean Gradient                  3.5 mmHg              LVOT Peak Velocity                1.1 m/s               AV Area Cont Eq vti               3 cm²                 * Indicates values subject to auto-interpretation LV EF:  65    % FINDINGS Left Ventricle Normal left ventricular chamber size. Normal left ventricular wall thickness. Normal left ventricular systolic function. Left ventricular ejection fraction  is visually estimated to be 65%. Normal regional wall motion. A reliable estimation of diastolic function cannot be made due to tachycardia. Right Ventricle Right ventricle not well visualized. Right Atrium Normal right atrial size. Dilated inferior vena cava with inspiratory collapse. Left Atrium Normal left atrial size. Left atrial volume index is 25  mL/sq m. Mitral Valve Structurally normal mitral valve. No mitral stenosis. Trace mitral regurgitation. Aortic Valve The aortic valve is not well visualized. No aortic stenosis. No aortic insufficiency. Tricuspid Valve The tricuspid valve is not well visualized.  No tricuspid regurgitation. Pulmonic Valve The pulmonic valve is not well visualized. Pericardium Normal pericardium without effusion. Aorta Aortic root not well visualized.  Ascending aorta not well visualized. Whitley Costello MD (Electronically Signed) Final Date:     2018                 08:17    Le Venous Duplex (specify In Comments Left, Right Or Bilateral)    Result Date: 2018   Vascular Laboratory  CONCLUSIONS  No acute thrombosis is identified.  Chronic deep venous thrombosis in the distal right superficial femoral vein  and popliteal vein.  Suspect chronic deep venous thrombosis in the proximal peroneal and  posterior tibial veins, though veins were not well visualized.  MATTHIEU MCKEON  Exam Date:     2018 10:01  Room #:     Inpatient  Priority:     Stat  Ht (in):             Wt (lb):  Ordering Physician:        KELECHI MCDONALD  Referring Physician:       [id number] Hurtado  Sonographer:               Stephanie Hills RVT  Study Type:                Complete Bilateral  Technical Quality:         Adequate  Age:    72    Gender:     M  MRN:    [id number]  :    1945      BSA:  Indications:     Edema, unspecified  CPT Codes:       02739  ICD Codes:       R609  History:         Edema and swelling of both legs for 3-4 years. No prior                   exams.  Limitations:  PROCEDURES:   "Bilateral lower extremity venous duplex imaging.  The following venous structures were evaluated: common femoral, profunda  femoral, greater saphenous, femoral, popliteal, peroneal and posterior  tibial veins.  Serial compression, augmentation maneuvers, color and spectral Doppler flow  evaluations were performed.  FINDINGS:  Right lower extremity -  Chronic deep venous thrombosis in the distal superficial femoral vein and  popliteal vein.  Suspect chronic deep venous thrombosis in the proximal peroneal and  posterior tibial veins, though veins were not well visualized.  All other veins of the right lower extremity demonstrate normal flow  dynamics with no evidence of deep vein thrombosis or superficial phlebitis.  Left lower extremity -  Complete color filling and compressibility with normal venous flow dynamics  including spontaneous flow, response to augmentation maneuvers, and  respiratory phasicity.  No evidence of superficial or deep venous thrombosis.  Antonio Alvares MD  (Electronically Signed)  Final Date:      12 September 2018                   11:01      Micro:  Results     Procedure Component Value Units Date/Time    BLOOD CULTURE [[id number]] Collected:  09/17/18 1509    Order Status:  Completed Specimen:  Blood from Peripheral Updated:  09/22/18 1817     Significant Indicator NEG     Source BLD     Site Peripheral     Blood Culture No growth after 5 days of incubation.  Blood culture testing and Gram stain, if indicated, are  performed at [facility], [address] [place] [address].  Positive blood cultures are  sent to [facility], [address] [place] [address], for organism identification and  susceptibility testing.      Narrative:       Per Hospital Policy: Only change Specimen Src: to \"Line\" if  specified by physician order.    BLOOD CULTURE [[id number]] Collected:  09/17/18 150    Order Status:  Completed Specimen:  Blood from Peripheral Updated: " " 09/22/18 1817     Significant Indicator NEG     Source BLD     Site Peripheral     Blood Culture No growth after 5 days of incubation.  Blood culture testing and Gram stain, if indicated, are  performed at [facility], [address] [place] [address].  Positive blood cultures are  sent to [facility], [address] [place] [address], for organism identification and  susceptibility testing.      Narrative:       Per Hospital Policy: Only change Specimen Src: to \"Line\" if  specified by physician order.          Assessment:  Enoch Mistry is a 72 y.o. male with a history of poorly controlled diabetes, hypertension, known cholelithiasis, who presented with abdominal pain and significant conjugated hyperbilirubinemia.  His blood culture is positive for Enterobacter.  The hyperbilirubinemia is spontaneously improving, suggestive of a gallstone that passed.  Unfortunately, his white count continues to worsen, now up to 50,000.  No intra-abdominal abscess or ductal dilatation on CT or MRCP.  Given the worsening white count, surgery was consulted.  There was suspicion for an acute acute gangrenous cholecystitis that may not have been as evident on imaging.  Cholecystectomy performed on 9/20 (op note pending).  ERCP today for multiple gallstones in CBD.  White count down some today.    Patient unfortunately also developed an acute blood loss anemia on 9/19, transferred to the ICU, found to have a bleeding duodenal ulcer that was controlled.    Pertinent Diagnoses:  1.  Sepsis,  persistent  2.  Enterobacter bacteremia   3.  Conjugated hyperbilirubinemia, improving, likely passed an obstructive gallstone  4.  Poorly controlled diabetes  5.  Cholelithiasis  6.  Acute blood loss anemia, duodenal ulcer    Active Hospital Problems    Diagnosis   • Sepsis due to Enterobacter species ([facility]) [A41.59]   • Acute on chronic respiratory failure ([facility]) [J96.20]   • Hypokalemia [E87.6] "   • Hyponatremia [E87.1]   • Diarrhea [R19.7]   • Hypophosphatemia [E83.39]   • Abnormal LFTs [R94.5]   • Uncontrolled type 2 diabetes mellitus ([facility]) [E11.65]   • Non-compliance [Z91.19]   • Hepatic steatosis [K76.0]   • Chronic deep vein thrombosis (DVT) ([facility]) [I82.509]   • Stage 2 moderate COPD by GOLD classification (HCC) [J44.9]       Plan:  Sepsis  Low-grade temperatures resolved  Significant leukocytosis, decreased overall  Cholecystectomy performed on 9/20,  Status post ERCP with biliary stent and biopsies on 9/21.  Biopsies negative for H. pylori   Continue IV ceftazidime, and PO Flagyl  Surveillance blood cultures from 9/17 are no growth to date    Enterobacter bacteremia  As above  blood cultures 9/17 - NGTD  Anticipate 2 weeks of antibiotics from 9/17/18  Estimated stop date 10/01/18  Anticipate transition to p.o. Cipro at discharge to complete antibiotic course      Conjugated hyperbilirubinemia, improved, likely passed an obstructive gallstone  LFTs and T bili continue to improve  Plan as above    Poorly controlled diabetes  A1c 9.5  Contributing to immunosuppression  Maintain blood sugars under 150 to control infection    Acute blood loss anemia, duodenal ulcer  Eliquis was discontinued.  Bleeding control of duodenal ulcer done on EGD 9/19  Hemoglobin stable  No evidence of active bleeding    Discussed with IM RN.

## 2018-09-24 NOTE — DIETARY
"Nutrition services - Brief Note: Day 12 of admit.  Enoch Mistry is a 72 y.o. male with admitting DX of Sepsis.     Pt with PO intake progressing. Diet advanced to Diabetic on 9/21 1845. PO intake records % x 4, 25-50% x 2 and <25% x 2. Per ID progress note on 9/24, pt with WBC decreasing (9/22 WBC 30.1 9/23 WBC 23 and 9/24 WBC 16.9). Pt underwent ERCP with stent placement and noted with multiple gallstones in common bile duct on 9/21. Noted pt sitting up in chair and feeling much improved today. Diarrhea continues though no nausea or vomiting overnight. Pt with PO intake improving and per ID note, pt anxious to discharge on 9/24. Pt with current pending discharge. RD to continue to follow clinical course.     Assessment:  Height: 170.2 cm (5' 7\")  Weight: 106.8 kg (235 lb 7.2 oz)  Body mass index is 36.88 kg/m².   Diet/Intake: Diabetic. See narrative.     Labs (Hgb 8.2 L Na 134 L K 3.1 L BUN <5 L Ca 7.1 L Alb 1.9 L total Protein 4.3 L), MAR (Lasix 10mg BID, Lantus 10 units QHS, Humulin SSI, Flagyl, Protonix 40mg BID, KCl 40 mEq TID, farafate 1g q6 hours)  , and Chart reviewed.     Recommendations/Plan:  1. Diet as ordered.   2. Encourage intake of 50% or greater.   3. Document intake of all meals  as % taken in ADL's to provide interdisciplinary communication across all shifts.   4. Monitor weight.  5. Nutrition rep will continue to see patient for ongoing meal and snack preferences.           "

## 2018-09-24 NOTE — PROGRESS NOTES
Patient is sleeping comfortably in bed, head of the bed is elevated per patient request. Respirations are even and unlabored. Call light and belongings are within reach. Will continue to monitor.

## 2018-09-24 NOTE — PROGRESS NOTES
Report received from Lyubov OGDEN. Patient resting comfortably in bed, requested black coffee. Wound care nurse just changed wound dressings on bilateral legs and placed new wound care orders. Safety precautions in place.

## 2018-09-24 NOTE — CARE PLAN
Problem: Safety  Goal: Will remain free from injury  Outcome: PROGRESSING AS EXPECTED  Patient is up with walker and 1 person assist. Tolerated well. Use of call light reinforced. Patient agreeable with all safety and fall risk measures.     Problem: Respiratory:  Goal: Respiratory status will improve  Outcome: PROGRESSING AS EXPECTED  On 3.5 L via NC. Sats above 95%.

## 2018-09-24 NOTE — FACE TO FACE
Face to Face Note  -  Durable Medical Equipment    Lalito Omalley M.D. - NPI: [id number]  I certify that this patient is under my care and that they have had a durable medical equipment(DME)face to face encounter by myself that meets the physician DME face-to-face encounter requirements with this patient on:    Date of encounter:   Patient:                    MRN:                       YOB: 2018  Enoch Mistry  [id number]  1945     The encounter with the patient was in whole, or in part, for the following medical condition, which is the primary reason for durable medical equipment:  Other - cholangitis, copd, debility    I certify that, based on my findings, the following durable medical equipment is medically necessary:  Walkers.    HOME O2 Saturation Measurements:(Values must be present for Home Oxygen orders)         ,     ,         My Clinical findings support the need for the above equipment due to:  Abnormal Gait    Supporting Symptoms: difficulty walking     ------------------------------------------------------------------------------------------------------------------    Face to Face Supporting Documentation - Home Health    The encounter with this patient was in whole or in part the primary reason for home health admission.    Date of encounter:   Patient:                    MRN:                       YOB: 2018  Enoch Mistry  [id number]  1945     Home health to see patient for:  Physical Therapy evaluation and treatment    Skilled need for:  Recent Deterioration of Health Status cholangitis    Skilled nursing interventions to include:  Comment: g    Homebound evidenced status by:  Need the aid of supportive devices such as crutches, canes, wheelchairs or walkers. Leaving home must require a considerable and taxing effort. There must exist a normal inability to leave the home.    Community Physician to provide follow up care: Charles  LYNDA Duke     Optional Interventions    Wound information & treatment:    Home Infusion Therapy orders:    Line/Drain/Airway:    I certify the face to face encounter for this home care referral meets the CMS requirements and the encounter/clinical assessment with the patient was, in whole, or in part, for the medical condition(s) listed above, which is the primary reason for home health care. Based on my clinical findings: the service(s) are medically necessary, support the need for home health care, and the homebound criteria are met.  I certify that this patient has had a face to face encounter by myself.  Lalito Omalley M.D. - NPI: [id number]    *Debility, frailty and advanced age in the absence of an acute deterioration or exacerbation of a condition do not qualify a patient for home health.

## 2018-09-24 NOTE — WOUND TEAM
[facility]  Inpatient Services  Wound and Skin Care   Admission Date:  9/12/2018   HPI, PMH, SH: Reviewed  Unit where seen by Wound Team: 3320/00    WOUND CONSULT RELATED TO:  Bilateral leg edema, right leg discoloration    SUBJECTIVE:  Pt agreeable to obtaining compression stockings upon d/c from hospital      Self Report / Pain Level:  0/10       OBJECTIVE:  Pt sitting on EOB, legs dependent, transferred to supine in bed for tx.  Tubi  and abd and xeroform on legs    WOUND TYPE, LOCATION, CHARACTERISTICS (Pressure ulcers: location, stage, POA or date identified)    Location and type of wound:bilateral legs R>L, edema related to venous insufficiency and fluid overload         Periwound:    Varicose veins, hemosiderin staining, pitting edema,skin in gaiter distribution is shiny and taut      Drainage:     Heavy transudate      Tissue Type and %:    Pt with multiple blisters on B legs in various stages     Wound Edges:   Flat and attached  Odor:     none  Exposed structure(s):   none  S&S of Infection:   minimal      Measurements:   deroofed blister right lateral leg 1.0 cmx1.5 cm x 0.1 cm - 100% red  deroofed blister right medial leg 0.4 cm x 0.4 cm x 0.1 cm - 100% red  paritally de roofed blister left posterior leg 4.5 cm x 6.0 cm x0.1  Cm   100% red    Vascular:  9/15/18  Dorsal Pedal pulses:  Right biphasic 3+, left triphasic 3+  Posterior tib pulses:  Right and left triphasic 3+    JAY:     Not taken    Lab Values:    WBC:       WBC   Date/Time Value Ref Range Status   09/23/2018 10:15 AM 23.0 (H) 4.8 - 10.8 K/uL Final     AIC:      Lab Results   Component Value Date/Time    HBA1C 9.5 (H) 09/12/2018 09:22 AM         Culture:   Completed no open wound bed to culture    INTERVENTIONS BY WOUND TEAM:  Legs cleaned with damp wash cloth, viscopaste strips placed over lower leg to cover open areas, ABD and doubled telfa pads used on lower legs and secured with rolled gauze (unable to obtain ABD  pads)  Tubi  G placed on bilateral legs base of toes to fibular head.     Dressing selection:  Tubi G         Interdisciplinary consultation:  RN - Dr Scott prescribed 10 mg lasix, patient re POC      EVALUATION:  Pt's legs are now weeping heavy amount of transudate that is soaking through dressings and bed pads in < 24 hours.  Heels are macerated.  Blisters on legs indicate that pt is not moving fluid effectively from the lower extremities.  It is not expected that dressing will resolve this fluid issue.      Factors affecting wound healing:  COPD, DM, ex smoker, CVI, hx of DVT right LE, hx of non compliance with medications  Goals:  Not met, now goal is to decrease drainage to none    NURSING PLAN OF CARE ORDERS (X):    Dressing changes: See Dressing Care orders:   X  Skin care: See Skin Care orders:   Rectal tube care: See Rectal Tube Care orders:   Other orders:        WOUND TEAM PLAN OF CARE (X):   NPWT change 3 x week:        Dressing changes by wound team:       Follow up as needed:     X  Other (explain):    Anticipated discharge plans (X):  SNF:           Home Care:           Outpatient Wound Center:            Self Care:            Other:         tbd -

## 2018-09-24 NOTE — PROGRESS NOTES
Report received from ALIN Gillis. Patient resting comfortably in the chair. Declines any needs at this time. Call light in reach. Safety measures in place.

## 2018-09-24 NOTE — PROGRESS NOTES
Telemetry Strip    Strip printed:   Measurements from am strip were as follows:  Rhythm:  SR  HR: 95  Measurements:  .14/.10/.44    Telemetry Shift Summary:    Rhythm: SR/ST  HR: 90s  Ectopy: rare PVCs, Rare Coup.          Normal Values  Rhythm SR  HR Range    Measurements 0.12-0.20 / 0.06-0.10  / 0.30-0.52

## 2018-09-24 NOTE — DISCHARGE PLANNING
Good evening,  This patient has not been seen by Charles Duke M.D. since 12/26/2017, and has an appointment to establish with Collin Schwab P.A.-C. For 10/02/2018.  This TCS left a message for Sreekanth Duke M.D.'s office to verify whether or not he would still be willing to sign.  Otherwise, 10/02/2018 appointment is too far out and will need to be moved up as patient will need to be seen within 48-72 hours of discharge.  This referral will be placed on hold until either Dr. Duke agrees to sign or a sooner appointment is made for the patient to establish with a new PCP. Thank you!

## 2018-09-24 NOTE — DISCHARGE PLANNING
Received Choice form at [phone]  Agency/Facility Name: [facility]  Referral sent per Choice form @ 0023

## 2018-09-24 NOTE — PROGRESS NOTES
Wound care on bilateral legs completed according to wound nurse instructions.     Report given to Judy OGDEN and Olimpia OGDEN. Patient resting comfortably in cardiac chair. Safety precautions in place.

## 2018-09-24 NOTE — PROGRESS NOTES
Telemetry Shift Summary    Rhythm SR-ST   HR Range 70s-100s  Ectopy rare PVCs, rare couplets   Measurements .16/.08/.40    Tele strips placed in patient chart.

## 2018-09-24 NOTE — PROGRESS NOTES
Patient sitting up in the chair, tolerating well. Dressings CDI (changed early this AM). BLE-pedal pitting edema.  IVPB potassium started. Wife visiting at bedside.

## 2018-09-24 NOTE — PROGRESS NOTES
DR Omalley called, order clarification on the 2 potassiums ordered, telephone order to D/C current potassium orders and give 40 MEQ Potassium TID.  Orders placed.

## 2018-09-24 NOTE — FLOWSHEET NOTE
09/23/18 1704   Events/Summary/Plan   Events/Summary/Plan MDI given   Non-Invasive Resp Device Site Inspection Completed Intact   Interdisciplinary Plan of Care-Goals (Indications)   Bronchodilator Indications History / Diagnosis   Interdisciplinary Plan of Care-Outcomes    Bronchodilator Outcome Patient at Stable Baseline   Education   Education Yes - Pt. / Family has been Instructed in use of Respiratory Medications and Adverse Reactions   RT Assessment of Delivered Medications   Evaluation of Medication Delivery Daily Yes-- Pt /Family has been Instructed in use of Respiratory Medications and Adverse Reactions   MDI/DPI Group   #MDI/DPI Given MDI/DPI x 1   Chest Exam   Respiration 18   Heart Rate (Monitored) 88   Breath Sounds   Pre/Post Intervention Post Intervention Assessment   RUL Breath Sounds Clear   RML Breath Sounds Diminished   RLL Breath Sounds Diminished   LORNA Breath Sounds Clear   LLL Breath Sounds Diminished   Oximetry   #Pulse Oximetry (Single Determination) Yes   Oxygen   Pulse Oximetry 93 %   O2 (LPM) 2   O2 Daily Delivery Respiratory  Silicone Nasal Cannula

## 2018-09-24 NOTE — PROGRESS NOTES
2130 Patient is resting in bed comfortably then sitting on the edge of the bed. Assisted patient to the bathroom using front wheel walker, patient tolerated well with standby assist. CNA assisted patient back to bed and performed CHG bath on patient. Assessment completed and medicated, see MAR. FS was 180, coverage given. Patient requested PRN Percocet for abdominal pain. Dressings on bilateral legs had a scant amount of drainage, RN asked patient if dressing can be changed, patient requested dressing to be changed in the morning. RN and patient agreed to change dressings at 0530. Assisted patient back to bed and provided warm blanket and extra pillow to float ankles. Safety precautions in place.     2340 Patient is sleeping comfortably in bed, medicated patient ,see MAR. Patient declines any other needs at this time. Safety precautions in place.

## 2018-09-24 NOTE — PROGRESS NOTES
Telemetry summary:  Rhythm: SR, ST     HR: from 70s to 100s    ID: 0.14  QRS 0.08   QT 0.42  Ectopies: rare PAC, rare PVC,

## 2018-09-24 NOTE — CARE PLAN
Problem: Knowledge Deficit  Goal: Knowledge of disease process/condition, treatment plan, diagnostic tests, and medications will improve  Outcome: PROGRESSING AS EXPECTED  Discussed plan of care with patient including wound care and antibiotic therapy, patient verbalized understanding.     Problem: Mobility  Goal: Risk for activity intolerance will decrease  Outcome: PROGRESSING AS EXPECTED  Assisted patient by ambulating to the bathroom, patient utilized front wheel walker and tolerated well, encouraged patient to continue walking to bathroom if he continue to tolerate it.

## 2018-09-24 NOTE — CARE PLAN
Problem: Nutritional:  Goal: Achieve adequate nutritional intake  Pt will consume >/= 50% of meals consistently   Outcome: PROGRESSING AS EXPECTED

## 2018-09-24 NOTE — DISCHARGE PLANNING
TCN spoke to pt at bedside regarding MD's orders for HH for PT and Wound Care and DME for a walker. Pt signed Choice for [facility] and [employer] for DME.  PUSHPAN faxed to [facility].

## 2018-09-24 NOTE — PROGRESS NOTES
[facility] Medicine Daily Progress Note    Date of Service  9/24/2018    Chief Complaint  72 y.o. Male admitted 9/12/2018 with with right upper quadrant pain found to have cholangitis/cholecystitis with related sepsis. Complicated by GI bleed and found to have severe PUD. S/p cholecystectomy and ercp.     Interval Problem Update  Abdominal pain improving.   weeping edema in legs  Forced diuresis started  K replaced.   Some nausea over night      Consultants/Specialty  ID  GI  Surgery    Disposition  Suspect home with HH and walker    critical care time with active management of severe sepsis, shock, GI bleeding 35min. No overlap.     Us abd:  1.  Cholelithiasis without other sonographic findings of biliary disease  2.  Echogenic liver, a nonspecific finding that often is found to represent steatosis.  Other infiltrative processes could have an identical appearance.    Ct abd:  6.6 mm noncalcified subpleural nodule in the right lower lobe.  [Prominent liver size.  Small gallbladder containing a small amount of stone material.  Stable 12 mm right adrenal gland nodule.  Stable 3 cm left renal cyst.  Atherosclerotic vascular disease.  Enlarged prostate gland.  No focal abscess appreciated.    MRI abdomen:  1.  Cholelithiasis with choledocholithiasis. No significant ductal dilatation is appreciated at this time.  2.  Indeterminate small right adrenal nodule as described on the recent CT.  3.  Bilateral renal cysts.          Review of Systems  Review of Systems   Constitutional: Negative for chills and fever.   HENT: Negative for sore throat.    Eyes: Negative for blurred vision and pain.   Respiratory: Negative for cough and shortness of breath.    Cardiovascular: Positive for leg swelling. Negative for chest pain and palpitations.   Gastrointestinal: Positive for abdominal pain. Negative for nausea.   Genitourinary: Negative for dysuria and urgency.   Musculoskeletal: Negative for back pain and neck pain.   Skin: Negative  for itching and rash.   Neurological: Negative for dizziness and tingling.   Psychiatric/Behavioral: Negative for depression. The patient does not have insomnia.    All other systems reviewed and are negative.       Physical Exam  Blood Pressure : 152/61   Temperature: 36.7 °C (98 °F)   Pulse: (!) 110   Respiration: 18   Pulse Oximetry: 95 %     Physical Exam   Constitutional: He is oriented to person, place, and time. He appears well-developed and well-nourished. No distress.   Patient seen and examined  Plan discussed with RN   HENT:   Right Ear: External ear normal.   Left Ear: External ear normal.   Nose: Nose normal.   Eyes: Right eye exhibits no discharge. Left eye exhibits no discharge. No scleral icterus.   Neck: No JVD present. No tracheal deviation present.   Cardiovascular: Normal rate, normal heart sounds and intact distal pulses.    No murmur heard.       Pulmonary/Chest: Effort normal and breath sounds normal. No respiratory distress. He has no rales.   Abdominal: Soft. Bowel sounds are normal. He exhibits distension. There is tenderness. There is no guarding.   Musculoskeletal: He exhibits edema. He exhibits no tenderness.   Neurological: He is alert and oriented to person, place, and time.   Skin: Skin is warm and dry. He is not diaphoretic. No erythema.   Psychiatric: He has a normal mood and affect. His behavior is normal.   Nursing note and vitals reviewed.      Fluids    Intake/Output Summary (Last 24 hours) at 09/24/18 0913  Last data filed at 09/24/18 0836   Gross per 24 hour   Intake             1480 ml   Output             2545 ml   Net            -1065 ml       Laboratory  Recent Labs      09/22/18   0420  09/23/18   1015  09/24/18   0645   WBC  30.1*  23.0*  16.9*   RBC  2.53*  2.69*  2.69*   HEMOGLOBIN  7.9*  8.3*  8.2*   HEMATOCRIT  24.2*  25.4*  25.2*   MCV  95.7  94.4  93.7   MCH  31.2  30.9  30.5   MCHC  32.6*  32.7*  32.5*   RDW  51.5*  57.5*  56.9*   PLATELETCT  275  324  334   MPV   12.0  11.2  11.0     Recent Labs      09/22/18   0420  09/22/18   1422  09/23/18   1015  09/24/18   0645   SODIUM  131*   --   129*  134*   POTASSIUM  3.3*  3.3*  3.6  3.1*   CHLORIDE  102   --   99  101   CO2  23   --   25  27   GLUCOSE  177*   --   178*  149*   BUN  12   --   8  <5*   CREATININE  0.73   --   0.62  0.64   CALCIUM  7.0*   --   7.0*  7.1*                   Imaging  DX-PORTABLE FLUOROSCOPY < 1 HOUR   Final Result         Portable fluoroscopy utilized for 0.1 minutes.         [id number] STENT - TUBE   Final Result      ERCP in progress      DX-CHEST-LIMITED (1 VIEW)   Final Result      No radiographic evidence of a pneumothorax following right IJ line placement.      Mild bibasilar atelectasis      [id number]   Final Result      1.  Cholelithiasis with choledocholithiasis. No significant ductal dilatation is appreciated at this time.      2.  Indeterminate small right adrenal nodule as described on the recent CT.      3.  Bilateral renal cysts.      CT-ABDOMEN-PELVIS WITH   Final Result         6.6 mm noncalcified subpleural nodule in the right lower lobe.         [Prominent liver size.      Small gallbladder containing a small amount of stone material.      Stable 12 mm right adrenal gland nodule.      Stable 3 cm left renal cyst.      Atherosclerotic vascular disease.      Enlarged prostate gland.      No focal abscess appreciated.         DX-CHEST-PORTABLE (1 VIEW)   Final Result      Improved bibasilar atelectasis and pulmonary edema.      US-GALLBLADDER   Final Result      1.  Cholelithiasis without other sonographic findings of biliary disease   2.  Echogenic liver, a nonspecific finding that often is found to represent steatosis.  Other infiltrative processes could have an identical appearance.      ECHOCARDIOGRAM-COMP W/ CONT   Final Result      DX-CHEST-PORTABLE (1 VIEW)   Final Result      Mild pulmonary edema and bibasilar atelectasis, without significant change.      US-GALLBLADDER    Final Result      1.  Limited evaluation of pancreas and abdominal aorta.   2.  Echogenic liver consistent with fatty infiltration with focal sparing adjacent to gallbladder fossa.   3.  Cholelithiasis with pain over the gallbladder.  No other evidence to indicate acute cholecystitis.  No biliary dilation.   4.  Distended bladder and mild RIGHT hydronephrosis, possibly indicating urinary retention.      LE VENOUS DUPLEX (Specify in Comments Left, Right Or Bilateral)   Final Result      DX-CHEST-PORTABLE (1 VIEW)   Final Result      1.  Cardiomegaly.      2.  Bibasilar atelectasis.           Assessment/Plan  Acute on chronic respiratory failure (HCC)   Assessment & Plan    Patient chronically on 2 L oxygen   No wheezing now and given GI bleeding will stop steroids.           Severe sepsis with septic shock (CODE) ([facility])   Assessment & Plan    2/2 cholecystitis/cholangitis  Cholecystectomy performed on 9/20 then ERCP with biliary stent and biopsies on 9/21.  Biopsies negative for H. pylori   Continue IV abx per ID  Repeat cultures neg to date.   Estimated stop date 10/01/18 with transition to PO upon discharge.           Diarrhea   Assessment & Plan    resolved        Hyponatremia   Assessment & Plan    Adjust volume status as needed        Hypokalemia   Assessment & Plan    Trend and replace          Hypophosphatemia   Assessment & Plan    resolved        Cholangitis   Assessment & Plan    S/p ercp and stent.   Repeat ERCP as outpatient in 4-6 weeks for sphincterotomy and stone removal.        Uncontrolled type 2 diabetes mellitus (HCC)- (present on admission)   Assessment & Plan    Has been Noncompliant with follow-up and home fingersticks  With hyperglycemia and vascular complications.   A1c 9.5  Continue ssi  Added back small dose of lantus.           Bacteremia   Assessment & Plan    2/2 cholangitis  Iv abx for enterobacter per ID        PUD (peptic ulcer disease)   Assessment & Plan    Severe with multiple  ulcerations to the point of preventing ercp success  extensive periampullary ulcers preventing sphincterotomy              Common bile duct stone- (present on admission)   Assessment & Plan    S/p ercp        GI bleeding   Assessment & Plan    2/2 PUD  S/p egd  ppi therapy.   Carafate  H pylori negative.         Elevated PSA- (present on admission)   Assessment & Plan    Marked elevation at 49- concerning for prostate ccancer.   Outpatient workup.         Lower extremity edema- (present on admission)   Assessment & Plan    Forced diuresis ongoing. Leg elevation.         Chronic deep vein thrombosis (DVT) (HCC)   Assessment & Plan    Off eliquis given bleeding.   Would not restart        Hepatic steatosis   Assessment & Plan    Due to morbid obesity and uncontrolled diabetes          Non-compliance   Assessment & Plan    Discussed importance of compliance and f/u        Stage 2 moderate COPD by GOLD classification (HCC)- (present on admission)   Assessment & Plan    Flare resolved. Off steroids.

## 2018-09-25 VITALS
DIASTOLIC BLOOD PRESSURE: 55 MMHG | TEMPERATURE: 98 F | RESPIRATION RATE: 20 BRPM | SYSTOLIC BLOOD PRESSURE: 126 MMHG | HEART RATE: 102 BPM | BODY MASS INDEX: 35.5 KG/M2 | OXYGEN SATURATION: 97 % | HEIGHT: 67 IN | WEIGHT: 226.19 LBS

## 2018-09-25 PROBLEM — R19.7 DIARRHEA: Status: RESOLVED | Noted: 2018-09-15 | Resolved: 2018-09-25

## 2018-09-25 PROBLEM — K83.09 CHOLANGITIS: Status: RESOLVED | Noted: 2018-09-12 | Resolved: 2018-09-25

## 2018-09-25 PROBLEM — R65.21 SEVERE SEPSIS WITH SEPTIC SHOCK (CODE) (HCC): Status: RESOLVED | Noted: 2018-09-12 | Resolved: 2018-09-25

## 2018-09-25 PROBLEM — R78.81 BACTEREMIA: Status: RESOLVED | Noted: 2018-09-23 | Resolved: 2018-09-25

## 2018-09-25 LAB
ALBUMIN SERPL BCP-MCNC: 1.9 G/DL (ref 3.2–4.9)
ALBUMIN/GLOB SERPL: 0.8 G/DL
ALP SERPL-CCNC: 58 U/L (ref 30–99)
ALT SERPL-CCNC: 41 U/L (ref 2–50)
ANION GAP SERPL CALC-SCNC: 6 MMOL/L (ref 0–11.9)
AST SERPL-CCNC: 31 U/L (ref 12–45)
BASOPHILS # BLD AUTO: 0.1 % (ref 0–1.8)
BASOPHILS # BLD: 0.01 K/UL (ref 0–0.12)
BILIRUB SERPL-MCNC: 1.2 MG/DL (ref 0.1–1.5)
BUN SERPL-MCNC: <5 MG/DL (ref 8–22)
CALCIUM SERPL-MCNC: 7.2 MG/DL (ref 8.4–10.2)
CHLORIDE SERPL-SCNC: 101 MMOL/L (ref 96–112)
CO2 SERPL-SCNC: 26 MMOL/L (ref 20–33)
CREAT SERPL-MCNC: 0.65 MG/DL (ref 0.5–1.4)
EOSINOPHIL # BLD AUTO: 0.2 K/UL (ref 0–0.51)
EOSINOPHIL NFR BLD: 1.5 % (ref 0–6.9)
ERYTHROCYTE [DISTWIDTH] IN BLOOD BY AUTOMATED COUNT: 59.4 FL (ref 35.9–50)
GLOBULIN SER CALC-MCNC: 2.3 G/DL (ref 1.9–3.5)
GLUCOSE BLD-MCNC: 124 MG/DL (ref 65–99)
GLUCOSE BLD-MCNC: 169 MG/DL (ref 65–99)
GLUCOSE SERPL-MCNC: 132 MG/DL (ref 65–99)
HCT VFR BLD AUTO: 25.8 % (ref 42–52)
HGB BLD-MCNC: 8.1 G/DL (ref 14–18)
IMM GRANULOCYTES # BLD AUTO: 0.13 K/UL (ref 0–0.11)
IMM GRANULOCYTES NFR BLD AUTO: 1 % (ref 0–0.9)
LYMPHOCYTES # BLD AUTO: 1.87 K/UL (ref 1–4.8)
LYMPHOCYTES NFR BLD: 14.1 % (ref 22–41)
MAGNESIUM SERPL-MCNC: 1.9 MG/DL (ref 1.5–2.5)
MCH RBC QN AUTO: 30.2 PG (ref 27–33)
MCHC RBC AUTO-ENTMCNC: 31.4 G/DL (ref 33.7–35.3)
MCV RBC AUTO: 96.3 FL (ref 81.4–97.8)
MONOCYTES # BLD AUTO: 0.93 K/UL (ref 0–0.85)
MONOCYTES NFR BLD AUTO: 7 % (ref 0–13.4)
NEUTROPHILS # BLD AUTO: 10.11 K/UL (ref 1.82–7.42)
NEUTROPHILS NFR BLD: 76.3 % (ref 44–72)
NRBC # BLD AUTO: 0 K/UL
NRBC BLD-RTO: 0 /100 WBC
PHOSPHATE SERPL-MCNC: 1.8 MG/DL (ref 2.5–4.5)
PLATELET # BLD AUTO: 299 K/UL (ref 164–446)
PMV BLD AUTO: 11.4 FL (ref 9–12.9)
POTASSIUM SERPL-SCNC: 3.5 MMOL/L (ref 3.6–5.5)
PROT SERPL-MCNC: 4.2 G/DL (ref 6–8.2)
RBC # BLD AUTO: 2.68 M/UL (ref 4.7–6.1)
SODIUM SERPL-SCNC: 133 MMOL/L (ref 135–145)
WBC # BLD AUTO: 13.3 K/UL (ref 4.8–10.8)

## 2018-09-25 PROCEDURE — 700102 HCHG RX REV CODE 250 W/ 637 OVERRIDE(OP): Performed by: INTERNAL MEDICINE

## 2018-09-25 PROCEDURE — 99232 SBSQ HOSP IP/OBS MODERATE 35: CPT | Performed by: INTERNAL MEDICINE

## 2018-09-25 PROCEDURE — 84100 ASSAY OF PHOSPHORUS: CPT

## 2018-09-25 PROCEDURE — 700111 HCHG RX REV CODE 636 W/ 250 OVERRIDE (IP): Performed by: HOSPITALIST

## 2018-09-25 PROCEDURE — 83735 ASSAY OF MAGNESIUM: CPT

## 2018-09-25 PROCEDURE — 700105 HCHG RX REV CODE 258: Performed by: INTERNAL MEDICINE

## 2018-09-25 PROCEDURE — 700111 HCHG RX REV CODE 636 W/ 250 OVERRIDE (IP): Performed by: INTERNAL MEDICINE

## 2018-09-25 PROCEDURE — A9270 NON-COVERED ITEM OR SERVICE: HCPCS | Performed by: INTERNAL MEDICINE

## 2018-09-25 PROCEDURE — 94760 N-INVAS EAR/PLS OXIMETRY 1: CPT

## 2018-09-25 PROCEDURE — 700102 HCHG RX REV CODE 250 W/ 637 OVERRIDE(OP): Performed by: HOSPITALIST

## 2018-09-25 PROCEDURE — 80053 COMPREHEN METABOLIC PANEL: CPT

## 2018-09-25 PROCEDURE — 11721 DEBRIDE NAIL 6 OR MORE: CPT

## 2018-09-25 PROCEDURE — A9270 NON-COVERED ITEM OR SERVICE: HCPCS | Performed by: HOSPITALIST

## 2018-09-25 PROCEDURE — 82962 GLUCOSE BLOOD TEST: CPT | Mod: 91

## 2018-09-25 PROCEDURE — 99239 HOSP IP/OBS DSCHRG MGMT >30: CPT | Performed by: HOSPITALIST

## 2018-09-25 PROCEDURE — 85025 COMPLETE CBC W/AUTO DIFF WBC: CPT

## 2018-09-25 PROCEDURE — C9113 INJ PANTOPRAZOLE SODIUM, VIA: HCPCS | Performed by: INTERNAL MEDICINE

## 2018-09-25 RX ORDER — FUROSEMIDE 20 MG/1
20 TABLET ORAL DAILY
Qty: 5 TAB | Refills: 0 | Status: SHIPPED | OUTPATIENT
Start: 2018-09-25 | End: 2018-09-30

## 2018-09-25 RX ORDER — BUDESONIDE AND FORMOTEROL FUMARATE DIHYDRATE 160; 4.5 UG/1; UG/1
2 AEROSOL RESPIRATORY (INHALATION) 2 TIMES DAILY
Qty: 1 INHALER | Refills: 0 | Status: SHIPPED | OUTPATIENT
Start: 2018-09-25 | End: 2018-10-02

## 2018-09-25 RX ORDER — POTASSIUM CHLORIDE 20 MEQ/1
40 TABLET, EXTENDED RELEASE ORAL 2 TIMES DAILY
Qty: 40 TAB | Refills: 0 | Status: SHIPPED | OUTPATIENT
Start: 2018-09-25 | End: 2018-10-05

## 2018-09-25 RX ORDER — CIPROFLOXACIN 500 MG/1
500 TABLET, FILM COATED ORAL 2 TIMES DAILY
Qty: 12 TAB | Refills: 0 | Status: SHIPPED | OUTPATIENT
Start: 2018-09-25 | End: 2018-10-01

## 2018-09-25 RX ADMIN — FUROSEMIDE 10 MG: 10 INJECTION, SOLUTION INTRAVENOUS at 06:23

## 2018-09-25 RX ADMIN — METRONIDAZOLE 500 MG: 500 TABLET ORAL at 15:11

## 2018-09-25 RX ADMIN — METRONIDAZOLE 500 MG: 500 TABLET ORAL at 06:22

## 2018-09-25 RX ADMIN — SUCRALFATE 1 G: 1 SUSPENSION ORAL at 06:27

## 2018-09-25 RX ADMIN — PANTOPRAZOLE SODIUM 40 MG: 40 INJECTION, POWDER, LYOPHILIZED, FOR SOLUTION INTRAVENOUS at 06:27

## 2018-09-25 RX ADMIN — SUCRALFATE 1 G: 1 SUSPENSION ORAL at 12:02

## 2018-09-25 RX ADMIN — DIPHENOXYLATE HYDROCHLORIDE AND ATROPINE SULFATE 1 TABLET: 2.5; .025 TABLET ORAL at 02:17

## 2018-09-25 RX ADMIN — BUDESONIDE AND FORMOTEROL FUMARATE DIHYDRATE 2 PUFF: 160; 4.5 AEROSOL RESPIRATORY (INHALATION) at 06:30

## 2018-09-25 RX ADMIN — POTASSIUM CHLORIDE 40 MEQ: 1500 TABLET, EXTENDED RELEASE ORAL at 06:20

## 2018-09-25 RX ADMIN — CEFTAZIDIME 1 G: 1 INJECTION, POWDER, FOR SOLUTION INTRAMUSCULAR; INTRAVENOUS at 06:30

## 2018-09-25 RX ADMIN — SUCRALFATE 1 G: 1 SUSPENSION ORAL at 00:46

## 2018-09-25 ASSESSMENT — ENCOUNTER SYMPTOMS
ROS GI COMMENTS: IMPROVED
FEVER: 0
NAUSEA: 0
COUGH: 0
ABDOMINAL PAIN: 1
DIARRHEA: 1
VOMITING: 0
CHILLS: 1
SHORTNESS OF BREATH: 0

## 2018-09-25 ASSESSMENT — PAIN SCALES - GENERAL
PAINLEVEL_OUTOF10: 0
PAINLEVEL_OUTOF10: 3

## 2018-09-25 NOTE — PROGRESS NOTES
Report received from ALIN Gillis. Patient resting comfortably in at side of the bed. Declines any needs at this time. Call light in reach.

## 2018-09-25 NOTE — FACE TO FACE
Face to Face Supporting Documentation - Home Health    The encounter with this patient was in whole or in part the primary reason for home health admission.    Date of encounter:   Patient:                    MRN:                       YOB: 2018  Enoch Mistry  [id number]  1945     Home health to see patient for:  Wound Care   Nursing care  PT/OT    Skilled need for:  Exacerbation of Chronic Disease State LE edema, chronic leg swelling and wounds    Skilled nursing interventions to include:  Venous access care, Home IV infusion therapy, Wound Care and Line/Drain/Airway education and care    Homebound status evidenced by:  Need the aid of supportive devices such as crutches, canes, wheelchairs or walkers, Require the use of special transportation, Needs the assistance of another person in order to leave the home or Have a condition such that leaving his or her home is medically contraindicated. Leaving home requires a considerable and taxing effort. There is a normal inability to leave the home.    Community Physician to provide follow up care: Charles Duke M.D.     Optional Interventions? No      I certify the face to face encounter for this home health care referral meets the CMS requirements and the encounter/clinical assessment with the patient was, in whole, or in part, for the medical condition(s) listed above, which is the primary reason for home health care. Based on my clinical findings: the service(s) are medically necessary, support the need for home health care, and the homebound criteria are met.  I certify that this patient has had a face to face encounter by myself.  Laverne Erazo M.D. - NPI: [id number]

## 2018-09-25 NOTE — PROGRESS NOTES
Report given to Judy OGDEN. Plan of care discussed. Patient resting comfortably in bed. Safety precautions in place.

## 2018-09-25 NOTE — DISCHARGE PLANNING
Per IDT rounds, pt is not yet medically cleared.      When pt is medically cleared and his O2 is different from his baseline

## 2018-09-25 NOTE — PROGRESS NOTES
Received bedside report from ALIN Kim. Patient is resting comfortably in bed with eyes closed. Patient declines any needs at this time. IJ dressing is intact, patient to remain in bed until 1930.

## 2018-09-25 NOTE — DISCHARGE SUMMARY
Discharge Summary    CHIEF COMPLAINT ON ADMISSION  Chief Complaint   Patient presents with   • Leg Swelling     for about 3-4 yrs   • N/V     yesterday       Reason for Admission  N/V     Admission Date  9/12/2018    CODE STATUS  DNAR, I OK    HPI & HOSPITAL COURSE  Enoch Mistry is a 72 y.o. male with a past medical history of chronic alcohol use, alcoholic liver disease, chronic DVT, COPD on home oxygen at 2 L/min, and obesity presented complaining of dyspnea at rest as well as right upper quadrant pain.  The patient was found to be septic. On imaging he was found to have cholelithiasis and cholangitis, IV abx were started, surgery was consulted. Patient was found to have melena and drop in hemoglobin with abd pain. He became hypotensive and transferred to the ICU for fluid resuscitation. He had a central line placed. Surgery was consulted as was GI and  patient underwent lap cholecystectomy, ERCP and EGD. He was found to have PUD, started on PPI and sucralfate. LFTs were trended down.     He does not take any inhaler for his COPD and does not have a pulmonologist.pulmonology was consulted while in hospital..For his COPD GOLD C-D on home O2, he was started on Duo-nebs and symbicort BID. He was started don IVF and IV abx, cultures were obtained, his Blood cultures were + Enterobacter.  Patient was started on ceftazidime and Flagyl .repeat blood cultures were negative and he was switched to oral abx.He was continued on AC for chronic DVT.     Patient also has chronic LE swelling, however worsening in hospital, likely 2/2 all the fluids he had received given his clinical condition. Weeping wounds, wound was consulted, applied dressings, patient was started on diuretics and tolerated well. Wound care needs to be set up with HH, however orders have to come from his PCP. HOWIE consulted and working on this. He is also requiring O2 during ambulation and rest, only has a concentrator at home, SW also working obtaining  more O2 for home. At this time patient needs to be diuresed more and have HH and O2 set up for safe discharge, however patient wanting to leave AMA, states he is sick of being in bed. I explained to him in detail 2-3 times that he would be leaving against medical advice since we are still trying to replaced electrolytes and diurese him and setting up outpatient referrals for a safe discharged. However patient and wife in the room demanding to leave AMA.  At this time I will dc him AMA, with ciprofloxacin as recommended by ID, Diuretics and K supplementation. Encouraged to fu with PCP  ER precautions given    Patient and wife understood that they are leaving AMA and agreed    We will continue to work with SW and PCP to set up HH for wound care, PT and O2.             Therefore, he is discharged in fair and stable condition against medcial advice.    The patient met 2-midnight criteria for an inpatient stay at the time of discharge.    Discharge Date  9/25/18    FOLLOW UP ITEMS POST DISCHARGE  PCP  ID    DISCHARGE DIAGNOSES  Active Problems:    Uncontrolled type 2 diabetes mellitus (HCC) POA: Yes    Hypophosphatemia POA: Unknown    Hypokalemia POA: Unknown    Hyponatremia POA: Unknown    Stage 2 moderate COPD by GOLD classification ([facility]) POA: Yes      Overview: 06/21/17 -- CAT Scoring in clinic 20 --> Class B      06/15/17 -- PFT showed FEV1 77%, FEV1/FVC 64% --> Stage 2    Non-compliance POA: Unknown    Hepatic steatosis POA: Unknown    Chronic deep vein thrombosis (DVT) ([facility]) POA: Unknown    Lower extremity edema POA: Yes    Elevated PSA POA: Yes    GI bleeding POA: Unknown    Common bile duct stone POA: Yes    PUD (peptic ulcer disease) POA: Unknown  Resolved Problems:    Severe sepsis with septic shock (CODE) ([facility]) POA: Unknown    Cholangitis POA: Unknown    Diarrhea POA: Unknown    Bacteremia POA: Unknown      FOLLOW UP  Future Appointments  Date Time Provider Department Center   10/2/2018 8:40 AM Collin QUIJANO  [facility]     No follow-up provider specified.    MEDICATIONS ON DISCHARGE     Medication List      START taking these medications      Instructions   budesonide-formoterol 160-4.5 MCG/ACT Aero  Commonly known as:  SYMBICORT   Inhale 2 Puffs by mouth 2 Times a Day.  Dose:  2 Puff     ciprofloxacin 500 MG Tabs  Commonly known as:  CIPRO   Take 1 Tab by mouth 2 times a day for 6 days.  Dose:  500 mg     furosemide 20 MG Tabs  Commonly known as:  LASIX   Take 1 Tab by mouth every day for 5 days.  Dose:  20 mg     potassium chloride SA 20 MEQ Tbcr  Commonly known as:  Kdur   Take 2 Tabs by mouth 2 times a day for 10 days.  Dose:  40 mEq        CONTINUE taking these medications      Instructions   amLODIPine 5 MG Tabs  Commonly known as:  NORVASC   TAKE ONE TABLET BY MOUTH DAILY (NORVASC)            Allergies  No Known Allergies    DIET  Orders Placed This Encounter   Procedures   • Diet Order Diabetic     Standing Status:   Standing     Number of Occurrences:   1     Order Specific Question:   Diet:     Answer:   Diabetic [3]       ACTIVITY  As tolerated.  Weight bearing as tolerated    CONSULTATIONS  ID  Pulmonary  GI  surgery    PROCEDURES  9/19/18: PREOPERATIVE DIAGNOSIS:  Acute upper gastrointestinal bleeding.     POSTOPERATIVE DIAGNOSES:  1.  1 cm duodenal apex ulcer with bleeding visible vessel.  2.  Multiple clean based duodenal ulcers.  3.  Grade D esophagitis.     PROCEDURE:  Esophagogastroduodenoscopy.    9/20/18:  PREOPERATIVE DIAGNOSES:  Acute cholecystitis with cholelithiasis and   choledocholithiasis.     POSTOPERATIVE DIAGNOSES:  Acute cholecystitis with cholelithiasis and   choledocholithiasis.     OPERATION PERFORMED:  Laparoscopic cholecystectomy.    9/21/18: PROCEDURE PERFORMED:  Endoscopic retrograde cholangiography with common bile   duct stent placement, 10-Syrian 5 cm straight plastic biliary stent and biopsy   of the antrum.      LABORATORY  Lab Results   Component Value Date     SODIUM 133 (L) 09/25/2018    POTASSIUM 3.5 (L) 09/25/2018    CHLORIDE 101 09/25/2018    CO2 26 09/25/2018    GLUCOSE 132 (H) 09/25/2018    BUN <5 (L) 09/25/2018    CREATININE 0.65 09/25/2018        Lab Results   Component Value Date    WBC 13.3 (H) 09/25/2018    HEMOGLOBIN 8.1 (L) 09/25/2018    HEMATOCRIT 25.8 (L) 09/25/2018    PLATELETCT 299 09/25/2018        Total time of the discharge process exceeds 42 minutes.

## 2018-09-25 NOTE — PROGRESS NOTES
Wound care in to see patient. Nails clipped and dressings changed, tolerated well.     Patient hoping for discharge today. MD notified nursing that she wants him to stay one more day. Patient very upset with this plan. Asked to speak to MD. MD notified through case management. Charge nurse notified as well.     Wife at bedside.

## 2018-09-25 NOTE — PROGRESS NOTES
BLE dressings changed. Cleaned, Visco paste strips applied, abd pad gauze and elastic dressing. Slight discomfort for patient when dressings removed but tolerated well.     IJ removed per MD. Patient tolerated well. Tip intact. Pressure applied. No drainage. Gauze and transparent dressing applied to site.

## 2018-09-25 NOTE — DISCHARGE PLANNING
Still waiting cb from Dr. Corey office. Next visit shows 10/2018 which is too far in the future. If we cannot get Dr. corey to agree to sign then patient will need to request referral from the new PCP. HH will notify [facility] once PCP office has been reached.

## 2018-09-25 NOTE — PROGRESS NOTES
Infectious Disease Progress Note    Author: Marguerite Fuchs M.D. Date & Time of service: 9/25/2018  7:26 AM    Chief Complaint:  Follow-up of Enterobacter bacteremia    Interval History:  9/18 afebrile, white count now up to 31.8.  Reports that he was feeling terrible this morning after breakfast, feels a little bit better now.  9/19 afebrile, white count up to 44,000. CT with no e/o abscess or ductal dilatation. MRCP planned and he is scheduled for cholecystectomy tomorrow at 3 PM. His prostate is 6cm in size. No abscess.  Hemoglobin dropped from 11-7.5.  Patient reports off and on abdominal pain  9/20 afebrile, white count 50,000.  Patient was transferred to the ICU with hemodynamic instability and upper GI bleed.  EGD showed a bleeding duodenal ulcer.  General surgery was consulted and there is suspicion for a gangrenous cholecystitis.  He is scheduled to undergo a cholecystectomy today.  Patient reports that his abdominal pain has improved today  9/21 afebrile, white count down some from 53,000 - 47,000.  Gallbladder was removed on 9/20.  ERCP today for multiple gallstones in common bile duct. Pt in OR  9/22 AF WBC 30.1 patient transferred out of the ICU today, ongoing abdominal pain but somewhat improved, status post ERCP with stent placement and biopsy yesterday, biopsy negative for H. Pylori  9/23 AF WBC 23 patient feels slightly better today but not yet back to baseline however he is anxious to go home tomorrow  9/24 AF WBC 16.9 patient sitting up in chair and feeling much improved today, continues to have diarrhea, no nausea or vomiting over night  9/25 AF WBC 13.3 patient feels cold secondary to temperature in the room, otherwise no new issues overnight, states his wounds on his lower extremities are improving with decreased swelling, patient anxious to go home  Labs Reviewed and Medications Reviewed.    Review of Systems:  Review of Systems   Constitutional: Positive for chills and malaise/fatigue.  Negative for fever.   Respiratory: Negative for cough and shortness of breath.    Cardiovascular: Positive for leg swelling.   Gastrointestinal: Positive for abdominal pain and diarrhea. Negative for nausea and vomiting.        Improved   Genitourinary: Negative for dysuria.     Hemodynamics:  Temp (24hrs), Av.8 °C (98.2 °F), Min:36.6 °C (97.8 °F), Max:37.2 °C (98.9 °F)  Temperature: 36.8 °C (98.3 °F)  Pulse  Av  Min: 65  Max: 128   Blood Pressure : 125/62       Physical Exam:  Physical Exam   Constitutional: He is oriented to person, place, and time. He appears well-developed.   Obese   HENT:   Head: Normocephalic and atraumatic.   Eyes: Pupils are equal, round, and reactive to light. EOM are normal.   Neck: Neck supple.   Right IJ catheter   Cardiovascular: Normal rate and regular rhythm.    Pulmonary/Chest: Effort normal. He has no wheezes.   Abdominal: He exhibits distension. There is tenderness.   Musculoskeletal: He exhibits edema.   Bilateral lower extremities and compression stockings   Neurological: He is alert and oriented to person, place, and time.   Nursing note and vitals reviewed.      Meds:    Current Facility-Administered Medications:   •  metroNIDAZOLE  •  insulin glargine  •  furosemide  •  potassium chloride SA  •  pantoprazole  •  insulin regular **AND** [CANCELED] Accu-Chek Q6 if NPO **AND** NOTIFY MD and PharmD **AND** glucose 4 g **AND** dextrose 50%  •  oxyCODONE-acetaminophen  •  cefTAZidime  •  [DISCONTINUED] insulin regular **AND** Accu-Chek ACHS **AND** NOTIFY MD and PharmD **AND** glucose 4 g **AND** dextrose 50%  •  sucralfate  •  diphenoxylate-atropine  •  LORazepam  •  budesonide-formoterol  •  benzocaine-menthol  •  ondansetron  •  Respiratory Care per Protocol  •  NS  •  ipratropium-albuterol    Labs:  Recent Labs      18   1015  18   0645  18   0415   WBC  23.0*  16.9*  13.3*   RBC  2.69*  2.69*  2.68*   HEMOGLOBIN  8.3*  8.2*  8.1*   HEMATOCRIT  25.4*   25.2*  25.8*   MCV  94.4  93.7  96.3   MCH  30.9  30.5  30.2   RDW  57.5*  56.9*  59.4*   PLATELETCT  324  334  299   MPV  11.2  11.0  11.4   NEUTSPOLYS  79.90*  76.30*  76.30*   LYMPHOCYTES  11.00*  15.40*  14.10*   MONOCYTES  5.70  5.60  7.00   EOSINOPHILS  0.50  1.10  1.50   BASOPHILS  0.20  0.10  0.10     Recent Labs      09/23/18   1015  09/24/18   0645  09/25/18   0415   SODIUM  129*  134*  133*   POTASSIUM  3.6  3.1*  3.5*   CHLORIDE  99  101  101   CO2  25  27  26   GLUCOSE  178*  149*  132*   BUN  8  <5*  <5*     Recent Labs      09/23/18   1015  09/24/18   0645  09/25/18   0415   ALBUMIN  2.2*  1.9*  1.9*   TBILIRUBIN  1.3  1.2  1.2   ALKPHOSPHAT  60  55  58   TOTPROTEIN  4.7*  4.3*  4.2*   ALTSGPT  65*  50  41   ASTSGOT  51*  37  31   CREATININE  0.62  0.64  0.65       Imaging:  Dx-chest-portable (1 View)    Result Date: 9/13/2018 9/13/2018 6:39 AM HISTORY/REASON FOR EXAM:  Shortness of Breath TECHNIQUE/EXAM DESCRIPTION AND NUMBER OF VIEWS: Single portable view of the chest. COMPARISON: Yesterday FINDINGS: Interstitial opacities persist in the mid and lower lung fields bilaterally, and there is bibasilar atelectasis. Heart and mediastinum are normal.     Mild pulmonary edema and bibasilar atelectasis, without significant change.    Dx-chest-portable (1 View)    Result Date: 9/12/2018 9/12/2018 9:10 AM HISTORY/REASON FOR EXAM: Rapid heart rate. TECHNIQUE/EXAM DESCRIPTION AND NUMBER OF VIEWS: Single portable view of the chest. COMPARISON: 5/5/2015 FINDINGS: There is bibasilar atelectasis. There is no pleural effusion. The heart is enlarged.     1.  Cardiomegaly. 2.  Bibasilar atelectasis.    Us-gallbladder    Result Date: 9/18/2018  9/18/2018 6:28 AM HISTORY/REASON FOR EXAM: Pain, leukocytosis TECHNIQUE/EXAM DESCRIPTION AND NUMBER OF VIEWS:  Real-time sonography of the gallbladder. COMPARISON: 9/12/2018 FINDINGS: There is no ascites. The liver is normal in contour. There is no evidence of solid mass  lesion. The liver is diffusely increased in echogenicity. Gallstones are present. There is a wall echo shadow sign. The gallbladder is not well distended. Its wall measures 2 mm in thickness. There is no sonographic Wolf sign. 0.23 cm There is no pericholecystic fluid. The common duct measures 1 cm. The visualized pancreas is unremarkable. The visualized aorta is normal in caliber. Intrahepatic IVC is patent. The portal vein is patent with hepatopetal flow. The right kidney measures 11.6 cm. There is RIGHT pelvocaliectasis.     1.  Cholelithiasis without other sonographic findings of biliary disease 2.  Echogenic liver, a nonspecific finding that often is found to represent steatosis.  Other infiltrative processes could have an identical appearance.    Us-gallbladder    Result Date: 9/12/2018 9/12/2018 2:37 PM HISTORY/REASON FOR EXAM:  Abdominal pain, nausea and vomiting. TECHNIQUE/EXAM DESCRIPTION AND NUMBER OF VIEWS:  Real-time sonography of the gallbladder. COMPARISON: 4/18/2014 FINDINGS: There is no ascites. The liver is diffusely echogenic with decreased echogenicity in the gallbladder fossa. The liver measures 24.33 cm. Gallbladder shows echogenic shadowing focus consistent with stone.  Gallbladder is contracted.  Gallbladder wall measures 0.32 cm.  Pain reported with scanning over the gallbladder. There is no pericholecystic fluid. The common duct measures 0.50 cm. The pancreas is obscured by bowel gas. The aorta is obscured by bowel gas. Intrahepatic IVC is patent. The portal vein is patent with hepatopetal flow. The right kidney measures 12.94 cm and shows mildly prominent collecting system. Note made of distended bladder.     1.  Limited evaluation of pancreas and abdominal aorta. 2.  Echogenic liver consistent with fatty infiltration with focal sparing adjacent to gallbladder fossa. 3.  Cholelithiasis with pain over the gallbladder.  No other evidence to indicate acute cholecystitis.  No biliary  dilation. 4.  Distended bladder and mild RIGHT hydronephrosis, possibly indicating urinary retention.    Echocardiogram-comp W/ Cont    Result Date: 2018  Transthoracic Echo Report Echocardiography Laboratory CONCLUSIONS Prior echo 17.  No significant changes noted. Normal left ventricular systolic function. Left ventricular ejection fraction is visually estimated to be 65%. A reliable estimation of diastolic function cannot be made due to tachycardia. Dilated inferior vena cava with inspiratory collapse. MATTHIEU MCKEON Exam Date:         2018                    07:16 Exam Location:     Inpatient Priority:          Routine Ordering Physician:        VIKI TORO Referring Physician:       [id number] Oneal Sonographer:               Chandler Mendoza RDCS Age:    72     Gender:    M MRN:    [id number] :    1945 BSA:    2.03   Ht (in):    67     Wt (lb):    202 Exam Type:     Complete, Contrast Indications:     Dyspnea ICD Codes:       786.09 CPT Codes:       75564,  BP:          /          HR: Technical Quality:       Technically difficult study -                          adequate information is obtained MEASUREMENTS  (Male / Female) Normal Values 2D ECHO LV Diastolic Diameter PLAX        5.5 cm                4.2 - 5.9 / 3.9 - 5.3 cm LV Systolic Diameter PLAX         2.8 cm                2.1 - 4.0 cm IVS Diastolic Thickness           1.2 cm                LVPW Diastolic Thickness          0.92 cm               LVOT Diameter                     2.3 cm                Estimated LV Ejection Fraction    65 %                  DOPPLER AV Peak Velocity                  1.2 m/s               AV Peak Gradient                  6.2 mmHg              AV Mean Gradient                  3.5 mmHg              LVOT Peak Velocity                1.1 m/s               AV Area Cont Eq vti               3 cm²                 * Indicates values subject to auto-interpretation LV EF:  65     % FINDINGS Left Ventricle Normal left ventricular chamber size. Normal left ventricular wall thickness. Normal left ventricular systolic function. Left ventricular ejection fraction is visually estimated to be 65%. Normal regional wall motion. A reliable estimation of diastolic function cannot be made due to tachycardia. Right Ventricle Right ventricle not well visualized. Right Atrium Normal right atrial size. Dilated inferior vena cava with inspiratory collapse. Left Atrium Normal left atrial size. Left atrial volume index is 25  mL/sq m. Mitral Valve Structurally normal mitral valve. No mitral stenosis. Trace mitral regurgitation. Aortic Valve The aortic valve is not well visualized. No aortic stenosis. No aortic insufficiency. Tricuspid Valve The tricuspid valve is not well visualized.  No tricuspid regurgitation. Pulmonic Valve The pulmonic valve is not well visualized. Pericardium Normal pericardium without effusion. Aorta Aortic root not well visualized.  Ascending aorta not well visualized. Whitley Costello MD (Electronically Signed) Final Date:     2018                 08:17    Le Venous Duplex (specify In Comments Left, Right Or Bilateral)    Result Date: 2018   Vascular Laboratory  CONCLUSIONS  No acute thrombosis is identified.  Chronic deep venous thrombosis in the distal right superficial femoral vein  and popliteal vein.  Suspect chronic deep venous thrombosis in the proximal peroneal and  posterior tibial veins, though veins were not well visualized.  MATTHIEU MCKEON  Exam Date:     2018 10:01  Room #:     Inpatient  Priority:     Stat  Ht (in):             Wt (lb):  Ordering Physician:        KELECHI MCDONALD  Referring Physician:       [id number] Hurtado  Sonographer:               Stephanie Hills RVT  Study Type:                Complete Bilateral  Technical Quality:         Adequate  Age:    72    Gender:     M  MRN:    [id number]  :    1945      BSA:  Indications:     Edema,  unspecified  CPT Codes:       40689  ICD Codes:       R609  History:         Edema and swelling of both legs for 3-4 years. No prior                   exams.  Limitations:  PROCEDURES:  Bilateral lower extremity venous duplex imaging.  The following venous structures were evaluated: common femoral, profunda  femoral, greater saphenous, femoral, popliteal, peroneal and posterior  tibial veins.  Serial compression, augmentation maneuvers, color and spectral Doppler flow  evaluations were performed.  FINDINGS:  Right lower extremity -  Chronic deep venous thrombosis in the distal superficial femoral vein and  popliteal vein.  Suspect chronic deep venous thrombosis in the proximal peroneal and  posterior tibial veins, though veins were not well visualized.  All other veins of the right lower extremity demonstrate normal flow  dynamics with no evidence of deep vein thrombosis or superficial phlebitis.  Left lower extremity -  Complete color filling and compressibility with normal venous flow dynamics  including spontaneous flow, response to augmentation maneuvers, and  respiratory phasicity.  No evidence of superficial or deep venous thrombosis.  Antonio Alvares MD  (Electronically Signed)  Final Date:      12 September 2018                   11:01      Micro:  Results     Procedure Component Value Units Date/Time    BLOOD CULTURE [[id number]] Collected:  09/17/18 1509    Order Status:  Completed Specimen:  Blood from Peripheral Updated:  09/22/18 1817     Significant Indicator NEG     Source BLD     Site Peripheral     Blood Culture No growth after 5 days of incubation.  Blood culture testing and Gram stain, if indicated, are  performed at [facility], [address] [place] [address].  Positive blood cultures are  sent to [facility], [address] [place] [address], for organism identification and  susceptibility testing.      Narrative:       Per Hospital Policy: Only  "change Specimen Src: to \"Line\" if  specified by physician order.    BLOOD CULTURE [[id number]] Collected:  09/17/18 1509    Order Status:  Completed Specimen:  Blood from Peripheral Updated:  09/22/18 1817     Significant Indicator NEG     Source BLD     Site Peripheral     Blood Culture No growth after 5 days of incubation.  Blood culture testing and Gram stain, if indicated, are  performed at [facility], [address] [place] [address].  Positive blood cultures are  sent to [facility], [address] [place] [address], for organism identification and  susceptibility testing.      Narrative:       Per Hospital Policy: Only change Specimen Src: to \"Line\" if  specified by physician order.          Assessment:  Enoch Mistry is a 72 y.o. male with a history of poorly controlled diabetes, hypertension, known cholelithiasis, who presented with abdominal pain and significant conjugated hyperbilirubinemia.  His blood culture is positive for Enterobacter.  The hyperbilirubinemia is spontaneously improving, suggestive of a gallstone that passed.  Unfortunately, his white count continues to worsen, now up to 50,000.  No intra-abdominal abscess or ductal dilatation on CT or MRCP.  Given the worsening white count, surgery was consulted.  There was suspicion for an acute acute gangrenous cholecystitis that may not have been as evident on imaging.  Cholecystectomy performed on 9/20 (op note pending).  ERCP today for multiple gallstones in CBD.  White count down some today.    Patient unfortunately also developed an acute blood loss anemia on 9/19, transferred to the ICU, found to have a bleeding duodenal ulcer that was controlled.    Pertinent Diagnoses:  1.  Sepsis,  persistent  2.  Enterobacter bacteremia   3.  Conjugated hyperbilirubinemia, improving, likely passed an obstructive gallstone  4.  Poorly controlled diabetes  5.  Cholelithiasis  6.  Acute blood loss anemia, " duodenal ulcer    Active Hospital Problems    Diagnosis   • Sepsis due to Enterobacter species ([facility]) [A41.59]   • Acute on chronic respiratory failure (HCC) [J96.20]   • Hypokalemia [E87.6]   • Hyponatremia [E87.1]   • Diarrhea [R19.7]   • Hypophosphatemia [E83.39]   • Abnormal LFTs [R94.5]   • Uncontrolled type 2 diabetes mellitus (HCC) [E11.65]   • Non-compliance [Z91.19]   • Hepatic steatosis [K76.0]   • Chronic deep vein thrombosis (DVT) ([facility]) [I82.509]   • Stage 2 moderate COPD by GOLD classification ([facility]) [J44.9]       Plan:  Sepsis  Low-grade temperatures resolved  Leukocytosis, decreased overall  Cholecystectomy performed on 9/20,  Status post ERCP with biliary stent and biopsies on 9/21.  Biopsies negative for H. pylori   Continue IV ceftazidime, and PO Flagyl  Surveillance blood cultures from 9/17 are no growth to date    Enterobacter bacteremia  As above  blood cultures 9/17 - NGTD  Anticipate 2 weeks of antibiotics from 9/17/18  Estimated stop date 10/01/18  Anticipate transition to p.o. Cipro at discharge to complete antibiotic course      Conjugated hyperbilirubinemia, improved, likely passed an obstructive gallstone  LFTs and T bili continue to improve  Plan as above    Poorly controlled diabetes  A1c 9.5  Contributing to immunosuppression  Maintain blood sugars under 150 to control infection    Acute blood loss anemia, duodenal ulcer  Eliquis was discontinued.  Bleeding control of duodenal ulcer done on EGD 9/19  Hemoglobin stable  No evidence of active bleeding    Discussed with IM RN.

## 2018-09-25 NOTE — PROGRESS NOTES
Patient is sleeping comfortably in bed. Medicated per MAR. Patient requested to sit up on the edge of the bed. Safety precautions in place.

## 2018-09-25 NOTE — PROGRESS NOTES
[facility]ist Progress Note     Date of Service: 2018     Chief Complaint  72 y.o. male with past medical history of hypertension, COPD, diabetes and noncompliant with meds and diet admitted 2018 with right upper quadrant pain, hypoxemia, sepsis     Interval Problem Update  Looks much better  Discussed POC  Discussed w/ ID  DIscussed DC planning and f/u        Consultants/Specialty  Critical care     Disposition  TBD            Review of Systems   Constitutional: Negative for fever and malaise/fatigue.   HENT: Negative for sinus pain and sore throat.    Eyes: Negative for discharge and redness.   Respiratory: Positive for cough (minmal) and shortness of breath (improved). Negative for sputum production and wheezing.    Cardiovascular: Positive for leg swelling (continues). Negative for chest pain and palpitations.   Gastrointestinal: Negative for abdominal pain, constipation, nausea and vomiting.   Genitourinary: Negative for dysuria.   Musculoskeletal: Negative for joint pain.   Skin: Negative for itching and rash.   Neurological: Negative for dizziness, weakness and headaches.   Psychiatric/Behavioral: Negative for depression. The patient is not nervous/anxious.        Physical Exam   Laboratory/Imaging   Hemodynamics  Temp (24hrs), Av.8 °C (98.2 °F), Min:36.5 °C (97.7 °F), Max:36.9 °C (98.5 °F)   Temperature: 36.9 °C (98.5 °F)  Pulse  Av.1  Min: 65  Max: 128 Heart Rate (Monitored): (!) 103  Blood Pressure : 146/72, NIBP: 142/73       Respiratory      Respiration: 18, Pulse Oximetry: 93 %, O2 Daily Delivery Respiratory : Silicone Nasal Cannula  Given By:: Mouthpiece, #MDI/DPI Given: MDI/DPI x 1, Work Of Breathing / Effort: Mild  RUL Breath Sounds: Clear, RML Breath Sounds: Diminished, RLL Breath Sounds: Diminished, LORNA Breath Sounds: Clear, LLL Breath Sounds: Diminished     Fluids     Intake/Output Summary (Last 24 hours) at 18 1520  Last data filed at 18 1500    Gross per 24  hour   Intake             4166 ml   Output             4200 ml   Net              -34 ml         Nutrition        Orders Placed This Encounter   Procedures   • Diet Order Full Liquid (advance as tolerated ), Hepatic, Diabetic       Standing Status:   Standing       Number of Occurrences:   1       Order Specific Question:   Diet:       Answer:   Full Liquid [11]       Comments:   advance as tolerated        Order Specific Question:   Diet:       Answer:   Hepatic [9]       Order Specific Question:   Diet:       Answer:   Diabetic [3]      Physical Exam   Constitutional: He is oriented to person, place, and time. He appears well-developed and well-nourished.   HENT:   Head: Normocephalic and atraumatic.   Right Ear: External ear normal.   Left Ear: External ear normal.   Nose: Nose normal.   Mouth/Throat: Oropharynx is clear and moist. No oropharyngeal exudate.   High flow nasal cannula in place   Eyes: Pupils are equal, round, and reactive to light. Conjunctivae and EOM are normal. Right eye exhibits no discharge. Left eye exhibits no discharge. Scleral icterus is present.   Neck: Neck supple.   Cardiovascular: Normal rate and regular rhythm.    Pulmonary/Chest: Effort normal. No respiratory distress. He has no wheezes. He has no rales. He exhibits no tenderness.   Improved breath sounds and excursion, no wheezes   Abdominal: Soft. Bowel sounds are normal. He exhibits distension (softer). He exhibits no mass. There is no tenderness. There is no rebound and no guarding.   Musculoskeletal: He exhibits no edema (3+ bilateral) or tenderness.   Neurological: He is alert and oriented to person, place, and time. No cranial nerve deficit.   Skin: Skin is warm and dry. He is not diaphoretic.   Stasis dermatitis, trace jaundice   Psychiatric: He has a normal mood and affect.   Nursing note and vitals reviewed.               Recent Labs      09/12/18   0922  09/13/18   0055  09/14/18   0445   WBC  20.6*  10.9*  9.2   RBC  5.16   4.67*  4.93   HEMOGLOBIN  15.2  13.8*  14.4   HEMATOCRIT  44.3  40.5*  43.4   MCV  85.9  86.7  88.0   MCH  29.5  29.6  29.2   MCHC  34.3  34.1  33.2*   RDW  41.7  43.6  45.5   PLATELETCT  156*  132*  151*   MPV  11.4  11.7  11.9      Recent Labs      09/12/18 0922 09/13/18 0055  09/14/18   0445   SODIUM  131*  138  138   POTASSIUM  3.4*  3.7  3.5*   CHLORIDE  99  111  106   CO2  19*  22  21   GLUCOSE  367*  176*  232*   BUN  18  11  16   CREATININE  0.95  0.57  0.74   CALCIUM  8.3*  7.3*  7.6*              Recent Labs      09/12/18 0922 09/13/18 0055 09/13/18   0702  09/14/18   0445   APTT  47.8*   < >  86.1*  87.1*  56.8*   INR  1.30*   --    --    --    --     < > = values in this interval not displayed.           Recent Labs      09/13/18 0055 09/14/18   0445   BNPBTYPENAT  122*  293*                 Assessment/Plan           Acute on chronic respiratory failure (HCC)   Assessment & Plan     Patient chronically on 2 L oxygen at home and was up to high flow nasal cannula.  Now down to 3 L  COPD, improving             Sepsis due to Enterobacter species (HCC)   Assessment & Plan     This is sepsis (without associated acute organ dysfunction).   2 out of 2 cultures positive for Enterobacter  Likely GI source, with obstructive picture with LFTs and t bili- still jaundiced  Pt refused HIDA scan- will repeat sono- discussed w/ patient  WBC increased w/ levaquin> ID called> changed to fortaz/ flagyl             Diarrhea   Assessment & Plan     c diff negative  improved          Hyponatremia   Assessment & Plan     resolved          Hypokalemia   Assessment & Plan     resolved             Hypophosphatemia   Assessment & Plan     resolved          Abnormal LFTs   Assessment & Plan     Presenting with RUQ pain, now resolved  US with gallstone but no sign of inflammation/fluid  LFTs and T bili down trending without intervention  Repeat sono          Uncontrolled type 2 diabetes mellitus (HCC)- (present on  admission)   Assessment & Plan     Noncompliant with follow-up and home fingersticks  A1c 9.5  Changed to 20 BID  Notified patient will need insulin at DC          Chronic deep vein thrombosis (DVT) (HCC)   Assessment & Plan     On eliquis now          Hepatic steatosis   Assessment & Plan     Due to morbid obesity and uncontrolled diabetes             Non-compliance   Assessment & Plan     Discussed importance of compliance and f/u          Stage 2 moderate COPD by GOLD classification (HCC)- (present on admission)   Assessment & Plan     On 2 L home oxygen  Improved  Change to PO steroids              Quality-Core Measures   Reviewed items::  Labs reviewed and Medications reviewed  Best catheter::  No Best  DVT: eliquis.  Ulcer Prophylaxis::  Not indicated  Antibiotics:  Treating active infection/contamination beyond 24 hours perioperative coverage  Assessed for rehabilitation services:  Patient unable to tolerate rehabilitation therapeutic regimen        Inadvertently replaced rather than edited 9/14 note this is for 9/17

## 2018-09-25 NOTE — PROGRESS NOTES
2150 Assisted patient to the bathroom, patient tolerated well using FWW. Assessment completed, legs are wrapped with dressing with no drainage noted. Provided patient with extra warm blankets. Medicated per MAR. Safety precautions in place.     2300 Assisted patient with ambulating to the bathroom, patient wanted to sit at the edge of the bed for a few minutes. Requested light to be turned off. Call light and belongings are within reach. Will continue to monitor.

## 2018-09-25 NOTE — WOUND TEAM
Pt seen for foot/nail care.  Toenails noted to be overgrown and mycotic.  Patient sat at edge of bed and soaked in water x 10 minutes.  Assisted back to bed.  Trimming and filing completed without difficulty, no knicks or cuts. Removed tubi  stockings and roll gauze due to moisture.  Replaced wrap and replaced tubi F to RLE and tubi E to LLE.  Instructed patient that he could come to [facility] for continued nail care due to DM status with PCP referral.  He also verbalized understanding in obtaining compression wraps when home.

## 2018-09-25 NOTE — DISCHARGE PLANNING
Agency/Facility Name: [facility]  Outcome: Per verbal request from STEPHANIE Toscano, new order along with F2F sent to [facility] via fax.

## 2018-09-25 NOTE — PROGRESS NOTES
Enoch Mistry patient has chosen to leave the hospital against medical advice. The attending physician has not discharged the patient. Patient is not a risk to himself or others. The doctor,  and myself have discussed with the patient the following:  Physician has not determined patient is ready for discharge, Risks and consequences of leaving the hospital too soon and Benefit of continued hospitalization.        Discharge against medical advice form has been Signed.      Patient is a greater than 60 years old and a referral to  was made.  met with patient at the bedside.     Attending physician has been notified and spoke with patient at the bedside     Patient educated by multiple nurses that he still needs diuretics and wound care for his BLE. Patient refuses to believe this, stating instead  That he believes it's a paperwork issue. Patient advised to call his PCP as soon as possible for an appointment to follow up.

## 2018-09-25 NOTE — CARE PLAN
Problem: Infection  Goal: Will remain free from infection  Outcome: PROGRESSING AS EXPECTED  Educated the patient on the importance of hygiene and provided patient with wash clothes to wash his hands after using the bathroom.    Problem: Knowledge Deficit  Goal: Knowledge of disease process/condition, treatment plan, diagnostic tests, and medications will improve  Outcome: PROGRESSING AS EXPECTED  Discussed plan of care with patient including wound care, antibiotics and removal of IJ line. Patient verbalized understanding.

## 2018-09-25 NOTE — PROGRESS NOTES
2018 11:05 PM     Author Provider Type Service Status Last Edit User Date of Service   Sharon Hidalgo M.D. Physician [facility] Signed Sharon Hidalgo M.D. 2018  3:20 PM      []Hide copied text  [facility] Hospitalist Progress Note     Date of Service: 2018     Chief Complaint  72 y.o. male with past medical history of hypertension, COPD, diabetes and noncompliant with meds and diet admitted 2018 with right upper quadrant pain, hypoxemia, sepsis     Interval Problem Update  Now on NC O2  Weakness, abdominal pain and SOB all better  Advised he remains very ill and Serioius bacterial infection in blood  treatening to leave AMA in next couple days  Discussed w/ Crticial care  Consultants/Specialty  Critical care     Disposition  TBD            Review of Systems   Constitutional: Positive for malaise/fatigue (further improvced). Negative for fever.   HENT: Negative for sinus pain and sore throat.    Eyes: Negative for discharge and redness.   Respiratory: Positive for cough and shortness of breath (improved). Negative for sputum production.    Cardiovascular: Positive for leg swelling (recurrent). Negative for chest pain and palpitations.   Gastrointestinal: Positive for abdominal pain (resolved). Negative for constipation, nausea and vomiting.   Genitourinary: Negative for dysuria.   Musculoskeletal: Negative for joint pain.   Skin: Negative for itching and rash.   Neurological: Negative for dizziness, weakness and headaches.   Psychiatric/Behavioral: Negative for depression. The patient is nervous/anxious (wants to go home).        Physical Exam   Laboratory/Imaging   Hemodynamics  Temp (24hrs), Av.8 °C (98.2 °F), Min:36.5 °C (97.7 °F), Max:36.9 °C (98.5 °F)   Temperature: 36.9 °C (98.5 °F)  Pulse  Av.1  Min: 65  Max: 128 Heart Rate (Monitored): (!) 103  Blood Pressure : 146/72, NIBP: 142/73       Respiratory      Respiration: 18, Pulse Oximetry: 93 %, O2 Daily Delivery  Respiratory : Silicone Nasal Cannula  Given By:: Mouthpiece, #MDI/DPI Given: MDI/DPI x 1, Work Of Breathing / Effort: Mild  RUL Breath Sounds: Clear, RML Breath Sounds: Diminished, RLL Breath Sounds: Diminished, LORNA Breath Sounds: Clear, LLL Breath Sounds: Diminished     Fluids     Intake/Output Summary (Last 24 hours) at 09/14/18 1520  Last data filed at 09/14/18 1500    Gross per 24 hour   Intake             4166 ml   Output             4200 ml   Net              -34 ml         Nutrition        Orders Placed This Encounter   Procedures   • Diet Order Full Liquid (advance as tolerated ), Hepatic, Diabetic       Standing Status:   Standing       Number of Occurrences:   1       Order Specific Question:   Diet:       Answer:   Full Liquid [11]       Comments:   advance as tolerated        Order Specific Question:   Diet:       Answer:   Hepatic [9]       Order Specific Question:   Diet:       Answer:   Diabetic [3]      Physical Exam   Constitutional: He is oriented to person, place, and time. He appears well-developed and well-nourished.   Less toxic and ill-appearing more alert   HENT:   Head: Normocephalic and atraumatic.   Right Ear: External ear normal.   Left Ear: External ear normal.   Nose: Nose normal.   Mouth/Throat: Oropharynx is clear and moist. No oropharyngeal exudate.   High flow nasal cannula in place   Eyes: Pupils are equal, round, and reactive to light. Conjunctivae and EOM are normal. Right eye exhibits no discharge. Left eye exhibits no discharge.   Neck: Neck supple.   Cardiovascular: Normal rate and regular rhythm.    Pulmonary/Chest: Effort normal. No respiratory distress. He has no wheezes. He has no rales. He exhibits no tenderness.   Improved breath souunds   Abdominal: Soft. Bowel sounds are normal. He exhibits distension (Still distended ). He exhibits no mass. There is tenderness. There is no rebound and no guarding.   Normal bowel sounds   Musculoskeletal: He exhibits no edema (3+ R  1-2+ L) or tenderness.   Neurological: He is alert and oriented to person, place, and time. No cranial nerve deficit.   Skin: Skin is warm and dry. He is not diaphoretic.   Stasis dermatitis   Psychiatric: His mood appears anxious. His affect is inappropriate. He expresses impulsivity and inappropriate judgment.   Nursing note and vitals reviewed.               Recent Labs      09/12/18 0922 09/13/18 0055 09/14/18 0445   WBC  20.6*  10.9*  9.2   RBC  5.16  4.67*  4.93   HEMOGLOBIN  15.2  13.8*  14.4   HEMATOCRIT  44.3  40.5*  43.4   MCV  85.9  86.7  88.0   MCH  29.5  29.6  29.2   MCHC  34.3  34.1  33.2*   RDW  41.7  43.6  45.5   PLATELETCT  156*  132*  151*   MPV  11.4  11.7  11.9            Recent Labs      09/12/18 0922 09/13/18 0055 09/14/18   0445   SODIUM  131*  138  138   POTASSIUM  3.4*  3.7  3.5*   CHLORIDE  99  111  106   CO2  19*  22  21   GLUCOSE  367*  176*  232*   BUN  18  11  16   CREATININE  0.95  0.57  0.74   CALCIUM  8.3*  7.3*  7.6*              Recent Labs      09/12/18 0922 09/13/18 0055 09/13/18   0702  09/14/18   0445   APTT  47.8*   < >  86.1*  87.1*  56.8*   INR  1.30*   --    --    --    --     < > = values in this interval not displayed.           Recent Labs      09/13/18 0055 09/14/18   0445   BNPBTYPENAT  122*  293*                 Assessment/Plan           Hypophosphatemia   Assessment & Plan     Kphos and f/u ordered          Chronic deep vein thrombosis (DVT) (HCC)   Assessment & Plan     On heparin drip  CTA canceled as it does not           Abnormal LFTs   Assessment & Plan     Patient with known gallstones, sonographic positive Wolf sign and right upper quadrant pain and tenderness  Continue to monitor          Hepatic steatosis   Assessment & Plan     Due to morbid obesity and uncontrolled diabetes  Does not explain current level of LFT abnormalities          Acute on chronic respiratory failure (HCC)   Assessment & Plan     Patient  chronically on 2 L oxygen at home  Now on high flow nasal cannula  Bibasilar fluffy infiltrates and positive pro calcitonin but lack of significant sputum or wheezing          Non-compliance   Assessment & Plan     Does not follow up with outpatient physician instructions             Sepsis due to undetermined organism ([facility])   Assessment & Plan     This is sepsis (without associated acute organ dysfunction).   2 out of 2 cultures positive for NLF gram-negative rods  Still suspect GI source although his abdominal symptoms are improved             Stage 2 moderate COPD by GOLD classification ([facility])- (present on admission)   Assessment & Plan     Mild exacerbation with worsening respiratory failure  RT protocol increased          Uncontrolled type 2 diabetes mellitus ([facility])- (present on admission)   Assessment & Plan     Noncompliant with follow-up and home fingersticks  A1c 9.5  Sugars 200s-Lantus increased              Quality-Core Measures   Reviewed items::  Labs reviewed and Radiology images reviewed  Best catheter::  No Best  DVT prophylaxis pharmacological::  Heparin  Ulcer Prophylaxis::  Not indicated  Antibiotics:  Treating active infection/contamination beyond 24 hours perioperative coverage  Assessed for rehabilitation services:  Patient unable to tolerate rehabilitation therapeutic regimen               inadvertly addended note on 9/17. This is copy of original note/ text from 9/14

## 2018-09-25 NOTE — PROGRESS NOTES
"0215 Patient asked for assistance to the bathroom, patient stated he had \"a lot of gas\" and loose stools. Patient had small amount of mucus stool. PRN medication given.     0314 Patient is sleeping comfortably in bed. Respirations are even and unlabored. Safety precautions in place.   "

## 2018-09-25 NOTE — CARE PLAN
Problem: Safety  Goal: Will remain free from injury  Outcome: PROGRESSING AS EXPECTED  Up to bathroom with one person assist plus walker. Steady with ambulation. Uses call light appropriately. Non-skid socks on with all activity    Problem: Respiratory:  Goal: Respiratory status will improve  Outcome: PROGRESSING AS EXPECTED  Patient titrated down by respiratory therapy. ON 2.5 L via NC    Problem: Skin Integrity  Goal: Risk for impaired skin integrity will decrease  Outcome: PROGRESSING AS EXPECTED  Wound care in to see patient, nails clipped and wound dressings changed

## 2018-09-25 NOTE — PROGRESS NOTES
Telemetry Strip    Strip printed: 0732  Measurements from am strip were as follows:  Rhythm:  SR  HR: 97  Measurements:  .14/.08/.38    Telemetry Shift Summary:    Rhythm: SR/ST  HR: 90s  Ectopy: Occ. PVCs          Normal Values  Rhythm SR  HR Range    Measurements 0.12-0.20 / 0.06-0.10  / 0.30-0.52

## 2018-09-26 ENCOUNTER — PATIENT OUTREACH (OUTPATIENT)
Dept: HEALTH INFORMATION MANAGEMENT | Facility: OTHER | Age: 73
End: 2018-09-26

## 2018-09-26 NOTE — DISCHARGE PLANNING
ATTN: Case Management  RE: Referral for Home Health    Reason for referral denial: Unable to get response from MD if will sign. Patient appointment with new PCP to far into the future and HH is unable to accept                We would like to take this opportunity to thank you for submitting a referral for your patient to continue the journey to recovery with [facility]. We hope to facilitate continued referrals from your facility as our goal is to provide quality, skilled care to as many patients as possible.            Unfortunately, we are not able to accept your patient into our service for the reason listed above. If further clarity is needed, our Intake Coordinators are available to discuss any barriers to service.            If you have any questions or concerns regarding this or future patients’ transition to Home Health, please do not hesitate to contact us. We are open for referrals 7 days a week from 8AM to 5PM at [phone].      We look forward to collaborating with you in the future,  [facility] Team

## 2018-09-26 NOTE — PROGRESS NOTES
IV removed, per patient request due to leaving AMA, patient tolerated well. Wife at bedside for discharge, sent out by wheelchair. All belongings gathered except two gray metal bracelets. Patient called after discharge to let him know they were left behind. Patient did not answer and no voicemail box set up. Bracelets placed in plastic bag and labeled and left with monitor tech.

## 2018-09-28 ENCOUNTER — TELEPHONE (OUTPATIENT)
Dept: MEDICAL GROUP | Facility: MEDICAL CENTER | Age: 73
End: 2018-09-28

## 2018-09-28 ENCOUNTER — PATIENT OUTREACH (OUTPATIENT)
Dept: HEALTH INFORMATION MANAGEMENT | Facility: OTHER | Age: 73
End: 2018-09-28

## 2018-09-28 NOTE — DOCUMENTATION QUERY
DOCUMENTATION QUERY    PROVIDERS: Please select “Cosign w/ note”to reply to query.    To better represent the severity of illness of your patient, please review the following information and exercise your independent professional judgment in responding to this query.     Septic shock is documented in the Progress Notes beginning 9/19.     Based upon the clinical findings, risk factors, and treatment, can this diagnosis be further specified as present on admission or developed after admission?    • Severe sepsis with shock was present on admission  • Sepsis present on admit, with septic shock developing after admission  • Other explanation of clinical findings (please document)  • Unable to determine          The medical record reflects the following:   Clinical Findings  Enterobacter sepsis, present on admission  Cholangitis  Possible obstructive cholecyst  Duodenal ulcer, active bleeding 9/19  Transferred to ICU with hypotension and acute blood loss anemia on 9/19   Treatment IV antibiotics  Laparoscopic cholecystectomy  Blood transfusion  Biliary stent   Risk Factors  Noncompliance with medications and recommendations  Left AMA  Multiple co-morbid conditions   Location within medical record  History and Physical, Progress Notes and Discharge Summary     Thank you,     Lori Miller  [facility] Coder   [phone]

## 2018-09-29 NOTE — TELEPHONE ENCOUNTER
VOICEMAIL  1. [facility]                     Call Back Number: [phone]    2. Message: Calling because pt was seen by [facility] today at home  and would like a referral to the [facility] Program. And would like a call back to further discussion.     3. Patient approves office to leave a detailed voicemail/MyChart message: N\A

## 2018-10-01 NOTE — TELEPHONE ENCOUNTER
What is the referral for?  I need to know a diagnosis or a reason.  Also, the patient can route questions through [employer] or through phone call with MA.

## 2018-10-01 NOTE — TELEPHONE ENCOUNTER
Nicolas Taylor said was he was seen [facility] and needed a referral to join the program, she didn't leave to many details but did we could give her a call. Ill give her a call before the end of the day.

## 2018-10-02 ENCOUNTER — OFFICE VISIT (OUTPATIENT)
Dept: MEDICAL GROUP | Facility: MEDICAL CENTER | Age: 73
End: 2018-10-02
Payer: MEDICARE

## 2018-10-02 VITALS
WEIGHT: 216 LBS | HEART RATE: 109 BPM | BODY MASS INDEX: 33.9 KG/M2 | TEMPERATURE: 97.7 F | HEIGHT: 67 IN | SYSTOLIC BLOOD PRESSURE: 118 MMHG | DIASTOLIC BLOOD PRESSURE: 52 MMHG | OXYGEN SATURATION: 97 %

## 2018-10-02 DIAGNOSIS — I82.503 CHRONIC DEEP VEIN THROMBOSIS (DVT) OF BOTH LOWER EXTREMITIES, UNSPECIFIED VEIN (HCC): ICD-10-CM

## 2018-10-02 DIAGNOSIS — E11.65 UNCONTROLLED TYPE 2 DIABETES MELLITUS WITH HYPERGLYCEMIA (HCC): ICD-10-CM

## 2018-10-02 DIAGNOSIS — K80.50 COMMON BILE DUCT STONE: ICD-10-CM

## 2018-10-02 DIAGNOSIS — R60.0 LOWER EXTREMITY EDEMA: ICD-10-CM

## 2018-10-02 DIAGNOSIS — E87.6 HYPOKALEMIA: ICD-10-CM

## 2018-10-02 DIAGNOSIS — J96.11 CHRONIC HYPOXEMIC RESPIRATORY FAILURE (HCC): ICD-10-CM

## 2018-10-02 DIAGNOSIS — I10 ESSENTIAL HYPERTENSION: ICD-10-CM

## 2018-10-02 DIAGNOSIS — E66.9 OBESITY (BMI 30-39.9): ICD-10-CM

## 2018-10-02 DIAGNOSIS — J44.9 STAGE 2 MODERATE COPD BY GOLD CLASSIFICATION (HCC): ICD-10-CM

## 2018-10-02 PROBLEM — K92.2 GI BLEEDING: Status: RESOLVED | Noted: 2018-09-19 | Resolved: 2018-10-02

## 2018-10-02 PROBLEM — S46.811A STRAIN OF RIGHT TRAPEZIUS MUSCLE: Status: RESOLVED | Noted: 2017-06-06 | Resolved: 2018-10-02

## 2018-10-02 PROBLEM — Z91.199 NON-COMPLIANCE: Status: RESOLVED | Noted: 2018-09-12 | Resolved: 2018-10-02

## 2018-10-02 PROBLEM — R31.9 HEMATURIA: Status: RESOLVED | Noted: 2017-09-11 | Resolved: 2018-10-02

## 2018-10-02 PROCEDURE — 99215 OFFICE O/P EST HI 40 MIN: CPT | Performed by: PHYSICIAN ASSISTANT

## 2018-10-02 NOTE — TELEPHONE ENCOUNTER
Spoke to Laura she has stated that she is stilling waiting a response from Karen who works at [facility]. So we can figure out exactly what we need.

## 2018-10-02 NOTE — ASSESSMENT & PLAN NOTE
Was seen at the hospital for sepsis and likely peptic ulcer disease. Had a cholecystectomy. Feels much better now. No abdominal pain.

## 2018-10-02 NOTE — ASSESSMENT & PLAN NOTE
This is a 72-year-old male who is here today to discuss several chronic medical concerns. Recent A1c in the hospital was 9.5. He's never been on any diabetic medications. Was wondering why he was on insulin at the hospital. A1c has been as high as 10.5. His low as 8.2. He is hesitant to start any oral medication. Doesn't want to start metformin. Wants to discuss metformin.

## 2018-10-02 NOTE — ASSESSMENT & PLAN NOTE
Has chronic use of O2. Currently on 2 L. Denies any shortness of breath or chest pain. Gets oxygen from preferred home health.

## 2018-10-02 NOTE — ASSESSMENT & PLAN NOTE
Has chronic lower extremity edema. States during the hospitalization and worsening ulcers. Currently his ulcers have improved. Denies any pain.

## 2018-10-02 NOTE — ASSESSMENT & PLAN NOTE
Has chronic hypertension and is currently on no medication. Wants to know if he needs to stop amlodipine.

## 2018-10-08 ENCOUNTER — OFFICE VISIT (OUTPATIENT)
Dept: ENDOCRINOLOGY | Facility: MEDICAL CENTER | Age: 73
End: 2018-10-08
Payer: MEDICARE

## 2018-10-08 VITALS
WEIGHT: 210.6 LBS | BODY MASS INDEX: 33.06 KG/M2 | OXYGEN SATURATION: 93 % | HEART RATE: 119 BPM | DIASTOLIC BLOOD PRESSURE: 72 MMHG | SYSTOLIC BLOOD PRESSURE: 132 MMHG | HEIGHT: 67 IN

## 2018-10-08 DIAGNOSIS — E11.649 UNCONTROLLED TYPE 2 DIABETES MELLITUS WITH HYPOGLYCEMIA WITHOUT COMA (HCC): ICD-10-CM

## 2018-10-08 DIAGNOSIS — I10 ESSENTIAL HYPERTENSION: ICD-10-CM

## 2018-10-08 DIAGNOSIS — L97.911 LEG ULCER, RIGHT, LIMITED TO BREAKDOWN OF SKIN (HCC): ICD-10-CM

## 2018-10-08 LAB
HBA1C MFR BLD: 0 % (ref ?–5.8)
INT CON NEG: NORMAL
INT CON POS: NORMAL

## 2018-10-08 PROCEDURE — 99204 OFFICE O/P NEW MOD 45 MIN: CPT | Performed by: PHYSICIAN ASSISTANT

## 2018-10-08 PROCEDURE — 83036 HEMOGLOBIN GLYCOSYLATED A1C: CPT | Performed by: PHYSICIAN ASSISTANT

## 2018-10-08 NOTE — PROGRESS NOTES
New Patient Consult Note  Referred by: Charles Duke M.D.    Reason for consult: Diabetes Management Type 2    HPI:  Enoch Mistry is a 72 y.o. old patient who is seeing us today for diabetes care.  This is a pleasant patient with diabetes and I appreciate the opportunity to participate in the care of this patient.  This is a new patient with me today.    Labs of 9/12/18 HbA1c was 9.5  Labs of 9/25/18 GFR >60  Labs of 9/22/17 HbA1c was 8.8    BG Diary:10/8/2018  In the AM:  Did not bring    Has been Diabetic since states several years ago  Has a Glucagon pen at home: no    1. Uncontrolled type 2 diabetes mellitus with hypoglycemia without coma (HCC)  This is a new patient with me on 10/8/18  He is on:  1. Nothing for Diabetes    2. Essential hypertension  This is stable       ROS:   Constitutional: No change in weight , No fatigue, No night sweats.  HEENT: No Headache.  Eyes:  No blurred vision, No visual changes.  Cardiac: No chest pain, No palpitations.  Resp: No shortness of breath, No cough,   Gastro: No nausea or vomiting, No diarrhea.  Neuro: Denies numbness or tinging in bilateral feet or hands, and no loss of sensation.  Endo: No heat or cold intolerance.  : No polyuria, No polydipsia, No chronic UTI's.  Lower extremities: No lower leg edema bilateral.  All other systems were reviewed and were negative.    Past Medical History:  Patient Active Problem List    Diagnosis Date Noted   • Acute on chronic respiratory failure (HCC) 09/12/2018     Priority: High   • Hypokalemia 09/15/2018     Priority: Medium   • Hyponatremia 09/15/2018     Priority: Medium   • Hypophosphatemia 09/14/2018     Priority: Medium   • Uncontrolled type 2 diabetes mellitus (HCC) 06/06/2017     Priority: Medium   • Hepatic steatosis 09/12/2018     Priority: Low   • Chronic deep vein thrombosis (DVT) ([facility]) 09/12/2018     Priority: Low   • Stage 2 moderate COPD by GOLD classification ([facility]) 06/21/2017     Priority: Low   •  Obesity (BMI 30-39.9) 10/02/2018   • PUD (peptic ulcer disease) 09/23/2018   • Common bile duct stone 09/19/2018   • Elevated PSA 09/21/2017   • History of BPH 09/11/2017   • Health maintenance alteration - declines vaccines + colonoscopy 06/21/2017   • Requires continuous at home supplemental oxygen 06/06/2017   • Benign non-nodular prostatic hyperplasia with lower urinary tract symptoms 06/06/2017   • Lower extremity edema 06/06/2017   • Chronic hypoxemic respiratory failure (HCC) 01/07/2016   • Adrenal adenoma 01/07/2016   • Pulmonary nodule 04/03/2015   • History of bladder cancer 04/02/2015   • Essential hypertension 04/01/2015       Past Surgical History:  Past Surgical History:   Procedure Laterality Date   • ERCP  9/21/2018    Procedure: ERCP;  Surgeon: Harman Michelle M.D.;  Location: [facility];  Service: EUS   • DEWEY BY LAPAROSCOPY  9/20/2018    Procedure: DEWEY BY LAPAROSCOPY;  Surgeon: Seamus Gaston M.D.;  Location: [facility];  Service: General   • GASTROSCOPY-ENDO  9/19/2018    Procedure: GASTROSCOPY-ENDO;  Surgeon: Darnell Hernández D.O.;  Location: [facility];  Service: Gastroenterology   • CYSTOSCOPY N/A 9/11/2017    Procedure: CYSTOSCOPY- CLOT EVACUATION, FULGURATION, OF PROSTATE;  Surgeon: Srini Giles M.D.;  Location: [facility];  Service: Urology   • CYSTOSCOPY  5/7/2015    Procedure: CYSTOSCOPY [57.32] ;  Surgeon: Srini Giles M.D.;  Location: [facility];  Service:    • TRANS URETHRAL RESECTION PROSTATE  5/7/2015    Procedure: TRANS URETHRAL RESECTION PROSTATE [60.20];  Surgeon: Srini Giles M.D.;  Location: [facility];  Service:    • TRANS URETHRAL RESECTION BLADDER  5/7/2015    Procedure: TRANS URETHRAL RESECTION BLADDER [57.49A];  Surgeon: Srini Giles M.D.;  Location: [facility];  Service:        Allergies:  Patient has no known allergies.    Social History:  Social History  "    Social History   • Marital status:      Spouse name: N/A   • Number of children: N/A   • Years of education: N/A     Occupational History   • Not on file.     Social History Main Topics   • Smoking status: Former Smoker     Packs/day: 2.00     Years: 40.00     Types: Cigarettes     Quit date: 1/1/2006   • Smokeless tobacco: Never Used   • Alcohol use 0.0 oz/week      Comment: 7 beers per week   • Drug use: No   • Sexual activity: Not Currently     Partners: Female     Other Topics Concern   • Not on file     Social History Narrative   • No narrative on file       Family History:  No family history on file.    Medications:    Current Outpatient Prescriptions:   •  POTASSIUM PO, Take  by mouth., Disp: , Rfl:       Physical Examination:   Vital signs: /72 (BP Location: Left arm, Patient Position: Sitting, BP Cuff Size: Large adult)   Pulse (!) 119   Ht 1.702 m (5' 7.01\")   Wt 95.5 kg (210 lb 9.6 oz)   SpO2 93% Comment: on oxygen 2%  BMI 32.98 kg/m²   General: No distress, cooperative, well dressed and well nourished.   Eyes: No scleral icterus or discharge, No hyposphagma  ENMT: Normal on external inspection of nose, lips, No nasal drainage   Neck: No abnormal masses on inspection  Resp: Normal effort, Bilateral clear to auscultation, No wheezing, No rales  CVS: Regular rate and rhythm, S1 S2 normal, No murmur. No gallop  Extremities: No edema bilateral extremities  Neuro: Alert and oriented  Psych: Normal mood and affect  +edema lower bilat legs       Assessment and Plan:    1. Uncontrolled type 2 diabetes mellitus with hypoglycemia without coma (HCC)    START:  1.  Ozempic 0.25 once a week THEN on 8/29/18 INCREASE to 0.5    2. Essential hypertension  This is stable and no changes needed    3.  Right lower anterior leg is an open 2cm by 1cm ulcer that is leaking clear fluid.  Refer to wound management.  It does not appear to be infected.     Needs to get back in with his PCP I explained to the " patient      The total time spent seeing this patient today face to face in consultation, and formulating an action plan for this visit was greater than 41 minutes. > Than 50% of this time was spent counseling, discussing problems documented above and below, coordinating care and answering questions by the physician assistant.  We developed a diabetes care plan for this patient today.        Return in about 6 weeks (around 11/19/2018).    Blood glucose log: Check BG in the morning when wake up, before lunch or dinner and before bed.  So three times a day.  Always bring BG diary to the next office visit.     This patient during there office visit was started on new medication.  Ozempic Side effects of new medications were discussed with the patient today in the office. The patient was supplied paperwork on this new medication.    Thank you kindly for allowing me to participate in the diabetes care plan for this patient.    Orville Medina PA-C, BC-ADM  Board Certified - Advanced Diabetes Management  10/08/18    CC:   Charles Duke M.D.

## 2018-10-08 NOTE — PATIENT INSTRUCTIONS
Blood glucose log: Check BG in the morning when wake up,and before bed.  So two times a day.  Always bring BG diary to the next office visit.     START:  1.  Ozempic 0.25 once a week THEN on 8/29/18 INCREASE to 0.5 (Monday)

## 2018-10-11 ENCOUNTER — TELEPHONE (OUTPATIENT)
Dept: MEDICAL GROUP | Facility: MEDICAL CENTER | Age: 73
End: 2018-10-11

## 2018-10-11 DIAGNOSIS — J96.11 CHRONIC HYPOXEMIC RESPIRATORY FAILURE (HCC): ICD-10-CM

## 2018-10-11 DIAGNOSIS — R60.0 LOWER EXTREMITY EDEMA: ICD-10-CM

## 2018-10-11 DIAGNOSIS — E66.9 OBESITY (BMI 30-39.9): ICD-10-CM

## 2018-10-11 DIAGNOSIS — Z99.81 REQUIRES CONTINUOUS AT HOME SUPPLEMENTAL OXYGEN: ICD-10-CM

## 2018-10-11 DIAGNOSIS — E11.649 UNCONTROLLED TYPE 2 DIABETES MELLITUS WITH HYPOGLYCEMIA WITHOUT COMA (HCC): ICD-10-CM

## 2018-10-11 DIAGNOSIS — J44.9 STAGE 2 MODERATE COPD BY GOLD CLASSIFICATION (HCC): ICD-10-CM

## 2018-10-11 DIAGNOSIS — I82.503 CHRONIC DEEP VEIN THROMBOSIS (DVT) OF BOTH LOWER EXTREMITIES, UNSPECIFIED VEIN (HCC): ICD-10-CM

## 2018-10-11 NOTE — TELEPHONE ENCOUNTER
Dr. Duke we received a call today from Mr. 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

## 2018-10-12 ENCOUNTER — HOME HEALTH ADMISSION (OUTPATIENT)
Dept: HOME HEALTH SERVICES | Facility: HOME HEALTHCARE | Age: 73
End: 2018-10-12
Payer: MEDICARE

## 2018-10-12 NOTE — TELEPHONE ENCOUNTER
Home health referral placed.  Please mail this to the patient's home.  Also, please call the insurance company or referral person to clarify that I have placed a new referral to their specifications.

## 2018-10-13 ENCOUNTER — HOME CARE VISIT (OUTPATIENT)
Dept: HOME HEALTH SERVICES | Facility: HOME HEALTHCARE | Age: 73
End: 2018-10-13
Payer: MEDICARE

## 2018-10-13 VITALS
TEMPERATURE: 97.8 F | RESPIRATION RATE: 20 BRPM | BODY MASS INDEX: 31.71 KG/M2 | OXYGEN SATURATION: 99 % | HEIGHT: 67 IN | DIASTOLIC BLOOD PRESSURE: 66 MMHG | WEIGHT: 202 LBS | SYSTOLIC BLOOD PRESSURE: 120 MMHG | HEART RATE: 98 BPM

## 2018-10-13 PROCEDURE — 665001 SOC-HOME HEALTH

## 2018-10-13 PROCEDURE — G0493 RN CARE EA 15 MIN HH/HOSPICE: HCPCS

## 2018-10-13 ASSESSMENT — PATIENT HEALTH QUESTIONNAIRE - PHQ9
1. LITTLE INTEREST OR PLEASURE IN DOING THINGS: 00
2. FEELING DOWN, DEPRESSED, IRRITABLE, OR HOPELESS: 00

## 2018-10-13 ASSESSMENT — ENCOUNTER SYMPTOMS
SHORTNESS OF BREATH: T
MENTAL STATUS CHANGE: 0
POOR JUDGMENT: 1

## 2018-10-15 ENCOUNTER — TELEPHONE (OUTPATIENT)
Dept: HEALTH INFORMATION MANAGEMENT | Facility: OTHER | Age: 73
End: 2018-10-15

## 2018-10-15 ENCOUNTER — HOME CARE VISIT (OUTPATIENT)
Dept: HOME HEALTH SERVICES | Facility: HOME HEALTHCARE | Age: 73
End: 2018-10-15
Payer: MEDICARE

## 2018-10-15 RX ORDER — BLOOD-GLUCOSE METER
KIT MISCELLANEOUS
Qty: 90 STRIP | Refills: 5 | Status: SHIPPED | OUTPATIENT
Start: 2018-10-15 | End: 2019-10-16 | Stop reason: SDUPTHER

## 2018-10-15 SDOH — ECONOMIC STABILITY: HOUSING INSECURITY
HOME SAFETY: OXYGEN SAFETY RISK ASSESSMENT PERFORMED. PATIENT PT WAS GIVEN A NO SMOKING SIGN AND PROVIDED EDUCATION ABOUT WHY IT IS IMPORTANT TO PLACE ONE. PATIENT DOES HAVE A WORKING FIRE EXTINGUISHER PRESENT IN THE HOME. SMOKE ALARMS ARE PRESENT AND FUNCTIONAL

## 2018-10-15 SDOH — ECONOMIC STABILITY: HOUSING INSECURITY
HOME SAFETY: ON EACH LEVEL OF THE HOME. PATIENT DOES HAVE A FIRE ESCAPE PLAN DEVELOPED. PATIENT DOES NOT HAVE FLAMMABLE MATERIALS PRESENT IN THE HOME PRESENTING A FIRE HAZARD. NO EVIDENCE FOUND OF SMOKING MATERIALS PRESENT IN THE HOME.

## 2018-10-15 SDOH — ECONOMIC STABILITY: HOUSING INSECURITY: UNSAFE APPLIANCES: 0

## 2018-10-15 SDOH — ECONOMIC STABILITY: HOUSING INSECURITY: UNSAFE COOKING RANGE AREA: 0

## 2018-10-15 ASSESSMENT — ACTIVITIES OF DAILY LIVING (ADL): OASIS_M1830: 01

## 2018-10-15 NOTE — TELEPHONE ENCOUNTER
Received referral from [facility]. Medications reviewed. No clinically significant interaction or medication issues noted.     Irene Posadas, JordyD

## 2018-10-16 ENCOUNTER — HOME CARE VISIT (OUTPATIENT)
Dept: HOME HEALTH SERVICES | Facility: HOME HEALTHCARE | Age: 73
End: 2018-10-16
Payer: MEDICARE

## 2018-10-16 VITALS
OXYGEN SATURATION: 94 % | RESPIRATION RATE: 18 BRPM | TEMPERATURE: 98.8 F | SYSTOLIC BLOOD PRESSURE: 130 MMHG | DIASTOLIC BLOOD PRESSURE: 65 MMHG | HEART RATE: 89 BPM

## 2018-10-16 VITALS
DIASTOLIC BLOOD PRESSURE: 60 MMHG | HEART RATE: 85 BPM | RESPIRATION RATE: 18 BRPM | OXYGEN SATURATION: 98 % | SYSTOLIC BLOOD PRESSURE: 122 MMHG | TEMPERATURE: 98 F

## 2018-10-16 PROCEDURE — G0495 RN CARE TRAIN/EDU IN HH: HCPCS

## 2018-10-16 PROCEDURE — G0151 HHCP-SERV OF PT,EA 15 MIN: HCPCS

## 2018-10-16 SDOH — ECONOMIC STABILITY: HOUSING INSECURITY: UNSAFE APPLIANCES: 0

## 2018-10-16 SDOH — ECONOMIC STABILITY: HOUSING INSECURITY: UNSAFE COOKING RANGE AREA: 0

## 2018-10-18 ENCOUNTER — HOME CARE VISIT (OUTPATIENT)
Dept: HOME HEALTH SERVICES | Facility: HOME HEALTHCARE | Age: 73
End: 2018-10-18
Payer: MEDICARE

## 2018-10-18 VITALS
HEART RATE: 86 BPM | WEIGHT: 202 LBS | OXYGEN SATURATION: 96 % | RESPIRATION RATE: 18 BRPM | BODY MASS INDEX: 31.64 KG/M2 | DIASTOLIC BLOOD PRESSURE: 64 MMHG | TEMPERATURE: 98.3 F | SYSTOLIC BLOOD PRESSURE: 132 MMHG

## 2018-10-18 PROCEDURE — G0493 RN CARE EA 15 MIN HH/HOSPICE: HCPCS

## 2018-10-18 ASSESSMENT — ENCOUNTER SYMPTOMS: DEBILITATING PAIN: 1

## 2018-10-19 ENCOUNTER — HOSPITAL ENCOUNTER (OUTPATIENT)
Dept: LAB | Facility: MEDICAL CENTER | Age: 73
End: 2018-10-19
Attending: PHYSICIAN ASSISTANT
Payer: MEDICARE

## 2018-10-19 DIAGNOSIS — E87.6 HYPOKALEMIA: ICD-10-CM

## 2018-10-19 LAB
ALBUMIN SERPL BCP-MCNC: 3.7 G/DL (ref 3.2–4.9)
ALBUMIN/GLOB SERPL: 0.9 G/DL
ALP SERPL-CCNC: 89 U/L (ref 30–99)
ALT SERPL-CCNC: 19 U/L (ref 2–50)
ANION GAP SERPL CALC-SCNC: 6 MMOL/L (ref 0–11.9)
AST SERPL-CCNC: 23 U/L (ref 12–45)
BILIRUB SERPL-MCNC: 0.9 MG/DL (ref 0.1–1.5)
BUN SERPL-MCNC: 8 MG/DL (ref 8–22)
CALCIUM SERPL-MCNC: 9.5 MG/DL (ref 8.5–10.5)
CHLORIDE SERPL-SCNC: 105 MMOL/L (ref 96–112)
CO2 SERPL-SCNC: 27 MMOL/L (ref 20–33)
CREAT SERPL-MCNC: 0.58 MG/DL (ref 0.5–1.4)
FASTING STATUS PATIENT QL REPORTED: NORMAL
GLOBULIN SER CALC-MCNC: 4 G/DL (ref 1.9–3.5)
GLUCOSE SERPL-MCNC: 148 MG/DL (ref 65–99)
POTASSIUM SERPL-SCNC: 3.9 MMOL/L (ref 3.6–5.5)
PROT SERPL-MCNC: 7.7 G/DL (ref 6–8.2)
SODIUM SERPL-SCNC: 138 MMOL/L (ref 135–145)

## 2018-10-19 PROCEDURE — 80053 COMPREHEN METABOLIC PANEL: CPT

## 2018-10-19 PROCEDURE — 36415 COLL VENOUS BLD VENIPUNCTURE: CPT

## 2018-10-23 ENCOUNTER — HOME CARE VISIT (OUTPATIENT)
Dept: HOME HEALTH SERVICES | Facility: HOME HEALTHCARE | Age: 73
End: 2018-10-23
Payer: MEDICARE

## 2018-10-23 VITALS
DIASTOLIC BLOOD PRESSURE: 70 MMHG | BODY MASS INDEX: 31.32 KG/M2 | TEMPERATURE: 97.4 F | HEART RATE: 97 BPM | OXYGEN SATURATION: 95 % | WEIGHT: 200 LBS | RESPIRATION RATE: 18 BRPM | SYSTOLIC BLOOD PRESSURE: 120 MMHG

## 2018-10-23 PROCEDURE — G0495 RN CARE TRAIN/EDU IN HH: HCPCS

## 2018-10-23 ASSESSMENT — ENCOUNTER SYMPTOMS: DEBILITATING PAIN: 1

## 2018-10-24 ASSESSMENT — ENCOUNTER SYMPTOMS: MENTAL STATUS CHANGE: 0

## 2018-10-25 ENCOUNTER — PATIENT OUTREACH (OUTPATIENT)
Dept: HEALTH INFORMATION MANAGEMENT | Facility: OTHER | Age: 73
End: 2018-10-25

## 2018-10-25 ENCOUNTER — HOME CARE VISIT (OUTPATIENT)
Dept: HOME HEALTH SERVICES | Facility: HOME HEALTHCARE | Age: 73
End: 2018-10-25
Payer: MEDICARE

## 2018-10-25 VITALS
TEMPERATURE: 97.9 F | OXYGEN SATURATION: 98 % | HEART RATE: 95 BPM | DIASTOLIC BLOOD PRESSURE: 60 MMHG | RESPIRATION RATE: 18 BRPM | SYSTOLIC BLOOD PRESSURE: 126 MMHG

## 2018-10-25 PROCEDURE — G0495 RN CARE TRAIN/EDU IN HH: HCPCS

## 2018-10-29 PROCEDURE — G0180 MD CERTIFICATION HHA PATIENT: HCPCS | Performed by: FAMILY MEDICINE

## 2018-10-29 ASSESSMENT — ACTIVITIES OF DAILY LIVING (ADL): HOME_HEALTH_OASIS: 00

## 2018-11-01 ENCOUNTER — HOME CARE VISIT (OUTPATIENT)
Dept: HOME HEALTH SERVICES | Facility: HOME HEALTHCARE | Age: 73
End: 2018-11-01
Payer: MEDICARE

## 2018-11-01 ENCOUNTER — APPOINTMENT (OUTPATIENT)
Dept: ADMISSIONS | Facility: MEDICAL CENTER | Age: 73
End: 2018-11-01
Attending: INTERNAL MEDICINE
Payer: MEDICARE

## 2018-11-01 DIAGNOSIS — Z01.812 PRE-OPERATIVE LABORATORY EXAMINATION: ICD-10-CM

## 2018-11-01 LAB
BASOPHILS # BLD AUTO: 0.5 % (ref 0–1.8)
BASOPHILS # BLD: 0.05 K/UL (ref 0–0.12)
EOSINOPHIL # BLD AUTO: 0.87 K/UL (ref 0–0.51)
EOSINOPHIL NFR BLD: 9 % (ref 0–6.9)
ERYTHROCYTE [DISTWIDTH] IN BLOOD BY AUTOMATED COUNT: 45.1 FL (ref 35.9–50)
EST. AVERAGE GLUCOSE BLD GHB EST-MCNC: 151 MG/DL
HBA1C MFR BLD: 6.9 % (ref 0–5.6)
HCT VFR BLD AUTO: 36.7 % (ref 42–52)
HGB BLD-MCNC: 11.2 G/DL (ref 14–18)
IMM GRANULOCYTES # BLD AUTO: 0.01 K/UL (ref 0–0.11)
IMM GRANULOCYTES NFR BLD AUTO: 0.1 % (ref 0–0.9)
LYMPHOCYTES # BLD AUTO: 1.63 K/UL (ref 1–4.8)
LYMPHOCYTES NFR BLD: 16.8 % (ref 22–41)
MCH RBC QN AUTO: 25.9 PG (ref 27–33)
MCHC RBC AUTO-ENTMCNC: 30.5 G/DL (ref 33.7–35.3)
MCV RBC AUTO: 85 FL (ref 81.4–97.8)
MONOCYTES # BLD AUTO: 0.56 K/UL (ref 0–0.85)
MONOCYTES NFR BLD AUTO: 5.8 % (ref 0–13.4)
NEUTROPHILS # BLD AUTO: 6.6 K/UL (ref 1.82–7.42)
NEUTROPHILS NFR BLD: 67.8 % (ref 44–72)
NRBC # BLD AUTO: 0 K/UL
NRBC BLD-RTO: 0 /100 WBC
PLATELET # BLD AUTO: 217 K/UL (ref 164–446)
PMV BLD AUTO: 12.6 FL (ref 9–12.9)
RBC # BLD AUTO: 4.32 M/UL (ref 4.7–6.1)
WBC # BLD AUTO: 9.7 K/UL (ref 4.8–10.8)

## 2018-11-01 PROCEDURE — 85025 COMPLETE CBC W/AUTO DIFF WBC: CPT

## 2018-11-01 PROCEDURE — 36415 COLL VENOUS BLD VENIPUNCTURE: CPT

## 2018-11-01 PROCEDURE — 83036 HEMOGLOBIN GLYCOSYLATED A1C: CPT

## 2018-11-01 NOTE — OR NURSING
"Preparing for your Procedure information\" sheet given to patient with verbal and written instructions. Patient instructed to continue prescribed medications through the day before surgery, instructed to take the following medications the day of surgery per anesthesia protocol    "

## 2018-11-02 NOTE — PROGRESS NOTES
Enoch Mistry was admitted for sepsis on 9/12/18, once treated he was discharged home on 9/25/18. [facility] patient advocate assisted with multiple discharge needs including 2 appointments, 1 Primary Care Physician appointment, 1 Endocrinology appointment. Of the 2 appointments the patient kept 2. Patient also has 1 future follow up appointment with Endocrinology on 11/19.  Patient advocate assisted by referring patient to [facility] to have patient's symptoms evaluated and also coordinated Home Health services for the patient through his PCP provider which patient is currently utilizing 3 times a week.  PPS Screening 70%

## 2018-11-06 ENCOUNTER — HOME CARE VISIT (OUTPATIENT)
Dept: HOME HEALTH SERVICES | Facility: HOME HEALTHCARE | Age: 73
End: 2018-11-06
Payer: MEDICARE

## 2018-11-06 PROCEDURE — G0495 RN CARE TRAIN/EDU IN HH: HCPCS

## 2018-11-07 VITALS
OXYGEN SATURATION: 98 % | RESPIRATION RATE: 18 BRPM | SYSTOLIC BLOOD PRESSURE: 130 MMHG | TEMPERATURE: 97.4 F | WEIGHT: 200 LBS | DIASTOLIC BLOOD PRESSURE: 76 MMHG | HEART RATE: 78 BPM | BODY MASS INDEX: 31.32 KG/M2

## 2018-11-13 ENCOUNTER — HOME CARE VISIT (OUTPATIENT)
Dept: HOME HEALTH SERVICES | Facility: HOME HEALTHCARE | Age: 73
End: 2018-11-13
Payer: MEDICARE

## 2018-11-13 SDOH — ECONOMIC STABILITY: HOUSING INSECURITY
HOME SAFETY: OXYGEN SAFETY RISK ASSESSMENT PERFORMED. PATIENT DOES HAVE A NO SMOKING SIGN POSTED IN THE HOME. PATIENT DOES HAVE A WORKING FIRE EXTINGUISHER PRESENT IN THE HOME. SMOKE ALARMS ARE PRESENT AND FUNCTIONAL ON EACH LEVEL OF THE HOME. PATIENT DOES HAVE A

## 2018-11-13 SDOH — ECONOMIC STABILITY: HOUSING INSECURITY: UNSAFE APPLIANCES: 0

## 2018-11-13 SDOH — ECONOMIC STABILITY: HOUSING INSECURITY: UNSAFE COOKING RANGE AREA: 0

## 2018-11-13 SDOH — ECONOMIC STABILITY: HOUSING INSECURITY
HOME SAFETY: FIRE ESCAPE PLAN DEVELOPED. PATIENT DOES NOT HAVE FLAMMABLE MATERIALS PRESENT IN THE HOME PRESENTING A FIRE HAZARD. NO EVIDENCE FOUND OF SMOKING MATERIALS PRESENT IN THE HOME.

## 2018-11-13 ASSESSMENT — ACTIVITIES OF DAILY LIVING (ADL): HOME_HEALTH_OASIS: 00

## 2018-11-15 ENCOUNTER — HOSPITAL ENCOUNTER (OUTPATIENT)
Facility: MEDICAL CENTER | Age: 73
End: 2018-11-15
Attending: INTERNAL MEDICINE | Admitting: INTERNAL MEDICINE
Payer: MEDICARE

## 2018-11-15 ENCOUNTER — APPOINTMENT (OUTPATIENT)
Dept: RADIOLOGY | Facility: MEDICAL CENTER | Age: 73
End: 2018-11-15
Attending: INTERNAL MEDICINE
Payer: MEDICARE

## 2018-11-15 VITALS
RESPIRATION RATE: 18 BRPM | WEIGHT: 207.45 LBS | OXYGEN SATURATION: 94 % | BODY MASS INDEX: 32.56 KG/M2 | TEMPERATURE: 96.8 F | HEIGHT: 67 IN | SYSTOLIC BLOOD PRESSURE: 140 MMHG | DIASTOLIC BLOOD PRESSURE: 74 MMHG | HEART RATE: 88 BPM

## 2018-11-15 LAB — GLUCOSE BLD-MCNC: 125 MG/DL (ref 65–99)

## 2018-11-15 PROCEDURE — 502240 HCHG MISC OR SUPPLY RC 0272: Performed by: INTERNAL MEDICINE

## 2018-11-15 PROCEDURE — 700111 HCHG RX REV CODE 636 W/ 250 OVERRIDE (IP)

## 2018-11-15 PROCEDURE — 160048 HCHG OR STATISTICAL LEVEL 1-5: Performed by: INTERNAL MEDICINE

## 2018-11-15 PROCEDURE — 160025 RECOVERY II MINUTES (STATS): Performed by: INTERNAL MEDICINE

## 2018-11-15 PROCEDURE — C1769 GUIDE WIRE: HCPCS | Performed by: INTERNAL MEDICINE

## 2018-11-15 PROCEDURE — 82962 GLUCOSE BLOOD TEST: CPT

## 2018-11-15 PROCEDURE — 700101 HCHG RX REV CODE 250

## 2018-11-15 PROCEDURE — 160203 HCHG ENDO MINUTES - 1ST 30 MINS LEVEL 4: Performed by: INTERNAL MEDICINE

## 2018-11-15 PROCEDURE — 500066 HCHG BITE BLOCK, ECT: Performed by: INTERNAL MEDICINE

## 2018-11-15 PROCEDURE — 110371 HCHG SHELL REV 272: Performed by: INTERNAL MEDICINE

## 2018-11-15 PROCEDURE — 74328 X-RAY BILE DUCT ENDOSCOPY: CPT

## 2018-11-15 PROCEDURE — 160009 HCHG ANES TIME/MIN: Performed by: INTERNAL MEDICINE

## 2018-11-15 PROCEDURE — 160002 HCHG RECOVERY MINUTES (STAT): Performed by: INTERNAL MEDICINE

## 2018-11-15 PROCEDURE — 700117 HCHG RX CONTRAST REV CODE 255

## 2018-11-15 PROCEDURE — 160035 HCHG PACU - 1ST 60 MINS PHASE I: Performed by: INTERNAL MEDICINE

## 2018-11-15 PROCEDURE — 160208 HCHG ENDO MINUTES - EA ADDL 1 MIN LEVEL 4: Performed by: INTERNAL MEDICINE

## 2018-11-15 PROCEDURE — 160046 HCHG PACU - 1ST 60 MINS PHASE II: Performed by: INTERNAL MEDICINE

## 2018-11-15 RX ORDER — ONDANSETRON 2 MG/ML
4 INJECTION INTRAMUSCULAR; INTRAVENOUS
Status: DISCONTINUED | OUTPATIENT
Start: 2018-11-15 | End: 2018-11-15 | Stop reason: HOSPADM

## 2018-11-15 RX ORDER — DIPHENHYDRAMINE HYDROCHLORIDE 50 MG/ML
12.5 INJECTION INTRAMUSCULAR; INTRAVENOUS
Status: DISCONTINUED | OUTPATIENT
Start: 2018-11-15 | End: 2018-11-15 | Stop reason: HOSPADM

## 2018-11-15 RX ORDER — SODIUM CHLORIDE, SODIUM LACTATE, POTASSIUM CHLORIDE, CALCIUM CHLORIDE 600; 310; 30; 20 MG/100ML; MG/100ML; MG/100ML; MG/100ML
INJECTION, SOLUTION INTRAVENOUS ONCE
Status: COMPLETED | OUTPATIENT
Start: 2018-11-15 | End: 2018-11-15

## 2018-11-15 RX ORDER — SODIUM CHLORIDE, SODIUM LACTATE, POTASSIUM CHLORIDE, CALCIUM CHLORIDE 600; 310; 30; 20 MG/100ML; MG/100ML; MG/100ML; MG/100ML
INJECTION, SOLUTION INTRAVENOUS CONTINUOUS
Status: DISCONTINUED | OUTPATIENT
Start: 2018-11-15 | End: 2018-11-15 | Stop reason: HOSPADM

## 2018-11-15 RX ORDER — LABETALOL HYDROCHLORIDE 5 MG/ML
5 INJECTION, SOLUTION INTRAVENOUS
Status: DISCONTINUED | OUTPATIENT
Start: 2018-11-15 | End: 2018-11-15 | Stop reason: HOSPADM

## 2018-11-15 RX ORDER — HALOPERIDOL 5 MG/ML
1 INJECTION INTRAMUSCULAR
Status: DISCONTINUED | OUTPATIENT
Start: 2018-11-15 | End: 2018-11-15 | Stop reason: HOSPADM

## 2018-11-15 RX ADMIN — SODIUM CHLORIDE, SODIUM LACTATE, POTASSIUM CHLORIDE, CALCIUM CHLORIDE: 600; 310; 30; 20 INJECTION, SOLUTION INTRAVENOUS at 07:10

## 2018-11-15 ASSESSMENT — PAIN SCALES - GENERAL
PAINLEVEL_OUTOF10: 0

## 2018-11-15 ASSESSMENT — ACTIVITIES OF DAILY LIVING (ADL): OASIS_M1830: 00

## 2018-11-15 NOTE — PROGRESS NOTES
1115- Patient to stage II area and assisted with dressing by nursing staff. Patient denies the need for pain or nausea intervention at this time. Vital signs taken and recorded in patient flow sheet. Patient has oxygen at side (uses home Oxygen continuously). Patient currently awaiting transportation home.     1145- Patient wife to chair side and discharge instructions and needed follow up discussed with each stating that discharge instructions understood.     1150- Patient discharged to awaiting vehicle via wheelchair.

## 2018-11-15 NOTE — OR NURSING
1034 Arrived from [facility] via gurney, nasal cannula in place, resp unlabored and spontaneous, head of gurney 20 degrees  1040 pt arouses to verbal stim, head of gurney elevated per pt request, denies any pain or nausea. Spouse notified and will head into facility. Noted sats hi 90's on 3 L, decreased o2 flow to 2 L.  Pt states that he normally runs with a 2 L flow at home.   1055 resting comfortably, denies pain or nausea.   1105 no change, pt stated he is ready to go, continues to deny pain or nausea. Meets criteria for dc pacu

## 2018-11-15 NOTE — DISCHARGE INSTRUCTIONS
ENDOSCOPY HOME CARE INSTRUCTIONS    GASTROSCOPY OR ERCP  1. Don't eat or drink anything for about an hour after the test. You can then resume your regular diet.  2. Don't drive or drink alcohol for 24 hours. The medication you received will make you too drowsy.  3. Don't take any coffee, tea, or aspirin products until after you see your doctor. These can harm the lining of your stomach.  4. If you begin to vomit bloody material, or develop black or bloody stools, call your doctor as soon as possible.  5. If you have any neck, chest, abdominal pain or temp of 100 degrees, call your doctor.  6. See your doctor if instructed to follow up   7. Additional instructions:   8. Prescriptions:   C  You should call 911 if you develop problems with breathing or chest pain.  If any questions arise, call your doctor. If your doctor is not available, please feel free to call {Endoscopy Dept Numbers:[id number]}. You can also call the HEALTH HOTLINE open 24 hours/day, 7 days/week and speak to a nurse at [phone], or toll free [phone].    Depression / Suicide Risk    As you are discharged from this [facility] facility, it is important to learn how to keep safe from harming yourself.    Recognize the warning signs:  · Abrupt changes in personality, positive or negative- including increase in energy   · Giving away possessions  · Change in eating patterns- significant weight changes-  positive or negative  · Change in sleeping patterns- unable to sleep or sleeping all the time   · Unwillingness or inability to communicate  · Depression  · Unusual sadness, discouragement and loneliness  · Talk of wanting to die  · Neglect of personal appearance   · Rebelliousness- reckless behavior  · Withdrawal from people/activities they love  · Confusion- inability to concentrate     If you or a loved one observes any of these behaviors or has concerns about self-harm, here's what you can do:  · Talk about it- your feelings and reasons for  harming yourself  · Remove any means that you might use to hurt yourself (examples: pills, rope, extension cords, firearm)  · Get professional help from the community (Mental Health, Substance Abuse, psychological counseling)  · Do not be alone:Call your Safe Contact- someone whom you trust who will be there for you.  · Call your local CRISIS HOTLINE 712-5940 or 550-925-4585  · Call your local Children's Mobile Crisis Response Team [place] (258) 354-2509 or www.[employer]  · Call the toll free National Suicide Prevention Hotlines   · National Suicide Prevention Lifeline 985-696-YQKL (6732)  · National Hope Line Network 800-SUICIDE (969-4837)    I acknowledge receipt and understanding of these Home Care Instructions.

## 2018-11-15 NOTE — OP REPORT
DATE OF SERVICE:  11/15/2018    PROCEDURES PERFORMED:  Endoscopic retrograde cholangiography with stent   removal, sphincterotomy, and stone removal.    SURGEON:  Harman Michelle MD    ANESTHESIOLOGIST:  Enoch Isabel MD    MEDICATION:  General anesthesia.    INDICATION FOR PROCEDURE:  This is a 73-year-old gentleman who previously   identified choledocholithiasis.  Initial ERCP revealed extensive amount of   duodenal ulcerations throughout the duodenum that prevented sphincterotomy and   removal of stones initially; therefore, he was temporized with a common bile   duct stent and returns today for definitive therapy.    POSTPROCEDURE FINDINGS:  1.  Common bile duct stent removed with snare.  2.  ERCP with sphincterotomy.  3.  ERCP with stone removal.  4.  ERCP with occlusion balloon cholangiogram revealing no further evidence   for filling defect with adequate drainage across the ampulla.    CONSENT:  Procedure risks and benefits reviewed thoroughly with the patient,   risks including but not limited to bleeding, perforation, side effects of   medication were informed.  Patient voiced understanding and agreed to proceed.    All fluoroscopic images were entirely performed and interpreted by me, Dr. Michelle.    DESCRIPTION OF PROCEDURE:  The patient was placed in left lateral decubitus   position after intubation and sedation.  A side-viewing duodenoscope was   passed carefully and easily through a bite block that had been previously   placed into the esophagus to the second portion of duodenum brought in a   shortened position.  All previous duodenal ulcers were now healed.  The stent   was appreciated; it was removed with a snare, after which a CleverCut   sphincterotome with a 0.035 wire was utilized to cannulate the ampulla and   upon first attempt, wire cannulation into the biliary system was appreciated   and bile was aspirated confirming proper placement.  The wire was maintained.    A 7-9 mm  sphincterotomy was performed without complication, after which the   tome was exchanged for a biliary balloon, which was then utilized to dredge   the biliary system removing numerous black and yellow stones, after which an   occlusion balloon cholangiogram revealed no evidence for intrahepatic   abnormality, mild dilation of common bile duct to 9 mm, but occlusion balloon   cholangiogram revealed no further evidence for any filling defects and all   contrast that had been injected spontaneously exited the ampulla without   obstruction.  The stomach was suctioned, desufflated, the scope was removed.    COMPLICATIONS:  None.    BLOOD LOSS:  None.    SPECIMENS:  None.    RECOMMENDATIONS:  Successful ERCP with stent and stone removal.  Patient may   follow with primary physician as needed.       ____________________________________     [facility] MD MARY Howard / JOAQUÍN    DD:  11/15/2018 10:42:35  DT:  11/15/2018 11:41:02    D#:  [id number]  Job#:  [id number]

## 2018-11-19 ENCOUNTER — OFFICE VISIT (OUTPATIENT)
Dept: ENDOCRINOLOGY | Facility: MEDICAL CENTER | Age: 73
End: 2018-11-19
Payer: MEDICARE

## 2018-11-19 VITALS
HEART RATE: 80 BPM | SYSTOLIC BLOOD PRESSURE: 140 MMHG | HEIGHT: 67 IN | BODY MASS INDEX: 32.49 KG/M2 | DIASTOLIC BLOOD PRESSURE: 48 MMHG | WEIGHT: 207 LBS | OXYGEN SATURATION: 90 %

## 2018-11-19 DIAGNOSIS — E11.649 UNCONTROLLED TYPE 2 DIABETES MELLITUS WITH HYPOGLYCEMIA, UNSPECIFIED HYPOGLYCEMIA COMA STATUS (HCC): ICD-10-CM

## 2018-11-19 DIAGNOSIS — I10 ESSENTIAL HYPERTENSION: ICD-10-CM

## 2018-11-19 PROCEDURE — 99214 OFFICE O/P EST MOD 30 MIN: CPT | Performed by: PHYSICIAN ASSISTANT

## 2018-11-19 NOTE — PROGRESS NOTES
Return to office Patient Consult Note  Referred by: Charles Duke M.D.    Reason for consult: Diabetes Management Type 2    HPI:  Enoch Mistry is a 73 y.o. old patient who is seeing us today for diabetes care.  This is a pleasant patient with diabetes and I appreciate the opportunity to participate in the care of this patient.    Labs of 11/1/18 HbA1c is 6.9  Labs of 9/12/18 HbA1c was 9.5  Labs of 9/25/18 GFR >60  Labs of 9/22/17 HbA1c was 8.8    BG Diary:11/19/2018  In the AM: 123, 142, 122, 117, 159, 109, 113, 116    Weight: on 10/8/18 he was 210 pounds and today he is 207      1. Uncontrolled type 2 diabetes mellitus with hypoglycemia, unspecified hypoglycemia coma status (HCC)  This is a new patient with me on 10/8/18  He is on:  1. Nothing for Diabetes    START:  1.  Ozempic 0.25 once a week      2. Essential hypertension  This is stable     3.  Right lower anterior leg is an open  This has healed      ROS:   Constitutional: No night sweats.  Eyes:  No visual changes.  Cardiac: No chest pain, No palpitations or racing heart rate.  Resp: No shortness of breath, No cough,   Gi: No Diarrhea    All other systems were reviewed and were/are negative.  The ROS was revised/revisited during this office visit from the patients first office visit with me on 10/8/18 Please review the full ROS during the first office visit.    Past Medical History:  Patient Active Problem List    Diagnosis Date Noted   • Acute on chronic respiratory failure (HCC) 09/12/2018     Priority: High   • Hypokalemia 09/15/2018     Priority: Medium   • Hyponatremia 09/15/2018     Priority: Medium   • Hypophosphatemia 09/14/2018     Priority: Medium   • Uncontrolled type 2 diabetes mellitus (HCC) 06/06/2017     Priority: Medium   • Hepatic steatosis 09/12/2018     Priority: Low   • Chronic deep vein thrombosis (DVT) ([facility]) 09/12/2018     Priority: Low   • Stage 2 moderate COPD by GOLD classification ([facility]) 06/21/2017     Priority: Low   •  Leg ulcer, right, limited to breakdown of skin (HCC) 10/08/2018   • Obesity (BMI 30-39.9) 10/02/2018   • PUD (peptic ulcer disease) 09/23/2018   • Common bile duct stone 09/19/2018   • Elevated PSA 09/21/2017   • History of BPH 09/11/2017   • Health maintenance alteration - declines vaccines + colonoscopy 06/21/2017   • Requires continuous at home supplemental oxygen 06/06/2017   • Benign non-nodular prostatic hyperplasia with lower urinary tract symptoms 06/06/2017   • Lower extremity edema 06/06/2017   • Chronic hypoxemic respiratory failure (HCC) 01/07/2016   • Adrenal adenoma 01/07/2016   • Pulmonary nodule 04/03/2015   • History of bladder cancer 04/02/2015   • Essential hypertension 04/01/2015       Past Surgical History:  Past Surgical History:   Procedure Laterality Date   • ERCP  11/15/2018    Procedure: ERCP - W/POSS BIOPSY, SPHINCTEROTOMY, STENT PLACEMENT, STENT REMOVAL, STONE EXTRACTION, DILATION;  Surgeon: Harman Michelle M.D.;  Location: [facility];  Service: EUS   • ERCP  9/21/2018    Procedure: ERCP;  Surgeon: Harman Michelle M.D.;  Location: [facility];  Service: EUS   • DEWEY BY LAPAROSCOPY  9/20/2018    Procedure: DEWEY BY LAPAROSCOPY;  Surgeon: Seamus Gaston M.D.;  Location: [facility];  Service: General   • GASTROSCOPY-ENDO  9/19/2018    Procedure: GASTROSCOPY-ENDO;  Surgeon: Darnell Hernández D.O.;  Location: [facility];  Service: Gastroenterology   • CYSTOSCOPY N/A 9/11/2017    Procedure: CYSTOSCOPY- CLOT EVACUATION, FULGURATION, OF PROSTATE;  Surgeon: Srini Giles M.D.;  Location: [facility];  Service: Urology   • CYSTOSCOPY  5/7/2015    Procedure: CYSTOSCOPY [57.32] ;  Surgeon: Srini Giles M.D.;  Location: [facility];  Service:    • TRANS URETHRAL RESECTION PROSTATE  5/7/2015    Procedure: TRANS URETHRAL RESECTION PROSTATE [60.20];  Surgeon: Srini Giles M.D.;  Location: [facility]  "[facility];  Service:    • TRANS URETHRAL RESECTION BLADDER  5/7/2015    Procedure: TRANS URETHRAL RESECTION BLADDER [57.49A];  Surgeon: Srini Giles M.D.;  Location: [facility];  Service:        Allergies:  Patient has no known allergies.    Social History:  Social History     Social History   • Marital status:      Spouse name: N/A   • Number of children: N/A   • Years of education: N/A     Occupational History   • Not on file.     Social History Main Topics   • Smoking status: Former Smoker     Packs/day: 2.00     Years: 50.00     Types: Cigarettes     Quit date: 1/1/2006   • Smokeless tobacco: Never Used   • Alcohol use 0.0 oz/week      Comment: 2-3 week   • Drug use: No   • Sexual activity: Not Currently     Partners: Female     Other Topics Concern   • Not on file     Social History Narrative   • No narrative on file       Family History:  No family history on file.    Medications:    Current Outpatient Prescriptions:   •  Semaglutide (OZEMPIC) 0.25 or 0.5 MG/DOSE Solution Pen-injector, Inject 0.5 mg as instructed every 7 days., Disp: 1 PEN, Rfl: 11  •  FREESTYLE LITE strip, USE ONCE EVERY MORNING TO TEST FASTING BLOOD SUGAR, Disp: 90 Strip, Rfl: 5  •  non-formulary med, Inhale 2 L/min by mouth Continuous., Disp: , Rfl:         Physical Examination:   Vital signs: /48 (BP Location: Left arm, Patient Position: Sitting, BP Cuff Size: Adult)   Pulse 80   Ht 1.702 m (5' 7.01\")   Wt 93.9 kg (207 lb)   SpO2 90% Comment: 2% o2  BMI 32.41 kg/m²   General: No distress, cooperative, well dressed and well nourished.   Eyes: No scleral icterus or discharge, No hyposphagma  ENMT: Normal on external inspection of nose, lips, No nasal drainage   Neck: No abnormal masses on inspection  Resp: Normal effort, Bilateral clear to auscultation, No wheezing, No rales  CVS: Regular rate and rhythm, S1 S2 normal, No murmur. No gallop  Extremities: No edema bilateral extremities  Neuro: Alert and " oriented  Skin: No rash, No Ulcers  Psych: Normal mood and affect      Assessment and Plan:    1. Uncontrolled type 2 diabetes mellitus with hypoglycemia, unspecified hypoglycemia coma status (HCC)    START:  1.  Ozempic 0.25 once a week (INCREASE to 0.5 this week)    2. Essential hypertension  This is stable and no changes needed      Return in about 3 months (around 2/19/2019).    Blood glucose log: Check BG in the morning when wake up, before lunch or dinner and before bed.  So three times a day.  Always bring BG diary to the next office visit.       Thank you kindly for allowing me to participate in the diabetes care plan for this patient.    Orville Medina PA-C, BC-ADM  Board Certified - Advanced Diabetes Management  11/19/18    CC:   Charles Duke M.D.

## 2018-11-26 ENCOUNTER — TELEPHONE (OUTPATIENT)
Dept: ENDOCRINOLOGY | Facility: MEDICAL CENTER | Age: 73
End: 2018-11-26

## 2018-11-26 NOTE — TELEPHONE ENCOUNTER
1. Caller Name: sabine                                         Call Back Number:[phone]        Patient approves a detailed voicemail message: no    PAtient is concerned that his blood sugar has been going too low since bumping up ozempic to 0.5.  bgl 11/22 75 11/24 41 11/26 145

## 2019-02-07 ENCOUNTER — TELEPHONE (OUTPATIENT)
Dept: ENDOCRINOLOGY | Facility: MEDICAL CENTER | Age: 74
End: 2019-02-07

## 2019-02-08 NOTE — TELEPHONE ENCOUNTER
VOICEMAIL    1. Caller Name: Unknown (female,no name left)                          Call Back Number: [phone]      2. Message: Female called and left a vm stating the pharmacy only has 6 needles for the Ozempic. Each pen gives patient 8 injections. She would like to get more needles for patient's Ozempic. She said patient is doing 0.25 mg once  every 7 days.    Please advise     3. Patient approves office to leave a detailed voicemail/MyChart message: yes

## 2019-03-06 RX ORDER — LANCETS 28 GAUGE
EACH MISCELLANEOUS
Qty: 100 EACH | Refills: 3 | Status: SHIPPED | OUTPATIENT
Start: 2019-03-06 | End: 2021-04-20 | Stop reason: SDUPTHER

## 2019-04-26 ENCOUNTER — OFFICE VISIT (OUTPATIENT)
Dept: ENDOCRINOLOGY | Facility: MEDICAL CENTER | Age: 74
End: 2019-04-26
Payer: MEDICARE

## 2019-04-26 VITALS
OXYGEN SATURATION: 95 % | HEART RATE: 100 BPM | SYSTOLIC BLOOD PRESSURE: 136 MMHG | BODY MASS INDEX: 32.8 KG/M2 | DIASTOLIC BLOOD PRESSURE: 68 MMHG | WEIGHT: 209 LBS | HEIGHT: 67 IN

## 2019-04-26 DIAGNOSIS — E11.649 UNCONTROLLED TYPE 2 DIABETES MELLITUS WITH HYPOGLYCEMIA, UNSPECIFIED HYPOGLYCEMIA COMA STATUS (HCC): ICD-10-CM

## 2019-04-26 DIAGNOSIS — I10 ESSENTIAL HYPERTENSION: ICD-10-CM

## 2019-04-26 LAB
HBA1C MFR BLD: 6.1 % (ref 0–5.6)
INT CON NEG: NEGATIVE
INT CON POS: POSITIVE

## 2019-04-26 PROCEDURE — 99214 OFFICE O/P EST MOD 30 MIN: CPT | Performed by: PHYSICIAN ASSISTANT

## 2019-04-26 PROCEDURE — 83036 HEMOGLOBIN GLYCOSYLATED A1C: CPT | Performed by: PHYSICIAN ASSISTANT

## 2019-04-26 NOTE — PROGRESS NOTES
Return to office Patient Consult Note  Referred by: Charles Duke M.D.    Reason for consult: Diabetes Management Type 2    HPI:  Enoch Mistry is a 73 y.o. old patient who is seeing us today for diabetes care.  This is a pleasant patient with diabetes and I appreciate the opportunity to participate in the care of this patient.    Labs of 4/26/2019 HbA1c is 6.1  Labs of 11/1/18 HbA1c is 6.9  Labs of 9/12/18 HbA1c was 9.5  Labs of 9/25/18 GFR >60  Labs of 9/22/17 HbA1c was 8.8    BG Diary:4/26/2019  In the AM:  No log    Weight: on 10/8/18 he was 210 pounds and today he is 207       1. Uncontrolled type 2 diabetes mellitus with hypoglycemia, unspecified hypoglycemia coma status (HCC)  This is a new patient with me on 10/8/18  He is on:  1. Nothing for Diabetes     START:  1.  Ozempic 0.5 once a week      2. Essential hypertension  This is stable today and no new changes are needed or required in today's visit      ROS:   Constitutional: No night sweats.  Eyes:  No visual changes.  Cardiac: No chest pain, No palpitations or racing heart rate.  Resp: No shortness of breath, No cough,   Gi: No Diarrhea    All other systems were reviewed and were/are negative.      Past Medical History:  Patient Active Problem List    Diagnosis Date Noted   • Acute on chronic respiratory failure (HCC) 09/12/2018     Priority: High   • Hypokalemia 09/15/2018     Priority: Medium   • Hyponatremia 09/15/2018     Priority: Medium   • Hypophosphatemia 09/14/2018     Priority: Medium   • Uncontrolled type 2 diabetes mellitus (HCC) 06/06/2017     Priority: Medium   • Hepatic steatosis 09/12/2018     Priority: Low   • Chronic deep vein thrombosis (DVT) ([facility]) 09/12/2018     Priority: Low   • Stage 2 moderate COPD by GOLD classification ([facility]) 06/21/2017     Priority: Low   • Leg ulcer, right, limited to breakdown of skin ([facility]) 10/08/2018   • Obesity (BMI 30-39.9) 10/02/2018   • PUD (peptic ulcer disease) 09/23/2018   • Common bile duct  stone 09/19/2018   • Elevated PSA 09/21/2017   • History of BPH 09/11/2017   • Health maintenance alteration - declines vaccines + colonoscopy 06/21/2017   • Requires continuous at home supplemental oxygen 06/06/2017   • Benign non-nodular prostatic hyperplasia with lower urinary tract symptoms 06/06/2017   • Lower extremity edema 06/06/2017   • Chronic hypoxemic respiratory failure (HCC) 01/07/2016   • Adrenal adenoma 01/07/2016   • Pulmonary nodule 04/03/2015   • History of bladder cancer 04/02/2015   • Essential hypertension 04/01/2015       Past Surgical History:  Past Surgical History:   Procedure Laterality Date   • ERCP  11/15/2018    Procedure: ERCP - W/POSS BIOPSY, SPHINCTEROTOMY, STENT PLACEMENT, STENT REMOVAL, STONE EXTRACTION, DILATION;  Surgeon: Harman Michelle M.D.;  Location: [facility];  Service: EUS   • ERCP  9/21/2018    Procedure: ERCP;  Surgeon: Harman Michelle M.D.;  Location: [facility];  Service: EUS   • DEWEY BY LAPAROSCOPY  9/20/2018    Procedure: DEWEY BY LAPAROSCOPY;  Surgeon: Seamus Gaston M.D.;  Location: [facility];  Service: General   • GASTROSCOPY-ENDO  9/19/2018    Procedure: GASTROSCOPY-ENDO;  Surgeon: Darnell Hernández D.O.;  Location: [facility];  Service: Gastroenterology   • CYSTOSCOPY N/A 9/11/2017    Procedure: CYSTOSCOPY- CLOT EVACUATION, FULGURATION, OF PROSTATE;  Surgeon: Srini Giles M.D.;  Location: [facility];  Service: Urology   • CYSTOSCOPY  5/7/2015    Procedure: CYSTOSCOPY [57.32] ;  Surgeon: Srini Giles M.D.;  Location: [facility];  Service:    • TRANS URETHRAL RESECTION PROSTATE  5/7/2015    Procedure: TRANS URETHRAL RESECTION PROSTATE [60.20];  Surgeon: Srini Giles M.D.;  Location: [facility];  Service:    • TRANS URETHRAL RESECTION BLADDER  5/7/2015    Procedure: TRANS URETHRAL RESECTION BLADDER [57.49A];  Surgeon: Srini Giles M.D.;  Location:  [facility];  Service:        Allergies:  Patient has no known allergies.    Social History:  Social History     Social History   • Marital status:      Spouse name: N/A   • Number of children: N/A   • Years of education: N/A     Occupational History   • Not on file.     Social History Main Topics   • Smoking status: Former Smoker     Packs/day: 2.00     Years: 50.00     Types: Cigarettes     Quit date: 1/1/2006   • Smokeless tobacco: Never Used   • Alcohol use 0.0 oz/week      Comment: 2-3 week   • Drug use: No   • Sexual activity: Not Currently     Partners: Female     Other Topics Concern   • Not on file     Social History Narrative   • No narrative on file       Family History:  History reviewed. No pertinent family history.    Medications:    Current Outpatient Prescriptions:   •  FREESTYLE LANCETS Misc, USE EVERY MORNING TO TEST FASTING BLOOD SUGAR, Disp: 100 Each, Rfl: 3  •  Insulin Pen Needle 32 G x 4 mm (NOVOFINE PLUS), 1 Applicator by Does not apply route every day. Using one needle tip with Ozempic per week, Disp: 50 Each, Rfl: 3  •  Semaglutide (OZEMPIC) 0.25 or 0.5 MG/DOSE Solution Pen-injector, Inject 0.5 mg as instructed every 7 days., Disp: 1 PEN, Rfl: 11  •  FREESTYLE LITE strip, USE ONCE EVERY MORNING TO TEST FASTING BLOOD SUGAR, Disp: 90 Strip, Rfl: 5  •  non-formulary med, Inhale 2 L/min by mouth Continuous., Disp: , Rfl:         Physical Examination:   Vital signs: /68 (BP Location: Left arm, Patient Position: Sitting)   Pulse 100   Ht 1.702 m (5' 7\")   Wt 94.8 kg (209 lb)   SpO2 95%   BMI 32.73 kg/m²   General: No distress, cooperative, well dressed and well nourished.   Eyes: No scleral icterus or discharge, No hyposphagma  ENMT: Normal on external inspection of nose, lips, No nasal drainage   Neck: No abnormal masses on inspection  Resp: Normal effort, Bilateral clear to auscultation, No wheezing, No rales  CVS: Regular rate and rhythm, S1 S2 normal, No murmur. " No gallop  Extremities: No edema bilateral extremities  Neuro: Alert and oriented  Skin: No rash, No Ulcers  Psych: Normal mood and affect      Assessment and Plan:    1. Uncontrolled type 2 diabetes mellitus with hypoglycemia, unspecified hypoglycemia coma status (HCC)    START:  1.  Ozempic 0.5 once a week     2. Essential hypertension  This is stable today and no new changes are needed or required in today's visit    Return in about 3 months (around 7/26/2019).      Thank you kindly for allowing me to participate in the diabetes care plan for this patient.    Orville Medina PA-C, BC-ADM  Board Certified - Advanced Diabetes Management  04/26/19    CC:   Charles Duke M.D.

## 2019-07-03 ENCOUNTER — TELEPHONE (OUTPATIENT)
Dept: ENDOCRINOLOGY | Facility: MEDICAL CENTER | Age: 74
End: 2019-07-03

## 2019-07-03 NOTE — TELEPHONE ENCOUNTER
Message from Orville requesting that we get Pt in to be seen. Last OV was 04/26/19 and was supposed to return in 3 months around 07/26/19    Phone Number Called: [phone] (home)     Call outcome: unable to LM for Pt    Message: Unable to LM for Pt b/c VM box is not set up

## 2019-08-07 ENCOUNTER — OFFICE VISIT (OUTPATIENT)
Dept: ENDOCRINOLOGY | Facility: MEDICAL CENTER | Age: 74
End: 2019-08-07
Payer: MEDICARE

## 2019-08-07 VITALS
OXYGEN SATURATION: 98 % | BODY MASS INDEX: 32.65 KG/M2 | HEART RATE: 79 BPM | SYSTOLIC BLOOD PRESSURE: 130 MMHG | DIASTOLIC BLOOD PRESSURE: 68 MMHG | HEIGHT: 67 IN | WEIGHT: 208 LBS

## 2019-08-07 DIAGNOSIS — E11.65 UNCONTROLLED TYPE 2 DIABETES MELLITUS WITH HYPERGLYCEMIA (HCC): ICD-10-CM

## 2019-08-07 DIAGNOSIS — I10 ESSENTIAL HYPERTENSION: ICD-10-CM

## 2019-08-07 LAB
HBA1C MFR BLD: 6 % (ref 0–5.6)
INT CON NEG: NEGATIVE
INT CON POS: POSITIVE

## 2019-08-07 PROCEDURE — 83036 HEMOGLOBIN GLYCOSYLATED A1C: CPT | Performed by: PHYSICIAN ASSISTANT

## 2019-08-07 PROCEDURE — 99214 OFFICE O/P EST MOD 30 MIN: CPT | Performed by: PHYSICIAN ASSISTANT

## 2019-08-07 NOTE — PROGRESS NOTES
Return to office Patient Consult Note  Referred by: Charles Duke M.D.    Reason for consult: Diabetes Management Type 2    HPI:  Enoch Mistry is a 73 y.o. old patient who is seeing us today for diabetes care.  This is a pleasant patient with diabetes and I appreciate the opportunity to participate in the care of this patient.    Labs of 8/7/2019 HbA1c is 6.0   Labs of 4/26/2019 HbA1c is 6.1  Labs of 11/1/18 HbA1c is 6.9  Labs of 9/12/18 HbA1c was 9.5  Labs of 9/25/18 GFR >60  Labs of 9/22/17 HbA1c was 8.8    BG Diary:8/7/2019  In the AM:   116, 87, 101, 96, 115, 112,     Weight: on 10/8/18 he was 210 pounds and today he is 207       1. Uncontrolled type 2 diabetes mellitus with hyperglycemia (HCC)  This is a new patient with me on 10/8/18  He is on:  1. Nothing for Diabetes     START:  1.  Ozempic 0.5 once a week       2. Essential hypertension  This is stable today and no new changes are needed or required in today's visit    ROS:   Constitutional: No night sweats.  Eyes:  No visual changes.  Cardiac: No chest pain, No palpitations or racing heart rate.  Resp: No shortness of breath, No cough,   Gi: No Diarrhea    All other systems were reviewed and were/are negative.      Past Medical History:  Patient Active Problem List    Diagnosis Date Noted   • Leg ulcer, right, limited to breakdown of skin ([facility]) 10/08/2018   • Obesity (BMI 30-39.9) 10/02/2018   • PUD (peptic ulcer disease) 09/23/2018   • Common bile duct stone 09/19/2018   • Hypokalemia 09/15/2018   • Hyponatremia 09/15/2018   • Hypophosphatemia 09/14/2018   • Acute on chronic respiratory failure (HCC) 09/12/2018   • Hepatic steatosis 09/12/2018   • Chronic deep vein thrombosis (DVT) ([facility]) 09/12/2018   • Elevated PSA 09/21/2017   • History of BPH 09/11/2017   • Stage 2 moderate COPD by GOLD classification ([facility]) 06/21/2017   • Health maintenance alteration - declines vaccines + colonoscopy 06/21/2017   • Requires continuous at home supplemental  oxygen 06/06/2017   • Uncontrolled type 2 diabetes mellitus (HCC) 06/06/2017   • Benign non-nodular prostatic hyperplasia with lower urinary tract symptoms 06/06/2017   • Lower extremity edema 06/06/2017   • Chronic hypoxemic respiratory failure (HCC) 01/07/2016   • Adrenal adenoma 01/07/2016   • Pulmonary nodule 04/03/2015   • History of bladder cancer 04/02/2015   • Essential hypertension 04/01/2015       Past Surgical History:  Past Surgical History:   Procedure Laterality Date   • ERCP  11/15/2018    Procedure: ERCP - W/POSS BIOPSY, SPHINCTEROTOMY, STENT PLACEMENT, STENT REMOVAL, STONE EXTRACTION, DILATION;  Surgeon: Harman Michelle M.D.;  Location: [facility];  Service: EUS   • ERCP  9/21/2018    Procedure: ERCP;  Surgeon: Harman Michelle M.D.;  Location: [facility];  Service: EUS   • DEWEY BY LAPAROSCOPY  9/20/2018    Procedure: DEWEY BY LAPAROSCOPY;  Surgeon: Seamus Gaston M.D.;  Location: [facility];  Service: General   • GASTROSCOPY-ENDO  9/19/2018    Procedure: GASTROSCOPY-ENDO;  Surgeon: Darnell Hernández D.O.;  Location: [facility];  Service: Gastroenterology   • CYSTOSCOPY N/A 9/11/2017    Procedure: CYSTOSCOPY- CLOT EVACUATION, FULGURATION, OF PROSTATE;  Surgeon: Srini Giles M.D.;  Location: [facility];  Service: Urology   • CYSTOSCOPY  5/7/2015    Procedure: CYSTOSCOPY [57.32] ;  Surgeon: Srini Giles M.D.;  Location: [facility];  Service:    • TRANS URETHRAL RESECTION PROSTATE  5/7/2015    Procedure: TRANS URETHRAL RESECTION PROSTATE [60.20];  Surgeon: Srini Giles M.D.;  Location: [facility];  Service:    • TRANS URETHRAL RESECTION BLADDER  5/7/2015    Procedure: TRANS URETHRAL RESECTION BLADDER [57.49A];  Surgeon: Srini Giles M.D.;  Location: [facility];  Service:        Allergies:  Patient has no known allergies.    Social History:  Social History     Socioeconomic  History   • Marital status:      Spouse name: Not on file   • Number of children: Not on file   • Years of education: Not on file   • Highest education level: Not on file   Occupational History   • Not on file   Social Needs   • Financial resource strain: Not on file   • Food insecurity:     Worry: Not on file     Inability: Not on file   • Transportation needs:     Medical: Not on file     Non-medical: Not on file   Tobacco Use   • Smoking status: Former Smoker     Packs/day: 2.00     Years: 50.00     Pack years: 100.00     Types: Cigarettes     Last attempt to quit: 2006     Years since quittin.6   • Smokeless tobacco: Never Used   Substance and Sexual Activity   • Alcohol use: Yes     Alcohol/week: 0.0 oz     Comment: 2-3 week   • Drug use: No   • Sexual activity: Not Currently     Partners: Female   Lifestyle   • Physical activity:     Days per week: Not on file     Minutes per session: Not on file   • Stress: Not on file   Relationships   • Social connections:     Talks on phone: Not on file     Gets together: Not on file     Attends Hinduism service: Not on file     Active member of club or organization: Not on file     Attends meetings of clubs or organizations: Not on file     Relationship status: Not on file   • Intimate partner violence:     Fear of current or ex partner: Not on file     Emotionally abused: Not on file     Physically abused: Not on file     Forced sexual activity: Not on file   Other Topics Concern   • Not on file   Social History Narrative   • Not on file       Family History:  History reviewed. No pertinent family history.    Medications:    Current Outpatient Medications:   •  Semaglutide (OZEMPIC) 0.25 or 0.5 MG/DOSE Solution Pen-injector, Inject 0.5 mg as instructed every 7 days., Disp: 1 PEN, Rfl: 11  •  FREESTYLE LANCETS Misc, USE EVERY MORNING TO TEST FASTING BLOOD SUGAR, Disp: 100 Each, Rfl: 3  •  Insulin Pen Needle 32 G x 4 mm (NOVOFINE PLUS), 1 Applicator by  "Does not apply route every day. Using one needle tip with Ozempic per week, Disp: 50 Each, Rfl: 3  •  FREESTYLE LITE strip, USE ONCE EVERY MORNING TO TEST FASTING BLOOD SUGAR, Disp: 90 Strip, Rfl: 5  •  non-formulary med, Inhale 2 L/min by mouth Continuous., Disp: , Rfl:         Physical Examination:   Vital signs: /68 (BP Location: Left arm, Patient Position: Sitting)   Pulse 79   Ht 1.702 m (5' 7\")   Wt 94.3 kg (208 lb)   SpO2 98%   BMI 32.58 kg/m²   General: No distress, cooperative, well dressed and well nourished.   Eyes: No scleral icterus or discharge, No hyposphagma  ENMT: Normal on external inspection of nose, lips, No nasal drainage   Neck: No abnormal masses on inspection  Resp: Normal effort, Bilateral clear to auscultation, No wheezing, No rales  CVS: Regular rate and rhythm, S1 S2 normal, No murmur. No gallop  Extremities: No edema bilateral extremities  Neuro: Alert and oriented  Skin: No rash, No Ulcers  Psych: Normal mood and affect      Assessment and Plan:    1. Uncontrolled type 2 diabetes mellitus with hyperglycemia (HCC)       START:  1.  Ozempic 0.5 once a week     2. Essential hypertension  This is stable today and no new changes are needed or required in today's visit    Return in about 6 months (around 2/7/2020).      Thank you kindly for allowing me to participate in the diabetes care plan for this patient.    Orville Medina PA-C, BC-ADM  Board Certified - Advanced Diabetes Management  08/07/19    CC:   Charles Duke M.D.    "

## 2019-08-08 ENCOUNTER — TELEPHONE (OUTPATIENT)
Dept: MEDICAL GROUP | Facility: MEDICAL CENTER | Age: 74
End: 2019-08-08

## 2019-08-08 NOTE — TELEPHONE ENCOUNTER
ESTABLISHED PATIENT PRE-VISIT PLANNING     Patient was NOT contacted to complete PVP.     Note: Patient will not be contacted if there is no indication to call.     1.  Reviewed notes from the last few office visits within the medical group: Yes    2.  If any orders were placed at last visit or intended to be done for this visit (i.e. 6 mos follow-up), do we have Results/Consult Notes?        •  Labs - Labs ordered, completed on 10/19/2018 and results are in chart.   Note: If patient appointment is for lab review and patient did not complete labs, check with provider if OK to reschedule patient until labs completed.       •  Imaging - Imaging was not ordered at last office visit.       •  Referrals - No referrals were ordered at last office visit.    3. Is this appointment scheduled as a Hospital Follow-Up? No    4.  Immunizations were updated in Epic using WebIZ?: No WebIZ record    5.  Patient is due for the following Health Maintenance Topics:   Health Maintenance Due   Topic Date Due   • Annual Wellness Visit  1945   • HEPATITIS C SCREENING  1945   • URINE ACR / MICROALBUMIN  10/11/1963   • PFT SCREENING-FEV1 AND FEV/FVC RATIO / SPIROMETRY SHOULD BE PERFORMED ANNUALLY  10/11/1963   • IMM HEP B VACCINE (1 of 3 - Risk 3-dose series) 10/11/1964   • IMM DTaP/Tdap/Td Vaccine (1 - Tdap) 10/11/1964   • IMM ZOSTER VACCINES (1 of 2) 10/11/1995   • IMM PNEUMOCOCCAL VACCINE: 65+ Years (1 of 2 - PCV13) 10/11/2010   • COLONOSCOPY  04/20/2016   • FASTING LIPID PROFILE  05/31/2018   • DIABETES MONOFILAMENT / LE EXAM  12/26/2018       6. Orders for overdue Health Maintenance topics pended in Pre-Charting? YES    7.  AHA (MDX) form printed for Provider? YES    8.  Patient was NOT informed to arrive 15 min prior to their scheduled appointment and bring in their medication bottles.

## 2019-08-13 ENCOUNTER — OFFICE VISIT (OUTPATIENT)
Dept: MEDICAL GROUP | Facility: MEDICAL CENTER | Age: 74
End: 2019-08-13
Payer: MEDICARE

## 2019-08-13 VITALS
TEMPERATURE: 99.5 F | HEIGHT: 67 IN | RESPIRATION RATE: 13 BRPM | DIASTOLIC BLOOD PRESSURE: 68 MMHG | HEART RATE: 98 BPM | BODY MASS INDEX: 32.18 KG/M2 | SYSTOLIC BLOOD PRESSURE: 138 MMHG | OXYGEN SATURATION: 93 % | WEIGHT: 205 LBS

## 2019-08-13 DIAGNOSIS — J96.11 CHRONIC HYPOXEMIC RESPIRATORY FAILURE (HCC): ICD-10-CM

## 2019-08-13 DIAGNOSIS — I73.9 PVD (PERIPHERAL VASCULAR DISEASE) (HCC): ICD-10-CM

## 2019-08-13 DIAGNOSIS — Z78.9 HEALTH MAINTENANCE ALTERATION: ICD-10-CM

## 2019-08-13 DIAGNOSIS — E11.9 TYPE 2 DIABETES MELLITUS WITHOUT COMPLICATION, WITHOUT LONG-TERM CURRENT USE OF INSULIN (HCC): ICD-10-CM

## 2019-08-13 DIAGNOSIS — J44.9 STAGE 2 MODERATE COPD BY GOLD CLASSIFICATION (HCC): ICD-10-CM

## 2019-08-13 DIAGNOSIS — N40.0 BENIGN NON-NODULAR PROSTATIC HYPERPLASIA WITHOUT LOWER URINARY TRACT SYMPTOMS: ICD-10-CM

## 2019-08-13 PROBLEM — J96.20 ACUTE ON CHRONIC RESPIRATORY FAILURE (HCC): Status: RESOLVED | Noted: 2018-09-12 | Resolved: 2019-08-13

## 2019-08-13 PROBLEM — L97.911 LEG ULCER, RIGHT, LIMITED TO BREAKDOWN OF SKIN (HCC): Status: RESOLVED | Noted: 2018-10-08 | Resolved: 2019-08-13

## 2019-08-13 PROBLEM — E87.1 HYPONATREMIA: Status: RESOLVED | Noted: 2018-09-15 | Resolved: 2019-08-13

## 2019-08-13 PROBLEM — E83.39 HYPOPHOSPHATEMIA: Status: RESOLVED | Noted: 2018-09-14 | Resolved: 2019-08-13

## 2019-08-13 PROBLEM — Z87.438 HISTORY OF BPH: Status: RESOLVED | Noted: 2017-09-11 | Resolved: 2019-08-13

## 2019-08-13 PROBLEM — E87.6 HYPOKALEMIA: Status: RESOLVED | Noted: 2018-09-15 | Resolved: 2019-08-13

## 2019-08-13 PROBLEM — I82.509 CHRONIC DEEP VEIN THROMBOSIS (DVT) (HCC): Status: RESOLVED | Noted: 2018-09-12 | Resolved: 2019-08-13

## 2019-08-13 PROBLEM — R60.0 LOWER EXTREMITY EDEMA: Status: RESOLVED | Noted: 2017-06-06 | Resolved: 2019-08-13

## 2019-08-13 PROBLEM — K80.50 COMMON BILE DUCT STONE: Status: RESOLVED | Noted: 2018-09-19 | Resolved: 2019-08-13

## 2019-08-13 PROCEDURE — 99214 OFFICE O/P EST MOD 30 MIN: CPT | Performed by: PHYSICIAN ASSISTANT

## 2019-08-13 NOTE — ASSESSMENT & PLAN NOTE
This is a 73-year-old noncompliant patient who comes in today to go over some of his chronic medical conditions.  His diabetes is well controlled.  Is on his Ozempic of 0.5 mg every week.  He is followed with endocrinology.  Denies any concerns over his diabetes.  Refusing other labs such as cholesterol and microalbumin level.

## 2019-08-13 NOTE — ASSESSMENT & PLAN NOTE
Chronic condition.  Stable.  Stage II moderate COPD.  Supplemental oxygen daily.  Today he speaks of a screening or form to be completed for the DMV.  Has yet to go through the process with the DMV.  States his wife recently had to have a form completed.  The form was not completed though by a physician.  He is concerned that possibly he will need a form to be completed so he would like me to do it if needed.  Also realizes he may have to have his eyes screen.  No concerns currently with driving.

## 2019-08-13 NOTE — ASSESSMENT & PLAN NOTE
Has a chronic condition of PVD.  Previously with ulcerations on the right leg.  Those have cleared up over time.  Denies any significant lower extremity swelling.  Has a history of DVTs but is not on any anticoagulant currently.  Denies any lower extremity pain.

## 2019-08-13 NOTE — ASSESSMENT & PLAN NOTE
History of BPH and bladder cancer with a PSI in 2017 that was elevated.  He denies any urinary symptoms currently.  No nocturia.  No urinary hesitancy.  He is no longer follows with urology.  Declines a PSA lab.

## 2019-08-13 NOTE — ASSESSMENT & PLAN NOTE
Chronic history of COPD.  Emphysema.  Using oxygen supplementally daily.  Requesting a prescription for a mask.  Uses preferred home health.  Also like to discuss portable options.  He has not followed currently with pulmonology.  Is doing well.  No recent PFT.  Has not smoked for many years.

## 2019-08-13 NOTE — ASSESSMENT & PLAN NOTE
Reviewed most of his healthcare maintenance items but he declined colonoscopy or Cologuard.  Also declined vaccinations.  Declined hep C screening, microalbumin and cholesterol profile.

## 2019-08-13 NOTE — PROGRESS NOTES
Subjective:   Encoh Mistry is a 73 y.o. male here today for type 2 diabetes, controlled, PVD, BPH without symptoms, stage II COPD with chronic hypoxemia and refusal of healthcare maintenance items.    Type 2 diabetes mellitus without complication, without long-term current use of insulin ([facility])  This is a 73-year-old noncompliant patient who comes in today to go over some of his chronic medical conditions.  His diabetes is well controlled.  Is on his Ozempic of 0.5 mg every week.  He is followed with endocrinology.  Denies any concerns over his diabetes.  Refusing other labs such as cholesterol and microalbumin level.    PVD (peripheral vascular disease) ([facility])  Has a chronic condition of PVD.  Previously with ulcerations on the right leg.  Those have cleared up over time.  Denies any significant lower extremity swelling.  Has a history of DVTs but is not on any anticoagulant currently.  Denies any lower extremity pain.    Benign non-nodular prostatic hyperplasia without lower urinary tract symptoms  History of BPH and bladder cancer with a PSI in 2017 that was elevated.  He denies any urinary symptoms currently.  No nocturia.  No urinary hesitancy.  He is no longer follows with urology.  Declines a PSA lab.    Health maintenance alteration - declines vaccines + colonoscopy  Reviewed most of his healthcare maintenance items but he declined colonoscopy or Cologuard.  Also declined vaccinations.  Declined hep C screening, microalbumin and cholesterol profile.    Stage 2 moderate COPD by GOLD classification ([facility])  Chronic history of COPD.  Emphysema.  Using oxygen supplementally daily.  Requesting a prescription for a mask.  [facility]  Also like to discuss portable options.  He has not followed currently with pulmonology.  Is doing well.  No recent PFT.  Has not smoked for many years.    Chronic hypoxemic respiratory failure (CMS-HCC)  Chronic condition.  Stable.  Stage II moderate COPD.   "Supplemental oxygen daily.  Today he speaks of a screening or form to be completed for the DMV.  Has yet to go through the process with the DMV.  States his wife recently had to have a form completed.  The form was not completed though by a physician.  He is concerned that possibly he will need a form to be completed so he would like me to do it if needed.  Also realizes he may have to have his eyes screen.  No concerns currently with driving.         Current medicines (including changes today)  Current Outpatient Medications   Medication Sig Dispense Refill   • Semaglutide (OZEMPIC) 0.25 or 0.5 MG/DOSE Solution Pen-injector Inject 0.5 mg as instructed every 7 days. 1 PEN 11   • FREESTYLE LANCETS Misc USE EVERY MORNING TO TEST FASTING BLOOD SUGAR 100 Each 3   • Insulin Pen Needle 32 G x 4 mm (NOVOFINE PLUS) 1 Applicator by Does not apply route every day. Using one needle tip with Ozempic per week 50 Each 3   • FREESTYLE LITE strip USE ONCE EVERY MORNING TO TEST FASTING BLOOD SUGAR 90 Strip 5   • non-formulary med Inhale 2 L/min by mouth Continuous.       No current facility-administered medications for this visit.      He  has a past medical history of Anemia, Arrhythmia, Breath shortness, Cancer (HCC), Diabetes (HCC), Emphysema of lung (HCC), Hiatus hernia syndrome, Hypertension, Other specified disorder of intestines, and Urinary bladder disorder.    Social History and Family History were reviewed and updated.    ROS   No chest pain, no shortness of breath while on O2, no abdominal pain and all other systems were reviewed and are negative.       Objective:     /68 (BP Location: Left arm, Patient Position: Sitting, BP Cuff Size: Adult)   Pulse 98   Temp 37.5 °C (99.5 °F) (Temporal)   Resp 13   Ht 1.702 m (5' 7\")   Wt 93 kg (205 lb)   SpO2 93%  Body mass index is 32.11 kg/m².   Physical Exam:  Constitutional: Alert, no distress.  Skin: Warm, dry, good turgor, no rashes in visible areas.  Eye: Equal, " round and reactive, conjunctiva clear, lids normal.  ENMT: Lips without lesions, good dentition, oropharynx clear.  Neck: Trachea midline, no masses.   Lymph: No cervical or supraclavicular lymphadenopathy  Respiratory: Unlabored respiratory effort, lungs clear to auscultation, no wheezes, no ronchi.  Cardiovascular: Normal S1, S2, no murmur, no edema.  Abdomen: Soft, non-tender, no masses.  Psych: Alert and oriented x3, normal affect and mood.        Assessment and Plan:   The following treatment plan was discussed    1. Type 2 diabetes mellitus without complication, without long-term current use of insulin ([facility])  Chronic condition.  Well-controlled.  Recent A1c at 6.0.  Continue to follow with endocrinology.  Continue Ozempic weekly.    2. Stage 2 moderate COPD by GOLD classification ([facility])  Chronic condition.  Stable.  Provided a form as a prescription for a mask to use for his oxygen.  Contact me through [facility] regarding a portable tank prescription if needed.  Advised to follow-up with me in 6 months.  - DME Mask and Supplies    3. PVD (peripheral vascular disease) ([facility])  Chronic condition.  Stable.  Continue to control diabetes.  Walk routinely.  Will monitor.    4. Benign non-nodular prostatic hyperplasia without lower urinary tract symptoms  Chronic condition.  Stable.  Asymptomatic.  Offered PSA screening but he declined.    5. Health maintenance alteration - declines vaccines + colonoscopy  Discussed vaccinations as well as labs that need to be ordered.  Colonoscopy or Cologuard but he declined all items.    6. Chronic hypoxemic respiratory failure (HCC)  Chronic condition secondary to COPD.  Continue oxygen supplementation.  Offered referral to pulmonology and PFT but he declined.      Followup: Return if symptoms worsen or fail to improve.    Please note that this dictation was created using voice recognition software. I have made every reasonable attempt to correct obvious errors, but  I expect that there are errors of grammar and possibly content that I did not discover before finalizing the note.

## 2019-10-17 RX ORDER — BLOOD-GLUCOSE METER
KIT MISCELLANEOUS
Qty: 50 STRIP | Refills: 0 | Status: SHIPPED | OUTPATIENT
Start: 2019-10-17 | End: 2020-12-17

## 2019-10-30 ENCOUNTER — PATIENT OUTREACH (OUTPATIENT)
Dept: HEALTH INFORMATION MANAGEMENT | Facility: OTHER | Age: 74
End: 2019-10-30

## 2019-10-30 ENCOUNTER — TELEPHONE (OUTPATIENT)
Dept: HEALTH INFORMATION MANAGEMENT | Facility: OTHER | Age: 74
End: 2019-10-30

## 2019-10-30 NOTE — PROGRESS NOTES
Called pt to get him est with new PCP, he stated last 3 times that he has been in he has seen Collin Schwab and thought he was already established. Sent message to Collin to confirm.    -JTP

## 2019-10-30 NOTE — TELEPHONE ENCOUNTER
Stone Polanco,    This patient believes that he has established with you already. He was last seen by you 08/13/19. Can we go ahead and change his PCP in epic to you or would you like for us to schedule a New To You appointment?     Thank you,    Luz

## 2020-04-29 ENCOUNTER — TELEPHONE (OUTPATIENT)
Dept: HEALTH INFORMATION MANAGEMENT | Facility: OTHER | Age: 75
End: 2020-04-29

## 2020-09-14 ENCOUNTER — PATIENT OUTREACH (OUTPATIENT)
Dept: HEALTH INFORMATION MANAGEMENT | Facility: OTHER | Age: 75
End: 2020-09-14

## 2020-09-14 NOTE — PROGRESS NOTES
Patient called regarding getting a refill of his oxygen tanks. He is having a difficult time getting in touch with [facility]. I advised I will reach out to them and call him back with an update.

## 2020-09-14 NOTE — PROGRESS NOTES
I was able to reach [facility] and Oxygen tanks will be delivered tomorrow. I notified the patient.

## 2020-09-22 ENCOUNTER — PATIENT OUTREACH (OUTPATIENT)
Dept: HEALTH INFORMATION MANAGEMENT | Facility: OTHER | Age: 75
End: 2020-09-22

## 2020-09-22 NOTE — PROGRESS NOTES
Outcome: No answer No     AHA    Please transfer to Patient Outreach Team at [phone] when patient returns call.      [facility] Verified: yes    Attempt # 1

## 2020-11-23 ENCOUNTER — TELEPHONE (OUTPATIENT)
Dept: HEALTH INFORMATION MANAGEMENT | Facility: OTHER | Age: 75
End: 2020-11-23

## 2020-11-23 NOTE — TELEPHONE ENCOUNTER
Good afternoon Collin,    I spoke with your patient today and he is coming in tomorrow to see you for leg pain. He was wanting to know if there was anything you could prescribe to help eleiviate the pain until tomorrow. He stated the over the counter mediation was not helping anymore. Please advise.    Thank you,  Sasha BLACK

## 2020-11-24 ENCOUNTER — OFFICE VISIT (OUTPATIENT)
Dept: MEDICAL GROUP | Facility: MEDICAL CENTER | Age: 75
End: 2020-11-24
Payer: MEDICARE

## 2020-11-24 VITALS
HEART RATE: 100 BPM | RESPIRATION RATE: 20 BRPM | TEMPERATURE: 99.2 F | BODY MASS INDEX: 33.63 KG/M2 | HEIGHT: 67 IN | OXYGEN SATURATION: 91 % | DIASTOLIC BLOOD PRESSURE: 70 MMHG | SYSTOLIC BLOOD PRESSURE: 148 MMHG | WEIGHT: 214.29 LBS

## 2020-11-24 DIAGNOSIS — I73.9 PVD (PERIPHERAL VASCULAR DISEASE) (HCC): ICD-10-CM

## 2020-11-24 DIAGNOSIS — E11.9 TYPE 2 DIABETES MELLITUS WITHOUT COMPLICATION, WITHOUT LONG-TERM CURRENT USE OF INSULIN (HCC): ICD-10-CM

## 2020-11-24 DIAGNOSIS — M79.662 PAIN IN BOTH LOWER LEGS: ICD-10-CM

## 2020-11-24 DIAGNOSIS — M79.661 PAIN IN BOTH LOWER LEGS: ICD-10-CM

## 2020-11-24 DIAGNOSIS — I10 ESSENTIAL HYPERTENSION: ICD-10-CM

## 2020-11-24 PROCEDURE — 99214 OFFICE O/P EST MOD 30 MIN: CPT | Performed by: PHYSICIAN ASSISTANT

## 2020-11-24 RX ORDER — HYDROCODONE BITARTRATE AND ACETAMINOPHEN 5; 325 MG/1; MG/1
1 TABLET ORAL EVERY 8 HOURS PRN
Qty: 21 TAB | Refills: 0 | Status: ON HOLD | OUTPATIENT
Start: 2020-11-24 | End: 2020-12-04

## 2020-11-24 RX ORDER — FUROSEMIDE 20 MG/1
20 TABLET ORAL 2 TIMES DAILY
Qty: 60 TAB | Refills: 3 | Status: SHIPPED | OUTPATIENT
Start: 2020-11-24 | End: 2021-05-05 | Stop reason: SDUPTHER

## 2020-11-24 RX ORDER — SULFAMETHOXAZOLE AND TRIMETHOPRIM 800; 160 MG/1; MG/1
1 TABLET ORAL 2 TIMES DAILY
Qty: 20 TAB | Refills: 0 | Status: ON HOLD
Start: 2020-11-24 | End: 2020-12-04

## 2020-11-24 RX ORDER — HYDROCODONE BITARTRATE AND ACETAMINOPHEN 5; 325 MG/1; MG/1
1 TABLET ORAL EVERY 4 HOURS PRN
Qty: 90 TAB | Refills: 0 | Status: SHIPPED | OUTPATIENT
Start: 2020-11-24 | End: 2020-11-24 | Stop reason: SDUPTHER

## 2020-11-24 NOTE — ASSESSMENT & PLAN NOTE
This is a 75-year-old male here today to discuss chronic history of PVD with worsening concerns over the past 3 months.  He states that he has bilateral leg pain.  Open wounds.  Has been wrapping the wounds.  He sleeps in a bed.  States he has been placed on antibiotics in the past.  No effectiveness.  He has been taking Tylenol and ibuprofen with some relief but the pain is very severe.

## 2020-11-24 NOTE — ASSESSMENT & PLAN NOTE
Chronic condition.  Last year his A1c was at 6.0 while on Ozempic.  He ran out of the medication.  His endocrinologist left [facility].  There has been no contact regarding follow-up.  He is currently on no medication whatsoever.

## 2020-11-24 NOTE — PROGRESS NOTES
Subjective:   Enoch Mistry is a 75 y.o. male here today for chronic history of PVD with worsening ulcerations and pain and type 2 diabetes.    PVD (peripheral vascular disease) (HCC)  This is a 75-year-old male here today to discuss chronic history of PVD with worsening concerns over the past 3 months.  He states that he has bilateral leg pain.  Open wounds.  Has been wrapping the wounds.  He sleeps in a bed.  States he has been placed on antibiotics in the past.  No effectiveness.  He has been taking Tylenol and ibuprofen with some relief but the pain is very severe.    Type 2 diabetes mellitus without complication, without long-term current use of insulin ([facility])  Chronic condition.  Last year his A1c was at 6.0 while on Ozempic.  He ran out of the medication.  His endocrinologist left [facility].  There has been no contact regarding follow-up.  He is currently on no medication whatsoever.       Current medicines (including changes today)  Current Outpatient Medications   Medication Sig Dispense Refill   • furosemide (LASIX) 20 MG Tab Take 1 Tab by mouth 2 times a day. 60 Tab 3   • sulfamethoxazole-trimethoprim (BACTRIM DS) 800-160 MG tablet Take 1 Tab by mouth 2 times a day for 10 days. 20 Tab 0   • HYDROcodone-acetaminophen (NORCO) 5-325 MG Tab per tablet Take 1 Tab by mouth every four hours as needed for up to 30 days. 90 Tab 0   • FREESTYLE LITE strip USE ONCE EVERY MORNING TO TEST FASTING BLOOD SUGAR 50 Strip 0   • FREESTYLE LANCETS Misc USE EVERY MORNING TO TEST FASTING BLOOD SUGAR 100 Each 3   • Insulin Pen Needle 32 G x 4 mm (NOVOFINE PLUS) 1 Applicator by Does not apply route every day. Using one needle tip with Ozempic per week 50 Each 3   • non-formulary med Inhale 2 L/min by mouth Continuous.       No current facility-administered medications for this visit.      He  has a past medical history of Anemia, Arrhythmia, Breath shortness, Cancer (HCC), Diabetes (HCC), Emphysema of lung (HCC), Hiatus hernia  "syndrome, Hypertension, Other specified disorder of intestines, and Urinary bladder disorder.    Social History and Family History were reviewed and updated.    ROS   No chest pain, no shortness of breath, no abdominal pain and all other systems were reviewed and are negative.       Objective:     /70 (BP Location: Left arm, Patient Position: Sitting, BP Cuff Size: Adult)   Pulse (!) 125   Temp 37.3 °C (99.2 °F) (Temporal)   Resp 20   Ht 1.702 m (5' 7\")   Wt 97.2 kg (214 lb 4.6 oz)   SpO2 91%  Body mass index is 33.56 kg/m².   Physical Exam:  Constitutional: Alert, no distress.  Wearing cannula.  Skin: Warm, dry, good turgor, no rashes in visible areas.  Eye: Equal, round and reactive, conjunctiva clear, lids normal.  ENMT: Lips without lesions, good dentition, oropharynx clear.  Neck: Trachea midline, no masses.   Lymph: No cervical or supraclavicular lymphadenopathy  Respiratory: Unlabored respiratory effort, lungs appear clear, no wheezes.  Cardiovascular: Regular rate rhythm, lower extremity with bilateral leg edema noted.  Significant wounds which have been covered.  Weeping.  Erythema.  Psych: Alert and oriented x3, normal affect and mood.        Assessment and Plan:   The following treatment plan was discussed    1. PVD (peripheral vascular disease) (HCC)  Chronic condition with recent exacerbation.  Set up referral for home wound care.  Refer to vascular medicine.  Will start him on Lasix 20 mg twice daily.  Also placed him on Bactrim.  Prescribed hydrocodone for intermittent pain.  May take medication as directed.   reviewed.  Discussed my concern that possibly his diabetes needs to be better controlled.  Will defer labs until next office visit.  - REFERRAL TO VASCULAR MEDICINE  - furosemide (LASIX) 20 MG Tab; Take 1 Tab by mouth 2 times a day.  Dispense: 60 Tab; Refill: 3  - REFERRAL TO HOME WOUND CARE  - sulfamethoxazole-trimethoprim (BACTRIM DS) 800-160 MG tablet; Take 1 Tab by mouth 2 " times a day for 10 days.  Dispense: 20 Tab; Refill: 0  - HYDROcodone-acetaminophen (NORCO) 5-325 MG Tab per tablet; Take 1 Tab by mouth every four hours as needed for up to 30 days.  Dispense: 90 Tab; Refill: 0  - Consent for Opiate Prescription    2. Pain in both lower legs  Acute, new onset condition secondary to PVD.  Significant wounds noted.  Try to establish care with vascular and wound care and provided medication help reduce the swelling of the lower extremities.  He did sign controlled prescription consent.  - HYDROcodone-acetaminophen (NORCO) 5-325 MG Tab per tablet; Take 1 Tab by mouth every four hours as needed for up to 30 days.  Dispense: 90 Tab; Refill: 0  - Consent for Opiate Prescription    3. Type 2 diabetes mellitus without complication, without long-term current use of insulin (HCC)  Chronic condition.  Status unknown.  Will defer labs until next office visit in 1 month.  Refer to endocrinology.  Contacted pharmacist regarding starting Ozempic as samples.  - REFERRAL TO ENDOCRINOLOGY    4. Essential hypertension  Chronic condition.  Uncontrolled.  Will start Lasix 20 mg twice daily.  Defer labs until next month.    Pharmacy called and mentioned that he was opiate naïve.  I change the dose to reflect a 1 week supply instead of a 30-day supply.    Followup: Return in about 3 months (around 2/24/2021), or if symptoms worsen or fail to improve.    Please note that this dictation was created using voice recognition software. I have made every reasonable attempt to correct obvious errors, but I expect that there are errors of grammar and possibly content that I did not discover before finalizing the note.

## 2020-11-30 ENCOUNTER — HOME HEALTH ADMISSION (OUTPATIENT)
Dept: HOME HEALTH SERVICES | Facility: HOME HEALTHCARE | Age: 75
End: 2020-11-30
Payer: MEDICARE

## 2020-11-30 DIAGNOSIS — I73.9 PVD (PERIPHERAL VASCULAR DISEASE) (HCC): ICD-10-CM

## 2020-11-30 DIAGNOSIS — S81.801A WOUND OF RIGHT LOWER EXTREMITY, INITIAL ENCOUNTER: ICD-10-CM

## 2020-11-30 DIAGNOSIS — R60.0 LOWER EXTREMITY EDEMA: ICD-10-CM

## 2020-12-01 ENCOUNTER — HOME CARE VISIT (OUTPATIENT)
Dept: HOME HEALTH SERVICES | Facility: HOME HEALTHCARE | Age: 75
End: 2020-12-01

## 2020-12-01 ENCOUNTER — APPOINTMENT (OUTPATIENT)
Dept: RADIOLOGY | Facility: MEDICAL CENTER | Age: 75
DRG: 603 | End: 2020-12-01
Attending: EMERGENCY MEDICINE
Payer: MEDICARE

## 2020-12-01 ENCOUNTER — TELEPHONE (OUTPATIENT)
Dept: VASCULAR LAB | Facility: MEDICAL CENTER | Age: 75
End: 2020-12-01

## 2020-12-01 ENCOUNTER — HOSPITAL ENCOUNTER (INPATIENT)
Facility: MEDICAL CENTER | Age: 75
LOS: 3 days | DRG: 603 | End: 2020-12-04
Attending: EMERGENCY MEDICINE | Admitting: INTERNAL MEDICINE
Payer: MEDICARE

## 2020-12-01 DIAGNOSIS — L03.119 CELLULITIS OF LOWER EXTREMITY, UNSPECIFIED LATERALITY: ICD-10-CM

## 2020-12-01 DIAGNOSIS — I73.9 PVD (PERIPHERAL VASCULAR DISEASE) (HCC): ICD-10-CM

## 2020-12-01 DIAGNOSIS — J96.11 CHRONIC HYPOXEMIC RESPIRATORY FAILURE (HCC): ICD-10-CM

## 2020-12-01 DIAGNOSIS — E11.9 TYPE 2 DIABETES MELLITUS WITHOUT COMPLICATION, WITHOUT LONG-TERM CURRENT USE OF INSULIN (HCC): ICD-10-CM

## 2020-12-01 DIAGNOSIS — L03.115 CELLULITIS OF RIGHT LOWER EXTREMITY: ICD-10-CM

## 2020-12-01 LAB
ALBUMIN SERPL BCP-MCNC: 3.2 G/DL (ref 3.2–4.9)
ALBUMIN/GLOB SERPL: 1 G/DL
ALP SERPL-CCNC: 61 U/L (ref 30–99)
ALT SERPL-CCNC: 31 U/L (ref 2–50)
ANION GAP SERPL CALC-SCNC: 13 MMOL/L (ref 7–16)
APPEARANCE UR: ABNORMAL
AST SERPL-CCNC: 49 U/L (ref 12–45)
BACTERIA #/AREA URNS HPF: ABNORMAL /HPF
BASOPHILS # BLD AUTO: 0.3 % (ref 0–1.8)
BASOPHILS # BLD: 0.02 K/UL (ref 0–0.12)
BILIRUB SERPL-MCNC: 1.2 MG/DL (ref 0.1–1.5)
BILIRUB UR QL STRIP.AUTO: ABNORMAL
BUN SERPL-MCNC: 20 MG/DL (ref 8–22)
CALCIUM SERPL-MCNC: 8.4 MG/DL (ref 8.4–10.2)
CHLORIDE SERPL-SCNC: 98 MMOL/L (ref 96–112)
CO2 SERPL-SCNC: 22 MMOL/L (ref 20–33)
COLOR UR: YELLOW
COVID ORDER STATUS COVID19: NORMAL
CREAT SERPL-MCNC: 0.89 MG/DL (ref 0.5–1.4)
EOSINOPHIL # BLD AUTO: 0.09 K/UL (ref 0–0.51)
EOSINOPHIL NFR BLD: 1.4 % (ref 0–6.9)
EPI CELLS #/AREA URNS HPF: ABNORMAL /HPF
ERYTHROCYTE [DISTWIDTH] IN BLOOD BY AUTOMATED COUNT: 42.5 FL (ref 35.9–50)
ERYTHROCYTE [SEDIMENTATION RATE] IN BLOOD BY WESTERGREN METHOD: 0 MM/HOUR (ref 0–20)
GLOBULIN SER CALC-MCNC: 3.2 G/DL (ref 1.9–3.5)
GLUCOSE BLD-MCNC: 173 MG/DL (ref 65–99)
GLUCOSE BLD-MCNC: 176 MG/DL (ref 65–99)
GLUCOSE SERPL-MCNC: 204 MG/DL (ref 65–99)
GLUCOSE UR STRIP.AUTO-MCNC: NEGATIVE MG/DL
HCT VFR BLD AUTO: 43.4 % (ref 42–52)
HGB BLD-MCNC: 14.4 G/DL (ref 14–18)
HYALINE CASTS #/AREA URNS LPF: ABNORMAL /LPF
IMM GRANULOCYTES # BLD AUTO: 0.03 K/UL (ref 0–0.11)
IMM GRANULOCYTES NFR BLD AUTO: 0.5 % (ref 0–0.9)
INR PPP: 1.08 (ref 0.87–1.13)
KETONES UR STRIP.AUTO-MCNC: NEGATIVE MG/DL
LACTATE BLD-SCNC: 1.2 MMOL/L (ref 0.5–2)
LACTATE BLD-SCNC: 2 MMOL/L (ref 0.5–2)
LACTATE BLD-SCNC: 2.1 MMOL/L (ref 0.5–2)
LACTATE BLD-SCNC: 2.7 MMOL/L (ref 0.5–2)
LEUKOCYTE ESTERASE UR QL STRIP.AUTO: NEGATIVE
LYMPHOCYTES # BLD AUTO: 0.93 K/UL (ref 1–4.8)
LYMPHOCYTES NFR BLD: 14.6 % (ref 22–41)
MCH RBC QN AUTO: 28.2 PG (ref 27–33)
MCHC RBC AUTO-ENTMCNC: 33.2 G/DL (ref 33.7–35.3)
MCV RBC AUTO: 85.1 FL (ref 81.4–97.8)
MICRO URNS: ABNORMAL
MONOCYTES # BLD AUTO: 0.55 K/UL (ref 0–0.85)
MONOCYTES NFR BLD AUTO: 8.6 % (ref 0–13.4)
MUCOUS THREADS #/AREA URNS HPF: ABNORMAL /HPF
NEUTROPHILS # BLD AUTO: 4.75 K/UL (ref 1.82–7.42)
NEUTROPHILS NFR BLD: 74.6 % (ref 44–72)
NITRITE UR QL STRIP.AUTO: NEGATIVE
NRBC # BLD AUTO: 0 K/UL
NRBC BLD-RTO: 0 /100 WBC
PH UR STRIP.AUTO: 5 [PH] (ref 5–8)
PLATELET # BLD AUTO: 168 K/UL (ref 164–446)
PMV BLD AUTO: 10.9 FL (ref 9–12.9)
POTASSIUM SERPL-SCNC: 5.2 MMOL/L (ref 3.6–5.5)
PROT SERPL-MCNC: 6.4 G/DL (ref 6–8.2)
PROT UR QL STRIP: 30 MG/DL
PROTHROMBIN TIME: 13.7 SEC (ref 12–14.6)
RBC # BLD AUTO: 5.1 M/UL (ref 4.7–6.1)
RBC # URNS HPF: ABNORMAL /HPF
RBC UR QL AUTO: ABNORMAL
SODIUM SERPL-SCNC: 133 MMOL/L (ref 135–145)
SP GR UR REFRACTOMETRY: 1.03
UNIDENT CRYS URNS QL MICRO: ABNORMAL /HPF
WBC # BLD AUTO: 6.4 K/UL (ref 4.8–10.8)
WBC #/AREA URNS HPF: ABNORMAL /HPF

## 2020-12-01 PROCEDURE — 83605 ASSAY OF LACTIC ACID: CPT

## 2020-12-01 PROCEDURE — 96365 THER/PROPH/DIAG IV INF INIT: CPT

## 2020-12-01 PROCEDURE — 99285 EMERGENCY DEPT VISIT HI MDM: CPT

## 2020-12-01 PROCEDURE — 81001 URINALYSIS AUTO W/SCOPE: CPT

## 2020-12-01 PROCEDURE — 700105 HCHG RX REV CODE 258: Performed by: INTERNAL MEDICINE

## 2020-12-01 PROCEDURE — 99223 1ST HOSP IP/OBS HIGH 75: CPT | Mod: AI | Performed by: INTERNAL MEDICINE

## 2020-12-01 PROCEDURE — 83036 HEMOGLOBIN GLYCOSYLATED A1C: CPT

## 2020-12-01 PROCEDURE — 85652 RBC SED RATE AUTOMATED: CPT

## 2020-12-01 PROCEDURE — 94760 N-INVAS EAR/PLS OXIMETRY 1: CPT

## 2020-12-01 PROCEDURE — 71045 X-RAY EXAM CHEST 1 VIEW: CPT

## 2020-12-01 PROCEDURE — 700111 HCHG RX REV CODE 636 W/ 250 OVERRIDE (IP): Performed by: INTERNAL MEDICINE

## 2020-12-01 PROCEDURE — 700102 HCHG RX REV CODE 250 W/ 637 OVERRIDE(OP): Performed by: INTERNAL MEDICINE

## 2020-12-01 PROCEDURE — U0003 INFECTIOUS AGENT DETECTION BY NUCLEIC ACID (DNA OR RNA); SEVERE ACUTE RESPIRATORY SYNDROME CORONAVIRUS 2 (SARS-COV-2) (CORONAVIRUS DISEASE [COVID-19]), AMPLIFIED PROBE TECHNIQUE, MAKING USE OF HIGH THROUGHPUT TECHNOLOGIES AS DESCRIBED BY CMS-2020-01-R: HCPCS

## 2020-12-01 PROCEDURE — 85025 COMPLETE CBC W/AUTO DIFF WBC: CPT

## 2020-12-01 PROCEDURE — 80053 COMPREHEN METABOLIC PANEL: CPT

## 2020-12-01 PROCEDURE — 87086 URINE CULTURE/COLONY COUNT: CPT

## 2020-12-01 PROCEDURE — A9270 NON-COVERED ITEM OR SERVICE: HCPCS | Performed by: INTERNAL MEDICINE

## 2020-12-01 PROCEDURE — 96372 THER/PROPH/DIAG INJ SC/IM: CPT

## 2020-12-01 PROCEDURE — C9803 HOPD COVID-19 SPEC COLLECT: HCPCS | Performed by: INTERNAL MEDICINE

## 2020-12-01 PROCEDURE — 770006 HCHG ROOM/CARE - MED/SURG/GYN SEMI*

## 2020-12-01 PROCEDURE — 96366 THER/PROPH/DIAG IV INF ADDON: CPT

## 2020-12-01 PROCEDURE — 82962 GLUCOSE BLOOD TEST: CPT

## 2020-12-01 PROCEDURE — 96375 TX/PRO/DX INJ NEW DRUG ADDON: CPT

## 2020-12-01 PROCEDURE — 96367 TX/PROPH/DG ADDL SEQ IV INF: CPT

## 2020-12-01 PROCEDURE — 36415 COLL VENOUS BLD VENIPUNCTURE: CPT

## 2020-12-01 PROCEDURE — 700105 HCHG RX REV CODE 258: Performed by: EMERGENCY MEDICINE

## 2020-12-01 PROCEDURE — 85610 PROTHROMBIN TIME: CPT

## 2020-12-01 PROCEDURE — 700111 HCHG RX REV CODE 636 W/ 250 OVERRIDE (IP): Performed by: EMERGENCY MEDICINE

## 2020-12-01 PROCEDURE — 87040 BLOOD CULTURE FOR BACTERIA: CPT | Mod: 91

## 2020-12-01 RX ORDER — FUROSEMIDE 20 MG/1
20 TABLET ORAL 2 TIMES DAILY
Status: DISCONTINUED | OUTPATIENT
Start: 2020-12-01 | End: 2020-12-04 | Stop reason: HOSPADM

## 2020-12-01 RX ORDER — KETOROLAC TROMETHAMINE 30 MG/ML
15 INJECTION, SOLUTION INTRAMUSCULAR; INTRAVENOUS ONCE
Status: COMPLETED | OUTPATIENT
Start: 2020-12-01 | End: 2020-12-01

## 2020-12-01 RX ORDER — ONDANSETRON 4 MG/1
4 TABLET, ORALLY DISINTEGRATING ORAL EVERY 4 HOURS PRN
Status: DISCONTINUED | OUTPATIENT
Start: 2020-12-01 | End: 2020-12-04 | Stop reason: HOSPADM

## 2020-12-01 RX ORDER — ONDANSETRON 2 MG/ML
4 INJECTION INTRAMUSCULAR; INTRAVENOUS EVERY 4 HOURS PRN
Status: DISCONTINUED | OUTPATIENT
Start: 2020-12-01 | End: 2020-12-04 | Stop reason: HOSPADM

## 2020-12-01 RX ORDER — POLYETHYLENE GLYCOL 3350 17 G/17G
1 POWDER, FOR SOLUTION ORAL
Status: DISCONTINUED | OUTPATIENT
Start: 2020-12-01 | End: 2020-12-04 | Stop reason: HOSPADM

## 2020-12-01 RX ORDER — ACETAMINOPHEN 325 MG/1
650 TABLET ORAL EVERY 6 HOURS PRN
Status: DISCONTINUED | OUTPATIENT
Start: 2020-12-01 | End: 2020-12-04 | Stop reason: HOSPADM

## 2020-12-01 RX ORDER — LACTOBACILLUS RHAMNOSUS GG 10B CELL
1 CAPSULE ORAL
Status: DISCONTINUED | OUTPATIENT
Start: 2020-12-02 | End: 2020-12-04 | Stop reason: HOSPADM

## 2020-12-01 RX ORDER — ONDANSETRON 2 MG/ML
4 INJECTION INTRAMUSCULAR; INTRAVENOUS ONCE
Status: COMPLETED | OUTPATIENT
Start: 2020-12-01 | End: 2020-12-01

## 2020-12-01 RX ORDER — DEXTROSE MONOHYDRATE 25 G/50ML
50 INJECTION, SOLUTION INTRAVENOUS
Status: DISCONTINUED | OUTPATIENT
Start: 2020-12-01 | End: 2020-12-03

## 2020-12-01 RX ORDER — SODIUM CHLORIDE 9 MG/ML
INJECTION, SOLUTION INTRAVENOUS CONTINUOUS
Status: DISCONTINUED | OUTPATIENT
Start: 2020-12-01 | End: 2020-12-02

## 2020-12-01 RX ORDER — MORPHINE SULFATE 4 MG/ML
4 INJECTION, SOLUTION INTRAMUSCULAR; INTRAVENOUS ONCE
Status: COMPLETED | OUTPATIENT
Start: 2020-12-01 | End: 2020-12-01

## 2020-12-01 RX ORDER — BISACODYL 10 MG
10 SUPPOSITORY, RECTAL RECTAL
Status: DISCONTINUED | OUTPATIENT
Start: 2020-12-01 | End: 2020-12-04 | Stop reason: HOSPADM

## 2020-12-01 RX ORDER — IBUPROFEN 200 MG
200 TABLET ORAL EVERY 6 HOURS PRN
Status: ON HOLD | COMMUNITY
End: 2020-12-04

## 2020-12-01 RX ORDER — SODIUM CHLORIDE, SODIUM LACTATE, POTASSIUM CHLORIDE, AND CALCIUM CHLORIDE .6; .31; .03; .02 G/100ML; G/100ML; G/100ML; G/100ML
500 INJECTION, SOLUTION INTRAVENOUS
Status: DISCONTINUED | OUTPATIENT
Start: 2020-12-01 | End: 2020-12-04 | Stop reason: HOSPADM

## 2020-12-01 RX ORDER — HYDROCODONE BITARTRATE AND ACETAMINOPHEN 5; 325 MG/1; MG/1
1 TABLET ORAL EVERY 8 HOURS PRN
Status: DISCONTINUED | OUTPATIENT
Start: 2020-12-01 | End: 2020-12-02

## 2020-12-01 RX ORDER — HYDROMORPHONE HYDROCHLORIDE 1 MG/ML
1 INJECTION, SOLUTION INTRAMUSCULAR; INTRAVENOUS; SUBCUTANEOUS ONCE
Status: COMPLETED | OUTPATIENT
Start: 2020-12-01 | End: 2020-12-01

## 2020-12-01 RX ORDER — AMOXICILLIN 250 MG
2 CAPSULE ORAL 2 TIMES DAILY
Status: DISCONTINUED | OUTPATIENT
Start: 2020-12-01 | End: 2020-12-04 | Stop reason: HOSPADM

## 2020-12-01 RX ADMIN — FUROSEMIDE 20 MG: 40 TABLET ORAL at 14:58

## 2020-12-01 RX ADMIN — VANCOMYCIN HYDROCHLORIDE 2500 MG: 500 INJECTION, POWDER, LYOPHILIZED, FOR SOLUTION INTRAVENOUS at 14:58

## 2020-12-01 RX ADMIN — SODIUM CHLORIDE 3 G: 900 INJECTION INTRAVENOUS at 12:35

## 2020-12-01 RX ADMIN — SODIUM CHLORIDE: 9 INJECTION, SOLUTION INTRAVENOUS at 17:57

## 2020-12-01 RX ADMIN — SODIUM CHLORIDE 3 G: 900 INJECTION INTRAVENOUS at 20:31

## 2020-12-01 RX ADMIN — KETOROLAC TROMETHAMINE 15 MG: 30 INJECTION, SOLUTION INTRAMUSCULAR at 12:01

## 2020-12-01 RX ADMIN — MORPHINE SULFATE 4 MG: 4 INJECTION INTRAVENOUS at 12:00

## 2020-12-01 RX ADMIN — HYDROMORPHONE HYDROCHLORIDE 1 MG: 1 INJECTION, SOLUTION INTRAMUSCULAR; INTRAVENOUS; SUBCUTANEOUS at 14:59

## 2020-12-01 RX ADMIN — ENOXAPARIN SODIUM 40 MG: 40 INJECTION SUBCUTANEOUS at 14:58

## 2020-12-01 RX ADMIN — HYDROCODONE BITARTRATE AND ACETAMINOPHEN 1 TABLET: 5; 325 TABLET ORAL at 20:41

## 2020-12-01 RX ADMIN — ONDANSETRON 4 MG: 2 INJECTION INTRAMUSCULAR; INTRAVENOUS at 11:59

## 2020-12-01 RX ADMIN — THERA TABS 1 TABLET: TAB at 17:56

## 2020-12-01 ASSESSMENT — ENCOUNTER SYMPTOMS
DIARRHEA: 0
CONSTIPATION: 0
EYE PAIN: 0
NERVOUS/ANXIOUS: 0
COUGH: 0
NAUSEA: 0
HEADACHES: 0
DIZZINESS: 0
LOSS OF CONSCIOUSNESS: 0
WHEEZING: 0
EYE REDNESS: 0
MYALGIAS: 0
SEIZURES: 0
SHORTNESS OF BREATH: 0
INSOMNIA: 0
PALPITATIONS: 0
TREMORS: 0
VOMITING: 0
FEVER: 0
WEAKNESS: 0
CHILLS: 0
BLOOD IN STOOL: 0
FALLS: 0
HEMOPTYSIS: 0
FOCAL WEAKNESS: 0
ABDOMINAL PAIN: 0

## 2020-12-01 ASSESSMENT — FIBROSIS 4 INDEX: FIB4 SCORE: 3.93

## 2020-12-01 ASSESSMENT — PAIN DESCRIPTION - PAIN TYPE
TYPE: ACUTE PAIN
TYPE: ACUTE PAIN

## 2020-12-01 NOTE — ED TRIAGE NOTES
BIB [facility] for bilateral upper and lower extremity rash, Been taking Bactrim and doesn't feel its getting better.

## 2020-12-01 NOTE — ASSESSMENT & PLAN NOTE
Failed Bactrim, does not appear to be bacterial in nature.  Procalcitonin negative  Wound Consult pending  Hx of PVD but this does not appear ischemic  Continue oral Augmentin therapy for 5 days for secondary cellulitis in the setting of severe scabies infection  Permethrin treatment legs and arms

## 2020-12-01 NOTE — ASSESSMENT & PLAN NOTE
Venous US shows R chronic vs subacute DVT  Consider anticoagulation, will discuss this with the patient

## 2020-12-01 NOTE — H&P
"Hospital Medicine History & Physical Note    Date of Service  12/1/2020    Primary Care Physician  Collin Schwab P.A.-C.    Consultants      Code Status  Prior    Chief Complaint  Chief Complaint   Patient presents with   • Rash     Bilateral upper and lower extremities       History of Presenting Illness  75 y.o. male who presented 12/1/2020 with Rash (Bilateral upper and lower extremities)  History of PVD with complaints of pain on Norco and poor healing, open wounds, for 2mo now being treated as cellulitis with Bactrim and other antibiotics. He was referred to vascular medicine and wound care by his PCP, Felecia BLANCHARD  History of diabetes on oral medications, fair control last A1c 6.0  History of uncontrolled hypertension.  Presented with Rash (Bilateral upper and lower extremities)  He has had on and off issues with his lower extremities which he said would usually \"improve\" with antibiotics. However for the past 2mo now despite antibiotics, last of which was Bactrim which was prescribed to him on the 24th and last took yesterday, he continues to have bilateral lower leg pain, rash, cracking, clear discharge, redness. He does have [facility] nurse who looked at it and per his report he said they could \"not do anything more\".  He denied fever, chills. Headache, cough, sore throat, change in taste, other myalgias. He denied recent travle to CoVID hotspots or exposure to known CoVID positive individuals.   At the ED, he is afebrile, hemodynamically stable. He is NOT hypoxic.  CXR:  No evidence of acute cardiopulmonary process.  No leukocytosis.  When I saw him at ED, no acute distress. Lower extremities are cracked, slightlyu swolln, erythematous. At this time dry, I do not see any purulence.    Review of Systems  Review of Systems   Constitutional: Negative for chills and fever.   HENT: Negative for congestion, hearing loss and nosebleeds.    Eyes: Negative for pain and redness.   Respiratory: Negative for cough, " hemoptysis, shortness of breath and wheezing.    Cardiovascular: Negative for chest pain and palpitations.   Gastrointestinal: Negative for abdominal pain, blood in stool, constipation, diarrhea, nausea and vomiting.   Genitourinary: Negative for dysuria, frequency and hematuria.   Musculoskeletal: Negative for falls, joint pain and myalgias.   Skin: Positive for rash.   Neurological: Negative for dizziness, tremors, focal weakness, seizures, loss of consciousness, weakness and headaches.   Psychiatric/Behavioral: The patient is not nervous/anxious and does not have insomnia.    All other systems reviewed and are negative.      Past Medical History   has a past medical history of Anemia, Arrhythmia, Breath shortness, Cancer (HCC), Diabetes (HCC), Emphysema of lung (HCC), Hiatus hernia syndrome, Hypertension, Other specified disorder of intestines, and Urinary bladder disorder.    Surgical History   has a past surgical history that includes cystoscopy (5/7/2015); trans urethral resection prostate (5/7/2015); trans urethral resection bladder (5/7/2015); cystoscopy (N/A, 9/11/2017); gastroscopy-endo (9/19/2018); bud by laparoscopy (9/20/2018); ercp (9/21/2018); and ercp (11/15/2018).     Family History  family history is not on file.   No family history of PVD  Social History   reports that he quit smoking about 14 years ago. His smoking use included cigarettes. He has a 100.00 pack-year smoking history. He has never used smokeless tobacco. He reports previous alcohol use. He reports that he does not use drugs.    Allergies  No Known Allergies    Medications  Prior to Admission Medications   Prescriptions Last Dose Informant Patient Reported? Taking?   FREESTYLE LANCETS Misc   No No   Sig: USE EVERY MORNING TO TEST FASTING BLOOD SUGAR   FREESTYLE LITE strip   No No   Sig: USE ONCE EVERY MORNING TO TEST FASTING BLOOD SUGAR   HYDROcodone-acetaminophen (NORCO) 5-325 MG Tab per tablet   No No   Sig: Take 1 Tab by mouth  every 8 hours as needed for up to 7 days.   Insulin Pen Needle 32 G x 4 mm (NOVOFINE PLUS)   No No   Si Applicator by Does not apply route every day. Using one needle tip with Ozempic per week   furosemide (LASIX) 20 MG Tab   No No   Sig: Take 1 Tab by mouth 2 times a day.   non-formulary med   Yes No   Sig: Inhale 2 L/min by mouth Continuous.   sulfamethoxazole-trimethoprim (BACTRIM DS) 800-160 MG tablet   No No   Sig: Take 1 Tab by mouth 2 times a day for 10 days.      Facility-Administered Medications: None       Physical Exam  Temp:  [37.1 °C (98.8 °F)] 37.1 °C (98.8 °F)  Pulse:  [] 96  Resp:  [18] 18  BP: (124-151)/(61-74) 124/61  SpO2:  [95 %-99 %] 96 %    Physical Exam  Vitals signs and nursing note reviewed.   Constitutional:       Appearance: He is obese.      Comments: Seems unkempt   HENT:      Head: Normocephalic and atraumatic.      Right Ear: External ear normal.      Left Ear: External ear normal.      Nose: Nose normal.      Mouth/Throat:      Mouth: Mucous membranes are moist.   Eyes:      General: No scleral icterus.     Conjunctiva/sclera: Conjunctivae normal.   Neck:      Musculoskeletal: Normal range of motion and neck supple.   Cardiovascular:      Rate and Rhythm: Normal rate and regular rhythm.      Heart sounds: No murmur. No friction rub. No gallop.    Pulmonary:      Effort: Pulmonary effort is normal.      Breath sounds: Normal breath sounds.   Abdominal:      General: Abdomen is flat. Bowel sounds are normal. There is no distension.      Palpations: Abdomen is soft.      Tenderness: There is no abdominal tenderness. There is no guarding.   Musculoskeletal: Normal range of motion.         General: Swelling (BLE) present.      Right lower leg: Edema present.      Left lower leg: Edema present.   Skin:     General: Skin is warm.      Findings: Erythema (BLE) and rash (BLE cracking no discharge) present.   Neurological:      Mental Status: He is alert and oriented to person, place,  and time. Mental status is at baseline.   Psychiatric:         Mood and Affect: Mood normal.         Behavior: Behavior normal.         Thought Content: Thought content normal.         Judgment: Judgment normal.         Laboratory:  Recent Labs     12/01/20  1155   WBC 6.4   RBC 5.10   HEMOGLOBIN 14.4   HEMATOCRIT 43.4   MCV 85.1   MCH 28.2   MCHC 33.2*   RDW 42.5   PLATELETCT 168   MPV 10.9     Recent Labs     12/01/20  1155   SODIUM 133*   POTASSIUM 5.2   CHLORIDE 98   CO2 22   GLUCOSE 204*   BUN 20   CREATININE 0.89   CALCIUM 8.4     Recent Labs     12/01/20  1155   ALTSGPT 31   ASTSGOT 49*   ALKPHOSPHAT 61   TBILIRUBIN 1.2   GLUCOSE 204*         No results for input(s): NTPROBNP in the last 72 hours.      No results for input(s): TROPONINT in the last 72 hours.    Imaging:  DX-CHEST-PORTABLE (1 VIEW)   Final Result      No evidence of acute cardiopulmonary process.            Assessment/Plan:  I anticipate this patient will require at least two midnights for appropriate medical management, necessitating inpatient admission.    * Cellulitis of bilateral lower extremity, PVD  Assessment & Plan  Empiric Unasyn-vancomycin  Ordered Wound Team consult  Gram and culture any discharge  IF not infected, can order vasculitic panel  Complete PVD work-up by ordering arterial and venous LE Dopplers.    Type 2 diabetes mellitus without complication, without long-term current use of insulin (HCC)- (present on admission)  Assessment & Plan  Ordered SS inulin    PVD (peripheral vascular disease) ([facility])- (present on admission)  Assessment & Plan  Ordering arterial Dopplers as above.    Essential hypertension- (present on admission)  Assessment & Plan  Continue his Lasix for now. Monitor K+, currently 5.2.    Lovenox SQ

## 2020-12-01 NOTE — ED PROVIDER NOTES
ED Provider Note    ED Provider    Means of arrival: Ambulance  History obtained from: Patient and EMS  History limited by: None    CHIEF COMPLAINT  Chief Complaint   Patient presents with   • Rash     Bilateral upper and lower extremities       HPI  Enoch Mistry is a 75 y.o. male who presents complains of bilateral lower extremity rash has been going on for approximately 2 months.  Started out as a erythema and swelling.  It has now progressed to significant swelling, crusting, and cracking of the skin.  He has an erythematous rash also to the upper extremities.  He describes this is painful, not itching.  He has been using lotion.  Been placed on antibiotics and the antibiotics have not been helping this.    He has tingling to the lower extremities bilaterally.  No numbness.  He is able to walk without difficulty.    No recent fevers chills cough congestion.  Does complain of nausea with no vomiting.  There is no diarrhea or abdominal pain.    REVIEW OF SYSTEMS  See HPI for further details. All other systems are negative.     PAST MEDICAL HISTORY   has a past medical history of Anemia, Arrhythmia, Breath shortness, Cancer (Formerly [facility]), Diabetes (Formerly [facility]), Emphysema of lung (HCC), Hiatus hernia syndrome, Hypertension, Other specified disorder of intestines, and Urinary bladder disorder.    SOCIAL HISTORY  Social History     Tobacco Use   • Smoking status: Former Smoker     Packs/day: 2.00     Years: 50.00     Pack years: 100.00     Types: Cigarettes     Quit date: 2006     Years since quittin.9   • Smokeless tobacco: Never Used   Substance and Sexual Activity   • Alcohol use: Not Currently     Alcohol/week: 0.0 oz     Comment: Nothing for one year (19)   • Drug use: No   • Sexual activity: Not Currently     Partners: Female     Comment: , no children       SURGICAL HISTORY   has a past surgical history that includes cystoscopy (2015); trans urethral resection prostate (2015); trans urethral  "resection bladder (5/7/2015); cystoscopy (N/A, 9/11/2017); gastroscopy-endo (9/19/2018); bud by laparoscopy (9/20/2018); ercp (9/21/2018); and ercp (11/15/2018).    CURRENT MEDICATIONS  Home Medications     Reviewed by Jordana Fernandez (Pharmacy Tech) on 12/01/20 at 1422  Med List Status: Complete   Medication Last Dose Status   FREESTYLE LANCETS Misc  Active   FREESTYLE LITE strip  Active   furosemide (LASIX) 20 MG Tab 11/30/2020 Active   HYDROcodone-acetaminophen (NORCO) 5-325 MG Tab per tablet 11/30/2020 Active   ibuprofen (MOTRIN) 200 MG Tab > 1 week Active   Insulin Pen Needle 32 G x 4 mm (NOVOFINE PLUS)  Active   multivitamin (THERAGRAN) Tab >3 days Active   non-formulary med  Active   sulfamethoxazole-trimethoprim (BACTRIM DS) 800-160 MG tablet 11/30/2020 Active                ALLERGIES  No Known Allergies    PHYSICAL EXAM  VITAL SIGNS: /62   Pulse 91   Temp 36.7 °C (98 °F) (Temporal)   Resp 18   Ht 1.702 m (5' 7\")   Wt 97.1 kg (214 lb)   SpO2 96%   BMI 33.52 kg/m²   Constitutional: Alert in no apparent distress.  HENT: No signs of trauma, Mucous membranes are moist   Eyes:  Conjunctiva normal, Non-icteric.   Neck: Normal range of motion, No tenderness, Supple,  Lymphatic: No lymphadenopathy noted.   Cardiovascular: Regular rate and rhythm, no murmurs.   Thorax & Lungs: Normal breath sounds, No respiratory distress, No wheezing, No chest tenderness.   Abdomen: Bowel sounds normal, Soft, No tenderness, No masses, No pulsatile masses. No peritoneal signs.  Skin: Warm, Dry, rash to the bilateral lower extremities.  The right is greater than the left.  The right is scaly, crusting, erythematous with +2 edema.    Right lower extremity has crusting only on the dorsum of the foot.  There is erythema and +1 edema to the entire left lower extremity.  Bilateral lower extremities have have what may be petechial areas of the rash also.  Bilateral upper extremities have erythematous diffuse rash " without swelling.  There is no crusting.  Back: No bony tenderness, No CVA tenderness.   Extremities: Rash as described under skin.  He does have a good range of motion of the lower extremities, and pedal pulses are present and cap refill is less than 2 seconds.  Musculoskeletal: Good range of motion in all major joints. No tenderness to palpation or major deformities noted.   Neurologic: Alert ,Oriented x4, Normal motor function, Normal sensory function, No focal deficits noted.   Psychiatric: Affect normal, Judgment normal, Mood normal.       MEDICAL DECISION MAKING  This is a 75 y.o. male who presents with a significant rash and swelling concerning for cellulitis, he is afebrile and not having fevers, does not appear to be septic at this time.  This may also be a vasculitis type of rash.  ESR and CRP will also be evaluated for.    The patient will be started on empiric antibiotics.    DIAGNOSTIC STUDIES / PROCEDURES    EKG      LABS  Results for orders placed or performed during the hospital encounter of 12/01/20   CBC WITH DIFFERENTIAL   Result Value Ref Range    WBC 6.4 4.8 - 10.8 K/uL    RBC 5.10 4.70 - 6.10 M/uL    Hemoglobin 14.4 14.0 - 18.0 g/dL    Hematocrit 43.4 42.0 - 52.0 %    MCV 85.1 81.4 - 97.8 fL    MCH 28.2 27.0 - 33.0 pg    MCHC 33.2 (L) 33.7 - 35.3 g/dL    RDW 42.5 35.9 - 50.0 fL    Platelet Count 168 164 - 446 K/uL    MPV 10.9 9.0 - 12.9 fL    Neutrophils-Polys 74.60 (H) 44.00 - 72.00 %    Lymphocytes 14.60 (L) 22.00 - 41.00 %    Monocytes 8.60 0.00 - 13.40 %    Eosinophils 1.40 0.00 - 6.90 %    Basophils 0.30 0.00 - 1.80 %    Immature Granulocytes 0.50 0.00 - 0.90 %    Nucleated RBC 0.00 /100 WBC    Neutrophils (Absolute) 4.75 1.82 - 7.42 K/uL    Lymphs (Absolute) 0.93 (L) 1.00 - 4.80 K/uL    Monos (Absolute) 0.55 0.00 - 0.85 K/uL    Eos (Absolute) 0.09 0.00 - 0.51 K/uL    Baso (Absolute) 0.02 0.00 - 0.12 K/uL    Immature Granulocytes (abs) 0.03 0.00 - 0.11 K/uL    NRBC (Absolute) 0.00 K/uL    COMP METABOLIC PANEL   Result Value Ref Range    Sodium 133 (L) 135 - 145 mmol/L    Potassium 5.2 3.6 - 5.5 mmol/L    Chloride 98 96 - 112 mmol/L    Co2 22 20 - 33 mmol/L    Anion Gap 13.0 7.0 - 16.0    Glucose 204 (H) 65 - 99 mg/dL    Bun 20 8 - 22 mg/dL    Creatinine 0.89 0.50 - 1.40 mg/dL    Calcium 8.4 8.4 - 10.2 mg/dL    AST(SGOT) 49 (H) 12 - 45 U/L    ALT(SGPT) 31 2 - 50 U/L    Alkaline Phosphatase 61 30 - 99 U/L    Total Bilirubin 1.2 0.1 - 1.5 mg/dL    Albumin 3.2 3.2 - 4.9 g/dL    Total Protein 6.4 6.0 - 8.2 g/dL    Globulin 3.2 1.9 - 3.5 g/dL    A-G Ratio 1.0 g/dL   Sed Rate   Result Value Ref Range    Sed Rate Westergren 0 0 - 20 mm/hour   LACTIC ACID   Result Value Ref Range    Lactic Acid 2.1 (H) 0.5 - 2.0 mmol/L   ESTIMATED GFR   Result Value Ref Range    GFR If African American >60 >60 mL/min/1.73 m 2    GFR If Non African American >60 >60 mL/min/1.73 m 2   Lactic Acid -STAT Once   Result Value Ref Range    Lactic Acid 2.0 0.5 - 2.0 mmol/L   Prothrombin time (INR)   Result Value Ref Range    PT 13.7 12.0 - 14.6 sec    INR 1.08 0.87 - 1.13         RADIOLOGY  DX-CHEST-PORTABLE (1 VIEW)   Final Result      No evidence of acute cardiopulmonary process.      US-EXTREMITY ARTERY LOWER BILAT W/JAY (COMBO)    (Results Pending)   US-EXTREMITY VENOUS LOWER BILAT    (Results Pending)     Radiology films were independently visualized by me  COURSE  Pertinent Labs & Imaging studies reviewed. (See chart for details)    10:44 AM - Patient seen and examined at bedside. Discussed plan of care,    Patient's lower extremities are erythematous and swollen.  The right is cracking crusted and flaking.  This is consistent with cellulitis.  White blood cell count is normal he does not appear septic but IV antibiotics were initiated.    His ESR is normal vasculitis is less likely so steroids were withheld at this time.  I spoke with the hospitalist for admission the patient will be admitted for further evaluation and  treatment.  He was medicated with morphine for pain he did receive temporary improvement with pain from that.      Admitted in stable condition    FINAL IMPRESSION  1. Cellulitis of right lower extremity        The note accurately reflects work and decisions made by me.  Skip Balderas D.O.  12/1/2020  4:10 PM

## 2020-12-02 ENCOUNTER — APPOINTMENT (OUTPATIENT)
Dept: RADIOLOGY | Facility: MEDICAL CENTER | Age: 75
DRG: 603 | End: 2020-12-02
Attending: INTERNAL MEDICINE
Payer: MEDICARE

## 2020-12-02 PROBLEM — B86 SCABIES: Status: ACTIVE | Noted: 2020-12-02

## 2020-12-02 LAB
ANION GAP SERPL CALC-SCNC: 11 MMOL/L (ref 7–16)
BUN SERPL-MCNC: 24 MG/DL (ref 8–22)
CALCIUM SERPL-MCNC: 7.8 MG/DL (ref 8.4–10.2)
CHLORIDE SERPL-SCNC: 100 MMOL/L (ref 96–112)
CO2 SERPL-SCNC: 22 MMOL/L (ref 20–33)
CREAT SERPL-MCNC: 0.89 MG/DL (ref 0.5–1.4)
ERYTHROCYTE [DISTWIDTH] IN BLOOD BY AUTOMATED COUNT: 45.2 FL (ref 35.9–50)
EST. AVERAGE GLUCOSE BLD GHB EST-MCNC: 200 MG/DL
GLUCOSE BLD-MCNC: 154 MG/DL (ref 65–99)
GLUCOSE BLD-MCNC: 160 MG/DL (ref 65–99)
GLUCOSE BLD-MCNC: 164 MG/DL (ref 65–99)
GLUCOSE BLD-MCNC: 200 MG/DL (ref 65–99)
GLUCOSE SERPL-MCNC: 182 MG/DL (ref 65–99)
HBA1C MFR BLD: 8.6 % (ref 0–5.6)
HCT VFR BLD AUTO: 42.5 % (ref 42–52)
HGB BLD-MCNC: 13.5 G/DL (ref 14–18)
MCH RBC QN AUTO: 28.1 PG (ref 27–33)
MCHC RBC AUTO-ENTMCNC: 31.8 G/DL (ref 33.7–35.3)
MCV RBC AUTO: 88.5 FL (ref 81.4–97.8)
PLATELET # BLD AUTO: 147 K/UL (ref 164–446)
PMV BLD AUTO: 10.6 FL (ref 9–12.9)
POTASSIUM SERPL-SCNC: 4.8 MMOL/L (ref 3.6–5.5)
PROCALCITONIN SERPL-MCNC: 0.23 NG/ML
RBC # BLD AUTO: 4.8 M/UL (ref 4.7–6.1)
SARS-COV-2 RNA RESP QL NAA+PROBE: NOTDETECTED
SODIUM SERPL-SCNC: 133 MMOL/L (ref 135–145)
SPECIMEN SOURCE: NORMAL
WBC # BLD AUTO: 6.2 K/UL (ref 4.8–10.8)

## 2020-12-02 PROCEDURE — A9270 NON-COVERED ITEM OR SERVICE: HCPCS | Performed by: INTERNAL MEDICINE

## 2020-12-02 PROCEDURE — 94760 N-INVAS EAR/PLS OXIMETRY 1: CPT

## 2020-12-02 PROCEDURE — 93970 EXTREMITY STUDY: CPT

## 2020-12-02 PROCEDURE — 82962 GLUCOSE BLOOD TEST: CPT | Mod: 91

## 2020-12-02 PROCEDURE — 85027 COMPLETE CBC AUTOMATED: CPT

## 2020-12-02 PROCEDURE — 700111 HCHG RX REV CODE 636 W/ 250 OVERRIDE (IP): Performed by: INTERNAL MEDICINE

## 2020-12-02 PROCEDURE — 99233 SBSQ HOSP IP/OBS HIGH 50: CPT | Performed by: INTERNAL MEDICINE

## 2020-12-02 PROCEDURE — 700102 HCHG RX REV CODE 250 W/ 637 OVERRIDE(OP): Performed by: INTERNAL MEDICINE

## 2020-12-02 PROCEDURE — 770006 HCHG ROOM/CARE - MED/SURG/GYN SEMI*

## 2020-12-02 PROCEDURE — 84145 PROCALCITONIN (PCT): CPT

## 2020-12-02 PROCEDURE — 700101 HCHG RX REV CODE 250: Performed by: INTERNAL MEDICINE

## 2020-12-02 PROCEDURE — 80048 BASIC METABOLIC PNL TOTAL CA: CPT

## 2020-12-02 PROCEDURE — 700105 HCHG RX REV CODE 258: Performed by: INTERNAL MEDICINE

## 2020-12-02 PROCEDURE — 36415 COLL VENOUS BLD VENIPUNCTURE: CPT

## 2020-12-02 RX ORDER — AMOXICILLIN AND CLAVULANATE POTASSIUM 875; 125 MG/1; MG/1
1 TABLET, FILM COATED ORAL EVERY 12 HOURS
Status: DISCONTINUED | OUTPATIENT
Start: 2020-12-02 | End: 2020-12-04 | Stop reason: HOSPADM

## 2020-12-02 RX ORDER — HYDROCODONE BITARTRATE AND ACETAMINOPHEN 5; 325 MG/1; MG/1
1 TABLET ORAL EVERY 6 HOURS PRN
Status: DISCONTINUED | OUTPATIENT
Start: 2020-12-02 | End: 2020-12-02

## 2020-12-02 RX ORDER — PERMETHRIN 50 MG/G
CREAM TOPICAL ONCE
Status: COMPLETED | OUTPATIENT
Start: 2020-12-02 | End: 2020-12-02

## 2020-12-02 RX ORDER — OXYCODONE HYDROCHLORIDE 5 MG/1
5-10 TABLET ORAL EVERY 4 HOURS PRN
Status: DISCONTINUED | OUTPATIENT
Start: 2020-12-02 | End: 2020-12-04 | Stop reason: HOSPADM

## 2020-12-02 RX ADMIN — SODIUM CHLORIDE 3 G: 900 INJECTION INTRAVENOUS at 07:56

## 2020-12-02 RX ADMIN — OXYCODONE HYDROCHLORIDE 10 MG: 5 TABLET ORAL at 23:48

## 2020-12-02 RX ADMIN — POLYETHYLENE GLYCOL 3350 1 PACKET: 17 POWDER, FOR SOLUTION ORAL at 10:18

## 2020-12-02 RX ADMIN — THERA TABS 1 TABLET: TAB at 05:34

## 2020-12-02 RX ADMIN — AMOXICILLIN AND CLAVULANATE POTASSIUM 1 TABLET: 875; 125 TABLET, FILM COATED ORAL at 17:45

## 2020-12-02 RX ADMIN — FUROSEMIDE 20 MG: 40 TABLET ORAL at 01:47

## 2020-12-02 RX ADMIN — OXYCODONE HYDROCHLORIDE 10 MG: 5 TABLET ORAL at 13:26

## 2020-12-02 RX ADMIN — SENNOSIDES-DOCUSATE SODIUM TAB 8.6-50 MG 2 TABLET: 8.6-5 TAB at 05:34

## 2020-12-02 RX ADMIN — HYDROCODONE BITARTRATE AND ACETAMINOPHEN 1 TABLET: 5; 325 TABLET ORAL at 08:56

## 2020-12-02 RX ADMIN — PERMETHRIN: 50 CREAM TOPICAL at 16:54

## 2020-12-02 RX ADMIN — SODIUM CHLORIDE 3 G: 900 INJECTION INTRAVENOUS at 01:48

## 2020-12-02 RX ADMIN — Medication 1 CAPSULE: at 07:56

## 2020-12-02 ASSESSMENT — ENCOUNTER SYMPTOMS
COUGH: 0
SORE THROAT: 0
ABDOMINAL PAIN: 0
DEPRESSION: 0
BACK PAIN: 0
BLOOD IN STOOL: 0
NAUSEA: 0
SHORTNESS OF BREATH: 0
FOCAL WEAKNESS: 0
VOMITING: 0
DIARRHEA: 0
DIZZINESS: 0
PALPITATIONS: 0
HEADACHES: 0
CHILLS: 0
WEAKNESS: 0
FEVER: 0
HALLUCINATIONS: 0
HEARTBURN: 0
MYALGIAS: 0

## 2020-12-02 ASSESSMENT — PAIN DESCRIPTION - PAIN TYPE: TYPE: ACUTE PAIN

## 2020-12-02 NOTE — THERAPY
Missed Therapy     Patient Name: Enoch Mistry  Age:  75 y.o., Sex:  male  Medical Record #: [id number]  Today's Date: 12/2/2020    Discussed missed therapy with RN        12/02/20 0988   Initial Contact Note    Initial Contact Note Order Received and Verified, Occupational Therapy Evaluation in Progress with Full Report to Follow.   Interdisciplinary Plan of Care Collaboration   Collaboration Comments OT travis attempted discussed w/RN held this am d/t on going pain issues will follow up tomorrow    Session Information   Date / Session Number  12/2 held

## 2020-12-02 NOTE — NON-PROVIDER
Saw order in the In basket. Unable to reach patient and leave a VM to get more information about homecare wound care . Shows patient is also in Hospital since yesterday.

## 2020-12-02 NOTE — FLOWSHEET NOTE
12/01/20 2000   Events/Summary/Plan   Events/Summary/Plan Noc ox start: pt wears oxygen 24/7 2 LPM: will see what Sp02 patient runs during sleep w/02. explained procedure to pt   Skin Inspection Respiratory Device Intact   Vital Signs   Pulse (!) 104   Respiration 20   Pulse Oximetry 93 %   $ Pulse Oximetry (Spot Check) Yes   O2 Alarms Set & Reviewed Yes   Respiratory Assessment   Respiratory Pattern Within Normal Limits   Level of Consciousness Alert   Chest Exam   Work Of Breathing / Effort Within Normal Limits   Breath Sounds   RUL Breath Sounds Clear   RML Breath Sounds Diminished   RLL Breath Sounds Diminished   LORNA Breath Sounds Clear   LLL Breath Sounds Diminished   Oxygen   O2 (LPM) 2   O2 Delivery Device Nasal Cannula

## 2020-12-02 NOTE — CARE PLAN
Problem: Knowledge Deficit  Goal: Knowledge of disease process/condition, treatment plan, diagnostic tests, and medications will improve  Outcome: PROGRESSING AS EXPECTED    Discussed plan of care for today w/ pt this am, he is A+O x 4, he verbalizes understanding of his plan of care, questions answered. Emotional support given. Reinforcing education as necessary. Discussed pt's care with Hospitalist today.       Problem: Pain Management  Goal: Pain level will decrease to patient's comfort goal  Outcome: PROGRESSING SLOWER THAN EXPECTED    Receives PRN's. Discussed pain control with hospitalist. Assessing every four hrs for pain per protocol; see doc flowsheets

## 2020-12-02 NOTE — PROGRESS NOTES
Report received from ER RN at 1643. Pt to the floor at 1700.  Pt is showing no signs of distress, states 5/10 pain in lower R extremity but is tolerable. Pt is on 3L via NC. Pt educated about calling before getting up. States no further needs at this time, just to rest. Bed in lowest position, call light in reach will continue to monitor.

## 2020-12-02 NOTE — PROGRESS NOTES
2 Rn skin check complete, skin not WDL. Generalized swelling, petechiae/ pupura, and rash to bilateral upper and lower extremities, abdomen, back. R LE peeling, dryness, with open wounds.  See wound flowsheet.

## 2020-12-02 NOTE — DISCHARGE PLANNING
Care Transition Team Assessment  LSW met with pt at bedside to complete assessment and discuss discharge plans/barriers. Pt's goal is to be able to return home upon discharge and reported his spouse could provide transportation. Pt follows with Ronan Schwab and uses [facility] on [address]. When discussing potential barriers for discharge he reported difficult time obtaining appt due to no availability to follow up on his legs/wounds.     Pt is 75 year old  male who lives with his spouse in their ground level apt at [address] [place] [address]. He denied having any children. Pt reported he is independent with his ADL/iADL needs although due to his legs he indicated it has taken him longer to complete tasks. He denies the use of any mobility devices in the home. He does have home oxygen with [facility] [facility] at 2 L. Pt is retired and receives SSA income. He denies any financial barriers to meeting his basic needs. He denied any substance abuse or behavioral health needs.      Pt does not have an Advanced Directive. LSW discussed Advanced Directives and importance. He reported his wife would make decisions for him, if he is unable but was not interested in completing document at this time.     Information Source  Orientation : Oriented x 4  Information Given By: Patient  Informant's Name: Enoch  Who is responsible for making decisions for patient? : Patient    Readmission Evaluation  Is this a readmission?: No    Elopement Risk  Legal Hold: No  Ambulatory or Self Mobile in Wheelchair: No-Not an Elopement Risk  Elopement Risk: Not at Risk for Elopement    Interdisciplinary Discharge Planning  Primary Care Physician: Collin Schwab PA-C  Lives with - Patient's Self Care Capacity: Spouse  Support Systems: Spouse / Significant Other  Housing / Facility: 1 Story Apartment / Condo  Durable Medical Equipment: Home Oxygen  DME Provider / Phone: [facility]     Discharge Preparedness  What is  your plan after discharge?: Home with help  What are your discharge supports?: Spouse  Prior Functional Level: Ambulatory, Drives Self, Independent with Activities of Daily Living, Independent with Medication Management  Difficulity with ADLs: None  Difficulity with IADLs: None    Functional Assesment  Prior Functional Level: Ambulatory, Drives Self, Independent with Activities of Daily Living, Independent with Medication Management    Finances  Financial Barriers to Discharge: No  Prescription Coverage: Yes    Domestic Abuse  Have you ever been the victim of abuse or violence?: No    Psychological Assessment  History of Substance Abuse: None  History of Psychiatric Problems: No  Non-compliant with Treatment: No  Newly Diagnosed Illness: Yes    Discharge Risks or Barriers  Discharge risks or barriers?: No  Patient risk factors: Lack of outside supports    Anticipated Discharge Information  Discharge Disposition: Still a Patient (30)

## 2020-12-02 NOTE — PROGRESS NOTES
"Pharmacy Kinetics 75 y.o. male on vancomycin day # 1 2020    Currently on Vancomycin 2500 mg iv Loading dose at 1500  Provider specified end date: Not yet determined.    Indication for Treatment: BLE Cellulitis    Pertinent history per medical record: Admitted on 2020 for cellulitis of lower extremities with open wounds for 2 months, was treated with bactrim..    Other antibiotics: Unasyn 3 Gm IV every 6 hours.    Allergies: Patient has no known allergies.     List concerns for renal function   BUN/SCr ratio > 20:1, age    Pertinent cultures to date:   None at this time.    MRSA nares swab if pneumonia is a concern (ordered/positive/negative/n-a): N/A    Recent Labs     20  1155   WBC 6.4   NEUTSPOLYS 74.60*     Recent Labs     20  1155   BUN 20   CREATININE 0.89   ALBUMIN 3.2     No results for input(s): VANCOTROUGH, VANCOPEAK, VANCORANDOM in the last 72 hours.    Intake/Output Summary (Last 24 hours) at 2020 1718  Last data filed at 2020 1305  Gross per 24 hour   Intake 100 ml   Output --   Net 100 ml      /65   Pulse 90   Temp 36.7 °C (98 °F) (Temporal)   Resp 18   Ht 1.702 m (5' 7\")   Wt 97.1 kg (214 lb)   SpO2 99%  Temp (24hrs), Av.9 °C (98.4 °F), Min:36.7 °C (98 °F), Max:37.1 °C (98.8 °F)      A/P   1. Vancomycin dose change: to 2000 mg IV every 24 hours.  2. Next vancomycin level: 12/3/20 at 1600 hours.  3. Goal trough: 10-15 mcg/ml  4. Comments: Will monitor and adjust regimen per protocol.    [facility]    "

## 2020-12-02 NOTE — PROGRESS NOTES
"Pharmacy Kinetics 75 y.o. male on vancomycin day # 2 2020    Currently on Vancomycin 2000 mg iv q24hr    Indication for Treatment: BLE Cellulitis     Pertinent history per medical record: Admitted on 2020 for cellulitis of lower extremities with open wounds for 2 months, was treated with bactrim.     Other antibiotics: Unasyn 3 Gm IV every 6 hours.     Allergies: Patient has no known allergies.      List concerns for renal function   BUN/SCr ratio > 20:1, age     Pertinent cultures to date:   None at this time.     MRSA nares swab if pneumonia is a concern (ordered/positive/negative/n-a): N/A    Recent Labs     20  1155 20  0344   WBC 6.4 6.2   NEUTSPOLYS 74.60*  --      Recent Labs     20  1155 20  0344   BUN 20 24*   CREATININE 0.89 0.89   ALBUMIN 3.2  --      No results for input(s): VANCOTROUGH, VANCOPEAK, VANCORANDOM in the last 72 hours.    Intake/Output Summary (Last 24 hours) at 2020 0827  Last data filed at 2020 0454  Gross per 24 hour   Intake 753.85 ml   Output 550 ml   Net 203.85 ml      /64   Pulse (!) 103   Temp 37.4 °C (99.4 °F)   Resp 20   Ht 1.702 m (5' 7\")   Wt 98.7 kg (217 lb 9.5 oz)   SpO2 90%  Temp (24hrs), Av.9 °C (98.5 °F), Min:36.7 °C (98 °F), Max:37.4 °C (99.4 °F)      A/P   1. Vancomycin dose change: none, continue 2000 mg IV q24hr   2. Next vancomycin level: 12/3 @ 1600  3. Goal trough: 10 -15 mcg/mL  4. Comments: Pharmacy continue to monitor and adjust dosing per protocol   5. Vancomycin changed to augmentin per MD.    Laverne Hernández, Pharmacy Intern     [facility]      "

## 2020-12-02 NOTE — PROGRESS NOTES
"Received report from day shift RN. Assumed care of patient. Patient is currently AOx4, reporting 5/10 pain with BLE that \"hurts more when I stand up or sit up.\" Medicated per MAR. Pt stated that \"norco does not work for me.\" Scattered rash seen to BLE, BUE and trunk. BLE are peeling and edematous. Pt reports having numbness in BLE. Provided site care and covered with roll gauze, pictures taken. No N/V. VSS. Plan of care reviewed with the patient. Patient verbalized understanding. Hourly rounding in place. Bed locked and in lowest position, call light and belongings within reach.     UA sent  COVID swab sent            "

## 2020-12-03 ENCOUNTER — APPOINTMENT (OUTPATIENT)
Dept: RADIOLOGY | Facility: MEDICAL CENTER | Age: 75
DRG: 603 | End: 2020-12-03
Attending: INTERNAL MEDICINE
Payer: MEDICARE

## 2020-12-03 LAB
ALBUMIN SERPL BCP-MCNC: 3.3 G/DL (ref 3.2–4.9)
BASOPHILS # BLD AUTO: 0.8 % (ref 0–1.8)
BASOPHILS # BLD: 0.05 K/UL (ref 0–0.12)
BUN SERPL-MCNC: 23 MG/DL (ref 8–22)
CALCIUM SERPL-MCNC: 8 MG/DL (ref 8.4–10.2)
CHLORIDE SERPL-SCNC: 101 MMOL/L (ref 96–112)
CO2 SERPL-SCNC: 24 MMOL/L (ref 20–33)
CREAT SERPL-MCNC: 0.69 MG/DL (ref 0.5–1.4)
EOSINOPHIL # BLD AUTO: 0.29 K/UL (ref 0–0.51)
EOSINOPHIL NFR BLD: 4.4 % (ref 0–6.9)
ERYTHROCYTE [DISTWIDTH] IN BLOOD BY AUTOMATED COUNT: 44.4 FL (ref 35.9–50)
GLUCOSE BLD-MCNC: 157 MG/DL (ref 65–99)
GLUCOSE BLD-MCNC: 165 MG/DL (ref 65–99)
GLUCOSE BLD-MCNC: 173 MG/DL (ref 65–99)
GLUCOSE BLD-MCNC: 203 MG/DL (ref 65–99)
GLUCOSE SERPL-MCNC: 189 MG/DL (ref 65–99)
HCT VFR BLD AUTO: 43.8 % (ref 42–52)
HGB BLD-MCNC: 14.2 G/DL (ref 14–18)
IMM GRANULOCYTES # BLD AUTO: 0.03 K/UL (ref 0–0.11)
IMM GRANULOCYTES NFR BLD AUTO: 0.5 % (ref 0–0.9)
LYMPHOCYTES # BLD AUTO: 1.61 K/UL (ref 1–4.8)
LYMPHOCYTES NFR BLD: 24.5 % (ref 22–41)
MAGNESIUM SERPL-MCNC: 2.4 MG/DL (ref 1.5–2.5)
MCH RBC QN AUTO: 28.1 PG (ref 27–33)
MCHC RBC AUTO-ENTMCNC: 32.4 G/DL (ref 33.7–35.3)
MCV RBC AUTO: 86.6 FL (ref 81.4–97.8)
MONOCYTES # BLD AUTO: 0.68 K/UL (ref 0–0.85)
MONOCYTES NFR BLD AUTO: 10.4 % (ref 0–13.4)
NEUTROPHILS # BLD AUTO: 3.91 K/UL (ref 1.82–7.42)
NEUTROPHILS NFR BLD: 59.4 % (ref 44–72)
NRBC # BLD AUTO: 0 K/UL
NRBC BLD-RTO: 0 /100 WBC
PHOSPHATE SERPL-MCNC: 2.2 MG/DL (ref 2.5–4.5)
PLATELET # BLD AUTO: 175 K/UL (ref 164–446)
PMV BLD AUTO: 10.6 FL (ref 9–12.9)
POTASSIUM SERPL-SCNC: 4.3 MMOL/L (ref 3.6–5.5)
RBC # BLD AUTO: 5.06 M/UL (ref 4.7–6.1)
SODIUM SERPL-SCNC: 137 MMOL/L (ref 135–145)
WBC # BLD AUTO: 6.6 K/UL (ref 4.8–10.8)

## 2020-12-03 PROCEDURE — 83735 ASSAY OF MAGNESIUM: CPT

## 2020-12-03 PROCEDURE — 71045 X-RAY EXAM CHEST 1 VIEW: CPT

## 2020-12-03 PROCEDURE — 700111 HCHG RX REV CODE 636 W/ 250 OVERRIDE (IP): Performed by: INTERNAL MEDICINE

## 2020-12-03 PROCEDURE — 770001 HCHG ROOM/CARE - MED/SURG/GYN PRIV*

## 2020-12-03 PROCEDURE — 80069 RENAL FUNCTION PANEL: CPT

## 2020-12-03 PROCEDURE — A9270 NON-COVERED ITEM OR SERVICE: HCPCS | Performed by: INTERNAL MEDICINE

## 2020-12-03 PROCEDURE — 97161 PT EVAL LOW COMPLEX 20 MIN: CPT

## 2020-12-03 PROCEDURE — 99232 SBSQ HOSP IP/OBS MODERATE 35: CPT | Performed by: INTERNAL MEDICINE

## 2020-12-03 PROCEDURE — 36415 COLL VENOUS BLD VENIPUNCTURE: CPT

## 2020-12-03 PROCEDURE — 85025 COMPLETE CBC W/AUTO DIFF WBC: CPT

## 2020-12-03 PROCEDURE — 700102 HCHG RX REV CODE 250 W/ 637 OVERRIDE(OP): Performed by: INTERNAL MEDICINE

## 2020-12-03 PROCEDURE — 82962 GLUCOSE BLOOD TEST: CPT

## 2020-12-03 PROCEDURE — 97162 PT EVAL MOD COMPLEX 30 MIN: CPT

## 2020-12-03 PROCEDURE — 97165 OT EVAL LOW COMPLEX 30 MIN: CPT

## 2020-12-03 RX ORDER — TRIAMCINOLONE ACETONIDE 1 MG/G
OINTMENT TOPICAL 2 TIMES DAILY
Status: DISCONTINUED | OUTPATIENT
Start: 2020-12-03 | End: 2020-12-04 | Stop reason: HOSPADM

## 2020-12-03 RX ORDER — DEXTROSE MONOHYDRATE 25 G/50ML
50 INJECTION, SOLUTION INTRAVENOUS
Status: DISCONTINUED | OUTPATIENT
Start: 2020-12-03 | End: 2020-12-04 | Stop reason: HOSPADM

## 2020-12-03 RX ADMIN — AMOXICILLIN AND CLAVULANATE POTASSIUM 1 TABLET: 875; 125 TABLET, FILM COATED ORAL at 04:03

## 2020-12-03 RX ADMIN — TRIAMCINOLONE ACETONIDE: 1 OINTMENT TOPICAL at 17:00

## 2020-12-03 RX ADMIN — Medication 1 CAPSULE: at 08:43

## 2020-12-03 RX ADMIN — ENOXAPARIN SODIUM 40 MG: 40 INJECTION SUBCUTANEOUS at 04:03

## 2020-12-03 RX ADMIN — OXYCODONE HYDROCHLORIDE 5 MG: 5 TABLET ORAL at 08:43

## 2020-12-03 RX ADMIN — TRIAMCINOLONE ACETONIDE: 1 OINTMENT TOPICAL at 12:29

## 2020-12-03 RX ADMIN — FUROSEMIDE 20 MG: 40 TABLET ORAL at 14:33

## 2020-12-03 RX ADMIN — OXYCODONE HYDROCHLORIDE 10 MG: 5 TABLET ORAL at 03:55

## 2020-12-03 RX ADMIN — AMOXICILLIN AND CLAVULANATE POTASSIUM 1 TABLET: 875; 125 TABLET, FILM COATED ORAL at 16:59

## 2020-12-03 RX ADMIN — SENNOSIDES-DOCUSATE SODIUM TAB 8.6-50 MG 2 TABLET: 8.6-5 TAB at 04:03

## 2020-12-03 RX ADMIN — THERA TABS 1 TABLET: TAB at 04:03

## 2020-12-03 ASSESSMENT — ENCOUNTER SYMPTOMS
FOCAL WEAKNESS: 0
SORE THROAT: 0
HEARTBURN: 0
MYALGIAS: 0
SHORTNESS OF BREATH: 0
DIARRHEA: 0
HALLUCINATIONS: 0
COUGH: 0
HEADACHES: 0
DEPRESSION: 0
BLOOD IN STOOL: 0
WEAKNESS: 0
DIZZINESS: 0
BACK PAIN: 0
ABDOMINAL PAIN: 0
PALPITATIONS: 0
VOMITING: 0
FEVER: 0
CHILLS: 0
NAUSEA: 0

## 2020-12-03 ASSESSMENT — PAIN DESCRIPTION - PAIN TYPE
TYPE: ACUTE PAIN
TYPE: ACUTE PAIN

## 2020-12-03 ASSESSMENT — COGNITIVE AND FUNCTIONAL STATUS - GENERAL
MOBILITY SCORE: 24
DAILY ACTIVITIY SCORE: 24
SUGGESTED CMS G CODE MODIFIER DAILY ACTIVITY: CH
SUGGESTED CMS G CODE MODIFIER MOBILITY: CH

## 2020-12-03 ASSESSMENT — ACTIVITIES OF DAILY LIVING (ADL): TOILETING: INDEPENDENT

## 2020-12-03 ASSESSMENT — GAIT ASSESSMENTS
DEVIATION: STEP TO
GAIT LEVEL OF ASSIST: SUPERVISED
DISTANCE (FEET): 100

## 2020-12-03 NOTE — THERAPY
"Occupational Therapy   Initial Evaluation     Patient Name: Enoch Mistry  Age:  75 y.o., Sex:  male  Medical Record #: [id number]  Today's Date: 12/3/2020          Assessment  Patient is 75 y.o. male admitted for weakness, PMHx: PVD, wounds, DM, chronic hypoxemic respiratory failure and HTN. This admission pt is dx w/cellulitis, and scabies infection, pt appears to be at/near his funcitonal baseline. Pt reports pain is improving and is not requiring any assitance for ADL's at this time. Anticipate no further OT needs     Plan  Recommend Occupational Therapy for Evaluation only    DC Equipment Recommendations: None  Discharge Recommendations: Anticipate that the patient will have no further occupational therapy needs after discharge from the hospital     Subjective  \"I really just want to get some sleep today\"      Objective   12/03/20 0949   Prior Living Situation   Prior Services None   Housing / Facility 1 Story House   Steps Into Home 0   Bathroom Set up Bathtub / Shower Combination   Equipment Owned None   Lives with - Patient's Self Care Capacity Spouse   Comments reports SO is able to assist as needed    Prior Level of ADL Function   Self Feeding Independent   Grooming / Hygiene Independent   Bathing Independent   Dressing Independent   Toileting Independent   Prior Level of IADL Function   Medication Management Independent   Laundry Independent   Kitchen Mobility Independent   Finances Independent   Home Management Independent   Shopping Independent   Prior Level Of Mobility Independent Without Device in Community   Pain 0 - 10 Group   Therapist Pain Assessment Nurse Notified;3   Cognition    Cognition / Consciousness WDL   Level of Consciousness Alert   Passive ROM Upper Body   Passive ROM Upper Body WDL   Active ROM Upper Body   Active ROM Upper Body  WDL   Strength Upper Body   Upper Body Strength  WDL   Sensation Upper Body   Upper Extremity Sensation  WDL   Upper Body Muscle Tone   Upper Body Muscle " Tone  WDL   Neurological Concerns   Neurological Concerns No   Coordination Upper Body   Coordination WDL   Balance Assessment   Sitting Balance (Static) Good   Sitting Balance (Dynamic) Good   Standing Balance (Static) Good   Standing Balance (Dynamic) Good   Weight Shift Sitting Good   Weight Shift Standing Good   Comments no AD    Bed Mobility    Supine to Sit Modified Independent   Sit to Supine Modified Independent   Scooting Modified Independent   Rolling Modified Independent   ADL Assessment   Grooming Supervision;Standing   Upper Body Dressing Supervision   Lower Body Dressing Supervision   Toileting Supervision   Comments reports showering yesterday no issues no needs    Functional Mobility   Sit to Stand Modified Independent   Bed, Chair, Wheelchair Transfer Modified Independent   Toilet Transfers Modified Independent   Mobility walking in room no AD    Edema / Skin Assessment   Edema / Skin  X   Skin Assessment Wound Risk Moderate   Comments skin lesions on all extremities, BLE wrapped    Activity Tolerance   Comments reports fatigue from lack of sleep    Education Group   Education Provided Role of Occupational Therapist   Role of Occupational Therapist Patient Response Patient;Acceptance;Explanation   Problem List   Problem List None   Anticipated Discharge Equipment and Recommendations   DC Equipment Recommendations None   Discharge Recommendations Anticipate that the patient will have no further occupational therapy needs after discharge from the hospital   Interdisciplinary Plan of Care Collaboration   IDT Collaboration with  Nursing   Patient Position at End of Therapy In Bed;Call Light within Reach;Tray Table within Reach   Collaboration Comments Rn aware of OT eval    Session Information   Date / Session Number  12/3 #1   Priority 0  (eval only )

## 2020-12-03 NOTE — PROGRESS NOTES
Hospital Medicine Daily Progress Note    Date of Service  12/2/2020    Chief Complaint  75 y.o. male admitted 12/1/2020 with LE rash    Hospital Course  History of type 2 diabetes and hypertension admitted with lower extremity rash which has been refractory to outpatient Bactrim therapy.  Rash is suggestive of scabies rather than bacterial infection.  Vascular studies have been ordered.      Interval Problem Update  12/2: Antibiotics deescalated to Augmentin, permethrin added.  Wound care pending.  Procalcitonin ordered    Consultants/Specialty  None    Code Status  Full Code    Disposition  Home when rash improved.    Review of Systems  Review of Systems   Constitutional: Positive for malaise/fatigue. Negative for chills and fever.   HENT: Negative for sore throat.    Respiratory: Negative for cough and shortness of breath.    Cardiovascular: Negative for chest pain and palpitations.   Gastrointestinal: Negative for abdominal pain, blood in stool, diarrhea, heartburn, nausea and vomiting.   Genitourinary: Negative for dysuria and frequency.   Musculoskeletal: Negative for back pain and myalgias.   Skin: Positive for itching and rash.   Neurological: Negative for dizziness, focal weakness, weakness and headaches.   Psychiatric/Behavioral: Negative for depression and hallucinations.   All other systems reviewed and are negative.       Physical Exam  Temp:  [36.7 °C (98 °F)-37.4 °C (99.4 °F)] 37.4 °C (99.4 °F)  Pulse:  [] 103  Resp:  [16-20] 20  BP: (126-167)/(56-66) 126/64  SpO2:  [79 %-99 %] 90 %    Physical Exam  Constitutional:       Comments: Disheveled, unkempt   HENT:      Head: Normocephalic and atraumatic.      Nose: Nose normal.      Mouth/Throat:      Mouth: Mucous membranes are moist.   Eyes:      Extraocular Movements: Extraocular movements intact.      Pupils: Pupils are equal, round, and reactive to light.   Neck:      Musculoskeletal: Normal range of motion and neck supple.   Cardiovascular:       Rate and Rhythm: Normal rate and regular rhythm.      Heart sounds: No murmur.   Pulmonary:      Effort: Pulmonary effort is normal. No respiratory distress.      Breath sounds: Normal breath sounds. No stridor.   Abdominal:      General: Abdomen is flat. Bowel sounds are normal. There is no distension.      Palpations: Abdomen is soft.      Tenderness: There is no abdominal tenderness.   Musculoskeletal:         General: No swelling, tenderness or deformity.   Skin:     General: Skin is dry.      Coloration: Skin is not pale.      Findings: Rash present.      Comments: Right lower extremity with dry flaking rash, extending to mid-upper thigh.  Erythematous non-blanching plaque on L calf.  Less intense but similar appearance on forarms.  Minimal edema   Neurological:      General: No focal deficit present.      Mental Status: He is alert and oriented to person, place, and time. Mental status is at baseline.   Psychiatric:         Mood and Affect: Mood normal.         Behavior: Behavior normal.         Thought Content: Thought content normal.         Fluids    Intake/Output Summary (Last 24 hours) at 12/2/2020 1619  Last data filed at 12/2/2020 1200  Gross per 24 hour   Intake 1153.85 ml   Output 650 ml   Net 503.85 ml       Laboratory  Recent Labs     12/01/20  1155 12/02/20  0344   WBC 6.4 6.2   RBC 5.10 4.80   HEMOGLOBIN 14.4 13.5*   HEMATOCRIT 43.4 42.5   MCV 85.1 88.5   MCH 28.2 28.1   MCHC 33.2* 31.8*   RDW 42.5 45.2   PLATELETCT 168 147*   MPV 10.9 10.6     Recent Labs     12/01/20  1155 12/02/20  0344   SODIUM 133* 133*   POTASSIUM 5.2 4.8   CHLORIDE 98 100   CO2 22 22   GLUCOSE 204* 182*   BUN 20 24*   CREATININE 0.89 0.89   CALCIUM 8.4 7.8*     Recent Labs     12/01/20  1155   INR 1.08               Imaging  US-EXTREMITY VENOUS LOWER BILAT   Final Result      DX-CHEST-PORTABLE (1 VIEW)   Final Result      No evidence of acute cardiopulmonary process.           Assessment/Plan  * Cellulitis of lower  extremity  Assessment & Plan  Failed Bactrim, states he had this once before but went away with compression socks  Wound Consult pending  Hx of PVD but this does not appear ischemic  Check procalcitonin  May have overlying cellulitis related to scabies infection  De-escalate to Augmentin  Permethrin treatment    Type 2 diabetes mellitus without complication, without long-term current use of insulin ([facility])- (present on admission)  Assessment & Plan  A1C 8.6  Ordered SS inulin    Scabies  Assessment & Plan  LE rash which is not improving.  Non-blanching erythematous plaque on L calf, flaking rash on RLE.  Extending to forearms  No improvement with abx  Check procalcitonin  Bathe in warm water , then apply permethrin cream  Isolation  Wound care    PVD (peripheral vascular disease) ([facility])- (present on admission)  Assessment & Plan  F/U arterial doppler    Chronic hypoxemic respiratory failure ([facility])- (present on admission)  Assessment & Plan  Currently on 2L  Encourage IS  Wean as able    Essential hypertension- (present on admission)  Assessment & Plan  Controlled  Continue lisinopril  Hold lasix (lactate was elevated on admission)       VTE prophylaxis: Lovenox    Total time:  40 minutes.  I spent greater than 50% of the time for patient care, counseling, and coordination on this date, including unit/floor time, and face-to-face time with the patient as per interval events and assessment and plan above

## 2020-12-03 NOTE — THERAPY
Physical Therapy   Initial Evaluation     Patient Name: Enoch Mistry  Age:  75 y.o., Sex:  male  Medical Record #: [id number]  Today's Date: 12/3/2020          Assessment  Patient is 75 y.o. male with a diagnosis of cellulitis bi lat LE.Pt lives at home with wife and is usually active.Pt is safe with transfers and ambulation and has no equipment needs     Plan    Recommend Physical Therapy for Evaluation only      12/03/20 1400   Prior Living Situation   Prior Services None   Housing / Facility 1 Story House   Steps Into Home 0   Equipment Owned None   Lives with - Patient's Self Care Capacity Spouse   Prior Level of Functional Mobility   Bed Mobility Independent   Transfer Status Independent   Ambulation Independent   Distance Ambulation (Feet)   (community amb)   Assistive Devices Used None   Stairs Independent   Balance Assessment   Sitting Balance (Static) Good   Sitting Balance (Dynamic) Good   Standing Balance (Static) Good   Standing Balance (Dynamic) Good   Weight Shift Sitting Good   Weight Shift Standing Good   Gait Analysis   Gait Level Of Assist Supervised   Assistive Device None   Distance (Feet) 100   # of Times Distance was Traveled 1   Deviation Step To   Weight Bearing Status full   Bed Mobility    Supine to Sit Independent   Sit to Supine Independent   Scooting Independent   Functional Mobility   Sit to Stand Modified Independent   Bed, Chair, Wheelchair Transfer Modified Independent   Activity Tolerance   Sitting Edge of Bed 5   Standing 10   Patient / Family Goals    Patient / Family Goal #1 home   Anticipated Discharge Equipment and Recommendations   DC Equipment Recommendations None   Discharge Recommendations Anticipate that the patient will have no further physical therapy needs after discharge from the hospital       DC Equipment Recommendations: (P) None  Discharge Recommendations: (P) Anticipate that the patient will have no further physical therapy needs after discharge from the  \[facility] [place] [facility]\""

## 2020-12-03 NOTE — PROGRESS NOTES
Pt showered, ambulated with standby assist and FWW to room 206. Medicated for pain per MAR. Pt requesting to rest before dressings are applied to legs. Bed is locked and in lowest position, call light within reach.    0600 BLE dressings applied per wound orders

## 2020-12-03 NOTE — HOSPITAL COURSE
History of type 2 diabetes and hypertension admitted with lower extremity rash which has been refractory to outpatient Bactrim therapy.  Rash is suggestive of scabies rather than bacterial infection.  Vascular studies have been ordered.

## 2020-12-03 NOTE — PROGRESS NOTES
Received report from day shift nurse. Assumed pt care at 1930. Pt is A&Ox4, resting comfortably in bed. Pt on 3L O2 via nasal cannula. No signs of SOB/respiratory distress. Labs noted, VSS. Pt c/o no pain at this moment. Fall precautions in place. Bed at lowest position. Call light and personal belongings within reach.     Pt to shower in 8-12 hours from application of Permathrin at 1845. After shower, RN will apply viscopaste per wound care dressing orders.

## 2020-12-03 NOTE — PROGRESS NOTES
Hospital Medicine Daily Progress Note    Date of Service  12/3/2020    Chief Complaint  75 y.o. male admitted 12/1/2020 with LE rash    Hospital Course  History of type 2 diabetes and hypertension admitted with lower extremity rash which has been refractory to outpatient Bactrim therapy.  Rash is suggestive of scabies rather than bacterial infection.  Vascular studies have been ordered.      Interval Problem Update  12/2: Antibiotics deescalated to Augmentin, permethrin added.  Wound care pending.  Procalcitonin ordered  12/3: Procalcitonin negative, feels better after permethrin cream. Tolerating PO abx.  Lasix resumed    Consultants/Specialty  None    Code Status  Full Code    Disposition  Home when rash improved- likely AM  Recommend derm follow up    Review of Systems  Review of Systems   Constitutional: Positive for malaise/fatigue. Negative for chills and fever.   HENT: Negative for sore throat.    Respiratory: Negative for cough and shortness of breath.    Cardiovascular: Negative for chest pain and palpitations.   Gastrointestinal: Negative for abdominal pain, blood in stool, diarrhea, heartburn, nausea and vomiting.   Genitourinary: Negative for dysuria and frequency.   Musculoskeletal: Negative for back pain and myalgias.   Skin: Positive for itching and rash (Improved).   Neurological: Negative for dizziness, focal weakness, weakness and headaches.   Psychiatric/Behavioral: Negative for depression and hallucinations.   All other systems reviewed and are negative.       Physical Exam  Temp:  [36.2 °C (97.2 °F)-36.6 °C (97.9 °F)] 36.2 °C (97.2 °F)  Pulse:  [] 94  Resp:  [18-20] 18  BP: (126-157)/(55-68) 137/55  SpO2:  [90 %-93 %] 91 %    Physical Exam  Constitutional:       Comments: Disheveled, unkempt   HENT:      Head: Normocephalic and atraumatic.      Nose: Nose normal.      Mouth/Throat:      Mouth: Mucous membranes are moist.   Eyes:      Extraocular Movements: Extraocular movements intact.       Pupils: Pupils are equal, round, and reactive to light.   Neck:      Musculoskeletal: Normal range of motion and neck supple.   Cardiovascular:      Rate and Rhythm: Normal rate and regular rhythm.      Heart sounds: No murmur.   Pulmonary:      Effort: Pulmonary effort is normal. No respiratory distress.      Breath sounds: Normal breath sounds. No stridor.   Abdominal:      General: Abdomen is flat. Bowel sounds are normal. There is no distension.      Palpations: Abdomen is soft.      Tenderness: There is no abdominal tenderness.   Musculoskeletal:         General: No swelling, tenderness or deformity.   Skin:     General: Skin is dry.      Coloration: Skin is not pale.      Findings: Rash present.      Comments: LEs are covered.  As noted on toenails.  Bilateral forearm rash with minimal erythema however dry scaly skin with multiple red macules   Neurological:      General: No focal deficit present.      Mental Status: He is alert and oriented to person, place, and time. Mental status is at baseline.   Psychiatric:         Mood and Affect: Mood normal.         Behavior: Behavior normal.         Thought Content: Thought content normal.         Fluids    Intake/Output Summary (Last 24 hours) at 12/3/2020 1529  Last data filed at 12/2/2020 1912  Gross per 24 hour   Intake 200 ml   Output --   Net 200 ml       Laboratory  Recent Labs     12/01/20  1155 12/02/20  0344 12/03/20  0505   WBC 6.4 6.2 6.6   RBC 5.10 4.80 5.06   HEMOGLOBIN 14.4 13.5* 14.2   HEMATOCRIT 43.4 42.5 43.8   MCV 85.1 88.5 86.6   MCH 28.2 28.1 28.1   MCHC 33.2* 31.8* 32.4*   RDW 42.5 45.2 44.4   PLATELETCT 168 147* 175   MPV 10.9 10.6 10.6     Recent Labs     12/01/20  1155 12/02/20  0344 12/03/20  0505   SODIUM 133* 133* 137   POTASSIUM 5.2 4.8 4.3   CHLORIDE 98 100 101   CO2 22 22 24   GLUCOSE 204* 182* 189*   BUN 20 24* 23*   CREATININE 0.89 0.89 0.69   CALCIUM 8.4 7.8* 8.0*     Recent Labs     12/01/20  1155   INR 1.08                Imaging  DX-CHEST-PORTABLE (1 VIEW)   Final Result      Stable chest with some basilar atelectasis and cardiac silhouette enlargement      US-EXTREMITY VENOUS LOWER BILAT   Final Result      DX-CHEST-PORTABLE (1 VIEW)   Final Result      No evidence of acute cardiopulmonary process.           Assessment/Plan  * Cellulitis of lower extremity  Assessment & Plan  Failed Bactrim, does not appear to be bacterial in nature.  Procalcitonin negative  Wound Consult pending  Hx of PVD but this does not appear ischemic  Continue oral Augmentin therapy for 5 days for secondary cellulitis in the setting of severe scabies infection  Permethrin treatment legs and arms    Type 2 diabetes mellitus without complication, without long-term current use of insulin ([facility])- (present on admission)  Assessment & Plan  A1C 8.6  Ordered SS inulin    Scabies  Assessment & Plan  LE rash: Failed to improve with outpatient Bactrim therapy  Non-blanching erythematous plaque on L calf, flaking rash on RLE.  Extending to forearms  Procalcitonin negative  Status post permethrin cream  Okay to DC isolation  Wound care    PVD (peripheral vascular disease) ([facility])- (present on admission)  Assessment & Plan  Venous US shows R chronic vs subacute DVT  Consider anticoagulation, will discuss this with the patient    Chronic hypoxemic respiratory failure (HCC)- (present on admission)  Assessment & Plan  Currently on 2L  Encourage IS    Essential hypertension- (present on admission)  Assessment & Plan  Controlled  Continue lisinopril  Resume lasix 20 BID       VTE prophylaxis: Lovenox

## 2020-12-03 NOTE — ASSESSMENT & PLAN NOTE
LE rash: Failed to improve with outpatient Bactrim therapy  Non-blanching erythematous plaque on L calf, flaking rash on RLE.  Extending to forearms  Procalcitonin negative  Status post permethrin cream  Okay to DC isolation  Wound care

## 2020-12-03 NOTE — PROGRESS NOTES
Pt in isolation. Permathrin applied. Updated pt concerning plan of care. Per pharmacist, pt can shower in 8 to 14 hours. Will pass care to NOC RN @ [facility]

## 2020-12-04 ENCOUNTER — HOME HEALTH ADMISSION (OUTPATIENT)
Dept: HOME HEALTH SERVICES | Facility: HOME HEALTHCARE | Age: 75
End: 2020-12-04
Payer: MEDICARE

## 2020-12-04 VITALS
HEIGHT: 67 IN | WEIGHT: 217.59 LBS | OXYGEN SATURATION: 95 % | BODY MASS INDEX: 34.15 KG/M2 | HEART RATE: 105 BPM | SYSTOLIC BLOOD PRESSURE: 135 MMHG | RESPIRATION RATE: 19 BRPM | TEMPERATURE: 98.6 F | DIASTOLIC BLOOD PRESSURE: 70 MMHG

## 2020-12-04 LAB
ALBUMIN SERPL BCP-MCNC: 3.2 G/DL (ref 3.2–4.9)
BACTERIA UR CULT: NORMAL
BASOPHILS # BLD AUTO: 0.3 % (ref 0–1.8)
BASOPHILS # BLD: 0.02 K/UL (ref 0–0.12)
BUN SERPL-MCNC: 22 MG/DL (ref 8–22)
CALCIUM SERPL-MCNC: 8.1 MG/DL (ref 8.4–10.2)
CHLORIDE SERPL-SCNC: 102 MMOL/L (ref 96–112)
CO2 SERPL-SCNC: 23 MMOL/L (ref 20–33)
COMMENT 1642: NORMAL
CREAT SERPL-MCNC: 0.66 MG/DL (ref 0.5–1.4)
D DIMER PPP IA.FEU-MCNC: 2.41 UG/ML (FEU) (ref 0–0.5)
EOSINOPHIL # BLD AUTO: 0.24 K/UL (ref 0–0.51)
EOSINOPHIL NFR BLD: 3.3 % (ref 0–6.9)
ERYTHROCYTE [DISTWIDTH] IN BLOOD BY AUTOMATED COUNT: 42.1 FL (ref 35.9–50)
GLUCOSE BLD-MCNC: 188 MG/DL (ref 65–99)
GLUCOSE SERPL-MCNC: 174 MG/DL (ref 65–99)
HCT VFR BLD AUTO: 40.2 % (ref 42–52)
HGB BLD-MCNC: 13.5 G/DL (ref 14–18)
IMM GRANULOCYTES # BLD AUTO: 0.04 K/UL (ref 0–0.11)
IMM GRANULOCYTES NFR BLD AUTO: 0.6 % (ref 0–0.9)
LYMPHOCYTES # BLD AUTO: 1.61 K/UL (ref 1–4.8)
LYMPHOCYTES NFR BLD: 22.2 % (ref 22–41)
MCH RBC QN AUTO: 28.4 PG (ref 27–33)
MCHC RBC AUTO-ENTMCNC: 33.6 G/DL (ref 33.7–35.3)
MCV RBC AUTO: 84.5 FL (ref 81.4–97.8)
MONOCYTES # BLD AUTO: 0.59 K/UL (ref 0–0.85)
MONOCYTES NFR BLD AUTO: 8.1 % (ref 0–13.4)
NEUTROPHILS # BLD AUTO: 4.76 K/UL (ref 1.82–7.42)
NEUTROPHILS NFR BLD: 65.5 % (ref 44–72)
NRBC # BLD AUTO: 0 K/UL
NRBC BLD-RTO: 0 /100 WBC
PHOSPHATE SERPL-MCNC: 2.5 MG/DL (ref 2.5–4.5)
PLATELET # BLD AUTO: 238 K/UL (ref 164–446)
PLATELET BLD QL SMEAR: NORMAL
PMV BLD AUTO: 10.2 FL (ref 9–12.9)
POTASSIUM SERPL-SCNC: 4.3 MMOL/L (ref 3.6–5.5)
RBC # BLD AUTO: 4.76 M/UL (ref 4.7–6.1)
RBC BLD AUTO: NORMAL
SIGNIFICANT IND 70042: NORMAL
SITE SITE: NORMAL
SODIUM SERPL-SCNC: 135 MMOL/L (ref 135–145)
SOURCE SOURCE: NORMAL
VARIANT LYMPHS BLD QL SMEAR: NORMAL
WBC # BLD AUTO: 7.3 K/UL (ref 4.8–10.8)

## 2020-12-04 PROCEDURE — A9270 NON-COVERED ITEM OR SERVICE: HCPCS | Performed by: INTERNAL MEDICINE

## 2020-12-04 PROCEDURE — 80069 RENAL FUNCTION PANEL: CPT

## 2020-12-04 PROCEDURE — 700111 HCHG RX REV CODE 636 W/ 250 OVERRIDE (IP): Performed by: INTERNAL MEDICINE

## 2020-12-04 PROCEDURE — 99239 HOSP IP/OBS DSCHRG MGMT >30: CPT | Performed by: INTERNAL MEDICINE

## 2020-12-04 PROCEDURE — 85379 FIBRIN DEGRADATION QUANT: CPT

## 2020-12-04 PROCEDURE — 36415 COLL VENOUS BLD VENIPUNCTURE: CPT

## 2020-12-04 PROCEDURE — 700102 HCHG RX REV CODE 250 W/ 637 OVERRIDE(OP): Performed by: INTERNAL MEDICINE

## 2020-12-04 PROCEDURE — 85025 COMPLETE CBC W/AUTO DIFF WBC: CPT

## 2020-12-04 PROCEDURE — 82962 GLUCOSE BLOOD TEST: CPT

## 2020-12-04 RX ORDER — ACETAMINOPHEN 325 MG/1
650 TABLET ORAL EVERY 6 HOURS PRN
Qty: 30 TAB | Refills: 0 | Status: SHIPPED
Start: 2020-12-04 | End: 2021-03-21

## 2020-12-04 RX ORDER — PERMETHRIN 50 MG/G
CREAM TOPICAL ONCE
Status: COMPLETED | OUTPATIENT
Start: 2020-12-04 | End: 2020-12-04

## 2020-12-04 RX ORDER — AMOXICILLIN AND CLAVULANATE POTASSIUM 875; 125 MG/1; MG/1
1 TABLET, FILM COATED ORAL EVERY 12 HOURS
Qty: 6 TAB | Refills: 0 | Status: SHIPPED | OUTPATIENT
Start: 2020-12-04 | End: 2020-12-07

## 2020-12-04 RX ADMIN — ENOXAPARIN SODIUM 40 MG: 40 INJECTION SUBCUTANEOUS at 04:53

## 2020-12-04 RX ADMIN — FUROSEMIDE 20 MG: 40 TABLET ORAL at 14:40

## 2020-12-04 RX ADMIN — THERA TABS 1 TABLET: TAB at 04:52

## 2020-12-04 RX ADMIN — OXYCODONE HYDROCHLORIDE 5 MG: 5 TABLET ORAL at 10:43

## 2020-12-04 RX ADMIN — SENNOSIDES-DOCUSATE SODIUM TAB 8.6-50 MG 2 TABLET: 8.6-5 TAB at 04:52

## 2020-12-04 RX ADMIN — FUROSEMIDE 20 MG: 40 TABLET ORAL at 00:56

## 2020-12-04 RX ADMIN — AMOXICILLIN AND CLAVULANATE POTASSIUM 1 TABLET: 875; 125 TABLET, FILM COATED ORAL at 04:52

## 2020-12-04 RX ADMIN — TRIAMCINOLONE ACETONIDE: 1 OINTMENT TOPICAL at 04:59

## 2020-12-04 RX ADMIN — Medication 1 CAPSULE: at 07:19

## 2020-12-04 RX ADMIN — PERMETHRIN: 50 CREAM TOPICAL at 14:35

## 2020-12-04 NOTE — DISCHARGE PLANNING
Anticipated Discharge Disposition: Home with home health.     Action: Pt discussed in IDT rounds. Pt would like HH services. LSW obtained choice from pt-- [facility]. Faxed to CHRISTINA, Irina @ [phone].     Barriers to Discharge: HH acceptance     Plan: LSW will f/u with [facility].

## 2020-12-04 NOTE — DISCHARGE SUMMARY
Discharge Summary    CHIEF COMPLAINT ON ADMISSION  Chief Complaint   Patient presents with   • Rash     Bilateral upper and lower extremities       Reason for Admission  EMS     Admission Date  12/1/2020    CODE STATUS  Full Code    HPI & HOSPITAL COURSE  This is a 75 y.o. male with a History of type 2 diabetes, chronic 2 L oxygen high respiratory failure with hypoxia, PVD and hypertension admitted with lower extremity rash which has been refractory to outpatient Bactrim therapy.  There is also found to have lactic acidosis and acute kidney injury which appeared secondary to dehydration therefore his Lasix were held and he was given IV fluids.  He was started on broad-spectrum antibiotic therapy for presumed bilateral cellulitis.      After further evaluation his rash was suggestive of scabies rather than bacterial infection.  Vascular studies were ordered and showed no evidence of acute DVT however did reveal a right lower extremity subacute versus chronic DVT.  Patient states he has had issues with peripheral vascular disease and has recently made an appointment to establish care with vascular surgery however this appointment has not yet occurred.  Anticoagulation was discussed with him and he declined initiating anticoagulation during this hospital stay and instead, wishes to follow this up with his vascular surgeon at their upcoming appointment.    He was given a treatment of permethrin on B LEs and B UEs.  This significantly improved his rash and flaking skin however did not completely resolve it.  He continued to have purple hue of the feet which was thought more likely related to PVD.  Procalcitonin was negative making severe bacterial infection unlikely.  His antibiotics were deescalated to Augmentin he will continue a total of 7-day course    His oxygen status remained at baseline.  He was quite anxious to discharge therefore arrangements were made for him to go home with home health including home health  wound care.  He will follow-up with vascular surgery as scheduled.  He was recommended to follow-up with dermatology      Therefore, he is discharged in fair and stable condition to home with organized home healthcare and close outpatient follow-up.    The patient met 2-midnight criteria for an inpatient stay at the time of discharge.    Discharge Date  12/4/2020    FOLLOW UP ITEMS POST DISCHARGE  Follow-up with PCP for dermatology referral  Follow-up with vascular surgery as scheduled to discuss subacute versus chronic lower extremity DVT as well as PVD    DISCHARGE DIAGNOSES  Principal Problem:    Cellulitis of lower extremity POA: Unknown  Active Problems:    Type 2 diabetes mellitus without complication, without long-term current use of insulin (HCC) POA: Yes    Essential hypertension POA: Yes      Overview: 1/7/15 started lisinopril 10 mg, declines ultrasound arterial lower       extremities    Chronic hypoxemic respiratory failure (HCC) POA: Yes      Overview: 06/06/17 -- patient reports that when at home on oxygen concentrator, his       O2 saturations go up to 95-96%. However, on his small oxygen tank while       ambulating outside the house, he is typically around 90%.      01/07/16 -- 2 L oxygen since discharge from the hospital last     PVD (peripheral vascular disease) (HCC) POA: Yes    Scabies POA: Unknown  Resolved Problems:    * No resolved hospital problems. *      FOLLOW UP  Future Appointments   Date Time Provider Department Center   12/22/2020  8:40 AM SHER Da Silva     No follow-up provider specified.    MEDICATIONS ON DISCHARGE     Medication List      START taking these medications      Instructions   acetaminophen 325 MG Tabs  Commonly known as: Tylenol   Take 2 Tabs by mouth every 6 hours as needed (Mild Pain; (Pain scale 1-3); Temp greater than 100.5 F).  Dose: 650 mg     amoxicillin-clavulanate 875-125 MG Tabs  Commonly known as: AUGMENTIN   Take 1 Tab by mouth  every 12 hours for 3 days.  Dose: 1 Tab        CONTINUE taking these medications      Instructions   FreeStyle Lancets Misc   USE EVERY MORNING TO TEST FASTING BLOOD SUGAR     FREESTYLE LITE strip  Generic drug: glucose blood   USE ONCE EVERY MORNING TO TEST FASTING BLOOD SUGAR     furosemide 20 MG Tabs  Commonly known as: LASIX   Take 1 Tab by mouth 2 times a day.  Dose: 20 mg     Insulin Pen Needle 32 G x 4 mm  Commonly known as: NovoFine Plus   1 Applicator by Does not apply route every day. Using one needle tip with Ozempic per week  Dose: 1 Applicator     multivitamin Tabs   Take 1 Tab by mouth every day.  Dose: 1 Tab     non-formulary med   Inhale 2 L/min by mouth Continuous.  Dose: 2 L/min        STOP taking these medications    HYDROcodone-acetaminophen 5-325 MG Tabs per tablet  Commonly known as: NORCO     ibuprofen 200 MG Tabs  Commonly known as: MOTRIN     sulfamethoxazole-trimethoprim 800-160 MG tablet  Commonly known as: BACTRIM DS            Allergies  No Known Allergies    DIET  Orders Placed This Encounter   Procedures   • Diet Order Diet: Consistent CHO (Diabetic)     Standing Status:   Standing     Number of Occurrences:   1     Order Specific Question:   Diet:     Answer:   Consistent CHO (Diabetic) [4]       ACTIVITY  As tolerated.  Weight bearing as tolerated    CONSULTATIONS  None    PROCEDURES  None    LABORATORY  Lab Results   Component Value Date    SODIUM 135 12/04/2020    POTASSIUM 4.3 12/04/2020    CHLORIDE 102 12/04/2020    CO2 23 12/04/2020    GLUCOSE 174 (H) 12/04/2020    BUN 22 12/04/2020    CREATININE 0.66 12/04/2020        Lab Results   Component Value Date    WBC 7.3 12/04/2020    HEMOGLOBIN 13.5 (L) 12/04/2020    HEMATOCRIT 40.2 (L) 12/04/2020    PLATELETCT 238 12/04/2020        Total time of the discharge process exceeds 39 minutes.

## 2020-12-04 NOTE — WOUND TEAM
[facility]  Inpatient Services  Initial Wound and Skin Care Evaluation    Admission Date: 12/1/2020     Last order of IP CONSULT TO WOUND CARE was found on 12/1/2020 from Hospital Encounter on 12/1/2020       HPI, PMH, SH: Reviewed    Unit where seen by Wound Team: 2206/00     WOUND CONSULT/FOLLOW UP RELATED TO:  B l;egs     Self Report / Pain Level:  Tender to touch     OBJECTIVE:  viscopaste and rolled gauze on legs  WOUND TYPE, LOCATION, CHARACTERISTICS (Pressure Injuries: location, stage, POA or date identified)  Wound 12/01/20 Other (comment) Other (Comment) Anterior;Lateral Left LLE Erythema, dryness - PVD (Active)   Wound Image   12/01/20 2300   Site Assessment Red;Dry 12/03/20 1800   Periwound Assessment Hemosiderin Staining;Red;Edema 12/03/20 1800   Margins Undefined edges 12/03/20 1800   Closure Secondary intention 12/03/20 1800   Drainage Amount None 12/03/20 1800   Treatments Site care;Compression 12/03/20 1800   Wound Cleansing Approved Wound Cleanser 12/03/20 1800   Dressing Options Viscopaste;Dry Roll Gauze 12/03/20 1800   Dressing Changed Changed 12/03/20 1800   Dressing Status Clean;Dry;Intact 12/03/20 0740   Dressing Change/Treatment Frequency Every 48 hrs, and As Needed 12/03/20 1800   NEXT Dressing Change/Treatment Date 12/05/20 12/03/20 1800   NEXT Weekly Photo (Inpatient Only) 12/08/20 12/03/20 1800   Non-staged Wound Description Partial thickness 12/03/20 1800   Wound Odor None 12/03/20 1800   Pulses Left;Bounding;DP;PT 12/03/20 1800   Exposed Structures None 12/03/20 1800   WOUND NURSE ONLY - Time Spent with Patient (mins) 60 12/03/20 1800   Number of days: 2       Wound 12/01/20 Anterior;Lateral Right RLE Erythema, dryness, open wounds - PVD (Active)   Wound Image   12/01/20 2300   Site Assessment Dry;Red 12/03/20 1800   Periwound Assessment Edema;Hemosiderin Staining;Dry 12/03/20 1800   Margins Undefined edges 12/03/20 1800   Closure Secondary intention 12/03/20 1800    Drainage Amount None 12/03/20 1800   Treatments Cleansed;Site care 12/03/20 1800   Wound Cleansing Approved Wound Cleanser 12/03/20 1800   Dressing Options Viscopaste;Dry Roll Gauze 12/03/20 1800   Dressing Changed Changed 12/03/20 1800   Dressing Status Clean;Dry;Intact 12/03/20 0740   Dressing Change/Treatment Frequency Every 48 hrs, and As Needed 12/03/20 1800   NEXT Dressing Change/Treatment Date 12/05/20 12/03/20 1800   NEXT Weekly Photo (Inpatient Only) 12/08/20 12/03/20 1800   Non-staged Wound Description Partial thickness 12/03/20 1800   Wound Odor None 12/03/20 1800   Pulses Right;2+;DP 12/03/20 1800   Exposed Structures None 12/03/20 1800   Number of days: 2          Vascular: by hand held doppler L ankle pulses bounding multiphasic, R DP 2+ multiphasic    JAY:   No results found.      Lab Values:    Lab Results   Component Value Date/Time    WBC 6.6 12/03/2020 05:05 AM    RBC 5.06 12/03/2020 05:05 AM    HEMOGLOBIN 14.2 12/03/2020 05:05 AM    HEMATOCRIT 43.8 12/03/2020 05:05 AM    SEDRATEWES 0 12/01/2020 11:55 AM    HBA1C 8.6 (H) 12/01/2020 11:55 AM          Culture:   Obtained nt  Culture Results show:  No results found for this or any previous visit (from the past 720 hour(s)).      INTERVENTIONS BY WOUND TEAM:  Dressings removed, legs cleansed with damp cloth and 4 in 1 cleanser, placed viscopate and rolled gauze on legs    Interdisciplinary consultation: Patient, Bedside RN     EVALUATION: Legs appear improved compared to initial picture taken this admission.  Will continue with viscopaste.      Goals: Steady decrease in wound area and depth weekly.    NURSING PLAN OF CARE ORDERS (X):    Dressing changes: See Dressing Care orders:X  Skin care: See Skin Care orders:   Rectal tube care: See Rectal Tube Care orders:   Other orders:    RSKIN:   CURRENTLY IN PLACE (X), APPLIED THIS VISIT (A), ORDERED (O):   Q shift Johnathan:    Q shift pressure point assessments:    Pressure redistribution mattress             Low Airloss          Bariatric MARIANNE         Bariatric foam           Heel float boots     Heel Silicone dressing        Float Heels off Bed with Pillows               Barrier wipes         Barrier Cream         Barrier paste          Sacral silicone dressing         Silicone O2 tubing         Anchorfast         Cannula fixation Device (Tender )          Gray Foam Ear protectors           Trach with Optifoam split foam                 Waffle cushion        Waffle Overlay         Rectal tube or BMS    Purwick/Condom Cath          Antifungal tx      Interdry          Reposition q 2 hours        Up to chair        Ambulate      PT/OT        Dietician        Diabetes Education      PO     TF     TPN     NPO   # days   Other        WOUND TEAM PLAN OF CARE   Dressing changes by wound team:          Follow up 1-2 times weekly:               Follow up 3 times weekly:                NPWT change 3 times weekly:     Follow up as needed:   X    Other (explain):     Anticipated discharge plans:  LTACH:        SNF/Rehab:   X pt will need ongoing wound care for B legs upon discharge               Home Care:           Outpatient Wound Center:            Self Care:

## 2020-12-04 NOTE — RESPIRATORY CARE
COPD EDUCATION by COPD CLINICAL EDUCATOR  12/4/2020 at 10:26 AM by Krysta Holguin, RRT     Patient reviewed by COPD education team. Patient does have a history or diagnosis of COPD and is a former smoker.  Patient refused education and COPD program.

## 2020-12-04 NOTE — DISCHARGE PLANNING
Received Choice form at 5025  Agency/Facility Name: [facility]  Referral sent per Choice form @ [phone]

## 2020-12-04 NOTE — DISCHARGE PLANNING
ATTN: Case Management  RE: Referral for Home Health    As of 12/04/2020, we have accepted the Home Health referral for the patient listed above. We possibly cannot see the patient until Sunday or Monday, if that is fine please have the attending MD place a note.    A [facility] clinician will be out to see the patient within 48 hours. If you have any questions or concerns regarding the patient's transition to Home Health, please do not hesitate to contact us at [phone].      We look forward to collaborating with you,  [facility] Home [facility] Team

## 2020-12-04 NOTE — FACE TO FACE
Face to Face Supporting Documentation - Home Health    The encounter with this patient was in whole or in part the primary reason for home health admission.    Date of encounter:   Patient:                    MRN:                       YOB: 2020  Enoch Mistry  [id number]  1945     Home health to see patient for:  Skilled Nursing care for assessment, interventions & education, Wound Care, Home health aide, Physical Therapy evaluation and treatment and Occupational therapy evaluation and treatment    Skilled need for:  Exacerbation of Chronic Disease State PVD, Venous stasis dermatitis, scabies    Skilled nursing interventions to include:  Wound Care    Homebound status evidenced by:  Needs the assistance of another person in order to leave the home. Leaving home requires a considerable and taxing effort. There is a normal inability to leave the home.    Community Physician to provide follow up care: Collin Schwab P.A.-C.     Optional Interventions? No      I certify the face to face encounter for this home health care referral meets the CMS requirements and the encounter/clinical assessment with the patient was, in whole, or in part, for the medical condition(s) listed above, which is the primary reason for home health care. Based on my clinical findings: the service(s) are medically necessary, support the need for home health care, and the homebound criteria are met.  I certify that this patient has had a face to face encounter by myself.  Stefanie Rogers D.O. - EDYI: [phone]

## 2020-12-04 NOTE — DISCHARGE INSTRUCTIONS
Wound Care, Adult  Taking care of your wound properly can help to prevent pain, infection, and scarring. It can also help your wound to heal more quickly.  How to care for your wound  Wound care       · Follow instructions from your health care provider about how to take care of your wound. Make sure you:  ? Wash your hands with soap and water before you change the bandage (dressing). If soap and water are not available, use hand .  ? Change your dressing as told by your health care provider.  ? Leave stitches (sutures), skin glue, or adhesive strips in place. These skin closures may need to stay in place for 2 weeks or longer. If adhesive strip edges start to loosen and curl up, you may trim the loose edges. Do not remove adhesive strips completely unless your health care provider tells you to do that.  · Check your wound area every day for signs of infection. Check for:  ? Redness, swelling, or pain.  ? Fluid or blood.  ? Warmth.  ? Pus or a bad smell.  · Ask your health care provider if you should clean the wound with mild soap and water. Doing this may include:  ? Using a clean towel to pat the wound dry after cleaning it. Do not rub or scrub the wound.  ? Applying a cream or ointment. Do this only as told by your health care provider.  ? Covering the incision with a clean dressing.  · Ask your health care provider when you can leave the wound uncovered.  · Keep the dressing dry until your health care provider says it can be removed. Do not take baths, swim, use a hot tub, or do anything that would put the wound underwater until your health care provider approves. Ask your health care provider if you can take showers. You may only be allowed to take sponge baths.  Medicines    · If you were prescribed an antibiotic medicine, cream, or ointment, take or use the antibiotic as told by your health care provider. Do not stop taking or using the antibiotic even if your condition improves.  · Take  over-the-counter and prescription medicines only as told by your health care provider. If you were prescribed pain medicine, take it 30 or more minutes before you do any wound care or as told by your health care provider.  General instructions  · Return to your normal activities as told by your health care provider. Ask your health care provider what activities are safe.  · Do not scratch or pick at the wound.  · Do not use any products that contain nicotine or tobacco, such as cigarettes and e-cigarettes. These may delay wound healing. If you need help quitting, ask your health care provider.  · Keep all follow-up visits as told by your health care provider. This is important.  · Eat a diet that includes protein, vitamin A, vitamin C, and other nutrient-rich foods to help the wound heal.  ? Foods rich in protein include meat, dairy, beans, nuts, and other sources.  ? Foods rich in vitamin A include carrots and dark green, leafy vegetables.  ? Foods rich in vitamin C include citrus, tomatoes, and other fruits and vegetables.  ? Nutrient-rich foods have protein, carbohydrates, fat, vitamins, or minerals. Eat a variety of healthy foods including vegetables, fruits, and whole grains.  Contact a health care provider if:  · You received a tetanus shot and you have swelling, severe pain, redness, or bleeding at the injection site.  · Your pain is not controlled with medicine.  · You have redness, swelling, or pain around the wound.  · You have fluid or blood coming from the wound.  · Your wound feels warm to the touch.  · You have pus or a bad smell coming from the wound.  · You have a fever or chills.  · You are nauseous or you vomit.  · You are dizzy.  Get help right away if:  · You have a red streak going away from your wound.  · The edges of the wound open up and separate.  · Your wound is bleeding, and the bleeding does not stop with gentle pressure.  · You have a rash.  · You faint.  · You have trouble  breathing.  Summary  · Always wash your hands with soap and water before changing your bandage (dressing).  · To help with healing, eat foods that are rich in protein, vitamin A, vitamin C, and other nutrients.  · Check your wound every day for signs of infection. Contact your health care provider if you suspect that your wound is infected.  This information is not intended to replace advice given to you by your health care provider. Make sure you discuss any questions you have with your health care provider.  Document Released: 09/26/2009 Document Revised: 04/06/2020 Document Reviewed: 07/04/2017  [email or web address] Patient Education © 2020 [email or web address]    Discharge Instructions    Discharged to home by car with friend. Discharged via wheelchair, hospital escort: Yes.  Special equipment needed: Not Applicable    Be sure to schedule a follow-up appointment with your primary care doctor or any specialists as instructed.     Discharge Plan:   Influenza Vaccine Indication: Patient Refuses    I understand that a diet low in cholesterol, fat, and sodium is recommended for good health. Unless I have been given specific instructions below for another diet, I accept this instruction as my diet prescription.   Other diet: Regular    Special Instructions: None    · Is patient discharged on Warfarin / Coumadin?   No     Depression / Suicide Risk    As you are discharged from this [facility] facility, it is important to learn how to keep safe from harming yourself.    Recognize the warning signs:  · Abrupt changes in personality, positive or negative- including increase in energy   · Giving away possessions  · Change in eating patterns- significant weight changes-  positive or negative  · Change in sleeping patterns- unable to sleep or sleeping all the time   · Unwillingness or inability to communicate  · Depression  · Unusual sadness, discouragement and loneliness  · Talk of wanting to die  · Neglect of personal  appearance   · Rebelliousness- reckless behavior  · Withdrawal from people/activities they love  · Confusion- inability to concentrate     If you or a loved one observes any of these behaviors or has concerns about self-harm, here's what you can do:  · Talk about it- your feelings and reasons for harming yourself  · Remove any means that you might use to hurt yourself (examples: pills, rope, extension cords, firearm)  · Get professional help from the community (Mental Health, Substance Abuse, psychological counseling)  · Do not be alone:Call your Safe Contact- someone whom you trust who will be there for you.  · Call your local CRISIS HOTLINE 119-0895 or 742-312-4177  · Call your local Children's Mobile Crisis Response Team [place] (209) 085-0483 or www.[employer]  · Call the toll free National Suicide Prevention Hotlines   · National Suicide Prevention Lifeline 957-476-ZVXV (6247)  · National Hope Line Network 800-SUICIDE (191-7959)

## 2020-12-05 ENCOUNTER — HOME CARE VISIT (OUTPATIENT)
Dept: HOME HEALTH SERVICES | Facility: HOME HEALTHCARE | Age: 75
End: 2020-12-05
Payer: MEDICARE

## 2020-12-05 VITALS
DIASTOLIC BLOOD PRESSURE: 70 MMHG | SYSTOLIC BLOOD PRESSURE: 142 MMHG | WEIGHT: 206 LBS | HEIGHT: 67 IN | TEMPERATURE: 98.7 F | RESPIRATION RATE: 18 BRPM | BODY MASS INDEX: 32.33 KG/M2

## 2020-12-05 PROCEDURE — 6650537 HCR  CLEANSER ANTISEPTIC HAND FOAM 1.6OZ

## 2020-12-05 PROCEDURE — G0493 RN CARE EA 15 MIN HH/HOSPICE: HCPCS

## 2020-12-05 PROCEDURE — 665001 SOC-HOME HEALTH

## 2020-12-05 ASSESSMENT — FIBROSIS 4 INDEX: FIB4 SCORE: 2.77

## 2020-12-05 ASSESSMENT — ENCOUNTER SYMPTOMS: DEBILITATING PAIN: 1

## 2020-12-06 LAB
BACTERIA BLD CULT: NORMAL
BACTERIA BLD CULT: NORMAL
SIGNIFICANT IND 70042: NORMAL
SIGNIFICANT IND 70042: NORMAL
SITE SITE: NORMAL
SITE SITE: NORMAL
SOURCE SOURCE: NORMAL
SOURCE SOURCE: NORMAL

## 2020-12-06 SDOH — ECONOMIC STABILITY: HOUSING INSECURITY
HOME SAFETY: ON EACH LEVEL OF THE HOME. PATIENT DOES HAVE A FIRE ESCAPE PLAN DEVELOPED. PATIENT DOES NOT HAVE FLAMMABLE MATERIALS PRESENT IN THE HOME PRESENTING A FIRE HAZARD. NO EVIDENCE FOUND OF SMOKING MATERIALS PRESENT IN THE HOME.REVIEWED FALL PREVENTIONS R/

## 2020-12-06 SDOH — ECONOMIC STABILITY: HOUSING INSECURITY: HOME SAFETY: T OXYGEN TUBING, CAT, NARROW PATHWAYS

## 2020-12-06 SDOH — ECONOMIC STABILITY: HOUSING INSECURITY: EVIDENCE OF SMOKING MATERIAL: 0

## 2020-12-06 ASSESSMENT — ENCOUNTER SYMPTOMS
SHORTNESS OF BREATH: T
VOMITING: DENIES

## 2020-12-06 ASSESSMENT — ACTIVITIES OF DAILY LIVING (ADL)
HOME_HEALTH_OASIS: 02
OASIS_M1830: 03

## 2020-12-06 ASSESSMENT — PATIENT HEALTH QUESTIONNAIRE - PHQ9
CLINICAL INTERPRETATION OF PHQ2 SCORE: 0
2. FEELING DOWN, DEPRESSED, IRRITABLE, OR HOPELESS: 00
1. LITTLE INTEREST OR PLEASURE IN DOING THINGS: 00

## 2020-12-07 ENCOUNTER — HOME CARE VISIT (OUTPATIENT)
Dept: HOME HEALTH SERVICES | Facility: HOME HEALTHCARE | Age: 75
End: 2020-12-07
Payer: MEDICARE

## 2020-12-07 ENCOUNTER — ANTICOAGULATION MONITORING (OUTPATIENT)
Dept: MEDICAL GROUP | Facility: PHYSICIAN GROUP | Age: 75
End: 2020-12-07

## 2020-12-07 ENCOUNTER — TELEPHONE (OUTPATIENT)
Dept: MEDICAL GROUP | Facility: MEDICAL CENTER | Age: 75
End: 2020-12-07

## 2020-12-07 PROCEDURE — G0299 HHS/HOSPICE OF RN EA 15 MIN: HCPCS

## 2020-12-07 NOTE — PROGRESS NOTES
Received referral from [facility]. Medications reviewed. No clinically significant interactions noted.     Sampson Drake, PharmD, MS, BCACP, [facility]  Phone [phone] fax [phone]    This note was created using voice recognition software (Dragon). The accuracy of the dictation is limited by the abilities of the software. I have reviewed the note prior to signing, however some errors in grammar and context are still possible. If you have any questions related to this note please do not hesitate to contact our office.

## 2020-12-08 VITALS
RESPIRATION RATE: 18 BRPM | DIASTOLIC BLOOD PRESSURE: 60 MMHG | HEART RATE: 102 BPM | TEMPERATURE: 98.3 F | OXYGEN SATURATION: 93 % | SYSTOLIC BLOOD PRESSURE: 138 MMHG

## 2020-12-08 ASSESSMENT — ENCOUNTER SYMPTOMS: MUSCLE WEAKNESS: 1

## 2020-12-08 NOTE — TELEPHONE ENCOUNTER
VOICEMAIL  1. Caller Name: [facility]                        Call Back Number: [phone]    2. Message: reviewed wound pictures. Wondering if decrease to twice a week for dressings for legs. Plans to see pt later this afternoon.

## 2020-12-09 ENCOUNTER — HOME CARE VISIT (OUTPATIENT)
Dept: HOME HEALTH SERVICES | Facility: HOME HEALTHCARE | Age: 75
End: 2020-12-09
Payer: MEDICARE

## 2020-12-09 ENCOUNTER — PATIENT OUTREACH (OUTPATIENT)
Dept: HEALTH INFORMATION MANAGEMENT | Facility: OTHER | Age: 75
End: 2020-12-09

## 2020-12-09 PROCEDURE — G0155 HHCP-SVS OF CSW,EA 15 MIN: HCPCS

## 2020-12-10 ENCOUNTER — HOME CARE VISIT (OUTPATIENT)
Dept: HOME HEALTH SERVICES | Facility: HOME HEALTHCARE | Age: 75
End: 2020-12-10
Payer: MEDICARE

## 2020-12-10 ENCOUNTER — TELEPHONE (OUTPATIENT)
Dept: HEALTH INFORMATION MANAGEMENT | Facility: OTHER | Age: 75
End: 2020-12-10

## 2020-12-10 PROCEDURE — A6403 STERILE GAUZE>16 <= 48 SQ IN: HCPCS

## 2020-12-10 PROCEDURE — G0299 HHS/HOSPICE OF RN EA 15 MIN: HCPCS

## 2020-12-10 PROCEDURE — A6223 GAUZE >16<=48 NO W/SAL W/O B: HCPCS

## 2020-12-10 PROCEDURE — A6456 ZINC PASTE BAND W >=3"<5"/YD: HCPCS

## 2020-12-10 NOTE — TELEPHONE ENCOUNTER
Good afternoon,    Pt needs a refill of his hydrocodone rx. He stated he is not mobile right now and cannot do a virtual visit. Pt would like to know if he can be scheduled for a phone call for this refill. Please advise.    Thank you    Luz

## 2020-12-10 NOTE — PROGRESS NOTES
Called patient to follow up on ED visit. He is still not feeling better and he does have Home Health and I did advise that he should let the Home Health team know that he is having some pain and still not feeling better. He has an upcoming appointment with his PCP and is waiting for call to Vascular Dept. as referral was processed.

## 2020-12-10 NOTE — PROGRESS NOTES
Reports pain 9+ during SNV. Tylenol is not effective. Pain is worse when feet are dependent. Lt toes med purple on tips. Bharath legs much less red today. He took shower just before nurse arrived with a lot of dead skin removed., /60. Does not have appt until next month with vascular.

## 2020-12-11 ENCOUNTER — HOME CARE VISIT (OUTPATIENT)
Dept: HOME HEALTH SERVICES | Facility: HOME HEALTHCARE | Age: 75
End: 2020-12-11
Payer: MEDICARE

## 2020-12-11 VITALS
HEART RATE: 111 BPM | SYSTOLIC BLOOD PRESSURE: 144 MMHG | RESPIRATION RATE: 18 BRPM | DIASTOLIC BLOOD PRESSURE: 60 MMHG | TEMPERATURE: 98.5 F | OXYGEN SATURATION: 94 %

## 2020-12-11 VITALS
OXYGEN SATURATION: 97 % | TEMPERATURE: 100.3 F | RESPIRATION RATE: 17 BRPM | SYSTOLIC BLOOD PRESSURE: 152 MMHG | HEART RATE: 104 BPM | DIASTOLIC BLOOD PRESSURE: 58 MMHG

## 2020-12-11 VITALS
TEMPERATURE: 100.3 F | HEART RATE: 104 BPM | DIASTOLIC BLOOD PRESSURE: 58 MMHG | OXYGEN SATURATION: 97 % | RESPIRATION RATE: 17 BRPM | SYSTOLIC BLOOD PRESSURE: 152 MMHG

## 2020-12-11 DIAGNOSIS — I73.9 PVD (PERIPHERAL VASCULAR DISEASE) (HCC): ICD-10-CM

## 2020-12-11 DIAGNOSIS — M79.662 PAIN IN BOTH LOWER LEGS: ICD-10-CM

## 2020-12-11 DIAGNOSIS — M79.661 PAIN IN BOTH LOWER LEGS: ICD-10-CM

## 2020-12-11 PROCEDURE — G0152 HHCP-SERV OF OT,EA 15 MIN: HCPCS

## 2020-12-11 PROCEDURE — G0151 HHCP-SERV OF PT,EA 15 MIN: HCPCS

## 2020-12-11 RX ORDER — HYDROCODONE BITARTRATE AND ACETAMINOPHEN 5; 325 MG/1; MG/1
1 TABLET ORAL EVERY 8 HOURS PRN
Qty: 21 TAB | Refills: 0 | Status: SHIPPED | OUTPATIENT
Start: 2020-12-11 | End: 2020-12-18

## 2020-12-11 SDOH — ECONOMIC STABILITY: HOUSING INSECURITY: EVIDENCE OF SMOKING MATERIAL: 0

## 2020-12-11 ASSESSMENT — ENCOUNTER SYMPTOMS
DEBILITATING PAIN: 1
MENTAL STATUS CHANGE: 0
MENTAL STATUS CHANGE: 0
DEBILITATING PAIN: 1
DEBILITATING PAIN: 1
SEVERE DYSPNEA: 1
MUSCLE WEAKNESS: 1
ARTHRALGIAS: 1
MENTAL STATUS CHANGE: 0

## 2020-12-11 ASSESSMENT — ACTIVITIES OF DAILY LIVING (ADL)
FEEDING_WITHIN_DEFINED_LIMITS: 1
GROOMING_WITHIN_DEFINED_LIMITS: 1
BATHING_CURRENT_FUNCTION: INDEPENDENT
TOILETING: INDEPENDENT
AMBULATION ASSISTANCE ON FLAT SURFACES: 1
CURRENT_FUNCTION: STAND BY ASSIST
BATHING_WITHIN_DEFINED_LIMITS: 1
BATHING ASSESSED: 1
GROOMING_CURRENT_FUNCTION: INDEPENDENT
CURRENT_FUNCTION: INDEPENDENT
PHYSICAL TRANSFERS ASSESSED: 1
TOILETING: 1
AMBULATION ASSISTANCE: STAND BY ASSIST
AMBULATION ASSISTANCE: INDEPENDENT
GROOMING ASSESSED: 1
AMBULATION ASSISTANCE: 1
AMBULATION_DISTANCE/DURATION_TOLERATED: 30 FEET

## 2020-12-12 NOTE — PROGRESS NOTES
OT did initial eval 12/11/2020.  OT will not pick client up for therapy.  He refused additional DME, and HEP to address weakness in his hands.

## 2020-12-14 ENCOUNTER — HOME CARE VISIT (OUTPATIENT)
Dept: HOME HEALTH SERVICES | Facility: HOME HEALTHCARE | Age: 75
End: 2020-12-14
Payer: MEDICARE

## 2020-12-14 ENCOUNTER — TELEPHONE (OUTPATIENT)
Dept: MEDICAL GROUP | Facility: MEDICAL CENTER | Age: 75
End: 2020-12-14

## 2020-12-14 VITALS
DIASTOLIC BLOOD PRESSURE: 70 MMHG | OXYGEN SATURATION: 98 % | SYSTOLIC BLOOD PRESSURE: 148 MMHG | HEART RATE: 112 BPM | TEMPERATURE: 98.2 F | RESPIRATION RATE: 18 BRPM

## 2020-12-14 DIAGNOSIS — B86 SCABIES: ICD-10-CM

## 2020-12-14 PROCEDURE — G0299 HHS/HOSPICE OF RN EA 15 MIN: HCPCS

## 2020-12-14 PROCEDURE — A6223 GAUZE >16<=48 NO W/SAL W/O B: HCPCS

## 2020-12-14 PROCEDURE — A6403 STERILE GAUZE>16 <= 48 SQ IN: HCPCS

## 2020-12-14 ASSESSMENT — ENCOUNTER SYMPTOMS
VOMITING: NO
NAUSEA: NO
DEBILITATING PAIN: 1

## 2020-12-14 NOTE — TELEPHONE ENCOUNTER
Notified ALIN Shaw home care wound nurse and the patient that the referral for dermatology was placed.

## 2020-12-14 NOTE — TELEPHONE ENCOUNTER
1. Caller Name: Valeria                        Call Back Number: [phone]      How would the patient prefer to be contacted with a response: Phone call OK to leave a detailed message    Valeria called reporting pt was treated for scabies in the hospital, but she feels pt needs a Dermatology referral for continued skin issues.

## 2020-12-15 ENCOUNTER — HOME CARE VISIT (OUTPATIENT)
Dept: HOME HEALTH SERVICES | Facility: HOME HEALTHCARE | Age: 75
End: 2020-12-15
Payer: MEDICARE

## 2020-12-17 ENCOUNTER — TELEPHONE (OUTPATIENT)
Dept: MEDICAL GROUP | Facility: MEDICAL CENTER | Age: 75
End: 2020-12-17

## 2020-12-17 ENCOUNTER — HOME CARE VISIT (OUTPATIENT)
Dept: HOME HEALTH SERVICES | Facility: HOME HEALTHCARE | Age: 75
End: 2020-12-17
Payer: MEDICARE

## 2020-12-17 VITALS
SYSTOLIC BLOOD PRESSURE: 130 MMHG | HEART RATE: 106 BPM | OXYGEN SATURATION: 94 % | DIASTOLIC BLOOD PRESSURE: 50 MMHG | RESPIRATION RATE: 18 BRPM | TEMPERATURE: 99.4 F

## 2020-12-17 DIAGNOSIS — E11.9 TYPE 2 DIABETES MELLITUS WITHOUT COMPLICATION, WITHOUT LONG-TERM CURRENT USE OF INSULIN (HCC): ICD-10-CM

## 2020-12-17 PROCEDURE — A6456 ZINC PASTE BAND W >=3"<5"/YD: HCPCS

## 2020-12-17 PROCEDURE — G0180 MD CERTIFICATION HHA PATIENT: HCPCS | Performed by: INTERNAL MEDICINE

## 2020-12-17 PROCEDURE — A6223 GAUZE >16<=48 NO W/SAL W/O B: HCPCS

## 2020-12-17 PROCEDURE — G0299 HHS/HOSPICE OF RN EA 15 MIN: HCPCS

## 2020-12-17 PROCEDURE — A6403 STERILE GAUZE>16 <= 48 SQ IN: HCPCS

## 2020-12-17 ASSESSMENT — ENCOUNTER SYMPTOMS
VOMITING: NO
NAUSEA: NO
MENTAL STATUS CHANGE: 0

## 2020-12-18 DIAGNOSIS — B86 SCABIES: ICD-10-CM

## 2020-12-18 RX ORDER — PERMETHRIN 50 MG/G
1 CREAM TOPICAL ONCE
Qty: 60 G | Refills: 1 | Status: SHIPPED | OUTPATIENT
Start: 2020-12-18 | End: 2020-12-18

## 2020-12-18 NOTE — TELEPHONE ENCOUNTER
Called ans spoke w/pt's wife (Vickie). Pt was sleeping. Vickie doesn't think the pt has had a BM since the RNValeria reported in yesterday. Vickie said the pt did not take his Miralax today though, explained to Vickie that the pt needs to continue w/Miralax daily, increase his water intake and attempt to consume 3 meals- even if small meals- in attempt to have and maintain a bowel regimen. Vickie does report the pt is NOT having abdominal pain, she verbalized an understanding to contact us at once if pain develops- or- go to UC if we are unavailable.   Told Vickie that test strips and Permethrin Rx were sent to [facility]. Explained how to administer Permethrin. Vicike said the Dermatology appointment is this Monday morning.   I advised we would call back on Monday to get an update.

## 2020-12-18 NOTE — TELEPHONE ENCOUNTER
1. Caller Name: ALIN Shaw                        Call Back Number: [phone]      How would the patient prefer to be contacted with a response: Phone call OK to leave a detailed message    ALIN Shaw called with multiple pt concerns. 1) Pt has not had a BM since last Sat (12/12), but she reports pt is eating very little and not drinking enough water even though she encourages pt to eat/drink more. Pt did take Miralax yesterday and today with no results. Wondering if you want him to drink some Mag Citrate? 2) Pt found his blood glucose meter, but doesn't have strips. Can you order some Freestyle strips with directions to test bid? 3) Valeria knows referral was placed to Dermatology for Scabies/skin issue- but she reports pt is miserable. She loaded some pics in  today. Is there anything he can do until he can be seen by Derm?

## 2020-12-21 ENCOUNTER — HOME CARE VISIT (OUTPATIENT)
Dept: HOME HEALTH SERVICES | Facility: HOME HEALTHCARE | Age: 75
End: 2020-12-21
Payer: MEDICARE

## 2020-12-21 ENCOUNTER — OFFICE VISIT (OUTPATIENT)
Dept: DERMATOLOGY | Facility: IMAGING CENTER | Age: 75
End: 2020-12-21
Payer: MEDICARE

## 2020-12-21 DIAGNOSIS — L30.9 DERMATITIS: ICD-10-CM

## 2020-12-21 PROCEDURE — G0299 HHS/HOSPICE OF RN EA 15 MIN: HCPCS

## 2020-12-21 PROCEDURE — A6456 ZINC PASTE BAND W >=3"<5"/YD: HCPCS

## 2020-12-21 PROCEDURE — A6223 GAUZE >16<=48 NO W/SAL W/O B: HCPCS

## 2020-12-21 PROCEDURE — A6403 STERILE GAUZE>16 <= 48 SQ IN: HCPCS

## 2020-12-21 PROCEDURE — 99214 OFFICE O/P EST MOD 30 MIN: CPT | Performed by: NURSE PRACTITIONER

## 2020-12-21 RX ORDER — TRIAMCINOLONE ACETONIDE 1 MG/G
1 OINTMENT TOPICAL 2 TIMES DAILY
Qty: 60 G | Refills: 2 | Status: SHIPPED
Start: 2020-12-21 | End: 2021-03-21

## 2020-12-21 ASSESSMENT — ENCOUNTER SYMPTOMS
DIAPHORESIS: 0
WEIGHT LOSS: 0
FEVER: 0
CHILLS: 0

## 2020-12-21 NOTE — PROGRESS NOTES
"Dermatology New Patient Visit    Chief Complaint   Patient presents with   • Rash     lowe legs and arms        Subjective:     HPI:   Enoch Mistry is a 75 y.o. male presenting for    Pt here for follow-up on for scabies treated in hospital and at home since d/c on 12.1.20    D/c summary HPI on 12.1.20    \"Admitted with lower extremity rash which has been refractory to outpatient Bactrim therapy.  There is also found to have lactic acidosis and acute kidney injury which appeared secondary to dehydration therefore his Lasix were held and he was given IV fluids.  He was started on broad-spectrum antibiotic therapy for presumed bilateral cellulitis.       After further evaluation his rash was suggestive of scabies rather than bacterial infection.  Vascular studies were ordered and showed no evidence of acute DVT however did reveal a right lower extremity subacute versus chronic DVT.  Patient states he has had issues with peripheral vascular disease and has recently made an appointment to establish care with vascular surgery however this appointment has not yet occurred.  Anticoagulation was discussed with him and he declined initiating anticoagulation during this hospital stay and instead, wishes to follow this up with his vascular surgeon at their upcoming appointment.\"    Today his main complaint is lower ext pain and is frustrated that it is car difficult to get pain control-states that current pain med is not controlling pain adequetely  He does has intermittent mild itching to his lower ext and does c/o of dry skin issues as well  States it is difficult to care for his skin given his multiple medical problems  He does have home health/ home PT/OT               Past Medical History:   Diagnosis Date   • Anemia    • Arrhythmia     per pt RN told him the rate was irregular 10/15/18   • Breath shortness     uses 2Lnc 02 continuous   • Cancer (HCC)     bladder cancer   • Diabetes (HCC)     ovidiokinh ozempic   • " Emphysema of lung (HCC)     uses 2Lnc 02 continuous   • Hiatus hernia syndrome    • Hypertension     not taking medication stopped by  18   • Other specified disorder of intestines     constipation   • Urinary bladder disorder     hx of no longer an issue       Current Outpatient Medications on File Prior to Visit   Medication Sig Dispense Refill   • glucose blood strip 1 Strip by Other route as needed. 100 Strip 5   • acetaminophen (TYLENOL) 325 MG Tab Take 2 Tabs by mouth every 6 hours as needed (Mild Pain; (Pain scale 1-3); Temp greater than 100.5 F). 30 Tab 0   • multivitamin (THERAGRAN) Tab Take 1 Tab by mouth every day.     • furosemide (LASIX) 20 MG Tab Take 1 Tab by mouth 2 times a day. 60 Tab 3   • FREESTYLE LANCETS Misc USE EVERY MORNING TO TEST FASTING BLOOD SUGAR 100 Each 3   • non-formulary med Inhale 2 L/min continuous. Oxygen dose range: 2 L/min  Respiratory route via: Nasal Cannula   Oxygen supplier: Preferred      Indications: Hypoxia       No current facility-administered medications on file prior to visit.        No Known Allergies    History reviewed. No pertinent family history.    Social History     Socioeconomic History   • Marital status:      Spouse name: Not on file   • Number of children: Not on file   • Years of education: Not on file   • Highest education level: Not on file   Occupational History   • Not on file   Social Needs   • Financial resource strain: Not on file   • Food insecurity     Worry: Not on file     Inability: Not on file   • Transportation needs     Medical: Not on file     Non-medical: Not on file   Tobacco Use   • Smoking status: Former Smoker     Packs/day: 2.00     Years: 50.00     Pack years: 100.00     Types: Cigarettes     Quit date: 2006     Years since quittin.9   • Smokeless tobacco: Never Used   Substance and Sexual Activity   • Alcohol use: Not Currently     Alcohol/week: 0.0 oz     Comment: Nothing for one year (19)   • Drug use:  No   • Sexual activity: Not Currently     Partners: Female     Comment: , no children   Lifestyle   • Physical activity     Days per week: Not on file     Minutes per session: Not on file   • Stress: Not on file   Relationships   • Social connections     Talks on phone: Not on file     Gets together: Not on file     Attends [employer] service: Not on file     Active member of club or organization: Not on file     Attends meetings of clubs or organizations: Not on file     Relationship status: Not on file   • Intimate partner violence     Fear of current or ex partner: Not on file     Emotionally abused: Not on file     Physically abused: Not on file     Forced sexual activity: Not on file   Other Topics Concern   • Not on file   Social History Narrative   • Not on file       Review of Systems   Constitutional: Positive for malaise/fatigue. Negative for chills, diaphoresis, fever and weight loss.   Respiratory:        On 2L O2   Cardiovascular: Positive for leg swelling.   Skin: Positive for itching and rash.        Objective:     A focused cutaneous exam was completed including: face, left and right upper extremities (including hands/digits and fingernails) and left lower extremities with the following pertinent findings listed below. Right lower ext with compression dressing in place.Remaining above-listed examined areas within normal limits / negative for rashes or lesions.      There were no vitals taken for this visit.    Physical Exam   Constitutional: He is oriented to person, place, and time and well-developed, well-nourished, and in no distress. No distress.   HENT:   Head: Normocephalic.   Pulmonary/Chest: Effort normal.   Neurological: He is alert and oriented to person, place, and time.   Skin: Skin is warm and dry. Rash noted. There is erythema.   Upper and lower ext with dried erythematous papules with excoriations. Xerosis cutis noted throughout.   Right lower ext with compression dressing in  place.      Psychiatric: Mood normal.       DATA: none applicable to review    Assessment and Plan:     1. Dermatitis post scabies   No clinical findings for continued scabies infection  Would not harm to treat again with pemethrin to ensure complete treatment  Pain in lower ext is likely r/t to PVD.   Itching can persist for months after scabies  Dermatitis is likely three fold in etiology: post scabies, statis dermatitis, and overall xerosis cutis.   We reviewed dry skin care in detail and handout provided:  Short showers or baths with luke warm water, limited use of fragrance-free soap, generous use of bland fragrance-free emollients within 3 min of exiting the shower or bath, and fragrance free laundry products.  - Start Triamcinolone 0.1% ointment BID to affected areas of the arms and legs as needed for itching.  - We reviewed risks/benefits/expectations of treatment in detail, including side effects of long term topical steroid use (such as striae, atrophy and telangiectasias).   -will need to keep thick moisturizer on skin     - triamcinolone acetonide (KENALOG) 0.1 % Ointment; Apply 1 Application topically 2 times a day. As needed for itching.  Dispense: 60 g; Refill: 2      Followup: Return for as needed for skin concerns.    JAM Aguilar.

## 2020-12-22 ENCOUNTER — OFFICE VISIT (OUTPATIENT)
Dept: MEDICAL GROUP | Facility: MEDICAL CENTER | Age: 75
End: 2020-12-22
Payer: MEDICARE

## 2020-12-22 VITALS
RESPIRATION RATE: 16 BRPM | BODY MASS INDEX: 34.21 KG/M2 | SYSTOLIC BLOOD PRESSURE: 154 MMHG | WEIGHT: 218 LBS | TEMPERATURE: 98.7 F | HEART RATE: 93 BPM | DIASTOLIC BLOOD PRESSURE: 58 MMHG | OXYGEN SATURATION: 93 % | HEIGHT: 67 IN

## 2020-12-22 DIAGNOSIS — M79.662 PAIN IN BOTH LOWER LEGS: ICD-10-CM

## 2020-12-22 DIAGNOSIS — J96.11 CHRONIC HYPOXEMIC RESPIRATORY FAILURE (HCC): ICD-10-CM

## 2020-12-22 DIAGNOSIS — I73.9 PVD (PERIPHERAL VASCULAR DISEASE) (HCC): ICD-10-CM

## 2020-12-22 DIAGNOSIS — Z11.59 ENCOUNTER FOR HEPATITIS C SCREENING TEST FOR LOW RISK PATIENT: ICD-10-CM

## 2020-12-22 DIAGNOSIS — Z12.5 SCREENING PSA (PROSTATE SPECIFIC ANTIGEN): ICD-10-CM

## 2020-12-22 DIAGNOSIS — J44.9 STAGE 2 MODERATE COPD BY GOLD CLASSIFICATION (HCC): ICD-10-CM

## 2020-12-22 DIAGNOSIS — M79.661 PAIN IN BOTH LOWER LEGS: ICD-10-CM

## 2020-12-22 DIAGNOSIS — E11.9 TYPE 2 DIABETES MELLITUS WITHOUT COMPLICATION, WITHOUT LONG-TERM CURRENT USE OF INSULIN (HCC): ICD-10-CM

## 2020-12-22 DIAGNOSIS — L03.119 CELLULITIS OF LOWER EXTREMITY, UNSPECIFIED LATERALITY: ICD-10-CM

## 2020-12-22 DIAGNOSIS — K27.9 PUD (PEPTIC ULCER DISEASE): ICD-10-CM

## 2020-12-22 PROBLEM — B86 SCABIES: Status: RESOLVED | Noted: 2020-12-02 | Resolved: 2020-12-22

## 2020-12-22 PROCEDURE — 99214 OFFICE O/P EST MOD 30 MIN: CPT | Performed by: PHYSICIAN ASSISTANT

## 2020-12-22 RX ORDER — OMEPRAZOLE 20 MG/1
20 CAPSULE, DELAYED RELEASE ORAL DAILY
Qty: 100 CAP | Refills: 1 | Status: SHIPPED
Start: 2020-12-22 | End: 2021-01-19

## 2020-12-22 RX ORDER — NAPROXEN 500 MG/1
500 TABLET ORAL 2 TIMES DAILY WITH MEALS
Qty: 60 TAB | Refills: 1 | Status: SHIPPED
Start: 2020-12-22 | End: 2021-01-01

## 2020-12-22 ASSESSMENT — FIBROSIS 4 INDEX: FIB4 SCORE: 2.77

## 2020-12-22 NOTE — ASSESSMENT & PLAN NOTE
Chronic condition.  Stable disease process.  On oxygen supplementation 24/7.  Does follow with pulmonology.

## 2020-12-22 NOTE — PROGRESS NOTES
Subjective:   Enoch Mistry is a 75 y.o. male here today for recent follow-up from hospitalization, diabetes, PVD, COPD, bilateral leg pain and preventative health care.    Type 2 diabetes mellitus without complication, without long-term current use of insulin ([facility])  Chronic condition.  Recent A1c at 8.6.  At 1 point was on Ozempic and his glucose levels were well controlled.  A1c was close to 6.  The medication was discharged because it was so effective.  I did refer him to endocrinology last month but there has been no follow-up appointment.  He is due for a microalbumin level.    PVD (peripheral vascular disease) ([facility])  This is a pleasant 75-year-old male here today after his hospitalization.  Feels good today.  He states that his dog when he feels good for the day.  Still has issues with thoughts that he might be dying soon.  He denies any depression.  Denies any homicidal or suicidal ideations.  Is currently been seen by wound care for his lower extremity cellulitis and PVD history.  Has an appointment on the 15th with Dr. Smith.  He is on Lasix 20 mg twice daily.  States his weight after hospitalization was 201 and today at 218.  He is unsure why the fluctuation but does state that there is significant weeping from the legs.  Currently they are bandaged.    Stage 2 moderate COPD by GOLD classification ([facility])  Chronic condition.  Stable disease process.  On oxygen supplementation 24/7.  Does follow with pulmonology.    Pain in both lower legs  Still has pain in both of his legs.  Pain is usually worse when he gets up from a seated position.  He states he used to ride an exercise bike at home.  Stop doing that.  Is currently taking narcotics when needed.  Typically take it in the morning when he gets up which they are effective.  He is is requesting a different type of medication that is not a controlled medication.       Current medicines (including changes today)  Current Outpatient Medications  "  Medication Sig Dispense Refill   • naproxen (NAPROSYN) 500 MG Tab Take 1 Tab by mouth 2 times a day, with meals. 60 Tab 1   • omeprazole (PRILOSEC) 20 MG delayed-release capsule Take 1 Cap by mouth every day. 1/2 hour prior to breakfast. 100 Cap 1   • triamcinolone acetonide (KENALOG) 0.1 % Ointment Apply 1 Application topically 2 times a day. As needed for itching. 60 g 2   • glucose blood strip 1 Strip by Other route as needed. 100 Strip 5   • acetaminophen (TYLENOL) 325 MG Tab Take 2 Tabs by mouth every 6 hours as needed (Mild Pain; (Pain scale 1-3); Temp greater than 100.5 F). 30 Tab 0   • multivitamin (THERAGRAN) Tab Take 1 Tab by mouth every day.     • furosemide (LASIX) 20 MG Tab Take 1 Tab by mouth 2 times a day. 60 Tab 3   • FREESTYLE LANCETS Misc USE EVERY MORNING TO TEST FASTING BLOOD SUGAR 100 Each 3   • non-formulary med Inhale 2 L/min continuous. Oxygen dose range: 2 L/min  Respiratory route via: Nasal Cannula   Oxygen supplier: Preferred      Indications: Hypoxia       No current facility-administered medications for this visit.      He  has a past medical history of Anemia, Arrhythmia, Breath shortness, Cancer (HCC), Diabetes (HCC), Emphysema of lung (HCC), Hiatus hernia syndrome, Hypertension, Other specified disorder of intestines, and Urinary bladder disorder.    Social History and Family History were reviewed and updated.    ROS   No chest pain, no shortness of breath, no abdominal pain and all other systems were reviewed and are negative.       Objective:     /58   Pulse 93   Temp 37.1 °C (98.7 °F) (Temporal)   Resp 16   Ht 1.702 m (5' 7\")   Wt 98.9 kg (218 lb)   SpO2 93%  Body mass index is 34.14 kg/m².   Physical Exam:  Constitutional: Alert, no distress.  Skin: Warm, dry, good turgor, no rashes in visible areas.  Eye: Equal, round and reactive, conjunctiva clear, lids normal.  ENMT: Lips without lesions, good dentition, oropharynx clear.  Neck: Trachea midline, no masses. "   Lymph: No cervical or supraclavicular lymphadenopathy  Respiratory: Unlabored respiratory effort, lungs appear clear, no wheezes.  Cardiovascular: Regular rate rhythm, lower extremities are wrapped.  Psych: Alert and oriented x3, normal affect and mood.        Assessment and Plan:   The following treatment plan was discussed    1. PVD (peripheral vascular disease) (HCC)  Condition.  Uncontrolled.  Continue to follow with wound care.  Follow-up with vascular on the 15th.  Continue Lasix.  Advised to try to ride his bike twice daily once the wounds of healed.    2. Type 2 diabetes mellitus without complication, without long-term current use of insulin (HCC)  Chronic condition.  Uncontrolled.  Contacted endocrinology regarding an appointment.  - Comp Metabolic Panel; Future  - MICROALBUMIN CREAT RATIO URINE; Future    3. 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  Stable.  Continue oxygen supplementation.    4. Chronic hypoxemic respiratory failure (HCC)  Chronic condition.  Stable.  Continue oxygen supplementation.    5. Cellulitis of lower extremity, unspecified laterality  Chronic condition.  Ordered CBC.  Provided naproxen as directed.  Continue wound care.  - CBC WITH DIFFERENTIAL; Future  - naproxen (NAPROSYN) 500 MG Tab; Take 1 Tab by mouth 2 times a day, with meals.  Dispense: 60 Tab; Refill: 1    6. Screening PSA (prostate specific antigen)  PSA ordered.  Screening.  Last PSA at 49.  - PROSTATE SPECIFIC AG SCREENING; Future    7. Encounter for hepatitis C screening test for low risk patient  Hep C viral antibody ordered.  Low risk.  - HEP C VIRUS ANTIBODY; Future    8. Pain in both lower legs  Chronic condition.  Advised to exercise routinely when possible.  Provided naproxen as directed.    9. PUD (peptic ulcer disease)  Chronic condition.  Given the prescription of a nonsteroidal provided omeprazole as directed.  We will see him in 1 month to check his status.  - omeprazole  (PRILOSEC) 20 MG delayed-release capsule; Take 1 Cap by mouth every day. 1/2 hour prior to breakfast.  Dispense: 100 Cap; Refill: 1      Followup: Return in about 4 weeks (around 1/19/2021), or if symptoms worsen or fail to improve.    Please note that this dictation was created using voice recognition software. I have made every reasonable attempt to correct obvious errors, but I expect that there are errors of grammar and possibly content that I did not discover before finalizing the note.

## 2020-12-22 NOTE — ASSESSMENT & PLAN NOTE
Chronic condition.  Recent A1c at 8.6.  At 1 point was on Ozempic and his glucose levels were well controlled.  A1c was close to 6.  The medication was discharged because it was so effective.  I did refer him to endocrinology last month but there has been no follow-up appointment.  He is due for a microalbumin level.

## 2020-12-22 NOTE — ASSESSMENT & PLAN NOTE
Still has pain in both of his legs.  Pain is usually worse when he gets up from a seated position.  He states he used to ride an exercise bike at home.  Stop doing that.  Is currently taking narcotics when needed.  Typically take it in the morning when he gets up which they are effective.  He is is requesting a different type of medication that is not a controlled medication.

## 2020-12-22 NOTE — ASSESSMENT & PLAN NOTE
This is a pleasant 75-year-old male here today after his hospitalization.  Feels good today.  He states that his dog when he feels good for the day.  Still has issues with thoughts that he might be dying soon.  He denies any depression.  Denies any homicidal or suicidal ideations.  Is currently been seen by wound care for his lower extremity cellulitis and PVD history.  Has an appointment on the 15th with Dr. Smith.  He is on Lasix 20 mg twice daily.  States his weight after hospitalization was 201 and today at 218.  He is unsure why the fluctuation but does state that there is significant weeping from the legs.  Currently they are bandaged.

## 2020-12-23 ENCOUNTER — TELEPHONE (OUTPATIENT)
Dept: ENDOCRINOLOGY | Facility: MEDICAL CENTER | Age: 75
End: 2020-12-23

## 2020-12-23 VITALS
DIASTOLIC BLOOD PRESSURE: 50 MMHG | TEMPERATURE: 99.5 F | SYSTOLIC BLOOD PRESSURE: 134 MMHG | HEART RATE: 112 BPM | RESPIRATION RATE: 18 BRPM | OXYGEN SATURATION: 92 %

## 2020-12-23 ASSESSMENT — ENCOUNTER SYMPTOMS
DEBILITATING PAIN: 1
LIMITED RANGE OF MOTION: 1
MUSCLE WEAKNESS: 1
POOR JUDGMENT: 1

## 2020-12-23 ASSESSMENT — ACTIVITIES OF DAILY LIVING (ADL)
AMBULATION ASSISTANCE: STAND BY ASSIST
CURRENT_FUNCTION: STAND BY ASSIST

## 2020-12-23 NOTE — TELEPHONE ENCOUNTER
----- Message from Johan Segura M.D. sent at 12/22/2020  9:18 AM PST -----  Regarding: RE: Needs Appt  Can we nmake an ecxception please   ----- Message -----  From: Shonna Ham, Med Ass't  Sent: 12/22/2020   9:15 AM PST  To: Johan Segura M.D.  Subject: RE: Needs Appt                                   Dr. Segura right now the soonest availability with you in not until April. You are completely booked. Is April ok or would you like to make an exception?    Thank you,    Rojelio  ----- Message -----  From: Johan Segura M.D.  Sent: 12/22/2020   8:54 AM PST  To: Shonna Ham Med Ass't  Subject: FW: Needs Appt                                   Can we help this patient Enoch Mistry [id number] - he needs an appt    ----- Message -----  From: Collin Schwab P.A.-C.  Sent: 12/22/2020   8:49 AM PST  To: Johan Segura M.D.  Subject: Needs Appt                                       [facility], would you please see that Enoch gets an appt with your staff.  I want to keep him out of the hospital.  Hope you are well.  Take care and Nae Reid.    Collin

## 2020-12-23 NOTE — TELEPHONE ENCOUNTER
Phone Number Called: [phone]    Call outcome: Spoke to patient regarding message below.    Message: Spoke with patient and I scheduled him for 01/12/21 at 04:40pm

## 2020-12-24 ENCOUNTER — HOME CARE VISIT (OUTPATIENT)
Dept: HOME HEALTH SERVICES | Facility: HOME HEALTHCARE | Age: 75
End: 2020-12-24
Payer: MEDICARE

## 2020-12-24 PROCEDURE — A6456 ZINC PASTE BAND W >=3"<5"/YD: HCPCS

## 2020-12-24 PROCEDURE — G0299 HHS/HOSPICE OF RN EA 15 MIN: HCPCS

## 2020-12-24 PROCEDURE — A6223 GAUZE >16<=48 NO W/SAL W/O B: HCPCS

## 2020-12-25 ENCOUNTER — HOME CARE VISIT (OUTPATIENT)
Dept: HOME HEALTH SERVICES | Facility: HOME HEALTHCARE | Age: 75
End: 2020-12-25
Payer: MEDICARE

## 2020-12-27 VITALS
RESPIRATION RATE: 18 BRPM | SYSTOLIC BLOOD PRESSURE: 140 MMHG | OXYGEN SATURATION: 94 % | TEMPERATURE: 98.7 F | DIASTOLIC BLOOD PRESSURE: 64 MMHG | HEART RATE: 100 BPM

## 2020-12-27 ASSESSMENT — ENCOUNTER SYMPTOMS
ARTHRALGIAS: 1
MUSCLE WEAKNESS: 1
DEBILITATING PAIN: 1

## 2020-12-28 ENCOUNTER — HOME CARE VISIT (OUTPATIENT)
Dept: HOME HEALTH SERVICES | Facility: HOME HEALTHCARE | Age: 75
End: 2020-12-28
Payer: MEDICARE

## 2020-12-28 PROCEDURE — G0299 HHS/HOSPICE OF RN EA 15 MIN: HCPCS

## 2020-12-28 ASSESSMENT — FIBROSIS 4 INDEX: FIB4 SCORE: 2.77

## 2020-12-29 ENCOUNTER — TELEPHONE (OUTPATIENT)
Dept: MEDICAL GROUP | Facility: MEDICAL CENTER | Age: 75
End: 2020-12-29

## 2020-12-29 ENCOUNTER — HOME CARE VISIT (OUTPATIENT)
Dept: HOME HEALTH SERVICES | Facility: HOME HEALTHCARE | Age: 75
End: 2020-12-29
Payer: MEDICARE

## 2020-12-29 VITALS
HEART RATE: 89 BPM | RESPIRATION RATE: 18 BRPM | TEMPERATURE: 98.5 F | DIASTOLIC BLOOD PRESSURE: 68 MMHG | WEIGHT: 212 LBS | OXYGEN SATURATION: 95 % | SYSTOLIC BLOOD PRESSURE: 150 MMHG | BODY MASS INDEX: 33.2 KG/M2

## 2020-12-29 ASSESSMENT — ENCOUNTER SYMPTOMS
MUSCLE WEAKNESS: 1
DEBILITATING PAIN: 1

## 2020-12-29 ASSESSMENT — ACTIVITIES OF DAILY LIVING (ADL)
AMBULATION ASSISTANCE: STAND BY ASSIST
CURRENT_FUNCTION: STAND BY ASSIST

## 2020-12-29 NOTE — TELEPHONE ENCOUNTER
Called pt, spoke with wife Vickie. Vickie reports pt has taken Omeprazole which was helpful- but had to take a couple of the Oxycodone he still had from previous Rx d/t pain and stomach upset from Naproxen (pt was taking food w/Naproxen). Pt has no more Oxycodone. They bought some 500mg OTC Tylenol to take- pt hasn't tried taking them yet- in the hopes of managing his pain. Gave recommendations on max 3000mg/day with 6 hour intervals.   Enoch verbalized a resistance to being seen in the ED or even UC. Expressed importance of being seen soon if symptoms have worsened- but pt still resistant, pt wants to wait a day or two and see how symptoms/leg progresses. No fever or other reported symptoms. Offered appt for next week (currently has appt for 1/27/21)- pt seemed resistant to moving up appt also- but was told if he didn't need the appt he could always cancel, then pt agreed and appt was made. Vickie stated she will call back if no improvement.

## 2020-12-29 NOTE — PROGRESS NOTES
ACTION NEEDED  VM left 12/28 with no call back. Pain 9 in right leg..Rt calf significantly larger than left. Rt shin warm, red, weepy. He took off viscopaste wrap Sun night due to pain. He is unable to tolerate Naproxyn due to GI side effects.Unable to obtain strips for glucometer as pharmacy will not release.

## 2020-12-29 NOTE — TELEPHONE ENCOUNTER
1. Caller Name: Sonali                        Call Back Number: [phone]      How would the patient prefer to be contacted with a response: Phone call OK to leave a detailed message    Home Healthcare Rn called to report pt is not tolerating Naproxsyn well and probably needs to return to taking Oxycodone to manage pain. Pt reporting pain 9/10, Sonali reporting right leg is more swollen, erythemic and weepy. Sonali feels pt s/b seen.

## 2020-12-31 ENCOUNTER — HOME CARE VISIT (OUTPATIENT)
Dept: HOME HEALTH SERVICES | Facility: HOME HEALTHCARE | Age: 75
End: 2020-12-31
Payer: MEDICARE

## 2020-12-31 ENCOUNTER — TELEPHONE (OUTPATIENT)
Dept: MEDICAL GROUP | Facility: MEDICAL CENTER | Age: 75
End: 2020-12-31

## 2020-12-31 VITALS
TEMPERATURE: 97.9 F | RESPIRATION RATE: 16 BRPM | DIASTOLIC BLOOD PRESSURE: 60 MMHG | HEART RATE: 88 BPM | SYSTOLIC BLOOD PRESSURE: 136 MMHG | OXYGEN SATURATION: 95 %

## 2020-12-31 PROCEDURE — A6223 GAUZE >16<=48 NO W/SAL W/O B: HCPCS

## 2020-12-31 PROCEDURE — A6403 STERILE GAUZE>16 <= 48 SQ IN: HCPCS

## 2020-12-31 PROCEDURE — G0299 HHS/HOSPICE OF RN EA 15 MIN: HCPCS

## 2020-12-31 PROCEDURE — A6456 ZINC PASTE BAND W >=3"<5"/YD: HCPCS

## 2020-12-31 ASSESSMENT — ENCOUNTER SYMPTOMS
VOMITING: NO
NAUSEA: NO

## 2021-01-01 NOTE — TELEPHONE ENCOUNTER
1. Caller Name: Valeria (home care RN)                        Call Back Number: [phone] (home)       How would the patient prefer to be contacted with a response: Phone call OK to leave a detailed message    [facility] RN called reporting pt still does not have his blood glucose meter or blood glucose test strips. She reports prescription needs directions on how many times a day pt needs to check glucose level. Called [facility] and clarified pt needs to check bg once daily upon awakening/ before eating (fasting).

## 2021-01-04 ENCOUNTER — HOME CARE VISIT (OUTPATIENT)
Dept: HOME HEALTH SERVICES | Facility: HOME HEALTHCARE | Age: 76
End: 2021-01-04
Payer: MEDICARE

## 2021-01-04 VITALS
RESPIRATION RATE: 16 BRPM | OXYGEN SATURATION: 95 % | DIASTOLIC BLOOD PRESSURE: 70 MMHG | SYSTOLIC BLOOD PRESSURE: 132 MMHG | HEART RATE: 88 BPM | TEMPERATURE: 97.9 F

## 2021-01-04 PROCEDURE — G0299 HHS/HOSPICE OF RN EA 15 MIN: HCPCS

## 2021-01-04 PROCEDURE — 665001 SOC-HOME HEALTH

## 2021-01-04 ASSESSMENT — ENCOUNTER SYMPTOMS
VOMITING: DENIES
NAUSEA: DENIES
MUSCLE WEAKNESS: 1

## 2021-01-07 ENCOUNTER — HOME CARE VISIT (OUTPATIENT)
Dept: HOME HEALTH SERVICES | Facility: HOME HEALTHCARE | Age: 76
End: 2021-01-07
Payer: MEDICARE

## 2021-01-07 VITALS
SYSTOLIC BLOOD PRESSURE: 130 MMHG | BODY MASS INDEX: 33.2 KG/M2 | TEMPERATURE: 98.4 F | WEIGHT: 212 LBS | OXYGEN SATURATION: 93 % | HEART RATE: 96 BPM | DIASTOLIC BLOOD PRESSURE: 60 MMHG | RESPIRATION RATE: 18 BRPM

## 2021-01-07 PROCEDURE — G0299 HHS/HOSPICE OF RN EA 15 MIN: HCPCS

## 2021-01-07 ASSESSMENT — ENCOUNTER SYMPTOMS
MUSCLE WEAKNESS: 1
SHORTNESS OF BREATH: T
MENTAL STATUS CHANGE: 0
DEBILITATING PAIN: 1
LIMITED RANGE OF MOTION: 1

## 2021-01-07 ASSESSMENT — FIBROSIS 4 INDEX: FIB4 SCORE: 2.77

## 2021-01-11 ENCOUNTER — HOME CARE VISIT (OUTPATIENT)
Dept: HOME HEALTH SERVICES | Facility: HOME HEALTHCARE | Age: 76
End: 2021-01-11
Payer: MEDICARE

## 2021-01-11 DIAGNOSIS — Z23 NEED FOR VACCINATION: ICD-10-CM

## 2021-01-11 PROCEDURE — G0299 HHS/HOSPICE OF RN EA 15 MIN: HCPCS

## 2021-01-12 VITALS
OXYGEN SATURATION: 95 % | RESPIRATION RATE: 18 BRPM | TEMPERATURE: 98 F | SYSTOLIC BLOOD PRESSURE: 130 MMHG | DIASTOLIC BLOOD PRESSURE: 80 MMHG | HEART RATE: 83 BPM

## 2021-01-12 ASSESSMENT — ENCOUNTER SYMPTOMS
DEBILITATING PAIN: 1
DEPRESSED MOOD: 1
MUSCLE WEAKNESS: 1
LIMITED RANGE OF MOTION: 1
ARTHRALGIAS: 1

## 2021-01-12 NOTE — PROGRESS NOTES
Pt refused viscopaste wraps today as not much difference seen in appearance of leg. Gave him tubigrip for compression & encouraged to moisturize legs

## 2021-01-14 ENCOUNTER — HOME CARE VISIT (OUTPATIENT)
Dept: HOME HEALTH SERVICES | Facility: HOME HEALTHCARE | Age: 76
End: 2021-01-14
Payer: MEDICARE

## 2021-01-14 VITALS
SYSTOLIC BLOOD PRESSURE: 130 MMHG | TEMPERATURE: 97.5 F | OXYGEN SATURATION: 99 % | RESPIRATION RATE: 18 BRPM | HEART RATE: 84 BPM | DIASTOLIC BLOOD PRESSURE: 64 MMHG

## 2021-01-14 PROCEDURE — G0299 HHS/HOSPICE OF RN EA 15 MIN: HCPCS

## 2021-01-14 ASSESSMENT — ENCOUNTER SYMPTOMS
VOMITING: NO
NAUSEA: NO

## 2021-01-15 ENCOUNTER — TELEPHONE (OUTPATIENT)
Dept: VASCULAR LAB | Facility: MEDICAL CENTER | Age: 76
End: 2021-01-15

## 2021-01-15 ENCOUNTER — OFFICE VISIT (OUTPATIENT)
Dept: VASCULAR LAB | Facility: MEDICAL CENTER | Age: 76
End: 2021-01-15
Attending: FAMILY MEDICINE
Payer: MEDICARE

## 2021-01-15 VITALS
DIASTOLIC BLOOD PRESSURE: 67 MMHG | HEIGHT: 67 IN | SYSTOLIC BLOOD PRESSURE: 158 MMHG | WEIGHT: 212 LBS | HEART RATE: 90 BPM | BODY MASS INDEX: 33.27 KG/M2

## 2021-01-15 DIAGNOSIS — M79.661 PAIN IN BOTH LOWER LEGS: ICD-10-CM

## 2021-01-15 DIAGNOSIS — I87.2 CHRONIC VENOUS INSUFFICIENCY: ICD-10-CM

## 2021-01-15 DIAGNOSIS — J44.9 STAGE 2 MODERATE COPD BY GOLD CLASSIFICATION (HCC): ICD-10-CM

## 2021-01-15 DIAGNOSIS — Z86.718 HISTORY OF DVT (DEEP VEIN THROMBOSIS): ICD-10-CM

## 2021-01-15 DIAGNOSIS — E11.9 TYPE 2 DIABETES MELLITUS WITHOUT COMPLICATION, WITHOUT LONG-TERM CURRENT USE OF INSULIN (HCC): ICD-10-CM

## 2021-01-15 DIAGNOSIS — Z87.891 FORMER SMOKER: ICD-10-CM

## 2021-01-15 DIAGNOSIS — M79.89 RIGHT LEG SWELLING: ICD-10-CM

## 2021-01-15 DIAGNOSIS — I82.411 DEEP VEIN THROMBOSIS (DVT) OF FEMORAL VEIN OF RIGHT LOWER EXTREMITY, UNSPECIFIED CHRONICITY (HCC): ICD-10-CM

## 2021-01-15 DIAGNOSIS — L03.119 CELLULITIS OF LOWER EXTREMITY, UNSPECIFIED LATERALITY: ICD-10-CM

## 2021-01-15 DIAGNOSIS — I10 ESSENTIAL HYPERTENSION: ICD-10-CM

## 2021-01-15 DIAGNOSIS — M79.662 PAIN IN BOTH LOWER LEGS: ICD-10-CM

## 2021-01-15 DIAGNOSIS — R91.1 PULMONARY NODULE: ICD-10-CM

## 2021-01-15 DIAGNOSIS — J96.11 CHRONIC HYPOXEMIC RESPIRATORY FAILURE (HCC): ICD-10-CM

## 2021-01-15 DIAGNOSIS — I73.9 PVD (PERIPHERAL VASCULAR DISEASE) (HCC): ICD-10-CM

## 2021-01-15 DIAGNOSIS — S81.801A NON-HEALING WOUND OF RIGHT LOWER EXTREMITY: ICD-10-CM

## 2021-01-15 PROCEDURE — 99212 OFFICE O/P EST SF 10 MIN: CPT

## 2021-01-15 PROCEDURE — 99205 OFFICE O/P NEW HI 60 MIN: CPT | Performed by: FAMILY MEDICINE

## 2021-01-15 ASSESSMENT — ENCOUNTER SYMPTOMS
FOCAL WEAKNESS: 0
BRUISES/BLEEDS EASILY: 0
VOMITING: 0
WHEEZING: 0
DIZZINESS: 0
DEPRESSION: 0
CLAUDICATION: 1
MYALGIAS: 0
NERVOUS/ANXIOUS: 0
WEAKNESS: 0
SHORTNESS OF BREATH: 1
CHILLS: 0
BLURRED VISION: 0
DOUBLE VISION: 0
HEMOPTYSIS: 0
COUGH: 0
BLOOD IN STOOL: 0
PND: 0
TREMORS: 0
ABDOMINAL PAIN: 0
SEIZURES: 0
HEADACHES: 0
INSOMNIA: 0
PALPITATIONS: 0
DIARRHEA: 0
NAUSEA: 0
ORTHOPNEA: 0
FEVER: 0

## 2021-01-15 ASSESSMENT — FIBROSIS 4 INDEX: FIB4 SCORE: 2.77

## 2021-01-15 NOTE — PROGRESS NOTES
INITIAL VASCULAR MEDICINE VISIT  Subjective:   Enoch Mistry is a 75 y.o. y.o. male  who presents today 01/15/21  for   Chief Complaint   Patient presents with   • Office Visit     VD (peripheral vascular disease)     initially referred by Collin Schwab P.A.-c for eval and med mgmt of PAD      HPI:    Lower extremity PAD:  Symptoms: inability to walk w/o pain, RLE > LLE leg pain that is ongoing.  Getting wound care with HH, reports some improvements.    Chronic limb ischemic sx:  non-healing wounds for > 3-4 months.  Currently wounds on ulcerations across the leg  Location: RLE pretibial area, mild on Left   Timing:  No changes   Claudication onset distance/pain free walking distance:  Unable to walk   Total walking distance prior to stopping due to symptoms: n/a   How long until pain subsides with rest:  1-2min  Prior imaging studies:  Venous duplex only, no JAY   Prior back injury:  None   Prior medications/interventions:    Meds:  Furosemide 20mg BID in past, no statin, ACEI, or antiplat   Interventions:  Compression, wound care.  No prior surgeries.    Exercise therapy:  N/a   Tobacco hx:  reports that he quit smoking about 15 years ago. His smoking use included cigarettes. He has a 100.00 pack-year smoking history. He has never used smokeless tobacco.  Hx of ASCVD or VTE:  2020 - proximal subacute DVT - no OAC started at that time.     HTN:  Current HTN concerns: Denies   Current ADRs: No  HTN sx:  No current blurred or changed vision, chest pain, shortness of breath, headache, nausea, dizziness/vertigo   Home BP los/60s  Per  nursing   24h ABPM completed: not tested  Adherence to current HTN meds: compliant all of the time    Pertinent HTN hx (reviewed at initial visit):   Age at HTN dx: n/a  Past HTN medications: none   HTN crises:  No prior hospitalization or crises   Lifestyle factors:  Daily salt intake estimate:  Low   EtOH: No  Interfering substances/contributing factors:   None    Hyperlipidemia:    Stable, no current concerns   Current treatment: none   Myalgias? No  Other adverse drug reactions? No  Last lipid profile: reviewed with patient, as noted below   Hx of clinical ASCVD events: None    Antiplatelet/anticoagulation:   No    T2D:  Stable. No current symptoms reported.   Tolerating meds and no recent med changes.   Home blood sugars stable, reports no lows.   Last A1c   Lab Results   Component Value Date    HBA1C 8.6 (H) 12/01/2020      COPD:   On oxygen therapy 2-3LNC, no worsening cough or ROBERT but very limited functional capacity.    reports that he quit smoking about 15 years ago. His smoking use included cigarettes. He has a 100.00 pack-year smoking history. He has never used smokeless tobacco.   Prior echo w/o pulm HTN   No current pulm MD     CKD:    No     Sleeping disorder/TRENA:   No     Hypothyroidism:   No     Past Medical History:   Diagnosis Date   • Anemia    • Arrhythmia     per pt RN told him the rate was irregular 10/15/18   • Breath shortness     uses 2Lnc 02 continuous   • Cancer (HCC)     bladder cancer   • Diabetes (HCC)     yskinh ozempic   • Emphysema of lung (HCC)     uses 2Lnc 02 continuous   • Hiatus hernia syndrome    • Hypertension     not taking medication stopped by  9/12/18   • Other specified disorder of intestines     constipation   • Urinary bladder disorder     hx of no longer an issue     Past Surgical History:   Procedure Laterality Date   • ERCP  11/15/2018    Procedure: ERCP - W/POSS BIOPSY, SPHINCTEROTOMY, STENT PLACEMENT, STENT REMOVAL, STONE EXTRACTION, DILATION;  Surgeon: Harman Michelle M.D.;  Location: [facility];  Service: EUS   • ERCP  9/21/2018    Procedure: ERCP;  Surgeon: Harman Michelle M.D.;  Location: [facility];  Service: EUS   • DEWEY BY LAPAROSCOPY  9/20/2018    Procedure: DEWEY BY LAPAROSCOPY;  Surgeon: Seamus Gaston M.D.;  Location: [facility];  Service: General   •  GASTROSCOPY-ENDO  9/19/2018    Procedure: GASTROSCOPY-ENDO;  Surgeon: Darnell Hernández D.O.;  Location: [facility];  Service: Gastroenterology   • CYSTOSCOPY N/A 9/11/2017    Procedure: CYSTOSCOPY- CLOT EVACUATION, FULGURATION, OF PROSTATE;  Surgeon: Srini Giles M.D.;  Location: SURGERY [facility];  Service: Urology   • CYSTOSCOPY  5/7/2015    Procedure: CYSTOSCOPY [57.32] ;  Surgeon: Srini Giles M.D.;  Location: SURGERY [facility];  Service:    • TRANS URETHRAL RESECTION PROSTATE  5/7/2015    Procedure: TRANS URETHRAL RESECTION PROSTATE [60.20];  Surgeon: Srini Giles M.D.;  Location: SURGERY [facility];  Service:    • TRANS URETHRAL RESECTION BLADDER  5/7/2015    Procedure: TRANS URETHRAL RESECTION BLADDER [57.49A];  Surgeon: Srini Giles M.D.;  Location: SURGERY [facility];  Service:         Current Outpatient Medications:   •  aspirin EC, 162 mg, Oral, DAILY  •  polyethylene glycol 3350, 17 g, Oral, PRN, Taking  •  omeprazole, 20 mg, Oral, DAILY, Taking  •  triamcinolone acetonide, 1 Application, Topical, BID, Taking  •  glucose blood, 1 Each, Other, PRN, Taking  •  acetaminophen, 650 mg, Oral, Q6HRS PRN, Taking  •  multivitamin, 1 Tab, Oral, DAILY, Taking  •  furosemide, 20 mg, Oral, BID, Taking  •  FreeStyle Lancets, USE EVERY MORNING TO TEST FASTING BLOOD SUGAR, Taking  •  non-formulary med, 2 L/min, Inhalation, Continuous, Taking   No Known Allergies  History reviewed. No pertinent family history.   Social History     Tobacco Use   • Smoking status: Former Smoker     Packs/day: 2.00     Years: 50.00     Pack years: 100.00     Types: Cigarettes     Quit date: 1/1/2006     Years since quitting: 15.0   • Smokeless tobacco: Never Used   Substance Use Topics   • Alcohol use: Not Currently     Alcohol/week: 0.0 oz     Comment: Nothing for one year (8/13/19)   • Drug use: No     DIET AND EXERCISE:  Weight Change: stable   BMI Readings from Last 5 Encounters:  "  01/15/21 33.20 kg/m²   01/07/21 33.20 kg/m²   12/28/20 33.20 kg/m²   12/22/20 34.14 kg/m²   12/05/20 32.26 kg/m²      Diet: common adult  Exercise: no regular exercise program   Review of Systems   Constitutional: Negative for chills, fever and malaise/fatigue.   Eyes: Negative for blurred vision and double vision.   Respiratory: Positive for shortness of breath. Negative for cough, hemoptysis and wheezing.    Cardiovascular: Positive for claudication and leg swelling. Negative for chest pain, palpitations, orthopnea and PND.   Gastrointestinal: Negative for abdominal pain, blood in stool, diarrhea, melena, nausea and vomiting.   Genitourinary: Negative for hematuria.   Musculoskeletal: Negative for joint pain and myalgias.   Skin: Positive for itching (bilat legs). Negative for rash.   Neurological: Negative for dizziness, tremors, focal weakness, seizures, weakness and headaches.   Endo/Heme/Allergies: Does not bruise/bleed easily.   Psychiatric/Behavioral: Negative for depression. The patient is not nervous/anxious and does not have insomnia.       Objective:     Vitals:    01/15/21 1005 01/15/21 1009   BP: (!) 163/58 158/67   BP Location: Left arm Left arm   Patient Position: Sitting Sitting   BP Cuff Size: Adult Adult   Pulse: 93 90   Weight: 96.2 kg (212 lb)    Height: 1.702 m (5' 7\")       BP Readings from Last 5 Encounters:   01/15/21 158/67   01/14/21 130/64   01/11/21 130/80   01/07/21 130/60   01/04/21 132/70      Body mass index is 33.2 kg/m².  Physical Exam  Vitals signs reviewed.   Constitutional:       Appearance: Normal appearance.   HENT:      Head: Normocephalic and atraumatic.      Nose: Nose normal.      Mouth/Throat:      Mouth: Mucous membranes are moist.      Pharynx: Oropharynx is clear.   Eyes:      Extraocular Movements: Extraocular movements intact.      Conjunctiva/sclera: Conjunctivae normal.   Neck:      Musculoskeletal: Normal range of motion and neck supple.   Cardiovascular:      " Rate and Rhythm: Normal rate and regular rhythm.      Pulses: Normal pulses.           Carotid pulses are 2+ on the right side and 2+ on the left side.       Radial pulses are 2+ on the right side and 2+ on the left side.        Dorsalis pedis pulses are 2+ on the right side and 2+ on the left side.        Posterior tibial pulses are 2+ on the right side and 2+ on the left side.      Heart sounds: Normal heart sounds.      Comments:    Spider telangectasia:       RLE:  None      LLE: none   Varicosities:           RLE: none      LLE: none   Corona phlebectatica:      RLE:  None        LLE:  None   Cording:         RLE:  None     LLE: None     Pulmonary:      Effort: Pulmonary effort is normal.      Breath sounds: Normal breath sounds.   Abdominal:      General: Abdomen is flat. Bowel sounds are normal.      Palpations: Abdomen is soft.   Musculoskeletal:      Right lower leg: 3+ Pitting Edema present.      Left lower leg: 3+ Pitting Edema present.   Skin:     General: Skin is warm and dry.      Capillary Refill: Capillary refill takes less than 2 seconds.          Neurological:      General: No focal deficit present.      Mental Status: He is alert and oriented to person, place, and time. Mental status is at baseline.   Psychiatric:         Mood and Affect: Mood normal.         Behavior: Behavior normal.           Lab Results   Component Value Date    PROTHROMBTM 13.7 12/01/2020    INR 1.08 12/01/2020       Lab Results   Component Value Date    HBA1C 8.6 (H) 12/01/2020      Lab Results   Component Value Date    SODIUM 135 12/04/2020    POTASSIUM 4.3 12/04/2020    CHLORIDE 102 12/04/2020    CO2 23 12/04/2020    GLUCOSE 174 (H) 12/04/2020    BUN 22 12/04/2020    CREATININE 0.66 12/04/2020    IFAFRICA >60 12/04/2020    IFNOTAFR >60 12/04/2020        Lab Results   Component Value Date    WBC 7.3 12/04/2020    RBC 4.76 12/04/2020    HEMOGLOBIN 13.5 (L) 12/04/2020    HEMATOCRIT 40.2 (L) 12/04/2020    MCV 84.5 12/04/2020     MCH 28.4 2020    MCHC 33.6 (L) 2020    MPV 10.2 2020       VASCULAR IMAGING:   Last EKG:   Results for orders placed or performed during the hospital encounter of 18   EKG NOW   Result Value Ref Range    Report       [facility]    Test Date:  2018  Pt Name:    MATTHIEU MCKEON                   Department: [facility]  MRN:        [id number]                      [facility]  Gender:     Male                         Technician: MIGUEL  :        1945                   Requested By:ER TRIAGE PROTOCOL  Order #:    [id number]                    Reading MD: KELECHI MCDONALD MD    Measurements  Intervals                                Axis  Rate:       127                          P:          58  MO:         141                          QRS:        -41  QRSD:       104                          T:          39  QT:         320  QTc:        466    Interpretive Statements  Sinus tachycardia  Ventricular premature complex  Incomplete RBBB and LAFB  RSR' in V1 or V2, probably normal variant  Compared to ECG 2015 14:31:26  Ventricular premature complex(es) now present  Left anterior fascicular block now present  Incomplete right bundle-branch block now present  Ri ght bundle-branch block now present  Sinus rhythm no longer present  Atrial premature complex(es) no longer present  Myocardial infarct finding no longer present    Electronically Signed On 2018 10:35:52 PDT by KELECHI MCDONALD MD       Echo 2018  Prior echo 17.  No significant changes noted.  Normal left ventricular systolic function.  Left ventricular ejection fraction is visually estimated to be 65%.  A reliable estimation of diastolic function cannot be made due to   tachycardia.  Dilated inferior vena cava with inspiratory collapse.    CT abd/pelvis   6.6 mm noncalcified subpleural nodule in the right lower lobe.   [Prominent liver size.   Small gallbladder containing a small  amount of stone material.   Stable 12 mm right adrenal gland nodule.   Stable 3 cm left renal cyst.   Atherosclerotic vascular disease.   Enlarged prostate gland.   No focal abscess appreciated.    BLE venous 12/2/20  Right lower extremity -   Non-occlusive thrombus seen in the distal femoral and popliteal vein.    Thrombus appears adherent to the vein wall.    Calf veins not fully seen.    All other veins of the right lower extremity demonstrate normal flow    dynamics with no evidence of deep vein thrombosis.    Triphasic waveforms seen in the common femoral, mid femoral, popliteal, and    distal tibial arteries.       Left lower extremity -   Complete color filling and compressibility with normal venous flow dynamics    including spontaneous flow and respiratory phasicity.    Calf veins not fully seen.    No evidence of deep venous thrombosis in areas visualized.     Triphasic waveforms seen in the common femoral, mid femoral, popliteal, and    distal tibial arteries.    Medical Decision Making:  Today's Assessment / Status / Plan:     1. PVD (peripheral vascular disease) (formerly [facility])     2. Chronic venous insufficiency     3. Type 2 diabetes mellitus without complication, without long-term current use of insulin (formerly [facility])  REFERRAL TO PHARMACOTHERAPY SERVICE    CBC WITHOUT DIFFERENTIAL    Comp Metabolic Panel    Lipid Profile    MICROALBUMIN CREAT RATIO URINE   4. Pain in both lower legs  US-EXTREMITY ARTERY LOWER BILAT W/JAY (COMBO)    US-EXTREMITY VENOUS LOWER BILAT    CANCELED: US-EXTREMITY VENOUS LOWER UNILAT RIGHT   5. Cellulitis of lower extremity, unspecified laterality  US-EXTREMITY ARTERY LOWER BILAT W/JAY (COMBO)    US-EXTREMITY VENOUS LOWER BILAT    CANCELED: US-EXTREMITY VENOUS LOWER UNILAT RIGHT   6. Chronic hypoxemic respiratory failure (HCC)  EC-ECHOCARDIOGRAM COMPLETE W/O CONT    REFERRAL TO PULMONARY AND SLEEP MEDICINE   7. Pulmonary nodule  REFERRAL TO PULMONARY AND SLEEP MEDICINE   8. Essential  hypertension  EC-ECHOCARDIOGRAM COMPLETE W/O CONT   9. Stage 2 moderate COPD by GOLD classification ([facility])  EC-ECHOCARDIOGRAM COMPLETE W/O CONT    REFERRAL TO PULMONARY AND SLEEP MEDICINE   10. Deep vein thrombosis (DVT) of femoral vein of right lower extremity, unspecified chronicity ([facility])  US-EXTREMITY VENOUS LOWER BILAT    CANCELED: US-EXTREMITY VENOUS LOWER UNILAT RIGHT   11. Right leg swelling  US-EXTREMITY ARTERY LOWER BILAT W/JAY (COMBO)    US-EXTREMITY VENOUS LOWER BILAT    CANCELED: US-EXTREMITY VENOUS LOWER UNILAT RIGHT   12. Non-healing wound of right lower extremity  US-EXTREMITY ARTERY LOWER BILAT W/JAY (COMBO)    US-EXTREMITY VENOUS LOWER BILAT   13. Former smoker     14. History of DVT (deep vein thrombosis)         Patient Type: Primary Prevention. Possible secondary if has true PAD     Etiology of Established CVD if Present:   1) ? PAD  2) RLE DVT, 12/2020   3) CVI     Lower extremity PAD:  Presumed outflow disease   Very symptomatic, unclear if mixed vs venous vs arterial at this time   Location of wounds along with claudication indicate likely arterial   Overlay of poorly controlled T2D and ? Neuropathy complicating condition   Sunil classification: 6  Cirilo class:  IV  Likely chronic limb ischemic changes with art ulcerations and will have low threshold to ref to vasc surg for interventions based upon urgent imaging   No signs of neuropathy  - continue TLC measures and continued complete cessation of tobacco products   - start ASA 162mg daily   - likely needs RAAS blocker, statin   - consider cilostazol 100mg BID (no signs of CHF) - reviewed common ADRs   - check urgent JAY/art duplex, consider Ao/iliac and CTA Ao with run-off based upon results   - may require eval for revasc with vasc surg    Chronic venous insufficiency  CEAP classification: C6S / Ep,s / A? / P?  - check venous reflux duplex to eval for possible options for interventions   - continue knee-high compression socks,  "20-30mmHg, as much as tolerated    - increase walking, avoid prolonged standing   - elevated legs while sitting and sleeping above heart level  - reduce sodium (\"salt\") in diet to less than 2,000mg daily   - continue daily moisturizing lotion (such as [place] Diabetic Foot Cream)  Meds:  - start antiplat due to ulcerations   - no indications of diuretics at this time, will await results of echo and imaging     ANTITHROMBOTIC THERAPY:  Anti-Platelet/Anti-Coagulant Tx recommended: yes  Indication: acute art ulcers vs VLUs   Date of initiation: today   Expected duration: indefinite antiplat, consider initiation of DOAC based upon VR duplex results for 3-6mo  HAS-BLED bleeding risk calc ([email or web address]): 1 pt, 3.4%, low risk  Thrombophilia/hypercoag evaluation:  not recommended  Last CBC, BMP: 12/2020  Antithrombotic therapy plan:  - start ASA 162mg daily   - consider DOAC based upon imaging results   - stressed strict adherence to tx and avoid early termination due to increased risk for recurrent VTE  - check CBC, BMP (CMP if any underlying liver disease), and monitor CrCl as needed Q6mo while on DOAC  - counseled on signs and symptoms of acute VTE that require seeking prompt attention in the ED to include shortness of breath, chest pain, pain with deep inhalation, acute leg swelling and/or pain in calf or leg   - elevate legs as much as possible, use compression stockings/socks if directed by your provider  - Avoid hormonal therapies including estrogen or testosterone-containing meds, or raloxifene or tamoxifene (commonly used for osteoporosis)  - Avoid sedentary periods  - continue complete avoidance of tobacco products  - if having any invasive procedure,please make sure the doctor knows of your history of blood clots and current anticoagulation status    LIPID MANAGEMENT:   Qualifies for Statin Therapy Based on 2018 ACC/AHA Guidelines: yes, Secondary Prevention - Very high risk ASCVD - 2 major events or 1 event with " other high-risk conditions  10-yr ASCVD risk score: N/A  Major ASCVD events: History of ischemic CVA  and Symptomatic PAD (hx of claudication with JAY <0.85, previous revascularization/amputation  High-risk conditions: >64yr old   Currently on Statin: No  Treatment goals: LDL-C <100 (consider LDL <70 based upon JAY results)  At goal? No  Plan:   - reinforced ongoing TLC measures as noted   - update abs   Meds:   .- low threshold to start at least moderate-intensity statin, may benefit from high-inten based upon imaging     BLOOD PRESSURE MANAGEMENT:  ACC/AHA (2017) goal <130/80  Home BP at goal:  yes  Office BP at goal:  No, concerned for white coat   Indications of end organ damage: None, pending further eval   Device candidate? no  Plan:   Monitoring:   - start/continue home BP monitoring, reviewed correct technique, provide BP log and instructions  - order 24h ABPM:  UNDECIDED  - monitor lytes/gfr routinely   - contact office if BP consistently >140/>90 to discussion of tx adjustments   Medications:  ACEi/ARB: 1st line agent, low threshold to start, travis if PAD     Glycemic Status: Diabetic  Goal A1c < 7.5 reasonable to start   Last A1c    Lab Results   Component Value Date    HBA1C 8.6 (H) 12/01/2020    UACR: not examined  Explained that wound healing status will continue to be hampered by glucose toxicity to coag and immune system.  Also his fatigue and other cnoditions may be at least partially blamed on long-standing hyperglycemia.   Plan:  - ref to pharmacotx DM clinic for initial eval - next week  - will likely benefit from metformin, weekly GLP1RA, consider low dose SGLT2i if JAY stable   -  Also pending with endocrine   - recommmend for routine care with PCP (or endocrine) to include regular A1c monitoring, annual albumin/creatinine ratio (ACR), annual diabetic retinopathy screening, foot exams, annual flu vaccine, and updates to pneumonia vaccines as appropriate     Smoking: maintenance stage     reports  that he quit smoking about 15 years ago. His smoking use included cigarettes. He has a 100.00 pack-year smoking history. He has never used smokeless tobacco.   - continued complete avoidance of all tobacco products     Physical Activity: continue healthy activity to improve CV fitness, see care instructions for additional details     Weight Management and Nutrition: Dietary plan was discussed with patient at this visit including DASH, low sodium and/or as outlined in care instructions     Other:   1) non-healing wounds , RLE > LLE   Location more likely associated to arterial   - continue home health wound care and nursing   - pending imaging, will consider if revasc procedures needed   - get labs and establish control of metabolic derangements, travis glucose     2) edema, bilat  No prior indications of HF from echo 2018 but high risk for pulm HTN based upon tobacco hx   - update echo and labs   - consider cardiology eval   - may benefit from diuretics based upon labs and imaging     Instructed to follow-up with PCP for remainder of adult medical needs: yes  We will partner with other providers in the management of established vascular disease and cardiometabolic risk factors.    Studies to Be Obtained: echo, JAY/art duplex, BLE VR duplex    Labs to Be Obtained: as noted above     Total time: 60-74min - chart review/prep, review of other providers' records, imaging/lab review, face-to-face time for history/examination, ordering, prescribing,  review of meds and tx plan, documentation in EMR, care coordination (as needed), social determinants affecting care such as poverty, limited mobility and lack of transportation   - provided info for [employer] for future visits     Follow up in:  Feb/Mar, will stay in contact via phone with results and med mgmt     Andrey Smith M.D.  [facility]   [facility]   [phone]

## 2021-01-15 NOTE — TELEPHONE ENCOUNTER
Called pt and informed him of the following;    ----- Message from Andrey Smith M.D. sent at 1/15/2021 12:01 PM PST -----  Please inform him that he should be taking aspirin 81mg 2 tablets daily with food to help the leg ulcers to heal.  Forget to put on his med list.  I have added now.  Thanks!

## 2021-01-18 ENCOUNTER — HOSPITAL ENCOUNTER (OUTPATIENT)
Dept: LAB | Facility: MEDICAL CENTER | Age: 76
End: 2021-01-18
Attending: PHYSICIAN ASSISTANT
Payer: MEDICARE

## 2021-01-18 ENCOUNTER — HOME CARE VISIT (OUTPATIENT)
Dept: HOME HEALTH SERVICES | Facility: HOME HEALTHCARE | Age: 76
End: 2021-01-18
Payer: MEDICARE

## 2021-01-18 VITALS
DIASTOLIC BLOOD PRESSURE: 66 MMHG | TEMPERATURE: 98.3 F | OXYGEN SATURATION: 94 % | SYSTOLIC BLOOD PRESSURE: 154 MMHG | HEART RATE: 100 BPM | RESPIRATION RATE: 18 BRPM

## 2021-01-18 DIAGNOSIS — E11.9 TYPE 2 DIABETES MELLITUS WITHOUT COMPLICATION, WITHOUT LONG-TERM CURRENT USE OF INSULIN (HCC): ICD-10-CM

## 2021-01-18 DIAGNOSIS — Z11.59 ENCOUNTER FOR HEPATITIS C SCREENING TEST FOR LOW RISK PATIENT: ICD-10-CM

## 2021-01-18 DIAGNOSIS — Z12.5 SCREENING PSA (PROSTATE SPECIFIC ANTIGEN): ICD-10-CM

## 2021-01-18 DIAGNOSIS — L03.119 CELLULITIS OF LOWER EXTREMITY, UNSPECIFIED LATERALITY: ICD-10-CM

## 2021-01-18 LAB
ALBUMIN SERPL BCP-MCNC: 2.9 G/DL (ref 3.2–4.9)
ALBUMIN/GLOB SERPL: 1 G/DL
ALP SERPL-CCNC: 67 U/L (ref 30–99)
ALT SERPL-CCNC: 7 U/L (ref 2–50)
ANION GAP SERPL CALC-SCNC: 12 MMOL/L (ref 7–16)
AST SERPL-CCNC: 15 U/L (ref 12–45)
BASOPHILS # BLD AUTO: 0.5 % (ref 0–1.8)
BASOPHILS # BLD: 0.06 K/UL (ref 0–0.12)
BILIRUB SERPL-MCNC: 0.9 MG/DL (ref 0.1–1.5)
BUN SERPL-MCNC: 11 MG/DL (ref 8–22)
CALCIUM SERPL-MCNC: 8.2 MG/DL (ref 8.4–10.2)
CHLORIDE SERPL-SCNC: 106 MMOL/L (ref 96–112)
CO2 SERPL-SCNC: 23 MMOL/L (ref 20–33)
CREAT SERPL-MCNC: 0.64 MG/DL (ref 0.5–1.4)
EOSINOPHIL # BLD AUTO: 0.33 K/UL (ref 0–0.51)
EOSINOPHIL NFR BLD: 2.9 % (ref 0–6.9)
ERYTHROCYTE [DISTWIDTH] IN BLOOD BY AUTOMATED COUNT: 45.8 FL (ref 35.9–50)
GLOBULIN SER CALC-MCNC: 2.8 G/DL (ref 1.9–3.5)
GLUCOSE SERPL-MCNC: 144 MG/DL (ref 65–99)
HCT VFR BLD AUTO: 35.9 % (ref 42–52)
HCV AB SER QL: NORMAL
HGB BLD-MCNC: 11.6 G/DL (ref 14–18)
IMM GRANULOCYTES # BLD AUTO: 0.04 K/UL (ref 0–0.11)
IMM GRANULOCYTES NFR BLD AUTO: 0.3 % (ref 0–0.9)
LYMPHOCYTES # BLD AUTO: 1.44 K/UL (ref 1–4.8)
LYMPHOCYTES NFR BLD: 12.5 % (ref 22–41)
MCH RBC QN AUTO: 28.6 PG (ref 27–33)
MCHC RBC AUTO-ENTMCNC: 32.3 G/DL (ref 33.7–35.3)
MCV RBC AUTO: 88.6 FL (ref 81.4–97.8)
MONOCYTES # BLD AUTO: 0.73 K/UL (ref 0–0.85)
MONOCYTES NFR BLD AUTO: 6.3 % (ref 0–13.4)
NEUTROPHILS # BLD AUTO: 8.93 K/UL (ref 1.82–7.42)
NEUTROPHILS NFR BLD: 77.5 % (ref 44–72)
NRBC # BLD AUTO: 0 K/UL
NRBC BLD-RTO: 0 /100 WBC
PLATELET # BLD AUTO: 315 K/UL (ref 164–446)
PMV BLD AUTO: 10.1 FL (ref 9–12.9)
POTASSIUM SERPL-SCNC: 3.9 MMOL/L (ref 3.6–5.5)
PROT SERPL-MCNC: 5.7 G/DL (ref 6–8.2)
PSA SERPL-MCNC: 4.58 NG/ML (ref 0–4)
RBC # BLD AUTO: 4.05 M/UL (ref 4.7–6.1)
SODIUM SERPL-SCNC: 141 MMOL/L (ref 135–145)
WBC # BLD AUTO: 11.5 K/UL (ref 4.8–10.8)

## 2021-01-18 PROCEDURE — 85025 COMPLETE CBC W/AUTO DIFF WBC: CPT

## 2021-01-18 PROCEDURE — 36415 COLL VENOUS BLD VENIPUNCTURE: CPT

## 2021-01-18 PROCEDURE — 84153 ASSAY OF PSA TOTAL: CPT

## 2021-01-18 PROCEDURE — 80053 COMPREHEN METABOLIC PANEL: CPT

## 2021-01-18 PROCEDURE — 86803 HEPATITIS C AB TEST: CPT

## 2021-01-18 PROCEDURE — G0299 HHS/HOSPICE OF RN EA 15 MIN: HCPCS

## 2021-01-18 PROCEDURE — A6456 ZINC PASTE BAND W >=3"<5"/YD: HCPCS

## 2021-01-18 PROCEDURE — A6253 ABSORPT DRG > 48 SQ IN W/O B: HCPCS

## 2021-01-18 PROCEDURE — A6452 HIGH COMPRES BAND W>=3"<5"YD: HCPCS

## 2021-01-18 ASSESSMENT — ENCOUNTER SYMPTOMS
VOMITING: NO
NAUSEA: NO
MENTAL STATUS CHANGE: 0

## 2021-01-19 PROBLEM — D72.829 ELEVATED WBC COUNT: Status: ACTIVE | Noted: 2021-01-19

## 2021-01-20 ENCOUNTER — TELEPHONE (OUTPATIENT)
Dept: MEDICAL GROUP | Facility: MEDICAL CENTER | Age: 76
End: 2021-01-20

## 2021-01-20 ENCOUNTER — HOME CARE VISIT (OUTPATIENT)
Dept: HOME HEALTH SERVICES | Facility: HOME HEALTHCARE | Age: 76
End: 2021-01-20
Payer: MEDICARE

## 2021-01-20 VITALS
DIASTOLIC BLOOD PRESSURE: 66 MMHG | RESPIRATION RATE: 18 BRPM | HEART RATE: 108 BPM | OXYGEN SATURATION: 97 % | TEMPERATURE: 99.4 F | SYSTOLIC BLOOD PRESSURE: 140 MMHG

## 2021-01-20 PROCEDURE — G0299 HHS/HOSPICE OF RN EA 15 MIN: HCPCS

## 2021-01-20 PROCEDURE — A6403 STERILE GAUZE>16 <= 48 SQ IN: HCPCS

## 2021-01-20 PROCEDURE — A4554 DISPOSABLE UNDERPADS: HCPCS

## 2021-01-20 PROCEDURE — A6452 HIGH COMPRES BAND W>=3"<5"YD: HCPCS

## 2021-01-20 PROCEDURE — A6456 ZINC PASTE BAND W >=3"<5"/YD: HCPCS

## 2021-01-20 PROCEDURE — A4450 NON-WATERPROOF TAPE: HCPCS

## 2021-01-20 PROCEDURE — A6441 PAD BAND W>=3" <5"/YD: HCPCS

## 2021-01-20 ASSESSMENT — ENCOUNTER SYMPTOMS
NAUSEA: NO
MUSCLE WEAKNESS: 1
VOMITING: NO
MENTAL STATUS CHANGE: 0

## 2021-01-20 NOTE — TELEPHONE ENCOUNTER
ESTABLISHED PATIENT PRE-VISIT PLANNING     Patient was NOT contacted to complete PVP.     Note: Patient will not be contacted if there is no indication to call.     1.  Reviewed notes from the last few office visits within the medical group: Yes    2.  If any orders were placed at last visit or intended to be done for this visit (i.e. 6 mos follow-up), do we have Results/Consult Notes?         •  Labs - Labs ordered, completed on 01/18/2021 and results are in chart.  Note: If patient appointment is for lab review and patient did not complete labs, check with provider if OK to reschedule patient until labs completed.       •  Imaging - Imaging ordered, NOT completed. Patient advised to complete prior to next appointment.       •  Referrals - Referral ordered, patient has NOT been seen.    3. Is this appointment scheduled as a Hospital Follow-Up? No    4.  Immunizations were updated in Epic using Reconcile Outside Information activity? Yes    5.  Patient is due for the following Health Maintenance Topics:   Health Maintenance Due   Topic Date Due   • URINE ACR / MICROALBUMIN  04/11/1946   • COVID-19 Vaccine (1 of 2) 10/11/1961   • FASTING LIPID PROFILE  05/31/2018   • Annual Pulmonary Function Test / Spirometry  07/02/2018   • DIABETES MONOFILAMENT / LE EXAM  12/26/2018   • RETINAL SCREENING  11/07/2019       6.  AHA (Pulse[employer]) form printed for Provider? Email sent to [facility] requesting form

## 2021-01-22 ENCOUNTER — HOME CARE VISIT (OUTPATIENT)
Dept: HOME HEALTH SERVICES | Facility: HOME HEALTHCARE | Age: 76
End: 2021-01-22
Payer: MEDICARE

## 2021-01-22 ENCOUNTER — HOSPITAL ENCOUNTER (OUTPATIENT)
Dept: RADIOLOGY | Facility: MEDICAL CENTER | Age: 76
End: 2021-01-22
Attending: FAMILY MEDICINE
Payer: MEDICARE

## 2021-01-22 ENCOUNTER — NON-PROVIDER VISIT (OUTPATIENT)
Dept: VASCULAR LAB | Facility: MEDICAL CENTER | Age: 76
End: 2021-01-22
Attending: FAMILY MEDICINE
Payer: MEDICARE

## 2021-01-22 ENCOUNTER — HOSPITAL ENCOUNTER (OUTPATIENT)
Dept: CARDIOLOGY | Facility: MEDICAL CENTER | Age: 76
End: 2021-01-22
Attending: FAMILY MEDICINE
Payer: MEDICARE

## 2021-01-22 VITALS
OXYGEN SATURATION: 94 % | RESPIRATION RATE: 18 BRPM | DIASTOLIC BLOOD PRESSURE: 50 MMHG | HEART RATE: 112 BPM | SYSTOLIC BLOOD PRESSURE: 158 MMHG | TEMPERATURE: 98.8 F

## 2021-01-22 DIAGNOSIS — I82.411 DEEP VEIN THROMBOSIS (DVT) OF FEMORAL VEIN OF RIGHT LOWER EXTREMITY, UNSPECIFIED CHRONICITY (HCC): ICD-10-CM

## 2021-01-22 DIAGNOSIS — M79.662 PAIN IN BOTH LOWER LEGS: ICD-10-CM

## 2021-01-22 DIAGNOSIS — M79.661 PAIN IN BOTH LOWER LEGS: ICD-10-CM

## 2021-01-22 DIAGNOSIS — I10 ESSENTIAL HYPERTENSION: ICD-10-CM

## 2021-01-22 DIAGNOSIS — S81.801A NON-HEALING WOUND OF RIGHT LOWER EXTREMITY: ICD-10-CM

## 2021-01-22 DIAGNOSIS — M79.89 RIGHT LEG SWELLING: ICD-10-CM

## 2021-01-22 DIAGNOSIS — J96.11 CHRONIC HYPOXEMIC RESPIRATORY FAILURE (HCC): ICD-10-CM

## 2021-01-22 DIAGNOSIS — J44.9 STAGE 2 MODERATE COPD BY GOLD CLASSIFICATION (HCC): ICD-10-CM

## 2021-01-22 DIAGNOSIS — L03.119 CELLULITIS OF LOWER EXTREMITY, UNSPECIFIED LATERALITY: ICD-10-CM

## 2021-01-22 DIAGNOSIS — E11.9 TYPE 2 DIABETES MELLITUS WITHOUT COMPLICATION, WITHOUT LONG-TERM CURRENT USE OF INSULIN (HCC): ICD-10-CM

## 2021-01-22 LAB
LV EJECT FRACT  99904: 60
LV EJECT FRACT MOD 2C 99903: 72.84
LV EJECT FRACT MOD 4C 99902: 66.91
LV EJECT FRACT MOD BP 99901: 69.52

## 2021-01-22 PROCEDURE — 93306 TTE W/DOPPLER COMPLETE: CPT

## 2021-01-22 PROCEDURE — 93922 UPR/L XTREMITY ART 2 LEVELS: CPT

## 2021-01-22 PROCEDURE — A6452 HIGH COMPRES BAND W>=3"<5"YD: HCPCS

## 2021-01-22 PROCEDURE — 99213 OFFICE O/P EST LOW 20 MIN: CPT | Mod: 25

## 2021-01-22 PROCEDURE — A6456 ZINC PASTE BAND W >=3"<5"/YD: HCPCS

## 2021-01-22 PROCEDURE — A4554 DISPOSABLE UNDERPADS: HCPCS

## 2021-01-22 PROCEDURE — 93306 TTE W/DOPPLER COMPLETE: CPT | Mod: 26 | Performed by: INTERNAL MEDICINE

## 2021-01-22 PROCEDURE — G0299 HHS/HOSPICE OF RN EA 15 MIN: HCPCS

## 2021-01-22 PROCEDURE — A6441 PAD BAND W>=3" <5"/YD: HCPCS

## 2021-01-22 PROCEDURE — 93970 EXTREMITY STUDY: CPT

## 2021-01-22 RX ORDER — SEMAGLUTIDE 1.34 MG/ML
0.25 INJECTION, SOLUTION SUBCUTANEOUS
Qty: 4 EACH | Refills: 3 | Status: SHIPPED
Start: 2021-01-22 | End: 2021-03-08

## 2021-01-22 ASSESSMENT — ENCOUNTER SYMPTOMS
VOMITING: NO
MENTAL STATUS CHANGE: 0
NAUSEA: NO

## 2021-01-22 NOTE — PATIENT INSTRUCTIONS
- Medication changes   • GLP-1 Agent: Ozempic 0.25 mg every 7 days.      -Blood glucose and A1c target

## 2021-01-22 NOTE — Clinical Note
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  Restarted his Ozempic.  I will see him in 4 weeks and titrate the dose.  He is resistant to starting Metformin.  Due to peripheral vascular disease I did not choose Jardiance as a initial agent  Thanks  Remi

## 2021-01-22 NOTE — PROGRESS NOTES
Patient Consult Note    TIME IN: 740  TIME OUT: 817    Primary care physician: Collin Schwab P.A.-C.    Reason for consult: Management of Uncontrolled Type 2 Diabetes    HPI:  Enoch Mistry is a 75 y.o. old patient who comes in today for evaluation of above stated problem.        Most Recent labs and A1c:    Lab Results   Component Value Date/Time    HBA1C 8.6 (H) 12/01/2020 11:55 AM          Lab Results   Component Value Date/Time    CHOLSTRLTOT 99 (L) 05/31/2017 07:49 AM    LDL 39 05/31/2017 07:49 AM    HDL 35 (A) 05/31/2017 07:49 AM    TRIGLYCERIDE 124 05/31/2017 07:49 AM       Lab Results   Component Value Date/Time    SODIUM 141 01/18/2021 10:47 AM    POTASSIUM 3.9 01/18/2021 10:47 AM    CHLORIDE 106 01/18/2021 10:47 AM    CO2 23 01/18/2021 10:47 AM    GLUCOSE 144 (H) 01/18/2021 10:47 AM    BUN 11 01/18/2021 10:47 AM    CREATININE 0.64 01/18/2021 10:47 AM     Lab Results   Component Value Date/Time    ALKPHOSPHAT 67 01/18/2021 10:47 AM    ASTSGOT 15 01/18/2021 10:47 AM    ALTSGPT 7 01/18/2021 10:47 AM    TBILIRUBIN 0.9 01/18/2021 10:47 AM    INR 1.08 12/01/2020 11:55 AM    ALBUMIN 2.9 (L) 01/18/2021 10:47 AM      No components found for: MICROALBUMINCREATRATIOURINE      Diabetes Medication History and Current Regimen  Currently not on any diabetic medications.    Pt has home glucometer and proper testing technique - yes    Pt reports blood sugars:   Before Breakfast: 112-123    Hypoglycemia awareness - yes  Nocturnal hypoglycemia- yes  Hypoglycemia:  None    Pt's treatment of Hypoglycemia    - 15:15 Rule discussed with patient    Current Exercise - bikes on a stationary bike occasionally  Exercise Goal - every other day on his stationary bike    Dietary - common adult.     Foot Exam:  Rash on the legs.  In the hospital recently, thought it might due to scabies.  Currently resolved.    Visual Inspection: Feet without maceration, ulcers, fissures.  Feet dry.  Pedal pulses: intact  bilaterally    Preventative Management  BP regimen (ACE/ARB) - no  ASA - 81mg      Statin - none  Cardiovascular   Patient Type, check all that apply:   Type 2 Diabetes Mellitus    Last Retinal Scan - done about a 2 years ago     Last Foot Exam - daily     Last Microalbuminuria - due    updated caregaps    There is no immunization history on file for this patient.      Past Medical History:  Patient Active Problem List    Diagnosis Date Noted   • Cellulitis of lower extremity 04/02/2015     Priority: High   • Type 2 diabetes mellitus without complication, without long-term current use of insulin ([facility]) 06/06/2017     Priority: Medium   • Hepatic steatosis 09/12/2018     Priority: Low   • Stage 2 moderate COPD by GOLD classification ([facility]) 06/21/2017     Priority: Low   • Elevated WBC count 01/19/2021   • Former smoker 01/15/2021   • Non-healing wound of right lower extremity 01/15/2021   • Right leg swelling 01/15/2021   • Deep vein thrombosis (DVT) of femoral vein of right lower extremity, unspecified chronicity ([facility]) 01/15/2021   • History of DVT (deep vein thrombosis) 01/15/2021   • Pain in both lower legs 11/24/2020   • PVD (peripheral vascular disease) ([facility]) 08/13/2019   • Obesity (BMI 30-39.9) 10/02/2018   • PUD (peptic ulcer disease) 09/23/2018   • Elevated PSA 09/21/2017   • Health maintenance alteration - declines vaccines + colonoscopy 06/21/2017   • Requires continuous at home supplemental oxygen 06/06/2017   • Benign non-nodular prostatic hyperplasia without lower urinary tract symptoms 06/06/2017   • Chronic hypoxemic respiratory failure ([facility]) 01/07/2016   • Adrenal adenoma 01/07/2016   • Pulmonary nodule 04/03/2015   • History of bladder cancer 04/02/2015   • Essential hypertension 04/01/2015       Past Surgical History:  Past Surgical History:   Procedure Laterality Date   • ERCP  11/15/2018    Procedure: ERCP - W/POSS BIOPSY, SPHINCTEROTOMY, STENT PLACEMENT, STENT REMOVAL, STONE EXTRACTION, DILATION;   Surgeon: Harman Michelle M.D.;  Location: [facility];  Service: EUS   • ERCP  9/21/2018    Procedure: ERCP;  Surgeon: Harman Michelle M.D.;  Location: [facility];  Service: EUS   • DEWEY BY LAPAROSCOPY  9/20/2018    Procedure: DEWEY BY LAPAROSCOPY;  Surgeon: Seamus Gaston M.D.;  Location: [facility];  Service: General   • GASTROSCOPY-ENDO  9/19/2018    Procedure: GASTROSCOPY-ENDO;  Surgeon: Darnell Hernández D.O.;  Location: [facility];  Service: Gastroenterology   • CYSTOSCOPY N/A 9/11/2017    Procedure: CYSTOSCOPY- CLOT EVACUATION, FULGURATION, OF PROSTATE;  Surgeon: Srini Giles M.D.;  Location: [facility];  Service: Urology   • CYSTOSCOPY  5/7/2015    Procedure: CYSTOSCOPY [57.32] ;  Surgeon: Srini Giles M.D.;  Location: [facility];  Service:    • TRANS URETHRAL RESECTION PROSTATE  5/7/2015    Procedure: TRANS URETHRAL RESECTION PROSTATE [60.20];  Surgeon: Srini Giles M.D.;  Location: [facility];  Service:    • TRANS URETHRAL RESECTION BLADDER  5/7/2015    Procedure: TRANS URETHRAL RESECTION BLADDER [57.49A];  Surgeon: Srini Giles M.D.;  Location: [facility];  Service:        Allergies:  Patient has no known allergies.    Social History:  Social History     Socioeconomic History   • Marital status:      Spouse name: Not on file   • Number of children: Not on file   • Years of education: Not on file   • Highest education level: Not on file   Occupational History   • Not on file   Social Needs   • Financial resource strain: Not on file   • Food insecurity     Worry: Not on file     Inability: Not on file   • Transportation needs     Medical: Not on file     Non-medical: Not on file   Tobacco Use   • Smoking status: Former Smoker     Packs/day: 2.00     Years: 50.00     Pack years: 100.00     Types: Cigarettes     Quit date: 1/1/2006     Years since quitting: 15.0   • Smokeless  tobacco: Never Used   Substance and Sexual Activity   • Alcohol use: Not Currently     Alcohol/week: 0.0 oz     Comment: Nothing for one year (8/13/19)   • Drug use: No   • Sexual activity: Not Currently     Partners: Female     Comment: , no children   Lifestyle   • Physical activity     Days per week: Not on file     Minutes per session: Not on file   • Stress: Not on file   Relationships   • Social connections     Talks on phone: Not on file     Gets together: Not on file     Attends [employer] service: Not on file     Active member of club or organization: Not on file     Attends meetings of clubs or organizations: Not on file     Relationship status: Not on file   • Intimate partner violence     Fear of current or ex partner: Not on file     Emotionally abused: Not on file     Physically abused: Not on file     Forced sexual activity: Not on file   Other Topics Concern   • Not on file   Social History Narrative   • Not on file       Family History:  No family history on file.    Medications:    Current Outpatient Medications:   •  Semaglutide,0.25 or 0.5MG/DOS, (OZEMPIC, 0.25 OR 0.5 MG/DOSE,) 2 MG/1.5ML Solution Pen-injector, Inject 0.25 mg under the skin every 7 days., Disp: 4 Each, Rfl: 3  •  aspirin EC (ECOTRIN) 81 MG Tablet Delayed Response, Take 2 Tabs by mouth every day., Disp: 30 Tab, Rfl:   •  polyethylene glycol 3350 (MIRALAX) 17 GM/SCOOP Powder, Take 17 g by mouth as needed., Disp: , Rfl:   •  triamcinolone acetonide (KENALOG) 0.1 % Ointment, Apply 1 Application topically 2 times a day. As needed for itching., Disp: 60 g, Rfl: 2  •  glucose blood strip, 1 Strip by Other route as needed., Disp: 100 Strip, Rfl: 5  •  acetaminophen (TYLENOL) 325 MG Tab, Take 2 Tabs by mouth every 6 hours as needed (Mild Pain; (Pain scale 1-3); Temp greater than 100.5 F)., Disp: 30 Tab, Rfl: 0  •  multivitamin (THERAGRAN) Tab, Take 1 Tab by mouth every day., Disp: , Rfl:   •  furosemide (LASIX) 20 MG Tab, Take 1 Tab  by mouth 2 times a day., Disp: 60 Tab, Rfl: 3  •  FREESTYLE LANCETS Misc, USE EVERY MORNING TO TEST FASTING BLOOD SUGAR, Disp: 100 Each, Rfl: 3  •  non-formulary med, Inhale 2 L/min continuous. Oxygen dose range: 2 L/min Respiratory route via: Nasal Cannula  Oxygen supplier: Preferred    Indications: Hypoxia, Disp: , Rfl:     Labs: Reviewed    Physical Examination:  Vital signs: There were no vitals taken for this visit. There is no height or weight on file to calculate BMI.    Assessment and Plan:  In the past he has used Ozempic and it decreased his A1c to 6.0.  He was told by his provider to stop the Ozempic because it was working too well.  He is very resistant to using medications, but after a long discussion about the pros and cons of medications versus the risks of diabetes he was agreeable to restarting Ozempic.  He did not want to use Metformin due to recent negative press.    Dr. Smith ordered multiple labs, which the patient will get done prior to our next appointment.      1. DM2    - Medication changes   • GLP-1 Agent: Ozempic 0.25 mg every 7 days.    Consider increasing Ozempic at the next appointment.    -Blood glucose and A1c target    • However, more aggressive diabetic control is reasonable when tolerated.    · Exercise 30 minutes daily at least 5 days/week, as tolerated.      Follow-up appointment in 4 weeks    Sampson Drake, PharmD, MS, BCACP, [facility]  Phone [phone] fax [phone]    This note was created using voice recognition software (Dragon). The accuracy of the dictation is limited by the abilities of the software. I have reviewed the note prior to signing, however some errors in grammar and context are still possible. If you have any questions related to this note please do not hesitate to contact our office.     01/22/21    CC:   SHER Da Silva, Andrey LUU M.D.

## 2021-01-25 ENCOUNTER — TELEPHONE (OUTPATIENT)
Dept: VASCULAR LAB | Facility: MEDICAL CENTER | Age: 76
End: 2021-01-25

## 2021-01-25 ENCOUNTER — HOME CARE VISIT (OUTPATIENT)
Dept: HOME HEALTH SERVICES | Facility: HOME HEALTHCARE | Age: 76
End: 2021-01-25
Payer: MEDICARE

## 2021-01-25 VITALS
SYSTOLIC BLOOD PRESSURE: 140 MMHG | TEMPERATURE: 98.8 F | HEART RATE: 84 BPM | DIASTOLIC BLOOD PRESSURE: 76 MMHG | RESPIRATION RATE: 18 BRPM | OXYGEN SATURATION: 98 %

## 2021-01-25 PROBLEM — I82.511 CHRONIC DEEP VEIN THROMBOSIS (DVT) OF FEMORAL VEIN OF RIGHT LOWER EXTREMITY (HCC): Status: ACTIVE | Noted: 2021-01-25

## 2021-01-25 PROCEDURE — A6452 HIGH COMPRES BAND W>=3"<5"YD: HCPCS

## 2021-01-25 PROCEDURE — G0299 HHS/HOSPICE OF RN EA 15 MIN: HCPCS

## 2021-01-25 PROCEDURE — A6456 ZINC PASTE BAND W >=3"<5"/YD: HCPCS

## 2021-01-25 ASSESSMENT — ENCOUNTER SYMPTOMS
VOMITING: NO
NAUSEA: NO
MUSCLE WEAKNESS: 1

## 2021-01-25 NOTE — TELEPHONE ENCOUNTER
Reviewed results of imagin) JAY normal - no indications of arterial disease to legs.      2) VR duplex shows old DVT femoral v, non-occlusive and mild small Saph v reflex in RLE and BLE varicosities.  No deep vessel VR - so degree of CVI is considered low.     3) echo shows at least moderate pulm HTN based upon RVSP 50mmHg and mild TR.      Several other labs abnormal    MA to contact patient to set up visit (telehealth ok) to review labs and discuss plan and treatment options to include optimizing diuretic and possible ref to cardiology and verify pending eval with pulm/sleep MD for further eval for causes and tx of pulm HTN that is likely biggest contributor to his current edema.

## 2021-01-26 ENCOUNTER — TELEPHONE (OUTPATIENT)
Dept: VASCULAR LAB | Facility: MEDICAL CENTER | Age: 76
End: 2021-01-26

## 2021-01-26 NOTE — TELEPHONE ENCOUNTER
Called patient to schedule sooner appt with Dr. Smith regarding recent imaging and labs. Patient does not have vm set up on his phone. Will try again later today.

## 2021-01-27 ENCOUNTER — OFFICE VISIT (OUTPATIENT)
Dept: MEDICAL GROUP | Facility: MEDICAL CENTER | Age: 76
End: 2021-01-27
Payer: MEDICARE

## 2021-01-27 VITALS
SYSTOLIC BLOOD PRESSURE: 152 MMHG | HEART RATE: 100 BPM | OXYGEN SATURATION: 94 % | BODY MASS INDEX: 33.27 KG/M2 | RESPIRATION RATE: 16 BRPM | WEIGHT: 212 LBS | HEIGHT: 67 IN | TEMPERATURE: 99.1 F | DIASTOLIC BLOOD PRESSURE: 68 MMHG

## 2021-01-27 DIAGNOSIS — R97.20 ELEVATED PSA: ICD-10-CM

## 2021-01-27 DIAGNOSIS — E11.9 TYPE 2 DIABETES MELLITUS WITHOUT COMPLICATION, WITHOUT LONG-TERM CURRENT USE OF INSULIN (HCC): ICD-10-CM

## 2021-01-27 DIAGNOSIS — D64.9 LOW HEMOGLOBIN AND LOW HEMATOCRIT: ICD-10-CM

## 2021-01-27 DIAGNOSIS — Z28.21 VACCINATION REFUSED BY PATIENT: ICD-10-CM

## 2021-01-27 DIAGNOSIS — J44.9 STAGE 2 MODERATE COPD BY GOLD CLASSIFICATION (HCC): ICD-10-CM

## 2021-01-27 DIAGNOSIS — I73.9 PVD (PERIPHERAL VASCULAR DISEASE) (HCC): ICD-10-CM

## 2021-01-27 PROBLEM — M79.662 PAIN IN BOTH LOWER LEGS: Status: RESOLVED | Noted: 2020-11-24 | Resolved: 2021-01-27

## 2021-01-27 PROBLEM — Z78.9 HEALTH MAINTENANCE ALTERATION: Status: RESOLVED | Noted: 2017-06-21 | Resolved: 2021-01-27

## 2021-01-27 PROBLEM — Z99.81 REQUIRES CONTINUOUS AT HOME SUPPLEMENTAL OXYGEN: Status: RESOLVED | Noted: 2017-06-06 | Resolved: 2021-01-27

## 2021-01-27 PROBLEM — M79.661 PAIN IN BOTH LOWER LEGS: Status: RESOLVED | Noted: 2020-11-24 | Resolved: 2021-01-27

## 2021-01-27 PROBLEM — D72.829 ELEVATED WBC COUNT: Status: RESOLVED | Noted: 2021-01-19 | Resolved: 2021-01-27

## 2021-01-27 PROCEDURE — 99214 OFFICE O/P EST MOD 30 MIN: CPT | Performed by: PHYSICIAN ASSISTANT

## 2021-01-27 ASSESSMENT — FIBROSIS 4 INDEX: FIB4 SCORE: 1.35

## 2021-01-27 ASSESSMENT — PATIENT HEALTH QUESTIONNAIRE - PHQ9: CLINICAL INTERPRETATION OF PHQ2 SCORE: 0

## 2021-01-27 NOTE — ASSESSMENT & PLAN NOTE
Currently using 2 L of oxygen daily.  Sometimes when he has issues with exertion he feels like he needs more oxygen.  Is wondering today if he can increase oxygen to 3 to 4 L.

## 2021-01-27 NOTE — ASSESSMENT & PLAN NOTE
Last A1c at 8.6.  Is currently provided Ozempic by pharmacy through vascular.  He is yet to start the medication.  Has an appointment in early March with Dr. Smith.

## 2021-01-27 NOTE — PROGRESS NOTES
Subjective:   Enoch Mistry is a 75 y.o. male here today for normal CBC, vaccination refusal, type 2 diabetes, PVD, COPD and elevated PSA.    Low hemoglobin and low hematocrit  This is a pleasant 75-year-old male here today to discuss his recent labs.  Labs show the following with his CBC:     Ref. Range 1/18/2021 10:47   WBC Latest Ref Range: 4.8 - 10.8 K/uL 11.5 (H)   RBC Latest Ref Range: 4.70 - 6.10 M/uL 4.05 (L)   Hemoglobin Latest Ref Range: 14.0 - 18.0 g/dL 11.6 (L)   Hematocrit Latest Ref Range: 42.0 - 52.0 % 35.9 (L)   MCV Latest Ref Range: 81.4 - 97.8 fL 88.6   MCH Latest Ref Range: 27.0 - 33.0 pg 28.6   MCHC Latest Ref Range: 33.7 - 35.3 g/dL 32.3 (L)   RDW Latest Ref Range: 35.9 - 50.0 fL 45.8   Platelet Count Latest Ref Range: 164 - 446 K/uL 315   MPV Latest Ref Range: 9.0 - 12.9 fL 10.1   Neutrophils-Polys Latest Ref Range: 44.00 - 72.00 % 77.50 (H)   Neutrophils (Absolute) Latest Ref Range: 1.82 - 7.42 K/uL 8.93 (H)   Lymphocytes Latest Ref Range: 22.00 - 41.00 % 12.50 (L)   Lymphs (Absolute) Latest Ref Range: 1.00 - 4.80 K/uL 1.44   Monocytes Latest Ref Range: 0.00 - 13.40 % 6.30   Monos (Absolute) Latest Ref Range: 0.00 - 0.85 K/uL 0.73   Eosinophils Latest Ref Range: 0.00 - 6.90 % 2.90   Eos (Absolute) Latest Ref Range: 0.00 - 0.51 K/uL 0.33   Basophils Latest Ref Range: 0.00 - 1.80 % 0.50   Baso (Absolute) Latest Ref Range: 0.00 - 0.12 K/uL 0.06   Immature Granulocytes Latest Ref Range: 0.00 - 0.90 % 0.30   Immature Granulocytes (abs) Latest Ref Range: 0.00 - 0.11 K/uL 0.04   Nucleated RBC Latest Units: /100 WBC 0.00   NRBC (Absolute) Latest Units: K/uL 0.00     Prior labs were similar except H&H were slightly elevated.  States that his leg pain is minimal.  Takes ibuprofen.  No fevers.    PVD (peripheral vascular disease) ([facility])  Continues to have home health come into help with his lower extremity wounds.  Was recently fitted for compression stockings.  Legs are wrapped today.  Does believe  that the home health nurse has been very helpful with his care.  Is pleased with her service.  As mentioned above is no longer taking Norco for pain of his lower extremities but currently taking Tylenol.    Vaccination refused by patient  He is not interested in receiving any other vaccinations while I am caring for him.  Would like them removed from my chart recommendations.  Also does not want to have a colonoscopy.  Does not care about colon cancer screening.  He also does not want to deal with any annual wellness examinations.  He was irritated that he recently received an email regarding some wellness coupons.  He thought that they originated from me.    Type 2 diabetes mellitus without complication, without long-term current use of insulin ([facility])  Last A1c at 8.6.  Is currently provided Ozempic by pharmacy through vascular.  He is yet to start the medication.  Has an appointment in early March with Dr. Smith.    Elevated PSA  PSA was above 4 but approximately one-point less than last year.  Does have a history of BPH but is currently not on medication not followed by urology given his history of bladder cancer.    Stage 2 moderate COPD by GOLD classification ([facility])  Currently using 2 L of oxygen daily.  Sometimes when he has issues with exertion he feels like he needs more oxygen.  Is wondering today if he can increase oxygen to 3 to 4 L.       Current medicines (including changes today)  Current Outpatient Medications   Medication Sig Dispense Refill   • Semaglutide,0.25 or 0.5MG/DOS, (OZEMPIC, 0.25 OR 0.5 MG/DOSE,) 2 MG/1.5ML Solution Pen-injector Inject 0.25 mg under the skin every 7 days. 4 Each 3   • aspirin EC (ECOTRIN) 81 MG Tablet Delayed Response Take 2 Tabs by mouth every day. 30 Tab    • polyethylene glycol 3350 (MIRALAX) 17 GM/SCOOP Powder Take 17 g by mouth as needed.     • triamcinolone acetonide (KENALOG) 0.1 % Ointment Apply 1 Application topically 2 times a day. As needed for itching. 60 g 2  "  • glucose blood strip 1 Strip by Other route as needed. 100 Strip 5   • acetaminophen (TYLENOL) 325 MG Tab Take 2 Tabs by mouth every 6 hours as needed (Mild Pain; (Pain scale 1-3); Temp greater than 100.5 F). 30 Tab 0   • multivitamin (THERAGRAN) Tab Take 1 Tab by mouth every day.     • furosemide (LASIX) 20 MG Tab Take 1 Tab by mouth 2 times a day. 60 Tab 3   • FREESTYLE LANCETS Misc USE EVERY MORNING TO TEST FASTING BLOOD SUGAR 100 Each 3   • non-formulary med Inhale 2 L/min continuous. Oxygen dose range: 2 L/min  Respiratory route via: Nasal Cannula   Oxygen supplier: Preferred      Indications: Hypoxia       No current facility-administered medications for this visit.      He  has a past medical history of Anemia, Arrhythmia, Breath shortness, Cancer ([facility]), Chronic deep vein thrombosis (DVT) of femoral vein of right lower extremity ([facility]) (1/25/2021), Diabetes ([facility]), Emphysema of lung ([facility]), Hiatus hernia syndrome, Hypertension, Other specified disorder of intestines, and Urinary bladder disorder.    Social History and Family History were reviewed and updated.    ROS   No chest pain, no shortness of breath, no abdominal pain and all other systems were reviewed and are negative.       Objective:     /68   Pulse 100   Temp 37.3 °C (99.1 °F) (Temporal)   Resp 16   Ht 1.702 m (5' 7\")   Wt 96.2 kg (212 lb)   SpO2 94%  Body mass index is 33.2 kg/m².   Physical Exam:  Constitutional: Alert, no distress.  Wearing nasal cannula.  Skin: Warm, dry, good turgor, no rashes in visible areas.  Eye: Equal, round and reactive, conjunctiva clear, lids normal.  ENMT: Lips without lesions, good dentition, oropharynx clear.  Neck: Trachea midline, no masses.   Lymph: No cervical or supraclavicular lymphadenopathy  Respiratory: Unlabored respiratory effort, lungs appear clear, no wheezes.  Cardiovascular: Regular rate rhythm.  Psych: Alert and oriented x3, normal affect and mood.        Assessment and Plan:   The " following treatment plan was discussed    1. Low hemoglobin and low hematocrit  Reviewed recent labs.  Will repeat CBC and add ferritin and iron.  Performed prior to March appointment with vascular.  - CBC WITH DIFFERENTIAL; Future  - FERRITIN; Future  - IRON/TOTAL IRON BIND; Future    2. Vaccination refused by patient  Discussed vaccinations that are due.  Discontinued all of his vaccinations as well as a colonoscopy.  Patient understands risk.    3. Type 2 diabetes mellitus without complication, without long-term current use of insulin ([facility])  Chronic condition.  Uncontrolled.  Follow with vascular regarding prescription for Ozempic.  Urged him to start Ozempic.  Ordered labs which he must fast for 8 hours.  Perform prior to seeing vascular in March.  - MICROALBUMIN CREAT RATIO URINE; Future  - Comp Metabolic Panel; Future  - Lipid Profile; Future    4. PVD (peripheral vascular disease) ([facility])  Chronic condition.  Stable.  Appears to be improving slightly.  Continue to follow with home health.  Continue with their care.  Continue Tylenol use.  Am concerned about the chronicity of his lower extremity swelling.  Unable to visualize legs today because of the wrappings.  Will contact Robyn Segura of [facility] for consultation and possible treatment.  Patient is willing to go through the process but would like to wait 2 to 3 weeks.    5. Stage 2 moderate COPD by GOLD classification ([facility])  Chronic condition.  Stable.  Continue oxygen supplementation.  Advised to increase to 3 to 3.5 liters if needed when he is in motion.    6. Elevated PSA  Reviewed PSA.  Did improve from last year.  Will monitor yearly.      Followup: Return in about 3 months (around 4/27/2021), or if symptoms worsen or fail to improve.    Please note that this dictation was created using voice recognition software. I have made every reasonable attempt to correct obvious errors, but I expect that there are errors of grammar and possibly content  that I did not discover before finalizing the note.

## 2021-01-27 NOTE — ASSESSMENT & PLAN NOTE
This is a pleasant 75-year-old male here today to discuss his recent labs.  Labs show the following with his CBC:     Ref. Range 1/18/2021 10:47   WBC Latest Ref Range: 4.8 - 10.8 K/uL 11.5 (H)   RBC Latest Ref Range: 4.70 - 6.10 M/uL 4.05 (L)   Hemoglobin Latest Ref Range: 14.0 - 18.0 g/dL 11.6 (L)   Hematocrit Latest Ref Range: 42.0 - 52.0 % 35.9 (L)   MCV Latest Ref Range: 81.4 - 97.8 fL 88.6   MCH Latest Ref Range: 27.0 - 33.0 pg 28.6   MCHC Latest Ref Range: 33.7 - 35.3 g/dL 32.3 (L)   RDW Latest Ref Range: 35.9 - 50.0 fL 45.8   Platelet Count Latest Ref Range: 164 - 446 K/uL 315   MPV Latest Ref Range: 9.0 - 12.9 fL 10.1   Neutrophils-Polys Latest Ref Range: 44.00 - 72.00 % 77.50 (H)   Neutrophils (Absolute) Latest Ref Range: 1.82 - 7.42 K/uL 8.93 (H)   Lymphocytes Latest Ref Range: 22.00 - 41.00 % 12.50 (L)   Lymphs (Absolute) Latest Ref Range: 1.00 - 4.80 K/uL 1.44   Monocytes Latest Ref Range: 0.00 - 13.40 % 6.30   Monos (Absolute) Latest Ref Range: 0.00 - 0.85 K/uL 0.73   Eosinophils Latest Ref Range: 0.00 - 6.90 % 2.90   Eos (Absolute) Latest Ref Range: 0.00 - 0.51 K/uL 0.33   Basophils Latest Ref Range: 0.00 - 1.80 % 0.50   Baso (Absolute) Latest Ref Range: 0.00 - 0.12 K/uL 0.06   Immature Granulocytes Latest Ref Range: 0.00 - 0.90 % 0.30   Immature Granulocytes (abs) Latest Ref Range: 0.00 - 0.11 K/uL 0.04   Nucleated RBC Latest Units: /100 WBC 0.00   NRBC (Absolute) Latest Units: K/uL 0.00     Prior labs were similar except H&H were slightly elevated.  States that his leg pain is minimal.  Takes ibuprofen.  No fevers.

## 2021-01-27 NOTE — ASSESSMENT & PLAN NOTE
PSA was above 4 but approximately one-point less than last year.  Does have a history of BPH but is currently not on medication not followed by urology given his history of bladder cancer.

## 2021-01-27 NOTE — ASSESSMENT & PLAN NOTE
Continues to have home health come into help with his lower extremity wounds.  Was recently fitted for compression stockings.  Legs are wrapped today.  Does believe that the home health nurse has been very helpful with his care.  Is pleased with her service.  As mentioned above is no longer taking Norco for pain of his lower extremities but currently taking Tylenol.

## 2021-01-27 NOTE — ASSESSMENT & PLAN NOTE
He is not interested in receiving any other vaccinations while I am caring for him.  Would like them removed from my chart recommendations.  Also does not want to have a colonoscopy.  Does not care about colon cancer screening.  He also does not want to deal with any annual wellness examinations.  He was irritated that he recently received an email regarding some wellness coupons.  He thought that they originated from me.

## 2021-01-28 ENCOUNTER — HOME CARE VISIT (OUTPATIENT)
Dept: HOME HEALTH SERVICES | Facility: HOME HEALTHCARE | Age: 76
End: 2021-01-28
Payer: MEDICARE

## 2021-01-28 ENCOUNTER — HOSPITAL ENCOUNTER (OUTPATIENT)
Facility: MEDICAL CENTER | Age: 76
End: 2021-01-28
Attending: PHYSICIAN ASSISTANT
Payer: MEDICARE

## 2021-01-28 VITALS
HEART RATE: 84 BPM | OXYGEN SATURATION: 94 % | SYSTOLIC BLOOD PRESSURE: 148 MMHG | TEMPERATURE: 98.6 F | RESPIRATION RATE: 18 BRPM | DIASTOLIC BLOOD PRESSURE: 78 MMHG

## 2021-01-28 LAB
GRAM STN SPEC: NORMAL
GRAM STN SPEC: NORMAL
SIGNIFICANT IND 70042: NORMAL
SIGNIFICANT IND 70042: NORMAL
SITE SITE: NORMAL
SITE SITE: NORMAL
SOURCE SOURCE: NORMAL
SOURCE SOURCE: NORMAL

## 2021-01-28 PROCEDURE — 87205 SMEAR GRAM STAIN: CPT

## 2021-01-28 PROCEDURE — A6207 CONTACT LAYER >16<= 48 SQ IN: HCPCS

## 2021-01-28 PROCEDURE — 87077 CULTURE AEROBIC IDENTIFY: CPT | Mod: 91

## 2021-01-28 PROCEDURE — A6452 HIGH COMPRES BAND W>=3"<5"YD: HCPCS

## 2021-01-28 PROCEDURE — 87070 CULTURE OTHR SPECIMN AEROBIC: CPT

## 2021-01-28 PROCEDURE — 87186 SC STD MICRODIL/AGAR DIL: CPT | Mod: 91

## 2021-01-28 PROCEDURE — G0299 HHS/HOSPICE OF RN EA 15 MIN: HCPCS

## 2021-01-28 ASSESSMENT — ENCOUNTER SYMPTOMS
NAUSEA: NO
VOMITING: NO

## 2021-01-29 ENCOUNTER — TELEPHONE (OUTPATIENT)
Dept: VASCULAR LAB | Facility: MEDICAL CENTER | Age: 76
End: 2021-01-29

## 2021-01-29 DIAGNOSIS — L03.119 CELLULITIS OF LOWER EXTREMITY, UNSPECIFIED LATERALITY: ICD-10-CM

## 2021-01-29 DIAGNOSIS — I73.9 PVD (PERIPHERAL VASCULAR DISEASE) (HCC): ICD-10-CM

## 2021-01-29 RX ORDER — CEPHALEXIN 500 MG/1
1000 CAPSULE ORAL 2 TIMES DAILY
Qty: 56 CAP | Refills: 0 | Status: SHIPPED
Start: 2021-01-29 | End: 2021-02-03

## 2021-01-29 NOTE — TELEPHONE ENCOUNTER
Multiple attempts to reach patient to schedule him a sooner appt with Dr. Smith, unable to reach patient, phone just goes to  and the vm box is not set up. Message sent to MD.

## 2021-02-01 ENCOUNTER — HOME CARE VISIT (OUTPATIENT)
Dept: HOME HEALTH SERVICES | Facility: HOME HEALTHCARE | Age: 76
End: 2021-02-01
Payer: MEDICARE

## 2021-02-01 VITALS
DIASTOLIC BLOOD PRESSURE: 66 MMHG | SYSTOLIC BLOOD PRESSURE: 140 MMHG | TEMPERATURE: 98.7 F | OXYGEN SATURATION: 98 % | HEART RATE: 88 BPM | RESPIRATION RATE: 18 BRPM

## 2021-02-01 DIAGNOSIS — L03.119 CELLULITIS OF LOWER EXTREMITY, UNSPECIFIED LATERALITY: ICD-10-CM

## 2021-02-01 PROCEDURE — A6207 CONTACT LAYER >16<= 48 SQ IN: HCPCS

## 2021-02-01 PROCEDURE — A6452 HIGH COMPRES BAND W>=3"<5"YD: HCPCS

## 2021-02-01 PROCEDURE — G0493 RN CARE EA 15 MIN HH/HOSPICE: HCPCS

## 2021-02-01 PROCEDURE — A6250 SKIN SEAL PROTECT MOISTURIZR: HCPCS

## 2021-02-01 PROCEDURE — A6197 ALGINATE DRSG >16 <=48 SQ IN: HCPCS

## 2021-02-01 RX ORDER — SULFAMETHOXAZOLE AND TRIMETHOPRIM 800; 160 MG/1; MG/1
1 TABLET ORAL 2 TIMES DAILY
Qty: 20 TAB | Refills: 0 | Status: SHIPPED
Start: 2021-02-01 | End: 2021-02-03

## 2021-02-01 SDOH — ECONOMIC STABILITY: HOUSING INSECURITY: EVIDENCE OF SMOKING MATERIAL: 0

## 2021-02-01 ASSESSMENT — ACTIVITIES OF DAILY LIVING (ADL): OASIS_M1830: 03

## 2021-02-01 ASSESSMENT — ENCOUNTER SYMPTOMS
VOMITING: NO
NAUSEA: NO

## 2021-02-01 ASSESSMENT — PATIENT HEALTH QUESTIONNAIRE - PHQ9: CLINICAL INTERPRETATION OF PHQ2 SCORE: 0

## 2021-02-03 DIAGNOSIS — L03.119 CELLULITIS OF LOWER EXTREMITY, UNSPECIFIED LATERALITY: ICD-10-CM

## 2021-02-03 LAB
BACTERIA WND AEROBE CULT: ABNORMAL
SIGNIFICANT IND 70042: ABNORMAL
SIGNIFICANT IND 70042: ABNORMAL
SITE SITE: ABNORMAL
SITE SITE: ABNORMAL
SOURCE SOURCE: ABNORMAL
SOURCE SOURCE: ABNORMAL

## 2021-02-03 RX ORDER — CLINDAMYCIN HYDROCHLORIDE 300 MG/1
300 CAPSULE ORAL 4 TIMES DAILY
Qty: 28 CAP | Refills: 0 | Status: SHIPPED
Start: 2021-02-03 | End: 2021-02-04

## 2021-02-04 ENCOUNTER — HOME CARE VISIT (OUTPATIENT)
Dept: HOME HEALTH SERVICES | Facility: HOME HEALTHCARE | Age: 76
End: 2021-02-04
Payer: MEDICARE

## 2021-02-04 VITALS
RESPIRATION RATE: 18 BRPM | OXYGEN SATURATION: 96 % | SYSTOLIC BLOOD PRESSURE: 140 MMHG | DIASTOLIC BLOOD PRESSURE: 70 MMHG | TEMPERATURE: 98.5 F | HEART RATE: 82 BPM

## 2021-02-04 PROCEDURE — A6452 HIGH COMPRES BAND W>=3"<5"YD: HCPCS

## 2021-02-04 PROCEDURE — G0299 HHS/HOSPICE OF RN EA 15 MIN: HCPCS

## 2021-02-04 PROCEDURE — A6197 ALGINATE DRSG >16 <=48 SQ IN: HCPCS

## 2021-02-04 PROCEDURE — 665003 FOLLOW UP-HOME HEALTH

## 2021-02-04 ASSESSMENT — ENCOUNTER SYMPTOMS
NAUSEA: NO
VOMITING: NO

## 2021-02-06 SDOH — ECONOMIC STABILITY: HOUSING INSECURITY
HOME SAFETY: PRESENT AND FUNCTIONAL ON EACH LEVEL OF THE HOME. PATIENT DOES HAVE A FIRE ESCAPE PLAN DEVELOPED. PATIENT DOES NOT HAVE FLAMMABLE MATERIALS PRESENT IN THE HOME PRESENTING A FIRE HAZARD. NO EVIDENCE FOUND OF SMOKING MATERIALS PRESENT IN THE HOME.

## 2021-02-06 SDOH — ECONOMIC STABILITY: HOUSING INSECURITY
HOME SAFETY: OXYGEN SAFETY RISK ASSESSMENT PERFORMED. PATIENT DOES NOT HAVE A NO SMOKING SIGN POSTED IN THE HOME, AND REFUSES TO DO SO DESPITE EXPLANATION OF WHY IT IS IMPORTANT. PATIENT DOES HAVE A WORKING FIRE EXTINGUISHER PRESENT IN THE HOME. SMOKE ALARMS ARE

## 2021-02-08 ENCOUNTER — HOME CARE VISIT (OUTPATIENT)
Dept: HOME HEALTH SERVICES | Facility: HOME HEALTHCARE | Age: 76
End: 2021-02-08
Payer: MEDICARE

## 2021-02-08 VITALS
OXYGEN SATURATION: 98 % | HEART RATE: 90 BPM | RESPIRATION RATE: 18 BRPM | TEMPERATURE: 99 F | SYSTOLIC BLOOD PRESSURE: 130 MMHG | DIASTOLIC BLOOD PRESSURE: 72 MMHG

## 2021-02-08 PROCEDURE — A4927 NON-STERILE GLOVES: HCPCS

## 2021-02-08 PROCEDURE — A6197 ALGINATE DRSG >16 <=48 SQ IN: HCPCS

## 2021-02-08 PROCEDURE — G0299 HHS/HOSPICE OF RN EA 15 MIN: HCPCS

## 2021-02-08 PROCEDURE — A6452 HIGH COMPRES BAND W>=3"<5"YD: HCPCS

## 2021-02-08 PROCEDURE — A6216 NON-STERILE GAUZE<=16 SQ IN: HCPCS

## 2021-02-08 PROCEDURE — A6250 SKIN SEAL PROTECT MOISTURIZR: HCPCS

## 2021-02-08 ASSESSMENT — ENCOUNTER SYMPTOMS
NAUSEA: NO
VOMITING: NO

## 2021-02-09 ENCOUNTER — HOME CARE VISIT (OUTPATIENT)
Dept: HOME HEALTH SERVICES | Facility: HOME HEALTHCARE | Age: 76
End: 2021-02-09
Payer: MEDICARE

## 2021-02-09 DIAGNOSIS — L03.119 CELLULITIS OF LOWER EXTREMITY, UNSPECIFIED LATERALITY: ICD-10-CM

## 2021-02-09 RX ORDER — AMOXICILLIN AND CLAVULANATE POTASSIUM 875; 125 MG/1; MG/1
1 TABLET, FILM COATED ORAL 3 TIMES DAILY
Qty: 21 TAB | Refills: 0 | Status: SHIPPED | OUTPATIENT
Start: 2021-02-09 | End: 2021-02-16

## 2021-02-10 ENCOUNTER — OFFICE VISIT (OUTPATIENT)
Dept: WOUND CARE | Facility: MEDICAL CENTER | Age: 76
End: 2021-02-10
Attending: PHYSICIAN ASSISTANT
Payer: MEDICARE

## 2021-02-10 ENCOUNTER — HOME CARE VISIT (OUTPATIENT)
Dept: HOME HEALTH SERVICES | Facility: HOME HEALTHCARE | Age: 76
End: 2021-02-10
Payer: MEDICARE

## 2021-02-10 VITALS
RESPIRATION RATE: 20 BRPM | SYSTOLIC BLOOD PRESSURE: 172 MMHG | OXYGEN SATURATION: 93 % | HEART RATE: 112 BPM | DIASTOLIC BLOOD PRESSURE: 94 MMHG | TEMPERATURE: 99 F

## 2021-02-10 DIAGNOSIS — I87.2 VENOUS STASIS DERMATITIS OF RIGHT LOWER EXTREMITY: ICD-10-CM

## 2021-02-10 DIAGNOSIS — L97.911 LEG ULCER, RIGHT, LIMITED TO BREAKDOWN OF SKIN (HCC): Primary | ICD-10-CM

## 2021-02-10 DIAGNOSIS — B35.1 ONYCHOMYCOSIS: ICD-10-CM

## 2021-02-10 DIAGNOSIS — E11.9 CONTROLLED TYPE 2 DIABETES MELLITUS WITHOUT COMPLICATION, WITHOUT LONG-TERM CURRENT USE OF INSULIN (HCC): ICD-10-CM

## 2021-02-10 DIAGNOSIS — L03.119 CELLULITIS OF LOWER EXTREMITY, UNSPECIFIED LATERALITY: ICD-10-CM

## 2021-02-10 PROCEDURE — G0179 MD RECERTIFICATION HHA PT: HCPCS | Performed by: FAMILY MEDICINE

## 2021-02-10 PROCEDURE — 99214 OFFICE O/P EST MOD 30 MIN: CPT | Mod: 25

## 2021-02-10 PROCEDURE — 99214 OFFICE O/P EST MOD 30 MIN: CPT | Performed by: NURSE PRACTITIONER

## 2021-02-10 PROCEDURE — 29581 APPL MULTLAYER CMPRN SYS LEG: CPT

## 2021-02-10 ASSESSMENT — ENCOUNTER SYMPTOMS
DIARRHEA: 0
SHORTNESS OF BREATH: 0
VOMITING: 0
FEVER: 0
CHILLS: 0
CONSTIPATION: 0
NAUSEA: 0
BACK PAIN: 1
COUGH: 0
DEPRESSION: 0
CLAUDICATION: 0
INSOMNIA: 0

## 2021-02-10 ASSESSMENT — PAIN SCALES - GENERAL: PAINLEVEL: NO PAIN

## 2021-02-10 NOTE — PROCEDURES
Non-selective debridement to BLE wounds and periwounds using foam cleanser and washcloth to remove nonviable tissue and biofilm.

## 2021-02-10 NOTE — PROGRESS NOTES
Prism order sent for Circaids:    Left:  Ankle-23.5  Calf- 33.5  Length- 48    Right:  Ankle-27  Calf- 39.5  Length- 47.5    Home health orders filled out and routed electronically to [facility]  via Jimmy Fairly. APRN to order Podiatry referral for patient.

## 2021-02-10 NOTE — PATIENT INSTRUCTIONS
-Keep your wound dressing clean, dry, and intact.    -Change your dressing if it becomes soiled, soaked, or falls off.    -Remove your compression wrap if you have severe pain, severe swelling, numbness, color change, or temperature change in your toes. If you need to remove your compression wrap, do so by unrolling it. Do not cut the compression wrap off to prevent cutting yourself on accident.    -Should you experience any significant changes in your wound(s), such as infection (redness, swelling, localized heat, increased pain, fever > 101 F, chills) or have any questions regarding your home care instructions, please contact the wound center at [phone]. If after hours, contact your primary care physician or go to the hospital emergency room.

## 2021-02-11 ENCOUNTER — ANTICOAGULATION MONITORING (OUTPATIENT)
Dept: MEDICAL GROUP | Facility: PHYSICIAN GROUP | Age: 76
End: 2021-02-11

## 2021-02-11 NOTE — PROGRESS NOTES
" Provider Encounter- Lower Extremity Ulcer      HISTORY OF PRESENT ILLNESS  Wound History:    START OF CARE IN CLINIC: 2/10/2021    REFERRING PROVIDER: Collin Schwab P.A.-C.     WOUND ETIOLOGY: Venous   LOCATION: Right lower extremity   HISTORY: Patient is referred to [facility] for evaluation and treatment of right lower extremity wounds with cellulitis and edema.  States he started having swelling in both legs, but more so to the right leg, around 2015.  He had a, \"clot\" in this leg around the same time.  Since then he has had problems with weeping from right leg, with recurring shallow wounds.  His current wounds have been present since about December 2020.  [facility] has been seeing him, applying compression, however he is only able to tolerate for a few days before he removes it.  He has been on several courses of antibiotics for cellulitis, most recently Augmentin prescribed on 2/9/2021.  His wounds and edema has shown very little progress, and therefore he was referred to [facility] for further evaluation.   Venous and arterial studies were done in January 2021.  ABIs slightly elevated, waveforms triphasic at the ankles.  Venous duplex shows chronic partial thrombus in the distal femoral and popliteal veins, no acute DVT, no obvious reflux in right lower extremity.      Pertinent Medical History: PVD, obesity, DVT right lower extremity, cellulitis of lower extremity, chronic right lower extremity swelling      DIABETES HX: Diagnosed with type 2 diabetes around 2015.  Was taken off of Ozempic because his blood sugars were, \"too good\".  Restarted on Ozempic in January 2021. Checks blood sugars at least daily and reports that these typically run around .  Has  had previous diabetes education, his PCP has referred him to endocrinology.  Does  have numbness in feet.  Usually wears R, nonprescriptive shoes. Does not check his feet routinely.  Has not had previous foot ulcers or foot surgery.  Current occupation " tired.      TOBACCO USE: Former smoker, quit in 2006 after smoking 2 packs/day x 50 years.    Patient's problem list, allergies, and current medications reviewed and updated in Epic    Interval History:  2/10/2021 : Initial clinic visit with QUINN Campos, ASHLIE, MAUREENN, MALCOLM.  Patient presents today accompanied by his wife.  He is very frustrated with lack of healing of his wounds, and the chronic swelling of his legs.  States he is only able to tolerate compression wraps for approximately 3 days before he has to remove them.  When he does not have compression, he experiences weeping from his right lower extremity.  He has had these problems off and on for the past 5 years, most recently since December 2020.  Lymphedema pump has been recommended, representative is scheduled to evaluate him on 2/12.  He was prescribed Augmentin yesterday for positive wound culture, MSSA and Salmonella.  He had been prescribed Bactrim prior to that, but was unable to tolerate.  He reports his blood sugar this morning was 95    He also has mycotic, overgrown toenails.  At risk for self injury.  He is requesting referral to podiatry        REVIEW OF SYSTEMS:   Review of Systems   Constitutional: Negative for chills and fever.   HENT: Positive for hearing loss.    Respiratory: Negative for cough and shortness of breath.         He is on supplemental oxygen, but states he is able to perform ADLs and household chores without SOP.   Cardiovascular: Positive for leg swelling. Negative for chest pain and claudication.        Swelling and both legs since about 2015, right leg is always been significantly worse than the left.   Gastrointestinal: Negative for constipation, diarrhea, nausea and vomiting.   Genitourinary: Negative for dysuria.   Musculoskeletal: Positive for back pain and joint pain.   Skin:        Redness and flaking of skin to both lower legs for several years, sometimes itches and burns.   Psychiatric/Behavioral: Negative  for depression. The patient does not have insomnia.          PHYSICAL EXAMINATION:   BP (!) 172/94   Pulse (!) 112   Temp 37.2 °C (99 °F)   Resp 20   SpO2 93%     Physical Exam  Constitutional:       Appearance: Normal appearance. He is obese.   HENT:      Head: Normocephalic.   Eyes:      Pupils: Pupils are equal, round, and reactive to light.   Cardiovascular:      Rate and Rhythm: Tachycardia present.      Pulses: Normal pulses.      Comments: Tachycardia but regular  Pulmonary:      Effort: Pulmonary effort is normal.      Comments: On supplemental O2  Musculoskeletal:      Right lower leg: Edema present.      Left lower leg: Edema present.      Comments: 3+ edema of right lower extremity  2+ edema of left lower extremity  Uses walker for ambulation   Skin:     General: Skin is warm.      Findings: Erythema present.      Comments: Chronic skin changes to bilateral lower extremities-hemosiderin staining, edema, scarring, lipodermatosclerosis.  Findings consistent with CVI    Right lower extremity with erythema from just below knee to foot, areas of denudation, shallow and weeping clear fluid    Toenails mycotic and overgrown   Neurological:      Mental Status: He is alert.         WOUND ASSESSMENT         Wound 02/10/21 Partial Thickness Wound Right Dorsal base of hallux (Active)   Wound Image   02/10/21 1100   Site Assessment Pink;Red 02/10/21 1100   Periwound Assessment Dry;Edema;Other (Comment) 02/10/21 1100   Margins Attached edges 02/10/21 1100   Drainage Amount Small 02/10/21 1100   Drainage Description Serosanguineous 02/10/21 1100   Treatments Cleansed;Other (Comment) 02/10/21 1100   Wound Cleansing Foam Cleanser/Washcloth 02/10/21 1100   Periwound Protectant Barrier Paste 02/10/21 1100   Dressing Options Hydrofiber Silver;Compression Wrap Two Layer 02/10/21 1100   Dressing Changed New 02/10/21 1100   Dressing Change/Treatment Frequency Every 72 hrs, and As Needed 02/10/21 1100   Non-staged Wound  Description Partial thickness 02/10/21 1100   Wound Length (cm) 2 cm 02/10/21 1100   Wound Width (cm) 3.1 cm 02/10/21 1100   Wound Depth (cm) 0 cm 02/10/21 1100   Wound Surface Area (cm^2) 6.2 cm^2 02/10/21 1100   Wound Volume (cm^3) 0 cm^3 02/10/21 1100   Tunneling (cm) 0 cm 02/10/21 1100   Undermining (cm) 0 cm 02/10/21 1100   Wound Odor None 02/10/21 1100   Pulses Right;2+;DP;PT;Doppler 02/10/21 1100   Exposed Structures None 02/10/21 1100       Wound 02/10/21 Partial Thickness Wound RLE Posterior cluster (Active)   Wound Image   02/10/21 1100   Site Assessment Pink;Red 02/10/21 1100   Periwound Assessment Dry;Hemosiderin Staining;Edema 02/10/21 1100   Margins Attached edges 02/10/21 1100   Drainage Amount Small 02/10/21 1100   Drainage Description Serosanguineous 02/10/21 1100   Treatments Cleansed;Other (Comment) 02/10/21 1100   Wound Cleansing Foam Cleanser/Washcloth 02/10/21 1100   Periwound Protectant Barrier Paste 02/10/21 1100   Dressing Options Hydrofiber Silver;Compression Wrap Two Layer 02/10/21 1100   Dressing Changed New 02/10/21 1100   Dressing Change/Treatment Frequency Every 72 hrs, and As Needed 02/10/21 1100   Non-staged Wound Description Partial thickness 02/10/21 1100   Wound Length (cm) 8.7 cm 02/10/21 1100   Wound Width (cm) 5 cm 02/10/21 1100   Wound Depth (cm) 0 cm 02/10/21 1100   Wound Surface Area (cm^2) 43.5 cm^2 02/10/21 1100   Wound Volume (cm^3) 0 cm^3 02/10/21 1100   Tunneling (cm) 0 cm 02/10/21 1100   Undermining (cm) 0 cm 02/10/21 1100   Wound Odor None 02/10/21 1100   Pulses Right;2+;DP;PT;Doppler 02/10/21 1100   Exposed Structures None 02/10/21 1100       Wound 02/10/21 Partial Thickness Wound Right Knee cluster (Active)   Wound Image   02/10/21 1100   Site Assessment Pink;Red 02/10/21 1100   Periwound Assessment Dry;Edema;Hemosiderin Staining 02/10/21 1100   Margins Attached edges 02/10/21 1100   Drainage Amount Small 02/10/21 1100   Drainage Description Serosanguineous 02/10/21  1100   Treatments Cleansed;Other (Comment) 02/10/21 1100   Wound Cleansing Foam Cleanser/Washcloth 02/10/21 1100   Periwound Protectant Barrier Paste 02/10/21 1100   Dressing Options Hydrofiber Silver;Compression Wrap Two Layer 02/10/21 1100   Dressing Changed New 02/10/21 1100   Dressing Change/Treatment Frequency Every 72 hrs, and As Needed 02/10/21 1100   Non-staged Wound Description Partial thickness 02/10/21 1100   Wound Length (cm) 3 cm 02/10/21 1100   Wound Width (cm) 2.5 cm 02/10/21 1100   Wound Depth (cm) 0 cm 02/10/21 1100   Wound Surface Area (cm^2) 7.5 cm^2 02/10/21 1100   Wound Volume (cm^3) 0 cm^3 02/10/21 1100   Tunneling (cm) 0 cm 02/10/21 1100   Undermining (cm) 0 cm 02/10/21 1100   Wound Odor None 02/10/21 1100   Pulses Right;2+;DP;PT;Doppler 02/10/21 1100   Exposed Structures None 02/10/21 1100       Wound 02/10/21 Partial Thickness Wound RLE Lateral (Active)   Wound Image   02/10/21 1100   Site Assessment Pink;Red 02/10/21 1100   Periwound Assessment Dry;Edema;Hemosiderin Staining 02/10/21 1100   Margins Attached edges 02/10/21 1100   Drainage Amount Small 02/10/21 1100   Drainage Description Serosanguineous 02/10/21 1100   Treatments Cleansed;Other (Comment) 02/10/21 1100   Wound Cleansing Foam Cleanser/Washcloth 02/10/21 1100   Periwound Protectant Barrier Paste 02/10/21 1100   Dressing Options Hydrofiber Silver;Compression Wrap Two Layer 02/10/21 1100   Dressing Changed New 02/10/21 1100   Dressing Change/Treatment Frequency Every 72 hrs, and As Needed 02/10/21 1100   Non-staged Wound Description Partial thickness 02/10/21 1100   Wound Length (cm) 1.7 cm 02/10/21 1100   Wound Width (cm) 1.5 cm 02/10/21 1100   Wound Depth (cm) 0 cm 02/10/21 1100   Wound Surface Area (cm^2) 2.55 cm^2 02/10/21 1100   Wound Volume (cm^3) 0 cm^3 02/10/21 1100   Tunneling (cm) 0 cm 02/10/21 1100   Undermining (cm) 0 cm 02/10/21 1100   Wound Odor None 02/10/21 1100   Pulses Right;2+;DP;PT;Doppler 02/10/21 1100    Exposed Structures None 02/10/21 1100       Wound 02/10/21 Partial Thickness Wound Left Anterior Ankle (Active)   Wound Image   02/10/21 1100   Site Assessment [place] 02/10/21 1100   Periwound Assessment Dry;Edema;Hemosiderin Staining 02/10/21 1100   Margins Attached edges 02/10/21 1100   Drainage Amount Scant 02/10/21 1100   Drainage Description Serosanguineous 02/10/21 1100   Treatments Cleansed;Other (Comment) 02/10/21 1100   Wound Cleansing Foam Cleanser/Washcloth 02/10/21 1100   Periwound Protectant Barrier Paste 02/10/21 1100   Dressing Options Hydrofiber Silver;Compression Wrap Two Layer 02/10/21 1100   Dressing Changed New 02/10/21 1100   Dressing Change/Treatment Frequency Every 72 hrs, and As Needed 02/10/21 1100   Non-staged Wound Description Partial thickness 02/10/21 1100   Tunneling (cm) 0 cm 02/10/21 1100   Undermining (cm) 0 cm 02/10/21 1100   Wound Odor None 02/10/21 1100   Exposed Structures None 02/10/21 1100        PROCEDURE:   - no need for debridement.  All wounds are shallow, diffuse borders.  -Wound care completed by wound RN, refer to wound flowsheet   -Patient tolerated the procedure well, without c/o of significant pain or discomfort.       Pertinent Labs and Diagnostics:    Labs:     IMAGING: N/A    VASCULAR STUDIES:   1/22/2021-venous ultrasound  RIGHT:   Chronic partial thrombus observed in the distal femoral & popliteal veins.     No acute dvt.   No deep vein reflux greater than 1 sec.   No greater saphenous vein reflux greater than 0.5 seconds.   Small saphenous vein reflux for maximum 1350 msec.   No obvious reflux in the varicose veins in the calf or a  at the    ankle.   LEFT:   All veins are fully compressible, no superficial or deep vein thrombus.   Deep vein reflux in the common femoral vein for 2150 msec with Valsalva    maneuver.   No reflux in the greater saphenous vein greater than 0.5 seconds.   No small saphenous vein reflux.   Reflux in a varicose vein at the  ankle for 1350 msec.    1/22/2021-US-JAY  Findings   Doppler waveforms of the common femoral arteries are high amplitude and    triphasic.    Doppler waveforms of the popliteal arteries are high amplitude and    triphasic.    Doppler waveforms at the ankle are brisk and triphasic.    Ankle-brachial index is normal.    There is no evidence of major arterial disease demonstrated bilaterally.     LAST  WOUND CULTURE:  DATE : 1/28/2021         Culture Result Abnormal   Staphylococcus aureus   Heavy growth   This isolate is presumed to be clindamycin resistant based on   detection of inducible resistance.  Clindamycin may still   be effective in some patients.     Culture Result Abnormal   Salmonella species   Light growth         ASSESSMENT AND PLAN:   1. Leg ulcer, right, limited to breakdown of skin (HCC)  Comments: History of DVT in this extremity, since then problems with chronic edema, weeping of skin, cellulitis, shallow wounds.  Unable to tolerate compression for more than a few days.  Arterial studies show no major arterial disease.  Venous studies show no obvious reflux right lower extremity.    2/10/2021: Initial clinic visit.  Patient's wounds are very superficial and diffuse.  No need for debridement.  I am not convinced that these are venous stasis ulcers, atypical presentation.  More likely due to chronic uncontrolled edema and recurring cellulitis.  -Legs measured for CircAid's, ordered through [facility].   Patient instructed not to attempt to apply these on his own, wait for home health nurse to assist.  Hopefully he will be able to tolerate these and get better control of his edema.  -Lymphedema pump would be helpful to mobilize fluids and proteins.  Representative is supposed to meet with him on 2/12 to see if he qualifies.  If he is able to get pump, recommend 30 to 60 minutes/day.  -Home health to continue with compression wraps until CircAid's are available  -Patient to return to clinic in 1 month for  assessment    2. Cellulitis of lower extremity, unspecified laterality  Comments: Chronic recurring infections of lower extremity    2/10/2021: Most recent culture positive for MSSA and Salmonella.  He was prescribed Bactrim first, unable to tolerate, prescribed Augmentin on 2/9/2021  -Patient to start Augmentin today, take as prescribed  -Pt advised to go to ER for any increased redness, swelling, drainage or odor, or if he develops fever, chills, nausea or vomiting.    3. Venous stasis dermatitis of right lower extremity  Comments: Chronic skin changes to lower extremities.  Thickening and scaling of skin around ankles and toes.  Redness and flaking to remaining skin of legs    2/10/2021:  -Advised patient to purchase urea cream 40%.  Best place to obtain this is off of [employer].  Ideally this would be applied daily, especially to thickened, scaling areas  -Once he has CircAid's, he might be able to apply daily as recommended  -If ineffective, consider AmLactin  -He is to return to clinic in 1 month to assess effectiveness of topical therapy and compression.    4.  Controlled type 2 diabetes mellitus without complication, without long-term current use of insulin  Comments: Patient restarted on Ozempic in January 21.  A1c not available in epic.    2/10/2021: Patient states his blood sugars have been in the low 100s or 90s since restarting Ozempic.  Blood sugar today 95  PCP has referred to endocrinology, patient has not yet scheduled.  -We will request A1c results from PCP  -Patient encouraged to make appointment with endocrinology  -Patient encouraged to keep blood sugars below 120 in order to optimize wound healing    5. Onychomycosis    2/10/2021: All toenails mycotic and overgrown.  Patient is not able, nor should he attempt to trim her own toenails.  He is at risk for self injury, which could progress to amputation.  -Referral to podiatry              PATIENT EDUCATION    - Importance of managing edema for  healing of ulcer, and for treatment and prevention of skin breakdown  -Discussed need for lifelong compression of lower legs   -Elevate legs above the level of the heart periodically throughout the day.  - Importance of adequate nutrition for wound healing      30 min spent face to face with patient, >50% of time spent counseling, coordinating care, reviewing records, discussing POC, educating patient regarding vascular disease, wound healing and progression. This time was spent in excess to procedure time.       Please note that this note may have been created using voice recognition software. I have worked with technical experts from [employer] to optimize the interface.  I have made every reasonable attempt to correct obvious errors, but there may be errors of grammar and possibly     N

## 2021-02-11 NOTE — PROGRESS NOTES
Medication chart review for [facility] services    PCP:  Collin Schwab P.A.-C.  [address]  [place] [address]  Fax: [phone]    Current medication list     Current Outpatient Medications:   •  amoxicillin-clavulanate, 1 tablet, Oral, TID  •  Ozempic (0.25 or 0.5 MG/DOSE), 0.25 mg, Subcutaneous, Q7 DAYS  •  polyethylene glycol 3350, 17 g, Oral, PRN  •  triamcinolone acetonide, 1 Application, Topical, BID (Patient not taking: Reported on 2/10/2021)  •  glucose blood, 1 Each, Other, PRN  •  acetaminophen, 650 mg, Oral, Q6HRS PRN  •  multivitamin, 1 tablet, Oral, DAILY  •  furosemide, 20 mg, Oral, BID  •  FreeStyle Lancets, USE EVERY MORNING TO TEST FASTING BLOOD SUGAR  •  non-formulary med, 2 L/min, Inhalation, Continuous    Allergies   Allergen Reactions   • Bactrim Ds        Medications on discharge summary that are not on their home medication list (or the dosing interval differs from the discharge summary)     No recent hospital discharge summary    Medications on home medication list not listed on their discharge summary     Not applicable    Labs / Images Reviewed:     Lab Results   Component Value Date/Time    SODIUM 141 01/18/2021 10:47 AM    POTASSIUM 3.9 01/18/2021 10:47 AM    CHLORIDE 106 01/18/2021 10:47 AM    CO2 23 01/18/2021 10:47 AM    GLUCOSE 144 (H) 01/18/2021 10:47 AM    BUN 11 01/18/2021 10:47 AM    CREATININE 0.64 01/18/2021 10:47 AM      Lab Results   Component Value Date/Time    ALKPHOSPHAT 67 01/18/2021 10:47 AM    ASTSGOT 15 01/18/2021 10:47 AM    ALTSGPT 7 01/18/2021 10:47 AM    TBILIRUBIN 0.9 01/18/2021 10:47 AM    ALBUMIN 2.9 (L) 01/18/2021 10:47 AM    INR 1.08 12/01/2020 11:55 AM          Potential drug interactions:         Assessment and Plan:   • No clinically significant drug interactions.          Sampson Drake, PharmD, MS, BCACP, LCC  [facility]  Phone [phone] fax [phone]    This note was created using  voice recognition software (Dragon). The accuracy of the dictation is limited by the abilities of the software. I have reviewed the note prior to signing, however some errors in grammar and context are still possible. If you have any questions related to this note please do not hesitate to contact our office.

## 2021-02-12 ENCOUNTER — HOME CARE VISIT (OUTPATIENT)
Dept: HOME HEALTH SERVICES | Facility: HOME HEALTHCARE | Age: 76
End: 2021-02-12
Payer: MEDICARE

## 2021-02-12 PROCEDURE — A6452 HIGH COMPRES BAND W>=3"<5"YD: HCPCS

## 2021-02-12 PROCEDURE — G0299 HHS/HOSPICE OF RN EA 15 MIN: HCPCS

## 2021-02-12 PROCEDURE — A6197 ALGINATE DRSG >16 <=48 SQ IN: HCPCS

## 2021-02-13 VITALS
TEMPERATURE: 98.5 F | RESPIRATION RATE: 18 BRPM | DIASTOLIC BLOOD PRESSURE: 76 MMHG | SYSTOLIC BLOOD PRESSURE: 140 MMHG | OXYGEN SATURATION: 97 % | HEART RATE: 92 BPM

## 2021-02-13 ASSESSMENT — ENCOUNTER SYMPTOMS
NAUSEA: NO
VOMITING: NO
MENTAL STATUS CHANGE: 0

## 2021-02-15 ENCOUNTER — HOME CARE VISIT (OUTPATIENT)
Dept: HOME HEALTH SERVICES | Facility: HOME HEALTHCARE | Age: 76
End: 2021-02-15
Payer: MEDICARE

## 2021-02-15 PROCEDURE — A4450 NON-WATERPROOF TAPE: HCPCS

## 2021-02-15 PROCEDURE — A6197 ALGINATE DRSG >16 <=48 SQ IN: HCPCS

## 2021-02-15 PROCEDURE — A6452 HIGH COMPRES BAND W>=3"<5"YD: HCPCS

## 2021-02-15 PROCEDURE — A6456 ZINC PASTE BAND W >=3"<5"/YD: HCPCS

## 2021-02-15 PROCEDURE — G0299 HHS/HOSPICE OF RN EA 15 MIN: HCPCS

## 2021-02-15 ASSESSMENT — FIBROSIS 4 INDEX: FIB4 SCORE: 1.35

## 2021-02-16 VITALS
WEIGHT: 198 LBS | SYSTOLIC BLOOD PRESSURE: 130 MMHG | BODY MASS INDEX: 31.01 KG/M2 | RESPIRATION RATE: 18 BRPM | HEART RATE: 96 BPM | OXYGEN SATURATION: 97 % | TEMPERATURE: 98 F | DIASTOLIC BLOOD PRESSURE: 64 MMHG

## 2021-02-16 ASSESSMENT — ENCOUNTER SYMPTOMS
VOMITING: NO
MENTAL STATUS CHANGE: 0
NAUSEA: NO

## 2021-02-18 ENCOUNTER — HOME CARE VISIT (OUTPATIENT)
Dept: HOME HEALTH SERVICES | Facility: HOME HEALTHCARE | Age: 76
End: 2021-02-18
Payer: MEDICARE

## 2021-02-18 VITALS
OXYGEN SATURATION: 97 % | DIASTOLIC BLOOD PRESSURE: 64 MMHG | RESPIRATION RATE: 18 BRPM | SYSTOLIC BLOOD PRESSURE: 134 MMHG | HEART RATE: 97 BPM | TEMPERATURE: 98.1 F

## 2021-02-18 PROCEDURE — A6197 ALGINATE DRSG >16 <=48 SQ IN: HCPCS

## 2021-02-18 PROCEDURE — A6207 CONTACT LAYER >16<= 48 SQ IN: HCPCS

## 2021-02-18 PROCEDURE — A6452 HIGH COMPRES BAND W>=3"<5"YD: HCPCS

## 2021-02-18 PROCEDURE — G0299 HHS/HOSPICE OF RN EA 15 MIN: HCPCS

## 2021-02-18 PROCEDURE — A6456 ZINC PASTE BAND W >=3"<5"/YD: HCPCS

## 2021-02-18 ASSESSMENT — ENCOUNTER SYMPTOMS
MENTAL STATUS CHANGE: 0
NAUSEA: NO
VOMITING: NO

## 2021-02-22 ENCOUNTER — TELEPHONE (OUTPATIENT)
Dept: WOUND CARE | Facility: MEDICAL CENTER | Age: 76
End: 2021-02-22

## 2021-02-22 ENCOUNTER — HOME CARE VISIT (OUTPATIENT)
Dept: HOME HEALTH SERVICES | Facility: HOME HEALTHCARE | Age: 76
End: 2021-02-22
Payer: MEDICARE

## 2021-02-22 VITALS
RESPIRATION RATE: 18 BRPM | DIASTOLIC BLOOD PRESSURE: 60 MMHG | BODY MASS INDEX: 31.01 KG/M2 | OXYGEN SATURATION: 90 % | WEIGHT: 198 LBS | HEART RATE: 92 BPM | TEMPERATURE: 97.8 F | SYSTOLIC BLOOD PRESSURE: 130 MMHG

## 2021-02-22 PROCEDURE — A6452 HIGH COMPRES BAND W>=3"<5"YD: HCPCS

## 2021-02-22 PROCEDURE — G0299 HHS/HOSPICE OF RN EA 15 MIN: HCPCS

## 2021-02-22 PROCEDURE — A6197 ALGINATE DRSG >16 <=48 SQ IN: HCPCS

## 2021-02-22 ASSESSMENT — FIBROSIS 4 INDEX: FIB4 SCORE: 1.35

## 2021-02-22 ASSESSMENT — ENCOUNTER SYMPTOMS: POOR JUDGMENT: 1

## 2021-02-23 ENCOUNTER — TELEPHONE (OUTPATIENT)
Dept: VASCULAR LAB | Facility: MEDICAL CENTER | Age: 76
End: 2021-02-23

## 2021-02-23 ASSESSMENT — ENCOUNTER SYMPTOMS: MUSCLE WEAKNESS: 1

## 2021-02-23 NOTE — TELEPHONE ENCOUNTER
Unable to leave message with patient or emergency contact.  Both numbers, VM not set up.    Letter sent to my chart.  Pt cancelled last f/u visit for DM with the pharmacist  Karlene Yang, Clinical Pharmacist, CDE, CACP

## 2021-02-23 NOTE — LETTER
February 23, 2021        [address]  [place] [address]        Dear Enoch Mistry ,    We have been unsuccessful in our attempts to contact you regarding your Diabetes Clinical Pharmacist referral.  Please contact us if you would like to schedule an appointment for help managing your Diabetes.    Please contact our clinic so we may assist you.  We are open Monday-Friday 8 am until 5 pm.  You may reach our Service at [phone].        Sincerely,       Andrey Pedersen PharmD, [facility]  Pharmacy Clinical Supervisor  [facility]  Outpatient Ambulatory Care Service

## 2021-02-25 ENCOUNTER — HOME CARE VISIT (OUTPATIENT)
Dept: HOME HEALTH SERVICES | Facility: HOME HEALTHCARE | Age: 76
End: 2021-02-25
Payer: MEDICARE

## 2021-02-25 PROCEDURE — G0299 HHS/HOSPICE OF RN EA 15 MIN: HCPCS

## 2021-02-25 PROCEDURE — A6197 ALGINATE DRSG >16 <=48 SQ IN: HCPCS

## 2021-02-25 PROCEDURE — A6452 HIGH COMPRES BAND W>=3"<5"YD: HCPCS

## 2021-02-26 VITALS
RESPIRATION RATE: 18 BRPM | SYSTOLIC BLOOD PRESSURE: 138 MMHG | HEART RATE: 85 BPM | DIASTOLIC BLOOD PRESSURE: 64 MMHG | OXYGEN SATURATION: 96 % | TEMPERATURE: 97.5 F

## 2021-02-26 ASSESSMENT — ENCOUNTER SYMPTOMS
POOR JUDGMENT: 1
MUSCLE WEAKNESS: 1

## 2021-03-01 ENCOUNTER — HOME CARE VISIT (OUTPATIENT)
Dept: HOME HEALTH SERVICES | Facility: HOME HEALTHCARE | Age: 76
End: 2021-03-01
Payer: MEDICARE

## 2021-03-01 PROCEDURE — A6452 HIGH COMPRES BAND W>=3"<5"YD: HCPCS

## 2021-03-01 PROCEDURE — G0299 HHS/HOSPICE OF RN EA 15 MIN: HCPCS

## 2021-03-01 PROCEDURE — A6197 ALGINATE DRSG >16 <=48 SQ IN: HCPCS

## 2021-03-02 ENCOUNTER — TELEPHONE (OUTPATIENT)
Dept: VASCULAR LAB | Facility: MEDICAL CENTER | Age: 76
End: 2021-03-02

## 2021-03-02 VITALS
OXYGEN SATURATION: 99 % | HEART RATE: 92 BPM | DIASTOLIC BLOOD PRESSURE: 66 MMHG | SYSTOLIC BLOOD PRESSURE: 140 MMHG | TEMPERATURE: 98.2 F | RESPIRATION RATE: 18 BRPM

## 2021-03-02 ASSESSMENT — ENCOUNTER SYMPTOMS
MENTAL STATUS CHANGE: 0
VOMITING: NO
NAUSEA: NO

## 2021-03-02 NOTE — TELEPHONE ENCOUNTER
Left voicemail message for patient to return call to [facility] to establish care      DM  2nd call  Karlene Yang, Clinical Pharmacist, CDE, CACP

## 2021-03-04 ENCOUNTER — HOSPITAL ENCOUNTER (OUTPATIENT)
Facility: MEDICAL CENTER | Age: 76
End: 2021-03-04
Attending: NURSE PRACTITIONER
Payer: MEDICARE

## 2021-03-04 ENCOUNTER — HOME CARE VISIT (OUTPATIENT)
Dept: HOME HEALTH SERVICES | Facility: HOME HEALTHCARE | Age: 76
End: 2021-03-04
Payer: MEDICARE

## 2021-03-04 VITALS
HEART RATE: 96 BPM | RESPIRATION RATE: 18 BRPM | OXYGEN SATURATION: 93 % | TEMPERATURE: 98.3 F | SYSTOLIC BLOOD PRESSURE: 140 MMHG | DIASTOLIC BLOOD PRESSURE: 66 MMHG

## 2021-03-04 LAB
GRAM STN SPEC: NORMAL
SIGNIFICANT IND 70042: NORMAL
SITE SITE: NORMAL
SOURCE SOURCE: NORMAL

## 2021-03-04 PROCEDURE — 87205 SMEAR GRAM STAIN: CPT

## 2021-03-04 PROCEDURE — G0299 HHS/HOSPICE OF RN EA 15 MIN: HCPCS

## 2021-03-04 PROCEDURE — 87077 CULTURE AEROBIC IDENTIFY: CPT | Mod: 91

## 2021-03-04 PROCEDURE — A6197 ALGINATE DRSG >16 <=48 SQ IN: HCPCS

## 2021-03-04 PROCEDURE — 87070 CULTURE OTHR SPECIMN AEROBIC: CPT

## 2021-03-04 PROCEDURE — 87186 SC STD MICRODIL/AGAR DIL: CPT

## 2021-03-04 ASSESSMENT — ENCOUNTER SYMPTOMS
VOMITING: NO
NAUSEA: NO
MENTAL STATUS CHANGE: 0

## 2021-03-05 DIAGNOSIS — T14.8XXA WOUND INFECTION: ICD-10-CM

## 2021-03-05 DIAGNOSIS — L08.9 WOUND INFECTION: ICD-10-CM

## 2021-03-05 RX ORDER — AMOXICILLIN AND CLAVULANATE POTASSIUM 875; 125 MG/1; MG/1
1 TABLET, FILM COATED ORAL 2 TIMES DAILY
Qty: 14 TABLET | Refills: 0 | Status: SHIPPED
Start: 2021-03-05 | End: 2021-03-05

## 2021-03-05 RX ORDER — DOXYCYCLINE 100 MG/1
100 TABLET ORAL 2 TIMES DAILY
Qty: 14 TABLET | Refills: 0 | Status: SHIPPED | OUTPATIENT
Start: 2021-03-05 | End: 2021-03-12

## 2021-03-08 ENCOUNTER — NON-PROVIDER VISIT (OUTPATIENT)
Dept: MEDICAL GROUP | Facility: MEDICAL CENTER | Age: 76
End: 2021-03-08
Payer: MEDICARE

## 2021-03-08 ENCOUNTER — HOME CARE VISIT (OUTPATIENT)
Dept: HOME HEALTH SERVICES | Facility: HOME HEALTHCARE | Age: 76
End: 2021-03-08
Payer: MEDICARE

## 2021-03-08 VITALS
WEIGHT: 194 LBS | SYSTOLIC BLOOD PRESSURE: 140 MMHG | RESPIRATION RATE: 18 BRPM | BODY MASS INDEX: 29.4 KG/M2 | HEART RATE: 95 BPM | TEMPERATURE: 97.9 F | OXYGEN SATURATION: 97 % | DIASTOLIC BLOOD PRESSURE: 70 MMHG | HEIGHT: 68 IN

## 2021-03-08 DIAGNOSIS — E11.9 TYPE 2 DIABETES MELLITUS WITHOUT COMPLICATION, WITHOUT LONG-TERM CURRENT USE OF INSULIN (HCC): Primary | ICD-10-CM

## 2021-03-08 LAB
HBA1C MFR BLD: 6.1 % (ref 0–5.6)
INT CON NEG: ABNORMAL
INT CON POS: ABNORMAL

## 2021-03-08 PROCEDURE — A6250 SKIN SEAL PROTECT MOISTURIZR: HCPCS

## 2021-03-08 PROCEDURE — A6452 HIGH COMPRES BAND W>=3"<5"YD: HCPCS

## 2021-03-08 PROCEDURE — 665003 FOLLOW UP-HOME HEALTH

## 2021-03-08 PROCEDURE — 83036 HEMOGLOBIN GLYCOSYLATED A1C: CPT | Performed by: INTERNAL MEDICINE

## 2021-03-08 PROCEDURE — A6197 ALGINATE DRSG >16 <=48 SQ IN: HCPCS

## 2021-03-08 PROCEDURE — G0299 HHS/HOSPICE OF RN EA 15 MIN: HCPCS

## 2021-03-08 PROCEDURE — 99401 PREV MED CNSL INDIV APPRX 15: CPT | Performed by: INTERNAL MEDICINE

## 2021-03-08 RX ORDER — SEMAGLUTIDE 1.34 MG/ML
0.25 INJECTION, SOLUTION SUBCUTANEOUS
Qty: 1.5 ML | Refills: 6 | Status: SHIPPED | OUTPATIENT
Start: 2021-03-08 | End: 2022-01-14 | Stop reason: SDUPTHER

## 2021-03-08 ASSESSMENT — ENCOUNTER SYMPTOMS
VOMITING: NO
MUSCLE WEAKNESS: 1
MENTAL STATUS CHANGE: 0
NAUSEA: NO

## 2021-03-08 ASSESSMENT — FIBROSIS 4 INDEX: FIB4 SCORE: 1.35

## 2021-03-08 NOTE — NON-PROVIDER
Patient Consult Note    TIME IN: 1:45pm  TIME OUT: 2:05pm    Primary care physician: Collin Schwab P.A.-C.    Reason for consult: Management of Uncontrolled Type 2 Diabetes    HPI:  Enoch Mistry is a 75 y.o. old patient who comes in today for evaluation of above stated problem.    Most Recent HbA1c:   Lab Results   Component Value Date/Time    HBA1C 6.1 (A) 03/08/2021 02:06 PM      Lab Results   Component Value Date/Time    CREATININE 0.64 01/18/2021 10:47 AM              Diabetes Medication History and Current Regimen  GLP-1 Agent: Ozempic 0.25 mg q 7 days    Pt has home glucometer and proper testing technique - yes    Pt reports blood sugars:   Before Breakfast:     Preventative Management  BP regimen (ACE/ARB) - none  ASA - none  Statin - none - pending lipid panel  Last Retinal Scan - overdue - pt to schedule with ophthalmologist this year  Last Foot Exam - 02/2021  Last A1c -   Lab Results   Component Value Date/Time    HBA1C 6.1 (A) 03/08/2021 02:06 PM      Last Microalbuminuria - pending    updated caregaps    Past Medical History:  Patient Active Problem List    Diagnosis Date Noted   • Cellulitis of lower extremity 04/02/2015   • Type 2 diabetes mellitus without complication, without long-term current use of insulin ([facility]) 06/06/2017   • Hepatic steatosis 09/12/2018   • Stage 2 moderate COPD by GOLD classification ([facility]) 06/21/2017   • Vaccination refused by patient 01/27/2021   • Low hemoglobin and low hematocrit 01/27/2021   • Chronic deep vein thrombosis (DVT) of femoral vein of right lower extremity ([facility]) 01/25/2021   • Former smoker 01/15/2021   • Non-healing wound of right lower extremity 01/15/2021   • Right leg swelling 01/15/2021   • Deep vein thrombosis (DVT) of femoral vein of right lower extremity, unspecified chronicity ([facility]) 01/15/2021   • History of DVT (deep vein thrombosis) 01/15/2021   • PVD (peripheral vascular disease) ([facility]) 08/13/2019   • Obesity (BMI 30-39.9) 10/02/2018    • PUD (peptic ulcer disease) 09/23/2018   • Elevated PSA 09/21/2017   • Benign non-nodular prostatic hyperplasia without lower urinary tract symptoms 06/06/2017   • Chronic hypoxemic respiratory failure (HCC) 01/07/2016   • Adrenal adenoma 01/07/2016   • Pulmonary nodule 04/03/2015   • History of bladder cancer 04/02/2015   • Essential hypertension 04/01/2015       Past Surgical History:  Past Surgical History:   Procedure Laterality Date   • ERCP  11/15/2018    Procedure: ERCP - W/POSS BIOPSY, SPHINCTEROTOMY, STENT PLACEMENT, STENT REMOVAL, STONE EXTRACTION, DILATION;  Surgeon: Harman Michelle M.D.;  Location: [facility];  Service: EUS   • ERCP  9/21/2018    Procedure: ERCP;  Surgeon: Harman Michelle M.D.;  Location: [facility];  Service: EUS   • DEWEY BY LAPAROSCOPY  9/20/2018    Procedure: DEWEY BY LAPAROSCOPY;  Surgeon: Seamus Gaston M.D.;  Location: [facility];  Service: General   • GASTROSCOPY-ENDO  9/19/2018    Procedure: GASTROSCOPY-ENDO;  Surgeon: Darnell Hernández D.O.;  Location: [facility];  Service: Gastroenterology   • CYSTOSCOPY N/A 9/11/2017    Procedure: CYSTOSCOPY- CLOT EVACUATION, FULGURATION, OF PROSTATE;  Surgeon: Srini Giles M.D.;  Location: [facility];  Service: Urology   • CYSTOSCOPY  5/7/2015    Procedure: CYSTOSCOPY [57.32] ;  Surgeon: Srini Giles M.D.;  Location: [facility];  Service:    • TRANS URETHRAL RESECTION PROSTATE  5/7/2015    Procedure: TRANS URETHRAL RESECTION PROSTATE [60.20];  Surgeon: Srini Giles M.D.;  Location: [facility];  Service:    • TRANS URETHRAL RESECTION BLADDER  5/7/2015    Procedure: TRANS URETHRAL RESECTION BLADDER [57.49A];  Surgeon: Srini Giles M.D.;  Location: [facility];  Service:        Allergies:  Bactrim ds    Social History:  Social History     Socioeconomic History   • Marital status:      Spouse name: Not on file    • Number of children: Not on file   • Years of education: Not on file   • Highest education level: Not on file   Occupational History   • Not on file   Tobacco Use   • Smoking status: Former Smoker     Packs/day: 2.00     Years: 50.00     Pack years: 100.00     Types: Cigarettes     Quit date: 1/1/2006     Years since quitting: 15.1   • Smokeless tobacco: Never Used   Substance and Sexual Activity   • Alcohol use: Not Currently     Alcohol/week: 0.0 oz     Comment: Nothing for one year (8/13/19)   • Drug use: No   • Sexual activity: Not Currently     Partners: Female     Comment: , no children   Other Topics Concern   • Not on file   Social History Narrative   • Not on file     Social Determinants of Health     Financial Resource Strain:    • Difficulty of Paying Living Expenses:    Food Insecurity:    • Worried About Running Out of Food in the Last Year:    • Ran Out of Food in the Last Year:    Transportation Needs:    • Lack of Transportation (Medical):    • Lack of Transportation (Non-Medical):    Physical Activity:    • Days of Exercise per Week:    • Minutes of Exercise per Session:    Stress:    • Feeling of Stress :    Social Connections:    • Frequency of Communication with Friends and Family:    • Frequency of Social Gatherings with Friends and Family:    • Attends [employer] Services:    • Active Member of Clubs or Organizations:    • Attends Club or Organization Meetings:    • Marital Status:    Intimate Partner Violence:    • Fear of Current or Ex-Partner:    • Emotionally Abused:    • Physically Abused:    • Sexually Abused:        Family History:  No family history on file.    Medications:    Current Outpatient Medications:   •  Semaglutide,0.25 or 0.5MG/DOS, (OZEMPIC, 0.25 OR 0.5 MG/DOSE,) 2 MG/1.5ML Solution Pen-injector, Inject 0.25 mL under the skin every 7 days., Disp: 1.5 mL, Rfl: 6  •  doxycycline monohydrate (ADOXA) 100 MG tablet, Take 1 tablet by mouth 2 times a day for 7 days., Disp:  14 tablet, Rfl: 0  •  glucose blood strip, 1 Strip by Other route as needed (for taking a blood glucose reading)., Disp: 100 Strip, Rfl: 5  •  polyethylene glycol 3350 (MIRALAX) 17 GM/SCOOP Powder, Take 17 g by mouth as needed (constipation)., Disp: , Rfl:   •  triamcinolone acetonide (KENALOG) 0.1 % Ointment, Apply 1 Application topically 2 times a day. As needed for itching. (Patient not taking: Reported on 2/10/2021), Disp: 60 g, Rfl: 2  •  acetaminophen (TYLENOL) 325 MG Tab, Take 2 Tabs by mouth every 6 hours as needed (Mild Pain; (Pain scale 1-3); Temp greater than 100.5 F)., Disp: 30 Tab, Rfl: 0  •  multivitamin (THERAGRAN) Tab, Take 1 Tab by mouth every day., Disp: , Rfl:   •  furosemide (LASIX) 20 MG Tab, Take 1 Tab by mouth 2 times a day., Disp: 60 Tab, Rfl: 3  •  FREESTYLE LANCETS Misc, USE EVERY MORNING TO TEST FASTING BLOOD SUGAR, Disp: 100 Each, Rfl: 3  •  non-formulary med, Inhale 2 L/min continuous. Oxygen dose range: 2 L/min Respiratory route via: Nasal Cannula  Oxygen supplier: Preferred    Indications: Hypoxia, Disp: , Rfl:     Labs: Reviewed    Assessment and Plan:    1. DM2  Pt has been tolerating Ozempic 0.25 mg q 7 days. He lost 10-12 lbs and denies any GI issues. His a1c has improved from 8.6% to 6.1% today in clinic and is now below goal. His FBG has been at goal after restarting Ozempic.    Discussed increasing Ozempic dose but pt prefers to stay on 0.25 mg q 7 days for now.    - Medication changes -   Continue Ozempic 0.25 mg q 7 days     - Lifestyle changes -   Continue limiting carb intake  Exercise is limited d/t current SSTI on LE    Return in about 3 months (around 6/8/2021).    Sylwia Marino, PharmD  03/08/21    CC:   Collin Schwab P.A.-C.

## 2021-03-10 ENCOUNTER — OFFICE VISIT (OUTPATIENT)
Dept: WOUND CARE | Facility: MEDICAL CENTER | Age: 76
End: 2021-03-10
Attending: PHYSICIAN ASSISTANT
Payer: MEDICARE

## 2021-03-10 VITALS
TEMPERATURE: 98.2 F | HEART RATE: 93 BPM | OXYGEN SATURATION: 94 % | DIASTOLIC BLOOD PRESSURE: 82 MMHG | SYSTOLIC BLOOD PRESSURE: 159 MMHG | RESPIRATION RATE: 20 BRPM

## 2021-03-10 DIAGNOSIS — T14.8XXA WOUND INFECTION: ICD-10-CM

## 2021-03-10 DIAGNOSIS — B36.9 FUNGAL DERMATITIS: ICD-10-CM

## 2021-03-10 DIAGNOSIS — L08.9 WOUND INFECTION: ICD-10-CM

## 2021-03-10 DIAGNOSIS — R23.8 DENUDED SKIN: ICD-10-CM

## 2021-03-10 DIAGNOSIS — R60.0 LOWER EXTREMITY EDEMA: ICD-10-CM

## 2021-03-10 PROCEDURE — 99213 OFFICE O/P EST LOW 20 MIN: CPT | Performed by: NURSE PRACTITIONER

## 2021-03-10 PROCEDURE — 29581 APPL MULTLAYER CMPRN SYS LEG: CPT

## 2021-03-10 PROCEDURE — 99213 OFFICE O/P EST LOW 20 MIN: CPT | Mod: 25

## 2021-03-10 RX ORDER — AMOXICILLIN 875 MG/1
875 TABLET, COATED ORAL 2 TIMES DAILY
Qty: 14 TABLET | Refills: 0 | Status: SHIPPED | OUTPATIENT
Start: 2021-03-10 | End: 2021-03-17

## 2021-03-10 RX ORDER — NYSTATIN 100000 [USP'U]/G
POWDER TOPICAL
Qty: 60 G | Refills: 4 | Status: SHIPPED | OUTPATIENT
Start: 2021-03-10 | End: 2021-03-31

## 2021-03-10 ASSESSMENT — ENCOUNTER SYMPTOMS
DIARRHEA: 0
VOMITING: 0
COUGH: 0
FEVER: 0
INSOMNIA: 0
CLAUDICATION: 0
DEPRESSION: 0
CONSTIPATION: 0
SHORTNESS OF BREATH: 0
NAUSEA: 0
CHILLS: 0
BACK PAIN: 1

## 2021-03-10 ASSESSMENT — PAIN SCALES - GENERAL: PAINLEVEL: 2=MINIMAL-SLIGHT

## 2021-03-10 NOTE — PATIENT INSTRUCTIONS
-Keep dressings clean and dry. Change dressings if the dressings become saturated, soiled, or fall off.    -Avoid prolonged standing or sitting without elevating your legs.    -Remove your compression garments if you have severe pain, severe swelling, numbness, color change, or temperature change in your toes. If you need to remove your compression garments, do so by unrolling them. Do not cut the compression garments off, this is to prevent cutting yourself on accident.    -Never walk around the house barefoot. Always wear a rubber soled slipper when walking around the house.    -Should you experience any significant changes in your wounds, such as signs of infection (redness, swelling, localized heat, increased pain, fever > 101 F, chills) or have any questions regarding your home care instructions, please contact the wound center at [phone]. If after hours, contact your primary care physician or go to the hospital emergency room.

## 2021-03-10 NOTE — PROGRESS NOTES
" Provider Encounter- Lower Extremity Ulcer      HISTORY OF PRESENT ILLNESS  Wound History:    START OF CARE IN CLINIC: 2/10/2021    REFERRING PROVIDER: Collin Schwab P.A.-C.     WOUND ETIOLOGY: Venous   LOCATION: Right lower extremity   HISTORY: Patient is referred to [facility] for evaluation and treatment of right lower extremity wounds with cellulitis and edema.  States he started having swelling in both legs, but more so to the right leg, around 2015.  He had a, \"clot\" in this leg around the same time.  Since then he has had problems with weeping from right leg, with recurring shallow wounds.  His current wounds have been present since about December 2020.  [facility] has been seeing him, applying compression, however he is only able to tolerate for a few days before he removes it.  He has been on several courses of antibiotics for cellulitis, most recently Augmentin prescribed on 2/9/2021.  His wounds and edema has shown very little progress, and therefore he was referred to [facility] for further evaluation.   Venous and arterial studies were done in January 2021.  ABIs slightly elevated, waveforms triphasic at the ankles.  Venous duplex shows chronic partial thrombus in the distal femoral and popliteal veins, no acute DVT, no obvious reflux in right lower extremity.      Pertinent Medical History: PVD, obesity, DVT right lower extremity, cellulitis of lower extremity, chronic right lower extremity swelling      DIABETES HX: Diagnosed with type 2 diabetes around 2015.  Was taken off of Ozempic because his blood sugars were, \"too good\".  Restarted on Ozempic in January 2021. Checks blood sugars at least daily and reports that these typically run around .  Has  had previous diabetes education, his PCP has referred him to endocrinology.  Does  have numbness in feet.  Usually wears R, nonprescriptive shoes. Does not check his feet routinely.  Has not had previous foot ulcers or foot surgery.  Current occupation " tired.      TOBACCO USE: Former smoker, quit in 2006 after smoking 2 packs/day x 50 years.    Patient's problem list, allergies, and current medications reviewed and updated in Epic    Interval History:  2/10/2021 : Initial clinic visit with QUINN Campos, ASHLIE, CWMIGUELN, [facility].  Patient presents today accompanied by his wife.  He is very frustrated with lack of healing of his wounds, and the chronic swelling of his legs.  States he is only able to tolerate compression wraps for approximately 3 days before he has to remove them.  When he does not have compression, he experiences weeping from his right lower extremity.  He has had these problems off and on for the past 5 years, most recently since December 2020.  Lymphedema pump has been recommended, representative is scheduled to evaluate him on 2/12.  He was prescribed Augmentin yesterday for positive wound culture, MSSA and Salmonella.  He had been prescribed Bactrim prior to that, but was unable to tolerate.  He reports his blood sugar this morning was 95    He also has mycotic, overgrown toenails.  At risk for self injury.  He is requesting referral to podiatry    3/10/2021: Clinic visit with QUINN Pennington. Patient states that they are feeling well today.  Patient denies fever, chills, nausea, vomiting, lightheadedness, dizziness, shortness of breath and chest pain.  Amoxicillin ordered in clinic today to cover the patient for Salmonella.  Nystatin powder ordered to be mixed with zinc cream and applied to patient's right lower extremity.        REVIEW OF SYSTEMS:   Review of Systems   Constitutional: Negative for chills and fever.   HENT: Positive for hearing loss.    Respiratory: Negative for cough and shortness of breath.         He is on supplemental oxygen, but states he is able to perform ADLs and household chores without SOP.   Cardiovascular: Positive for leg swelling. Negative for chest pain and claudication.        Swelling and both legs  since about 2015, right leg is always been significantly worse than the left.   Gastrointestinal: Negative for constipation, diarrhea, nausea and vomiting.   Genitourinary: Negative for dysuria.   Musculoskeletal: Positive for back pain and joint pain.   Skin:        Redness and flaking of skin to both lower legs for several years, sometimes itches and burns.   Psychiatric/Behavioral: Negative for depression. The patient does not have insomnia.          PHYSICAL EXAMINATION:   /82   Pulse 93   Temp 36.8 °C (98.2 °F)   Resp 20   SpO2 94%     Physical Exam  Constitutional:       Appearance: Normal appearance. He is obese.   HENT:      Head: Normocephalic.   Eyes:      Pupils: Pupils are equal, round, and reactive to light.   Cardiovascular:      Pulses: Normal pulses.   Pulmonary:      Effort: Pulmonary effort is normal.      Comments: On supplemental O2  Musculoskeletal:      Right lower leg: Edema present.      Left lower leg: Edema present.      Comments: 1+ edema of right lower extremity  1+ edema of left lower extremity  Uses walker for ambulation   Skin:     General: Skin is warm.      Findings: Erythema present.      Comments: Chronic skin changes to bilateral lower extremities-hemosiderin staining, edema, scarring, lipodermatosclerosis.  Findings consistent with CVI    Right lower extremity with erythema from just below knee to foot, areas of denudation  Toenails mycotic and overgrown   Neurological:      Mental Status: He is alert.         WOUND ASSESSMENT           Wound 02/10/21 Partial Thickness Wound Right Lateral/Anterior/Medial LE Denuded Skin (Active)   Wound Image     03/10/21 0845   Site Assessment Pink;Other (Comment) 03/10/21 0845   Periwound Assessment Denuded;Edema;Other (Comment) 03/10/21 0845   Margins Attached edges 03/10/21 0845   Drainage Amount Moderate 03/10/21 0845   Drainage Description Serosanguineous 03/10/21 0845   Treatments Cleansed 03/10/21 0845   Wound Cleansing Foam  Cleanser/Washcloth 03/10/21 0845   Periwound Protectant Antifungal Therapy;Barrier Paste 03/10/21 0845   Dressing Cleansing/Solutions Not Applicable 03/10/21 0845   Dressing Options Absorbent Abdominal Pad;Soft Conforming Roll;Coban 03/10/21 0845   Dressing Changed New 03/10/21 0845   Dressing Change/Treatment Frequency Every 48 hrs, and As Needed 03/10/21 0845   Non-staged Wound Description Partial thickness 03/10/21 0845   Wound Length (cm) 1.7 cm 02/10/21 1100   Wound Width (cm) 1.5 cm 02/10/21 1100   Wound Depth (cm) 0 cm 02/10/21 1100   Wound Surface Area (cm^2) 2.55 cm^2 02/10/21 1100   Wound Volume (cm^3) 0 cm^3 02/10/21 1100   Tunneling (cm) 0 cm 03/10/21 0845   Undermining (cm) 0 cm 03/10/21 0845   Wound Odor None 03/10/21 0845   Pulses Right;2+;DP;PT;Doppler 02/10/21 1100   Exposed Structures None 03/10/21 0845               PROCEDURE:   - no need for debridement.  All wounds are shallow, diffuse borders.  -Wound care completed by wound RN, refer to wound flowsheet   -Patient tolerated the procedure well, without c/o of significant pain or discomfort.       Pertinent Labs and Diagnostics:    Labs:     IMAGING: N/A    VASCULAR STUDIES:   1/22/2021-venous ultrasound  RIGHT:   Chronic partial thrombus observed in the distal femoral & popliteal veins.     No acute dvt.   No deep vein reflux greater than 1 sec.   No greater saphenous vein reflux greater than 0.5 seconds.   Small saphenous vein reflux for maximum 1350 msec.   No obvious reflux in the varicose veins in the calf or a  at the    ankle.   LEFT:   All veins are fully compressible, no superficial or deep vein thrombus.   Deep vein reflux in the common femoral vein for 2150 msec with Valsalva    maneuver.   No reflux in the greater saphenous vein greater than 0.5 seconds.   No small saphenous vein reflux.   Reflux in a varicose vein at the ankle for 1350 msec.    1/22/2021-US-JAY  Findings   Doppler waveforms of the common femoral arteries  are high amplitude and    triphasic.    Doppler waveforms of the popliteal arteries are high amplitude and    triphasic.    Doppler waveforms at the ankle are brisk and triphasic.    Ankle-brachial index is normal.    There is no evidence of major arterial disease demonstrated bilaterally.     LAST  WOUND CULTURE:  DATE : 1/28/2021         Culture Result Abnormal   Staphylococcus aureus   Heavy growth   This isolate is presumed to be clindamycin resistant based on   detection of inducible resistance.  Clindamycin may still   be effective in some patients.     Culture Result Abnormal   Salmonella species   Light growth         ASSESSMENT AND PLAN:   1. Leg ulcer, right, limited to breakdown of skin (HCC)  Comments: History of DVT in this extremity, since then problems with chronic edema, weeping of skin, cellulitis, shallow wounds.  Unable to tolerate compression for more than a few days.  Arterial studies show no major arterial disease.  Venous studies show no obvious reflux right lower extremity.    03/10/21  Patient's wounds are very superficial and diffuse.  No need for debridement.  I am not convinced that these are venous stasis ulcers, atypical presentation.  More likely due to chronic uncontrolled edema and recurring cellulitis.  -CircAid applied to left lower extremity.  -Reviewed with patient on next clinic visit if he has received his lymphedema pump was supposed to meet with a representative around 2/2/2021.  -Home health to continue with compression wrap to right lower extremity and continue with CircAid to left lower extremity  -Patient to return to clinic in 1 month for assessment    2. Cellulitis of lower extremity, unspecified laterality  Comments: Chronic recurring infections of lower extremity    3/10/2021: Most recent culture positive for MSSA and Salmonella.  He was prescribed Bactrim first, unable to tolerate, prescribed Augmentin on 2/9/2021.  Patient's right lower extremity was again cultured  on 3/4/2021 he was positive for MSSA and Salmonella again.  -Patient was empirically prescribed doxycycline for MSSA on previous culture.  Amoxicillin was added today in clinic for Salmonella.  -Pt advised to go to ER for any increased redness, swelling, drainage or odor, or if he develops fever, chills, nausea or vomiting.    3. Venous stasis dermatitis of right lower extremity  Comments: Chronic skin changes to lower extremities.  Thickening and scaling of skin around ankles and toes.  Redness and flaking to remaining skin of legs    3/10/2021:  -Nystatin powder with zinc cream to be placed to right lower extremity.  Urea cream and AmLactin are also viable choices for continued thickening and scaling if it proceeds following resolution of fungal component.  -He is to return to clinic in 1 month to assess effectiveness of topical therapy and compression.    4.  Controlled type 2 diabetes mellitus without complication, without long-term current use of insulin  Comments: Patient restarted on Ozempic in January 21.  A1c not available in epic.    3/10/2021: Patient states his blood sugars have been in the low 100s or 90s since restarting Ozempic.  Blood sugar today 97    Patient instructed to keep tight control over FBS, <140 for optimal wound healing; Implications of loss of protective sensation (LOPS) discussed with patient, including increased risk for amputation.  Advised to check feet at least daily, moisturize feet, and to always wear protective foot wear, arrange meticulous regular foot care by podiatrist or [facility]. Pt with good understanding.       5. Onychomycosis    3/10/2021: All toenails mycotic and overgrown.  Patient is not able, nor should he attempt to trim his own toenails.  He is at risk for self injury, which could progress to amputation.  -Patient has previously been referred to podiatry              PATIENT EDUCATION    - Importance of managing edema for healing of ulcer, and for treatment and  prevention of skin breakdown  -Discussed need for lifelong compression of lower legs   -Elevate legs above the level of the heart periodically throughout the day.  - Importance of adequate nutrition for wound healing    > 20 min spent face to face with patient,  time spent counseling, coordinating care, reviewing records, discussing POC, educating patient        Please note that this note may have been created using voice recognition software. I have worked with technical experts from [employer] to optimize the interface.  I have made every reasonable attempt to correct obvious errors, but there may be errors of grammar and possibly     N

## 2021-03-12 ENCOUNTER — HOME CARE VISIT (OUTPATIENT)
Dept: HOME HEALTH SERVICES | Facility: HOME HEALTHCARE | Age: 76
End: 2021-03-12
Payer: MEDICARE

## 2021-03-12 PROCEDURE — G0299 HHS/HOSPICE OF RN EA 15 MIN: HCPCS

## 2021-03-14 VITALS
OXYGEN SATURATION: 98 % | TEMPERATURE: 98 F | DIASTOLIC BLOOD PRESSURE: 54 MMHG | RESPIRATION RATE: 18 BRPM | HEART RATE: 90 BPM | SYSTOLIC BLOOD PRESSURE: 130 MMHG

## 2021-03-14 ASSESSMENT — ENCOUNTER SYMPTOMS
MENTAL STATUS CHANGE: 0
MUSCLE WEAKNESS: 1

## 2021-03-15 ENCOUNTER — HOME CARE VISIT (OUTPATIENT)
Dept: HOME HEALTH SERVICES | Facility: HOME HEALTHCARE | Age: 76
End: 2021-03-15
Payer: MEDICARE

## 2021-03-15 PROCEDURE — A6455 SELF-ADHER BAND >=5"/YD: HCPCS

## 2021-03-15 PROCEDURE — G0299 HHS/HOSPICE OF RN EA 15 MIN: HCPCS

## 2021-03-15 PROCEDURE — A4554 DISPOSABLE UNDERPADS: HCPCS

## 2021-03-15 PROCEDURE — A6403 STERILE GAUZE>16 <= 48 SQ IN: HCPCS

## 2021-03-15 NOTE — Clinical Note
"FYI  Pt showered with dressings off and urea cream applied. LLE remains dry. Pt has \"bulge\" medial calf, possibly from Circ-Aid being on so long. Instructed/demonstrated correct way to apply Circ-Aid. Comfortable. Instructed to take off at night and reapply in am. To apply urea cream twice daily then. RLE with some previously dry skin now wet and peeling off. Still weeping and very dry flaking areas. Having some stinging and burning over leg. Pt does not feel there was as much exudate this time. Photos taken. New dressings applied. Web roll not available yet. Wrapped with kerlix and Coban. Emily't at [facility] this week.  "

## 2021-03-16 VITALS
TEMPERATURE: 98.5 F | OXYGEN SATURATION: 95 % | RESPIRATION RATE: 18 BRPM | DIASTOLIC BLOOD PRESSURE: 70 MMHG | SYSTOLIC BLOOD PRESSURE: 118 MMHG | HEART RATE: 92 BPM

## 2021-03-16 ASSESSMENT — ENCOUNTER SYMPTOMS
VOMITING: NO
MENTAL STATUS CHANGE: 0
NAUSEA: NO

## 2021-03-17 ENCOUNTER — OFFICE VISIT (OUTPATIENT)
Dept: WOUND CARE | Facility: MEDICAL CENTER | Age: 76
End: 2021-03-17
Attending: PHYSICIAN ASSISTANT
Payer: MEDICARE

## 2021-03-17 VITALS
OXYGEN SATURATION: 94 % | RESPIRATION RATE: 20 BRPM | TEMPERATURE: 98.6 F | SYSTOLIC BLOOD PRESSURE: 152 MMHG | HEART RATE: 95 BPM | DIASTOLIC BLOOD PRESSURE: 73 MMHG

## 2021-03-17 DIAGNOSIS — L03.119 CELLULITIS OF LOWER EXTREMITY, UNSPECIFIED LATERALITY: ICD-10-CM

## 2021-03-17 DIAGNOSIS — E11.9 CONTROLLED TYPE 2 DIABETES MELLITUS WITHOUT COMPLICATION, WITHOUT LONG-TERM CURRENT USE OF INSULIN (HCC): ICD-10-CM

## 2021-03-17 DIAGNOSIS — B35.1 ONYCHOMYCOSIS: ICD-10-CM

## 2021-03-17 DIAGNOSIS — I87.2 VENOUS STASIS DERMATITIS OF RIGHT LOWER EXTREMITY: ICD-10-CM

## 2021-03-17 DIAGNOSIS — L97.911 LEG ULCER, RIGHT, LIMITED TO BREAKDOWN OF SKIN (HCC): Primary | ICD-10-CM

## 2021-03-17 PROCEDURE — 99214 OFFICE O/P EST MOD 30 MIN: CPT | Mod: 25

## 2021-03-17 PROCEDURE — 29581 APPL MULTLAYER CMPRN SYS LEG: CPT

## 2021-03-17 PROCEDURE — 99213 OFFICE O/P EST LOW 20 MIN: CPT | Performed by: NURSE PRACTITIONER

## 2021-03-17 ASSESSMENT — ENCOUNTER SYMPTOMS
DEPRESSION: 0
NAUSEA: 0
FEVER: 0
COUGH: 0
CLAUDICATION: 0
DIARRHEA: 0
INSOMNIA: 0
BACK PAIN: 1
SHORTNESS OF BREATH: 0
VOMITING: 0
CHILLS: 0
CONSTIPATION: 0

## 2021-03-17 ASSESSMENT — PAIN SCALES - GENERAL: PAINLEVEL: 1=MINIMAL PAIN

## 2021-03-17 NOTE — PROGRESS NOTES
"noted  ----- Message -----  From: Valeria Berger R.N.  Sent: 3/16/2021  11:47 AM PDT  To: HERBIE Quinn  Pt showered with dressings off and urea cream applied. LLE remains dry. Pt has \"bulge\" medial calf, possibly from Circ-Aid being on so long. Instructed/demonstrated correct way to apply Circ-Aid. Comfortable. Instructed to take off at night and reapply in am. To apply urea cream twice daily then. RLE with some previously dry skin now wet and peeling off. Still weeping and very dry flaking areas. Having some stinging and burning over leg. Pt does not feel there was as much exudate this time. Photos taken. New dressings applied. Web roll not available yet. Wrapped with kerlix and Coban. Emily't at [facility] this week.  "

## 2021-03-17 NOTE — PATIENT INSTRUCTIONS
-Keep your wound dressing clean, dry, and intact.    -Change your dressing if it becomes soiled, soaked, or falls off.    -Remove your compression wrap if you have severe pain, severe swelling, numbness, color change, or temperature change in your toes. If you need to remove your compression wrap, do so by unrolling it. Do not cut the compression wrap off to prevent cutting yourself on accident.    -Should you experience any significant changes in your wound(s), such as infection (redness, swelling, localized heat, increased pain, fever > 101 F, chills) or have any questions regarding your home care instructions, please contact the wound center at [phone]. If after hours, contact your primary care physician or go to the hospital emergency room.

## 2021-03-17 NOTE — PROGRESS NOTES
" Provider Encounter- Lower Extremity Ulcer      HISTORY OF PRESENT ILLNESS  Wound History:    START OF CARE IN CLINIC: 2/10/2021    REFERRING PROVIDER: Collin Schwab P.A.-C.     WOUND ETIOLOGY: Venous   LOCATION: Right lower extremity   HISTORY: Patient is referred to [facility] for evaluation and treatment of right lower extremity wounds with cellulitis and edema.  States he started having swelling in both legs, but more so to the right leg, around 2015.  He had a, \"clot\" in this leg around the same time.  Since then he has had problems with weeping from right leg, with recurring shallow wounds.  His current wounds have been present since about December 2020.  [facility] has been seeing him, applying compression, however he is only able to tolerate for a few days before he removes it.  He has been on several courses of antibiotics for cellulitis, most recently Augmentin prescribed on 2/9/2021.  His wounds and edema has shown very little progress, and therefore he was referred to [facility] for further evaluation.   Venous and arterial studies were done in January 2021.  ABIs slightly elevated, waveforms triphasic at the ankles.  Venous duplex shows chronic partial thrombus in the distal femoral and popliteal veins, no acute DVT, no obvious reflux in right lower extremity.      Pertinent Medical History: PVD, obesity, DVT right lower extremity, cellulitis of lower extremity, chronic right lower extremity swelling      DIABETES HX: Diagnosed with type 2 diabetes around 2015.  Was taken off of Ozempic because his blood sugars were, \"too good\".  Restarted on Ozempic in January 2021. Checks blood sugars at least daily and reports that these typically run around .  Has  had previous diabetes education, his PCP has referred him to endocrinology.  Does  have numbness in feet.  Usually wears R, nonprescriptive shoes. Does not check his feet routinely.  Has not had previous foot ulcers or foot surgery.  Current occupation " tired.      TOBACCO USE: Former smoker, quit in 2006 after smoking 2 packs/day x 50 years.    Patient's problem list, allergies, and current medications reviewed and updated in Epic    Interval History:  2/10/2021 : Initial clinic visit with QUINN Campos, ASHLIE, MAUREENN, CFJOSE.  Patient presents today accompanied by his wife.  He is very frustrated with lack of healing of his wounds, and the chronic swelling of his legs.  States he is only able to tolerate compression wraps for approximately 3 days before he has to remove them.  When he does not have compression, he experiences weeping from his right lower extremity.  He has had these problems off and on for the past 5 years, most recently since December 2020.  Lymphedema pump has been recommended, representative is scheduled to evaluate him on 2/12.  He was prescribed Augmentin yesterday for positive wound culture, MSSA and Salmonella.  He had been prescribed Bactrim prior to that, but was unable to tolerate.  He reports his blood sugar this morning was 95    He also has mycotic, overgrown toenails.  At risk for self injury.  He is requesting referral to podiatry    3/10/2021: Clinic visit with QUINN Pennington. Patient states that they are feeling well today.  Patient denies fever, chills, nausea, vomiting, lightheadedness, dizziness, shortness of breath and chest pain.  Amoxicillin ordered in clinic today to cover the patient for Salmonella.  Nystatin powder ordered to be mixed with zinc cream and applied to patient's right lower extremity.    3/17/2021: Clinic visit with QUINN Pennington. Patient states that they are feeling well today.  Patient denies fever, chills, nausea, vomiting, lightheadedness, dizziness, shortness of breath and chest pain.  Patient has completed his prescription of amoxicillin for Salmonella.  I am getting placed the patient in a 4 layer compression wrap Home health to change once a week patient has been sent home with an  extra 4 layer wrap.  Continue nystatin powder mixed with zinc cream to right lower extremity.        REVIEW OF SYSTEMS:   Review of Systems   Constitutional: Negative for chills and fever.   HENT: Positive for hearing loss.    Respiratory: Negative for cough and shortness of breath.         He is on supplemental oxygen, but states he is able to perform ADLs and household chores without SOP.   Cardiovascular: Positive for leg swelling. Negative for chest pain and claudication.        Swelling and both legs since about 2015, right leg is always been significantly worse than the left.   Gastrointestinal: Negative for constipation, diarrhea, nausea and vomiting.   Genitourinary: Negative for dysuria.   Musculoskeletal: Positive for back pain and joint pain.   Skin:        Redness and flaking of skin to both lower legs for several years, sometimes itches and burns.   Psychiatric/Behavioral: Negative for depression. The patient does not have insomnia.          PHYSICAL EXAMINATION:   /73   Pulse 95   Temp 37 °C (98.6 °F)   Resp 20   SpO2 94%     Physical Exam  Constitutional:       Appearance: Normal appearance. He is obese.   HENT:      Head: Normocephalic.   Eyes:      Pupils: Pupils are equal, round, and reactive to light.   Cardiovascular:      Pulses: Normal pulses.   Pulmonary:      Effort: Pulmonary effort is normal.      Comments: On supplemental O2  Musculoskeletal:      Right lower leg: Edema present.      Left lower leg: Edema present.      Comments: 1+ edema of right lower extremity  1+ edema of left lower extremity  Uses walker for ambulation   Skin:     General: Skin is warm.      Findings: Erythema present.      Comments: Chronic skin changes to bilateral lower extremities-hemosiderin staining, edema, scarring, lipodermatosclerosis.  Findings consistent with CVI    Right lower extremity with erythema from just below knee to foot, areas of denudation  Toenails mycotic and overgrown   Neurological:       Mental Status: He is alert.         WOUND ASSESSMENT       Wound 02/10/21 Partial Thickness Wound Right Lateral/Anterior/Medial LE Denuded Skin (Active)   Wound Image     03/17/21 0900   Site Assessment Pink;Red;Other (Comment) 03/17/21 0900   Periwound Assessment Dry;Denuded;Edema 03/17/21 0900   Margins Attached edges 03/17/21 0900   Drainage Amount Small 03/17/21 0900   Drainage Description Serosanguineous 03/17/21 0900   Treatments Cleansed;Site care 03/17/21 0900   Wound Cleansing Foam Cleanser/Washcloth 03/17/21 0900   Periwound Protectant Antifungal Therapy;Barrier Paste 03/17/21 0900   Dressing Cleansing/Solutions Not Applicable 03/17/21 0900   Dressing Options Compression Wrap Four Layer 03/17/21 0900   Dressing Changed New 03/17/21 0900   Dressing Change/Treatment Frequency Every 48 hrs, and As Needed 03/17/21 0900   Non-staged Wound Description Partial thickness 03/17/21 0900   Wound Length (cm) 1.7 cm 02/10/21 1100   Wound Width (cm) 1.5 cm 02/10/21 1100   Wound Depth (cm) 0 cm 02/10/21 1100   Wound Surface Area (cm^2) 2.55 cm^2 02/10/21 1100   Wound Volume (cm^3) 0 cm^3 02/10/21 1100   Tunneling (cm) 0 cm 03/17/21 0900   Undermining (cm) 0 cm 03/17/21 0900   Wound Odor None 03/17/21 0900   Pulses Right;2+;DP;PT;Doppler 02/10/21 1100   Exposed Structures None 03/17/21 0900       PROCEDURE:  -Patient assessed in clinic today  - no need for debridement.   -Wound care completed by wound RN, refer to wound flowsheet   -Patient tolerated the procedure well, without c/o of significant pain or discomfort.       Pertinent Labs and Diagnostics:    Labs:     IMAGING: N/A    VASCULAR STUDIES:   1/22/2021-venous ultrasound  RIGHT:   Chronic partial thrombus observed in the distal femoral & popliteal veins.     No acute dvt.   No deep vein reflux greater than 1 sec.   No greater saphenous vein reflux greater than 0.5 seconds.   Small saphenous vein reflux for maximum 1350 msec.   No obvious reflux in the  varicose veins in the calf or a  at the    ankle.   LEFT:   All veins are fully compressible, no superficial or deep vein thrombus.   Deep vein reflux in the common femoral vein for 2150 msec with Valsalva    maneuver.   No reflux in the greater saphenous vein greater than 0.5 seconds.   No small saphenous vein reflux.   Reflux in a varicose vein at the ankle for 1350 msec.    1/22/2021-[facility]  Findings   Doppler waveforms of the common femoral arteries are high amplitude and    triphasic.    Doppler waveforms of the popliteal arteries are high amplitude and    triphasic.    Doppler waveforms at the ankle are brisk and triphasic.    Ankle-brachial index is normal.    There is no evidence of major arterial disease demonstrated bilaterally.     LAST  WOUND CULTURE:  DATE : 1/28/2021         Culture Result Abnormal   Staphylococcus aureus   Heavy growth   This isolate is presumed to be clindamycin resistant based on   detection of inducible resistance.  Clindamycin may still   be effective in some patients.     Culture Result Abnormal   Salmonella species   Light growth         ASSESSMENT AND PLAN:   1. Leg ulcer, right, limited to breakdown of skin (HCC)  Comments: History of DVT in this extremity, since then problems with chronic edema, weeping of skin, cellulitis, shallow wounds.  Unable to tolerate compression for more than a few days.  Arterial studies show no major arterial disease.  Venous studies show no obvious reflux right lower extremity.    03/17/21  Patient's wounds are very superficial and diffuse.  No need for debridement.  I am not convinced that these are venous stasis ulcers, atypical presentation.  More likely due to chronic uncontrolled edema and recurring cellulitis.  -CircAid applied to left lower extremity.  -Reviewed with patient on next clinic visit if he has received his lymphedema pump was supposed to meet with a representative around 2/2/2021.  -Increase compression to right lower  extremity to 4-layer compression wrap.  Patient has changes to skin consistent with lymphedema.    2. Cellulitis of lower extremity, unspecified laterality  Comments: Chronic recurring infections of lower extremity    3/17/2021: Most recent culture positive for MSSA and Salmonella.  He was prescribed Bactrim first, unable to tolerate, prescribed Augmentin on 2/9/2021.  Patient's right lower extremity was again cultured on 3/4/2021 he was positive for MSSA and Salmonella again.  -Patient has finished his prescription for amoxicillin  -No signs or symptoms of infection at this current time.    3. Venous stasis dermatitis of right lower extremity  Comments: Chronic skin changes to lower extremities.  Thickening and scaling of skin around ankles and toes.  Redness and flaking to remaining skin of legs    3/17/2021:  -Nystatin powder with zinc cream to be placed to right lower extremity.  Urea cream and AmLactin are also viable choices for continued thickening and scaling if it proceeds following resolution of fungal component.  -He is to return to clinic in 1 month to assess effectiveness of topical therapy and compression.    4.  Controlled type 2 diabetes mellitus without complication, without long-term current use of insulin  Comments: Patient restarted on Ozempic in January 21.  A1c not available in epic.    3/17/2021: Patient states his blood sugars have been in the low 100s or 90s since restarting Ozempic.  Did not asked the patient his blood sugar in clinic today.    Patient instructed to keep tight control over FBS, <140 for optimal wound healing; Implications of loss of protective sensation (LOPS) discussed with patient, including increased risk for amputation.  Advised to check feet at least daily, moisturize feet, and to always wear protective foot wear, arrange meticulous regular foot care by podiatrist or [facility]. Pt with good understanding.       5. Onychomycosis    3/17/2021: All toenails mycotic and  overgrown.  Patient is not able, nor should he attempt to trim his own toenails.  He is at risk for self injury, which could progress to amputation.  -Patient has previously been referred to podiatry        PATIENT EDUCATION    - Importance of managing edema for healing of ulcer, and for treatment and prevention of skin breakdown  -Discussed need for lifelong compression of lower legs   -Elevate legs above the level of the heart periodically throughout the day.  - Importance of adequate nutrition for wound healing    >20  min spent face to face with patient excluding procedure time   >50% of time spent counseling, coordinating care, reviewing records, discussing POC, educating patient      Please note that this note may have been created using voice recognition software. I have worked with technical experts from [employer] to optimize the interface.  I have made every reasonable attempt to correct obvious errors, but there may be errors of grammar and possibly     N

## 2021-03-18 ENCOUNTER — APPOINTMENT (OUTPATIENT)
Dept: LAB | Facility: MEDICAL CENTER | Age: 76
End: 2021-03-18
Attending: FAMILY MEDICINE
Payer: MEDICARE

## 2021-03-18 ENCOUNTER — HOSPITAL ENCOUNTER (OUTPATIENT)
Dept: LAB | Facility: MEDICAL CENTER | Age: 76
End: 2021-03-18
Attending: PHYSICIAN ASSISTANT
Payer: MEDICARE

## 2021-03-18 DIAGNOSIS — D64.9 LOW HEMOGLOBIN AND LOW HEMATOCRIT: ICD-10-CM

## 2021-03-18 DIAGNOSIS — E11.9 TYPE 2 DIABETES MELLITUS WITHOUT COMPLICATION, WITHOUT LONG-TERM CURRENT USE OF INSULIN (HCC): ICD-10-CM

## 2021-03-18 LAB
ALBUMIN SERPL BCP-MCNC: 3.6 G/DL (ref 3.2–4.9)
ALBUMIN/GLOB SERPL: 1.2 G/DL
ALP SERPL-CCNC: 65 U/L (ref 30–99)
ALT SERPL-CCNC: 11 U/L (ref 2–50)
ANION GAP SERPL CALC-SCNC: 9 MMOL/L (ref 7–16)
AST SERPL-CCNC: 19 U/L (ref 12–45)
BASOPHILS # BLD AUTO: 0.5 % (ref 0–1.8)
BASOPHILS # BLD: 0.04 K/UL (ref 0–0.12)
BILIRUB SERPL-MCNC: 1 MG/DL (ref 0.1–1.5)
BUN SERPL-MCNC: 13 MG/DL (ref 8–22)
CALCIUM SERPL-MCNC: 9 MG/DL (ref 8.4–10.2)
CHLORIDE SERPL-SCNC: 103 MMOL/L (ref 96–112)
CHOLEST SERPL-MCNC: 107 MG/DL (ref 100–199)
CO2 SERPL-SCNC: 26 MMOL/L (ref 20–33)
CREAT SERPL-MCNC: 0.62 MG/DL (ref 0.5–1.4)
EOSINOPHIL # BLD AUTO: 0.34 K/UL (ref 0–0.51)
EOSINOPHIL NFR BLD: 4.3 % (ref 0–6.9)
ERYTHROCYTE [DISTWIDTH] IN BLOOD BY AUTOMATED COUNT: 43 FL (ref 35.9–50)
FERRITIN SERPL-MCNC: 129 NG/ML (ref 22–322)
GLOBULIN SER CALC-MCNC: 2.9 G/DL (ref 1.9–3.5)
GLUCOSE SERPL-MCNC: 114 MG/DL (ref 65–99)
HCT VFR BLD AUTO: 42 % (ref 42–52)
HDLC SERPL-MCNC: 47 MG/DL
HGB BLD-MCNC: 13.7 G/DL (ref 14–18)
IMM GRANULOCYTES # BLD AUTO: 0.02 K/UL (ref 0–0.11)
IMM GRANULOCYTES NFR BLD AUTO: 0.3 % (ref 0–0.9)
IRON SATN MFR SERPL: 40 % (ref 15–55)
IRON SERPL-MCNC: 93 UG/DL (ref 50–180)
LDLC SERPL CALC-MCNC: 46 MG/DL
LYMPHOCYTES # BLD AUTO: 1.56 K/UL (ref 1–4.8)
LYMPHOCYTES NFR BLD: 19.9 % (ref 22–41)
MCH RBC QN AUTO: 28.9 PG (ref 27–33)
MCHC RBC AUTO-ENTMCNC: 32.6 G/DL (ref 33.7–35.3)
MCV RBC AUTO: 88.6 FL (ref 81.4–97.8)
MONOCYTES # BLD AUTO: 0.51 K/UL (ref 0–0.85)
MONOCYTES NFR BLD AUTO: 6.5 % (ref 0–13.4)
NEUTROPHILS # BLD AUTO: 5.37 K/UL (ref 1.82–7.42)
NEUTROPHILS NFR BLD: 68.5 % (ref 44–72)
NRBC # BLD AUTO: 0 K/UL
NRBC BLD-RTO: 0 /100 WBC
PLATELET # BLD AUTO: 225 K/UL (ref 164–446)
PMV BLD AUTO: 10.7 FL (ref 9–12.9)
POTASSIUM SERPL-SCNC: 4.1 MMOL/L (ref 3.6–5.5)
PROT SERPL-MCNC: 6.5 G/DL (ref 6–8.2)
RBC # BLD AUTO: 4.74 M/UL (ref 4.7–6.1)
SODIUM SERPL-SCNC: 138 MMOL/L (ref 135–145)
TIBC SERPL-MCNC: 231 UG/DL (ref 250–450)
TRIGL SERPL-MCNC: 72 MG/DL (ref 0–149)
UIBC SERPL-MCNC: 138 UG/DL (ref 110–370)
WBC # BLD AUTO: 7.8 K/UL (ref 4.8–10.8)

## 2021-03-18 PROCEDURE — 83540 ASSAY OF IRON: CPT

## 2021-03-18 PROCEDURE — 80061 LIPID PANEL: CPT

## 2021-03-18 PROCEDURE — 80053 COMPREHEN METABOLIC PANEL: CPT

## 2021-03-18 PROCEDURE — 36415 COLL VENOUS BLD VENIPUNCTURE: CPT

## 2021-03-18 PROCEDURE — 82728 ASSAY OF FERRITIN: CPT

## 2021-03-18 PROCEDURE — 85025 COMPLETE CBC W/AUTO DIFF WBC: CPT

## 2021-03-18 PROCEDURE — 83550 IRON BINDING TEST: CPT

## 2021-03-19 ENCOUNTER — HOME CARE VISIT (OUTPATIENT)
Dept: HOME HEALTH SERVICES | Facility: HOME HEALTHCARE | Age: 76
End: 2021-03-19
Payer: MEDICARE

## 2021-03-19 PROCEDURE — A6452 HIGH COMPRES BAND W>=3"<5"YD: HCPCS

## 2021-03-19 PROCEDURE — G0299 HHS/HOSPICE OF RN EA 15 MIN: HCPCS

## 2021-03-20 VITALS
HEART RATE: 96 BPM | OXYGEN SATURATION: 98 % | TEMPERATURE: 98.5 F | SYSTOLIC BLOOD PRESSURE: 126 MMHG | RESPIRATION RATE: 18 BRPM | DIASTOLIC BLOOD PRESSURE: 64 MMHG

## 2021-03-20 ASSESSMENT — ENCOUNTER SYMPTOMS
NAUSEA: NO
VOMITING: NO
MENTAL STATUS CHANGE: 0

## 2021-03-22 ENCOUNTER — HOME CARE VISIT (OUTPATIENT)
Dept: HOME HEALTH SERVICES | Facility: HOME HEALTHCARE | Age: 76
End: 2021-03-22
Payer: MEDICARE

## 2021-03-22 PROCEDURE — G0299 HHS/HOSPICE OF RN EA 15 MIN: HCPCS

## 2021-03-22 NOTE — Clinical Note
FYI  Showered with dressings off. LLE remains good. Since last visit, pt pulled down knee-high stocking to ankle and applied urea cream daily. Today, I had him use daniel gloves and put on full knee-high. The foot section has the compression. He was able to see how much easier it is to use daniel gloves. Asked him now to remove the entire stocking at night, apply urea cream at HS and in am. Put stocking back on in am. He has not been using the Velcro calf wrap. No edema LLE. Palpable DP and PT pulses bilaterally. Most of the scaling skin came off RLE in shower. Leg is denuded. Photos taken. Dressings applied per care plan. FBG done daily and all good. 98 this am, 99 yesterday.

## 2021-03-23 VITALS
TEMPERATURE: 98.9 F | DIASTOLIC BLOOD PRESSURE: 64 MMHG | OXYGEN SATURATION: 98 % | SYSTOLIC BLOOD PRESSURE: 130 MMHG | RESPIRATION RATE: 18 BRPM | HEART RATE: 70 BPM

## 2021-03-23 DIAGNOSIS — R23.8 DENUDED SKIN: ICD-10-CM

## 2021-03-23 DIAGNOSIS — I73.9 PVD (PERIPHERAL VASCULAR DISEASE) (HCC): ICD-10-CM

## 2021-03-23 DIAGNOSIS — L03.115 CELLULITIS OF RIGHT LOWER EXTREMITY: ICD-10-CM

## 2021-03-23 ASSESSMENT — ENCOUNTER SYMPTOMS
LIMITED RANGE OF MOTION: 1
NAUSEA: NO
VOMITING: NO

## 2021-03-23 NOTE — PROGRESS NOTES
noted  ----- Message -----  From: Valeria Berger R.N.  Sent: 3/23/2021  11:45 AM PDT  To: Sonali Chau R.N.      TREMAYNE  Showered with dressings off. LLE remains good. Since last visit, pt pulled down knee-high stocking to ankle and applied urea cream daily. Today, I had him use daniel gloves and put on full knee-high. The foot section has the compression. He was able to see how much easier it is to use daniel gloves. Asked him now to remove the entire stocking at night, apply urea cream at HS and in am. Put stocking back on in am. He has not been using the Velcro calf wrap. No edema LLE. Palpable DP and PT pulses bilaterally. Most of the scaling skin came off RLE in shower. Leg is denuded. Photos taken. Dressings applied per care plan. FBG done daily and all good. 98 this am, 99 yesterday.

## 2021-03-25 ENCOUNTER — HOME CARE VISIT (OUTPATIENT)
Dept: HOME HEALTH SERVICES | Facility: HOME HEALTHCARE | Age: 76
End: 2021-03-25
Payer: MEDICARE

## 2021-03-25 VITALS
RESPIRATION RATE: 18 BRPM | TEMPERATURE: 98.4 F | HEART RATE: 84 BPM | DIASTOLIC BLOOD PRESSURE: 64 MMHG | OXYGEN SATURATION: 96 % | SYSTOLIC BLOOD PRESSURE: 142 MMHG

## 2021-03-25 PROCEDURE — A6452 HIGH COMPRES BAND W>=3"<5"YD: HCPCS

## 2021-03-25 PROCEDURE — A6234 HYDROCOLLD DRG <=16 W/O BDR: HCPCS

## 2021-03-25 PROCEDURE — G0299 HHS/HOSPICE OF RN EA 15 MIN: HCPCS

## 2021-03-25 ASSESSMENT — ENCOUNTER SYMPTOMS
NAUSEA: NO
VOMITING: NO
MUSCLE WEAKNESS: 1

## 2021-03-29 ENCOUNTER — HOME CARE VISIT (OUTPATIENT)
Dept: HOME HEALTH SERVICES | Facility: HOME HEALTHCARE | Age: 76
End: 2021-03-29
Payer: MEDICARE

## 2021-03-29 VITALS
RESPIRATION RATE: 18 BRPM | DIASTOLIC BLOOD PRESSURE: 74 MMHG | HEART RATE: 80 BPM | TEMPERATURE: 98.5 F | SYSTOLIC BLOOD PRESSURE: 142 MMHG | OXYGEN SATURATION: 97 %

## 2021-03-29 PROCEDURE — A4554 DISPOSABLE UNDERPADS: HCPCS

## 2021-03-29 PROCEDURE — A6452 HIGH COMPRES BAND W>=3"<5"YD: HCPCS

## 2021-03-29 PROCEDURE — G0299 HHS/HOSPICE OF RN EA 15 MIN: HCPCS

## 2021-03-29 ASSESSMENT — ENCOUNTER SYMPTOMS
NAUSEA: NO
VOMITING: NO

## 2021-03-29 NOTE — Clinical Note
TREMAYNE  Please also send to Collin Schwab, PCP.  Pt showered with dressings off. The knee-high stocking to be used with CircAid garment for LLE has compression foot and then regular knee-high. Pt finds the compression foot uncomfortable so to decided not to wear it. He was going to get a regular knee-high but he didn't do that. As of last evening, noted that leg was becoming edematous again and oozing around ankle and back of leg. Wanted to leave it without compression but I explained it will continue to get worse. Pt has been going on line and decided that he has cellulitis. Explained that if cellulitis skin would have increased warmth and it doesn't. Explained as stasis dermatitis. Pt frustrated that legs aren't healing. Two Xeroform dressings placed over ankle and around back of ankle and lower leg. Wrapped with Coflex 2-layer compression dressing which he tolerated well in the past. Communicated with QUINN Pennington, [facility]. RLE with flaking skin, moist underneath any flakes that come off, other denuded areas. Pt frustrated that there is no improvement. There is less edema in RLE. Dressed per care plan. Photos taken. Has carlos't at [facility] in two days. States FBG this whole month have been . He would like to rest his fingers from testing. Suggested he test occasionally before supper and two hours after so see what those readings are. Did not seem particularly interested.

## 2021-03-30 NOTE — PROGRESS NOTES
noted  ----- Message -----  From: Valeria Berger R.N.  Sent: 3/29/2021   9:19 PM PDT  To: HERBIE Quinn  Please also send to Collin Schwab, PCP.  Pt showered with dressings off. The knee-high stocking to be used with CircAid garment for LLE has compression foot and then regular knee-high. Pt finds the compression foot uncomfortable so to decided not to wear it. He was going to get a regular knee-high but he didn't do that. As of last evening, noted that leg was becoming edematous again and oozing around ankle and back of leg. Wanted to leave it without compression but I explained it will continue to get worse. Pt has been going on line and decided that he has cellulitis. Explained that if cellulitis skin would have increased warmth and it doesn't. Explained as stasis dermatitis. Pt frustrated that legs aren't healing. Two Xeroform dressings placed over ankle and around back of ankle and lower leg. Wrapped with Coflex 2-layer compression dressing which he tolerated well in the past. Communicated with QUINN Pennington, [facility]. RLE with flaking skin, moist underneath any flakes that come off, other denuded areas. Pt frustrated that there is no improvement. There is less edema in RLE. Dressed per care plan. Photos taken. Has carlos't at [facility] in two days. States FBG this whole month have been . He would like to rest his fingers from testing. Suggested he test occasionally before supper and two hours after so see what those readings are. Did not seem particularly interested.

## 2021-03-31 ENCOUNTER — OFFICE VISIT (OUTPATIENT)
Dept: WOUND CARE | Facility: MEDICAL CENTER | Age: 76
End: 2021-03-31
Attending: PHYSICIAN ASSISTANT
Payer: MEDICARE

## 2021-03-31 VITALS
RESPIRATION RATE: 20 BRPM | HEART RATE: 93 BPM | TEMPERATURE: 98.5 F | DIASTOLIC BLOOD PRESSURE: 73 MMHG | SYSTOLIC BLOOD PRESSURE: 150 MMHG | OXYGEN SATURATION: 94 %

## 2021-03-31 DIAGNOSIS — I87.2 VENOUS STASIS DERMATITIS OF RIGHT LOWER EXTREMITY: Primary | ICD-10-CM

## 2021-03-31 DIAGNOSIS — L97.911 LEG ULCER, RIGHT, LIMITED TO BREAKDOWN OF SKIN (HCC): ICD-10-CM

## 2021-03-31 DIAGNOSIS — L08.9 WOUND INFECTION: ICD-10-CM

## 2021-03-31 DIAGNOSIS — L03.119 CELLULITIS OF LOWER EXTREMITY, UNSPECIFIED LATERALITY: ICD-10-CM

## 2021-03-31 DIAGNOSIS — B36.9 FUNGAL DERMATITIS: ICD-10-CM

## 2021-03-31 DIAGNOSIS — T14.8XXA WOUND INFECTION: ICD-10-CM

## 2021-03-31 PROCEDURE — 99213 OFFICE O/P EST LOW 20 MIN: CPT

## 2021-03-31 PROCEDURE — 99213 OFFICE O/P EST LOW 20 MIN: CPT | Performed by: NURSE PRACTITIONER

## 2021-03-31 RX ORDER — DOXYCYCLINE HYCLATE 100 MG/1
100 CAPSULE ORAL 2 TIMES DAILY
Qty: 28 EACH | Refills: 0 | Status: SHIPPED | OUTPATIENT
Start: 2021-03-31 | End: 2021-04-14

## 2021-03-31 ASSESSMENT — PAIN SCALES - GENERAL: PAINLEVEL: 2=MINIMAL-SLIGHT

## 2021-03-31 ASSESSMENT — ENCOUNTER SYMPTOMS
INSOMNIA: 0
SHORTNESS OF BREATH: 0
VOMITING: 0
BACK PAIN: 1
CHILLS: 0
CONSTIPATION: 0
DEPRESSION: 0
FEVER: 0
NAUSEA: 0
DIARRHEA: 0
CLAUDICATION: 0
COUGH: 0

## 2021-03-31 NOTE — PROGRESS NOTES
" Provider Encounter- Lower Extremity Ulcer      HISTORY OF PRESENT ILLNESS  Wound History:    START OF CARE IN CLINIC: 2/10/2021    REFERRING PROVIDER: Collin Schwab P.A.-C.     WOUND ETIOLOGY: Venous   LOCATION: Right lower extremity   HISTORY: Patient is referred to [facility] for evaluation and treatment of right lower extremity wounds with cellulitis and edema.  States he started having swelling in both legs, but more so to the right leg, around 2015.  He had a, \"clot\" in this leg around the same time.  Since then he has had problems with weeping from right leg, with recurring shallow wounds.  His current wounds have been present since about December 2020.  [facility] has been seeing him, applying compression, however he is only able to tolerate for a few days before he removes it.  He has been on several courses of antibiotics for cellulitis, most recently Augmentin prescribed on 2/9/2021.  His wounds and edema has shown very little progress, and therefore he was referred to [facility] for further evaluation.   Venous and arterial studies were done in January 2021.  ABIs slightly elevated, waveforms triphasic at the ankles.  Venous duplex shows chronic partial thrombus in the distal femoral and popliteal veins, no acute DVT, no obvious reflux in right lower extremity.      Pertinent Medical History: PVD, obesity, DVT right lower extremity, cellulitis of lower extremity, chronic right lower extremity swelling      DIABETES HX: Diagnosed with type 2 diabetes around 2015.  Was taken off of Ozempic because his blood sugars were, \"too good\".  Restarted on Ozempic in January 2021. Checks blood sugars at least daily and reports that these typically run around .  Has  had previous diabetes education, his PCP has referred him to endocrinology.  Does  have numbness in feet.  Usually wears R, nonprescriptive shoes. Does not check his feet routinely.  Has not had previous foot ulcers or foot surgery.  Current occupation " tired.      TOBACCO USE: Former smoker, quit in 2006 after smoking 2 packs/day x 50 years.    Patient's problem list, allergies, and current medications reviewed and updated in Epic    Interval History:  2/10/2021 : Initial clinic visit with QUINN Campos, ASHLIE, MAUREENN, CFJOSE.  Patient presents today accompanied by his wife.  He is very frustrated with lack of healing of his wounds, and the chronic swelling of his legs.  States he is only able to tolerate compression wraps for approximately 3 days before he has to remove them.  When he does not have compression, he experiences weeping from his right lower extremity.  He has had these problems off and on for the past 5 years, most recently since December 2020.  Lymphedema pump has been recommended, representative is scheduled to evaluate him on 2/12.  He was prescribed Augmentin yesterday for positive wound culture, MSSA and Salmonella.  He had been prescribed Bactrim prior to that, but was unable to tolerate.  He reports his blood sugar this morning was 95    He also has mycotic, overgrown toenails.  At risk for self injury.  He is requesting referral to podiatry    3/10/2021: Clinic visit with QUINN Pennington. Patient states that they are feeling well today.  Patient denies fever, chills, nausea, vomiting, lightheadedness, dizziness, shortness of breath and chest pain.  Amoxicillin ordered in clinic today to cover the patient for Salmonella.  Nystatin powder ordered to be mixed with zinc cream and applied to patient's right lower extremity.    3/17/2021: Clinic visit with QUINN Pennington. Patient states that they are feeling well today.  Patient denies fever, chills, nausea, vomiting, lightheadedness, dizziness, shortness of breath and chest pain.  Patient has completed his prescription of amoxicillin for Salmonella.  I am getting placed the patient in a 4 layer compression wrap Home health to change once a week patient has been sent home with an  extra 4 layer wrap.  Continue nystatin powder mixed with zinc cream to right lower extremity.    3/31/2021: Clinic visit with QUINN Pennington. Patient states that they are feeling well today.  Patient denies fever, chills, nausea, vomiting, lightheadedness, dizziness, shortness of breath and chest pain.  Patient has significant periwound erythema, edema and slightly increased pain to right lower extremity we will place the patient on doxycycline for 14 days.  Patient informed of the potential side effects including increased levels of Coumadin and increased photosensitivity with sun exposure.  Patient reports that he has a referral to dermatology to I think is an excellent choice due to his wound/skin conditions.  Considered a Solu-Medrol Dosepak following administration of ABX however, patient refused.        REVIEW OF SYSTEMS:   Review of Systems   Constitutional: Negative for chills and fever.   HENT: Positive for hearing loss.    Respiratory: Negative for cough and shortness of breath.         He is on supplemental oxygen, but states he is able to perform ADLs and household chores without SOP.   Cardiovascular: Positive for leg swelling. Negative for chest pain and claudication.        Swelling and both legs since about 2015, right leg is always been significantly worse than the left.   Gastrointestinal: Negative for constipation, diarrhea, nausea and vomiting.   Genitourinary: Negative for dysuria.   Musculoskeletal: Positive for back pain and joint pain.   Skin:        Redness and flaking of skin to both lower legs for several years, sometimes itches and burns.   Psychiatric/Behavioral: Negative for depression. The patient does not have insomnia.          PHYSICAL EXAMINATION:   /73   Pulse 93   Temp 36.9 °C (98.5 °F)   Resp 20   SpO2 94%     Physical Exam  Constitutional:       Appearance: Normal appearance. He is obese.   HENT:      Head: Normocephalic.   Eyes:      Pupils: Pupils are equal,  round, and reactive to light.   Cardiovascular:      Pulses: Normal pulses.   Pulmonary:      Effort: Pulmonary effort is normal.      Comments: On supplemental O2  Musculoskeletal:      Right lower leg: Edema present.      Left lower leg: Edema present.      Comments: 1+ edema of right lower extremity  1+ edema of left lower extremity  Uses walker for ambulation   Skin:     General: Skin is warm.      Findings: Erythema present.      Comments: Chronic skin changes to bilateral lower extremities-hemosiderin staining, edema, scarring, lipodermatosclerosis.  Findings consistent with CVI    Right lower extremity with erythema from just below knee to foot, areas of denudation tissue  Toenails mycotic and overgrown    Left lower extremity with increased erythema and what appears to be fungal rash    Neurological:      Mental Status: He is alert.         WOUND ASSESSMENT       Wound 02/10/21 Partial Thickness Wound Right Lateral/Anterior/Medial LE Denuded Skin (Active)   Wound Image   03/31/21 0859   Site Assessment Pink;Red 03/31/21 0859   Periwound Assessment Denuded;Edema;Fragile 03/31/21 0859   Margins Undefined edges 03/31/21 0859   Drainage Amount Moderate 03/31/21 0859   Drainage Description Serosanguineous 03/31/21 0859   Treatments Cleansed;Compression 03/31/21 0859   Wound Cleansing Foam Cleanser/Washcloth 03/31/21 0859   Periwound Protectant Antifungal Therapy 03/31/21 0859   Dressing Cleansing/Solutions Not Applicable 03/31/21 0859   Dressing Options Viscopaste;Compression Wrap Two Layer 03/31/21 0859   Dressing Changed New 03/17/21 0900   Dressing Change/Treatment Frequency Every 48 hrs, and As Needed 03/17/21 0900   Non-staged Wound Description Partial thickness 03/31/21 0859   Wound Length (cm) 1.7 cm 02/10/21 1100   Wound Width (cm) 1.5 cm 02/10/21 1100   Wound Depth (cm) 0 cm 02/10/21 1100   Wound Surface Area (cm^2) 2.55 cm^2 02/10/21 1100   Wound Volume (cm^3) 0 cm^3 02/10/21 1100   Wound Bed Slough (%)  0 % 03/31/21 0859   Tunneling (cm) 0 cm 03/31/21 0859   Undermining (cm) 0 cm 03/31/21 0859   Wound Odor None 03/31/21 0859   Pulses Right;2+;DP;PT;Doppler 02/10/21 1100   Exposed Structures None 03/31/21 0859       Wound 03/31/21 LLE - Denuded (Active)   Wound Image   03/31/21 0859   Site Assessment Other (Comment);Pink;Fragile 03/31/21 0859   Periwound Assessment Edema;Fragile;Denuded 03/31/21 0859   Margins Undefined edges 03/31/21 0859   Drainage Amount Moderate 03/31/21 0859   Drainage Description Serous 03/31/21 0859   Treatments Cleansed;Compression 03/31/21 0859   Wound Cleansing Foam Cleanser/Washcloth 03/31/21 0859   Periwound Protectant Antifungal Therapy 03/31/21 0859   Dressing Cleansing/Solutions Not Applicable 03/31/21 0859   Dressing Options Unna Boot;Coban 03/31/21 0859   Non-staged Wound Description Partial thickness 03/31/21 0859   Tunneling (cm) 0 cm 03/31/21 0859   Undermining (cm) 0 cm 03/31/21 0859   Wound Odor None 03/31/21 0859   Exposed Structures None 03/31/21 0859                PROCEDURE:  -Patient assessed in clinic today  - no need for debridement.   -Wound care completed by wound RN, refer to wound flowsheet   -Patient tolerated the procedure well, without c/o of significant pain or discomfort.       Pertinent Labs and Diagnostics:    Labs:     IMAGING: N/A    VASCULAR STUDIES:   1/22/2021-venous ultrasound  RIGHT:   Chronic partial thrombus observed in the distal femoral & popliteal veins.     No acute dvt.   No deep vein reflux greater than 1 sec.   No greater saphenous vein reflux greater than 0.5 seconds.   Small saphenous vein reflux for maximum 1350 msec.   No obvious reflux in the varicose veins in the calf or a  at the    ankle.   LEFT:   All veins are fully compressible, no superficial or deep vein thrombus.   Deep vein reflux in the common femoral vein for 2150 msec with Valsalva    maneuver.   No reflux in the greater saphenous vein greater than 0.5 seconds.   No  small saphenous vein reflux.   Reflux in a varicose vein at the ankle for 1350 msec.    1/22/2021-US-JAY  Findings   Doppler waveforms of the common femoral arteries are high amplitude and    triphasic.    Doppler waveforms of the popliteal arteries are high amplitude and    triphasic.    Doppler waveforms at the ankle are brisk and triphasic.    Ankle-brachial index is normal.    There is no evidence of major arterial disease demonstrated bilaterally.     LAST  WOUND CULTURE:  DATE : 1/28/2021         Culture Result Abnormal   Staphylococcus aureus   Heavy growth   This isolate is presumed to be clindamycin resistant based on   detection of inducible resistance.  Clindamycin may still   be effective in some patients.     Culture Result Abnormal   Salmonella species   Light growth         ASSESSMENT AND PLAN:   1. Leg ulcer, right, limited to breakdown of skin (HCC)  Comments: History of DVT in this extremity, since then problems with chronic edema, weeping of skin, cellulitis, shallow wounds.  Unable to tolerate compression for more than a few days.  Arterial studies show no major arterial disease.  Venous studies show no obvious reflux right lower extremity.    03/31/21  Patient's wounds are very superficial and diffuse.  No need for debridement.  Wounds are not consistent with venous stasis ulcers due to the diffuse nature and location.  -Reviewed with patient on next clinic visit if he has received his lymphedema pump was supposed to meet with a representative around 2/2/2021.  Wound care: Viscopaste, compression wrap 2 layer to right lower extremity Unna boot to left lower extremity.    2. Cellulitis of lower extremity, unspecified laterality  Comments: Chronic recurring infections of lower extremity    3/31/2021: Most recent culture positive for MSSA and Salmonella.  He was prescribed Bactrim first, unable to tolerate, prescribed Augmentin on 2/9/2021.  Patient's right lower extremity was again cultured on  3/4/2021 he was positive for MSSA and Salmonella again.  -Patient has finished his prescription for amoxicillin  -Patient placed on a 14-day course of doxycycline to treat suspected MSSA infection    3. Venous stasis dermatitis of right lower extremity  Comments: Chronic skin changes to lower extremities.  Thickening and scaling of skin around ankles and toes.  Redness and flaking to remaining skin of legs    3/31/2021:  -Nystatin powder with zinc cream to be placed to right lower extremity.  Urea cream and AmLactin are also viable choices for continued thickening and scaling if it proceeds following resolution of fungal component.  -He is to return to clinic in 2 weeks to determine if doxycycline has been beneficial and the application of nystatin and barrier cream mixed has decreased the fungal component.  If fungal component does not improve consideration may be given to oral antifungal.    4.  Controlled type 2 diabetes mellitus without complication, without long-term current use of insulin  Comments: Patient restarted on Ozempic in January 21.  A1c not available in epic.    3/31/2021: Patient reports that his blood sugar was 102 in clinic today.  He has restarted Ozempic.     Patient instructed to keep tight control over FBS, <140 for optimal wound healing; Implications of loss of protective sensation (LOPS) discussed with patient, including increased risk for amputation.  Advised to check feet at least daily, moisturize feet, and to always wear protective foot wear, arrange meticulous regular foot care by podiatrist or [facility]. Pt with good understanding.       5. Onychomycosis    3/31/2021: All toenails mycotic and overgrown.  Patient is not able, nor should he attempt to trim his own toenails.  He is at risk for self injury, which could progress to amputation.  -Patient has previously been referred to podiatry        PATIENT EDUCATION    - Importance of managing edema for healing of ulcer, and for treatment and  prevention of skin breakdown  -Discussed need for lifelong compression of lower legs   -Elevate legs above the level of the heart periodically throughout the day.  - Importance of adequate nutrition for wound healing    > 20 min spent face to face with patient, >50% of time spent counseling, coordinating care, reviewing records, discussing POC, educating patient in excess of procedure time.          Please note that this note may have been created using voice recognition software. I have worked with technical experts from [employer] to optimize the interface.  I have made every reasonable attempt to correct obvious errors, but there may be errors of grammar and possibly     N

## 2021-03-31 NOTE — PATIENT INSTRUCTIONS
-Keep dressings clean and dry. Change dressings if they become over saturated, soiled or fall off.     -Avoid prolonged standing or sitting without elevating your legs.    -Remove your compression garments if you have severe pain, severe swelling, numbness, color change, or temperature change in your toes. If you need to remove your compression garments, do so by unrolling them. Do not cut the compression garments off, this is to prevent cutting yourself on accident.    -Should you experience any significant changes in your wound(s), such as signs of infection (redness, swelling, localized heat, increased pain, fever > 101 F, chills) or have any questions regarding your home care instructions, please contact the wound center at [phone]. If after hours, contact your primary care physician or go to the hospital emergency room.

## 2021-04-02 ENCOUNTER — PATIENT MESSAGE (OUTPATIENT)
Dept: HEALTH INFORMATION MANAGEMENT | Facility: OTHER | Age: 76
End: 2021-04-02

## 2021-04-02 ENCOUNTER — HOME CARE VISIT (OUTPATIENT)
Dept: HOME HEALTH SERVICES | Facility: HOME HEALTHCARE | Age: 76
End: 2021-04-02
Payer: MEDICARE

## 2021-04-02 VITALS
DIASTOLIC BLOOD PRESSURE: 68 MMHG | TEMPERATURE: 98.8 F | OXYGEN SATURATION: 98 % | RESPIRATION RATE: 18 BRPM | SYSTOLIC BLOOD PRESSURE: 140 MMHG | HEART RATE: 90 BPM

## 2021-04-02 PROCEDURE — A6452 HIGH COMPRES BAND W>=3"<5"YD: HCPCS

## 2021-04-02 PROCEDURE — A6456 ZINC PASTE BAND W >=3"<5"/YD: HCPCS

## 2021-04-02 PROCEDURE — A6455 SELF-ADHER BAND >=5"/YD: HCPCS

## 2021-04-02 PROCEDURE — G0493 RN CARE EA 15 MIN HH/HOSPICE: HCPCS

## 2021-04-02 SDOH — ECONOMIC STABILITY: HOUSING INSECURITY: EVIDENCE OF SMOKING MATERIAL: 0

## 2021-04-02 ASSESSMENT — ENCOUNTER SYMPTOMS
VOMITING: NO
NAUSEA: NO

## 2021-04-02 ASSESSMENT — PATIENT HEALTH QUESTIONNAIRE - PHQ9: CLINICAL INTERPRETATION OF PHQ2 SCORE: 0

## 2021-04-02 NOTE — Clinical Note
FYI  Pt showered with dressings off. RLE actually looks better today - less dry crusty skin. Still denuded areas. BLE dressed per updated care plan. Pt called dermatology office but only got voice prompts and no response. Will try again.

## 2021-04-04 ENCOUNTER — HOME CARE VISIT (OUTPATIENT)
Dept: HOME HEALTH SERVICES | Facility: HOME HEALTHCARE | Age: 76
End: 2021-04-04
Payer: MEDICARE

## 2021-04-04 ASSESSMENT — ACTIVITIES OF DAILY LIVING (ADL): OASIS_M1830: 03

## 2021-04-05 ENCOUNTER — HOME CARE VISIT (OUTPATIENT)
Dept: HOME HEALTH SERVICES | Facility: HOME HEALTHCARE | Age: 76
End: 2021-04-05
Payer: MEDICARE

## 2021-04-05 ENCOUNTER — TELEPHONE (OUTPATIENT)
Dept: MEDICAL GROUP | Facility: PHYSICIAN GROUP | Age: 76
End: 2021-04-05

## 2021-04-05 PROCEDURE — A6456 ZINC PASTE BAND W >=3"<5"/YD: HCPCS

## 2021-04-05 PROCEDURE — A6452 HIGH COMPRES BAND W>=3"<5"YD: HCPCS

## 2021-04-05 PROCEDURE — A6455 SELF-ADHER BAND >=5"/YD: HCPCS

## 2021-04-05 PROCEDURE — 665003 FOLLOW UP-HOME HEALTH

## 2021-04-05 PROCEDURE — G0299 HHS/HOSPICE OF RN EA 15 MIN: HCPCS

## 2021-04-05 NOTE — PROGRESS NOTES
noted  ----- Message -----  From: Valeria Berger R.N.  Sent: 4/4/2021   7:39 PM PDT  To: HERBIE Quinn  Pt showered with dressings off. RLE actually looks better today - less dry crusty skin. Still denuded areas. BLE dressed per updated care plan. Pt called dermatology office but only got voice prompts and no response. Will try again.

## 2021-04-05 NOTE — Clinical Note
"FYI  Pt showered with dressings off within the last two hours. Increased edema BLE today. Pt states the compression wraps feel good for about 1 1/2 days, then they feel tight. Explained it is his legs that swell. When he sits in the living room, he elevates his legs but when he is in the office, and at the computer for hours, they are dependent. Photos taken. New dressings and compression applied BLE to pt comfort but explained elevation is helpful. They look a little different each time. He said once in the past they healed with just tubigrip and \"maybe they should just be open to air.\" At that time he was in the hospital with legs elevated. He is just frustrated that legs have not healed. Plans to try to get through to dermatology office today to make carlos't. When he called last time he did not have his insurance card in hand as they requested.  "

## 2021-04-05 NOTE — PROGRESS NOTES
noted  ----- Message -----  From: Valeria Berger R.N.  Sent: 4/4/2021   7:39 PM PDT  To: Sonali Chau R.N.      Home health recertication 4/2/21.    PRIMARY DX/SKILLED NEED: BLE edema with stasis dermatitis; skin infection  SN FREQUENCY: 2w8, 1w1, 2 prn  ZIP CODE: [id number]  DISCIPLINES ORDERED: SN  INSURANCE & AUTHORIZATION: long term Plus  CERTIFICATION PERIOD: 4/4 - 6/2/21

## 2021-04-05 NOTE — TELEPHONE ENCOUNTER
Medication chart review for [facility] services    PCP:  Collin Schwab P.A.-C.  [address]  [place] [phone]  Fax: [phone]    Current medication list     Current Outpatient Medications:   •  doxycycline, 100 mg, Oral, BID  •  Ozempic (0.25 or 0.5 MG/DOSE), 0.25 mL, Subcutaneous, Q7 DAYS  •  glucose blood, 1 Each, Other, PRN  •  polyethylene glycol 3350, 17 g, Oral, PRN  •  multivitamin, 1 tablet, Oral, DAILY  •  furosemide, 20 mg, Oral, BID  •  FreeStyle Lancets, USE EVERY MORNING TO TEST FASTING BLOOD SUGAR  •  non-formulary med, 2 L/min, Inhalation, Continuous    Allergies   Allergen Reactions   • Bactrim Ds          Labs / Images Reviewed:     Lab Results   Component Value Date/Time    SODIUM 138 03/18/2021 10:18 AM    POTASSIUM 4.1 03/18/2021 10:18 AM    CHLORIDE 103 03/18/2021 10:18 AM    CO2 26 03/18/2021 10:18 AM    GLUCOSE 114 (H) 03/18/2021 10:18 AM    BUN 13 03/18/2021 10:18 AM    CREATININE 0.62 03/18/2021 10:18 AM      Lab Results   Component Value Date/Time    ALKPHOSPHAT 65 03/18/2021 10:18 AM    ASTSGOT 19 03/18/2021 10:18 AM    ALTSGPT 11 03/18/2021 10:18 AM    TBILIRUBIN 1.0 03/18/2021 10:18 AM    ALBUMIN 3.6 03/18/2021 10:18 AM    INR 1.08 12/01/2020 11:55 AM            Assessment and Plan:   • Received referral from [facility]. Medications reviewed. No clinically significant interactions noted.             Sampson Drake, PharmD, MS, BCACP, LCC  [facility]  Phone [phone] fax [phone]    This note was created using voice recognition software (Dr[employer]). The accuracy of the dictation is limited by the abilities of the software. I have reviewed the note prior to signing, however some errors in grammar and context are still possible. If you have any questions related to this note please do not hesitate to contact our office.

## 2021-04-06 VITALS
TEMPERATURE: 98.9 F | DIASTOLIC BLOOD PRESSURE: 60 MMHG | RESPIRATION RATE: 18 BRPM | SYSTOLIC BLOOD PRESSURE: 142 MMHG | OXYGEN SATURATION: 97 % | HEART RATE: 100 BPM

## 2021-04-06 ASSESSMENT — ENCOUNTER SYMPTOMS
NAUSEA: NO
MUSCLE WEAKNESS: 1
MENTAL STATUS CHANGE: 0
VOMITING: NO

## 2021-04-08 ENCOUNTER — HOME CARE VISIT (OUTPATIENT)
Dept: HOME HEALTH SERVICES | Facility: HOME HEALTHCARE | Age: 76
End: 2021-04-08
Payer: MEDICARE

## 2021-04-08 VITALS
DIASTOLIC BLOOD PRESSURE: 58 MMHG | TEMPERATURE: 98.2 F | OXYGEN SATURATION: 96 % | RESPIRATION RATE: 18 BRPM | HEART RATE: 80 BPM | SYSTOLIC BLOOD PRESSURE: 136 MMHG

## 2021-04-08 PROCEDURE — G0299 HHS/HOSPICE OF RN EA 15 MIN: HCPCS

## 2021-04-08 ASSESSMENT — ENCOUNTER SYMPTOMS: MUSCLE WEAKNESS: 1

## 2021-04-08 NOTE — PROGRESS NOTES
"noted  ----- Message -----  From: Valeria Berger R.N.  Sent: 4/6/2021   7:46 PM PDT  To: HERBIE Quinn  Pt showered with dressings off within the last two hours. Increased edema BLE today. Pt states the compression wraps feel good for about 1 1/2 days, then they feel tight. Explained it is his legs that swell. When he sits in the living room, he elevates his legs but when he is in the office, and at the computer for hours, they are dependent. Photos taken. New dressings and compression applied BLE to pt comfort but explained elevation is helpful. They look a little different each time. He said once in the past they healed with just tubigrip and \"maybe they should just be open to air.\" At that time he was in the hospital with legs elevated. He is just frustrated that legs have not healed. Plans to try to get through to dermatology office today to make carlos't. When he called last time he did not have his insurance card in hand as they requested.  "

## 2021-04-12 ENCOUNTER — HOME CARE VISIT (OUTPATIENT)
Dept: HOME HEALTH SERVICES | Facility: HOME HEALTHCARE | Age: 76
End: 2021-04-12
Payer: MEDICARE

## 2021-04-12 VITALS
DIASTOLIC BLOOD PRESSURE: 56 MMHG | SYSTOLIC BLOOD PRESSURE: 126 MMHG | RESPIRATION RATE: 18 BRPM | HEART RATE: 88 BPM | TEMPERATURE: 98.5 F | OXYGEN SATURATION: 98 %

## 2021-04-12 PROCEDURE — G0299 HHS/HOSPICE OF RN EA 15 MIN: HCPCS

## 2021-04-12 PROCEDURE — A6456 ZINC PASTE BAND W >=3"<5"/YD: HCPCS

## 2021-04-12 PROCEDURE — A6452 HIGH COMPRES BAND W>=3"<5"YD: HCPCS

## 2021-04-12 PROCEDURE — A6455 SELF-ADHER BAND >=5"/YD: HCPCS

## 2021-04-12 ASSESSMENT — ENCOUNTER SYMPTOMS
NAUSEA: NO
VOMITING: NO

## 2021-04-12 NOTE — Clinical Note
FYI  Pt showered with dressings off. BLE look much better today. LLE with some oozing of serous fluid over dorsal foot. RLE still more edematous than left but less today than last week. Posterior RLE dry. Crusting less medial foot. Photos taken but my phone is not transmitting. BLE redressed per care plan. Pt was not able to find his antifungal powder so not applied. Checked with last nurse and it was on the table or put back in his bag. Will be seen at [facility] in two days. Still planning to see dermatologist. FBG 98 this morning.

## 2021-04-12 NOTE — PROGRESS NOTES
Quality Review completed for Recertification OASIS by BRIANNA Piper RN on April 8th, 2021:      Edits completed by ALIN Ramires:  1. Added to safety: correct use of devices, ambulation with assist  2. Removed F2F  3. Added to  DME: Device and Oxygen  4. Added pain assessment to careplan  5.  is 2,  is 2,  is 1,  is 1,  is 3, [id number] C is 4, [id number] E,F,I,J are 04. PEr narrative, pt. Needs supervision with adls and minimal asssit with dressing. Per homebound status, pt. Has weakness/fatigue, oxygen, wound, severe taxing effort to leave.

## 2021-04-13 PROCEDURE — G0179 MD RECERTIFICATION HHA PT: HCPCS | Performed by: FAMILY MEDICINE

## 2021-04-13 NOTE — PROGRESS NOTES
noted  ----- Message -----  From: Valeria Berger R.N.  Sent: 4/12/2021   9:31 PM PDT  To: EHRBIE Quinn  Pt showered with dressings off. BLE look much better today. LLE with some oozing of serous fluid over dorsal foot. RLE still more edematous than left but less today than last week. Posterior RLE dry. Crusting less medial foot. Photos taken but my phone is not transmitting. BLE redressed per care plan. Pt was not able to find his antifungal powder so not applied. Checked with last nurse and it was on the table or put back in his bag. Will be seen at [facility] in two days. Still planning to see dermatologist. FBG 98 this morning.

## 2021-04-13 NOTE — PROGRESS NOTES
I agree with clarifications made.  ----- Message -----  From: Isela Piper R.N.  Sent: 4/12/2021  12:09 PM PDT  To: Valeria Berger R.N.      Quality Review completed for Recertification OASIS by BRIANAN Piper RN on April 8th, 2021:      Edits completed by Keyla RN:  1. Added to safety: correct use of devices, ambulation with assist  2. Removed F2F  3. Added to  DME: Device and Oxygen  4. Added pain assessment to careplan  5.  is 2,  is 2,  is 1,  is 1,  is 3, [id number] C is 4, [id number] E,F,I,J are 04. PEr narrative, pt. Needs supervision with adls and minimal asssit with dressing. Per homebound status, pt. Has weakness/fatigue, oxygen, wound, severe taxing effort to leave.

## 2021-04-14 ENCOUNTER — OFFICE VISIT (OUTPATIENT)
Dept: WOUND CARE | Facility: MEDICAL CENTER | Age: 76
End: 2021-04-14
Attending: PHYSICIAN ASSISTANT
Payer: MEDICARE

## 2021-04-14 VITALS
DIASTOLIC BLOOD PRESSURE: 75 MMHG | TEMPERATURE: 98.6 F | HEART RATE: 98 BPM | RESPIRATION RATE: 20 BRPM | OXYGEN SATURATION: 90 % | SYSTOLIC BLOOD PRESSURE: 153 MMHG

## 2021-04-14 DIAGNOSIS — T14.8XXA WOUND INFECTION: ICD-10-CM

## 2021-04-14 DIAGNOSIS — I87.2 VENOUS STASIS DERMATITIS OF RIGHT LOWER EXTREMITY: Primary | ICD-10-CM

## 2021-04-14 DIAGNOSIS — B36.9 FUNGAL DERMATITIS: ICD-10-CM

## 2021-04-14 DIAGNOSIS — R60.0 LOWER EXTREMITY EDEMA: ICD-10-CM

## 2021-04-14 DIAGNOSIS — B35.1 ONYCHOMYCOSIS: ICD-10-CM

## 2021-04-14 DIAGNOSIS — E11.9 CONTROLLED TYPE 2 DIABETES MELLITUS WITHOUT COMPLICATION, WITHOUT LONG-TERM CURRENT USE OF INSULIN (HCC): ICD-10-CM

## 2021-04-14 DIAGNOSIS — L08.9 WOUND INFECTION: ICD-10-CM

## 2021-04-14 PROCEDURE — 99213 OFFICE O/P EST LOW 20 MIN: CPT

## 2021-04-14 PROCEDURE — 99212 OFFICE O/P EST SF 10 MIN: CPT | Performed by: NURSE PRACTITIONER

## 2021-04-14 RX ORDER — NYSTATIN 100000 [USP'U]/G
POWDER TOPICAL
Qty: 60 G | Refills: 2 | Status: SHIPPED | OUTPATIENT
Start: 2021-04-14 | End: 2021-04-21

## 2021-04-14 ASSESSMENT — ENCOUNTER SYMPTOMS
DEPRESSION: 0
VOMITING: 0
BACK PAIN: 1
DIARRHEA: 0
CONSTIPATION: 0
NAUSEA: 0
INSOMNIA: 0
CLAUDICATION: 0
FEVER: 0
COUGH: 0
CHILLS: 0
SHORTNESS OF BREATH: 0

## 2021-04-14 NOTE — PROGRESS NOTES
" Provider Encounter- Lower Extremity Ulcer      HISTORY OF PRESENT ILLNESS  Wound History:    START OF CARE IN CLINIC: 2/10/2021    REFERRING PROVIDER: Collin Schwab P.A.-C.     WOUND ETIOLOGY: Venous   LOCATION: Right lower extremity   HISTORY: Patient is referred to [facility] for evaluation and treatment of right lower extremity wounds with cellulitis and edema.  States he started having swelling in both legs, but more so to the right leg, around 2015.  He had a, \"clot\" in this leg around the same time.  Since then he has had problems with weeping from right leg, with recurring shallow wounds.  His current wounds have been present since about December 2020.  [facility] has been seeing him, applying compression, however he is only able to tolerate for a few days before he removes it.  He has been on several courses of antibiotics for cellulitis, most recently Augmentin prescribed on 2/9/2021.  His wounds and edema has shown very little progress, and therefore he was referred to [facility] for further evaluation.   Venous and arterial studies were done in January 2021.  ABIs slightly elevated, waveforms triphasic at the ankles.  Venous duplex shows chronic partial thrombus in the distal femoral and popliteal veins, no acute DVT, no obvious reflux in right lower extremity.      Pertinent Medical History: PVD, obesity, DVT right lower extremity, cellulitis of lower extremity, chronic right lower extremity swelling      DIABETES HX: Diagnosed with type 2 diabetes around 2015.  Was taken off of Ozempic because his blood sugars were, \"too good\".  Restarted on Ozempic in January 2021. Checks blood sugars at least daily and reports that these typically run around .  Has  had previous diabetes education, his PCP has referred him to endocrinology.  Does  have numbness in feet.  Usually wears R, nonprescriptive shoes. Does not check his feet routinely.  Has not had previous foot ulcers or foot surgery.  Current occupation " tired.      TOBACCO USE: Former smoker, quit in 2006 after smoking 2 packs/day x 50 years.    Patient's problem list, allergies, and current medications reviewed and updated in Epic    Interval History:  2/10/2021 : Initial clinic visit with QUINN Campos, ASHLIE, MAUREENN, CFJOSE.  Patient presents today accompanied by his wife.  He is very frustrated with lack of healing of his wounds, and the chronic swelling of his legs.  States he is only able to tolerate compression wraps for approximately 3 days before he has to remove them.  When he does not have compression, he experiences weeping from his right lower extremity.  He has had these problems off and on for the past 5 years, most recently since December 2020.  Lymphedema pump has been recommended, representative is scheduled to evaluate him on 2/12.  He was prescribed Augmentin yesterday for positive wound culture, MSSA and Salmonella.  He had been prescribed Bactrim prior to that, but was unable to tolerate.  He reports his blood sugar this morning was 95    He also has mycotic, overgrown toenails.  At risk for self injury.  He is requesting referral to podiatry    3/10/2021: Clinic visit with QUINN Pennington. Patient states that they are feeling well today.  Patient denies fever, chills, nausea, vomiting, lightheadedness, dizziness, shortness of breath and chest pain.  Amoxicillin ordered in clinic today to cover the patient for Salmonella.  Nystatin powder ordered to be mixed with zinc cream and applied to patient's right lower extremity.    3/17/2021: Clinic visit with QUINN Pennington. Patient states that they are feeling well today.  Patient denies fever, chills, nausea, vomiting, lightheadedness, dizziness, shortness of breath and chest pain.  Patient has completed his prescription of amoxicillin for Salmonella.  I am getting placed the patient in a 4 layer compression wrap Home health to change once a week patient has been sent home with an  extra 4 layer wrap.  Continue nystatin powder mixed with zinc cream to right lower extremity.    3/31/2021: Clinic visit with QUINN Pennington. Patient states that they are feeling well today.  Patient denies fever, chills, nausea, vomiting, lightheadedness, dizziness, shortness of breath and chest pain.  Patient has significant periwound erythema, edema and slightly increased pain to right lower extremity we will place the patient on doxycycline for 14 days.  Patient informed of the potential side effects including increased levels of Coumadin and increased photosensitivity with sun exposure.  Patient reports that he has a referral to dermatology to I think is an excellent choice due to his wound/skin conditions.  Considered a Solu-Medrol Dosepak following administration of ABX however, patient refused.    4/14/2021: Clinic visit with QUINN Pennington. Patient states that they are feeling well today.  Patient denies fever, chills, nausea, vomiting, lightheadedness, dizziness, shortness of breath and chest pain.  Patient continues to have significant erythema to right lower extremity greater than left.  Patient reports that he does have an appointment with dermatology in the next week or so.  Patient has slightly denuded tissue to right lower extremity however patient does not have any deep open wounds to be treated at the clinic.  Patient would be better served at the dermatology clinic.        REVIEW OF SYSTEMS:   Review of Systems   Constitutional: Negative for chills and fever.   HENT: Positive for hearing loss.    Respiratory: Negative for cough and shortness of breath.         He is on supplemental oxygen, but states he is able to perform ADLs and household chores without SOP.   Cardiovascular: Positive for leg swelling. Negative for chest pain and claudication.        Swelling and both legs since about 2015, right leg is always been significantly worse than the left.   Gastrointestinal: Negative  for constipation, diarrhea, nausea and vomiting.   Genitourinary: Negative for dysuria.   Musculoskeletal: Positive for back pain and joint pain.   Skin:        Redness and flaking of skin to both lower legs for several years, sometimes itches and burns.   Psychiatric/Behavioral: Negative for depression. The patient does not have insomnia.          PHYSICAL EXAMINATION:   /75   Pulse 98   Temp 37 °C (98.6 °F)   Resp 20   SpO2 90%     Physical Exam  Constitutional:       Appearance: Normal appearance. He is obese.   HENT:      Head: Normocephalic.   Eyes:      Pupils: Pupils are equal, round, and reactive to light.   Cardiovascular:      Pulses: Normal pulses.   Pulmonary:      Effort: Pulmonary effort is normal.      Comments: On supplemental O2  Musculoskeletal:      Right lower leg: Edema present.      Left lower leg: Edema present.      Comments: 2+ edema of right lower extremity  1+ edema of left lower extremity  Uses walker for ambulation   Skin:     General: Skin is warm.      Findings: Erythema present.      Comments: Chronic skin changes to bilateral lower extremities-hemosiderin staining, edema, scarring, lipodermatosclerosis.  Findings consistent with CVI    Right lower extremity with erythema from just below knee to foot, areas of denudation tissue  Toenails mycotic and overgrown    Left lower extremity with increased erythema and what appears to be fungal rash    Neurological:      Mental Status: He is alert.         WOUND ASSESSMENT        Wound 02/10/21 Partial Thickness Wound Right Lateral/Anterior/Medial LE Denuded Skin (Active)   Wound Image   04/14/21 1000   Site Assessment Pink;Red;Fragile 04/14/21 1000   Periwound Assessment Denuded;Edema;Fragile 04/14/21 1000   Margins Undefined edges 04/14/21 1000   Drainage Amount Large 04/14/21 1000   Drainage Description Serosanguineous 04/14/21 1000   Treatments Cleansed;Topical Lidocaine;Provider debridement;Site care 04/14/21 1000   Wound  Cleansing Foam Cleanser/Washcloth 04/14/21 1000   Periwound Protectant Barrier Paste;Antifungal Therapy 04/14/21 1000   Dressing Cleansing/Solutions Not Applicable 04/14/21 1000   Dressing Options Hydrofiber;Dry Roll Gauze;Hypafix Tape;Tubigrip 04/14/21 1000   Dressing Changed New 04/14/21 1000   Dressing Status Clean;Dry;Intact 04/14/21 1000   Dressing Change/Treatment Frequency Every 48 hrs, and As Needed 03/17/21 0900   Non-staged Wound Description Partial thickness 04/14/21 1000   Wound Length (cm) 1.7 cm 02/10/21 1100   Wound Width (cm) 1.5 cm 02/10/21 1100   Wound Depth (cm) 0 cm 02/10/21 1100   Wound Surface Area (cm^2) 2.55 cm^2 02/10/21 1100   Wound Volume (cm^3) 0 cm^3 02/10/21 1100   Wound Bed Slough (%) 0 % 03/31/21 0859   Tunneling (cm) 0 cm 04/14/21 1000   Undermining (cm) 0 cm 04/14/21 1000   Wound Odor None 04/14/21 1000   Pulses Right;2+;DP;PT;Doppler 02/10/21 1100   Exposed Structures None 04/14/21 1000       Wound 03/31/21 LLE - Denuded (Active)   Wound Image   04/14/21 1000   Site Assessment Pink;Fragile 04/14/21 1000   Periwound Assessment Edema;Fragile;Denuded 04/14/21 1000   Margins Undefined edges 04/14/21 1000   Drainage Amount Moderate 04/14/21 1000   Drainage Description Serous 04/14/21 1000   Treatments Cleansed;Site care 04/14/21 1000   Wound Cleansing Foam Cleanser/Washcloth 04/14/21 1000   Periwound Protectant Barrier Paste;Antifungal Therapy 04/14/21 1000   Dressing Cleansing/Solutions Not Applicable 04/14/21 1000   Dressing Options Hydrofiber;Dry Roll Gauze;Hypafix Tape;Tubigrip 04/14/21 1000   Dressing Changed New 04/14/21 1000   Dressing Status Clean;Dry;Intact 04/14/21 1000   Non-staged Wound Description Partial thickness 04/14/21 1000   Tunneling (cm) 0 cm 04/14/21 1000   Undermining (cm) 0 cm 04/14/21 1000   Wound Odor None 04/14/21 1000   Exposed Structures None 04/14/21 1000             PROCEDURE:  -Patient assessed in clinic today  - no need for debridement.   -Wound care  completed by wound RN, refer to wound flowsheet   -Patient tolerated the procedure well, without c/o of significant pain or discomfort.       Pertinent Labs and Diagnostics:    Labs:     IMAGING: N/A    VASCULAR STUDIES:   1/22/2021-venous ultrasound  RIGHT:   Chronic partial thrombus observed in the distal femoral & popliteal veins.     No acute dvt.   No deep vein reflux greater than 1 sec.   No greater saphenous vein reflux greater than 0.5 seconds.   Small saphenous vein reflux for maximum 1350 msec.   No obvious reflux in the varicose veins in the calf or a  at the    ankle.   LEFT:   All veins are fully compressible, no superficial or deep vein thrombus.   Deep vein reflux in the common femoral vein for 2150 msec with Valsalva    maneuver.   No reflux in the greater saphenous vein greater than 0.5 seconds.   No small saphenous vein reflux.   Reflux in a varicose vein at the ankle for 1350 msec.    1/22/2021-US-JAY  Findings   Doppler waveforms of the common femoral arteries are high amplitude and    triphasic.    Doppler waveforms of the popliteal arteries are high amplitude and    triphasic.    Doppler waveforms at the ankle are brisk and triphasic.    Ankle-brachial index is normal.    There is no evidence of major arterial disease demonstrated bilaterally.     LAST  WOUND CULTURE:  DATE : 1/28/2021         Culture Result Abnormal   Staphylococcus aureus   Heavy growth   This isolate is presumed to be clindamycin resistant based on   detection of inducible resistance.  Clindamycin may still   be effective in some patients.     Culture Result Abnormal   Salmonella species   Light growth         ASSESSMENT AND PLAN:   1. Leg ulcer, right, limited to breakdown of skin (HCC)  Comments: History of DVT in this extremity, since then problems with chronic edema, weeping of skin, cellulitis, shallow wounds.  Unable to tolerate compression for more than a few days.  Arterial studies show no major arterial  disease.  Venous studies show no obvious reflux right lower extremity.    04/14/21:  Patient's wounds are very superficial and diffuse.  No need for debridement.  Wounds are not consistent with venous stasis ulcers due to the diffuse nature and location.  -Reviewed with patient on next clinic visit if he has received his lymphedema pump was supposed to meet with a representative around 2/2/2021.  Wound care: Hydrofiber, dry roll gauze, high fixed tape, Tubigrip E x2 patient will need to remove dressings for dermatology to assessed tomorrow.  Patient has home health on Friday can replace higher level of compression at that time.    2. Cellulitis of lower extremity, unspecified laterality  Comments: Chronic recurring infections of lower extremity    4/14/2021: Most recent culture positive for MSSA and Salmonella.  He was prescribed Bactrim first, unable to tolerate, prescribed Augmentin on 2/9/2021.  Patient's right lower extremity was again cultured on 3/4/2021 he was positive for MSSA and Salmonella again.  -Patient has finished his prescription for amoxicillin  -Patient placed on a 14-day course of doxycycline to treat suspected MSSA infection    3. Venous stasis dermatitis of right lower extremity  Comments: Chronic skin changes to lower extremities.  Thickening and scaling of skin around ankles and toes.  Redness and flaking to remaining skin of legs    4/14/2021:  -Nystatin powder with zinc cream to be placed to right lower extremity.  Urea cream and AmLactin are also viable choices for continued thickening and scaling if it proceeds following resolution of fungal component.  -Patient's right lower extremity denuded tissue and erythema continue to wax and wane without significant progress.  -Patient to see dermatology on 4/15/2021    4.  Controlled type 2 diabetes mellitus without complication, without long-term current use of insulin  Comments: Patient restarted on Ozempic in January 21.  A1c not available in  epic.    Patient instructed to keep tight control over FBS, <140 for optimal wound healing; Implications of loss of protective sensation (LOPS) discussed with patient, including increased risk for amputation.  Advised to check feet at least daily, moisturize feet, and to always wear protective foot wear, arrange meticulous regular foot care by podiatrist or [facility]. Pt with good understanding.       5. Onychomycosis    04/14/21: All toenails mycotic and overgrown.  Patient is not able, nor should he attempt to trim his own toenails.  He is at risk for self injury, which could progress to amputation.  -Patient has previously been referred to podiatry        PATIENT EDUCATION    - Importance of managing edema for healing of ulcer, and for treatment and prevention of skin breakdown  -Discussed need for lifelong compression of lower legs   -Elevate legs above the level of the heart periodically throughout the day.  - Importance of adequate nutrition for wound healing    > 10 min spent face to face with patient, >50% of time spent counseling, coordinating care, reviewing records, discussing POC, educating patient in excess of procedure time.          Please note that this note may have been created using voice recognition software. I have worked with technical experts from [employer] to optimize the interface.  I have made every reasonable attempt to correct obvious errors, but there may be errors of grammar and possibly     N

## 2021-04-14 NOTE — PATIENT INSTRUCTIONS
-Keep your wound dressing clean, dry, and intact.    -Change your dressing if it becomes soiled, soaked, or falls off.    -Should you experience any significant changes in your wound(s), such as infection (redness, swelling, localized heat, increased pain, fever > 101 F, chills) or have any questions regarding your home care instructions, please contact the wound center at [phone]. If after hours, contact your primary care physician or go to the hospital emergency room.

## 2021-04-15 ENCOUNTER — APPOINTMENT (RX ONLY)
Dept: URBAN - METROPOLITAN AREA CLINIC 4 | Facility: CLINIC | Age: 76
Setting detail: DERMATOLOGY
End: 2021-04-15

## 2021-04-15 DIAGNOSIS — I87.2 VENOUS INSUFFICIENCY (CHRONIC) (PERIPHERAL): ICD-10-CM

## 2021-04-15 LAB
BACTERIA WND AEROBE CULT: ABNORMAL
SIGNIFICANT IND 70042: ABNORMAL
SITE SITE: ABNORMAL
SOURCE SOURCE: ABNORMAL

## 2021-04-15 PROCEDURE — ? PRESCRIPTION

## 2021-04-15 PROCEDURE — ? ORDER TESTS

## 2021-04-15 PROCEDURE — 99203 OFFICE O/P NEW LOW 30 MIN: CPT

## 2021-04-15 PROCEDURE — ? TREATMENT REGIMEN

## 2021-04-15 RX ORDER — TRIAMCINOLONE ACETONIDE 1 MG/G
OINTMENT TOPICAL
Qty: 1 | Refills: 9 | Status: ERX | COMMUNITY
Start: 2021-04-15

## 2021-04-15 RX ADMIN — TRIAMCINOLONE ACETONIDE: 1 OINTMENT TOPICAL at 00:00

## 2021-04-15 ASSESSMENT — LOCATION SIMPLE DESCRIPTION DERM
LOCATION SIMPLE: RIGHT PRETIBIAL REGION
LOCATION SIMPLE: LEFT PRETIBIAL REGION

## 2021-04-15 ASSESSMENT — LOCATION DETAILED DESCRIPTION DERM
LOCATION DETAILED: RIGHT PROXIMAL PRETIBIAL REGION
LOCATION DETAILED: LEFT PROXIMAL PRETIBIAL REGION
LOCATION DETAILED: RIGHT DISTAL PRETIBIAL REGION

## 2021-04-15 ASSESSMENT — LOCATION ZONE DERM: LOCATION ZONE: LEG

## 2021-04-15 NOTE — PROCEDURE: ORDER TESTS
Billing Type: Third-Party Bill
Expected Date Of Service: 04/15/2021
Performing Laboratory: [id number]
Bill For Surgical Tray: no

## 2021-04-15 NOTE — PROCEDURE: TREATMENT REGIMEN
Initiate Treatment: Apply triamcinalone to bilateral legs bid \\nPt instructed to wash with soap and water daily and keep lower legs moist with TAC\\nPt to return in two weeks
Detail Level: Zone

## 2021-04-15 NOTE — HPI: WOUND, LOWER EXTREMITY
Is The Wound New Or Recurrent?: recurrent
How Severe Is It?: severe
How Is Your Wound Healing?: healing poorly
Is This A New Presentation, Or A Follow-Up?: Lower Extremity Wound

## 2021-04-16 ENCOUNTER — HOME CARE VISIT (OUTPATIENT)
Dept: HOME HEALTH SERVICES | Facility: HOME HEALTHCARE | Age: 76
End: 2021-04-16
Payer: MEDICARE

## 2021-04-16 PROCEDURE — G0493 RN CARE EA 15 MIN HH/HOSPICE: HCPCS

## 2021-04-19 ENCOUNTER — HOME CARE VISIT (OUTPATIENT)
Dept: HOME HEALTH SERVICES | Facility: HOME HEALTHCARE | Age: 76
End: 2021-04-19
Payer: MEDICARE

## 2021-04-19 ENCOUNTER — PATIENT OUTREACH (OUTPATIENT)
Dept: HEALTH INFORMATION MANAGEMENT | Facility: OTHER | Age: 76
End: 2021-04-19

## 2021-04-19 VITALS
SYSTOLIC BLOOD PRESSURE: 122 MMHG | DIASTOLIC BLOOD PRESSURE: 56 MMHG | TEMPERATURE: 98.2 F | HEART RATE: 84 BPM | RESPIRATION RATE: 18 BRPM | OXYGEN SATURATION: 98 %

## 2021-04-19 VITALS
RESPIRATION RATE: 18 BRPM | TEMPERATURE: 98.9 F | HEART RATE: 71 BPM | OXYGEN SATURATION: 91 % | SYSTOLIC BLOOD PRESSURE: 150 MMHG | DIASTOLIC BLOOD PRESSURE: 56 MMHG

## 2021-04-19 PROCEDURE — G0299 HHS/HOSPICE OF RN EA 15 MIN: HCPCS

## 2021-04-19 PROCEDURE — A4927 NON-STERILE GLOVES: HCPCS

## 2021-04-19 ASSESSMENT — ENCOUNTER SYMPTOMS
NAUSEA: NO
SEVERE DYSPNEA: 1
VOMITING: NO
MUSCLE WEAKNESS: 1

## 2021-04-19 ASSESSMENT — ACTIVITIES OF DAILY LIVING (ADL)
AMBULATION ASSISTANCE: STAND BY ASSIST
CURRENT_FUNCTION: STAND BY ASSIST

## 2021-04-19 NOTE — Clinical Note
TOMMYANA LAURA  Pt was seen by Dr Morales, dermatologist, 4/15/21. Reported he did another culture and RICKIE was negative for fungus. Pt and wife following BLE wound care per dermatology, which is generous application of Triamcinolone 0.1% cream over BLE twice daily. May use xeroform for comfort if needed, but pt has not been using any. Using Tubigrip E on LLE and F on RLE for some compression. Today, LLE dry. No redness. Deep hemosiderin staining BLE. Minimal moisture RLE. Still some edema, especially around foot and ankle. Bright redness resolved. Pt doesn't want to get his hopes up, but results are good so far. A week ago his legs looked the best they had but by the time he got to [facility] two days later they were bad again. Previous orders on hold while pt and wife following dermatology instructions. Has FU carlos't with dermatology for 4/29/21.

## 2021-04-19 NOTE — PROGRESS NOTES
Outcome: Pt called back and declined to schedule. Not interested.     Please transfer to Patient Outreach Team at [phone] when patient returns call.      [facility] Verified: yes    Attempt # 2

## 2021-04-19 NOTE — PROGRESS NOTES
Outcome: Left Message    Please transfer to Patient Outreach Team at [phone] when patient returns call.    Web[employer] Checked & Epic Updated:  no    [facility] Verified: no    Attempt # 1

## 2021-04-20 ENCOUNTER — RX ONLY (OUTPATIENT)
Age: 76
Setting detail: RX ONLY
End: 2021-04-20

## 2021-04-20 RX ORDER — LANCETS 28 GAUGE
EACH MISCELLANEOUS
Qty: 100 EACH | Refills: 3 | Status: SHIPPED | OUTPATIENT
Start: 2021-04-20

## 2021-04-20 RX ORDER — SILVER SULFADIAZINE 10 MG/G
CREAM TOPICAL
Qty: 1 | Refills: 2 | Status: ERX | COMMUNITY
Start: 2021-04-20

## 2021-04-21 DIAGNOSIS — M79.89 RIGHT LEG SWELLING: ICD-10-CM

## 2021-04-21 DIAGNOSIS — L03.115 CELLULITIS OF RIGHT LOWER EXTREMITY: ICD-10-CM

## 2021-04-21 DIAGNOSIS — I73.9 PVD (PERIPHERAL VASCULAR DISEASE) (HCC): ICD-10-CM

## 2021-04-21 RX ORDER — TRIAMCINOLONE ACETONIDE 1 MG/G
1 OINTMENT TOPICAL 2 TIMES DAILY
Qty: 453.6 G | Refills: 5 | Status: SHIPPED | OUTPATIENT
Start: 2021-04-21 | End: 2021-05-04

## 2021-04-21 NOTE — PROGRESS NOTES
noted  ----- Message -----  From: Valeria Berger R.N.  Sent: 4/19/2021   7:33 PM PDT  To: HERBIE Quinn  Pt was seen by Dr Morales, dermatologist, 4/15/21. Reported he did another culture and RICKIE was negative for fungus. Pt and wife following BLE wound care per dermatology, which is generous application of Triamcinolone 0.1% cream over BLE twice daily. May use xeroform for comfort if needed, but pt has not been using any. Using Tubigrip E on LLE and F on RLE for some compression. Today, LLE dry. No redness. Deep hemosiderin staining BLE. Minimal moisture RLE. Still some edema, especially around foot and ankle. Bright redness resolved. Pt doesn't want to get his hopes up, but results are good so far. A week ago his legs looked the best they had but by the time he got to [facility] two days later they were bad again. Previous orders on hold while pt and wife following dermatology instructions. Has FU carlos't with dermatology for 4/29/21.

## 2021-04-22 ENCOUNTER — HOME CARE VISIT (OUTPATIENT)
Dept: HOME HEALTH SERVICES | Facility: HOME HEALTHCARE | Age: 76
End: 2021-04-22
Payer: MEDICARE

## 2021-04-22 VITALS
DIASTOLIC BLOOD PRESSURE: 60 MMHG | RESPIRATION RATE: 18 BRPM | HEART RATE: 81 BPM | TEMPERATURE: 97.8 F | OXYGEN SATURATION: 98 % | SYSTOLIC BLOOD PRESSURE: 136 MMHG

## 2021-04-22 PROCEDURE — G0493 RN CARE EA 15 MIN HH/HOSPICE: HCPCS

## 2021-04-23 DIAGNOSIS — E11.9 TYPE 2 DIABETES MELLITUS WITHOUT COMPLICATION, WITHOUT LONG-TERM CURRENT USE OF INSULIN (HCC): ICD-10-CM

## 2021-04-23 ASSESSMENT — ENCOUNTER SYMPTOMS: MUSCLE WEAKNESS: 1

## 2021-04-23 NOTE — PROGRESS NOTES
Received request via: Patient    Was the patient seen in the last year in this department? Yes    Does the patient have an active prescription (recently filled or refills available) for medication(s) requested? No     Pharmacy didn't like the order you sent, I pended a new order.

## 2021-04-26 ENCOUNTER — HOME CARE VISIT (OUTPATIENT)
Dept: HOME HEALTH SERVICES | Facility: HOME HEALTHCARE | Age: 76
End: 2021-04-26
Payer: MEDICARE

## 2021-04-26 NOTE — Clinical Note
Collin is one that referred the pt to them. Why are they not sending him report. Not expecting an answer but frustrating. Legs look better.  ----- Message -----  From: Collin Schwab P.A.-C.  Sent: 4/27/2021  10:19 AM PDT  To: Valeria Berger R.N.      I have been trying in vein to get medical records from [facility].  I do not like that office.  I have called the representative for medical records and have received nothing.  I am thoroughly frustrated with them.  Enoch reached out to me about getting medical records and I am unable to.  Please let him know that.  I am out of the loop because of their incompetence.  Thank you, Isabela Polanco  ----- Message -----  From: Valeria Berger R.N.  Sent: 4/26/2021   6:38 PM PDT  To: Collin Schwab P.A.-C.    Please send to Dr Johan Morales Jr, dermatologist    Spoke with pt. Pt states dermatology office called him to tell him salmonella was higher on last culture. Spoke about another prescription and said they would call him back. He has not had a call back or notification that a prescription needed to be picked up. Dermatology office sent orders to home health for Silvadene in am and Triamcinolone at HS to BLE beginning 4/22/21 for two weeks. Right now, pt and wife are doing the skin care and using Tubigrip for some compression. No need for SN  visit today. Pt will call Dermatology this morning and get clarification. Pt has carlos't with Dermatology 4/29/30. Pt rescheduled his carlos't with [facility] until more is known.

## 2021-04-26 NOTE — Clinical Note
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  ----- Message -----  From: Collin Schwab P.A.-C.  Sent: 4/27/2021  10:19 AM PDT  To: Valeria Berger R.N.      I have been trying in vein to get medical records from [facility].  I do not like that office.  I have called the representative for medical records and have received nothing.  I am thoroughly frustrated with them.  Enoch reached out to me about getting medical records and I am unable to.  Please let him know that.  I am out of the loop because of their incompetence.  Thank you, Isabela Polanco  ----- Message -----  From: Valerai Berger R.N.  Sent: 4/26/2021   6:38 PM PDT  To: Collin Schwab P.A.-C.    Please send to Dr Johan Morales Jr, dermatologist    Spoke with pt. Pt states dermatology office called him to tell him salmonella was higher on last culture. Spoke about another prescription and said they would call him back. He has not had a call back or notification that a prescription needed to be picked up. Dermatology office sent orders to home health for Silvadene in am and Triamcinolone at HS to BLE beginning 4/22/21 for two weeks. Right now, pt and wife are doing the skin care and using Tubigrip for some compression. No need for SN HH visit today. Pt will call Dermatology this morning and get clarification. Pt has carlos't with Dermatology 4/29/30. Pt rescheduled his carlos't with [facility] until more is known.

## 2021-04-26 NOTE — Clinical Note
Please send to Dr Johan Morales Jr, dermatologist    Spoke with pt. Pt states dermatology office called him to tell him salmonella was higher on last culture. Spoke about another prescription and said they would call him back. He has not had a call back or notification that a prescription needed to be picked up. Dermatology office sent orders to home health for Silvadene in am and Triamcinolone at HS to BLE beginning 4/22/21 for two weeks. Right now, pt and wife are doing the skin care and using Tubigrip for some compression. No need for SN  visit today. Pt will call Dermatology this morning and get clarification. Pt has carlos't with Dermatology 4/29/30. Pt rescheduled his carlos't with [facility] until more is known.

## 2021-04-27 NOTE — CASE COMMUNICATION
noted  ----- Message -----  From: Valeria Berger R.N.  Sent: 4/26/2021   6:38 PM PDT  To: Sonali Chau R.N.      Please send to Dr Johan Morales Jr, dermatologist    Spoke with pt. Pt states dermatology office called him to tell him salmonella was higher on last culture. Spoke about another prescription and said they would call him back. He has not had a call back or notification that a prescription needed to be picked up. Dermatology office sent orders to home health for Silvadene in am and Triamcinolone at HS to BLE beginning 4/22/21 for two weeks. Right now, pt and wife are doing the skin care and using Tubigrip for some compression. No need for SN  visit today. Pt will call Dermatology this morning and get clarification. Pt has carlos't with Dermatology 4/29/30. Pt rescheduled his carlos't with [facility] until more is known.

## 2021-04-28 NOTE — CASE COMMUNICATION
noted  ----- Message -----  From: Valeria Berger R.N.  Sent: 4/27/2021   4:14 PM PDT  To: Sonali Chau R.N., Brigida Tomlinson R.N.  Subject: FW:                                                [facility] is one that referred the pt to them. Why are they not sending him report. Not expecting an answer but frustrating. Legs look better.  ----- Message -----  From: Collin Schwab P.A.-C.  Sent: 4/27/2021  10:19 AM PDT  To: Valeria Berger R.N.      I have been trying in vein to get medical records from [facility].  I do not like that office.  I have called the representative for medical records and have received nothing.  I am thoroughly frustrated with them.  Enoch reached out to me about getting medical records and I am unable to.  Please let him know that.  I am out of the loop because of their incompetence.  Thank you, IsabelaCollin  ----- Message -----  From: Valeria Berger R.N.  Sent: 4/26/2021   6:38 PM PDT  To: ABBY Da Silva.JOHN    Please send to Dr Johan Morales Jr, dermatologist    Spoke with pt. Pt states dermatology office called him to tell him salmonella was higher on last culture. Spoke about another prescription and said they would call him back. He has not had a call back or notification that a prescription needed to be picked up. Dermatology office sent orders to home health for Silvadene in am and Triamcinolone at HS to BLE beginning 4/22/21 for two weeks. Right now, pt and wife are doing the skin care and using Tubigrip for some compression. No need for SN  visit today. Pt will call Dermatology this morning and get clarification. Pt has carlos't with Dermatology 4/29/30. Pt rescheduled his carlos't with [facility] until more is known.

## 2021-04-29 ENCOUNTER — APPOINTMENT (RX ONLY)
Dept: URBAN - METROPOLITAN AREA CLINIC 4 | Facility: CLINIC | Age: 76
Setting detail: DERMATOLOGY
End: 2021-04-29

## 2021-04-29 DIAGNOSIS — I87.2 VENOUS INSUFFICIENCY (CHRONIC) (PERIPHERAL): ICD-10-CM

## 2021-04-29 PROCEDURE — 99213 OFFICE O/P EST LOW 20 MIN: CPT

## 2021-04-29 PROCEDURE — ? TREATMENT REGIMEN

## 2021-04-29 PROCEDURE — ? MEDICATION COUNSELING

## 2021-04-29 PROCEDURE — ? PRESCRIPTION

## 2021-04-29 RX ORDER — TRIAMCINOLONE ACETONIDE 1 MG/G
CREAM TOPICAL BID
Qty: 1 | Refills: 11 | Status: ERX | COMMUNITY
Start: 2021-04-29

## 2021-04-29 RX ADMIN — TRIAMCINOLONE ACETONIDE: 1 CREAM TOPICAL at 00:00

## 2021-04-29 ASSESSMENT — LOCATION SIMPLE DESCRIPTION DERM
LOCATION SIMPLE: RIGHT PRETIBIAL REGION
LOCATION SIMPLE: LEFT PRETIBIAL REGION

## 2021-04-29 ASSESSMENT — LOCATION DETAILED DESCRIPTION DERM
LOCATION DETAILED: RIGHT PROXIMAL PRETIBIAL REGION
LOCATION DETAILED: LEFT DISTAL PRETIBIAL REGION

## 2021-04-29 ASSESSMENT — LOCATION ZONE DERM: LOCATION ZONE: LEG

## 2021-04-29 NOTE — PROCEDURE: TREATMENT REGIMEN
Modify Regimen: Better leg compression. Continue with current therapy as both legs continue to heal .\\nPt advised to dc TAC on the left leg. Continue to use TAC to the right lower leg as directed.  No need for silvadene cream.
Detail Level: Zone

## 2021-04-29 NOTE — PROCEDURE: MEDICATION COUNSELING
Azithromycin Pregnancy And Lactation Text: This medication is considered safe during pregnancy and is also secreted in breast milk.
Hydroxychloroquine Pregnancy And Lactation Text: This medication has been shown to cause fetal harm but it isn't assigned a Pregnancy Risk Category. There are small amounts excreted in breast milk.
Taltz Counseling: I discussed with the patient the risks of ixekizumab including but not limited to immunosuppression, serious infections, worsening of inflammatory bowel disease and drug reactions.  The patient understands that monitoring is required including a PPD at baseline and must alert us or the primary physician if symptoms of infection or other concerning signs are noted.
Rhofade Pregnancy And Lactation Text: This medication has not been assigned a Pregnancy Risk Category. It is unknown if the medication is excreted in breast milk.
Enbrel Counseling:  I discussed with the patient the risks of etanercept including but not limited to myelosuppression, immunosuppression, autoimmune hepatitis, demyelinating diseases, lymphoma, and infections.  The patient understands that monitoring is required including a PPD at baseline and must alert us or the primary physician if symptoms of infection or other concerning signs are noted.
Doxepin Counseling:  Patient advised that the medication is sedating and not to drive a car after taking this medication. Patient informed of potential adverse effects including but not limited to dry mouth, urinary retention, and blurry vision.  The patient verbalized understanding of the proper use and possible adverse effects of doxepin.  All of the patient's questions and concerns were addressed.
Acitretin Pregnancy And Lactation Text: This medication is Pregnancy Category X and should not be given to women who are pregnant or may become pregnant in the future. This medication is excreted in breast milk.
Sarecycline Counseling: Patient advised regarding possible photosensitivity and discoloration of the teeth, skin, lips, tongue and gums.  Patient instructed to avoid sunlight, if possible.  When exposed to sunlight, patients should wear protective clothing, sunglasses, and sunscreen.  The patient was instructed to call the office immediately if the following severe adverse effects occur:  hearing changes, easy bruising/bleeding, severe headache, or vision changes.  The patient verbalized understanding of the proper use and possible adverse effects of sarecycline.  All of the patient's questions and concerns were addressed.
Drysol Pregnancy And Lactation Text: This medication is considered safe during pregnancy and breast feeding.
SSKI Counseling:  I discussed with the patient the risks of SSKI including but not limited to thyroid abnormalities, metallic taste, GI upset, fever, headache, acne, arthralgias, paraesthesias, lymphadenopathy, easy bleeding, arrhythmias, and allergic reaction.
Libtayo Counseling- I discussed with the patient the risks of Libtayo including but not limited to nausea, vomiting, diarrhea, and bone or muscle pain.  The patient verbalized understanding of the proper use and possible adverse effects of Libtayo.  All of the patient's questions and concerns were addressed.
Bactrim Counseling:  I discussed with the patient the risks of sulfa antibiotics including but not limited to GI upset, allergic reaction, drug rash, diarrhea, dizziness, photosensitivity, and yeast infections.  Rarely, more serious reactions can occur including but not limited to aplastic anemia, agranulocytosis, methemoglobinemia, blood dyscrasias, liver or kidney failure, lung infiltrates or desquamative/blistering drug rashes.
Enbrel Pregnancy And Lactation Text: This medication is Pregnancy Category B and is considered safe during pregnancy. It is unknown if this medication is excreted in breast milk.
Taltz Pregnancy And Lactation Text: The risk during pregnancy and breastfeeding is uncertain with this medication.
Arava Counseling:  Patient counseled regarding adverse effects of Arava including but not limited to nausea, vomiting, abnormalities in liver function tests. Patients may develop mouth sores, rash, diarrhea, and abnormalities in blood counts. The patient understands that monitoring is required including LFTs and blood counts.  There is a rare possibility of scarring of the liver and lung problems that can occur when taking methotrexate. Persistent nausea, loss of appetite, pale stools, dark urine, cough, and shortness of breath should be reported immediately. Patient advised to discontinue Arava treatment and consult with a physician prior to attempting conception. The patient will have to undergo a treatment to eliminate Arava from the body prior to conception.
Doxepin Pregnancy And Lactation Text: This medication is Pregnancy Category C and it isn't known if it is safe during pregnancy. It is also excreted in breast milk and breast feeding isn't recommended.
Sski Pregnancy And Lactation Text: This medication is Pregnancy Category D and isn't considered safe during pregnancy. It is excreted in breast milk.
Bexarotene Counseling:  I discussed with the patient the risks of bexarotene including but not limited to hair loss, dry lips/skin/eyes, liver abnormalities, hyperlipidemia, pancreatitis, depression/suicidal ideation, photosensitivity, drug rash/allergic reactions, hypothyroidism, anemia, leukopenia, infection, cataracts, and teratogenicity.  Patient understands that they will need regular blood tests to check lipid profile, liver function tests, white blood cell count, thyroid function tests and pregnancy test if applicable.
Elidel Counseling: Patient may experience a mild burning sensation during topical application. Elidel is not approved in children less than 2 years of age. There have been case reports of hematologic and skin malignancies in patients using topical calcineurin inhibitors although causality is questionable.
Bactrim Pregnancy And Lactation Text: This medication is Pregnancy Category D and is known to cause fetal risk.  It is also excreted in breast milk.
Solaraze Counseling:  I discussed with the patient the risks of Solaraze including but not limited to erythema, scaling, itching, weeping, crusting, and pain.
Libtayo Pregnancy And Lactation Text: This medication is contraindicated in pregnancy and when breast feeding.
Sarecycline Pregnancy And Lactation Text: This medication is Pregnancy Category D and not consider safe during pregnancy. It is also excreted in breast milk.
Arava Pregnancy And Lactation Text: This medication is Pregnancy Category X and is absolutely contraindicated during pregnancy. It is unknown if it is excreted in breast milk.
Tremfya Counseling: I discussed with the patient the risks of guselkumab including but not limited to immunosuppression, serious infections, and drug reactions.  The patient understands that monitoring is required including a PPD at baseline and must alert us or the primary physician if symptoms of infection or other concerning signs are noted.
Thalidomide Counseling: I discussed with the patient the risks of thalidomide including but not limited to birth defects, anxiety, weakness, chest pain, dizziness, cough and severe allergy.
Bexarotene Pregnancy And Lactation Text: This medication is Pregnancy Category X and should not be given to women who are pregnant or may become pregnant. This medication should not be used if you are breast feeding.
Elidel Pregnancy And Lactation Text: This medication is Pregnancy Category C. It is unknown if this medication is excreted in breast milk.
Solaraze Pregnancy And Lactation Text: This medication is Pregnancy Category B and is considered safe. There is some data to suggest avoiding during the third trimester. It is unknown if this medication is excreted in breast milk.
Hydroxyzine Counseling: Patient advised that the medication is sedating and not to drive a car after taking this medication.  Patient informed of potential adverse effects including but not limited to dry mouth, urinary retention, and blurry vision.  The patient verbalized understanding of the proper use and possible adverse effects of hydroxyzine.  All of the patient's questions and concerns were addressed.
Humira Counseling:  I discussed with the patient the risks of adalimumab including but not limited to myelosuppression, immunosuppression, autoimmune hepatitis, demyelinating diseases, lymphoma, and serious infections.  The patient understands that monitoring is required including a PPD at baseline and must alert us or the primary physician if symptoms of infection or other concerning signs are noted.
Cephalexin Counseling: I counseled the patient regarding use of cephalexin as an antibiotic for prophylactic and/or therapeutic purposes. Cephalexin (commonly prescribed under brand name Keflex) is a cephalosporin antibiotic which is active against numerous classes of bacteria, including most skin bacteria. Side effects may include nausea, diarrhea, gastrointestinal upset, rash, hives, yeast infections, and in rare cases, hepatitis, kidney disease, seizures, fever, confusion, neurologic symptoms, and others. Patients with severe allergies to penicillin medications are cautioned that there is about a 10% incidence of cross-reactivity with cephalosporins. When possible, patients with penicillin allergies should use alternatives to cephalosporins for antibiotic therapy.
Niacinamide Counseling: I recommended taking niacin or niacinamide, also know as vitamin B3, twice daily. Recent evidence suggests that taking vitamin B3 (500 mg twice daily) can reduce the risk of actinic keratoses and non-melanoma skin cancers. Side effects of vitamin B3 include flushing and headache.
Tetracycline Counseling: Patient counseled regarding possible photosensitivity and increased risk for sunburn.  Patient instructed to avoid sunlight, if possible.  When exposed to sunlight, patients should wear protective clothing, sunglasses, and sunscreen.  The patient was instructed to call the office immediately if the following severe adverse effects occur:  hearing changes, easy bruising/bleeding, severe headache, or vision changes.  The patient verbalized understanding of the proper use and possible adverse effects of tetracycline.  All of the patient's questions and concerns were addressed. Patient understands to avoid pregnancy while on therapy due to potential birth defects.
Clofazimine Counseling:  I discussed with the patient the risks of clofazimine including but not limited to skin and eye pigmentation, liver damage, nausea/vomiting, gastrointestinal bleeding and allergy.
Isotretinoin Counseling: Patient should get monthly blood tests, not donate blood, not drive at night if vision affected, not share medication, and not undergo elective surgery for 6 months after tx completed. Side effects reviewed, pt to contact office should one occur.
Eucrisa Counseling: Patient may experience a mild burning sensation during topical application. Eucrisa is not approved in children less than 2 years of age.
Hydroxyzine Pregnancy And Lactation Text: This medication is not safe during pregnancy and should not be taken. It is also excreted in breast milk and breast feeding isn't recommended.
Topical Retinoid counseling:  Patient advised to apply a pea-sized amount only at bedtime and wait 30 minutes after washing their face before applying.  If too drying, patient may add a non-comedogenic moisturizer. The patient verbalized understanding of the proper use and possible adverse effects of retinoids.  All of the patient's questions and concerns were addressed.
Niacinamide Pregnancy And Lactation Text: These medications are considered safe during pregnancy.
Xeljanz Counseling: I discussed with the patient the risks of Xeljanz therapy including increased risk of infection, liver issues, headache, diarrhea, or cold symptoms. Live vaccines should be avoided. They were instructed to call if they have any problems.
Cephalexin Pregnancy And Lactation Text: This medication is Pregnancy Category B and considered safe during pregnancy.  It is also excreted in breast milk but can be used safely for shorter doses.
Clofazimine Pregnancy And Lactation Text: This medication is Pregnancy Category C and isn't considered safe during pregnancy. It is excreted in breast milk.
Isotretinoin Pregnancy And Lactation Text: This medication is Pregnancy Category X and is considered extremely dangerous during pregnancy. It is unknown if it is excreted in breast milk.
Ilumya Counseling: I discussed with the patient the risks of tildrakizumab including but not limited to immunosuppression, malignancy, posterior leukoencephalopathy syndrome, and serious infections.  The patient understands that monitoring is required including a PPD at baseline and must alert us or the primary physician if symptoms of infection or other concerning signs are noted.
Tranexamic Acid Counseling:  Patient advised of the small risk of bleeding problems with tranexamic acid. They were also instructed to call if they developed any nausea, vomiting or diarrhea. All of the patient's questions and concerns were addressed.
Nsaids Counseling: NSAID Counseling: I discussed with the patient that NSAIDs should be taken with food. Prolonged use of NSAIDs can result in the development of stomach ulcers.  Patient advised to stop taking NSAIDs if abdominal pain occurs.  The patient verbalized understanding of the proper use and possible adverse effects of NSAIDs.  All of the patient's questions and concerns were addressed.
Eucrisa Pregnancy And Lactation Text: This medication has not been assigned a Pregnancy Risk Category but animal studies failed to show danger with the topical medication. It is unknown if the medication is excreted in breast milk.
Colchicine Counseling:  Patient counseled regarding adverse effects including but not limited to stomach upset (nausea, vomiting, stomach pain, or diarrhea).  Patient instructed to limit alcohol consumption while taking this medication.  Colchicine may reduce blood counts especially with prolonged use.  The patient understands that monitoring of kidney function and blood counts may be required, especially at baseline. The patient verbalized understanding of the proper use and possible adverse effects of colchicine.  All of the patient's questions and concerns were addressed.
Xelfloydz Pregnancy And Lactation Text: This medication is Pregnancy Category D and is not considered safe during pregnancy.  The risk during breast feeding is also uncertain.
Clindamycin Counseling: I counseled the patient regarding use of clindamycin as an antibiotic for prophylactic and/or therapeutic purposes. Clindamycin is active against numerous classes of bacteria, including skin bacteria. Side effects may include nausea, diarrhea, gastrointestinal upset, rash, hives, yeast infections, and in rare cases, colitis.
Tazorac Counseling:  Patient advised that medication is irritating and drying.  Patient may need to apply sparingly and wash off after an hour before eventually leaving it on overnight.  The patient verbalized understanding of the proper use and possible adverse effects of tazorac.  All of the patient's questions and concerns were addressed.
Albendazole Counseling:  I discussed with the patient the risks of albendazole including but not limited to cytopenia, kidney damage, nausea/vomiting and severe allergy.  The patient understands that this medication is being used in an off-label manner.
Tranexamic Acid Pregnancy And Lactation Text: It is unknown if this medication is safe during pregnancy or breast feeding.
High Dose Vitamin A Counseling: Side effects reviewed, pt to contact office should one occur.
Fluconazole Counseling:  Patient counseled regarding adverse effects of fluconazole including but not limited to headache, diarrhea, nausea, upset stomach, liver function test abnormalities, taste disturbance, and stomach pain.  There is a rare possibility of liver failure that can occur when taking fluconazole.  The patient understands that monitoring of LFTs and kidney function test may be required, especially at baseline. The patient verbalized understanding of the proper use and possible adverse effects of fluconazole.  All of the patient's questions and concerns were addressed.
Hydroquinone Counseling:  Patient advised that medication may result in skin irritation, lightening (hypopigmentation), dryness, and burning.  In the event of skin irritation, the patient was advised to reduce the amount of the drug applied or use it less frequently.  Rarely, spots that are treated with hydroquinone can become darker (pseudoochronosis).  Should this occur, patient instructed to stop medication and call the office. The patient verbalized understanding of the proper use and possible adverse effects of hydroquinone.  All of the patient's questions and concerns were addressed.
Clindamycin Pregnancy And Lactation Text: This medication can be used in pregnancy if certain situations. Clindamycin is also present in breast milk.
Xolair Counseling:  Patient informed of potential adverse effects including but not limited to fever, muscle aches, rash and allergic reactions.  The patient verbalized understanding of the proper use and possible adverse effects of Xolair.  All of the patient's questions and concerns were addressed.
Infliximab Counseling:  I discussed with the patient the risks of infliximab including but not limited to myelosuppression, immunosuppression, autoimmune hepatitis, demyelinating diseases, lymphoma, and serious infections.  The patient understands that monitoring is required including a PPD at baseline and must alert us or the primary physician if symptoms of infection or other concerning signs are noted.
Nsaids Pregnancy And Lactation Text: These medications are considered safe up to 30 weeks gestation. It is excreted in breast milk.
Tazorac Pregnancy And Lactation Text: This medication is not safe during pregnancy. It is unknown if this medication is excreted in breast milk.
Fluconazole Pregnancy And Lactation Text: This medication is Pregnancy Category C and it isn't know if it is safe during pregnancy. It is also excreted in breast milk.
Albendazole Pregnancy And Lactation Text: This medication is Pregnancy Category C and it isn't known if it is safe during pregnancy. It is also excreted in breast milk.
High Dose Vitamin A Pregnancy And Lactation Text: High dose vitamin A therapy is contraindicated during pregnancy and breast feeding.
Valtrex Counseling: I discussed with the patient the risks of valacyclovir including but not limited to kidney damage, nausea, vomiting and severe allergy.  The patient understands that if the infection seems to be worsening or is not improving, they are to call.
Doxycycline Counseling:  Patient counseled regarding possible photosensitivity and increased risk for sunburn.  Patient instructed to avoid sunlight, if possible.  When exposed to sunlight, patients should wear protective clothing, sunglasses, and sunscreen.  The patient was instructed to call the office immediately if the following severe adverse effects occur:  hearing changes, easy bruising/bleeding, severe headache, or vision changes.  The patient verbalized understanding of the proper use and possible adverse effects of doxycycline.  All of the patient's questions and concerns were addressed.
Xolair Pregnancy And Lactation Text: This medication is Pregnancy Category B and is considered safe during pregnancy. This medication is excreted in breast milk.
Odomzo Counseling- I discussed with the patient the risks of Odomzo including but not limited to nausea, vomiting, diarrhea, constipation, weight loss, changes in the sense of taste, decreased appetite, muscle spasms, and hair loss.  The patient verbalized understanding of the proper use and possible adverse effects of Odomzo.  All of the patient's questions and concerns were addressed.
Dapsone Counseling: I discussed with the patient the risks of dapsone including but not limited to hemolytic anemia, agranulocytosis, rashes, methemoglobinemia, kidney failure, peripheral neuropathy, headaches, GI upset, and liver toxicity.  Patients who start dapsone require monitoring including baseline LFTs and weekly CBCs for the first month, then every month thereafter.  The patient verbalized understanding of the proper use and possible adverse effects of dapsone.  All of the patient's questions and concerns were addressed.
Griseofulvin Counseling:  I discussed with the patient the risks of griseofulvin including but not limited to photosensitivity, cytopenia, liver damage, nausea/vomiting and severe allergy.  The patient understands that this medication is best absorbed when taken with a fatty meal (e.g., ice cream or french fries).
Topical Clindamycin Counseling: Patient counseled that this medication may cause skin irritation or allergic reactions.  In the event of skin irritation, the patient was advised to reduce the amount of the drug applied or use it less frequently.   The patient verbalized understanding of the proper use and possible adverse effects of clindamycin.  All of the patient's questions and concerns were addressed.
Valtrex Pregnancy And Lactation Text: this medication is Pregnancy Category B and is considered safe during pregnancy. This medication is not directly found in breast milk but it's metabolite acyclovir is present.
Imiquimod Counseling:  I discussed with the patient the risks of imiquimod including but not limited to erythema, scaling, itching, weeping, crusting, and pain.  Patient understands that the inflammatory response to imiquimod is variable from person to person and was educated regarded proper titration schedule.  If flu-like symptoms develop, patient knows to discontinue the medication and contact us.
Ivermectin Counseling:  Patient instructed to take medication on an empty stomach with a full glass of water.  Patient informed of potential adverse effects including but not limited to nausea, diarrhea, dizziness, itching, and swelling of the extremities or lymph nodes.  The patient verbalized understanding of the proper use and possible adverse effects of ivermectin.  All of the patient's questions and concerns were addressed.
Doxycycline Pregnancy And Lactation Text: This medication is Pregnancy Category D and not consider safe during pregnancy. It is also excreted in breast milk but is considered safe for shorter treatment courses.
Topical Clindamycin Pregnancy And Lactation Text: This medication is Pregnancy Category B and is considered safe during pregnancy. It is unknown if it is excreted in breast milk.
Dapsone Pregnancy And Lactation Text: This medication is Pregnancy Category C and is not considered safe during pregnancy or breast feeding.
Rituxan Counseling:  I discussed with the patient the risks of Rituxan infusions. Side effects can include infusion reactions, severe drug rashes including mucocutaneous reactions, reactivation of latent hepatitis and other infections and rarely progressive multifocal leukoencephalopathy.  All of the patient's questions and concerns were addressed.
Griseofulvin Pregnancy And Lactation Text: This medication is Pregnancy Category X and is known to cause serious birth defects. It is unknown if this medication is excreted in breast milk but breast feeding should be avoided.
Use Enhanced Medication Counseling?: No
Erythromycin Counseling:  I discussed with the patient the risks of erythromycin including but not limited to GI upset, allergic reaction, drug rash, diarrhea, increase in liver enzymes, and yeast infections.
Otezla Counseling: The side effects of Otezla were discussed with the patient, including but not limited to worsening or new depression, weight loss, diarrhea, nausea, upper respiratory tract infection, and headache. Patient instructed to call the office should any adverse effect occur.  The patient verbalized understanding of the proper use and possible adverse effects of Otezla.  All the patient's questions and concerns were addressed.
Rituxan Pregnancy And Lactation Text: This medication is Pregnancy Category C and it isn't know if it is safe during pregnancy. It is unknown if this medication is excreted in breast milk but similar antibodies are known to be excreted.
Azathioprine Counseling:  I discussed with the patient the risks of azathioprine including but not limited to myelosuppression, immunosuppression, hepatotoxicity, lymphoma, and infections.  The patient understands that monitoring is required including baseline LFTs, Creatinine, possible TPMP genotyping and weekly CBCs for the first month and then every 2 weeks thereafter.  The patient verbalized understanding of the proper use and possible adverse effects of azathioprine.  All of the patient's questions and concerns were addressed.
Erivedge Counseling- I discussed with the patient the risks of Erivedge including but not limited to nausea, vomiting, diarrhea, constipation, weight loss, changes in the sense of taste, decreased appetite, muscle spasms, and hair loss.  The patient verbalized understanding of the proper use and possible adverse effects of Erivedge.  All of the patient's questions and concerns were addressed.
Benzoyl Peroxide Counseling: Patient counseled that medicine may cause skin irritation and bleach clothing.  In the event of skin irritation, the patient was advised to reduce the amount of the drug applied or use it less frequently.   The patient verbalized understanding of the proper use and possible adverse effects of benzoyl peroxide.  All of the patient's questions and concerns were addressed.
Minoxidil Counseling: Minoxidil is a topical medication which can increase blood flow where it is applied. It is uncertain how this medication increases hair growth. Side effects are uncommon and include stinging and allergic reactions.
Erythromycin Pregnancy And Lactation Text: This medication is Pregnancy Category B and is considered safe during pregnancy. It is also excreted in breast milk.
Topical Sulfur Applications Counseling: Topical Sulfur Counseling: Patient counseled that this medication may cause skin irritation or allergic reactions.  In the event of skin irritation, the patient was advised to reduce the amount of the drug applied or use it less frequently.   The patient verbalized understanding of the proper use and possible adverse effects of topical sulfur application.  All of the patient's questions and concerns were addressed.
Otezla Pregnancy And Lactation Text: This medication is Pregnancy Category C and it isn't known if it is safe during pregnancy. It is unknown if it is excreted in breast milk.
Itraconazole Counseling:  I discussed with the patient the risks of itraconazole including but not limited to liver damage, nausea/vomiting, neuropathy, and severe allergy.  The patient understands that this medication is best absorbed when taken with acidic beverages such as non-diet cola or ginger ale.  The patient understands that monitoring is required including baseline LFTs and repeat LFTs at intervals.  The patient understands that they are to contact us or the primary physician if concerning signs are noted.
Azathioprine Pregnancy And Lactation Text: This medication is Pregnancy Category D and isn't considered safe during pregnancy. It is unknown if this medication is excreted in breast milk.
Siliq Counseling:  I discussed with the patient the risks of Siliq including but not limited to new or worsening depression, suicidal thoughts and behavior, immunosuppression, malignancy, posterior leukoencephalopathy syndrome, and serious infections.  The patient understands that monitoring is required including a PPD at baseline and must alert us or the primary physician if symptoms of infection or other concerning signs are noted. There is also a special program designed to monitor depression which is required with Siliq.
Topical Sulfur Applications Pregnancy And Lactation Text: This medication is Pregnancy Category C and has an unknown safety profile during pregnancy. It is unknown if this topical medication is excreted in breast milk.
Metronidazole Counseling:  I discussed with the patient the risks of metronidazole including but not limited to seizures, nausea/vomiting, a metallic taste in the mouth, nausea/vomiting and severe allergy.
Benzoyl Peroxide Pregnancy And Lactation Text: This medication is Pregnancy Category C. It is unknown if benzoyl peroxide is excreted in breast milk.
Oxybutynin Counseling:  I discussed with the patient the risks of oxybutynin including but not limited to skin rash, drowsiness, dry mouth, difficulty urinating, and blurred vision.
Detail Level: Simple
Cellcept Counseling:  I discussed with the patient the risks of mycophenolate mofetil including but not limited to infection/immunosuppression, GI upset, hypokalemia, hypercholesterolemia, bone marrow suppression, lymphoproliferative disorders, malignancy, GI ulceration/bleed/perforation, colitis, interstitial lung disease, kidney failure, progressive multifocal leukoencephalopathy, and birth defects.  The patient understands that monitoring is required including a baseline creatinine and regular CBC testing. In addition, patient must alert us immediately if symptoms of infection or other concerning signs are noted.
Finasteride Male Counseling: Finasteride Counseling:  I discussed with the patient the risks of use of finasteride including but not limited to decreased libido, decreased ejaculate volume, gynecomastia, and depression. Women should not handle medication.  All of the patient's questions and concerns were addressed.
Mirvaso Counseling: Mirvaso is a topical medication which can decrease superficial blood flow where applied. Side effects are uncommon and include stinging, redness and allergic reactions.
Wartpeel Counseling:  I discussed with the patient the risks of Wartpeel including but not limited to erythema, scaling, itching, weeping, crusting, and pain.
Ketoconazole Counseling:   Patient counseled regarding improving absorption with orange juice.  Adverse effects include but are not limited to breast enlargement, headache, diarrhea, nausea, upset stomach, liver function test abnormalities, taste disturbance, and stomach pain.  There is a rare possibility of liver failure that can occur when taking ketoconazole. The patient understands that monitoring of LFTs may be required, especially at baseline. The patient verbalized understanding of the proper use and possible adverse effects of ketoconazole.  All of the patient's questions and concerns were addressed.
Carac Counseling:  I discussed with the patient the risks of Carac including but not limited to erythema, scaling, itching, weeping, crusting, and pain.
Finasteride Pregnancy And Lactation Text: This medication is absolutely contraindicated during pregnancy. It is unknown if it is excreted in breast milk.
Cimzia Counseling:  I discussed with the patient the risks of Cimzia including but not limited to immunosuppression, allergic reactions and infections.  The patient understands that monitoring is required including a PPD at baseline and must alert us or the primary physician if symptoms of infection or other concerning signs are noted.
Metronidazole Pregnancy And Lactation Text: This medication is Pregnancy Category B and considered safe during pregnancy.  It is also excreted in breast milk.
Wartpeel Pregnancy And Lactation Text: This medication is Pregnancy Category X and contraindicated in pregnancy and in women who may become pregnant. It is unknown if this medication is excreted in breast milk.
Simponi Counseling:  I discussed with the patient the risks of golimumab including but not limited to myelosuppression, immunosuppression, autoimmune hepatitis, demyelinating diseases, lymphoma, and serious infections.  The patient understands that monitoring is required including a PPD at baseline and must alert us or the primary physician if symptoms of infection or other concerning signs are noted.
Propranolol Counseling:  I discussed with the patient the risks of propranolol including but not limited to low heart rate, low blood pressure, low blood sugar, restlessness and increased cold sensitivity. They should call the office if they experience any of these side effects.
Ketoconazole Pregnancy And Lactation Text: This medication is Pregnancy Category C and it isn't know if it is safe during pregnancy. It is also excreted in breast milk and breast feeding isn't recommended.
Gabapentin Counseling: I discussed with the patient the risks of gabapentin including but not limited to dizziness, somnolence, fatigue and ataxia.
Minocycline Counseling: Patient advised regarding possible photosensitivity and discoloration of the teeth, skin, lips, tongue and gums.  Patient instructed to avoid sunlight, if possible.  When exposed to sunlight, patients should wear protective clothing, sunglasses, and sunscreen.  The patient was instructed to call the office immediately if the following severe adverse effects occur:  hearing changes, easy bruising/bleeding, severe headache, or vision changes.  The patient verbalized understanding of the proper use and possible adverse effects of minocycline.  All of the patient's questions and concerns were addressed.
Cyclophosphamide Counseling:  I discussed with the patient the risks of cyclophosphamide including but not limited to hair loss, hormonal abnormalities, decreased fertility, abdominal pain, diarrhea, nausea and vomiting, bone marrow suppression and infection. The patient understands that monitoring is required while taking this medication.
Zyclara Counseling:  I discussed with the patient the risks of imiquimod including but not limited to erythema, scaling, itching, weeping, crusting, and pain.  Patient understands that the inflammatory response to imiquimod is variable from person to person and was educated regarded proper titration schedule.  If flu-like symptoms develop, patient knows to discontinue the medication and contact us.
Cimzia Pregnancy And Lactation Text: This medication crosses the placenta but can be considered safe in certain situations. Cimzia may be excreted in breast milk.
Methotrexate Pregnancy And Lactation Text: This medication is Pregnancy Category X and is known to cause fetal harm. This medication is excreted in breast milk.
Calcipotriene Counseling:  I discussed with the patient the risks of calcipotriene including but not limited to erythema, scaling, itching, and irritation.
Picato Counseling:  I discussed with the patient the risks of Picato including but not limited to erythema, scaling, itching, weeping, crusting, and pain.
Terbinafine Counseling: Patient counseling regarding adverse effects of terbinafine including but not limited to headache, diarrhea, rash, upset stomach, liver function test abnormalities, itching, taste/smell disturbance, nausea, abdominal pain, and flatulence.  There is a rare possibility of liver failure that can occur when taking terbinafine.  The patient understands that a baseline LFT and kidney function test may be required. The patient verbalized understanding of the proper use and possible adverse effects of terbinafine.  All of the patient's questions and concerns were addressed.
Cyclophosphamide Pregnancy And Lactation Text: This medication is Pregnancy Category D and it isn't considered safe during pregnancy. This medication is excreted in breast milk.
Propranolol Pregnancy And Lactation Text: This medication is Pregnancy Category C and it isn't known if it is safe during pregnancy. It is excreted in breast milk.
Skyrizi Counseling: I discussed with the patient the risks of risankizumab-rzaa including but not limited to immunosuppression, and serious infections.  The patient understands that monitoring is required including a PPD at baseline and must alert us or the primary physician if symptoms of infection or other concerning signs are noted.
Terbinafine Pregnancy And Lactation Text: This medication is Pregnancy Category B and is considered safe during pregnancy. It is also excreted in breast milk and breast feeding isn't recommended.
Prednisone Counseling:  I discussed with the patient the risks of prolonged use of prednisone including but not limited to weight gain, insomnia, osteoporosis, mood changes, diabetes, susceptibility to infection, glaucoma and high blood pressure.  In cases where prednisone use is prolonged, patients should be monitored with blood pressure checks, serum glucose levels and an eye exam.  Additionally, the patient may need to be placed on GI prophylaxis, PCP prophylaxis, and calcium and vitamin D supplementation and/or a bisphosphonate.  The patient verbalized understanding of the proper use and the possible adverse effects of prednisone.  All of the patient's questions and concerns were addressed.
Calcipotriene Pregnancy And Lactation Text: This medication has not been proven safe during pregnancy. It is unknown if this medication is excreted in breast milk.
Cyclosporine Counseling:  I discussed with the patient the risks of cyclosporine including but not limited to hypertension, gingival hyperplasia,myelosuppression, immunosuppression, liver damage, kidney damage, neurotoxicity, lymphoma, and serious infections. The patient understands that monitoring is required including baseline blood pressure, CBC, CMP, lipid panel and uric acid, and then 1-2 times monthly CMP and blood pressure.
Quinolones Counseling:  I discussed with the patient the risks of fluoroquinolones including but not limited to GI upset, allergic reaction, drug rash, diarrhea, dizziness, photosensitivity, yeast infections, liver function test abnormalities, tendonitis/tendon rupture.
Cosentyx Counseling:  I discussed with the patient the risks of Cosentyx including but not limited to worsening of Crohn's disease, immunosuppression, allergic reactions and infections.  The patient understands that monitoring is required including a PPD at baseline and must alert us or the primary physician if symptoms of infection or other concerning signs are noted.
Glycopyrrolate Counseling:  I discussed with the patient the risks of glycopyrrolate including but not limited to skin rash, drowsiness, dry mouth, difficulty urinating, and blurred vision.
Birth Control Pills Counseling: Birth Control Pill Counseling: I discussed with the patient the potential side effects of OCPs including but not limited to increased risk of stroke, heart attack, thrombophlebitis, deep venous thrombosis, hepatic adenomas, breast changes, GI upset, headaches, and depression.  The patient verbalized understanding of the proper use and possible adverse effects of OCPs. All of the patient's questions and concerns were addressed.
Prednisone Pregnancy And Lactation Text: This medication is Pregnancy Category C and it isn't know if it is safe during pregnancy. This medication is excreted in breast milk.
Protopic Counseling: Patient may experience a mild burning sensation during topical application. Protopic is not approved in children less than 2 years of age. There have been case reports of hematologic and skin malignancies in patients using topical calcineurin inhibitors although causality is questionable.
5-Fu Counseling: 5-Fluorouracil Counseling:  I discussed with the patient the risks of 5-fluorouracil including but not limited to erythema, scaling, itching, weeping, crusting, and pain.
Birth Control Pills Pregnancy And Lactation Text: This medication should be avoided if pregnant and for the first 30 days post-partum.
Opioid Counseling: I discussed with the patient the potential side effects of opioids including but not limited to addiction, altered mental status, and depression. I stressed avoiding alcohol, benzodiazepines, muscle relaxants and sleep aids unless specifically okayed by a physician. The patient verbalized understanding of the proper use and possible adverse effects of opioids. All of the patient's questions and concerns were addressed. They were instructed to flush the remaining pills down the toilet if they did not need them for pain.
Glycopyrrolate Pregnancy And Lactation Text: This medication is Pregnancy Category B and is considered safe during pregnancy. It is unknown if it is excreted breast milk.
Stelara Counseling:  I discussed with the patient the risks of ustekinumab including but not limited to immunosuppression, malignancy, posterior leukoencephalopathy syndrome, and serious infections.  The patient understands that monitoring is required including a PPD at baseline and must alert us or the primary physician if symptoms of infection or other concerning signs are noted.
Dupixent Counseling: I discussed with the patient the risks of dupilumab including but not limited to eye infection and irritation, cold sores, injection site reactions, worsening of asthma, allergic reactions and increased risk of parasitic infection.  Live vaccines should be avoided while taking dupilumab. Dupilumab will also interact with certain medications such as warfarin and cyclosporine. The patient understands that monitoring is required and they must alert us or the primary physician if symptoms of infection or other concerning signs are noted.
Protopic Pregnancy And Lactation Text: This medication is Pregnancy Category C. It is unknown if this medication is excreted in breast milk when applied topically.
Rifampin Counseling: I discussed with the patient the risks of rifampin including but not limited to liver damage, kidney damage, red-orange body fluids, nausea/vomiting and severe allergy.
Cimetidine Counseling:  I discussed with the patient the risks of Cimetidine including but not limited to gynecomastia, headache, diarrhea, nausea, drowsiness, arrhythmias, pancreatitis, skin rashes, psychosis, bone marrow suppression and kidney toxicity.
Spironolactone Counseling: Patient advised regarding risks of diarrhea, abdominal pain, hyperkalemia, birth defects (for female patients), liver toxicity and renal toxicity. The patient may need blood work to monitor liver and kidney function and potassium levels while on therapy. The patient verbalized understanding of the proper use and possible adverse effects of spironolactone.  All of the patient's questions and concerns were addressed.
Azithromycin Counseling:  I discussed with the patient the risks of azithromycin including but not limited to GI upset, allergic reaction, drug rash, diarrhea, and yeast infections.
Opioid Pregnancy And Lactation Text: These medications can lead to premature delivery and should be avoided during pregnancy. These medications are also present in breast milk in small amounts.
Dupixent Pregnancy And Lactation Text: This medication likely crosses the placenta but the risk for the fetus is uncertain. This medication is excreted in breast milk.
Hydroxychloroquine Counseling:  I discussed with the patient that a baseline ophthalmologic exam is needed at the start of therapy and every year thereafter while on therapy. A CBC may also be warranted for monitoring.  The side effects of this medication were discussed with the patient, including but not limited to agranulocytosis, aplastic anemia, seizures, rashes, retinopathy, and liver toxicity. Patient instructed to call the office should any adverse effect occur.  The patient verbalized understanding of the proper use and possible adverse effects of Plaquenil.  All the patient's questions and concerns were addressed.
Methotrexate Counseling:  Patient counseled regarding adverse effects of methotrexate including but not limited to nausea, vomiting, abnormalities in liver function tests. Patients may develop mouth sores, rash, diarrhea, and abnormalities in blood counts. The patient understands that monitoring is required including LFT's and blood counts.  There is a rare possibility of scarring of the liver and lung problems that can occur when taking methotrexate. Persistent nausea, loss of appetite, pale stools, dark urine, cough, and shortness of breath should be reported immediately. Patient advised to discontinue methotrexate treatment at least three months before attempting to become pregnant.  I discussed the need for folate supplements while taking methotrexate.  These supplements can decrease side effects during methotrexate treatment. The patient verbalized understanding of the proper use and possible adverse effects of methotrexate.  All of the patient's questions and concerns were addressed.
Rhofade Counseling: Rhofade is a topical medication which can decrease superficial blood flow where applied. Side effects are uncommon and include stinging, redness and allergic reactions.
Rifampin Pregnancy And Lactation Text: This medication is Pregnancy Category C and it isn't know if it is safe during pregnancy. It is also excreted in breast milk and should not be used if you are breast feeding.
Spironolactone Pregnancy And Lactation Text: This medication can cause feminization of the male fetus and should be avoided during pregnancy. The active metabolite is also found in breast milk.
Acitretin Counseling:  I discussed with the patient the risks of acitretin including but not limited to hair loss, dry lips/skin/eyes, liver damage, hyperlipidemia, depression/suicidal ideation, photosensitivity.  Serious rare side effects can include but are not limited to pancreatitis, pseudotumor cerebri, bony changes, clot formation/stroke/heart attack.  Patient understands that alcohol is contraindicated since it can result in liver toxicity and significantly prolong the elimination of the drug by many years.
Drysol Counseling:  I discussed with the patient the risks of drysol/aluminum chloride including but not limited to skin rash, itching, irritation, burning.

## 2021-04-30 ENCOUNTER — HOME CARE VISIT (OUTPATIENT)
Dept: HOME HEALTH SERVICES | Facility: HOME HEALTHCARE | Age: 76
End: 2021-04-30
Payer: MEDICARE

## 2021-04-30 NOTE — Clinical Note
Pt was seen by Dr Johan Morales, dermatology, yesterday. Has been using Triamcinolone ointment twice daily to BLE. Was to have added Silvadene starting 4/22, likely because of Salmonella on culture report, but prescription was never called in. Dr Morales reportedly told pt yesterday he did not need it. Discharged from Dermatology. May return if any recurrence. Missed SN  visit today as pt and wife are doing the skin care and using Tubigrip for some compression. Stopped to see pt today. Explained and had pt sign [facility] for home health discharge Monday 5/3. Pt in agreement. Will reassess pt and take photos at DC visit. Has carlos't with PCP for next week.  Thank you.

## 2021-05-01 NOTE — CASE COMMUNICATION
noted  ----- Message -----  From: Valeria Berger R.N.  Sent: 4/30/2021   8:16 PM PDT  To: Sonali Chau R.N.      Pt was seen by Dr Johan Morales, dermatology, yesterday. Has been using Triamcinolone ointment twice daily to BLE. Was to have added Silvadene starting 4/22, likely because of Salmonella on culture report, but prescription was never called in. Dr Morales reportedly told pt yesterday he did not need it. Discharged from Dermatology. May return if any recurrence. Missed SN  visit today as pt and wife are doing the skin care and using Tubigrip for some compression. Stopped to see pt today. Explained and had pt sign [facility] for home health discharge Monday 5/3. Pt in agreement. Will reassess pt and take photos at DC visit. Has carlos't with PCP for next week.  Thank you.

## 2021-05-03 ENCOUNTER — HOME CARE VISIT (OUTPATIENT)
Dept: HOME HEALTH SERVICES | Facility: HOME HEALTHCARE | Age: 76
End: 2021-05-03
Payer: MEDICARE

## 2021-05-03 PROCEDURE — G0493 RN CARE EA 15 MIN HH/HOSPICE: HCPCS

## 2021-05-03 NOTE — Clinical Note
I don't know. Someone she doesn't like. I'll see if Sandee can find out. Was thinking maybe she could get referral from her physician if she had a name. It was a woman that Enoch when to. 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 Let my certifcation go when I was missing 3 CEU's one year. Still belong to [employer] - now renamed. I don't know any of the people here. My  has type 2 but is very well controlled on Prandin only, after years of nothing but diet and exercise. 21 yr. Only 2 A1c's of 7% - all the others 6.5 or less.   ----- Message -----  From: Collin Schwab P.A.-C.  Sent: 5/10/2021   8:45 AM PDT  To: Valeria Berger R.N.  Subject: RE:                                                Treating type I versus type II diabetics is very different.  Canela to Enoch' controlled diabetes was placing him on Ozempic.  Initially it was Sampson Drake, Pharm.D. who is your daughter's contact current physician?    Collin  ----- Message -----  From: Valeria Berger R.N.  Sent: 5/8/2021  10:32 PM PDT  To: Collin Schwab P.A.-C.  Subject: RE:                                              With what we were doing he should have healed and he didn't. Was very frustrating. Thank you for making the derm referral. Problem isn't over but tolerable right now. His past got him to this point, although pts feel it is just bad luck and really nothing they did. Glad he has improved to this point and will go back to derm if he breaks down again. He needs to put on compression hose (using daniel gloves) or his Circaids but he stays with the Tubigrip. I gave him printed information on how to measure and get compression hose on line. He is going to make his own decisions. Thankfully, you referred him and got his diabetes back under control as well. I don't understand the Salmonella either. Derm told him it is in most of our guts but he is careful with hand washing and apartment is neat and clean.     Can you give  me the name of the pharmacist CDE that you referred him to for his diabetes meds? My daughter has an employee with type 1 diabetes, on a pump, who does not like her physician and is having a terrible time getting under control. As a result, she can be difficult to work with. She is looking for someone to help her.     Thank you, Valeria    ----- Message -----  From: Collin Schwab P.A.-C.  Sent: 5/7/2021   9:58 AM PDT  To: HERBIE Atwood, I saw Enoch the other day.  Right leg has improved remarkably.  Disappointed in our dermatology department not doing a better evaluation on his leg.  I feel like we wasted a lot of time.  Only problem is that no one knows why he had Salmonella.  Hope you are well.  Sincerely,  Collin  ----- Message -----  From: Valeria Berger R.N.  Sent: 5/4/2021   9:27 AM PDT  To: Collin Schwab P.A.-C.    FYI  74 yo gentleman who lives in ground floor apartment with his wife. SOC for home health services 12/5/2020 after discharge from [facility] for what was reported as scabies. BLE edema with ulcerations. COPD. Oxygen dependence. Overweight. Severe pain BLE. Arterial studies in January indicated no major arterial disease. Venous insufficiency. Repeated cultures positive on more than one occasion with staph aureus and salmonella. Has been on several cycles of antibiotics. Type 2 diabetes poorly controlled with A1c 8.6% in December. Close follow-up with Collin Schwab PA-C. Several wound treatments were tried with compression therapy. Ulcerations improved and then deteriorated. Was referred to [facility] with first carlos't 2/10/21 with continuing attempts to heal legs. PCP referred pt to Pharmacist/CDE. Ozempic was restarted in March 2021 with next A1c 6.1%. BG control has remained good. FBG this morning was 99. PCP referred pt to dermatology. Dr Johan Morales, dermatologist, saw pt 4/15/21 and ordered triamcinolone ointment twice daily with Tubigrip for  compression. BLE have healed. Continues to have some edema in RLE. Dr Morales discharged the pt last week with instructions to return if any recurrence. Silvadene was to have been ordered to address the S cirilolla but never was and Dr Morales did not feel it was necessary now. Pt to use one of several moisturizing creams he recommended. Started Cetaphil. Photos taken today. LLE no edema and healed. RLE with some fragile areas but dry. Pt continues to use triamcinolone ointment. States Dr Morales informed him long term use will thin his skin and he should be cautious. Pt needs to transition to compression hose. Has printed information on how to measure and order. Also has CircAids and has used them correctly on his LLE. Wife assists with wound care as needed. Discharged from home health services.  Thank you, Valeria Berger, RN, MSN, CWCN       poor inspiratory effort

## 2021-05-03 NOTE — Clinical Note
With what we were doing he should have healed and he didn't. Was very frustrating. Thank you for making the derm referral. Problem isn't over but tolerable right now. His past got him to this point, although pts feel it is just bad luck and really nothing they did. Glad he has improved to this point and will go back to derm if he breaks down again. He needs to put on compression hose (using daniel gloves) or his Circaids but he stays with the Tubigrip. I gave him printed information on how to measure and get compression hose on line. He is going to make his own decisions. Thankfully, you referred him and got his diabetes back under control as well. I don't understand the Salmonella either. Derm told him it is in most of our guts but he is careful with hand washing and apartment is neat and clean.     Can you give me the name of the pharmacist CDE that you referred him to for his diabetes meds? My daughter has an employee with type 1 diabetes, on a pump, who does not like her physician and is having a terrible time getting under control. As a result, she can be difficult to work with. She is looking for someone to help her.     Thank you, Valeria    ----- Message -----  From: Collin Schwab P.A.-C.  Sent: 5/7/2021   9:58 AM PDT  To: HERBIE Atwood, I saw Enoch the other day.  Right leg has improved remarkably.  Disappointed in our dermatology department not doing a better evaluation on his leg.  I feel like we wasted a lot of time.  Only problem is that no one knows why he had Salmonella.  Hope you are well.  Sincerely,  Collin  ----- Message -----  From: Valeria Berger R.N.  Sent: 5/4/2021   9:27 AM PDT  To: Collin Schwab P.A.-C.    FYI  74 yo gentleman who lives in ground floor apartment with his wife. SOC for home health services 12/5/2020 after discharge from [facility] for what was reported as scabies. BLE edema with ulcerations. COPD. Oxygen dependence. Overweight. Severe pain BLE.  Arterial studies in January indicated no major arterial disease. Venous insufficiency. Repeated cultures positive on more than one occasion with staph aureus and salmonella. Has been on several cycles of antibiotics. Type 2 diabetes poorly controlled with A1c 8.6% in December. Close follow-up with Collin Schwab PA-C. Several wound treatments were tried with compression therapy. Ulcerations improved and then deteriorated. Was referred to [facility] with first carlos't 2/10/21 with continuing attempts to heal legs. PCP referred pt to Pharmacist/CDE. Ozempic was restarted in March 2021 with next A1c 6.1%. BG control has remained good. FBG this morning was 99. PCP referred pt to dermatology. Dr Johan Morales, dermatologist, saw pt 4/15/21 and ordered triamcinolone ointment twice daily with Tubigrip for compression. BLE have healed. Continues to have some edema in RLE. Dr Morales discharged the pt last week with instructions to return if any recurrence. Silvadene was to have been ordered to address the Salmonella but never was and Dr Morales did not feel it was necessary now. Pt to use one of several moisturizing creams he recommended. Started Cetaphil. Photos taken today. LLE no edema and healed. RLE with some fragile areas but dry. Pt continues to use triamcinolone ointment. States Dr Morales informed him long term use will thin his skin and he should be cautious. Pt needs to transition to compression hose. Has printed information on how to measure and order. Also has CircAids and has used them correctly on his LLE. Wife assists with wound care as needed. Discharged from home health services.  Thank you, Valeria Berger, RN, MSN, CWCN

## 2021-05-03 NOTE — Clinical Note
Thanks. Is there a endocrinologist that you like in [facility]?   ----- Message -----  From: Collin Schwab P.A.-C.  Sent: 5/11/2021  12:54 PM PDT  To: Valeria Berger R.N.  Subject: RE:                                                Yes, there are no notes.  I do not think he was actually seen.  Dr. Smith referred him to Pharm.D.  I think it was done through a telephone call as there is a telephone encounter note from Sampson.  Sylwia is in our office.  Not sure where Karlene is.  ----- Message -----  From: Valeria Berger R.N.  Sent: 5/11/2021  12:13 PM PDT  To: Collin Schwab P.A.-C.  Subject: RE:                                              When I look back in his chart, Sylwia did a FU visit with him about 7 days after going back on the Ozempic. Looks like Karlene Yang is listed as pharmacist, CDE. Just don't find any in-depth notes from her. Are they both at [facility] or [facility]?  Thanks  ----- Message -----  From: Collin Schwab P.A.-C.  Sent: 5/11/2021   9:27 AM PDT  To: Valeria Berger R.N.  Subject: RE:                                                Maybe it was Sylwia Marino?  She's a PharmD.  ----- Message -----  From: Valeria Berger R.N.  Sent: 5/10/2021   8:10 PM PDT  To: Collin Schwab P.A.-C.  Subject: RE:                                              I don't know. Someone she doesn't like. I'll see if Sandee can find out. Was thinking maybe she could get referral from her physician if she had a name. It was a woman that Enoch when to. 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 Let my certifcation go when I was missing 3 CEU's one year. Still belong to [employer] - now renamed. I don't know any of the people here. My  has type 2 but is very well controlled on Prandin only, after years of nothing but diet and exercise. 21 yr. Only 2 A1c's of 7% - all the others 6.5 or less.   ----- Message -----  From: Collin Schwab P.A.-C.  Sent: 5/10/2021   8:45 AM PDT  To: Valeria  CLARENCE Berger R.N.  Subject: RE:                                                Treating type I versus type II diabetics is very different.  Canela to Enoch' controlled diabetes was placing him on Ozempic.  Initially it was Sampson Drake, Pharm.D. who is your daughter's contact current physician?    Collin  ----- Message -----  From: Valeria Berger R.N.  Sent: 5/8/2021  10:32 PM PDT  To: Collin Schwab P.A.-C.  Subject: RE:                                              With what we were doing he should have healed and he didn't. Was very frustrating. Thank you for making the derm referral. Problem isn't over but tolerable right now. His past got him to this point, although pts feel it is just bad luck and really nothing they did. Glad he has improved to this point and will go back to derm if he breaks down again. He needs to put on compression hose (using daniel gloves) or his Circaids but he stays with the Tubigrip. I gave him printed information on how to measure and get compression hose on line. He is going to make his own decisions. Thankfully, you referred him and got his diabetes back under control as well. I don't understand the Salmonella either. Derm told him it is in most of our guts but he is careful with hand washing and apartment is neat and clean.     Can you give me the name of the pharmacist CDE that you referred him to for his diabetes meds? My daughter has an employee with type 1 diabetes, on a pump, who does not like her physician and is having a terrible time getting under control. As a result, she can be difficult to work with. She is looking for someone to help her.     Thank you, Valeria    ----- Message -----  From: Collin Schwab P.A.-C.  Sent: 5/7/2021   9:58 AM PDT  To: HERBIE Atwood, I saw Enoch the other day.  Right leg has improved remarkably.  Disappointed in our dermatology department not doing a better evaluation on his leg.  I feel like we wasted a lot of time.  Only  problem is that no one knows why he had Salmonella.  Hope you are well.  Sincerely,  Collin  ----- Message -----  From: Valeria Berger R.N.  Sent: 5/4/2021   9:27 AM PDT  To: Collin Schwab P.A.-C.     TOMMYI  76 yo gentleman who lives in ground floor apartment with his wife. SOC for home health services 12/5/2020 after discharge from [facility] for what was reported as scabies. BLE edema with ulcerations. COPD. Oxygen dependence. Overweight. Severe pain BLE. Arterial studies in January indicated no major arterial disease. Venous insufficiency. Repeated cultures positive on more than one occasion with staph aureus and salmonella. Has been on several cycles of antibiotics. Type 2 diabetes poorly controlled with A1c 8.6% in December. Close follow-up with Collin Schwab PA-C. Several wound treatments were tried with compression therapy. Ulcerations improved and then deteriorated. Was referred to [facility] with first carlos't 2/10/21 with continuing attempts to heal legs. PCP referred pt to Pharmacist/CDE. Ozempic was restarted in March 2021 with next A1c 6.1%. BG control has remained good. FBG this morning was 99. PCP referred pt to dermatology. Dr Johan Morales, dermatologist, saw pt 4/15/21 and ordered triamcinolone ointment twice daily with Tubigrip for compression. BLE have healed. Continues to have some edema in RLE. Dr Morales discharged the pt last week with instructions to return if any recurrence. Silvadene was to have been ordered to address the Salmonella but never was and Dr Morales did not feel it was necessary now. Pt to use one of several moisturizing creams he recommended. Started Cetaphil. Photos taken today. LLE no edema and healed. RLE with some fragile areas but dry. Pt continues to use triamcinolone ointment. States Dr Morales informed him long term use will thin his skin and he should be cautious. Pt needs to transition to compression hose. Has printed information on how to measure  and order. Also has CircAids and has used them correctly on his LLE. Wife assists with wound care as needed. Discharged from home health services.  Thank you, Valeria Berger, RN, MSN, CWCN

## 2021-05-03 NOTE — Clinical Note
When I look back in his chart, Sylwia did a FU visit with him about 7 days after going back on the Ozempic. Looks like Karlene Yang is listed as pharmacist, CDE. Just don't find any in-depth notes from her. Are they both at [facility] or [facility]?  Thanks  ----- Message -----  From: Collin Schwab P.A.-C.  Sent: 5/11/2021   9:27 AM PDT  To: Valeria Berger R.N.  Subject: RE:                                                Maybe it was Sylwia Marino?  She's a PharmD.  ----- Message -----  From: Valeria Berger R.N.  Sent: 5/10/2021   8:10 PM PDT  To: Collin Schwab P.A.-C.  Subject: RE:                                              I don't know. Someone she doesn't like. I'll see if Sandee can find out. Was thinking maybe she could get referral from her physician if she had a name. It was a woman that Enoch when to. 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 Let my certifcation go when I was missing 3 CEU's one year. Still belong to [employer] - now renamed. I don't know any of the people here. My  has type 2 but is very well controlled on Prandin only, after years of nothing but diet and exercise. 21 yr. Only 2 A1c's of 7% - all the others 6.5 or less.   ----- Message -----  From: Collin Schwab P.A.-C.  Sent: 5/10/2021   8:45 AM PDT  To: Valeria Berger R.N.  Subject: RE:                                                Treating type I versus type II diabetics is very different.  Canela to Enoch' controlled diabetes was placing him on Ozempic.  Initially it was Sampson Drake, Pharm.D. who is your daughter's contact current physician?    Collin  ----- Message -----  From: Valeria Berger R.N.  Sent: 5/8/2021  10:32 PM PDT  To: Collin C Zaheer, P.A.-C.  Subject: RE:                                              With what we were doing he should have healed and he didn't. Was very frustrating. Thank you for making the derm referral. Problem isn't over but tolerable right now. His past got  him to this point, although pts feel it is just bad luck and really nothing they did. Glad he has improved to this point and will go back to derm if he breaks down again. He needs to put on compression hose (using daniel gloves) or his Circaids but he stays with the Tubigrip. I gave him printed information on how to measure and get compression hose on line. He is going to make his own decisions. Thankfully, you referred him and got his diabetes back under control as well. I don't understand the Salmonella either. Derm told him it is in most of our guts but he is careful with hand washing and apartment is neat and clean.     Can you give me the name of the pharmacist CDE that you referred him to for his diabetes meds? My daughter has an employee with type 1 diabetes, on a pump, who does not like her physician and is having a terrible time getting under control. As a result, she can be difficult to work with. She is looking for someone to help her.     Thank you, Valeria    ----- Message -----  From: Collin Schwab P.A.-C.  Sent: 5/7/2021   9:58 AM PDT  To: HERBIE Atwood, I saw Enoch the other day.  Right leg has improved remarkably.  Disappointed in our dermatology department not doing a better evaluation on his leg.  I feel like we wasted a lot of time.  Only problem is that no one knows why he had Salmonella.  Hope you are well.  Sincerely,  Collin  ----- Message -----  From: Valreia Berger R.N.  Sent: 5/4/2021   9:27 AM PDT  To: Collin Schwab P.A.-C.    FYI  74 yo gentleman who lives in ground floor apartment with his wife. SOC for home health services 12/5/2020 after discharge from [facility] for what was reported as scabies. BLE edema with ulcerations. COPD. Oxygen dependence. Overweight. Severe pain BLE. Arterial studies in January indicated no major arterial disease. Venous insufficiency. Repeated cultures positive on more than one occasion with staph aureus and salmonella. Has been on  several cycles of antibiotics. Type 2 diabetes poorly controlled with A1c 8.6% in December. Close follow-up with Collin Schwab PA-C. Several wound treatments were tried with compression therapy. Gabe calvo improved and then deteriorated. Was referred to [facility] with first carlos't 2/10/21 with continuing attempts to heal legs. PCP referred pt to Pharmacist/CDE. Ozempic was restarted in March 2021 with next A1c 6.1%. BG control has remained good. FBG this morning was 99. PCP referred pt to dermatology. Dr Johan Morales, dermatologist, saw pt 4/15/21 and ordered triamcinolone ointment twice daily with Tubigrip for compression. BLE have healed. Continues to have some edema in RLE. Dr Morales discharged the pt last week with instructions to return if any recurrence. Silvadene was to have been ordered to address the Salmonella but never was and Dr Morales did not feel it was necessary now. Pt to use one of several moisturizing creams he recommended. Started Cetaphil. Photos taken today. LLE no edema and healed. RLE with some fragile areas but dry. Pt continues to use triamcinolone ointment. States Dr Morales informed him long term use will thin his skin and he should be cautious. Pt needs to transition to compression hose. Has printed information on how to measure and order. Also has CircAids and has used them correctly on his LLE. Wife assists with wound care as needed. Discharged from home health services.  Thank you, Valeria Berger, RN, MSN, CWCN

## 2021-05-03 NOTE — Clinical Note
FYI  74 yo gentleman who lives in ground floor apartment with his wife. SOC for home health services 12/5/2020 after discharge from [facility] for what was reported as scabies. BLE edema with ulcerations. COPD. Oxygen dependence. Overweight. Severe pain BLE. Arterial studies in January indicated no major arterial disease. Venous insufficiency. Repeated cultures positive on more than one occasion with staph aureus and salmonella. Has been on several cycles of antibiotics. Type 2 diabetes poorly controlled with A1c 8.6% in December. Close follow-up with Collin Schwab PA-C. Several wound treatments were tried with compression therapy. Ulcerations improved and then deteriorated. Was referred to [facility] with first carlos't 2/10/21 with continuing attempts to heal legs. PCP referred pt to Pharmacist/CDE. Ozempic was restarted in March 2021 with next A1c 6.1%. BG control has remained good. FBG this morning was 99. PCP referred pt to dermatology. Dr Johan Morales, dermatologist, saw pt 4/15/21 and ordered triamcinolone ointment twice daily with Tubigrip for compression. BLE have healed. Continues to have some edema in RLE. Dr Morales discharged the pt last week with instructions to return if any recurrence. Silvadene was to have been ordered to address the Salmonella but never was and Dr Morales did not feel it was necessary now. Pt to use one of several moisturizing creams he recommended. Started Cetaphil. Photos taken today. LLE no edema and healed. RLE with some fragile areas but dry. Pt continues to use triamcinolone ointment. States Dr Morales informed him long term use will thin his skin and he should be cautious. Pt needs to transition to compression hose. Has printed information on how to measure and order. Also has CircAids and has used them correctly on his LLE. Wife assists with wound care as needed. Discharged from home health services.  Thank you, Valeria Berger, RN, MSN, CWCN

## 2021-05-04 VITALS
RESPIRATION RATE: 18 BRPM | DIASTOLIC BLOOD PRESSURE: 70 MMHG | BODY MASS INDEX: 29.5 KG/M2 | SYSTOLIC BLOOD PRESSURE: 124 MMHG | HEART RATE: 83 BPM | HEIGHT: 68 IN | OXYGEN SATURATION: 93 % | TEMPERATURE: 98.1 F

## 2021-05-04 SDOH — ECONOMIC STABILITY: HOUSING INSECURITY: EVIDENCE OF SMOKING MATERIAL: 0

## 2021-05-04 ASSESSMENT — PATIENT HEALTH QUESTIONNAIRE - PHQ9: CLINICAL INTERPRETATION OF PHQ2 SCORE: 0

## 2021-05-04 ASSESSMENT — ACTIVITIES OF DAILY LIVING (ADL): HOME_HEALTH_OASIS: 01

## 2021-05-04 NOTE — CASE COMMUNICATION
noted  ----- Message -----  From: Valeria Berger R.N.  Sent: 5/4/2021   9:27 AM PDT  To: Sonali Chau R.N.      TREMAYNE  76 yo gentleman who lives in ground floor apartment with his wife. SOC for home health services 12/5/2020 after discharge from [facility] for what was reported as scabies. BLE edema with ulcerations. COPD. Oxygen dependence. Overweight. Severe pain BLE. Arterial studies in January indicated no major arterial disease. Venous insufficiency. Repeated cultures positive on more than one occasion with staph aureus and salmonella. Has been on several cycles of antibiotics. Type 2 diabetes poorly controlled with A1c 8.6% in December. Close follow-up with Collin Schwab PA-C. Several wound treatments were tried with compression therapy. Ulcerations improved and then deteriorated. Was referred to [facility] with first carlso't 2/10/21 with continuing attempts to heal legs. PCP referred pt to Pharmacist/CDE. Ozempic was restarted in March 2021 with next A1c 6.1%. BG control has remained good. FBG this morning was 99. PCP referred pt to dermatology. Dr Johan Morales, dermatologist, saw pt 4/15/21 and ordered triamcinolone ointment twice daily with Tubigrip for compression. BLE have healed. Continues to have some edema in RLE. Dr Morales discharged the pt last week with instructions to return if any recurrence. Silvadene was to have been ordered to address the Salmonella but never was and Dr Morales did not feel it was necessary now. Pt to use one of several moisturizing creams he recommended. Started Cetaphil. Photos taken today. LLE no edema and healed. RLE with some fragile areas but dry. Pt continues to use triamcinolone ointment. States Dr Morales informed him long term use will thin his skin and he should be cautious. Pt needs to transition to compression hose. Has printed information on how to measure and order. Also has CircAids and has used them correctly on his LLE. Wife assists with wound  care as needed. Discharged from home health services.  Thank you, Valeria Berger, RN, MSN, CWCN

## 2021-05-05 ENCOUNTER — OFFICE VISIT (OUTPATIENT)
Dept: MEDICAL GROUP | Facility: MEDICAL CENTER | Age: 76
End: 2021-05-05
Payer: MEDICARE

## 2021-05-05 VITALS
DIASTOLIC BLOOD PRESSURE: 62 MMHG | SYSTOLIC BLOOD PRESSURE: 140 MMHG | TEMPERATURE: 99.6 F | OXYGEN SATURATION: 93 % | WEIGHT: 195 LBS | HEART RATE: 92 BPM | BODY MASS INDEX: 29.55 KG/M2 | HEIGHT: 68 IN

## 2021-05-05 DIAGNOSIS — I73.9 PVD (PERIPHERAL VASCULAR DISEASE) (HCC): ICD-10-CM

## 2021-05-05 DIAGNOSIS — I10 ESSENTIAL HYPERTENSION: ICD-10-CM

## 2021-05-05 DIAGNOSIS — J96.11 CHRONIC HYPOXEMIC RESPIRATORY FAILURE (HCC): ICD-10-CM

## 2021-05-05 DIAGNOSIS — E11.9 TYPE 2 DIABETES MELLITUS WITHOUT COMPLICATION, WITHOUT LONG-TERM CURRENT USE OF INSULIN (HCC): ICD-10-CM

## 2021-05-05 DIAGNOSIS — M79.89 RIGHT LEG SWELLING: ICD-10-CM

## 2021-05-05 PROBLEM — S81.801A NON-HEALING WOUND OF RIGHT LOWER EXTREMITY: Status: RESOLVED | Noted: 2021-01-15 | Resolved: 2021-05-05

## 2021-05-05 PROBLEM — D64.9 LOW HEMOGLOBIN AND LOW HEMATOCRIT: Status: RESOLVED | Noted: 2021-01-27 | Resolved: 2021-05-05

## 2021-05-05 PROCEDURE — 99214 OFFICE O/P EST MOD 30 MIN: CPT | Performed by: PHYSICIAN ASSISTANT

## 2021-05-05 RX ORDER — FUROSEMIDE 20 MG/1
20 TABLET ORAL 2 TIMES DAILY
Qty: 200 TABLET | Refills: 1 | Status: SHIPPED | OUTPATIENT
Start: 2021-05-05 | End: 2021-05-17 | Stop reason: SDUPTHER

## 2021-05-05 RX ORDER — TRIAMCINOLONE ACETONIDE 1 MG/G
CREAM TOPICAL
COMMUNITY
Start: 2021-04-29 | End: 2023-06-19

## 2021-05-05 RX ORDER — FUROSEMIDE 20 MG/1
20 TABLET ORAL 2 TIMES DAILY
Qty: 60 TABLET | Refills: 5 | Status: SHIPPED | OUTPATIENT
Start: 2021-05-05 | End: 2021-05-05 | Stop reason: SDUPTHER

## 2021-05-05 ASSESSMENT — FIBROSIS 4 INDEX: FIB4 SCORE: 1.91

## 2021-05-05 NOTE — ASSESSMENT & PLAN NOTE
Chronic condition.  States during his home health visits his blood pressure was in normal range.  He does take furosemide 20 mg twice daily for the lower extremity edema.  Requesting a refill.

## 2021-05-05 NOTE — ASSESSMENT & PLAN NOTE
This is a pleasant 75-year-old male here today to discuss his right leg.  Also generalized health.  Is doing much better with the right leg swelling.  [facility].  Diagnosed with Salmonella infection.  Placed on triamcinolone.  Since the initiation of triamcinolone the right leg has reduced in swelling and redness.  He was discharged recently from home health as well.  Told by dermatology as well no follow-up was needed.  He plans to use the cream until the leg looks normal appearing.  Unknown why the Salmonella infection.

## 2021-05-05 NOTE — PROGRESS NOTES
Subjective:   Enoch Mistry is a 75 y.o. male here today for right leg swelling, diabetes and hypertension.    Right leg swelling  This is a pleasant 75-year-old male here today to discuss his right leg.  Also generalized health.  Is doing much better with the right leg swelling.  Seen at [facility].  Diagnosed with Salmonella infection.  Placed on triamcinolone.  Since the initiation of triamcinolone the right leg has reduced in swelling and redness.  He was discharged recently from home health as well.  Told by dermatology as well no follow-up was needed.  He plans to use the cream until the leg looks normal appearing.  Unknown why the Salmonella infection.    Type 2 diabetes mellitus without complication, without long-term current use of insulin (HCC)  Chronic condition.  A1c recently at 6.1.  Takes 0.25 mg of Ozempic weekly.  Glucose levels at 100.    Essential hypertension  Chronic condition.  States during his home health visits his blood pressure was in normal range.  He does take furosemide 20 mg twice daily for the lower extremity edema.  Requesting a refill.    Chronic hypoxemic respiratory failure (HCC)  Chronic condition secondary to COPD.  Currently on 2 L of oxygen.       Current medicines (including changes today)  Current Outpatient Medications   Medication Sig Dispense Refill   • furosemide (LASIX) 20 MG Tab Take 1 tablet by mouth 2 times a day. 200 tablet 1   • triamcinolone acetonide (KENALOG) 0.1 % Cream      • Blood Glucose Test Strips Use one Abbott Freestyle Lite strip to test blood sugar twice daily. 100 Each 5   • FreeStyle Lancets Misc USE EVERY MORNING TO TEST FASTING BLOOD SUGAR 100 Each 3   • Home Care Oxygen Inhale 2 L/min continuous.     • Semaglutide,0.25 or 0.5MG/DOS, (OZEMPIC, 0.25 OR 0.5 MG/DOSE,) 2 MG/1.5ML Solution Pen-injector Inject 0.25 mL under the skin every 7 days. 1.5 mL 6   • glucose blood strip 1 Strip by Other route as needed (for taking a blood  "glucose reading). 100 Strip 5   • non-formulary med Inhale 2 L/min continuous. Oxygen dose range: 2 L/min  Respiratory route via: Nasal Cannula   Oxygen supplier: Preferred      Indications: Hypoxia       No current facility-administered medications for this visit.     He  has a past medical history of Anemia, Arrhythmia, Breath shortness, Cancer ([facility]), Chronic deep vein thrombosis (DVT) of femoral vein of right lower extremity ([facility]) (1/25/2021), Diabetes ([facility]), Emphysema of lung ([facility]), Hiatus hernia syndrome, Hypertension, Other specified disorder of intestines, and Urinary bladder disorder.    Social History and Family History were reviewed and updated.    ROS   No chest pain, no shortness of breath, no abdominal pain and all other systems were reviewed and are negative.       Objective:     /62   Pulse 92   Temp 37.6 °C (99.6 °F) (Temporal)   Ht 1.727 m (5' 8\")   Wt 88.5 kg (195 lb)   SpO2 93%  Body mass index is 29.65 kg/m².   Physical Exam:  Constitutional: Alert, no distress.  Skin: Warm, dry, good turgor, no rashes in visible areas.  Eye: Equal, round and reactive, conjunctiva clear, lids normal.  ENMT: Lips without lesions, good dentition, oropharynx clear.  Neck: Trachea midline, no masses.   Lymph: No cervical or supraclavicular lymphadenopathy  Respiratory: Unlabored respiratory effort, lungs clear to auscultation, no wheezes, no ronchi.  Cardiovascular: Normal S1, S2, no murmur, no edema.  Musculoskeletal: Right leg with notable erythema but much improved without any edema or ulcerations.  Psych: Alert and oriented x3, normal affect and mood.        Assessment and Plan:   The following treatment plan was discussed    1. Right leg swelling  Chronic condition.  Finally diagnosis of Salmonella.  May continue triamcinolone cream as directed.  Contact me if he has any problems through MyChart.  Discharged from home health and dermatology.    2. PVD (peripheral vascular disease) ([facility])  Chronic " condition.  Renew Lasix as directed.  - furosemide (LASIX) 20 MG Tab; Take 1 tablet by mouth 2 times a day.  Dispense: 200 tablet; Refill: 1    3. Type 2 diabetes mellitus without complication, without long-term current use of insulin (HCC)  Chronic condition.  Well-controlled.  Continue Ozempic as directed.  Will defer labs until next appointment.    4. Essential hypertension  Chronic condition.  Stable.  Controlled.  Continue furosemide as directed.     5.  Chronic hypoxemic respiratory failure  Chronic condition.  Secondary to COPD.  Continue supplemental oxygen.  He will continue to require minimum 2 L daily.    Followup: Return in about 3 months (around 8/5/2021), or if symptoms worsen or fail to improve.    Please note that this dictation was created using voice recognition software. I have made every reasonable attempt to correct obvious errors, but I expect that there are errors of grammar and possibly content that I did not discover before finalizing the note.

## 2021-05-06 ENCOUNTER — HOME CARE VISIT (OUTPATIENT)
Dept: HOME HEALTH SERVICES | Facility: HOME HEALTHCARE | Age: 76
End: 2021-05-06
Payer: MEDICARE

## 2021-05-06 ASSESSMENT — ACTIVITIES OF DAILY LIVING (ADL): OASIS_M1830: 00

## 2021-05-06 NOTE — Clinical Note
----- Message -----  From: Rebecca Stein R.N.  Sent: 5/6/2021  10:50 AM PDT  To: Valeria Berger R.N.      Quality Review for DC 5/3/21  OASIS by RUBEN Stein, RN on  May 6, 2021    Edits completed by RUBEN Stein, RN:  1.  is 0 per narrative that patient bathes independently and response to [id number]

## 2021-05-06 NOTE — CASE COMMUNICATION
Quality Review for DC 5/3/21  OASIS by RUBEN Stein, ALIN on  May 6, 2021    Edits completed by RUBEN Stein RN:  1.  is 0 per narrative that patient bathes independently and response to [id number]

## 2021-05-09 NOTE — CASE COMMUNICATION
I agree with updates as made.   ----- Message -----  From: Rebecca Stein R.N.  Sent: 5/6/2021  10:50 AM PDT  To: Valeria Berger R.N.      Quality Review for DC 5/3/21  OASIS by RUBEN Stein, RN on  May 6, 2021    Edits completed by RUBEN Stein, RN:  1.  is 0 per narrative that patient bathes independently and response to [id number]

## 2021-05-16 ENCOUNTER — PATIENT MESSAGE (OUTPATIENT)
Dept: MEDICAL GROUP | Facility: MEDICAL CENTER | Age: 76
End: 2021-05-16

## 2021-05-16 DIAGNOSIS — I73.9 PVD (PERIPHERAL VASCULAR DISEASE) (HCC): ICD-10-CM

## 2021-05-18 RX ORDER — FUROSEMIDE 20 MG/1
20 TABLET ORAL 2 TIMES DAILY
Qty: 200 TABLET | Refills: 1 | Status: SHIPPED | OUTPATIENT
Start: 2021-05-18 | End: 2023-08-07 | Stop reason: SDUPTHER

## 2021-06-02 NOTE — PROGRESS NOTES
Mbr inbound call stating he received a bill from [facility] for $38.07 and is unsure for what service. Unable to locate claim with the information mbr provided. Epic shows mbr having a pending balance of $37.02. Provided [facility] billing number and transferred, no further needs at this time.

## 2021-06-07 ENCOUNTER — NON-PROVIDER VISIT (OUTPATIENT)
Dept: MEDICAL GROUP | Facility: MEDICAL CENTER | Age: 76
End: 2021-06-07
Payer: MEDICARE

## 2021-06-07 DIAGNOSIS — E11.9 TYPE 2 DIABETES MELLITUS WITHOUT COMPLICATION, WITHOUT LONG-TERM CURRENT USE OF INSULIN (HCC): Primary | ICD-10-CM

## 2021-06-07 LAB
HBA1C MFR BLD: 5.9 % (ref 0–5.6)
INT CON NEG: ABNORMAL
INT CON POS: ABNORMAL

## 2021-06-07 PROCEDURE — 83036 HEMOGLOBIN GLYCOSYLATED A1C: CPT | Performed by: INTERNAL MEDICINE

## 2021-06-07 PROCEDURE — 99401 PREV MED CNSL INDIV APPRX 15: CPT | Performed by: INTERNAL MEDICINE

## 2021-06-07 NOTE — NON-PROVIDER
Patient Consult Note    TIME IN: 1:00pm  TIME OUT: 1:15pmpm    Primary care physician: Collin Schwab P.A.-C.    Reason for consult: Management of Uncontrolled Type 2 Diabetes    HPI:  Enoch Mistry is a 75 y.o. old patient who comes in today for evaluation of above stated problem.    Most Recent HbA1c:   Lab Results   Component Value Date/Time    HBA1C 5.9 (A) 06/07/2021 02:53 PM      Lab Results   Component Value Date/Time    CREATININE 0.62 03/18/2021 10:18 AM              Diabetes Medication History and Current Regimen  GLP-1 Agent: Ozempic 0.25 mg q 7 days    Pt has home glucometer and proper testing technique - yes    Pt reports blood sugars:   Before Breakfast: 100s    Preventative Management  BP regimen (ACE/ARB) - none  ASA - none  Statin - none   Last Retinal Scan - overdue - pt to schedule with ophthalmologist this year  Last Foot Exam - 02/2021  Last A1c -   Lab Results   Component Value Date/Time    HBA1C 5.9 (A) 06/07/2021 02:53 PM      Last Microalbuminuria - pending    updated caregaps    Past Medical History:  Patient Active Problem List    Diagnosis Date Noted   • Vaccination refused by patient 01/27/2021   • Chronic deep vein thrombosis (DVT) of femoral vein of right lower extremity ([facility]) 01/25/2021   • Former smoker 01/15/2021   • Right leg swelling 01/15/2021   • Deep vein thrombosis (DVT) of femoral vein of right lower extremity, unspecified chronicity ([facility]) 01/15/2021   • History of DVT (deep vein thrombosis) 01/15/2021   • PVD (peripheral vascular disease) ([facility]) 08/13/2019   • Obesity (BMI 30-39.9) 10/02/2018   • PUD (peptic ulcer disease) 09/23/2018   • Hepatic steatosis 09/12/2018   • Elevated PSA 09/21/2017   • Stage 2 moderate COPD by GOLD classification ([facility]) 06/21/2017   • Type 2 diabetes mellitus without complication, without long-term current use of insulin ([facility]) 06/06/2017   • Benign non-nodular prostatic hyperplasia without lower urinary tract symptoms 06/06/2017   • Chronic  hypoxemic respiratory failure (HCC) 01/07/2016   • Adrenal adenoma 01/07/2016   • Pulmonary nodule 04/03/2015   • History of bladder cancer 04/02/2015   • Essential hypertension 04/01/2015       Past Surgical History:  Past Surgical History:   Procedure Laterality Date   • ERCP  11/15/2018    Procedure: ERCP - W/POSS BIOPSY, SPHINCTEROTOMY, STENT PLACEMENT, STENT REMOVAL, STONE EXTRACTION, DILATION;  Surgeon: Harman Michelle M.D.;  Location: [facility];  Service: EUS   • ERCP  9/21/2018    Procedure: ERCP;  Surgeon: Harman Michelle M.D.;  Location: [facility];  Service: EUS   • DEWEY BY LAPAROSCOPY  9/20/2018    Procedure: DEWEY BY LAPAROSCOPY;  Surgeon: Seamus Gaston M.D.;  Location: [facility];  Service: General   • GASTROSCOPY-ENDO  9/19/2018    Procedure: GASTROSCOPY-ENDO;  Surgeon: Darnell Hernández D.O.;  Location: [facility];  Service: Gastroenterology   • CYSTOSCOPY N/A 9/11/2017    Procedure: CYSTOSCOPY- CLOT EVACUATION, FULGURATION, OF PROSTATE;  Surgeon: Srini Giles M.D.;  Location: [facility];  Service: Urology   • CYSTOSCOPY  5/7/2015    Procedure: CYSTOSCOPY [57.32] ;  Surgeon: Srini Giles M.D.;  Location: [facility];  Service:    • TRANS URETHRAL RESECTION PROSTATE  5/7/2015    Procedure: TRANS URETHRAL RESECTION PROSTATE [60.20];  Surgeon: Srini Giles M.D.;  Location: [facility];  Service:    • TRANS URETHRAL RESECTION BLADDER  5/7/2015    Procedure: TRANS URETHRAL RESECTION BLADDER [57.49A];  Surgeon: Srini Giles M.D.;  Location: [facility];  Service:        Allergies:  Bactrim ds    Social History:  Social History     Socioeconomic History   • Marital status:      Spouse name: Not on file   • Number of children: Not on file   • Years of education: Not on file   • Highest education level: Not on file   Occupational History   • Not on file   Tobacco Use   • Smoking  status: Former Smoker     Packs/day: 2.00     Years: 50.00     Pack years: 100.00     Types: Cigarettes     Quit date: 1/1/2006     Years since quitting: 15.4   • Smokeless tobacco: Never Used   Vaping Use   • Vaping Use: Never used   Substance and Sexual Activity   • Alcohol use: Not Currently     Alcohol/week: 0.0 oz     Comment: Nothing for one year (8/13/19)   • Drug use: No   • Sexual activity: Not Currently     Partners: Female     Comment: , no children   Other Topics Concern   • Not on file   Social History Narrative   • Not on file     Social Determinants of Health     Financial Resource Strain:    • Difficulty of Paying Living Expenses:    Food Insecurity:    • Worried About Running Out of Food in the Last Year:    • Ran Out of Food in the Last Year:    Transportation Needs:    • Lack of Transportation (Medical):    • Lack of Transportation (Non-Medical):    Physical Activity:    • Days of Exercise per Week:    • Minutes of Exercise per Session:    Stress:    • Feeling of Stress :    Social Connections:    • Frequency of Communication with Friends and Family:    • Frequency of Social Gatherings with Friends and Family:    • Attends [employer] Services:    • Active Member of Clubs or Organizations:    • Attends Club or Organization Meetings:    • Marital Status:    Intimate Partner Violence:    • Fear of Current or Ex-Partner:    • Emotionally Abused:    • Physically Abused:    • Sexually Abused:        Family History:  No family history on file.    Medications:    Current Outpatient Medications:   •  furosemide (LASIX) 20 MG Tab, Take 1 tablet by mouth 2 times a day., Disp: 200 tablet, Rfl: 1  •  triamcinolone acetonide (KENALOG) 0.1 % Cream, , Disp: , Rfl:   •  Blood Glucose Test Strips, Use one Abbott Freestyle Lite strip to test blood sugar twice daily., Disp: 100 Each, Rfl: 5  •  FreeStyle Lancets Misc, USE EVERY MORNING TO TEST FASTING BLOOD SUGAR, Disp: 100 Each, Rfl: 3  •  Home Care Oxygen,  Inhale 2 L/min continuous., Disp: , Rfl:   •  Semaglutide,0.25 or 0.5MG/DOS, (OZEMPIC, 0.25 OR 0.5 MG/DOSE,) 2 MG/1.5ML Solution Pen-injector, Inject 0.25 mL under the skin every 7 days., Disp: 1.5 mL, Rfl: 6  •  glucose blood strip, 1 Strip by Other route as needed (for taking a blood glucose reading)., Disp: 100 Strip, Rfl: 5  •  non-formulary med, Inhale 2 L/min continuous. Oxygen dose range: 2 L/min Respiratory route via: Nasal Cannula  Oxygen supplier: Preferred    Indications: Hypoxia, Disp: , Rfl:     Labs: Reviewed    Assessment and Plan:    1. DM2  Pt continues to tolerate Ozempic 0.25 mg q week.  His a1c continues to improve from 6.1% to 5.9%.    - Medication changes -   Continue Ozempic 0.25 mg q 7 days     - Lifestyle changes -   Continue limiting carb intake  Exercise is limited d/t current SSTI on LE    F/u PRN - will defer management to PCP  Sylwia Marino, PharmD  03/08/21    CC:   Collin Schwab P.A.-C.

## 2021-07-28 ENCOUNTER — TELEPHONE (OUTPATIENT)
Dept: MEDICAL GROUP | Facility: MEDICAL CENTER | Age: 76
End: 2021-07-28

## 2021-07-28 NOTE — TELEPHONE ENCOUNTER
ESTABLISHED PATIENT PRE-VISIT PLANNING     Patient was NOT contacted to complete PVP.  Note: Patient will not be contacted if there is no indication to call.       1.  Reviewed notes from the last few office visits within the medical group: Yes    2.  If any orders were placed at last visit or intended to be done for this visit (i.e. 6 mos follow-up), do we have Results/Consult Notes?   · Labs - Labs were not ordered at last office visit.   Note: If patient appointment is for lab review and patient did not complete labs, check with provider if OK to reschedule patient until labs completed.  · Imaging - Imaging was not ordered at last office visit.  · Referrals - Referral ordered, patient was seen and consult notes are in chart. Care Teams updated  YES.    3. Is this appointment scheduled as a Hospital Follow-Up? No    4.  Immunizations were updated in Epic using Reconcile Outside Information activity? Yes. The request for WEB-IZ Failed, unable to update and reconcile new immunizations.    5.  Patient is due for the following Health Maintenance Topics:   Health Maintenance Due   Topic Date Due   • URINE ACR / MICROALBUMIN  Never done   • RETINAL SCREENING  11/07/2019       6.  AHA (Pulse8) form printed for Provider? No, patient does not have any open alerts       ---------------------------------------------------------------------------------------------  This Pre-Visit Planning note has been created for the Provider and Medical Assistant to review prior to the patient's office appointment.   Patient is NOT REQUIRED to follow-up on this note.

## 2022-01-14 DIAGNOSIS — E11.9 TYPE 2 DIABETES MELLITUS WITHOUT COMPLICATION, WITHOUT LONG-TERM CURRENT USE OF INSULIN (HCC): ICD-10-CM

## 2022-01-14 RX ORDER — SEMAGLUTIDE 1.34 MG/ML
0.25 INJECTION, SOLUTION SUBCUTANEOUS
Qty: 1.5 ML | Refills: 6 | Status: SHIPPED | OUTPATIENT
Start: 2022-01-14 | End: 2022-01-17 | Stop reason: SDUPTHER

## 2022-01-17 DIAGNOSIS — E11.9 TYPE 2 DIABETES MELLITUS WITHOUT COMPLICATION, WITHOUT LONG-TERM CURRENT USE OF INSULIN (HCC): ICD-10-CM

## 2022-01-17 RX ORDER — SEMAGLUTIDE 1.34 MG/ML
0.5 INJECTION, SOLUTION SUBCUTANEOUS
Qty: 1.5 ML | Refills: 6 | Status: SHIPPED | OUTPATIENT
Start: 2022-01-17 | End: 2022-05-23 | Stop reason: SDUPTHER

## 2022-03-03 ENCOUNTER — PATIENT MESSAGE (OUTPATIENT)
Dept: HEALTH INFORMATION MANAGEMENT | Facility: OTHER | Age: 77
End: 2022-03-03
Payer: MEDICARE

## 2022-05-23 DIAGNOSIS — E11.9 TYPE 2 DIABETES MELLITUS WITHOUT COMPLICATION, WITHOUT LONG-TERM CURRENT USE OF INSULIN (HCC): ICD-10-CM

## 2022-05-23 RX ORDER — SEMAGLUTIDE 1.34 MG/ML
0.5 INJECTION, SOLUTION SUBCUTANEOUS
Qty: 1.5 ML | Refills: 0 | Status: SHIPPED | OUTPATIENT
Start: 2022-05-23 | End: 2023-06-19

## 2022-09-28 ENCOUNTER — TELEPHONE (OUTPATIENT)
Dept: HEALTH INFORMATION MANAGEMENT | Facility: OTHER | Age: 77
End: 2022-09-28

## 2023-04-21 ENCOUNTER — APPOINTMENT (OUTPATIENT)
Dept: MEDICAL GROUP | Facility: MEDICAL CENTER | Age: 78
End: 2023-04-21
Payer: MEDICARE

## 2023-06-17 SDOH — HEALTH STABILITY: MENTAL HEALTH
STRESS IS WHEN SOMEONE FEELS TENSE, NERVOUS, ANXIOUS, OR CAN'T SLEEP AT NIGHT BECAUSE THEIR MIND IS TROUBLED. HOW STRESSED ARE YOU?: NOT AT ALL

## 2023-06-17 SDOH — ECONOMIC STABILITY: FOOD INSECURITY: WITHIN THE PAST 12 MONTHS, YOU WORRIED THAT YOUR FOOD WOULD RUN OUT BEFORE YOU GOT MONEY TO BUY MORE.: NEVER TRUE

## 2023-06-17 SDOH — ECONOMIC STABILITY: FOOD INSECURITY: WITHIN THE PAST 12 MONTHS, THE FOOD YOU BOUGHT JUST DIDN'T LAST AND YOU DIDN'T HAVE MONEY TO GET MORE.: NEVER TRUE

## 2023-06-17 SDOH — ECONOMIC STABILITY: HOUSING INSECURITY
IN THE LAST 12 MONTHS, WAS THERE A TIME WHEN YOU DID NOT HAVE A STEADY PLACE TO SLEEP OR SLEPT IN A SHELTER (INCLUDING NOW)?: NO

## 2023-06-17 SDOH — ECONOMIC STABILITY: INCOME INSECURITY: IN THE LAST 12 MONTHS, WAS THERE A TIME WHEN YOU WERE NOT ABLE TO PAY THE MORTGAGE OR RENT ON TIME?: NO

## 2023-06-17 SDOH — ECONOMIC STABILITY: INCOME INSECURITY: HOW HARD IS IT FOR YOU TO PAY FOR THE VERY BASICS LIKE FOOD, HOUSING, MEDICAL CARE, AND HEATING?: NOT VERY HARD

## 2023-06-17 SDOH — HEALTH STABILITY: PHYSICAL HEALTH: ON AVERAGE, HOW MANY DAYS PER WEEK DO YOU ENGAGE IN MODERATE TO STRENUOUS EXERCISE (LIKE A BRISK WALK)?: 0 DAYS

## 2023-06-17 SDOH — ECONOMIC STABILITY: HOUSING INSECURITY: IN THE LAST 12 MONTHS, HOW MANY PLACES HAVE YOU LIVED?: 1

## 2023-06-17 SDOH — ECONOMIC STABILITY: TRANSPORTATION INSECURITY
IN THE PAST 12 MONTHS, HAS THE LACK OF TRANSPORTATION KEPT YOU FROM MEDICAL APPOINTMENTS OR FROM GETTING MEDICATIONS?: NO

## 2023-06-17 SDOH — ECONOMIC STABILITY: TRANSPORTATION INSECURITY
IN THE PAST 12 MONTHS, HAS LACK OF RELIABLE TRANSPORTATION KEPT YOU FROM MEDICAL APPOINTMENTS, MEETINGS, WORK OR FROM GETTING THINGS NEEDED FOR DAILY LIVING?: NO

## 2023-06-17 SDOH — ECONOMIC STABILITY: HOUSING INSECURITY

## 2023-06-17 SDOH — HEALTH STABILITY: PHYSICAL HEALTH: ON AVERAGE, HOW MANY MINUTES DO YOU ENGAGE IN EXERCISE AT THIS LEVEL?: 0 MIN

## 2023-06-17 SDOH — ECONOMIC STABILITY: TRANSPORTATION INSECURITY
IN THE PAST 12 MONTHS, HAS LACK OF TRANSPORTATION KEPT YOU FROM MEETINGS, WORK, OR FROM GETTING THINGS NEEDED FOR DAILY LIVING?: NO

## 2023-06-17 ASSESSMENT — SOCIAL DETERMINANTS OF HEALTH (SDOH)
HOW OFTEN DO YOU GET TOGETHER WITH FRIENDS OR RELATIVES?: NEVER
DO YOU BELONG TO ANY CLUBS OR ORGANIZATIONS SUCH AS CHURCH GROUPS UNIONS, FRATERNAL OR ATHLETIC GROUPS, OR SCHOOL GROUPS?: NO
DO YOU BELONG TO ANY CLUBS OR ORGANIZATIONS SUCH AS CHURCH GROUPS UNIONS, FRATERNAL OR ATHLETIC GROUPS, OR SCHOOL GROUPS?: NO
HOW OFTEN DO YOU HAVE SIX OR MORE DRINKS ON ONE OCCASION: NEVER
HOW OFTEN DO YOU ATTEND CHURCH OR RELIGIOUS SERVICES?: NEVER
HOW HARD IS IT FOR YOU TO PAY FOR THE VERY BASICS LIKE FOOD, HOUSING, MEDICAL CARE, AND HEATING?: NOT VERY HARD
HOW OFTEN DO YOU ATTENT MEETINGS OF THE CLUB OR ORGANIZATION YOU BELONG TO?: NEVER
HOW OFTEN DO YOU HAVE A DRINK CONTAINING ALCOHOL: NEVER
HOW OFTEN DO YOU ATTEND CHURCH OR RELIGIOUS SERVICES?: NEVER
HOW MANY DRINKS CONTAINING ALCOHOL DO YOU HAVE ON A TYPICAL DAY WHEN YOU ARE DRINKING: PATIENT DOES NOT DRINK
HOW OFTEN DO YOU ATTENT MEETINGS OF THE CLUB OR ORGANIZATION YOU BELONG TO?: NEVER
IN A TYPICAL WEEK, HOW MANY TIMES DO YOU TALK ON THE PHONE WITH FAMILY, FRIENDS, OR NEIGHBORS?: NEVER
IN A TYPICAL WEEK, HOW MANY TIMES DO YOU TALK ON THE PHONE WITH FAMILY, FRIENDS, OR NEIGHBORS?: NEVER
HOW OFTEN DO YOU GET TOGETHER WITH FRIENDS OR RELATIVES?: NEVER
WITHIN THE PAST 12 MONTHS, YOU WORRIED THAT YOUR FOOD WOULD RUN OUT BEFORE YOU GOT THE MONEY TO BUY MORE: NEVER TRUE

## 2023-06-17 ASSESSMENT — LIFESTYLE VARIABLES
HOW OFTEN DO YOU HAVE A DRINK CONTAINING ALCOHOL: NEVER
AUDIT-C TOTAL SCORE: 0
SKIP TO QUESTIONS 9-10: 1
HOW OFTEN DO YOU HAVE SIX OR MORE DRINKS ON ONE OCCASION: NEVER
HOW MANY STANDARD DRINKS CONTAINING ALCOHOL DO YOU HAVE ON A TYPICAL DAY: PATIENT DOES NOT DRINK

## 2023-06-19 ENCOUNTER — OFFICE VISIT (OUTPATIENT)
Dept: MEDICAL GROUP | Facility: MEDICAL CENTER | Age: 78
End: 2023-06-19
Payer: MEDICARE

## 2023-06-19 VITALS
RESPIRATION RATE: 17 BRPM | HEART RATE: 110 BPM | DIASTOLIC BLOOD PRESSURE: 70 MMHG | OXYGEN SATURATION: 95 % | WEIGHT: 204 LBS | SYSTOLIC BLOOD PRESSURE: 150 MMHG | BODY MASS INDEX: 30.92 KG/M2 | TEMPERATURE: 99 F | HEIGHT: 68 IN

## 2023-06-19 DIAGNOSIS — E11.622 DIABETES MELLITUS WITH SKIN ULCER (HCC): ICD-10-CM

## 2023-06-19 DIAGNOSIS — E11.51 TYPE II DIABETES MELLITUS WITH PERIPHERAL CIRCULATORY DISORDER (HCC): ICD-10-CM

## 2023-06-19 DIAGNOSIS — I10 ESSENTIAL HYPERTENSION: ICD-10-CM

## 2023-06-19 DIAGNOSIS — E55.9 VITAMIN D DEFICIENCY: ICD-10-CM

## 2023-06-19 DIAGNOSIS — R91.1 PULMONARY NODULE: ICD-10-CM

## 2023-06-19 DIAGNOSIS — Z12.5 SCREENING FOR MALIGNANT NEOPLASM OF PROSTATE: ICD-10-CM

## 2023-06-19 DIAGNOSIS — I73.9 PVD (PERIPHERAL VASCULAR DISEASE) (HCC): ICD-10-CM

## 2023-06-19 DIAGNOSIS — I82.511 CHRONIC DEEP VEIN THROMBOSIS (DVT) OF FEMORAL VEIN OF RIGHT LOWER EXTREMITY (HCC): ICD-10-CM

## 2023-06-19 DIAGNOSIS — D50.8 IRON DEFICIENCY ANEMIA SECONDARY TO INADEQUATE DIETARY IRON INTAKE: ICD-10-CM

## 2023-06-19 DIAGNOSIS — E11.9 TYPE 2 DIABETES MELLITUS WITHOUT COMPLICATION, WITHOUT LONG-TERM CURRENT USE OF INSULIN (HCC): ICD-10-CM

## 2023-06-19 DIAGNOSIS — E27.8 ADRENAL NODULE (HCC): ICD-10-CM

## 2023-06-19 DIAGNOSIS — L98.499 DIABETES MELLITUS WITH SKIN ULCER (HCC): ICD-10-CM

## 2023-06-19 DIAGNOSIS — I87.2 VENOUS INSUFFICIENCY OF BOTH LOWER EXTREMITIES: ICD-10-CM

## 2023-06-19 DIAGNOSIS — K76.6 PORTOPULMONARY HYPERTENSION (HCC): ICD-10-CM

## 2023-06-19 DIAGNOSIS — I27.20 PORTOPULMONARY HYPERTENSION (HCC): ICD-10-CM

## 2023-06-19 DIAGNOSIS — R97.20 ELEVATED PSA: ICD-10-CM

## 2023-06-19 DIAGNOSIS — K27.9 PUD (PEPTIC ULCER DISEASE): ICD-10-CM

## 2023-06-19 DIAGNOSIS — J44.9 STAGE 2 MODERATE COPD BY GOLD CLASSIFICATION (HCC): ICD-10-CM

## 2023-06-19 DIAGNOSIS — J96.11 CHRONIC HYPOXEMIC RESPIRATORY FAILURE (HCC): ICD-10-CM

## 2023-06-19 DIAGNOSIS — Z85.51 HISTORY OF BLADDER CANCER: ICD-10-CM

## 2023-06-19 DIAGNOSIS — H53.9 VISION CHANGES: ICD-10-CM

## 2023-06-19 PROBLEM — K44.9 HIATUS HERNIA SYNDROME: Status: ACTIVE | Noted: 2023-06-19

## 2023-06-19 PROCEDURE — 3077F SYST BP >= 140 MM HG: CPT | Performed by: NURSE PRACTITIONER

## 2023-06-19 PROCEDURE — 99214 OFFICE O/P EST MOD 30 MIN: CPT | Performed by: NURSE PRACTITIONER

## 2023-06-19 PROCEDURE — 3078F DIAST BP <80 MM HG: CPT | Performed by: NURSE PRACTITIONER

## 2023-06-19 ASSESSMENT — PATIENT HEALTH QUESTIONNAIRE - PHQ9: CLINICAL INTERPRETATION OF PHQ2 SCORE: 0

## 2023-06-19 NOTE — ASSESSMENT & PLAN NOTE
He is on o2 continuous.  He does not see pulmonary.  Not on meds for his COPD.  SOB occurs with exertion while cleaning his house.  If he does his chores slowly he won't have SOB.

## 2023-06-19 NOTE — ASSESSMENT & PLAN NOTE
"He has hx of wound on he legs.  He has been to ER and had wound clinic.  He saw derm and used kenalog that helped some.  Then when he gets up his legs the wounds come back.  He has used otc meds that initially helping.  He feels that the med are \"turning to mush.\"  He has had multiple levels of ski peeling.  For the last 3 wks he has had broken .  He now has to stand to do his dishes.  That is making his legs worse.  He feels that his legs have been infected long term but he has been told that it is not infected.  He was initially told that it was scabies.  The ER told  Him that it was that but his proir PCP, Wound clinic and derm did not think that it was done  "

## 2023-06-19 NOTE — ASSESSMENT & PLAN NOTE
He had surgery for bud.  There was stents placed with ERCP.  Done by Harman Michelle MD.  He is due updated lab for LFT's

## 2023-06-19 NOTE — PROGRESS NOTES
"Subjective     Enoch Mistry is a 77 y.o. male who presents with Establish Care            HPI  Seen to establish care.  He has been out of medications for about 8-10 months.  He is not been checking his glucoses.  He was on ozempic in past.    He is not on  healthy diet.  He rides his stationary bike for exercise.  He needs referral to ophth.  He is having vision changes with a spot in left eye for lab several wks.  He is interested in doretha portable oxygen for his o2.      Type 2 diabetes mellitus without complication, without long-term current use of insulin ([facility])  He is due updated lab.  Off all meds.      PVD (peripheral vascular disease) ([facility])  He has hx of wound on he legs.  He has been to ER and had wound clinic.  He saw derm and used kenalog that helped some.  Then when he gets up his legs the wounds come back.  He has used otc meds that initially helping.  He feels that the med are \"turning to mush.\"  He has had multiple levels of ski peeling.  For the last 3 wks he has had broken .  He now has to stand to do his dishes.  That is making his legs worse.  He feels that his legs have been infected long term but he has been told that it is not infected.  He was initially told that it was scabies.  The ER told  Him that it was that but his proir PCP, Wound clinic and derm did not think that it was done    Stage 2 moderate COPD by GOLD classification ([facility])  He is on o2 continuous.  He does not see pulmonary.  Not on meds for his COPD.  SOB occurs with exertion while cleaning his house.  If he does his chores slowly he won't have SOB.    Pulmonary nodule  Last check in 2015.  No current issues.    PUD (peptic ulcer disease)  He had surgery for bud.  There was stents placed with ERCP.  Done by Harman Michelle MD.  He is due updated lab for LFT's    Iron deficiency anemia secondary to inadequate dietary iron intake  He has hx of chronic anema.  No sx of bleeding.    History of bladder cancer  Uses " lasix prn for issues with voiding.  Has not seen urology long term.  He has had surgery on bladder and prostate.    Essential hypertension  Not on med tx for HTN. BP  Is sl elevated today wiht SBP but doesn't check at home.     Elevated PSA  Has hx of prostate enlargment with surgery removing some of prostate.  No urinary difficulties.    Chronic hypoxemic respiratory failure (HCC)  Stable on O2 2 l/min nasal cannula.  Does not see pulm    Chronic deep vein thrombosis (DVT) of femoral vein of right lower extremity (HCC)  He has been told in the past that he poss has had a DVT.  Not on anticoagulation    Adrenal adenoma  Hx in 2015 of rt adrenal nodule.  No current sx.     Portopulmonary hypertension (HCC)  Last echo in 2021 showed RVSP 50.  Has not seen cardiac long term.  No current sx of CHF      Patient Active Problem List    Diagnosis Date Noted    Iron deficiency anemia secondary to inadequate dietary iron intake 06/19/2023    Hiatus hernia syndrome 06/19/2023    Portopulmonary hypertension (HCC) 06/19/2023    Chronic deep vein thrombosis (DVT) of femoral vein of right lower extremity (HCC) 01/25/2021    Former smoker 01/15/2021    PVD (peripheral vascular disease) ([facility]) 08/13/2019    Obesity (BMI 30-39.9) 10/02/2018    PUD (peptic ulcer disease) 09/23/2018    Hepatic steatosis 09/12/2018    Elevated PSA 09/21/2017    Stage 2 moderate COPD by GOLD classification ([facility]) 06/21/2017    Type 2 diabetes mellitus without complication, without long-term current use of insulin (HCC) 06/06/2017    Benign non-nodular prostatic hyperplasia without lower urinary tract symptoms 06/06/2017    Chronic hypoxemic respiratory failure (HCC) 01/07/2016    Adrenal adenoma 01/07/2016    Pulmonary nodule 04/03/2015    History of bladder cancer 04/02/2015    Essential hypertension 04/01/2015     Current Outpatient Medications   Medication Sig Dispense Refill    furosemide (LASIX) 20 MG Tab Take 1 tablet by mouth 2 times a day. 200  tablet 1    Blood Glucose Test Strips Use one Abbott Freestyle Lite strip to test blood sugar twice daily. 100 Each 5    FreeStyle Lancets Misc USE EVERY MORNING TO TEST FASTING BLOOD SUGAR 100 Each 3    Home Care Oxygen Inhale 2 L/min continuous.       No current facility-administered medications for this visit.     Bactrim ds  Past Medical History:   Diagnosis Date    Adrenal adenoma 1/7/2016 8/4/13 CT renal no hydronephrosis, 2.7 cm left adrenal lesion likely representing cyst 4/2/15 CT-CTA chest pulmonary embolism protocol, right adrenal adenoma 10 mm    Anemia     Benign non-nodular prostatic hyperplasia without lower urinary tract symptoms 6/6/2017 2015 -- Transurethral resection    Chronic deep vein thrombosis (DVT) of femoral vein of right lower extremity (HCC) 01/25/2021    Chronic hypoxemic respiratory failure ([facility]) 1/7/2016    Currently on 2 L of oxygen.    Diabetes ([facility])     yskinh ozempic    Emphysema of lung ([facility])     uses 2Lnc 02 continuous    Essential hypertension 4/1/2015    1/7/15 started lisinopril 10 mg, declines ultrasound arterial lower extremities    Former smoker 1/15/2021    Hepatic steatosis 9/12/2018    Hiatus hernia syndrome     History of bladder cancer     bladder cancer    Hypertension     not taking medication stopped by  9/12/18    Obesity (BMI 30-39.9) 10/2/2018    Portopulmonary hypertension (HCC) 6/19/2023    PUD (peptic ulcer disease) 9/23/2018    Pulmonary nodule 4/3/2015    8/4/13 chest x-ray negative 4/18/14 chest x-ray bilateral basilar scarring or atelectasis 4/1/15 chest x-ray linear perihilar and lower lobe opacity possibly representing mild interstitial edema versus atelectasis 4/2/15 CT-CTA chest pulmonary embolism protocol, no CT evidence of pulmonary embolism, right lower lobe atelectasis with possible pneumonia, emphysema, 3 mm right middle lobe pulmonary n    PVD (peripheral vascular disease) ([facility]) 8/13/2019     Social History     Socioeconomic History     Marital status:      Spouse name: Not on file    Number of children: Not on file    Years of education: Not on file    Highest education level: Not on file   Occupational History    Not on file   Tobacco Use    Smoking status: Former     Packs/day: 2.00     Years: 50.00     Pack years: 100.00     Types: Cigarettes     Quit date: 2006     Years since quittin.4     Passive exposure: Never    Smokeless tobacco: Never   Vaping Use    Vaping Use: Never used   Substance and Sexual Activity    Alcohol use: Not Currently     Alcohol/week: 0.0 oz     Comment: Nothing for one year (19)    Drug use: No    Sexual activity: Not Currently     Partners: Female     Comment: , no children   Other Topics Concern    Not on file   Social History Narrative    Not on file     Social Determinants of Health     Financial Resource Strain: Low Risk  (2023)    Overall Financial Resource Strain (CARDIA)     Difficulty of Paying Living Expenses: Not very hard   Food Insecurity: No Food Insecurity (2023)    Hunger Vital Sign     Worried About Running Out of Food in the Last Year: Never true     Ran Out of Food in the Last Year: Never true   Transportation Needs: No Transportation Needs (2023)    PRAPARE - Transportation     Lack of Transportation (Medical): No     Lack of Transportation (Non-Medical): No   Physical Activity: Inactive (2023)    Exercise Vital Sign     Days of Exercise per Week: 0 days     Minutes of Exercise per Session: 0 min   Stress: No Stress Concern Present (2023)    [employer] [place] [employer] - Occupational Stress Questionnaire     Feeling of Stress : Not at all   Social Connections: Socially Isolated (2023)    Social Connection and Isolation Panel [NHANES]     Frequency of Communication with Friends and Family: Never     Frequency of Social Gatherings with Friends and Family: Never     Attends Mandaeism Services: Never     Active Member of Clubs or  Organizations: No     Attends Club or Organization Meetings: Never     Marital Status:    Intimate Partner Violence: Not on file   Housing Stability: Unknown (6/17/2023)    Housing Stability Vital Sign     Unable to Pay for Housing in the Last Year: No     Number of Places Lived in the Last Year: 1     Unstable Housing in the Last Year: Not on file     Family History   Problem Relation Age of Onset    Heart Disease Mother     Dementia Father 80           ROS  Review of Systems   Constitutional: Negative.  Negative for fever, chills, weight loss, malaise/fatigue and diaphoresis.   HENT: Negative.  Negative for hearing loss, ear pain, nosebleeds, sore throat, neck pain, tinnitus and ear discharge.    Eyes: Negative.  Negative for blurred vision, double vision, photophobia, pain, discharge and redness.   Respiratory: Negative.  Negative for cough, hemoptysis, wheezing and stridor.  Chronic sinus congestion.   Cardiovascular: Negative.  Negative for chest pain, palpitations, orthopnea, claudication, PND.   Gastrointestinal: Negative.  Negative for heartburn, nausea, vomiting, abdominal pain, diarrhea, constipation, blood in stool and melena.   Genitourinary: Negative.  Negative for  urgency, frequency, hematuria and flank pain. Occas dysuria and urgeurinary incontinence.  Musculoskeletal: Negative.  Negative for myalgias, back pain, falls.   Skin: Negative.  Negative for itching   Neurological: Negative.  Negative for dizziness, tingling, tremors, sensory change, speech change, focal weakness, seizures, loss of consciousness, weakness and headaches.   Endo/Heme/Allergies: Negative.  Negative for environmental allergies and polydipsia. Does not bruise/bleed easily.   Psychiatric/Behavioral: Negative.  Negative for depression, suicidal ideas, hallucinations, memory loss and substance abuse. The patient is not nervous/anxious and does have occas insomnia.    All other systems reviewed and are negative.    Objective  "    BP (!) 150/70 (BP Location: Right arm, Patient Position: Sitting, BP Cuff Size: Adult)   Pulse (!) 110   Temp 37.2 °C (99 °F) (Temporal)   Resp 17   Ht 1.727 m (5' 8\")   Wt 92.5 kg (204 lb)   SpO2 95%   PF (!) 2 L/min   BMI 31.02 kg/m²      Physical Exam  Physical Exam   Vitals reviewed.  Constitutional: oriented to person, place, and time. appears well-developed and well-nourished. No distress.   HENT: Head: Normocephalic and atraumatic. Bilateral tympanic membranes wnl w/o bulging.  Right Ear: External ear normal. Left Ear: External ear normal. Nose:  michael tm wnl. Eyes: Conjunctivae and EOM are normal. Pupils are equal, round, and reactive to light. Right eye exhibits no discharge. Left eye exhibits no discharge. No scleral icterus.    Neck: amb slowly. Neck supple. No JVD present.   Cardiovascular: Normal rate, regular rhythm, normal heart sounds and intact distal pulses.  Exam reveals no gallop and no friction rub.  No murmur heard.  No carotid bruits   Pulmonary/Chest: Effort normal and breath sounds normal. No stridor. No respiratory distress. no wheezes or rales. exhibits no tenderness.   Abdominal: Soft. Bowel sounds are normal. exhibits no distension and no mass. No tenderness. no rebound and no guarding.   Musculoskeletal: exhibits 3+ michael pedal edema with erythemtous michael LE with skin peeling.  michael pedal pulses 1+.  Lymphadenopathy:  no cervical or supraclavicular adenopathy.   Neurological: alert and oriented to person, place, and time. has normal reflexes. displays normal reflexes. No cranial nerve deficit. exhibits normal muscle tone. Coordination normal.   Skin: Skin is warm and dry. No rash noted. no diaphoresis. No pallor.   Psychiatric: normal mood and affect. behavior is normal.       Assessment & Plan   1. Venous insufficiency of both lower extremities      chronic with severe edema, reddness and skin peeling. instructed pt-keep legs elevated w/sitting, adequate h20 intake, low na diet, " virgil petersen.      2. Vision changes  Referral to Ophthalmology    recently he has noted vision changes.  needs ophth referral for vision exam      3. Type 2 diabetes mellitus without complication, without long-term current use of insulin ([facility])  Referral to Ophthalmology    no meds or lab long term.  enc pt to start healthy diet & do regular exercise. do lab and f/u for review      4. PVD (peripheral vascular disease) ([facility])      chronic w/o sx of infection.  lana wnl      5. Stage 2 moderate COPD by GOLD classification ([facility])      no current tx.  on chronic 02.  send order for small o2 concentrator.       6. Pulmonary nodule      occurred long ago.  no f/u needed      7. PUD (peptic ulcer disease)        8. Iron deficiency anemia secondary to inadequate dietary iron intake      rechk lab with CBC. f/u 1 mo for lab review.      9. History of bladder cancer      no evidence of reoccurrence      10. Essential hypertension      not on med at this time. BP elevated today. monitor bp at home if able. low na diet. f/u 1 mo for lab review and bp ck      11. Elevated PSA      kemal lab.  has not seen urology long term.      12. Chronic hypoxemic respiratory failure (HCC)      wears o2 continuously. consider pulm referral at 1 mo f/u appt      13. Chronic deep vein thrombosis (DVT) of femoral vein of right lower extremity (HCC)      no current sx or tx      14. Adrenal nodule (HCC)      found in past.  no current tx or sx      15. Diabetes mellitus with skin ulcer (HCC)      chronic skin peeling.  has chronic hx of sores/wounds in LE.        16. Portopulmonary hypertension (HCC)      not followed by cardiac. consider updated echo or cardiac referral at next appt      17. Type II diabetes mellitus with peripheral circulatory disorder (HCC)      + chronic pedal edema and venous insufficiency.

## 2023-06-19 NOTE — ASSESSMENT & PLAN NOTE
Uses lasix prn for issues with voiding.  Has not seen urology long term.  He has had surgery on bladder and prostate.

## 2023-06-27 ENCOUNTER — HOSPITAL ENCOUNTER (OUTPATIENT)
Dept: LAB | Facility: MEDICAL CENTER | Age: 78
End: 2023-06-27
Attending: NURSE PRACTITIONER
Payer: MEDICARE

## 2023-06-27 DIAGNOSIS — D50.8 IRON DEFICIENCY ANEMIA SECONDARY TO INADEQUATE DIETARY IRON INTAKE: ICD-10-CM

## 2023-06-27 DIAGNOSIS — E55.9 VITAMIN D DEFICIENCY: ICD-10-CM

## 2023-06-27 DIAGNOSIS — Z12.5 SCREENING FOR MALIGNANT NEOPLASM OF PROSTATE: ICD-10-CM

## 2023-06-27 DIAGNOSIS — E11.9 TYPE 2 DIABETES MELLITUS WITHOUT COMPLICATION, WITHOUT LONG-TERM CURRENT USE OF INSULIN (HCC): ICD-10-CM

## 2023-06-27 LAB
25(OH)D3 SERPL-MCNC: 28 NG/ML (ref 30–100)
ALBUMIN SERPL BCP-MCNC: 3.9 G/DL (ref 3.2–4.9)
ALBUMIN/GLOB SERPL: 1.1 G/DL
ALP SERPL-CCNC: 91 U/L (ref 30–99)
ALT SERPL-CCNC: 17 U/L (ref 2–50)
ANION GAP SERPL CALC-SCNC: 11 MMOL/L (ref 7–16)
AST SERPL-CCNC: 21 U/L (ref 12–45)
BASOPHILS # BLD AUTO: 0.4 % (ref 0–1.8)
BASOPHILS # BLD: 0.04 K/UL (ref 0–0.12)
BILIRUB SERPL-MCNC: 1.3 MG/DL (ref 0.1–1.5)
BUN SERPL-MCNC: 17 MG/DL (ref 8–22)
CALCIUM ALBUM COR SERPL-MCNC: 9.3 MG/DL (ref 8.5–10.5)
CALCIUM SERPL-MCNC: 9.2 MG/DL (ref 8.4–10.2)
CHLORIDE SERPL-SCNC: 103 MMOL/L (ref 96–112)
CHOLEST SERPL-MCNC: 110 MG/DL (ref 100–199)
CO2 SERPL-SCNC: 24 MMOL/L (ref 20–33)
CREAT SERPL-MCNC: 0.68 MG/DL (ref 0.5–1.4)
EOSINOPHIL # BLD AUTO: 0.17 K/UL (ref 0–0.51)
EOSINOPHIL NFR BLD: 1.8 % (ref 0–6.9)
ERYTHROCYTE [DISTWIDTH] IN BLOOD BY AUTOMATED COUNT: 41.8 FL (ref 35.9–50)
EST. AVERAGE GLUCOSE BLD GHB EST-MCNC: 223 MG/DL
FASTING STATUS PATIENT QL REPORTED: NORMAL
GFR SERPLBLD CREATININE-BSD FMLA CKD-EPI: 95 ML/MIN/1.73 M 2
GLOBULIN SER CALC-MCNC: 3.7 G/DL (ref 1.9–3.5)
GLUCOSE SERPL-MCNC: 192 MG/DL (ref 65–99)
HBA1C MFR BLD: 9.4 % (ref 4–5.6)
HCT VFR BLD AUTO: 45.9 % (ref 42–52)
HDLC SERPL-MCNC: 44 MG/DL
HGB BLD-MCNC: 15.1 G/DL (ref 14–18)
IMM GRANULOCYTES # BLD AUTO: 0.02 K/UL (ref 0–0.11)
IMM GRANULOCYTES NFR BLD AUTO: 0.2 % (ref 0–0.9)
LDLC SERPL CALC-MCNC: 50 MG/DL
LYMPHOCYTES # BLD AUTO: 1.99 K/UL (ref 1–4.8)
LYMPHOCYTES NFR BLD: 20.9 % (ref 22–41)
MCH RBC QN AUTO: 28.3 PG (ref 27–33)
MCHC RBC AUTO-ENTMCNC: 32.9 G/DL (ref 32.3–36.5)
MCV RBC AUTO: 86 FL (ref 81.4–97.8)
MONOCYTES # BLD AUTO: 0.6 K/UL (ref 0–0.85)
MONOCYTES NFR BLD AUTO: 6.3 % (ref 0–13.4)
NEUTROPHILS # BLD AUTO: 6.69 K/UL (ref 1.82–7.42)
NEUTROPHILS NFR BLD: 70.4 % (ref 44–72)
NRBC # BLD AUTO: 0 K/UL
NRBC BLD-RTO: 0 /100 WBC (ref 0–0.2)
PLATELET # BLD AUTO: 242 K/UL (ref 164–446)
PMV BLD AUTO: 10.8 FL (ref 9–12.9)
POTASSIUM SERPL-SCNC: 4.4 MMOL/L (ref 3.6–5.5)
PROT SERPL-MCNC: 7.6 G/DL (ref 6–8.2)
RBC # BLD AUTO: 5.34 M/UL (ref 4.7–6.1)
SODIUM SERPL-SCNC: 138 MMOL/L (ref 135–145)
TRIGL SERPL-MCNC: 81 MG/DL (ref 0–149)
WBC # BLD AUTO: 9.5 K/UL (ref 4.8–10.8)

## 2023-06-27 PROCEDURE — 84153 ASSAY OF PSA TOTAL: CPT

## 2023-06-27 PROCEDURE — 84154 ASSAY OF PSA FREE: CPT

## 2023-06-27 PROCEDURE — 82306 VITAMIN D 25 HYDROXY: CPT

## 2023-06-27 PROCEDURE — 85025 COMPLETE CBC W/AUTO DIFF WBC: CPT

## 2023-06-27 PROCEDURE — 83036 HEMOGLOBIN GLYCOSYLATED A1C: CPT

## 2023-06-27 PROCEDURE — 80061 LIPID PANEL: CPT

## 2023-06-27 PROCEDURE — 36415 COLL VENOUS BLD VENIPUNCTURE: CPT

## 2023-06-27 PROCEDURE — 80053 COMPREHEN METABOLIC PANEL: CPT

## 2023-06-29 ENCOUNTER — HOSPITAL ENCOUNTER (OUTPATIENT)
Facility: MEDICAL CENTER | Age: 78
End: 2023-06-29
Attending: NURSE PRACTITIONER
Payer: MEDICARE

## 2023-06-29 LAB
CREAT UR-MCNC: 81.9 MG/DL
MICROALBUMIN UR-MCNC: 33.4 MG/DL
MICROALBUMIN/CREAT UR: 408 MG/G (ref 0–30)
PSA FREE MFR SERPL: 21 %
PSA FREE SERPL-MCNC: 1.9 NG/ML
PSA SERPL-MCNC: 9.2 NG/ML (ref 0–4)

## 2023-06-29 PROCEDURE — 82570 ASSAY OF URINE CREATININE: CPT

## 2023-06-29 PROCEDURE — 82043 UR ALBUMIN QUANTITATIVE: CPT

## 2023-07-18 ENCOUNTER — TELEPHONE (OUTPATIENT)
Dept: HEALTH INFORMATION MANAGEMENT | Facility: OTHER | Age: 78
End: 2023-07-18
Payer: MEDICARE

## 2023-07-24 ENCOUNTER — OFFICE VISIT (OUTPATIENT)
Dept: MEDICAL GROUP | Facility: MEDICAL CENTER | Age: 78
End: 2023-07-24
Payer: MEDICARE

## 2023-07-24 VITALS
DIASTOLIC BLOOD PRESSURE: 60 MMHG | TEMPERATURE: 99.5 F | RESPIRATION RATE: 17 BRPM | SYSTOLIC BLOOD PRESSURE: 140 MMHG | HEART RATE: 111 BPM | HEIGHT: 68 IN | BODY MASS INDEX: 30.77 KG/M2 | WEIGHT: 203 LBS | OXYGEN SATURATION: 91 %

## 2023-07-24 DIAGNOSIS — E55.9 VITAMIN D DEFICIENCY: ICD-10-CM

## 2023-07-24 DIAGNOSIS — J96.11 CHRONIC HYPOXEMIC RESPIRATORY FAILURE (HCC): ICD-10-CM

## 2023-07-24 DIAGNOSIS — E78.5 HYPERLIPIDEMIA LDL GOAL <100: ICD-10-CM

## 2023-07-24 DIAGNOSIS — E11.9 TYPE 2 DIABETES MELLITUS WITHOUT COMPLICATION, WITHOUT LONG-TERM CURRENT USE OF INSULIN (HCC): ICD-10-CM

## 2023-07-24 DIAGNOSIS — R80.9 PROTEINURIA, UNSPECIFIED TYPE: ICD-10-CM

## 2023-07-24 DIAGNOSIS — R97.20 ELEVATED PSA: ICD-10-CM

## 2023-07-24 PROCEDURE — 3077F SYST BP >= 140 MM HG: CPT | Performed by: NURSE PRACTITIONER

## 2023-07-24 PROCEDURE — 99214 OFFICE O/P EST MOD 30 MIN: CPT | Performed by: NURSE PRACTITIONER

## 2023-07-24 PROCEDURE — 3078F DIAST BP <80 MM HG: CPT | Performed by: NURSE PRACTITIONER

## 2023-07-24 RX ORDER — SEMAGLUTIDE 1.34 MG/ML
0.5 INJECTION, SOLUTION SUBCUTANEOUS
Qty: 1.5 ML | Refills: 0 | Status: SHIPPED | OUTPATIENT
Start: 2023-07-24

## 2023-07-24 ASSESSMENT — FIBROSIS 4 INDEX: FIB4 SCORE: 1.62

## 2023-07-24 NOTE — PROGRESS NOTES
Subjective:     Enoch Mistry is a 77 y.o. male who presents with DM.    HPI:   Seen in f/u for DM.   He is still trying to get his portable o2.  He needs to go thru [facility].  The rep will be here today to discuss getting equipment needs.  He uses 24/7 o2.  He has COPD.  Does not see pulm.  Declines referral.  He has ozempic ordered but hasn't been able to get it at the pharmacy since they are out.    Reviewed lab with pt. Alb/cr ratio is up to 408.  No previous to compare.  GFR, corrected ca++, CBC is wnl.  PSA is 9.2.  that is down from 49.3 four years ago.  No urinary difficulties.  He has not seen urology long term.  He declines referral.  Vitamin d is low at 28.  He is not on otc supplement.   A1c is 9.4.  last test we have was 2 years ago at 5.9.  that was when he was on medication.  He has been out of his meds for several months.    He doesn't want to be on metformin.  LP shows trg and LDL at goal.  HDL is down from 47 two years ago to 44.  He does not exercise and is not on a healthy diet.  CMP is wnl except glucose elevated at 192 and globulin elevated at 3.7.               Patient Active Problem List    Diagnosis Date Noted    Iron deficiency anemia secondary to inadequate dietary iron intake 06/19/2023    Hiatus hernia syndrome 06/19/2023    Portopulmonary hypertension (HCC) 06/19/2023    Chronic deep vein thrombosis (DVT) of femoral vein of right lower extremity (HCC) 01/25/2021    Former smoker 01/15/2021    PVD (peripheral vascular disease) (HCC) 08/13/2019    Obesity (BMI 30-39.9) 10/02/2018    PUD (peptic ulcer disease) 09/23/2018    Hepatic steatosis 09/12/2018    Elevated PSA 09/21/2017    Stage 2 moderate COPD by GOLD classification ([facility]) 06/21/2017    Type 2 diabetes mellitus without complication, without long-term current use of insulin (HCC) 06/06/2017    Benign non-nodular prostatic hyperplasia without lower urinary tract symptoms 06/06/2017    Chronic hypoxemic respiratory  "failure (HCC) 01/07/2016    Adrenal adenoma 01/07/2016    Pulmonary nodule 04/03/2015    History of bladder cancer 04/02/2015    Essential hypertension 04/01/2015       Current medicines (including changes today)  Current Outpatient Medications   Medication Sig Dispense Refill    Semaglutide,0.25 or 0.5MG/DOS, (OZEMPIC, 0.25 OR 0.5 MG/DOSE,) 2 MG/1.5ML Solution Pen-injector Inject 0.5 mg under the skin every 7 days. 1.5 mL 0    furosemide (LASIX) 20 MG Tab Take 1 tablet by mouth 2 times a day. 200 tablet 1    Blood Glucose Test Strips Use one Abbott Freestyle Lite strip to test blood sugar twice daily. 100 Each 5    FreeStyle Lancets Misc USE EVERY MORNING TO TEST FASTING BLOOD SUGAR 100 Each 3    Home Care Oxygen Inhale 2 L/min continuous.       No current facility-administered medications for this visit.       Allergies   Allergen Reactions    Bactrim Ds        ROS  Constitutional: Negative. Negative for fever, chills, weight loss, malaise/fatigue and diaphoresis.   HENT: Negative. Negative for hearing loss, ear pain, nosebleeds, congestion, sore throat, neck pain, tinnitus and ear discharge.   Respiratory: Negative. Negative for cough, hemoptysis, sputum production, shortness of breath, wheezing and stridor.   Cardiovascular: Negative. Negative for chest pain, palpitations, orthopnea, claudication, leg swelling and PND.   Gastrointestinal: Denies nausea, vomiting, diarrhea, constipation, heartburn, melena or hematochezia.  Genitourinary: Denies dysuria, hematuria, urinary incontinence, frequency or urgency.        Objective:     BP (!) 140/60 (BP Location: Right arm, Patient Position: Sitting, BP Cuff Size: Adult)   Pulse (!) 111   Temp 37.5 °C (99.5 °F) (Temporal)   Resp 17   Ht 1.715 m (5' 7.5\")   Wt 92.1 kg (203 lb)   SpO2 91%   PF (!) 2 L/min  Body mass index is 31.33 kg/m².    Physical Exam:  Vitals reviewed.  Constitutional: Oriented to person, place, and time. appears well-developed and " well-nourished. No distress.   Cardiovascular: Normal rate, regular rhythm, normal heart sounds and intact distal pulses. Exam reveals no gallop and no friction rub. No murmur heard. No carotid bruits.   Pulmonary/Chest: Effort normal and breath sounds normal. No stridor. No respiratory distress. no wheezes or rales. exhibits no tenderness.   Musculoskeletal: Normal range of motion. exhibits no edema. michael pedal pulses 2+.  Lymphadenopathy: No cervical or supraclavicular adenopathy.   Neurological: Alert and oriented to person, place, and time. exhibits normal muscle tone.  Skin: Skin is warm and dry. No diaphoresis.   Psychiatric: Normal mood and affect. Behavior is normal.      Assessment and Plan:     The following treatment plan was discussed:    1. Type 2 diabetes mellitus without complication, without long-term current use of insulin (HCC)  Semaglutide,0.25 or 0.5MG/DOS, (OZEMPIC, 0.25 OR 0.5 MG/DOSE,) 2 MG/1.5ML Solution Pen-injector    HEMOGLOBIN A1C    Referral to Podiatry    pt will call pharmacies to find med. will send presc to approp pharmacy. a1c not controlled. kemal a1c 4 mo. refer podiatry for DM foot care. f/u for review      2. Hyperlipidemia LDL goal <100      trg and LDL at goal but HDL down. enc pt to improve healthy diet and be more active.  plan: kemal LP 6 mo.      3. Chronic hypoxemic respiratory failure (HCC)      on o2 24/7. Nancy spoke with [facility] in ofc. he will make sure pt gets his portable o2. declines pulm referral      4. Proteinuria, unspecified type      elevated d/t DM uncontrolled. plan rechk when a1c improved.  BP controlled. doesn't use nsaids freq      5. Elevated PSA      PSA elevated but down from previous. declines urology referral despite aware of prostate cancer risk      6. Vitamin D deficiency      enc pt to take D3 2000 units daily        7.  He declines DM foot exam today.  Will agree to podiatry referral.    Followup: Return in about 4 months (around  11/24/2023), or for lab review.

## 2023-08-07 DIAGNOSIS — I73.9 PVD (PERIPHERAL VASCULAR DISEASE) (HCC): ICD-10-CM

## 2023-08-07 RX ORDER — FUROSEMIDE 20 MG/1
20 TABLET ORAL 2 TIMES DAILY
Qty: 200 TABLET | Refills: 1 | Status: SHIPPED | OUTPATIENT
Start: 2023-08-07

## 2023-08-07 NOTE — TELEPHONE ENCOUNTER
Received request via: Pharmacy    Was the patient seen in the last year in this department? Yes    Does the patient have an active prescription (recently filled or refills available) for medication(s) requested? YES    Does the patient have retirement Plus and need 100 day supply (blood pressure, diabetes and cholesterol meds only)? Yes, quantity updated to 100 days   Requested Prescriptions     Pending Prescriptions Disp Refills    furosemide (LASIX) 20 MG Tab 200 Tablet 1     Sig: Take 1 Tablet by mouth 2 times a day.

## 2023-08-22 DIAGNOSIS — E11.9 TYPE 2 DIABETES MELLITUS WITHOUT COMPLICATION, WITHOUT LONG-TERM CURRENT USE OF INSULIN (HCC): ICD-10-CM

## 2023-11-01 PROBLEM — E11.51 TYPE II DIABETES MELLITUS WITH PERIPHERAL CIRCULATORY DISORDER (HCC): Status: ACTIVE | Noted: 2017-06-06

## 2024-04-02 NOTE — TELEPHONE ENCOUNTER
Called pt to attempt to schedule an initial vascular medicine but he had a home health nurse at his him who was going to send him to the E.R. Unable to make the appt at this time.   
28

## 2024-04-29 ENCOUNTER — TELEPHONE (OUTPATIENT)
Dept: HEALTH INFORMATION MANAGEMENT | Facility: OTHER | Age: 79
End: 2024-04-29

## 2024-05-21 ENCOUNTER — APPOINTMENT (OUTPATIENT)
Dept: RADIOLOGY | Facility: MEDICAL CENTER | Age: 79
DRG: 668 | End: 2024-05-21
Attending: STUDENT IN AN ORGANIZED HEALTH CARE EDUCATION/TRAINING PROGRAM
Payer: MEDICARE

## 2024-05-21 ENCOUNTER — HOSPITAL ENCOUNTER (INPATIENT)
Facility: MEDICAL CENTER | Age: 79
LOS: 5 days | DRG: 668 | End: 2024-05-26
Attending: HOSPITALIST | Admitting: HOSPITALIST
Payer: MEDICARE

## 2024-05-21 ENCOUNTER — APPOINTMENT (OUTPATIENT)
Dept: RADIOLOGY | Facility: MEDICAL CENTER | Age: 79
DRG: 668 | End: 2024-05-21
Attending: EMERGENCY MEDICINE
Payer: MEDICARE

## 2024-05-21 ENCOUNTER — ANESTHESIA EVENT (OUTPATIENT)
Dept: SURGERY | Facility: MEDICAL CENTER | Age: 79
DRG: 668 | End: 2024-05-21
Payer: MEDICARE

## 2024-05-21 ENCOUNTER — APPOINTMENT (OUTPATIENT)
Dept: CARDIOLOGY | Facility: MEDICAL CENTER | Age: 79
DRG: 668 | End: 2024-05-21
Attending: HOSPITALIST
Payer: MEDICARE

## 2024-05-21 ENCOUNTER — ANESTHESIA (OUTPATIENT)
Dept: SURGERY | Facility: MEDICAL CENTER | Age: 79
DRG: 668 | End: 2024-05-21
Payer: MEDICARE

## 2024-05-21 DIAGNOSIS — R30.0 DYSURIA: ICD-10-CM

## 2024-05-21 DIAGNOSIS — R31.0 GROSS HEMATURIA: ICD-10-CM

## 2024-05-21 DIAGNOSIS — N32.89 BLADDER MASS: ICD-10-CM

## 2024-05-21 DIAGNOSIS — N39.0 ACUTE UTI: ICD-10-CM

## 2024-05-21 PROBLEM — Z87.891 FORMER SMOKER: Status: RESOLVED | Noted: 2021-01-15 | Resolved: 2024-05-21

## 2024-05-21 PROBLEM — Z71.89 ACP (ADVANCE CARE PLANNING): Status: ACTIVE | Noted: 2024-05-21

## 2024-05-21 PROBLEM — R31.9 HEMATURIA: Status: ACTIVE | Noted: 2024-05-21

## 2024-05-21 PROBLEM — I26.99 OTHER PULMONARY EMBOLISM WITHOUT ACUTE COR PULMONALE (HCC): Status: ACTIVE | Noted: 2024-05-21

## 2024-05-21 LAB
ALBUMIN SERPL BCP-MCNC: 3.9 G/DL (ref 3.2–4.9)
ALBUMIN/GLOB SERPL: 1 G/DL
ALP SERPL-CCNC: 89 U/L (ref 30–99)
ALT SERPL-CCNC: 10 U/L (ref 2–50)
ANION GAP SERPL CALC-SCNC: 13 MMOL/L (ref 7–16)
APPEARANCE UR: ABNORMAL
AST SERPL-CCNC: 14 U/L (ref 12–45)
BACTERIA #/AREA URNS HPF: NEGATIVE /HPF
BASOPHILS # BLD AUTO: 0.4 % (ref 0–1.8)
BASOPHILS # BLD: 0.04 K/UL (ref 0–0.12)
BILIRUB SERPL-MCNC: 1.3 MG/DL (ref 0.1–1.5)
BILIRUB UR QL STRIP.AUTO: NEGATIVE
BUN SERPL-MCNC: 21 MG/DL (ref 8–22)
CALCIUM ALBUM COR SERPL-MCNC: 9.6 MG/DL (ref 8.5–10.5)
CALCIUM SERPL-MCNC: 9.5 MG/DL (ref 8.4–10.2)
CHLORIDE SERPL-SCNC: 103 MMOL/L (ref 96–112)
CO2 SERPL-SCNC: 22 MMOL/L (ref 20–33)
COLOR UR: YELLOW
CREAT SERPL-MCNC: 0.96 MG/DL (ref 0.5–1.4)
EOSINOPHIL # BLD AUTO: 0.17 K/UL (ref 0–0.51)
EOSINOPHIL NFR BLD: 1.8 % (ref 0–6.9)
EPI CELLS #/AREA URNS HPF: ABNORMAL /HPF
ERYTHROCYTE [DISTWIDTH] IN BLOOD BY AUTOMATED COUNT: 40.9 FL (ref 35.9–50)
GFR SERPLBLD CREATININE-BSD FMLA CKD-EPI: 81 ML/MIN/1.73 M 2
GLOBULIN SER CALC-MCNC: 4 G/DL (ref 1.9–3.5)
GLUCOSE BLD STRIP.AUTO-MCNC: 150 MG/DL (ref 65–99)
GLUCOSE BLD STRIP.AUTO-MCNC: 203 MG/DL (ref 65–99)
GLUCOSE SERPL-MCNC: 164 MG/DL (ref 65–99)
GLUCOSE UR STRIP.AUTO-MCNC: 100 MG/DL
HCT VFR BLD AUTO: 41.9 % (ref 42–52)
HGB BLD-MCNC: 13.8 G/DL (ref 14–18)
HYALINE CASTS #/AREA URNS LPF: ABNORMAL /LPF
IMM GRANULOCYTES # BLD AUTO: 0.03 K/UL (ref 0–0.11)
IMM GRANULOCYTES NFR BLD AUTO: 0.3 % (ref 0–0.9)
KETONES UR STRIP.AUTO-MCNC: NEGATIVE MG/DL
LACTATE SERPL-SCNC: 1.9 MMOL/L (ref 0.5–2)
LEUKOCYTE ESTERASE UR QL STRIP.AUTO: ABNORMAL
LV EJECT FRACT MOD 2C 99903: 58.09
LV EJECT FRACT MOD 4C 99902: 78.06
LV EJECT FRACT MOD BP 99901: 69.93
LYMPHOCYTES # BLD AUTO: 2.03 K/UL (ref 1–4.8)
LYMPHOCYTES NFR BLD: 21.5 % (ref 22–41)
MCH RBC QN AUTO: 28.6 PG (ref 27–33)
MCHC RBC AUTO-ENTMCNC: 32.9 G/DL (ref 32.3–36.5)
MCV RBC AUTO: 86.7 FL (ref 81.4–97.8)
MICRO URNS: ABNORMAL
MONOCYTES # BLD AUTO: 0.7 K/UL (ref 0–0.85)
MONOCYTES NFR BLD AUTO: 7.4 % (ref 0–13.4)
NEUTROPHILS # BLD AUTO: 6.46 K/UL (ref 1.82–7.42)
NEUTROPHILS NFR BLD: 68.6 % (ref 44–72)
NITRITE UR QL STRIP.AUTO: NEGATIVE
NRBC # BLD AUTO: 0 K/UL
NRBC BLD-RTO: 0 /100 WBC (ref 0–0.2)
NT-PROBNP SERPL IA-MCNC: 137 PG/ML (ref 0–125)
PATHOLOGY CONSULT NOTE: NORMAL
PH UR STRIP.AUTO: 6 [PH] (ref 5–8)
PLATELET # BLD AUTO: 226 K/UL (ref 164–446)
PMV BLD AUTO: 10.6 FL (ref 9–12.9)
POTASSIUM SERPL-SCNC: 4 MMOL/L (ref 3.6–5.5)
PROT SERPL-MCNC: 7.9 G/DL (ref 6–8.2)
PROT UR QL STRIP: 100 MG/DL
RBC # BLD AUTO: 4.83 M/UL (ref 4.7–6.1)
RBC # URNS HPF: ABNORMAL /HPF
RBC UR QL AUTO: ABNORMAL
SODIUM SERPL-SCNC: 138 MMOL/L (ref 135–145)
SP GR UR STRIP.AUTO: 1.02
TROPONIN T SERPL-MCNC: 25 NG/L (ref 6–19)
TROPONIN T SERPL-MCNC: 29 NG/L (ref 6–19)
TROPONIN T SERPL-MCNC: 32 NG/L (ref 6–19)
WBC # BLD AUTO: 9.4 K/UL (ref 4.8–10.8)
WBC #/AREA URNS HPF: ABNORMAL /HPF

## 2024-05-21 PROCEDURE — 87077 CULTURE AEROBIC IDENTIFY: CPT

## 2024-05-21 PROCEDURE — 0TBB8ZZ EXCISION OF BLADDER, VIA NATURAL OR ARTIFICIAL OPENING ENDOSCOPIC: ICD-10-PCS | Performed by: STUDENT IN AN ORGANIZED HEALTH CARE EDUCATION/TRAINING PROGRAM

## 2024-05-21 PROCEDURE — A9270 NON-COVERED ITEM OR SERVICE: HCPCS | Performed by: HOSPITALIST

## 2024-05-21 PROCEDURE — 700102 HCHG RX REV CODE 250 W/ 637 OVERRIDE(OP): Performed by: ANESTHESIOLOGY

## 2024-05-21 PROCEDURE — 160048 HCHG OR STATISTICAL LEVEL 1-5: Performed by: STUDENT IN AN ORGANIZED HEALTH CARE EDUCATION/TRAINING PROGRAM

## 2024-05-21 PROCEDURE — 160035 HCHG PACU - 1ST 60 MINS PHASE I: Performed by: STUDENT IN AN ORGANIZED HEALTH CARE EDUCATION/TRAINING PROGRAM

## 2024-05-21 PROCEDURE — 93306 TTE W/DOPPLER COMPLETE: CPT | Mod: 26 | Performed by: INTERNAL MEDICINE

## 2024-05-21 PROCEDURE — 99291 CRITICAL CARE FIRST HOUR: CPT

## 2024-05-21 PROCEDURE — 87040 BLOOD CULTURE FOR BACTERIA: CPT

## 2024-05-21 PROCEDURE — 160039 HCHG SURGERY MINUTES - EA ADDL 1 MIN LEVEL 3: Performed by: STUDENT IN AN ORGANIZED HEALTH CARE EDUCATION/TRAINING PROGRAM

## 2024-05-21 PROCEDURE — 74177 CT ABD & PELVIS W/CONTRAST: CPT

## 2024-05-21 PROCEDURE — 82962 GLUCOSE BLOOD TEST: CPT

## 2024-05-21 PROCEDURE — 36415 COLL VENOUS BLD VENIPUNCTURE: CPT

## 2024-05-21 PROCEDURE — 87086 URINE CULTURE/COLONY COUNT: CPT

## 2024-05-21 PROCEDURE — 700102 HCHG RX REV CODE 250 W/ 637 OVERRIDE(OP): Performed by: HOSPITALIST

## 2024-05-21 PROCEDURE — 160036 HCHG PACU - EA ADDL 30 MINS PHASE I: Performed by: STUDENT IN AN ORGANIZED HEALTH CARE EDUCATION/TRAINING PROGRAM

## 2024-05-21 PROCEDURE — 80053 COMPREHEN METABOLIC PANEL: CPT

## 2024-05-21 PROCEDURE — C1769 GUIDE WIRE: HCPCS | Performed by: STUDENT IN AN ORGANIZED HEALTH CARE EDUCATION/TRAINING PROGRAM

## 2024-05-21 PROCEDURE — 88307 TISSUE EXAM BY PATHOLOGIST: CPT

## 2024-05-21 PROCEDURE — 96374 THER/PROPH/DIAG INJ IV PUSH: CPT

## 2024-05-21 PROCEDURE — 71260 CT THORAX DX C+: CPT

## 2024-05-21 PROCEDURE — 160002 HCHG RECOVERY MINUTES (STAT): Performed by: STUDENT IN AN ORGANIZED HEALTH CARE EDUCATION/TRAINING PROGRAM

## 2024-05-21 PROCEDURE — 700117 HCHG RX CONTRAST REV CODE 255: Performed by: EMERGENCY MEDICINE

## 2024-05-21 PROCEDURE — 81001 URINALYSIS AUTO W/SCOPE: CPT

## 2024-05-21 PROCEDURE — 770001 HCHG ROOM/CARE - MED/SURG/GYN PRIV*

## 2024-05-21 PROCEDURE — 99223 1ST HOSP IP/OBS HIGH 75: CPT | Mod: 25,AI | Performed by: HOSPITALIST

## 2024-05-21 PROCEDURE — 83605 ASSAY OF LACTIC ACID: CPT

## 2024-05-21 PROCEDURE — 94760 N-INVAS EAR/PLS OXIMETRY 1: CPT

## 2024-05-21 PROCEDURE — 85025 COMPLETE CBC W/AUTO DIFF WBC: CPT

## 2024-05-21 PROCEDURE — 700111 HCHG RX REV CODE 636 W/ 250 OVERRIDE (IP): Performed by: EMERGENCY MEDICINE

## 2024-05-21 PROCEDURE — 160009 HCHG ANES TIME/MIN: Performed by: STUDENT IN AN ORGANIZED HEALTH CARE EDUCATION/TRAINING PROGRAM

## 2024-05-21 PROCEDURE — 83880 ASSAY OF NATRIURETIC PEPTIDE: CPT

## 2024-05-21 PROCEDURE — 99232 SBSQ HOSP IP/OBS MODERATE 35: CPT | Performed by: INTERNAL MEDICINE

## 2024-05-21 PROCEDURE — 160028 HCHG SURGERY MINUTES - 1ST 30 MINS LEVEL 3: Performed by: STUDENT IN AN ORGANIZED HEALTH CARE EDUCATION/TRAINING PROGRAM

## 2024-05-21 PROCEDURE — 87015 SPECIMEN INFECT AGNT CONCNTJ: CPT

## 2024-05-21 PROCEDURE — 93306 TTE W/DOPPLER COMPLETE: CPT

## 2024-05-21 PROCEDURE — 99497 ADVNCD CARE PLAN 30 MIN: CPT | Performed by: HOSPITALIST

## 2024-05-21 PROCEDURE — 84484 ASSAY OF TROPONIN QUANT: CPT

## 2024-05-21 RX ORDER — HEPARIN SODIUM 1000 [USP'U]/ML
80 INJECTION, SOLUTION INTRAVENOUS; SUBCUTANEOUS ONCE
Status: DISCONTINUED | OUTPATIENT
Start: 2024-05-21 | End: 2024-05-21

## 2024-05-21 RX ORDER — POLYETHYLENE GLYCOL 3350 17 G/17G
1 POWDER, FOR SOLUTION ORAL
Status: DISCONTINUED | OUTPATIENT
Start: 2024-05-21 | End: 2024-05-26 | Stop reason: HOSPADM

## 2024-05-21 RX ORDER — ONDANSETRON 2 MG/ML
4 INJECTION INTRAMUSCULAR; INTRAVENOUS EVERY 4 HOURS PRN
Status: DISCONTINUED | OUTPATIENT
Start: 2024-05-21 | End: 2024-05-26 | Stop reason: HOSPADM

## 2024-05-21 RX ORDER — AMOXICILLIN 250 MG
2 CAPSULE ORAL 2 TIMES DAILY
Status: DISCONTINUED | OUTPATIENT
Start: 2024-05-21 | End: 2024-05-26 | Stop reason: HOSPADM

## 2024-05-21 RX ORDER — SODIUM CHLORIDE, SODIUM LACTATE, POTASSIUM CHLORIDE, CALCIUM CHLORIDE 600; 310; 30; 20 MG/100ML; MG/100ML; MG/100ML; MG/100ML
INJECTION, SOLUTION INTRAVENOUS
Status: DISCONTINUED | OUTPATIENT
Start: 2024-05-21 | End: 2024-05-21 | Stop reason: SURG

## 2024-05-21 RX ORDER — DIPHENHYDRAMINE HYDROCHLORIDE 50 MG/ML
12.5 INJECTION INTRAMUSCULAR; INTRAVENOUS
Status: DISCONTINUED | OUTPATIENT
Start: 2024-05-21 | End: 2024-05-21 | Stop reason: HOSPADM

## 2024-05-21 RX ORDER — CEFAZOLIN SODIUM 1 G/3ML
INJECTION, POWDER, FOR SOLUTION INTRAMUSCULAR; INTRAVENOUS PRN
Status: DISCONTINUED | OUTPATIENT
Start: 2024-05-21 | End: 2024-05-21 | Stop reason: SURG

## 2024-05-21 RX ORDER — CEFTRIAXONE 2 G/1
2000 INJECTION, POWDER, FOR SOLUTION INTRAMUSCULAR; INTRAVENOUS ONCE
Status: COMPLETED | OUTPATIENT
Start: 2024-05-21 | End: 2024-05-21

## 2024-05-21 RX ORDER — HYDROMORPHONE HYDROCHLORIDE 1 MG/ML
0.1 INJECTION, SOLUTION INTRAMUSCULAR; INTRAVENOUS; SUBCUTANEOUS
Status: DISCONTINUED | OUTPATIENT
Start: 2024-05-21 | End: 2024-05-21 | Stop reason: HOSPADM

## 2024-05-21 RX ORDER — ACETAMINOPHEN 325 MG/1
650 TABLET ORAL EVERY 6 HOURS PRN
Status: DISCONTINUED | OUTPATIENT
Start: 2024-05-21 | End: 2024-05-26 | Stop reason: HOSPADM

## 2024-05-21 RX ORDER — SODIUM CHLORIDE, SODIUM LACTATE, POTASSIUM CHLORIDE, CALCIUM CHLORIDE 600; 310; 30; 20 MG/100ML; MG/100ML; MG/100ML; MG/100ML
INJECTION, SOLUTION INTRAVENOUS CONTINUOUS
Status: ACTIVE | OUTPATIENT
Start: 2024-05-21 | End: 2024-05-21

## 2024-05-21 RX ORDER — SODIUM CHLORIDE, SODIUM LACTATE, POTASSIUM CHLORIDE, CALCIUM CHLORIDE 600; 310; 30; 20 MG/100ML; MG/100ML; MG/100ML; MG/100ML
INJECTION, SOLUTION INTRAVENOUS CONTINUOUS
Status: DISCONTINUED | OUTPATIENT
Start: 2024-05-21 | End: 2024-05-21 | Stop reason: HOSPADM

## 2024-05-21 RX ORDER — LIDOCAINE HYDROCHLORIDE 20 MG/ML
INJECTION, SOLUTION EPIDURAL; INFILTRATION; INTRACAUDAL; PERINEURAL PRN
Status: DISCONTINUED | OUTPATIENT
Start: 2024-05-21 | End: 2024-05-21 | Stop reason: SURG

## 2024-05-21 RX ORDER — HEPARIN SODIUM 1000 [USP'U]/ML
40 INJECTION, SOLUTION INTRAVENOUS; SUBCUTANEOUS PRN
Status: DISCONTINUED | OUTPATIENT
Start: 2024-05-21 | End: 2024-05-21

## 2024-05-21 RX ORDER — MEPERIDINE HYDROCHLORIDE 25 MG/ML
12.5 INJECTION INTRAMUSCULAR; INTRAVENOUS; SUBCUTANEOUS
Status: DISCONTINUED | OUTPATIENT
Start: 2024-05-21 | End: 2024-05-21 | Stop reason: HOSPADM

## 2024-05-21 RX ORDER — LABETALOL HYDROCHLORIDE 5 MG/ML
5 INJECTION, SOLUTION INTRAVENOUS
Status: DISCONTINUED | OUTPATIENT
Start: 2024-05-21 | End: 2024-05-21 | Stop reason: HOSPADM

## 2024-05-21 RX ORDER — HYDROMORPHONE HYDROCHLORIDE 1 MG/ML
0.2 INJECTION, SOLUTION INTRAMUSCULAR; INTRAVENOUS; SUBCUTANEOUS
Status: DISCONTINUED | OUTPATIENT
Start: 2024-05-21 | End: 2024-05-21 | Stop reason: HOSPADM

## 2024-05-21 RX ORDER — METOPROLOL TARTRATE 1 MG/ML
INJECTION, SOLUTION INTRAVENOUS PRN
Status: DISCONTINUED | OUTPATIENT
Start: 2024-05-21 | End: 2024-05-21 | Stop reason: SURG

## 2024-05-21 RX ORDER — MAGNESIUM SULFATE HEPTAHYDRATE 40 MG/ML
INJECTION, SOLUTION INTRAVENOUS PRN
Status: DISCONTINUED | OUTPATIENT
Start: 2024-05-21 | End: 2024-05-21 | Stop reason: SURG

## 2024-05-21 RX ORDER — HEPARIN SODIUM 5000 [USP'U]/100ML
0-30 INJECTION, SOLUTION INTRAVENOUS CONTINUOUS
Status: DISCONTINUED | OUTPATIENT
Start: 2024-05-21 | End: 2024-05-21

## 2024-05-21 RX ORDER — ONDANSETRON 4 MG/1
4 TABLET, ORALLY DISINTEGRATING ORAL EVERY 4 HOURS PRN
Status: DISCONTINUED | OUTPATIENT
Start: 2024-05-21 | End: 2024-05-26 | Stop reason: HOSPADM

## 2024-05-21 RX ORDER — HYDROMORPHONE HYDROCHLORIDE 1 MG/ML
0.4 INJECTION, SOLUTION INTRAMUSCULAR; INTRAVENOUS; SUBCUTANEOUS
Status: DISCONTINUED | OUTPATIENT
Start: 2024-05-21 | End: 2024-05-21 | Stop reason: HOSPADM

## 2024-05-21 RX ORDER — DEXAMETHASONE SODIUM PHOSPHATE 4 MG/ML
INJECTION, SOLUTION INTRA-ARTICULAR; INTRALESIONAL; INTRAMUSCULAR; INTRAVENOUS; SOFT TISSUE PRN
Status: DISCONTINUED | OUTPATIENT
Start: 2024-05-21 | End: 2024-05-21 | Stop reason: SURG

## 2024-05-21 RX ORDER — ONDANSETRON 2 MG/ML
INJECTION INTRAMUSCULAR; INTRAVENOUS PRN
Status: DISCONTINUED | OUTPATIENT
Start: 2024-05-21 | End: 2024-05-21 | Stop reason: SURG

## 2024-05-21 RX ORDER — HALOPERIDOL 5 MG/ML
1 INJECTION INTRAMUSCULAR
Status: DISCONTINUED | OUTPATIENT
Start: 2024-05-21 | End: 2024-05-21 | Stop reason: HOSPADM

## 2024-05-21 RX ORDER — HYDRALAZINE HYDROCHLORIDE 20 MG/ML
5 INJECTION INTRAMUSCULAR; INTRAVENOUS
Status: DISCONTINUED | OUTPATIENT
Start: 2024-05-21 | End: 2024-05-21 | Stop reason: HOSPADM

## 2024-05-21 RX ORDER — DEXTROSE MONOHYDRATE 25 G/50ML
25 INJECTION, SOLUTION INTRAVENOUS
Status: DISCONTINUED | OUTPATIENT
Start: 2024-05-21 | End: 2024-05-21 | Stop reason: HOSPADM

## 2024-05-21 RX ORDER — MAGNESIUM 100 MG
100 CAPSULE ORAL DAILY
COMMUNITY

## 2024-05-21 RX ORDER — OXYCODONE HCL 5 MG/5 ML
10 SOLUTION, ORAL ORAL
Status: DISCONTINUED | OUTPATIENT
Start: 2024-05-21 | End: 2024-05-21 | Stop reason: HOSPADM

## 2024-05-21 RX ORDER — OXYCODONE HCL 5 MG/5 ML
5 SOLUTION, ORAL ORAL
Status: DISCONTINUED | OUTPATIENT
Start: 2024-05-21 | End: 2024-05-21 | Stop reason: HOSPADM

## 2024-05-21 RX ADMIN — METOPROLOL TARTRATE 1 MG: 5 INJECTION INTRAVENOUS at 11:33

## 2024-05-21 RX ADMIN — METOPROLOL TARTRATE 1 MG: 5 INJECTION INTRAVENOUS at 11:37

## 2024-05-21 RX ADMIN — CEFTRIAXONE SODIUM 2000 MG: 2 INJECTION, POWDER, FOR SOLUTION INTRAMUSCULAR; INTRAVENOUS at 06:25

## 2024-05-21 RX ADMIN — LIDOCAINE HYDROCHLORIDE 100 MG: 20 INJECTION, SOLUTION EPIDURAL; INFILTRATION; INTRACAUDAL at 11:19

## 2024-05-21 RX ADMIN — FENTANYL CITRATE 50 MCG: 50 INJECTION, SOLUTION INTRAMUSCULAR; INTRAVENOUS at 11:28

## 2024-05-21 RX ADMIN — FENTANYL CITRATE 50 MCG: 50 INJECTION, SOLUTION INTRAMUSCULAR; INTRAVENOUS at 12:29

## 2024-05-21 RX ADMIN — ONDANSETRON 8 MG: 2 INJECTION INTRAMUSCULAR; INTRAVENOUS at 12:04

## 2024-05-21 RX ADMIN — ROCURONIUM BROMIDE 50 MG: 10 INJECTION, SOLUTION INTRAVENOUS at 11:19

## 2024-05-21 RX ADMIN — METOPROLOL TARTRATE 1 MG: 5 INJECTION INTRAVENOUS at 11:30

## 2024-05-21 RX ADMIN — SUGAMMADEX 200 MG: 100 INJECTION, SOLUTION INTRAVENOUS at 12:28

## 2024-05-21 RX ADMIN — ROCURONIUM BROMIDE 20 MG: 10 INJECTION, SOLUTION INTRAVENOUS at 11:55

## 2024-05-21 RX ADMIN — SENNOSIDES AND DOCUSATE SODIUM 2 TABLET: 50; 8.6 TABLET ORAL at 18:47

## 2024-05-21 RX ADMIN — MAGNESIUM SULFATE HEPTAHYDRATE 2 G: 4 INJECTION, SOLUTION INTRAVENOUS at 11:32

## 2024-05-21 RX ADMIN — CEFAZOLIN 2 G: 1 INJECTION, POWDER, FOR SOLUTION INTRAMUSCULAR; INTRAVENOUS at 11:19

## 2024-05-21 RX ADMIN — IOHEXOL 100 ML: 350 INJECTION, SOLUTION INTRAVENOUS at 04:20

## 2024-05-21 RX ADMIN — FENTANYL CITRATE 50 MCG: 50 INJECTION, SOLUTION INTRAMUSCULAR; INTRAVENOUS at 12:17

## 2024-05-21 RX ADMIN — METOPROLOL TARTRATE 1 MG: 5 INJECTION INTRAVENOUS at 11:44

## 2024-05-21 RX ADMIN — IOHEXOL 75 ML: 350 INJECTION, SOLUTION INTRAVENOUS at 07:00

## 2024-05-21 RX ADMIN — FENTANYL CITRATE 50 MCG: 50 INJECTION, SOLUTION INTRAMUSCULAR; INTRAVENOUS at 11:56

## 2024-05-21 RX ADMIN — SODIUM CHLORIDE, POTASSIUM CHLORIDE, SODIUM LACTATE AND CALCIUM CHLORIDE: 600; 310; 30; 20 INJECTION, SOLUTION INTRAVENOUS at 11:14

## 2024-05-21 RX ADMIN — DEXAMETHASONE SODIUM PHOSPHATE 8 MG: 4 INJECTION INTRA-ARTICULAR; INTRALESIONAL; INTRAMUSCULAR; INTRAVENOUS; SOFT TISSUE at 11:19

## 2024-05-21 RX ADMIN — METOPROLOL TARTRATE 1 MG: 5 INJECTION INTRAVENOUS at 11:41

## 2024-05-21 RX ADMIN — PROPOFOL 150 MG: 10 INJECTION, EMULSION INTRAVENOUS at 11:19

## 2024-05-21 RX ADMIN — INSULIN HUMAN 3 UNITS: 100 INJECTION, SOLUTION PARENTERAL at 13:03

## 2024-05-21 ASSESSMENT — LIFESTYLE VARIABLES
ALCOHOL_USE: NO
EVER HAD A DRINK FIRST THING IN THE MORNING TO STEADY YOUR NERVES TO GET RID OF A HANGOVER: NO
AVERAGE NUMBER OF DAYS PER WEEK YOU HAVE A DRINK CONTAINING ALCOHOL: 0
DOES PATIENT WANT TO TALK TO SOMEONE ABOUT QUITTING: NO
HAVE PEOPLE ANNOYED YOU BY CRITICIZING YOUR DRINKING: NO
DOES PATIENT WANT TO STOP DRINKING: NO
DOES PATIENT WANT TO TALK TO SOMEONE ABOUT QUITTING: NO
TOTAL SCORE: 0
HAVE PEOPLE ANNOYED YOU BY CRITICIZING YOUR DRINKING: NO
ON A TYPICAL DAY WHEN YOU DRINK ALCOHOL HOW MANY DRINKS DO YOU HAVE: 0
TOTAL SCORE: 0
CONSUMPTION TOTAL: NEGATIVE
EVER FELT BAD OR GUILTY ABOUT YOUR DRINKING: NO
TOTAL SCORE: 0
HOW MANY TIMES IN THE PAST YEAR HAVE YOU HAD 5 OR MORE DRINKS IN A DAY: 0
TOTAL SCORE: 0
EVER HAD A DRINK FIRST THING IN THE MORNING TO STEADY YOUR NERVES TO GET RID OF A HANGOVER: NO
DOES PATIENT WANT TO STOP DRINKING: YES
HAVE YOU EVER FELT YOU SHOULD CUT DOWN ON YOUR DRINKING: NO
TOTAL SCORE: 0
TOTAL SCORE: 0
ON A TYPICAL DAY WHEN YOU DRINK ALCOHOL HOW MANY DRINKS DO YOU HAVE: 0
EVER FELT BAD OR GUILTY ABOUT YOUR DRINKING: NO
CONSUMPTION TOTAL: NEGATIVE
AVERAGE NUMBER OF DAYS PER WEEK YOU HAVE A DRINK CONTAINING ALCOHOL: 0
DO YOU DRINK ALCOHOL: NO
HAVE YOU EVER FELT YOU SHOULD CUT DOWN ON YOUR DRINKING: NO
HOW MANY TIMES IN THE PAST YEAR HAVE YOU HAD 5 OR MORE DRINKS IN A DAY: 0

## 2024-05-21 ASSESSMENT — PAIN DESCRIPTION - PAIN TYPE
TYPE: ACUTE PAIN
TYPE: SURGICAL PAIN

## 2024-05-21 ASSESSMENT — ENCOUNTER SYMPTOMS
CARDIOVASCULAR NEGATIVE: 1
ABDOMINAL PAIN: 0
NECK PAIN: 0
RESPIRATORY NEGATIVE: 1
NEUROLOGICAL NEGATIVE: 1
DOUBLE VISION: 0
VOMITING: 0
DIZZINESS: 0
MUSCULOSKELETAL NEGATIVE: 1
SHORTNESS OF BREATH: 0
HEADACHES: 0
INSOMNIA: 0
MYALGIAS: 0
COUGH: 0
DEPRESSION: 0
FEVER: 0
PSYCHIATRIC NEGATIVE: 1
WEAKNESS: 0
NAUSEA: 0
BLURRED VISION: 0
SORE THROAT: 0
PALPITATIONS: 0
BRUISES/BLEEDS EASILY: 0
EYES NEGATIVE: 1

## 2024-05-21 ASSESSMENT — FIBROSIS 4 INDEX
FIB4 SCORE: 1.53
FIB4 SCORE: 1.64
FIB4 SCORE: 1.53

## 2024-05-21 ASSESSMENT — SOCIAL DETERMINANTS OF HEALTH (SDOH)
WITHIN THE LAST YEAR, HAVE TO BEEN RAPED OR FORCED TO HAVE ANY KIND OF SEXUAL ACTIVITY BY YOUR PARTNER OR EX-PARTNER?: NO
IN THE PAST 12 MONTHS, HAS THE ELECTRIC, GAS, OIL, OR WATER COMPANY THREATENED TO SHUT OFF SERVICE IN YOUR HOME?: NO
WITHIN THE PAST 12 MONTHS, THE FOOD YOU BOUGHT JUST DIDN'T LAST AND YOU DIDN'T HAVE MONEY TO GET MORE: NEVER TRUE
WITHIN THE PAST 12 MONTHS, YOU WORRIED THAT YOUR FOOD WOULD RUN OUT BEFORE YOU GOT THE MONEY TO BUY MORE: NEVER TRUE
WITHIN THE LAST YEAR, HAVE YOU BEEN KICKED, HIT, SLAPPED, OR OTHERWISE PHYSICALLY HURT BY YOUR PARTNER OR EX-PARTNER?: NO
WITHIN THE LAST YEAR, HAVE YOU BEEN HUMILIATED OR EMOTIONALLY ABUSED IN OTHER WAYS BY YOUR PARTNER OR EX-PARTNER?: NO
WITHIN THE LAST YEAR, HAVE YOU BEEN AFRAID OF YOUR PARTNER OR EX-PARTNER?: NO

## 2024-05-21 ASSESSMENT — COGNITIVE AND FUNCTIONAL STATUS - GENERAL
MOBILITY SCORE: 24
DAILY ACTIVITIY SCORE: 24
SUGGESTED CMS G CODE MODIFIER MOBILITY: CH
SUGGESTED CMS G CODE MODIFIER DAILY ACTIVITY: CH
SUGGESTED CMS G CODE MODIFIER DAILY ACTIVITY: CH
DAILY ACTIVITIY SCORE: 24

## 2024-05-21 NOTE — ASSESSMENT & PLAN NOTE
CT chest showed right segmental and subsegmental PE.  Heparin was started on 5/22/2024 after surgery, switched to Lovenox on 5/23/2024 due to loss of IV access. Lovenox was discontinued on 5/24/2024 evening due to persistent nosebleed and hematuria. I have reached out to urology to provide recommendations for goals of care.

## 2024-05-21 NOTE — CONSULTS
Urology brief consult note    S: 79 yo M w/ gross hematuria found to have concern for bladder mass.    O: No acute distress. On 2L O2.    A/P: 79 yo M who presents w/ gross hematuria, bladder mass, left ureteral obstruction. Risks and benefits of TURBT w/ possible bilateral stents were discussed. We discussed need for catheter after surgery. Consent obtained.    - To OR for surgery.

## 2024-05-21 NOTE — OR NURSING
1234: To PACU post TURBT (TRANSURETHRAL RESECTION OF BLADDER TUMOR). OPA in place. CBI in progress.    1245: OPA dc'd, breathing is spontaneous and unlabored.    1259: POCFS = 203, see MAR.    1300: Dr. Alcantara at bedside.    1315: Pt denies pain or nausea. Report given to ROGERS Bryant RN.    1345: Report rec'd, care resumed. Pt remains pain/nausea free. Meets criteria for transfer to room.    1451: To room.

## 2024-05-21 NOTE — ED NOTES
Pt moved from 8B to 9 and placed on a hospital bed. 2 sets of blood cultures drawn and sent to lab before Rocephin started. Pt currently using the urinal

## 2024-05-21 NOTE — ED PROVIDER NOTES
"                                                        ED Provider Note    CHIEF COMPLAINT  Chief Complaint   Patient presents with    UTI     PT BIBA for urinary symptoms x 2weeks. Pt noticed tonight he had blood in his urine. Pt does state it burns when he pees, but has not been seen for it.         HPI    Primary care provider: OMARI Teresa   History obtained from: Patient  History limited by: None     Enoch Mistry is a 78 y.o. male who presents to the ED by EMS complaining of noticing blood with urination tonight.  He has had intermittent burning with urination for the past few weeks.  He denies fever/nausea/vomiting.  He denies being on blood thinners.  He has history of bladder cancer and reports that he was treated with surgery \"many years ago\" and several follow-ups shows he is cancer free.  He has had some constipation recently.  Patient is on 2 L of oxygen at baseline and denies increased shortness of breath.    REVIEW OF SYSTEMS  Please see HPI for pertinent positives/negatives.  All other systems reviewed and are negative.     PAST MEDICAL HISTORY  Past Medical History:   Diagnosis Date    Portopulmonary hypertension (HCC) 6/19/2023    Chronic deep vein thrombosis (DVT) of femoral vein of right lower extremity (HCC) 01/25/2021    Former smoker 1/15/2021    PVD (peripheral vascular disease) (HCC) 8/13/2019    Obesity (BMI 30-39.9) 10/2/2018    PUD (peptic ulcer disease) 9/23/2018    Hepatic steatosis 9/12/2018    Benign non-nodular prostatic hyperplasia without lower urinary tract symptoms 6/6/2017 2015 -- Transurethral resection    Chronic hypoxemic respiratory failure (HCC) 1/7/2016    Currently on 2 L of oxygen.    Adrenal adenoma 1/7/2016 8/4/13 CT renal no hydronephrosis, 2.7 cm left adrenal lesion likely representing cyst 4/2/15 CT-CTA chest pulmonary embolism protocol, right adrenal adenoma 10 mm    Pulmonary nodule 4/3/2015    8/4/13 chest x-ray negative 4/18/14 chest x-ray " bilateral basilar scarring or atelectasis 4/1/15 chest x-ray linear perihilar and lower lobe opacity possibly representing mild interstitial edema versus atelectasis 4/2/15 CT-CTA chest pulmonary embolism protocol, no CT evidence of pulmonary embolism, right lower lobe atelectasis with possible pneumonia, emphysema, 3 mm right middle lobe pulmonary n    Essential hypertension 4/1/2015    1/7/15 started lisinopril 10 mg, declines ultrasound arterial lower extremities    Anemia     Diabetes (HCC)     yskinh ozempic    Emphysema of lung (HCC)     uses 2Lnc 02 continuous    Hiatus hernia syndrome     History of bladder cancer     bladder cancer    Hypertension     not taking medication stopped by  9/12/18        SURGICAL HISTORY  Past Surgical History:   Procedure Laterality Date    ERCP  11/15/2018    Procedure: ERCP - W/POSS BIOPSY, SPHINCTEROTOMY, STENT PLACEMENT, STENT REMOVAL, STONE EXTRACTION, DILATION;  Surgeon: Harman Michelle M.D.;  Location: [facility];  Service: EUS    ERCP  09/21/2018    Procedure: ERCP;  Surgeon: Harman Michelle M.D.;  Location: [facility];  Service: EUS    DEWEY BY LAPAROSCOPY  09/20/2018    Procedure: DEWEY BY LAPAROSCOPY;  Surgeon: Seamus Gaston M.D.;  Location: [facility];  Service: General    GASTROSCOPY-ENDO  09/19/2018    Procedure: GASTROSCOPY-ENDO;  Surgeon: Darnell Hernández D.O.;  Location: [facility];  Service: Gastroenterology    CYSTOSCOPY N/A 09/11/2017    Procedure: CYSTOSCOPY- CLOT EVACUATION, FULGURATION, OF PROSTATE;  Surgeon: Srini Giles M.D.;  Location: [facility];  Service: Urology    CYSTOSCOPY  05/07/2015    Procedure: CYSTOSCOPY [57.32] ;  Surgeon: Srini Giles M.D.;  Location: [facility];  Service:     TRANS URETHRAL RESECTION PROSTATE  05/07/2015    Procedure: TRANS URETHRAL RESECTION PROSTATE [60.20];  Surgeon: Srini Giles M.D.;  Location: [facility]  "ORS;  Service:     TRANS URETHRAL RESECTION BLADDER  2015    Procedure: TRANS URETHRAL RESECTION BLADDER [57.49A];  Surgeon: Srini Giles M.D.;  Location: [facility];  Service:     TONSILLECTOMY          SOCIAL HISTORY  Social History     Tobacco Use    Smoking status: Former     Current packs/day: 0.00     Average packs/day: 2.0 packs/day for 50.0 years (100.0 ttl pk-yrs)     Types: Cigarettes     Start date: 1956     Quit date: 2006     Years since quittin.3     Passive exposure: Never    Smokeless tobacco: Never   Vaping Use    Vaping status: Never Used   Substance and Sexual Activity    Alcohol use: Not Currently     Alcohol/week: 0.0 oz     Comment: Nothing for one year (19)    Drug use: No    Sexual activity: Not Currently     Partners: Female     Comment: , no children        FAMILY HISTORY  Family History   Problem Relation Age of Onset    Heart Disease Mother     Dementia Father 80        CURRENT MEDICATIONS  Home Medications    **Home medications have not yet been reviewed for this encounter**       Audit from Redirected Encounters    **Home medications have not yet been reviewed for this encounter**          ALLERGIES  Allergies   Allergen Reactions    Bactrim Ds         PHYSICAL EXAM  VITAL SIGNS: BP (!) 183/83   Pulse 100   Temp 37.2 °C (99 °F) (Temporal)   Resp 12   Ht 1.702 m (5' 7\")   Wt 92.1 kg (203 lb)   SpO2 97%   BMI 31.79 kg/m²  @MONICA[[id number]::@     Pulse ox interpretation: 97% I interpret this pulse ox as normal     Constitutional: Well developed, well nourished, alert in no apparent distress, nontoxic appearance    HENT: No external signs of trauma, normocephalic  Eyes: PERRL, conjunctiva without erythema, no discharge, no icterus    Neck: Soft and supple, trachea midline, no stridor, no tenderness, no LAD, good ROM    Cardiovascular: Regular rate and rhythm, no murmurs/rubs/gallops, strong distal pulses and good perfusion    Thorax & Lungs: " No respiratory distress, CTAB    Abdomen: Soft, soft and nontender ventral hernia, no guarding, no rebound, normal BS    Back: No CVAT     Extremities: No cyanosis, bilateral lower extremity edema, no gross deformity, good ROM, intact distal pulses with brisk cap refill    Skin: Warm, dry, no pallor/cyanosis, no rash noted except chronic appearing changes      Neuro: A/O times 3, no focal deficits noted    Psychiatric: Cooperative, normal mood and affect, normal judgement, appropriate for clinical situation        DIAGNOSTIC STUDIES / PROCEDURES        LABS  All labs reviewed by me.     Results for orders placed or performed during the hospital encounter of 05/21/24   CBC WITH DIFFERENTIAL   Result Value Ref Range    WBC 9.4 4.8 - 10.8 K/uL    RBC 4.83 4.70 - 6.10 M/uL    Hemoglobin 13.8 (L) 14.0 - 18.0 g/dL    Hematocrit 41.9 (L) 42.0 - 52.0 %    MCV 86.7 81.4 - 97.8 fL    MCH 28.6 27.0 - 33.0 pg    MCHC 32.9 32.3 - 36.5 g/dL    RDW 40.9 35.9 - 50.0 fL    Platelet Count 226 164 - 446 K/uL    MPV 10.6 9.0 - 12.9 fL    Neutrophils-Polys 68.60 44.00 - 72.00 %    Lymphocytes 21.50 (L) 22.00 - 41.00 %    Monocytes 7.40 0.00 - 13.40 %    Eosinophils 1.80 0.00 - 6.90 %    Basophils 0.40 0.00 - 1.80 %    Immature Granulocytes 0.30 0.00 - 0.90 %    Nucleated RBC 0.00 0.00 - 0.20 /100 WBC    Neutrophils (Absolute) 6.46 1.82 - 7.42 K/uL    Lymphs (Absolute) 2.03 1.00 - 4.80 K/uL    Monos (Absolute) 0.70 0.00 - 0.85 K/uL    Eos (Absolute) 0.17 0.00 - 0.51 K/uL    Baso (Absolute) 0.04 0.00 - 0.12 K/uL    Immature Granulocytes (abs) 0.03 0.00 - 0.11 K/uL    NRBC (Absolute) 0.00 K/uL   COMP METABOLIC PANEL   Result Value Ref Range    Sodium 138 135 - 145 mmol/L    Potassium 4.0 3.6 - 5.5 mmol/L    Chloride 103 96 - 112 mmol/L    Co2 22 20 - 33 mmol/L    Anion Gap 13.0 7.0 - 16.0    Glucose 164 (H) 65 - 99 mg/dL    Bun 21 8 - 22 mg/dL    Creatinine 0.96 0.50 - 1.40 mg/dL    Calcium 9.5 8.4 - 10.2 mg/dL    Correct Calcium 9.6 8.5 -  10.5 mg/dL    AST(SGOT) 14 12 - 45 U/L    ALT(SGPT) 10 2 - 50 U/L    Alkaline Phosphatase 89 30 - 99 U/L    Total Bilirubin 1.3 0.1 - 1.5 mg/dL    Albumin 3.9 3.2 - 4.9 g/dL    Total Protein 7.9 6.0 - 8.2 g/dL    Globulin 4.0 (H) 1.9 - 3.5 g/dL    A-G Ratio 1.0 g/dL   URINALYSIS (UA)    Specimen: Urine, Clean Catch   Result Value Ref Range    Color Yellow     Character Cloudy (A)     Specific Gravity 1.025 <1.035    Ph 6.0 5.0 - 8.0    Glucose 100 (A) Negative mg/dL    Ketones Negative Negative mg/dL    Protein 100 (A) Negative mg/dL    Bilirubin Negative Negative    Nitrite Negative Negative    Leukocyte Esterase Moderate (A) Negative    Occult Blood Large (A) Negative    Micro Urine Req Microscopic    ESTIMATED GFR   Result Value Ref Range    GFR (CKD-EPI) 81 >60 mL/min/1.73 m 2   URINE MICROSCOPIC (W/UA)   Result Value Ref Range    WBC 10-20 (A) /hpf    -150 (A) /hpf    Bacteria Negative None /hpf    Epithelial Cells Few Few /hpf    Hyaline Cast 6-10 (A) /lpf        RADIOLOGY  I have independently interpreted the diagnostic imaging associated with this visit and am waiting the final reading from the radiologist.   My preliminary interpretation is as follows: No free air/perforation.    CT-ABDOMEN-PELVIS WITH   Final Result         1.  Posterior left bladder wall mass, should be considered neoplastic unless proven otherwise.   2.  Left hydronephrosis/hydroureter with delayed left nephrogram, compatible with obstructive changes, likely due to obstructing bladder mass.   3.  Mild right hydronephrosis and hydroureter.   4.  Bilateral pelvic adenopathy, concerning for metastatic disease given associated bladder findings.   5.  Prostate enlargement, consider causes of prostate enlargement additional workup as clinically appropriate.   6.  Hepatomegaly   7.  Irregular hepatic contour, compatible changes of cirrhosis   8.  Atherosclerosis and atherosclerotic coronary artery disease   9.  Pulmonary nodules, see  nodule follow-up recommendations below.      Fleischner Society pulmonary nodule recommendations:   Low Risk: CT at 3-6 months, then consider CT at 18-24 months      High Risk: CT at 3-6 months, then at 18-24 months      Comments: Use most suspicious nodule as guide to management. Follow-up intervals may vary according to size and risk.      Low Risk - Minimal or absent history of smoking and of other known risk factors.      High Risk - History of smoking or of other known risk factors.      Note: These recommendations do not apply to lung cancer screening, patients with immunosuppression, or patients with known primary cancer.      Fleischner Society 2017 Guidelines for Management of Incidentally Detected Pulmonary Nodules in Adults      These findings were discussed with the patient's clinician, Ed Witt, on 5/21/2024 5:01 AM.      CT-CHEST (THORAX) WITH    (Results Pending)          COURSE & MEDICAL DECISION MAKING  Nursing notes, VS, PMSFHx reviewed in chart.     Review of past medical records shows the patient was last seen in the office on July 24, 2023 for chronical medical issues including diabetes.      Differential diagnoses considered include but are not limited to: UTI/cystitis/pyelo, prostatitis, hernia, KS/renal colic, cancer       ED Observation Status? No; Patient does not meet criteria for ED Observation.       Discussion of management with other [facility] or appropriate source(s): Radiologist, urologist and hospitalist      0555: D/W Dr. Harvey, urologist.  He would like CT chest ordered and patient given dose of IV ceftriaxone and NPO.  Patient can be admitted by hospitalist and will consult on the patient.    0557: D/W Dr. Linda, hospitalist, who will admit patient      History and physical exam as above.  This is a 78-year-old male patient with medical history including hypertension, emphysema/COPD on 2 L of oxygen at baseline, diabetes, BPH with TURP, bladder cancer status post surgery who  presents to the ED complaining of occasional burning with urination for about 2 weeks and noticing blood with urination tonight.  Laboratory testing shows mild anemia.  No significant leukocytosis and patient afebrile in the ED.  No electrolyte arrangement or evidence for renal or hepatic dysfunction.  UA does show blood along with protein along with 10-20 WBC and moderate leukocyte Estrace but with negative bacteria.  CT abdomen/pelvis with findings as above.  Unfortunately, findings are suspicious for recurrence of bladder cancer with metastasis.  Dr. Harvey consulted with above recommendations.  I discussed with Dr. Linda who graciously agreed to admit patient.  Patient was updated on the findings and plan for admission and he is agreeable.      FINAL IMPRESSION  1. Gross hematuria Acute   2. Dysuria Acute   3. Bladder mass Active          DISPOSITION  Patient will be admitted by hospitalist for further care      Electronically signed by: Ed Witt D.O., 5/21/2024 2:22 AM      Portions of this record were made with voice recognition software.  Despite my review, errors may remain.  Please interpret this chart in the appropriate context.

## 2024-05-21 NOTE — OR NURSING
Procedure, patient allergies, and NPO status verified. Home med rec completed and belongings secured. Patient verbalizes understanding of pain scale, expected course of stay, and plan of care. Surgical site verified with pt, IV access verified.     Per Dr. Mariano, no need for EKG at this time.       Pt BLE noted to be dusky, flaky, cracking with noticeable odor.

## 2024-05-21 NOTE — CONSULTS
UROLOGY Consult Note:    RICK Persaud  Date & Time note created:    5/21/2024   11:11 AM       Patient ID:   Name:             Enoch Mistry   YOB: 1945  Age:                 78 y.o.  male   MRN:               [id number]                                                             Reason for Consult:      Gross hematuria, bladder mass    History of Present Illness:    This is a 78 year old male with a history of bladder cancer  who is s/p TURBT 5/7/2015 who presented to the ED 5/21/2024 due to dysuria, hematuria. During workup a CT was obtained which showed bladder mass, left ureteral obstruction. His creatinine was 0.96.  At the time of the consult, he denies abdominal pain, flank pain. He reports that he is able to void well. Denies fevers, chills, nausea, vomiting. He is currently NPO. Denies chest pain, shortness of breath. He is unsure of any urologic follow up he has done following TURBT in 2015.    Review of Systems:      Constitutional: See HPI  Eyes: Denies changes in vision, no eye pain  Ears/Nose/Throat/Mouth: Denies nasal congestion or sore throat   Cardiovascular: no chest pain, no palpitations   Respiratory: no shortness of breath , Denies cough  Gastrointestinal/Hepatic: See HPI  Genitourinary: See HPI  Musculoskeletal/Rheum: Denies  joint pain and swelling, no edema  Skin: Denies rash  Neurological: Denies headache, confusion, memory loss or focal weakness/parasthesias    All other systems were reviewed and are negative (AMA/CMS criteria)                Past Medical History:   Past Medical History:   Diagnosis Date    Adrenal adenoma 1/7/2016 8/4/13 CT renal no hydronephrosis, 2.7 cm left adrenal lesion likely representing cyst 4/2/15 CT-CTA chest pulmonary embolism protocol, right adrenal adenoma 10 mm    Anemia     Benign non-nodular prostatic hyperplasia without lower urinary tract symptoms 6/6/2017 2015 -- Transurethral resection    Chronic deep vein  thrombosis (DVT) of femoral vein of right lower extremity (HCC) 01/25/2021    Chronic hypoxemic respiratory failure (HCC) 1/7/2016    Currently on 2 L of oxygen.    Diabetes (HCC)     yskinh ozempic    Emphysema of lung ([facility])     uses 2Lnc 02 continuous    Essential hypertension 4/1/2015    1/7/15 started lisinopril 10 mg, declines ultrasound arterial lower extremities    Former smoker 1/15/2021    Hepatic steatosis 9/12/2018    Hiatus hernia syndrome     History of bladder cancer     bladder cancer    Hypertension     not taking medication stopped by  9/12/18    Obesity (BMI 30-39.9) 10/2/2018    Portopulmonary hypertension (HCC) 6/19/2023    PUD (peptic ulcer disease) 9/23/2018    Pulmonary nodule 4/3/2015    8/4/13 chest x-ray negative 4/18/14 chest x-ray bilateral basilar scarring or atelectasis 4/1/15 chest x-ray linear perihilar and lower lobe opacity possibly representing mild interstitial edema versus atelectasis 4/2/15 CT-CTA chest pulmonary embolism protocol, no CT evidence of pulmonary embolism, right lower lobe atelectasis with possible pneumonia, emphysema, 3 mm right middle lobe pulmonary n    PVD (peripheral vascular disease) ([facility]) 8/13/2019     Active Hospital Problems    Diagnosis     Hematuria [R31.9]     Other pulmonary embolism without acute cor pulmonale ([facility]) [I26.99]     ACP (advance care planning) [Z71.89]     Former smoker [Z87.891]     Stage 2 moderate COPD by GOLD classification ([facility]) [J44.9]     Type II diabetes mellitus with peripheral circulatory disorder ([facility]) [E11.51]     Acute UTI [N39.0]        Past Surgical History:  Past Surgical History:   Procedure Laterality Date    ERCP  11/15/2018    Procedure: ERCP - W/POSS BIOPSY, SPHINCTEROTOMY, STENT PLACEMENT, STENT REMOVAL, STONE EXTRACTION, DILATION;  Surgeon: Harman Michelle M.D.;  Location: SURGERY [facility];  Service: EUS    ERCP  09/21/2018    Procedure: ERCP;  Surgeon: Harman Michelle M.D.;  Location: [facility];  Service: EUS    DEWEY BY LAPAROSCOPY  09/20/2018    Procedure: DEWEY BY LAPAROSCOPY;  Surgeon: Seamus Gaston M.D.;  Location: [facility];  Service: General    GASTROSCOPY-ENDO  09/19/2018    Procedure: GASTROSCOPY-ENDO;  Surgeon: Darnell Hernández D.O.;  Location: [facility];  Service: Gastroenterology    CYSTOSCOPY N/A 09/11/2017    Procedure: CYSTOSCOPY- CLOT EVACUATION, FULGURATION, OF PROSTATE;  Surgeon: Srini Giles M.D.;  Location: [facility];  Service: Urology    CYSTOSCOPY  05/07/2015    Procedure: CYSTOSCOPY [57.32] ;  Surgeon: Srini Giles M.D.;  Location: [facility];  Service:     TRANS URETHRAL RESECTION PROSTATE  05/07/2015    Procedure: TRANS URETHRAL RESECTION PROSTATE [60.20];  Surgeon: Srini Giles M.D.;  Location: [facility];  Service:     TRANS URETHRAL RESECTION BLADDER  05/07/2015    Procedure: TRANS URETHRAL RESECTION BLADDER [57.49A];  Surgeon: Srini Giles M.D.;  Location: [facility];  Service:     TONSILLECTOMY         Hospital Medications:    Current Facility-Administered Medications:     [MAR Hold] acetaminophen (Tylenol) tablet 650 mg, 650 mg, Oral, Q6HRS PRN, Adriana Mercado M.D.    [MAR Hold] senna-docusate (Pericolace Or Senokot S) 8.6-50 MG per tablet 2 Tablet, 2 Tablet, Oral, BID **AND** [MAR Hold] polyethylene glycol/lytes (Miralax) Packet 1 Packet, 1 Packet, Oral, QDAY PRN, Adriana Mercado M.D.    [MAR Hold] ondansetron (Zofran) syringe/vial injection 4 mg, 4 mg, Intravenous, Q4HRS PRN, Adriana Mercado M.D.    [MAR Hold] ondansetron (Zofran ODT) dispertab 4 mg, 4 mg, Oral, Q4HRS PRN, Adriana Mercado M.D.    [MAR Hold] cefTRIAXone (Rocephin) 1,000 mg in  mL IVPB, 1,000 mg, Intravenous, Q24HRS, Adriana Mercado M.D.    lactated ringers infusion, , Intravenous, Continuous, Charles Alcantara M.D.    Current Outpatient  Medications:  Medications Prior to Admission   Medication Sig Dispense Refill Last Dose    BERBERINE CHLORIDE PO Take 250 mg by mouth with dinner.   2024 at pm    CHOLECALCIFEROL PO Take 1 Capsule by mouth every day.   2024 at am    ZINC SULFATE PO Take 1 Capsule by mouth every day.   2024 at am    B Complex-C (SUPER B COMPLEX PO) Take 1 Capsule by mouth every day.   2024 at am    Magnesium (MAGNACAPS) 100 MG Cap Take 100 mg by mouth every day.   2024 at afternoon    Blood Glucose Test Strips Use one Abbott Freestyle Lite strip to test blood sugar twice daily. (Patient not taking: Reported on 2024) 50 Strip 1 Not Taking    furosemide (LASIX) 20 MG Tab Take 1 Tablet by mouth 2 times a day. (Patient not taking: Reported on 2024) 200 Tablet 1 Not Taking    Semaglutide,0.25 or 0.5MG/DOS, (OZEMPIC, 0.25 OR 0.5 MG/DOSE,) 2 MG/1.5ML Solution Pen-injector Inject 0.5 mg under the skin every 7 days. (Patient not taking: Reported on 2024) 1.5 mL 0 Not Taking    FreeStyle Lancets Misc USE EVERY MORNING TO TEST FASTING BLOOD SUGAR (Patient not taking: Reported on 2024) 100 Each 3 Not Taking    Home Care Oxygen Inhale 2 L/min continuous.          Medication Allergy:  Allergies   Allergen Reactions    Bactrim Ds        Family History:  Family History   Problem Relation Age of Onset    Heart Disease Mother     Dementia Father 80       Social History:  Social History     Socioeconomic History    Marital status:      Spouse name: Not on file    Number of children: Not on file    Years of education: Not on file    Highest education level: Not on file   Occupational History    Not on file   Tobacco Use    Smoking status: Former     Current packs/day: 0.00     Average packs/day: 2.0 packs/day for 50.0 years (100.0 ttl pk-yrs)     Types: Cigarettes     Start date: 1956     Quit date: 2006     Years since quittin.3     Passive exposure: Never    Smokeless tobacco: Never    Vaping Use    Vaping status: Never Used   Substance and Sexual Activity    Alcohol use: Not Currently     Alcohol/week: 0.0 oz     Comment: Nothing for one year (8/13/19)    Drug use: No    Sexual activity: Not Currently     Partners: Female     Comment: , no children   Other Topics Concern    Not on file   Social History Narrative    Not on file     Social Determinants of Health     Financial Resource Strain: Low Risk  (6/17/2023)    Overall Financial Resource Strain (CARDIA)     Difficulty of Paying Living Expenses: Not very hard   Food Insecurity: No Food Insecurity (6/17/2023)    Hunger Vital Sign     Worried About Running Out of Food in the Last Year: Never true     Ran Out of Food in the Last Year: Never true   Transportation Needs: No Transportation Needs (6/17/2023)    PRAPARE - Transportation     Lack of Transportation (Medical): No     Lack of Transportation (Non-Medical): No   Physical Activity: Inactive (6/17/2023)    Exercise Vital Sign     Days of Exercise per Week: 0 days     Minutes of Exercise per Session: 0 min   Stress: No Stress Concern Present (6/17/2023)    [employer] [place] [employer] - Occupational Stress Questionnaire     Feeling of Stress : Not at all   Social Connections: Socially Isolated (6/17/2023)    Social Connection and Isolation Panel [NHANES]     Frequency of Communication with Friends and Family: Never     Frequency of Social Gatherings with Friends and Family: Never     Attends Sikh Services: Never     Active Member of Clubs or Organizations: No     Attends Club or Organization Meetings: Never     Marital Status:    Intimate Partner Violence: Not on file   Housing Stability: Unknown (6/17/2023)    Housing Stability Vital Sign     Unable to Pay for Housing in the Last Year: No     Number of Places Lived in the Last Year: 1     Unstable Housing in the Last Year: Not on file         Physical Exam:  Vitals/ General Appearance:   Weight/BMI: Body mass  "index is 31.79 kg/m².  BP (!) 151/75   Pulse 98   Temp 36.9 °C (98.4 °F) (Temporal)   Resp 18   Ht 1.702 m (5' 7\")   Wt 92.1 kg (203 lb)   SpO2 93%   Vitals:    05/21/24 0250 05/21/24 0336 05/21/24 0912 05/21/24 1040   BP:  (!) 146/71 (!) 152/78 (!) 151/75   Pulse: 100 98 91 98   Resp: 12  18 18   Temp:    36.9 °C (98.4 °F)   TempSrc:    Temporal   SpO2: 97% 96%  93%   Weight:       Height:         Oxygen Therapy:  Pulse Oximetry: 93 %, O2 (LPM): 3, O2 Delivery Device: Nasal Cannula    Constitutional:   No acute distress  HENMT:  Normocephalic, Atraumatic  Eyes:  EOMI.  Neck:  Normal range of motion  Lungs:  Normal respiratory effort  Abdomen: Soft, No tenderness, No guarding, No rebound tenderness  : No CVAT  Skin: Warm, Dry  Neurologic: Alert & oriented x 3  Psychiatric: Affect normal, Judgment normal, Mood normal.      MDM (Data Review):     Records reviewed and summarized in current documentation    Lab Data Review:  Recent Results (from the past 24 hour(s))   CBC WITH DIFFERENTIAL    Collection Time: 05/21/24  2:30 AM   Result Value Ref Range    WBC 9.4 4.8 - 10.8 K/uL    RBC 4.83 4.70 - 6.10 M/uL    Hemoglobin 13.8 (L) 14.0 - 18.0 g/dL    Hematocrit 41.9 (L) 42.0 - 52.0 %    MCV 86.7 81.4 - 97.8 fL    MCH 28.6 27.0 - 33.0 pg    MCHC 32.9 32.3 - 36.5 g/dL    RDW 40.9 35.9 - 50.0 fL    Platelet Count 226 164 - 446 K/uL    MPV 10.6 9.0 - 12.9 fL    Neutrophils-Polys 68.60 44.00 - 72.00 %    Lymphocytes 21.50 (L) 22.00 - 41.00 %    Monocytes 7.40 0.00 - 13.40 %    Eosinophils 1.80 0.00 - 6.90 %    Basophils 0.40 0.00 - 1.80 %    Immature Granulocytes 0.30 0.00 - 0.90 %    Nucleated RBC 0.00 0.00 - 0.20 /100 WBC    Neutrophils (Absolute) 6.46 1.82 - 7.42 K/uL    Lymphs (Absolute) 2.03 1.00 - 4.80 K/uL    Monos (Absolute) 0.70 0.00 - 0.85 K/uL    Eos (Absolute) 0.17 0.00 - 0.51 K/uL    Baso (Absolute) 0.04 0.00 - 0.12 K/uL    Immature Granulocytes (abs) 0.03 0.00 - 0.11 K/uL    NRBC (Absolute) 0.00 K/uL "   COMP METABOLIC PANEL    Collection Time: 05/21/24  2:30 AM   Result Value Ref Range    Sodium 138 135 - 145 mmol/L    Potassium 4.0 3.6 - 5.5 mmol/L    Chloride 103 96 - 112 mmol/L    Co2 22 20 - 33 mmol/L    Anion Gap 13.0 7.0 - 16.0    Glucose 164 (H) 65 - 99 mg/dL    Bun 21 8 - 22 mg/dL    Creatinine 0.96 0.50 - 1.40 mg/dL    Calcium 9.5 8.4 - 10.2 mg/dL    Correct Calcium 9.6 8.5 - 10.5 mg/dL    AST(SGOT) 14 12 - 45 U/L    ALT(SGPT) 10 2 - 50 U/L    Alkaline Phosphatase 89 30 - 99 U/L    Total Bilirubin 1.3 0.1 - 1.5 mg/dL    Albumin 3.9 3.2 - 4.9 g/dL    Total Protein 7.9 6.0 - 8.2 g/dL    Globulin 4.0 (H) 1.9 - 3.5 g/dL    A-G Ratio 1.0 g/dL   ESTIMATED GFR    Collection Time: 05/21/24  2:30 AM   Result Value Ref Range    GFR (CKD-EPI) 81 >60 mL/min/1.73 m 2   URINALYSIS (UA)    Collection Time: 05/21/24  3:49 AM    Specimen: Urine, Clean Catch   Result Value Ref Range    Color Yellow     Character Cloudy (A)     Specific Gravity 1.025 <1.035    Ph 6.0 5.0 - 8.0    Glucose 100 (A) Negative mg/dL    Ketones Negative Negative mg/dL    Protein 100 (A) Negative mg/dL    Bilirubin Negative Negative    Nitrite Negative Negative    Leukocyte Esterase Moderate (A) Negative    Occult Blood Large (A) Negative    Micro Urine Req Microscopic    URINE MICROSCOPIC (W/UA)    Collection Time: 05/21/24  3:49 AM   Result Value Ref Range    WBC 10-20 (A) /hpf    -150 (A) /hpf    Bacteria Negative None /hpf    Epithelial Cells Few Few /hpf    Hyaline Cast 6-10 (A) /lpf   TROPONIN    Collection Time: 05/21/24  8:50 AM   Result Value Ref Range    Troponin T 32 (H) 6 - 19 ng/L   proBrain Natriuretic Peptide, NT    Collection Time: 05/21/24  8:50 AM   Result Value Ref Range    NT-proBNP 137 (H) 0 - 125 pg/mL       Imaging/Procedures Review:    Reviewed    MDM (Assessment and Plan):     Active Hospital Problems    Diagnosis     Hematuria [R31.9]     Other pulmonary embolism without acute cor pulmonale (HCC) [I26.99]     ACP  (advance care planning) [Z71.89]     Former smoker [Z87.891]     Stage 2 moderate COPD by GOLD classification (HCC) [J44.9]     Type II diabetes mellitus with peripheral circulatory disorder (HCC) [E11.51]     Acute UTI [N39.0]       RICK Persaud   [address] [place] [address]   [phone]     78 year old male with a history of bladder cancer who is s/p TURBT 5/7/2015, now with findings of gross hematuria, bladder mass, left ureteral obstruction.  Plan:  - keep patient NPO. Plan is for OR with Dr. Alcantara for TURBT with possible placement of bilateral ureteral stents  -urology will follow.

## 2024-05-21 NOTE — ANESTHESIA PREPROCEDURE EVALUATION
Case: [id number] Anesthesia Start Date/Time: 05/21/24 1114    Procedure: TURBT (TRANSURETHRAL RESECTION OF BLADDER TUMOR) (Bladder)    Anesthesia type: general    Location:  OR  / SURGERY [facility]    Surgeons: Charles Alcantara M.D.            Relevant Problems   PULMONARY   (positive) Stage 2 moderate COPD by GOLD classification (HCC)      CARDIAC   (positive) Chronic deep vein thrombosis (DVT) of femoral vein of right lower extremity (HCC)   (positive) Essential hypertension   (positive) Other pulmonary embolism without acute cor pulmonale (HCC)   (positive) Portopulmonary hypertension (HCC)      GI   (positive) PUD (peptic ulcer disease)         (positive) Hepatic steatosis      ENDO   (positive) Type II diabetes mellitus with peripheral circulatory disorder (HCC)      Other   (positive) Chronic hypoxemic respiratory failure (HCC)   (positive) Former smoker   (positive) History of bladder cancer   (positive) Obesity (BMI 30-39.9)   (positive) PVD (peripheral vascular disease) (HCC)       Physical Exam    Airway   Mallampati: II  TM distance: >3 FB  Neck ROM: full       Cardiovascular - normal exam  Rhythm: regular  Rate: abnormal  (-) murmur     Dental   (+) upper dentures, lower dentures           Pulmonary   (+) decreased breath sounds     Abdominal    Neurological - normal exam                   Anesthesia Plan    ASA 3   ASA physical status 3 criteria: COPD - poorly controlled    Plan - general       Airway plan will be ETT          Induction: intravenous    Postoperative Plan: Postoperative administration of opioids is intended.    Pertinent diagnostic labs and testing reviewed    Informed Consent:    Anesthetic plan and risks discussed with patient.

## 2024-05-21 NOTE — H&P
Hospital Medicine History & Physical Note    Date of Service  5/21/2024    Primary Care Physician  DILMA Teresa.    Consultants  urology    Specialist Names: ERP discussed with Dr. Harvey    Code Status  Full Code    Chief Complaint  Chief Complaint   Patient presents with    UTI     PT BIBA for urinary symptoms x 2weeks. Pt noticed tonight he had blood in his urine. Pt does state it burns when he pees, but has not been seen for it.        History of Presenting Illness  Enoch Mistry is a 78 y.o. male, with history of bladder cancer status post transurethral resection of bladder tumor and prostate on 5/7/2015.  He  presented to the emergency department on 5/21/2024 with complains of dysuria and hematuria.  CT of the abdomen and pelvis is showing anterior left bladder wall mass.  He also has a UTI.  Started on IV antibiotics in the ED.  ERP discussed this case with Dr. Harvey, urologist who is recommending to add a CT of the chest, and keep patient n.p.o. as he may need a procedure today.    I discussed the plan of care with patient and ERP Dr. Witt .    Review of Systems  Review of Systems   Constitutional:  Negative for fever.   HENT:  Negative for congestion and sore throat.    Eyes:  Negative for blurred vision and double vision.   Respiratory:  Negative for cough and shortness of breath.    Cardiovascular:  Negative for chest pain and palpitations.   Gastrointestinal:  Negative for nausea and vomiting.   Genitourinary:  Positive for dysuria and hematuria. Negative for urgency.   Musculoskeletal:  Negative for myalgias and neck pain.   Skin:  Negative for itching and rash.   Neurological:  Negative for dizziness, weakness and headaches.   Endo/Heme/Allergies:  Does not bruise/bleed easily.   Psychiatric/Behavioral:  Negative for depression. The patient does not have insomnia.        Past Medical History   has a past medical history of Adrenal adenoma (1/7/2016), Anemia, Benign non-nodular  prostatic hyperplasia without lower urinary tract symptoms (6/6/2017), Chronic deep vein thrombosis (DVT) of femoral vein of right lower extremity (HCC) (01/25/2021), Chronic hypoxemic respiratory failure (HCC) (1/7/2016), Diabetes (HCC), Emphysema of lung (HCC), Essential hypertension (4/1/2015), Former smoker (1/15/2021), Hepatic steatosis (9/12/2018), Hiatus hernia syndrome, History of bladder cancer, Hypertension, Obesity (BMI 30-39.9) (10/2/2018), Portopulmonary hypertension (HCC) (6/19/2023), PUD (peptic ulcer disease) (9/23/2018), Pulmonary nodule (4/3/2015), and PVD (peripheral vascular disease) ([facility]) (8/13/2019).    Surgical History   has a past surgical history that includes cystoscopy (05/07/2015); trans urethral resection prostate (05/07/2015); trans urethral resection bladder (05/07/2015); cystoscopy (N/A, 09/11/2017); gastroscopy-endo (09/19/2018); bud by laparoscopy (09/20/2018); ercp (09/21/2018); ercp (11/15/2018); and tonsillectomy.     Family History  family history includes Dementia (age of onset: 80) in his father; Heart Disease in his mother.   Family history reviewed with patient. There is no family history that is pertinent to the chief complaint.     Social History   reports that he quit smoking about 18 years ago. His smoking use included cigarettes. He started smoking about 68 years ago. He has a 100 pack-year smoking history. He has never been exposed to tobacco smoke. He has never used smokeless tobacco. He reports that he does not currently use alcohol. He reports that he does not use drugs.    Allergies  Allergies   Allergen Reactions    Bactrim Ds        Medications  Prior to Admission Medications   Prescriptions Last Dose Informant Patient Reported? Taking?   Blood Glucose Test Strips   No No   Sig: Use one Abbott Freestyle Lite strip to test blood sugar twice daily.   FreeStyle Lancets Misc   No No   Sig: USE EVERY MORNING TO TEST FASTING BLOOD SUGAR   Home Care Oxygen   Yes No    Sig: Inhale 2 L/min continuous.   Semaglutide,0.25 or 0.5MG/DOS, (OZEMPIC, 0.25 OR 0.5 MG/DOSE,) 2 MG/1.5ML Solution Pen-injector   No No   Sig: Inject 0.5 mg under the skin every 7 days.   furosemide (LASIX) 20 MG Tab   No No   Sig: Take 1 Tablet by mouth 2 times a day.      Facility-Administered Medications: None       Physical Exam  Temp:  [37.2 °C (99 °F)] 37.2 °C (99 °F)  Pulse:  [100-110] 100  Resp:  [12-20] 12  BP: (183)/(83) 183/83  SpO2:  [96 %-97 %] 97 %  Blood Pressure : (!) 183/83   Temperature: 37.2 °C (99 °F)   Pulse: 100   Respiration: 12   Pulse Oximetry: 97 %       Physical Exam  Constitutional:       Appearance: Normal appearance.   HENT:      Head: Normocephalic and atraumatic.      Nose: Nose normal.      Mouth/Throat:      Mouth: Mucous membranes are moist.      Pharynx: Oropharynx is clear.   Eyes:      Extraocular Movements: Extraocular movements intact.      Pupils: Pupils are equal, round, and reactive to light.   Cardiovascular:      Rate and Rhythm: Normal rate and regular rhythm.      Pulses: Normal pulses.      Heart sounds: Normal heart sounds.   Pulmonary:      Effort: Pulmonary effort is normal.      Breath sounds: Normal breath sounds.   Abdominal:      General: Abdomen is flat. Bowel sounds are normal.      Palpations: Abdomen is soft.   Musculoskeletal:      Cervical back: Normal range of motion and neck supple.   Skin:     General: Skin is warm and dry.   Neurological:      General: No focal deficit present.      Mental Status: He is alert and oriented to person, place, and time.   Psychiatric:         Mood and Affect: Mood normal.         Behavior: Behavior normal.         Laboratory:  Recent Labs     05/21/24  0230   WBC 9.4   RBC 4.83   HEMOGLOBIN 13.8*   HEMATOCRIT 41.9*   MCV 86.7   MCH 28.6   MCHC 32.9   RDW 40.9   PLATELETCT 226   MPV 10.6     Recent Labs     05/21/24  0230   SODIUM 138   POTASSIUM 4.0   CHLORIDE 103   CO2 22   GLUCOSE 164*   BUN 21   CREATININE 0.96    CALCIUM 9.5     Recent Labs     05/21/24  0230   ALTSGPT 10   ASTSGOT 14   ALKPHOSPHAT 89   TBILIRUBIN 1.3   GLUCOSE 164*           Imaging:  CT-CHEST (THORAX) WITH   Final Result         1.  Right upper lobe segmental and subsegmental pulmonary embolus   2.  Atherosclerosis and atherosclerotic coronary artery disease   3.  Low-density left thyroid nodule, follow-up thyroid sonography for further characterization due to nodule size recommended.   4.  Changes of hepatic steatosis   5.  Emphysema   6.  Hepatomegaly   7.  Bilateral pulmonary nodules, see nodule follow-up recommendations below.      Fleischner Society pulmonary nodule recommendations:   Low Risk: CT at 3-6 months, then consider CT at 18-24 months      High Risk: CT at 3-6 months, then at 18-24 months      Comments: Use most suspicious nodule as guide to management. Follow-up intervals may vary according to size and risk.      Low Risk - Minimal or absent history of smoking and of other known risk factors.      High Risk - History of smoking or of other known risk factors.      Note: These recommendations do not apply to lung cancer screening, patients with immunosuppression, or patients with known primary cancer.      Fleischner Society 2017 Guidelines for Management of Incidentally Detected Pulmonary Nodules in Adults      These findings were discussed with the patient's clinician, Dr. Harvey, on 5/21/2024 7:09 AM.      CT-ABDOMEN-PELVIS WITH   Final Result         1.  Posterior left bladder wall mass, should be considered neoplastic unless proven otherwise.   2.  Left hydronephrosis/hydroureter with delayed left nephrogram, compatible with obstructive changes, likely due to obstructing bladder mass.   3.  Mild right hydronephrosis and hydroureter.   4.  Bilateral pelvic adenopathy, concerning for metastatic disease given associated bladder findings.   5.  Prostate enlargement, consider causes of prostate enlargement additional workup as clinically  appropriate.   6.  Hepatomegaly   7.  Irregular hepatic contour, compatible changes of cirrhosis   8.  Atherosclerosis and atherosclerotic coronary artery disease   9.  Pulmonary nodules, see nodule follow-up recommendations below.      Fleischner Society pulmonary nodule recommendations:   Low Risk: CT at 3-6 months, then consider CT at 18-24 months      High Risk: CT at 3-6 months, then at 18-24 months      Comments: Use most suspicious nodule as guide to management. Follow-up intervals may vary according to size and risk.      Low Risk - Minimal or absent history of smoking and of other known risk factors.      High Risk - History of smoking or of other known risk factors.      Note: These recommendations do not apply to lung cancer screening, patients with immunosuppression, or patients with known primary cancer.      Fleischner Society 2017 Guidelines for Management of Incidentally Detected Pulmonary Nodules in Adults      These findings were discussed with the patient's clinician, Ed Witt, on 5/21/2024 5:01 AM.        I reviewed CT imaging: CT of the abdomen and pelvis is showing left posterior Bladder wall mass CT of the chest showing right segmental and subsegmental PE      Assessment/Plan:  Justification for Admission Status  I anticipate this patient will require at least two midnights for appropriate medical management, necessitating inpatient admission because 79 yo M with hematuria. Prior bladder cancer and imaging with recurrent bladder mass.         * Hematuria- (present on admission)  Assessment & Plan  -Inpatient status on medical floor.  -Hematuria under the context of prior bladder cancer in 2015 status post resection.  Today, CT showing bladder mass.  -Strict NPO.  -Patient was started on Rocephin for UTI that I am continuing.  He is not septic.  -ERP discussed this case with urologist, Dr. Harvey.  Appreciate consult and recommendations.  -Pain control with IV narcotics as needed.  -Serial  H&H, his initial hemoglobin is 13.8.  He is hemodynamically stable.  -Urology also recommended CT scan which later came back positive for PE.    Other pulmonary embolism without acute cor pulmonale (HCC)  Assessment & Plan  -CT of the chest is now showing right segmental and subsegmental PE.  This was not a CT angiogram and no clear evidence of right heart strain.  -Patient will undergo procedure with urology today, hold off on heparin for now.  -Will order serial H&H, depending on procedure results, discussed timing for heparin infusion       Acute UTI- (present on admission)  Assessment & Plan  -With tachycardia but no other SIRS criteria.  He is not septic on admission.  -Patient was started on Rocephin in the emergency department which I will continue.  -He is NPO.  -Appreciate urology consult recommendations.    ACP (advance care planning)  Assessment & Plan  -I discussed CODE STATUS and advance care planning with the patient on admission.  Patient is aware that he might have recurrent bladder cancer and also has pulmonary emboli.  He states that he would not like to pursue any aggressive intervention for his cancer including chemotherapy or radiation therapy but he is willing and open to have IV antibiotics and treatment for hematuria and PE.  Total monitor time allocated on advanced care planning and CODE STATUS conversation was 16 minutes.    Former smoker- (present on admission)  Assessment & Plan  -Plan as above.    Stage 2 moderate COPD by GOLD classification ([facility])- (present on admission)  Assessment & Plan  -Currently on 2 L of supplemental oxygen via nasal cannula.    Type II diabetes mellitus with peripheral circulatory disorder ([facility])- (present on admission)  Assessment & Plan  -He was previously on Ozempic but currently out of medications.  His glucose on admission is 164.  I added regular insulin sliding scale.        VTE prophylaxis: SCDs/TEDs

## 2024-05-21 NOTE — ED TRIAGE NOTES
"Chief Complaint   Patient presents with    UTI     PT BIBA for urinary symptoms x 2weeks. Pt noticed tonight he had blood in his urine. Pt does state it burns when he pees, but has not been seen for it.      Ht 1.702 m (5' 7\")   Wt 92.1 kg (203 lb)   BMI 31.79 kg/m²       "

## 2024-05-21 NOTE — ED NOTES
Med rec is complete per Patient at bedside.   Pt hasn't been able to get Ozempic in a year and a half as it is unavailable. Pt is not testing his sugars.   Per pt, he is on Oxygen at home, but does not state how much.  Allergies reviewed.    Has patient had any outside antibiotics in the last 30 days? N    Any Anticoagulants (rivaroxaban, apixaban, edoxaban, dabigatran, warfarin, enoxaparin) taken in the last 14 days? N         Pharmacy/Pharmacies Pt utilizes : [facility] [phone]

## 2024-05-21 NOTE — OR NURSING
"1310 received report from August OGDEN. Pt resting on bed, asking for water--provided with PO fluids. Perera in place, CDI running--urine clear pink/red. Denies pain, states \"pressure in lower half\".     1315 perera bag emptied, 1750cc     1345 report back to August OGDEN.           " Terbinafine Pregnancy And Lactation Text: This medication is Pregnancy Category B and is considered safe during pregnancy. It is also excreted in breast milk and breast feeding isn't recommended.

## 2024-05-21 NOTE — ASSESSMENT & PLAN NOTE
Secondary to bladder cancer diagnosed in 2015.  Underwent TURBT on 5/21/2024. 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 Urology has been requested to provide recommendations for goals of care. Patient continues to have hematuria and nosebleeds

## 2024-05-21 NOTE — ANESTHESIA TIME REPORT
Anesthesia Start and Stop Event Times       Date Time Event    5/21/2024 1014 Ready for Procedure     1114 Anesthesia Start     1240 Anesthesia Stop          Responsible Staff  05/21/24      Name Role Begin End    Vincent Mariano M.D. Anesth 1114 1240          Overtime Reason:  no overtime (within assigned shift)    Comments:

## 2024-05-21 NOTE — PROGRESS NOTES
2300 Received report from Varghese RN PACU nurse  1510 Performed assessment  Pt is A&Ox4, pt declined to participate in part of my neuro assessment, complains of 5/10 pain; declines intervention, updated on POC: CBI  Notified to call me if he feels lower abd pain/ unusual pressure in the lower abd and a distended bladder.  Call light within reach, hourly rounding in place, bed in lowest position.

## 2024-05-21 NOTE — OP REPORT
Urology Operative Report    Date: 5/21/2024    Pre-operative diagnosis: Bladder tumor    Post-operative diagnosis: Bladder tumor    Procedures:  Transurethral resection of bladder tumor (large greater than 5 cm)    Surgeon: Surgeons and Role:     * Charles Alcantara M.D. - Primary    Anesthesia: General    EBL: Minimal    IV fluids: Per anesthesia.    Specimens: Bladder tumor    Complications: None    Drains: 24 Jamaican three-way catheter on continuous bladder irrigation    Disposition:  PACU  Wean CBI. Keep catheter in place for at least 7 days.  Follow up final pathology results.    Findings:  Massive bladder tumor involving the entire left side with papillary and sessile component.  Incomplete resection due to the amount of tumor involvement.  Some oozing at the end of the case due to the large amount of raw surface area.  Unable to locate either ureteral orifice.    Indications for procedure:    Enoch Mistry is a 78 y.o. male who presented with gross hematuria and was found to have a large bladder mass on CT.  After lengthy discussion of risks and benefits, patient agreed to proceed with transurethral resection of bladder tumor.    Details of procedure:    Patient was seen in the preoperative area and informed consent was obtained.  They were transported to the operating room and underwent general anesthesia without complication. Ancef was administered for antibiotic prophylaxis.  Timeout was completed.  Patient was positioned in dorsal lithotomy and then prepped and draped.    26 Jamaican resectoscope was inserted into the bladder per urethra.  Cystoscopy was notable for a massive left wall bladder tumor with both a papillary and sessile component.  We inserted the bipolar resectoscope loop and attempted to resect as much of the visible tumor as possible.  Given the extent of the tumor, resection was incomplete.  Total resection was greater than 5 cm.  We spent a large amount of time meticulously  attempting to obtain hemostasis, however given the degree of raw surface area there was still some oozing at the conclusion of the case.  Ellik bulb was used to evacuate bladder tumor which was sent for pathology.  Scope was withdrawn and with some difficulty using a catheter guide, we were eventually able to place a 24 Maori three-way cathetewith a 30 cc balloon.  Continuous bladder irrigation was initiated which was noted to be light red.  This included the procedure.  Patient was woken from anesthesia and transported to the PACU recovery.

## 2024-05-21 NOTE — ANESTHESIA PROCEDURE NOTES
Airway    Date/Time: 5/21/2024 11:20 AM    Performed by: Vincent Mariano M.D.  Authorized by: Vincent Mariano M.D.    Location:  OR  Urgency:  Elective  Difficult Airway: No    Indications for Airway Management:  Anesthesia      Spontaneous Ventilation: absent    Sedation Level:  Deep  Preoxygenated: Yes    Patient Position:  Sniffing  Final Airway Type:  Endotracheal airway  Final Endotracheal Airway:  ETT  Cuffed: Yes    Technique Used for Successful ETT Placement:  Direct laryngoscopy  Devices/Methods Used in Placement:  Intubating stylet    Insertion Site:  Oral  Blade Type:  Melchor  Laryngoscope Blade/Videolaryngoscope Blade Size:  4  ETT Size (mm):  7.5  Measured from:  Gums  ETT to Gums (cm):  23  Placement Verified by: auscultation and capnometry    Cormack-Lehane Classification:  Grade I - full view of glottis  Number of Attempts at Approach:  1

## 2024-05-22 LAB
ANION GAP SERPL CALC-SCNC: 13 MMOL/L (ref 7–16)
APTT PPP: 50.7 SEC (ref 24.7–36)
BUN SERPL-MCNC: 22 MG/DL (ref 8–22)
CALCIUM SERPL-MCNC: 8.4 MG/DL (ref 8.4–10.2)
CHLORIDE SERPL-SCNC: 101 MMOL/L (ref 96–112)
CO2 SERPL-SCNC: 22 MMOL/L (ref 20–33)
CREAT SERPL-MCNC: 1.12 MG/DL (ref 0.5–1.4)
ERYTHROCYTE [DISTWIDTH] IN BLOOD BY AUTOMATED COUNT: 40.4 FL (ref 35.9–50)
GFR SERPLBLD CREATININE-BSD FMLA CKD-EPI: 67 ML/MIN/1.73 M 2
GLUCOSE SERPL-MCNC: 171 MG/DL (ref 65–99)
HCT VFR BLD AUTO: 39.2 % (ref 42–52)
HGB BLD-MCNC: 13 G/DL (ref 14–18)
INR PPP: 1.14 (ref 0.87–1.13)
MCH RBC QN AUTO: 28.3 PG (ref 27–33)
MCHC RBC AUTO-ENTMCNC: 33.2 G/DL (ref 32.3–36.5)
MCV RBC AUTO: 85.4 FL (ref 81.4–97.8)
PLATELET # BLD AUTO: 241 K/UL (ref 164–446)
PMV BLD AUTO: 11.6 FL (ref 9–12.9)
POTASSIUM SERPL-SCNC: 4.3 MMOL/L (ref 3.6–5.5)
PROTHROMBIN TIME: 15.2 SEC (ref 12–14.6)
RBC # BLD AUTO: 4.59 M/UL (ref 4.7–6.1)
SODIUM SERPL-SCNC: 136 MMOL/L (ref 135–145)
UFH PPP CHRO-ACNC: 0.31 IU/ML
UFH PPP CHRO-ACNC: <0.1 IU/ML
WBC # BLD AUTO: 15.4 K/UL (ref 4.8–10.8)

## 2024-05-22 PROCEDURE — 700105 HCHG RX REV CODE 258: Performed by: HOSPITALIST

## 2024-05-22 PROCEDURE — 700111 HCHG RX REV CODE 636 W/ 250 OVERRIDE (IP): Mod: JZ | Performed by: HOSPITALIST

## 2024-05-22 PROCEDURE — 99232 SBSQ HOSP IP/OBS MODERATE 35: CPT | Performed by: INTERNAL MEDICINE

## 2024-05-22 PROCEDURE — 94760 N-INVAS EAR/PLS OXIMETRY 1: CPT

## 2024-05-22 PROCEDURE — 700111 HCHG RX REV CODE 636 W/ 250 OVERRIDE (IP): Performed by: INTERNAL MEDICINE

## 2024-05-22 PROCEDURE — A9270 NON-COVERED ITEM OR SERVICE: HCPCS | Performed by: HOSPITALIST

## 2024-05-22 PROCEDURE — 85610 PROTHROMBIN TIME: CPT

## 2024-05-22 PROCEDURE — 36415 COLL VENOUS BLD VENIPUNCTURE: CPT

## 2024-05-22 PROCEDURE — 85730 THROMBOPLASTIN TIME PARTIAL: CPT

## 2024-05-22 PROCEDURE — 85027 COMPLETE CBC AUTOMATED: CPT

## 2024-05-22 PROCEDURE — 80048 BASIC METABOLIC PNL TOTAL CA: CPT

## 2024-05-22 PROCEDURE — 307059 PAD,EAR PROTECTOR: Performed by: INTERNAL MEDICINE

## 2024-05-22 PROCEDURE — 85520 HEPARIN ASSAY: CPT

## 2024-05-22 PROCEDURE — 700102 HCHG RX REV CODE 250 W/ 637 OVERRIDE(OP): Performed by: HOSPITALIST

## 2024-05-22 PROCEDURE — 770001 HCHG ROOM/CARE - MED/SURG/GYN PRIV*

## 2024-05-22 PROCEDURE — 97602 WOUND(S) CARE NON-SELECTIVE: CPT

## 2024-05-22 RX ORDER — HEPARIN SODIUM 5000 [USP'U]/100ML
0-30 INJECTION, SOLUTION INTRAVENOUS CONTINUOUS
Status: DISCONTINUED | OUTPATIENT
Start: 2024-05-22 | End: 2024-05-23

## 2024-05-22 RX ORDER — HEPARIN SODIUM 1000 [USP'U]/ML
40 INJECTION, SOLUTION INTRAVENOUS; SUBCUTANEOUS PRN
Status: DISCONTINUED | OUTPATIENT
Start: 2024-05-22 | End: 2024-05-23

## 2024-05-22 RX ORDER — HEPARIN SODIUM 1000 [USP'U]/ML
80 INJECTION, SOLUTION INTRAVENOUS; SUBCUTANEOUS ONCE
Status: COMPLETED | OUTPATIENT
Start: 2024-05-22 | End: 2024-05-22

## 2024-05-22 RX ADMIN — CEFTRIAXONE SODIUM 1000 MG: 1 INJECTION, POWDER, FOR SOLUTION INTRAMUSCULAR; INTRAVENOUS at 06:15

## 2024-05-22 RX ADMIN — HEPARIN SODIUM 6100 UNITS: 1000 INJECTION, SOLUTION INTRAVENOUS; SUBCUTANEOUS at 16:28

## 2024-05-22 RX ADMIN — ACETAMINOPHEN 650 MG: 325 TABLET ORAL at 11:39

## 2024-05-22 RX ADMIN — HEPARIN SODIUM 18 UNITS/KG/HR: 5000 INJECTION, SOLUTION INTRAVENOUS at 16:43

## 2024-05-22 ASSESSMENT — ENCOUNTER SYMPTOMS
RESPIRATORY NEGATIVE: 1
NEUROLOGICAL NEGATIVE: 1
ABDOMINAL PAIN: 0
EYES NEGATIVE: 1
MUSCULOSKELETAL NEGATIVE: 1
PSYCHIATRIC NEGATIVE: 1
CARDIOVASCULAR NEGATIVE: 1

## 2024-05-22 ASSESSMENT — PAIN DESCRIPTION - PAIN TYPE
TYPE: ACUTE PAIN
TYPE: SURGICAL PAIN
TYPE: SURGICAL PAIN

## 2024-05-22 NOTE — PROGRESS NOTES
Slowed CBI rate. Urine is pale yellow with a slight pink tinge. There are no blood clots present.

## 2024-05-22 NOTE — PROGRESS NOTES
Received phone report from lab regarding blood cultures positive for gram positive rods. Patient receiving Rocephin once daily and VS are within normal limits. Notified Dr. Moses per protocol.

## 2024-05-22 NOTE — PROGRESS NOTES
Assumed care, patient awake and alert. No complaints of pain or discomfort. CBI running and producing light pink urine. Reviewed plan of care for the night.

## 2024-05-22 NOTE — PROGRESS NOTES
4 Eyes Skin Assessment Completed by ALIN Plascencia and ALIN Velasquez.    Head WDL  Ears Redness and Blanching, scabs, flaky  Nose WDL  Mouth WDL  Neck WDL  Breast/Chest WDL  Shoulder Blades WDL  Spine WDL  (R) Arm/Elbow/Hand WDL  (L) Arm/Elbow/Hand WDL  Abdomen WDL  Groin WDL  Scrotum/Coccyx/Buttocks Redness and Blanching  (R) Leg Crusted, flaky, heme stained skin  (L) Leg Crusted, flaky, heme stained skin  (R) Heel/Foot/Toe Dry, flaky, 2nd toe blackened tip  (L) Heel/Foot/Toe dry, flaky, callous                                            Devices In Places Blood Pressure Cuff, Pulse Ox, and Nasal Cannula      Interventions In Place Gray Ear Foams and Pillows    Possible Skin Injury Yes    Pictures Uploaded Into Epic Yes  Wound Consult Placed Yes  RN Wound Prevention Protocol Ordered Yes

## 2024-05-22 NOTE — PROGRESS NOTES
Hospital Medicine Daily Progress Note    Date of Service  5/21/2024    Chief Complaint  78-year-old male presented to the emergency room on 5/21/2024 with dysuria and hematuria.  He has a history of bladder cancer, s/p transurethral resection of bladder tumor and prostate on 5/7/2015.  He has a history of type 2 diabetes, COPD.     Hospital Course  CT abdomen and pelvis from admission shows anterior left bladder wall mass.  UA was positive for UTI.  Ceftriaxone was empirically started.    ER physician discussed case with urology.  CT chest was recommended.  Patient was found to have pulmonary embolism.  This was discussed with urology.  Heparin was not started as patient was planned for TURBT with possible placement of bilateral ureteral stents.    Interval Problem Update  Patient underwent transurethral resection of bladder tumor today.  He was noted to have a massive bladder tumor involving the entire left side with papillary and sessile components.  Resection was incomplete due to the amount of tumor involvement.  Ureteral orifices were unable to be located on either side.  Continuous bladder irrigation was started.  Discussed with urology, they will evaluate patient tomorrow and decide when anticoagulation can be started.    I have discussed this patient's plan of care and discharge plan at IDT rounds today with Case Management, Nursing, Nursing leadership, and other members of the IDT team.    Consultants/Specialty  urology    Code Status  DNAR/DNI    Disposition  The patient is not medically cleared for discharge to home or a post-acute facility.  Anticipate discharge to: home with close outpatient follow-up    I have placed the appropriate orders for post-discharge needs.    Review of Systems  Review of Systems   Constitutional:  Positive for malaise/fatigue.   HENT: Negative.     Eyes: Negative.    Respiratory: Negative.     Cardiovascular: Negative.    Gastrointestinal:  Negative for abdominal pain.    Genitourinary:  Positive for hematuria.   Musculoskeletal: Negative.    Skin: Negative.    Neurological: Negative.    Endo/Heme/Allergies: Negative.    Psychiatric/Behavioral: Negative.          Physical Exam  Temp:  [36.4 °C (97.5 °F)-37.7 °C (99.9 °F)] 36.4 °C (97.5 °F)  Pulse:  [] 94  Resp:  [10-20] 17  BP: (132-183)/(58-89) 132/72  SpO2:  [92 %-98 %] 92 %    Physical Exam  Constitutional:       Appearance: He is ill-appearing.   HENT:      Head: Normocephalic.      Nose: Nose normal.      Mouth/Throat:      Mouth: Mucous membranes are moist.   Eyes:      Pupils: Pupils are equal, round, and reactive to light.   Cardiovascular:      Rate and Rhythm: Normal rate.   Pulmonary:      Effort: Pulmonary effort is normal.   Abdominal:      Tenderness: There is no abdominal tenderness.   Musculoskeletal:      Cervical back: Normal range of motion.      Right lower leg: No edema.      Left lower leg: No edema.   Skin:     General: Skin is warm.   Neurological:      General: No focal deficit present.         Fluids    Intake/Output Summary (Last 24 hours) at 5/21/2024 1841  Last data filed at 5/21/2024 1628  Gross per 24 hour   Intake 1300 ml   Output -325 ml   Net 1625 ml       Laboratory  Recent Labs     05/21/24  0230   WBC 9.4   RBC 4.83   HEMOGLOBIN 13.8*   HEMATOCRIT 41.9*   MCV 86.7   MCH 28.6   MCHC 32.9   RDW 40.9   PLATELETCT 226   MPV 10.6     Recent Labs     05/21/24  0230   SODIUM 138   POTASSIUM 4.0   CHLORIDE 103   CO2 22   GLUCOSE 164*   BUN 21   CREATININE 0.96   CALCIUM 9.5                   Imaging  EC-ECHOCARDIOGRAM COMPLETE W/O CONT   Final Result      CT-CHEST (THORAX) WITH   Final Result         1.  Right upper lobe segmental and subsegmental pulmonary embolus   2.  Atherosclerosis and atherosclerotic coronary artery disease   3.  Low-density left thyroid nodule, follow-up thyroid sonography for further characterization due to nodule size recommended.   4.  Changes of hepatic steatosis   5.   Emphysema   6.  Hepatomegaly   7.  Bilateral pulmonary nodules, see nodule follow-up recommendations below.      Fleischner Society pulmonary nodule recommendations:   Low Risk: CT at 3-6 months, then consider CT at 18-24 months      High Risk: CT at 3-6 months, then at 18-24 months      Comments: Use most suspicious nodule as guide to management. Follow-up intervals may vary according to size and risk.      Low Risk - Minimal or absent history of smoking and of other known risk factors.      High Risk - History of smoking or of other known risk factors.      Note: These recommendations do not apply to lung cancer screening, patients with immunosuppression, or patients with known primary cancer.      Fleischner Society 2017 Guidelines for Management of Incidentally Detected Pulmonary Nodules in Adults      These findings were discussed with the patient's clinician, Dr. Harvey, on 5/21/2024 7:09 AM.      CT-ABDOMEN-PELVIS WITH   Final Result         1.  Posterior left bladder wall mass, should be considered neoplastic unless proven otherwise.   2.  Left hydronephrosis/hydroureter with delayed left nephrogram, compatible with obstructive changes, likely due to obstructing bladder mass.   3.  Mild right hydronephrosis and hydroureter.   4.  Bilateral pelvic adenopathy, concerning for metastatic disease given associated bladder findings.   5.  Prostate enlargement, consider causes of prostate enlargement additional workup as clinically appropriate.   6.  Hepatomegaly   7.  Irregular hepatic contour, compatible changes of cirrhosis   8.  Atherosclerosis and atherosclerotic coronary artery disease   9.  Pulmonary nodules, see nodule follow-up recommendations below.      Fleischner Society pulmonary nodule recommendations:   Low Risk: CT at 3-6 months, then consider CT at 18-24 months      High Risk: CT at 3-6 months, then at 18-24 months      Comments: Use most suspicious nodule as guide to management. Follow-up  intervals may vary according to size and risk.      Low Risk - Minimal or absent history of smoking and of other known risk factors.      High Risk - History of smoking or of other known risk factors.      Note: These recommendations do not apply to lung cancer screening, patients with immunosuppression, or patients with known primary cancer.      Fleischner Society 2017 Guidelines for Management of Incidentally Detected Pulmonary Nodules in Adults      These findings were discussed with the patient's clinician, Ed Witt, on 5/21/2024 5:01 AM.           Assessment/Plan  * Hematuria- (present on admission)  Assessment & Plan  Secondary to bladder cancer diagnosed in 2015.  Underwent TURBT today.  Per urology, wean CBI, keep catheter in place for at least 7 days, follow up final pathology results.    Other pulmonary embolism without acute cor pulmonale (HCC)  Assessment & Plan  CT chest showed right segmental and subsegmental PE.  Heparin was not started due to surgery.  Patient is CBI with significant blood in urine. Briefly discussed with urology, will likely start anticoagulation tomorrow.     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  Currently saturating 94% on 2 L/min nasal cannula    Type II diabetes mellitus with peripheral circulatory disorder (HCC)- (present on admission)  Assessment & Plan  Has been on Ozempic in the past.  Continue SSI with Accu-Cheks and hypoglycemia protocol    Acute UTI- (present on admission)  Assessment & Plan  UA was positive.  Cultures pending.  Rocephin was empirically started.    ACP (advance care planning)  Assessment & Plan  DNR/DNI         VTE prophylaxis: SCDs

## 2024-05-22 NOTE — PROGRESS NOTES
"Urology Progress Note    Post op Day # 1 TURBT    S: Seen sitting in chair at bedside. Feeling better today. No fevers, chills, nausea or vomiting.  Output via perera catheter has drastically lightened.    O:   BP (!) 147/78   Pulse 90   Temp 36.7 °C (98.1 °F) (Oral)   Resp 17   Ht 1.702 m (5' 7\")   Wt 92.1 kg (203 lb)   SpO2 90%   Recent Labs     05/21/24  0230 05/22/24  0138   SODIUM 138 136   POTASSIUM 4.0 4.3   CHLORIDE 103 101   CO2 22 22   GLUCOSE 164* 171*   BUN 21 22   CREATININE 0.96 1.12   CALCIUM 9.5 8.4     Recent Labs     05/21/24  0230 05/22/24  0138   WBC 9.4 15.4*   RBC 4.83 4.59*   HEMOGLOBIN 13.8* 13.0*   HEMATOCRIT 41.9* 39.2*   MCV 86.7 85.4   MCH 28.6 28.3   MCHC 32.9 33.2   RDW 40.9 40.4   PLATELETCT 226 241   MPV 10.6 11.6         Intake/Output Summary (Last 24 hours) at 5/22/2024 0759  Last data filed at 5/22/2024 0055  Gross per 24 hour   Intake 1300 ml   Output -4025 ml   Net 5325 ml       Exam:  Gen : NDA  ABD: Abdomen soft, no TTP.   : No CVAT. Perera with clear light pink on low flow CBI    A/P:    Active Hospital Problems    Diagnosis     Hematuria [R31.9]     Other pulmonary embolism without acute cor pulmonale (HCC) [I26.99]     ACP (advance care planning) [Z71.89]     Stage 2 moderate COPD by GOLD classification ([facility]) [J44.9]      06/21/17 -- CAT Scoring in clinic 20 --> Class B  06/15/17 -- PFT showed FEV1 77%, FEV1/FVC 64% --> Stage 2      Type II diabetes mellitus with peripheral circulatory disorder (HCC) [E11.51]     Acute UTI [N39.0]      Diagnois update 10/1/2016       78 year old male with a history of bladder cancer who is s/p TURBT 5/7/2015, now with findings of gross hematuria, bladder mass, left ureteral obstruction. Now s/p TURBT, incomplete resection due to the amount of tumor inovolvement.  Plan:  - continue CBI; work towards weaning CBI today  - await pathology results  - anticipate leaving catheter in place for 7 days with a voiding trial at that time  - " ultimately will require outpatient follow up with pathology results for discussion of long term management  - urology will follow.

## 2024-05-22 NOTE — WOUND TEAM
[facility]  Inpatient Services  Initial Wound and Skin Care Evaluation    Admission Date: 5/21/2024     Last order of IP CONSULT TO WOUND CARE was found on 5/21/2024 from Hospital Encounter on 5/21/2024     HPI, PMH, SH: Reviewed    Past Surgical History:   Procedure Laterality Date    TURBT (TRANSURETHRAL RESECTION OF BLADDER TUMOR) N/A 5/21/2024    Procedure: TURBT (TRANSURETHRAL RESECTION OF BLADDER TUMOR);  Surgeon: Charles Alcantara M.D.;  Location: [facility];  Service: Urology    ERCP  11/15/2018    Procedure: ERCP - W/POSS BIOPSY, SPHINCTEROTOMY, STENT PLACEMENT, STENT REMOVAL, STONE EXTRACTION, DILATION;  Surgeon: Harman Michelle M.D.;  Location: [facility];  Service: EUS    ERCP  09/21/2018    Procedure: ERCP;  Surgeon: Harman Michelle M.D.;  Location: [facility];  Service: EUS    DEWEY BY LAPAROSCOPY  09/20/2018    Procedure: DEWEY BY LAPAROSCOPY;  Surgeon: Seamus Gaston M.D.;  Location: [facility];  Service: General    GASTROSCOPY-ENDO  09/19/2018    Procedure: GASTROSCOPY-ENDO;  Surgeon: Darnell Hernández D.O.;  Location: [facility];  Service: Gastroenterology    CYSTOSCOPY N/A 09/11/2017    Procedure: CYSTOSCOPY- CLOT EVACUATION, FULGURATION, OF PROSTATE;  Surgeon: Srini Giles M.D.;  Location: [facility];  Service: Urology    CYSTOSCOPY  05/07/2015    Procedure: CYSTOSCOPY [57.32] ;  Surgeon: Srini Giles M.D.;  Location: [facility];  Service:     TRANS URETHRAL RESECTION PROSTATE  05/07/2015    Procedure: TRANS URETHRAL RESECTION PROSTATE [60.20];  Surgeon: Srini Giles M.D.;  Location: [facility];  Service:     TRANS URETHRAL RESECTION BLADDER  05/07/2015    Procedure: TRANS URETHRAL RESECTION BLADDER [57.49A];  Surgeon: Srini Giles M.D.;  Location: [facility];  Service:     TONSILLECTOMY       Social History     Tobacco Use    Smoking status:  Former     Current packs/day: 0.00     Average packs/day: 2.0 packs/day for 50.0 years (100.0 total pack years)     Types: Cigarettes     Start date: 1956     Quit date: 2006     Years since quittin.4     Passive exposure: Never    Smokeless tobacco: Never   Substance Use Topics    Alcohol use: Not Currently     Alcohol/week: 0.0 oz     Comment: Nothing for one year (19)     Chief Complaint   Patient presents with    UTI     PT BIBA for urinary symptoms x 2weeks. Pt noticed tonight he had blood in his urine. Pt does state it burns when he pees, but has not been seen for it.      Diagnosis: Hematuria [R31.9]    Unit where seen by Wound Team:      WOUND CONSULT RELATED TO:  R. Second toe, BLE, and Bilateral ears    WOUND TEAM PLAN OF CARE - Frequency of Follow-up:   Nursing to follow dressing orders written for wound care. Contact wound team if area fails to progress, deteriorates or with any questions/concerns if something comes up before next scheduled follow up (See below as to whether wound is following and frequency of wound follow up)   Not following, consult as needed  -      WOUND HISTORY:   Patient arrived to the hospital with c/o hematuria with a hx of bladder mass.  CT revealed a new mass and urology performed a TURP, patient has CBI running.      Patient confirmed that he wears O2 at home and he use to have offloading gray foams, but no longer has them at home.  He previously went to an out patient wound care clinic for his legs, but they eventually gave up.  Now he just makes sure that they stay dry and clean.  Patient denied wanting lotion or any moisturizer because it makes his legs mushy.         WOUND ASSESSMENT/LDA  Wound 24 Venous Ulcer  Leg Lower Bilateral Chronic (Active)   Date First Assessed/Time First Assessed: 24 1646   Present on Original Admission: Yes  Wound Approximate Age at First Assessment (Weeks): (c)   Hand Hygiene Completed: Yes  Primary Wound  Type: Venous Ulcer  Surgical Wound Type: (c)   Location: L...      Assessments 5/22/2024 11:00 AM   Site Assessment Edema;Red   Periwound Assessment Hyperpigmented;Edema   Margins Undefined edges;Attached edges   Closure Open to air   Drainage Amount None   Treatments Cleansed;Nonselective debridement;Site care   Wound Cleansing Soap and Water   Periwound Protectant Not Applicable   Dressing Status Open to Air   NEXT Weekly Photo (Inpatient Only) 05/29/24   Wound Team Following Not following       Wound 05/21/24 Pressure Injury Ear Posterior Bilateral POA stage 2 (Active)   Date First Assessed/Time First Assessed: 05/21/24 1649   Present on Original Admission: Yes  Wound Approximate Age at First Assessment (Weeks): (c)   Hand Hygiene Completed: Yes  Primary Wound Type: Pressure Injury  Location: Ear  Wound Orientation: P...      Assessments 5/22/2024 11:00 AM   Wound Image      Site Assessment Scabbed;Red   Periwound Assessment Pink   Margins Attached edges;Defined edges   Closure Open to air   Drainage Amount None   Treatments Cleansed;Nonselective debridement;Site care;Offloading   Wound Cleansing Normal Saline Irrigation   Periwound Protectant Not Applicable   Dressing Status Open to Air   NEXT Weekly Photo (Inpatient Only) 05/29/24   Wound Team Following Not following   Pressure Injury Stage Stage 2       Wound 05/21/24 Soft Tissue Necrosis Toe, 2nd Right (Active)   Date First Assessed/Time First Assessed: 05/21/24 1652   Present on Original Admission: Yes  Wound Approximate Age at First Assessment (Weeks): (c)   Hand Hygiene Completed: Yes  Primary Wound Type: Soft Tissue Necrosis  Location: Toe, 2nd  Laterality...      Assessments 5/22/2024 11:00 AM   Wound Image     Site Assessment Black;Eschar   Periwound Assessment Pink   Margins Defined edges;Attached edges   Closure Open to air   Drainage Amount None   Treatments Cleansed;Nonselective debridement;Site care   Wound Cleansing Povidone-Iodine   Periwound  Protectant Not Applicable   Dressing Status Open to Air   NEXT Weekly Photo (Inpatient Only) 05/29/24   Wound Team Following Not following        Vascular:    JAY:   No results found.    Lab Values:    Lab Results   Component Value Date/Time    WBC 15.4 (H) 05/22/2024 01:38 AM    RBC 4.59 (L) 05/22/2024 01:38 AM    HEMOGLOBIN 13.0 (L) 05/22/2024 01:38 AM    HEMATOCRIT 39.2 (L) 05/22/2024 01:38 AM    SEDRATEWES 0 12/01/2020 11:55 AM    HBA1C 9.4 (H) 06/27/2023 02:07 PM         Culture Results show:  No results found for this or any previous visit (from the past 720 hour(s)).    Pain Level/Medicated:  None, Tolerated without pain medication       INTERVENTIONS BY WOUND TEAM:  Chart and images reviewed. Discussed with bedside RN. All areas of concern (based on picture review, LDA review and discussion with bedside RN) have been thoroughly assessed. Documentation of areas based on significant findings. This RN in to assess patient. Performed standard wound care which includes appropriate positioning, dressing removal and non-selective debridement. Pictures and measurements obtained weekly if/when required.    Wound:  Bilateral lower legs  Cleansed/Non-selectively Debrided with:  Moist warm washcloth  Maura wound: Cleansed with Moist warm washcloth, Prepped with Open to Air  Primary Dressing:  n/a     Wound:  R. 2nd toe  Cleansed/Non-selectively Debrided with:  Moist warm washcloth  Maura wound: Cleansed with Moist warm washcloth, Prepped with betadine painted     Wound:  bilateral ears  Cleansed/Non-selectively Debrided with:  Moist warm washcloth  Maura wound: Cleansed with Moist warm washcloth, Prepped with Open to Air    Advanced Wound Care Discharge Planning  Number of Clinicians necessary to complete wound care: 1  Is patient requiring IV pain medications for dressing changes:  No   Length of time for dressing change 30 min. (This does not include chart review, pre-medication time, set up, clean up or time spent  "charting.)    Interdisciplinary consultation: Patient, Bedside RN , N/A.  Pressure injury and staging reviewed with N/A.    EVALUATION / RATIONALE FOR TREATMENT:     Date:  05/22/24  Wound Status:  Initial evaluation    Bilateral ears have adherent scab r/t O2 tubing and are POA stage 2.  Offloaded with offloading gray foams an additional set a foams for when patient discharges,  Bilateral lower legs left DELFIN due to the patient concern for his legs becoming mushy.  R. 2nd toe with most likely compromised blood flow, applied betadine to assist with keeping it clean and dry.  Bilateral heels are intact, Patient refused assessment of his sacrum because patient sitting comfortable in the chair, \"They just looked at it and said it was fine.\"          Goals: Steady decrease in wound area and depth weekly.    NURSING PLAN OF CARE ORDERS:  Dressing changes: See Dressing Care orders  Skin care: See Skin Care orders  RN Prevention Protocol    NUTRITION RECOMMENDATIONS   Wound Team Recommendations:  N/A    DIET ORDERS (From admission to next 24h)       Start     Ordered    05/21/24 1552  Diet Order Diet: Consistent CHO (Diabetic)  ALL MEALS        Question:  Diet:  Answer:  Consistent CHO (Diabetic)    05/21/24 1551                    PREVENTATIVE INTERVENTIONS:    Q shift Johnathan - performed per nursing policy  Q shift pressure point assessments - performed per nursing policy    Surface/Positioning  Standard/trauma mattress - Currently in Place    Offloading/Redistribution  Heel offloading dressing (Silicone dressing) - Ordered      Respiratory  Silicone O2 tubing - Currently in Place  Gray Foam Ear protectors - Currently in Place    Containment/Moisture Prevention    Best Catheter - Currently in Place    Anticipated discharge plans:  TBD        Vac Discharge Needs:  Vac Discharge plan is purely a recommendation from wound team and not a requirement for discharge unless otherwise stated by physician.  Not Applicable Pt not on " a wound vac

## 2024-05-22 NOTE — PROGRESS NOTES
Hospital Medicine Daily Progress Note    Date of Service  5/22/2024    Chief Complaint  78-year-old male presented to the emergency room on 5/21/2024 with dysuria and hematuria.  He has a history of bladder cancer, s/p transurethral resection of bladder tumor and prostate on 5/7/2015.  He has a history of type 2 diabetes, COPD.     Hospital Course  CT abdomen and pelvis from admission shows anterior left bladder wall mass.  UA was positive for UTI.  Ceftriaxone was empirically started.    ER physician discussed case with urology.  CT chest was recommended.  Patient was found to have pulmonary embolism.  This was discussed with urology.  Heparin was not started as patient was planned for TURBT with possible placement of bilateral ureteral stents.    Patient underwent transurethral resection of bladder tumor on 5/21/2024. He was noted to have a massive bladder tumor involving the entire left side with papillary and sessile components.  Resection was incomplete due to the amount of tumor involvement.  Ureteral orifices were unable to be located on either side.  Continuous bladder irrigation was started.      Interval Problem Update  CBI in place, small amount of hematuria. Hgb is 13.0. Pathology results are pending. Saturating 94% on 4 LPM nasal cannula. Heparin started for pulmonary embolism.     I have discussed this patient's plan of care and discharge plan at IDT rounds today with Case Management, Nursing, Nursing leadership, and other members of the IDT team.    Consultants/Specialty  urology    Code Status  DNAR/DNI    Disposition  The patient is not medically cleared for discharge to home or a post-acute facility.  Anticipate discharge to: home with close outpatient follow-up    I have placed the appropriate orders for post-discharge needs.    Review of Systems  Review of Systems   Constitutional:  Positive for malaise/fatigue.   HENT: Negative.     Eyes: Negative.    Respiratory: Negative.     Cardiovascular:  Negative.    Gastrointestinal:  Negative for abdominal pain.   Genitourinary:  Positive for hematuria.   Musculoskeletal: Negative.    Skin: Negative.    Neurological: Negative.    Endo/Heme/Allergies: Negative.    Psychiatric/Behavioral: Negative.          Physical Exam  Temp:  [36.1 °C (97 °F)-36.7 °C (98.1 °F)] 36.1 °C (97 °F)  Pulse:  [] 99  Resp:  [17-18] 18  BP: (132-153)/(72-89) 135/75  SpO2:  [90 %-96 %] 94 %    Physical Exam  Constitutional:       Appearance: He is ill-appearing.   HENT:      Head: Normocephalic.      Nose: Nose normal.      Mouth/Throat:      Mouth: Mucous membranes are moist.   Eyes:      Pupils: Pupils are equal, round, and reactive to light.   Cardiovascular:      Rate and Rhythm: Normal rate.   Pulmonary:      Effort: Pulmonary effort is normal.   Abdominal:      Tenderness: There is no abdominal tenderness.   Musculoskeletal:      Cervical back: Normal range of motion.      Right lower leg: No edema.      Left lower leg: No edema.   Skin:     General: Skin is warm.   Neurological:      General: No focal deficit present.         Fluids    Intake/Output Summary (Last 24 hours) at 5/22/2024 1528  Last data filed at 5/22/2024 1100  Gross per 24 hour   Intake 100 ml   Output -4400 ml   Net 4500 ml       Laboratory  Recent Labs     05/21/24  0230 05/22/24  0138   WBC 9.4 15.4*   RBC 4.83 4.59*   HEMOGLOBIN 13.8* 13.0*   HEMATOCRIT 41.9* 39.2*   MCV 86.7 85.4   MCH 28.6 28.3   MCHC 32.9 33.2   RDW 40.9 40.4   PLATELETCT 226 241   MPV 10.6 11.6     Recent Labs     05/21/24  0230 05/22/24  0138   SODIUM 138 136   POTASSIUM 4.0 4.3   CHLORIDE 103 101   CO2 22 22   GLUCOSE 164* 171*   BUN 21 22   CREATININE 0.96 1.12   CALCIUM 9.5 8.4                   Imaging  EC-ECHOCARDIOGRAM COMPLETE W/O CONT   Final Result      CT-CHEST (THORAX) WITH   Final Result         1.  Right upper lobe segmental and subsegmental pulmonary embolus   2.  Atherosclerosis and atherosclerotic coronary artery  disease   3.  Low-density left thyroid nodule, follow-up thyroid sonography for further characterization due to nodule size recommended.   4.  Changes of hepatic steatosis   5.  Emphysema   6.  Hepatomegaly   7.  Bilateral pulmonary nodules, see nodule follow-up recommendations below.      Fleischner Society pulmonary nodule recommendations:   Low Risk: CT at 3-6 months, then consider CT at 18-24 months      High Risk: CT at 3-6 months, then at 18-24 months      Comments: Use most suspicious nodule as guide to management. Follow-up intervals may vary according to size and risk.      Low Risk - Minimal or absent history of smoking and of other known risk factors.      High Risk - History of smoking or of other known risk factors.      Note: These recommendations do not apply to lung cancer screening, patients with immunosuppression, or patients with known primary cancer.      Fleischner Society 2017 Guidelines for Management of Incidentally Detected Pulmonary Nodules in Adults      These findings were discussed with the patient's clinician, Dr. Harvey, on 5/21/2024 7:09 AM.      CT-ABDOMEN-PELVIS WITH   Final Result         1.  Posterior left bladder wall mass, should be considered neoplastic unless proven otherwise.   2.  Left hydronephrosis/hydroureter with delayed left nephrogram, compatible with obstructive changes, likely due to obstructing bladder mass.   3.  Mild right hydronephrosis and hydroureter.   4.  Bilateral pelvic adenopathy, concerning for metastatic disease given associated bladder findings.   5.  Prostate enlargement, consider causes of prostate enlargement additional workup as clinically appropriate.   6.  Hepatomegaly   7.  Irregular hepatic contour, compatible changes of cirrhosis   8.  Atherosclerosis and atherosclerotic coronary artery disease   9.  Pulmonary nodules, see nodule follow-up recommendations below.      Fleischner Society pulmonary nodule recommendations:   Low Risk: CT at 3-6  months, then consider CT at 18-24 months      High Risk: CT at 3-6 months, then at 18-24 months      Comments: Use most suspicious nodule as guide to management. Follow-up intervals may vary according to size and risk.      Low Risk - Minimal or absent history of smoking and of other known risk factors.      High Risk - History of smoking or of other known risk factors.      Note: These recommendations do not apply to lung cancer screening, patients with immunosuppression, or patients with known primary cancer.      Fleischner Society 2017 Guidelines for Management of Incidentally Detected Pulmonary Nodules in Adults      These findings were discussed with the patient's clinician, Ed Witt, on 5/21/2024 5:01 AM.           Assessment/Plan  * Hematuria- (present on admission)  Assessment & Plan  Secondary to bladder cancer diagnosed in 2015.  Underwent TURBT today.  Per urology, wean CBI, keep catheter in place for at least 7 days, follow up final pathology results.    Other pulmonary embolism without acute cor pulmonale (HCC)  Assessment & Plan  CT chest showed right segmental and subsegmental PE.  Heparin was not started due to surgery.  Patient is CBI with significant blood in urine. Briefly discussed with urology, will likely start anticoagulation tomorrow.     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  Currently saturating 94% on 2 L/min nasal cannula    Type II diabetes mellitus with peripheral circulatory disorder (HCC)- (present on admission)  Assessment & Plan  Has been on Ozempic in the past.  Continue SSI with Accu-Cheks and hypoglycemia protocol    Acute UTI- (present on admission)  Assessment & Plan  UA was positive.  Cultures pending.  Rocephin was empirically started.    ACP (advance care planning)  Assessment & Plan  DNR/DNI         VTE prophylaxis: SCDs

## 2024-05-22 NOTE — DOCUMENTATION QUERY
"                                                                         [facility]                                                                       Query Response Note      PATIENT:               MATTHIEU MCKEON  ACCT #:                  [id number]  MRN:                     [id number]  :                      1945  ADMIT DATE:       2024 2:21 AM  DISCH DATE:          RESPONDING  PROVIDER #:        [id number]           QUERY TEXT:    Chronic home 02 use is documented in the Medical Record. Please further specify the condition this patient has    NOTE:  If an appropriate response is not listed below, please respond with a new note.     Note: If you agree with a diagnosis listed above, please remember to include it in your concurrent daily documentation and onto the Discharge Summary.          The patient's Clinical Indicators include:  78 year old male admitted with hematuria.     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 classification (HCC)- (present on admission)  -Currently on 2 L of supplemental oxygen via nasal cannula.\"     ED Note Witt \"emphysema/COPD on 2 L of oxygen at baseline\"    RN flowsheets: pt on 2-3 L O2 with sat's 92-96%    Risk factors: COPD, PE  Treatment: labs, O2, CT    If you have any questions, please contact:  Vidhya Montez RN CDI [facility]  [email or web address]  Vidhya Montez [address]  Options provided:   -- Chronic Hypoxic Respiratory Failure   -- Chronic Hypoxic Respiratory Failure due to COPD   -- Other explanation, Please specify      Query created by: Vidhya Montez on 2024 11:30 AM    RESPONSE TEXT:    Pulmonary embolism          Electronically signed by:  NENITA JEFF MD 2024 8:33 AM              "

## 2024-05-22 NOTE — DIETARY
"Nutrition services: Day 1 of admit.  Enoch Mistry is a 78 y.o. male with admitting DX of Hematuria [R31.9]    Consult received for unplanned wt loss 2-13 lb x 6 months, poor PO intake PTA. RD visited pt at bedside. Pt reports poor appetite for \"years,\" but states particularly poor post-operatively. Reports typically eats 2 meals/day, sometimes a grilled cheese at lunch. States lives w/ his wife and they are both \"slowing down.\" Pt declines snacks/supplements here. RD observed meal tray w/ % consumed. Pt states has a scale at home but monitoring his wt has not been a priority for him. Last known wt measurement per pt was ~1 year ago. Pt states \"visually\" he thinks he's down ~10 lb (\"but it could be zero\") in the past 3 months. Denies any noticed changes in clothing fit.    Assessment:  Height: 170.2 cm (5' 7\")  Weight: 92.1 kg (203 lb)  Body mass index is 31.79 kg/m²., BMI classification: Obesity class I  Diet/Intake:  Consistent CHO; refused x 1 meal (breakfast)    Evaluation:   PMHx includes adrenal adenoma, chronic hypoxemic respiratory failure, DM, emphysema, hepatic steatosis, hiatus hernia syndrome, bladder cancer, HTN, PUD, pulmonary nodule, PVD. Current dx list includes   POD #1 TURBT  Labs: POC glu/24hrs 171-203  MAR: PRN pericolace  Skin: stage 2 pressure ulcer B/L ear r/t O2 tubing, venuous ulcer BLE, 2nd R toe soft tissue necrosis. Edema 1+ to BLE  +4L O2 via nasal cannula  Last BM: 5/20  Nutrition-focused physical exam (NFPE): sunken temporalis w/ some muscle tone when jaw clenched. No fat wasting to triceps. Moderate muscle loss in clavicle and scapular regions.  Wt hx per chart review shows stable wt x 1 yr  05/21/24 92.1 kg (203 lb)  07/24/23 92.1 kg (203 lb)  06/19/23 92.5 kg (204 lb)    Malnutrition Risk: Unable to meet 2 ASPEN criteria based on pt interview and assessment.    Recommendations/Plan:  Encourage intake of meals %.  Document intake of all PO as % taken in ADL's to " provide interdisciplinary communication across all shifts.   Monitor weight.  Nutrition rep will continue to see patient for ongoing meal and snack preferences.     RD continues to follow.

## 2024-05-22 NOTE — CARE PLAN
The patient is Stable - Low risk of patient condition declining or worsening    Shift Goals  Clinical Goals: CBI, Pain management, Free from falls  Patient Goals: Comfort, Rest  Family Goals: Stay updated on POC    Progress made toward(s) clinical / shift goals:  No complaints of pain or discomfort during the night. CBI running and urine is turning to pale yellow with a slight pink tinge. Patient remains free of signs and symptoms of infection.     Patient is not progressing towards the following goals:

## 2024-05-22 NOTE — CARE PLAN
The patient is Stable - Low risk of patient condition declining or worsening    Shift Goals  Clinical Goals: maintain clear to yellow urine CBI output; pain control 3/10 or less; free from falls / injuries  Patient Goals: rest; pain control 3/10 or less; go home  Family Goals: n/a    Progress made toward(s) clinical / shift goals:  CBI output maintained clear to pink; pain controlled 3/10 or less    Patient is not progressing towards the following goals:

## 2024-05-22 NOTE — CARE PLAN
The patient is Stable - Low risk of patient condition declining or worsening    Shift Goals  Clinical Goals: maintain adequate CBI I's/O's, monitor for lower abd pain and bladder distention, manage pain with medication per MAR, remain free from falls  Patient Goals: rest  Family Goals: Stay updated on POC    Progress made toward(s) clinical / shift goals: CBI was adequately maintained, no complaints of lower abd distention/pressure/pain, pt remained free from falls     Problem: Knowledge Deficit - Standard  Goal: Patient and family/care givers will demonstrate understanding of plan of care, disease process/condition, diagnostic tests and medications  Outcome: Progressing     Problem: Pain - Standard  Goal: Alleviation of pain or a reduction in pain to the patient’s comfort goal  Outcome: Progressing     Problem: Fall Risk  Goal: Patient will remain free from falls  Outcome: Progressing       Patient is not progressing towards the following goals:

## 2024-05-23 ENCOUNTER — APPOINTMENT (OUTPATIENT)
Dept: RADIOLOGY | Facility: MEDICAL CENTER | Age: 79
DRG: 668 | End: 2024-05-23
Attending: INTERNAL MEDICINE
Payer: MEDICARE

## 2024-05-23 LAB
ANION GAP SERPL CALC-SCNC: 10 MMOL/L (ref 7–16)
BACTERIA BLD CULT: ABNORMAL
BACTERIA BLD CULT: ABNORMAL
BUN SERPL-MCNC: 29 MG/DL (ref 8–22)
CALCIUM SERPL-MCNC: 8.2 MG/DL (ref 8.4–10.2)
CHLORIDE SERPL-SCNC: 101 MMOL/L (ref 96–112)
CO2 SERPL-SCNC: 23 MMOL/L (ref 20–33)
CREAT SERPL-MCNC: 1.24 MG/DL (ref 0.5–1.4)
ERYTHROCYTE [DISTWIDTH] IN BLOOD BY AUTOMATED COUNT: 39.8 FL (ref 35.9–50)
GFR SERPLBLD CREATININE-BSD FMLA CKD-EPI: 59 ML/MIN/1.73 M 2
GLUCOSE SERPL-MCNC: 144 MG/DL (ref 65–99)
HCT VFR BLD AUTO: 37.9 % (ref 42–52)
HGB BLD-MCNC: 12.6 G/DL (ref 14–18)
MCH RBC QN AUTO: 28.3 PG (ref 27–33)
MCHC RBC AUTO-ENTMCNC: 33.2 G/DL (ref 32.3–36.5)
MCV RBC AUTO: 85 FL (ref 81.4–97.8)
PLATELET # BLD AUTO: 211 K/UL (ref 164–446)
PMV BLD AUTO: 10.8 FL (ref 9–12.9)
POTASSIUM SERPL-SCNC: 4.6 MMOL/L (ref 3.6–5.5)
RBC # BLD AUTO: 4.46 M/UL (ref 4.7–6.1)
SIGNIFICANT IND 70042: ABNORMAL
SITE SITE: ABNORMAL
SODIUM SERPL-SCNC: 134 MMOL/L (ref 135–145)
SOURCE SOURCE: ABNORMAL
UFH PPP CHRO-ACNC: 0.31 IU/ML
WBC # BLD AUTO: 11.1 K/UL (ref 4.8–10.8)

## 2024-05-23 PROCEDURE — 700105 HCHG RX REV CODE 258: Performed by: INTERNAL MEDICINE

## 2024-05-23 PROCEDURE — 87040 BLOOD CULTURE FOR BACTERIA: CPT | Mod: 91

## 2024-05-23 PROCEDURE — 80048 BASIC METABOLIC PNL TOTAL CA: CPT

## 2024-05-23 PROCEDURE — 36415 COLL VENOUS BLD VENIPUNCTURE: CPT

## 2024-05-23 PROCEDURE — 770001 HCHG ROOM/CARE - MED/SURG/GYN PRIV*

## 2024-05-23 PROCEDURE — 85027 COMPLETE CBC AUTOMATED: CPT

## 2024-05-23 PROCEDURE — 700102 HCHG RX REV CODE 250 W/ 637 OVERRIDE(OP): Performed by: HOSPITALIST

## 2024-05-23 PROCEDURE — 85520 HEPARIN ASSAY: CPT

## 2024-05-23 PROCEDURE — 700111 HCHG RX REV CODE 636 W/ 250 OVERRIDE (IP): Performed by: INTERNAL MEDICINE

## 2024-05-23 PROCEDURE — A9270 NON-COVERED ITEM OR SERVICE: HCPCS | Performed by: HOSPITALIST

## 2024-05-23 PROCEDURE — 99232 SBSQ HOSP IP/OBS MODERATE 35: CPT | Performed by: INTERNAL MEDICINE

## 2024-05-23 RX ORDER — CEFTRIAXONE 1 G/1
1000 INJECTION, POWDER, FOR SOLUTION INTRAMUSCULAR; INTRAVENOUS ONCE
Status: DISCONTINUED | OUTPATIENT
Start: 2024-05-23 | End: 2024-05-23

## 2024-05-23 RX ORDER — ENOXAPARIN SODIUM 100 MG/ML
100 INJECTION SUBCUTANEOUS EVERY 12 HOURS
Status: DISCONTINUED | OUTPATIENT
Start: 2024-05-23 | End: 2024-05-26 | Stop reason: HOSPADM

## 2024-05-23 RX ADMIN — HEPARIN SODIUM 18 UNITS/KG/HR: 5000 INJECTION, SOLUTION INTRAVENOUS at 11:21

## 2024-05-23 RX ADMIN — ENOXAPARIN SODIUM 100 MG: 100 INJECTION SUBCUTANEOUS at 15:15

## 2024-05-23 RX ADMIN — SENNOSIDES AND DOCUSATE SODIUM 2 TABLET: 50; 8.6 TABLET ORAL at 17:14

## 2024-05-23 RX ADMIN — CEFTRIAXONE SODIUM 1000 MG: 1 INJECTION, POWDER, FOR SOLUTION INTRAMUSCULAR; INTRAVENOUS at 13:40

## 2024-05-23 ASSESSMENT — ENCOUNTER SYMPTOMS
NEUROLOGICAL NEGATIVE: 1
EYES NEGATIVE: 1
CARDIOVASCULAR NEGATIVE: 1
MUSCULOSKELETAL NEGATIVE: 1
PSYCHIATRIC NEGATIVE: 1
RESPIRATORY NEGATIVE: 1
ABDOMINAL PAIN: 0

## 2024-05-23 ASSESSMENT — PAIN DESCRIPTION - PAIN TYPE
TYPE: ACUTE PAIN
TYPE: ACUTE PAIN

## 2024-05-23 ASSESSMENT — PATIENT HEALTH QUESTIONNAIRE - PHQ9
2. FEELING DOWN, DEPRESSED, IRRITABLE, OR HOPELESS: NOT AT ALL
SUM OF ALL RESPONSES TO PHQ9 QUESTIONS 1 AND 2: 0
1. LITTLE INTEREST OR PLEASURE IN DOING THINGS: NOT AT ALL

## 2024-05-23 NOTE — RESPIRATORY CARE
"   COPD EDUCATION by COPD CLINICAL EDUCATOR  5/23/2024 at 10:05 AM by Krysta Holguin, RRT     Patient reviewed by COPD education team. Patient does have a history or diagnosis of COPD and is a former smoker.  However, patient is not interested in the COPD program.    COPD Screen  COPD Risk Screening  Do you have a history of COPD?: Yes (Last PFT 2017 FEV1/FVC 64%)  Do you have a Pulmonologist?: No    COPD Assessment  COPD Clinical Specialists ONLY  COPD Education Initiated: Yes--Short Intervention (Stopped in to talk to pt, he states they say I have COPD but he quit smoking in 2006 has a 100 pk yr hx, is not on any inhalers/medication for breathing and does not want to see a Pulmonologist, not intersted in education did give info on [facility])  Is this a COPD exacerbation patient?: No  DME Company: [employer]  Physician Name: OMARI Teresa  Pulmonologist Name: none  Referrals Initiated: Yes  Pulmonary Rehab: N/A  Smoking Cessation: N/A  Hospice: N/A  Home Health Care: N/A  Mobile Urgent Care Services: Declined  Geriatric Specialty Group: Yes  Private In-Home Care Agency: N/A  Interdisciplinary Rounds: Attendance at Rounds (30 Min)    PFT Results    No results found for: \"PFT\"    Meds to Beds  [facility] provides bedside medication delivery for all eligible patients at discharge.  Would you like to opt out of this program for any reason?: No - Stay Opted In  Reason the patient would like to opt out of Bedside Medication Delivery at Discharge?: Patient prefers another pharmacy     MY COPD ACTION PLAN     It is recommended that patients and physicians /healthcare providers complete this action plan together. This plan should be discussed at each physician visit and updated as needed.    The green, yellow and red zones show groups of symptoms of COPD. This list of symptoms is not comprehensive, and you may experience other symptoms. In the \"Actions\" column, your healthcare provider has recommended actions for you to " "take based on your symptoms.    Patient Name: Enoch Mistry   YOB: 1945   Last Updated on:     Green Zone:  I am doing well today Actions     Usual activitiy and exercise level   Take daily medications     Usual amounts of cough and phlegm/mucus   Use oxygen as prescribed     Sleep well at night   Continue regular exercise/diet plan     Appetite is good   At all times avoid cigarette smoke, inhaled irritants     Daily Medications (these medications are taken every day):                Yellow Zone:  I am having a bad day or a COPD flare Actions     More breathless than usual   Continue daily medications     I have less energy for my daily activities   Use quick relief inhaler as ordered     Increased or thicker phlegm/mucus   Use oxygen as prescribed     Using quick relief inhaler/nebulizer more often   Get plenty of rest     Swelling of ankles more than usual   Use pursed lip breathing     More coughing than usual   At all times avoid cigarette smoke, inhaled irritants     I feel like I have a \"chest cold\"     Poor sleep and my symptoms woke me up     My appetite is not good     My medicine is not helping      Call provider immediately if symptoms don’t improve     Continue daily medications, add rescue medications:               Medications to be used during a flare up, (as Discussed with Provider):              Red Zone:  I need urgent medical care Actions     Severe shortness of breath even at rest   Call 911 or seek medical care immediately     Not able to do any activity because of breathing      Fever or shaking chills      Feeling confused or very drowsy       Chest pains      Coughing up blood                  "

## 2024-05-23 NOTE — PROGRESS NOTES
Received bedside report from NOC RN, assumed care of patient. Patient is AO4, 93% on 2L via nasal canula, sitting at edge of bed this morning. Patient denies pain at this time, discussed plan of care for the day. Call light within reach, bed locked in lowest position, bed alarm and strip alarm active, hourly rounding in place.

## 2024-05-23 NOTE — PROGRESS NOTES
"     Urology Progress Note    Post op Day # 2 TURBT    S: Seen sitting in chair at bedside. Continues to do well. No fevers, chills, nausea or vomiting.  Output via perera catheter is now clear.    O:   BP (!) 145/85   Pulse (!) 104   Temp 36.6 °C (97.8 °F) (Temporal)   Resp 17   Ht 1.702 m (5' 7\")   Wt 92.1 kg (203 lb)   SpO2 90%   Recent Labs     05/21/24  0230 05/22/24  0138 05/23/24  0456   SODIUM 138 136 134*   POTASSIUM 4.0 4.3 4.6   CHLORIDE 103 101 101   CO2 22 22 23   GLUCOSE 164* 171* 144*   BUN 21 22 29*   CREATININE 0.96 1.12 1.24   CALCIUM 9.5 8.4 8.2*     Recent Labs     05/21/24  0230 05/22/24  0138 05/23/24  0456   WBC 9.4 15.4* 11.1*   RBC 4.83 4.59* 4.46*   HEMOGLOBIN 13.8* 13.0* 12.6*   HEMATOCRIT 41.9* 39.2* 37.9*   MCV 86.7 85.4 85.0   MCH 28.6 28.3 28.3   MCHC 32.9 33.2 33.2   RDW 40.9 40.4 39.8   PLATELETCT 226 241 211   MPV 10.6 11.6 10.8         Intake/Output Summary (Last 24 hours) at 5/22/2024 0759  Last data filed at 5/22/2024 0055  Gross per 24 hour   Intake 1300 ml   Output -4025 ml   Net 5325 ml       Exam:  Gen : NDA  ABD: Abdomen soft, no TTP.   : No CVAT. Perera with clear light pink on low flow CBI    A/P:    Active Hospital Problems    Diagnosis     Hematuria [R31.9]     Other pulmonary embolism without acute cor pulmonale (HCC) [I26.99]     ACP (advance care planning) [Z71.89]     Stage 2 moderate COPD by GOLD classification ([facility]) [J44.9]      06/21/17 -- CAT Scoring in clinic 20 --> Class B  06/15/17 -- PFT showed FEV1 77%, FEV1/FVC 64% --> Stage 2      Type II diabetes mellitus with peripheral circulatory disorder (HCC) [E11.51]     Acute UTI [N39.0]      Diagnois update 10/1/2016       78 year old male with a history of bladder cancer who is s/p TURBT 5/7/2015, now with findings of gross hematuria, bladder mass, left ureteral obstruction. Now s/p TURBT, incomplete resection due to the amount of tumor inovolvement.  Plan:  - await pathology results  - anticipate leaving " catheter in place for 7 days with a voiding trial at that time  - ultimately will require outpatient follow up with pathology results for discussion of long term management  - [facility] [place] will help facitate thi follow up  - no further acute urologic intervention. Urology signing off.     Josafat Kerr, PMARCUS.   [address] [place] [id number]   [phone]

## 2024-05-23 NOTE — PROGRESS NOTES
Hospital Medicine Daily Progress Note    Date of Service  5/23/2024    Chief Complaint  78-year-old male presented to the emergency room on 5/21/2024 with dysuria and hematuria.  He has a history of bladder cancer, s/p transurethral resection of bladder tumor and prostate on 5/7/2015.  He has a history of type 2 diabetes, COPD.     Hospital Course  CT abdomen and pelvis from admission shows anterior left bladder wall mass.  UA was positive for UTI.  Ceftriaxone was empirically started.    ER physician discussed case with urology.  CT chest was recommended.  Patient was found to have pulmonary embolism.  This was discussed with urology.  Heparin was not started as patient was planned for TURBT with possible placement of bilateral ureteral stents.    Patient underwent transurethral resection of bladder tumor on 5/21/2024. He was noted to have a massive bladder tumor involving the entire left side with papillary and sessile components.  Resection was incomplete due to the amount of tumor involvement.  Ureteral orifices were unable to be located on either side.  Continuous bladder irrigation was started.      Interval Problem Update  On heparin drip. Hgb is stable.  1 out of 2 blood cultures from 5/21/2024 grew Bacillus species, possible contaminant, repeat blood cultures ordered.  WBCs improved from 15.4 -> 11.1.   CBI in place, small amount of hematuria. Pathology results are pending. Saturating 90-94% on 4 LPM nasal cannula.      I have discussed this patient's plan of care and discharge plan at IDT rounds today with Case Management, Nursing, Nursing leadership, and other members of the IDT team.    Consultants/Specialty  urology    Code Status  DNAR/DNI    Disposition  The patient is not medically cleared for discharge to home or a post-acute facility.  Anticipate discharge to: home with close outpatient follow-up    I have placed the appropriate orders for post-discharge needs.    Review of Systems  Review of  Systems   Constitutional:  Positive for malaise/fatigue.   HENT: Negative.     Eyes: Negative.    Respiratory: Negative.     Cardiovascular: Negative.    Gastrointestinal:  Negative for abdominal pain.   Genitourinary:  Positive for hematuria.   Musculoskeletal: Negative.    Skin: Negative.    Neurological: Negative.    Endo/Heme/Allergies: Negative.    Psychiatric/Behavioral: Negative.          Physical Exam  Temp:  [36.1 °C (97 °F)-36.6 °C (97.9 °F)] 36.6 °C (97.8 °F)  Pulse:  [100-104] 104  Resp:  [17-18] 17  BP: (128-166)/(74-95) 145/85  SpO2:  [90 %-95 %] 90 %    Physical Exam  Constitutional:       Appearance: He is ill-appearing.   HENT:      Head: Normocephalic.      Nose: Nose normal.      Mouth/Throat:      Mouth: Mucous membranes are moist.   Eyes:      Pupils: Pupils are equal, round, and reactive to light.   Cardiovascular:      Rate and Rhythm: Normal rate.   Pulmonary:      Effort: Pulmonary effort is normal.   Abdominal:      Tenderness: There is no abdominal tenderness.   Musculoskeletal:      Cervical back: Normal range of motion.      Right lower leg: No edema.      Left lower leg: No edema.   Skin:     General: Skin is warm.   Neurological:      General: No focal deficit present.         Fluids    Intake/Output Summary (Last 24 hours) at 5/23/2024 1336  Last data filed at 5/23/2024 0634  Gross per 24 hour   Intake --   Output 400 ml   Net -400 ml       Laboratory  Recent Labs     05/21/24  0230 05/22/24  0138 05/23/24  0456   WBC 9.4 15.4* 11.1*   RBC 4.83 4.59* 4.46*   HEMOGLOBIN 13.8* 13.0* 12.6*   HEMATOCRIT 41.9* 39.2* 37.9*   MCV 86.7 85.4 85.0   MCH 28.6 28.3 28.3   MCHC 32.9 33.2 33.2   RDW 40.9 40.4 39.8   PLATELETCT 226 241 211   MPV 10.6 11.6 10.8     Recent Labs     05/21/24  0230 05/22/24  0138 05/23/24  0456   SODIUM 138 136 134*   POTASSIUM 4.0 4.3 4.6   CHLORIDE 103 101 101   CO2 22 22 23   GLUCOSE 164* 171* 144*   BUN 21 22 29*   CREATININE 0.96 1.12 1.24   CALCIUM 9.5 8.4 8.2*      Recent Labs     05/22/24  1557   APTT 50.7*   INR 1.14*               Imaging  IR-US GUIDED PIV   Final Result    Ultrasound-guided PERIPHERAL IV INSERTION performed by    qualified nursing staff as above.      EC-ECHOCARDIOGRAM COMPLETE W/O CONT   Final Result      CT-CHEST (THORAX) WITH   Final Result         1.  Right upper lobe segmental and subsegmental pulmonary embolus   2.  Atherosclerosis and atherosclerotic coronary artery disease   3.  Low-density left thyroid nodule, follow-up thyroid sonography for further characterization due to nodule size recommended.   4.  Changes of hepatic steatosis   5.  Emphysema   6.  Hepatomegaly   7.  Bilateral pulmonary nodules, see nodule follow-up recommendations below.      Fleischner Society pulmonary nodule recommendations:   Low Risk: CT at 3-6 months, then consider CT at 18-24 months      High Risk: CT at 3-6 months, then at 18-24 months      Comments: Use most suspicious nodule as guide to management. Follow-up intervals may vary according to size and risk.      Low Risk - Minimal or absent history of smoking and of other known risk factors.      High Risk - History of smoking or of other known risk factors.      Note: These recommendations do not apply to lung cancer screening, patients with immunosuppression, or patients with known primary cancer.      Fleischner Society 2017 Guidelines for Management of Incidentally Detected Pulmonary Nodules in Adults      These findings were discussed with the patient's clinician, Dr. Harvey, on 5/21/2024 7:09 AM.      CT-ABDOMEN-PELVIS WITH   Final Result         1.  Posterior left bladder wall mass, should be considered neoplastic unless proven otherwise.   2.  Left hydronephrosis/hydroureter with delayed left nephrogram, compatible with obstructive changes, likely due to obstructing bladder mass.   3.  Mild right hydronephrosis and hydroureter.   4.  Bilateral pelvic adenopathy, concerning for metastatic disease given  associated bladder findings.   5.  Prostate enlargement, consider causes of prostate enlargement additional workup as clinically appropriate.   6.  Hepatomegaly   7.  Irregular hepatic contour, compatible changes of cirrhosis   8.  Atherosclerosis and atherosclerotic coronary artery disease   9.  Pulmonary nodules, see nodule follow-up recommendations below.      Fleischner Society pulmonary nodule recommendations:   Low Risk: CT at 3-6 months, then consider CT at 18-24 months      High Risk: CT at 3-6 months, then at 18-24 months      Comments: Use most suspicious nodule as guide to management. Follow-up intervals may vary according to size and risk.      Low Risk - Minimal or absent history of smoking and of other known risk factors.      High Risk - History of smoking or of other known risk factors.      Note: These recommendations do not apply to lung cancer screening, patients with immunosuppression, or patients with known primary cancer.      Fleischner Society 2017 Guidelines for Management of Incidentally Detected Pulmonary Nodules in Adults      These findings were discussed with the patient's clinician, Ed Witt, on 5/21/2024 5:01 AM.           Assessment/Plan  * Hematuria- (present on admission)  Assessment & Plan  Secondary to bladder cancer diagnosed in 2015.  Underwent TURBT today.  Per urology, wean CBI, keep catheter in place for at least 7 days, follow up final pathology results.    Other pulmonary embolism without acute cor pulmonale (HCC)  Assessment & Plan  CT chest showed right segmental and subsegmental PE.  Heparin was started on 5/22/2024 after surgery.      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  Currently saturating 90-94% on 4 L/min nasal cannula    Type II diabetes mellitus with peripheral circulatory disorder (HCC)- (present on admission)  Assessment & Plan  Has been on Ozempic in the past.   Continue SSI with Accu-Cheks and hypoglycemia protocol    Acute UTI- (present on admission)  Assessment & Plan  UA was positive.  Cultures pending.  Rocephin was empirically started.    ACP (advance care planning)  Assessment & Plan  DNR/DNI         VTE prophylaxis: SCDs

## 2024-05-23 NOTE — ANESTHESIA POSTPROCEDURE EVALUATION
Patient: Enoch Mistry    Procedure Summary       Date: 05/21/24 Room / Location:  OR 03 / SURGERY [facility]    Anesthesia Start: 1114 Anesthesia Stop: 1240    Procedure: TURBT (TRANSURETHRAL RESECTION OF BLADDER TUMOR) (Bladder) Diagnosis: (Gross hematuria, bladder mass)    Surgeons: Charles Alcantara M.D. Responsible Provider: Vincent Mariano M.D.    Anesthesia Type: general ASA Status: 3            Final Anesthesia Type: general  Last vitals  BP   Blood Pressure : (!) 166/95    Temp   36.6 °C (97.8 °F)    Pulse   (!) 102   Resp   18    SpO2   90 %      Anesthesia Post Evaluation    Patient location during evaluation: PACU  Level of consciousness: awake and alert    Airway patency: patent  Anesthetic complications: no  Cardiovascular status: hemodynamically stable  Respiratory status: acceptable  Hydration status: euvolemic    PONV: none          There were no known notable events for this encounter.     Nurse Pain Score: 2 (NPRS)

## 2024-05-23 NOTE — PROGRESS NOTES
Received report from Stef OGDEN. Pt resting in bed sleeping with spO2 at 94% on 2L. Equal rise and fall of chest noted. CBI in place. Best draining clear yellow urine at this time. No needs noted at this time. Call light in reach, bed in lowest position.

## 2024-05-23 NOTE — PROGRESS NOTES
BSSR received from day shift RN. Patient sitting in chair in no apparent distress with no complaints. A&O X4. Plan of care discussed with patient. CBI running at slow rate with pinkish output. Bed in low position with bed brakes applied and call light in reach. Patient states no needs at this time.     2245- Called Lab for XA draw.

## 2024-05-23 NOTE — CARE PLAN
The patient is Stable - Low risk of patient condition declining or worsening    Shift Goals  Clinical Goals: Remain free from falls, maintain clear to light pink output with CBI, Pain controlled <4/10  Patient Goals: Rest  Family Goals: n/a    Progress made toward(s) clinical / shift goals:  Patient remained free from fall, fall precautions observe. Patient has been refusing bed alarm on, health teachings rendered. OFF CBI today. Patient will be on Best cath x 7 days as per urologist. No hematuria noted this time    Patient is not progressing towards the following goals:    Problem: Communication  Goal: The ability to communicate needs accurately and effectively will improve  Outcome: Progressing     Problem: Discharge Barriers/Planning  Goal: Patient's continuum of care needs are met  Outcome: Progressing     Problem: Urinary Elimination  Goal: Establish and maintain regular urinary output  Outcome: Progressing     Problem: Infection - Standard  Goal: Patient will remain free from infection  Outcome: Progressing     Problem: Respiratory  Goal: Patient will achieve/maintain optimum respiratory ventilation and gas exchange  Outcome: Progressing     Problem: Mobility  Goal: Patient's capacity to carry out activities will improve  Outcome: Progressing     Problem: Self Care  Goal: Patient will have the ability to perform ADLs independently or with assistance (bathe, groom, dress, toilet and feed)  Outcome: Progressing     Problem: Knowledge Deficit - COPD  Goal: Patient/significant other demonstrates understanding of disease process, utilization of the Action Plan, medications and discharge instruction  Outcome: Progressing     Problem: Risk for Infection - COPD  Goal: Patient will remain free from signs and symptoms of infection  Outcome: Progressing     Problem: Nutrition - Advanced  Goal: Patient will display progressive weight gain toward goal have adequate food and fluid intake  Outcome: Progressing     Problem:  Ineffective Airway Clearance  Goal: Patient will maintain patent airway with clear/clearing breath sounds  Outcome: Progressing     Problem: Impaired Gas Exchange  Goal: Patient will demonstrate improved ventilation and adequate oxygenation and participate in treatment regimen within the level of ability/situation.  Outcome: Progressing     Problem: Risk for Aspiration  Goal: Patient's risk for aspiration will be absent or decrease  Outcome: Progressing     Problem: Diabetes Management  Goal: Patient will achieve and maintain glucose in satisfactory range  Outcome: Progressing     Problem: Knowledge Deficit - Diabetes  Goal: Patient will demonstrate knowledge of insulin injection, symptoms, and treatment of hypoglycemia and diet prior to discharge  Outcome: Progressing     Problem: Skin Integrity - Diabetes  Goal: Patient's skin on legs and feet will remain intact while hospitalized  Outcome: Progressing

## 2024-05-23 NOTE — CARE PLAN
The patient is Stable - Low risk of patient condition declining or worsening    Shift Goals  Clinical Goals: Pt pain will remain 4/10 or less overnight. Pt CBI will remain patent throughout the night.  Patient Goals: Rest/ sleepovernight  Family Goals: n/a    Progress made toward(s) clinical / shift goals:  Pt did not require PRN pain medication. Pt CBI titrated to a level to maintain pink-clear yellow urine throughout shift.

## 2024-05-23 NOTE — PROGRESS NOTES
CBI stopped as ordered. Best plug applied. To continue monitor urine output    1600- urine output is draining clear jean carlos, no hematuria at this time   normal...

## 2024-05-24 LAB
ANION GAP SERPL CALC-SCNC: 11 MMOL/L (ref 7–16)
BACTERIA UR CULT: ABNORMAL
BACTERIA UR CULT: ABNORMAL
BUN SERPL-MCNC: 23 MG/DL (ref 8–22)
CALCIUM SERPL-MCNC: 8.7 MG/DL (ref 8.4–10.2)
CHLORIDE SERPL-SCNC: 103 MMOL/L (ref 96–112)
CO2 SERPL-SCNC: 21 MMOL/L (ref 20–33)
CREAT SERPL-MCNC: 1.08 MG/DL (ref 0.5–1.4)
ERYTHROCYTE [DISTWIDTH] IN BLOOD BY AUTOMATED COUNT: 39.9 FL (ref 35.9–50)
GFR SERPLBLD CREATININE-BSD FMLA CKD-EPI: 70 ML/MIN/1.73 M 2
GLUCOSE SERPL-MCNC: 138 MG/DL (ref 65–99)
HCT VFR BLD AUTO: 40.1 % (ref 42–52)
HGB BLD-MCNC: 13.4 G/DL (ref 14–18)
MCH RBC QN AUTO: 28.3 PG (ref 27–33)
MCHC RBC AUTO-ENTMCNC: 33.4 G/DL (ref 32.3–36.5)
MCV RBC AUTO: 84.8 FL (ref 81.4–97.8)
PLATELET # BLD AUTO: 237 K/UL (ref 164–446)
PMV BLD AUTO: 11.5 FL (ref 9–12.9)
POTASSIUM SERPL-SCNC: 4.1 MMOL/L (ref 3.6–5.5)
RBC # BLD AUTO: 4.73 M/UL (ref 4.7–6.1)
SIGNIFICANT IND 70042: ABNORMAL
SITE SITE: ABNORMAL
SODIUM SERPL-SCNC: 135 MMOL/L (ref 135–145)
SOURCE SOURCE: ABNORMAL
UFH PPP CHRO-ACNC: 0.29 IU/ML
WBC # BLD AUTO: 10.3 K/UL (ref 4.8–10.8)

## 2024-05-24 PROCEDURE — 700105 HCHG RX REV CODE 258: Performed by: INTERNAL MEDICINE

## 2024-05-24 PROCEDURE — A9270 NON-COVERED ITEM OR SERVICE: HCPCS | Performed by: INTERNAL MEDICINE

## 2024-05-24 PROCEDURE — 36415 COLL VENOUS BLD VENIPUNCTURE: CPT

## 2024-05-24 PROCEDURE — 85520 HEPARIN ASSAY: CPT

## 2024-05-24 PROCEDURE — A9270 NON-COVERED ITEM OR SERVICE: HCPCS | Performed by: HOSPITALIST

## 2024-05-24 PROCEDURE — 700102 HCHG RX REV CODE 250 W/ 637 OVERRIDE(OP): Performed by: HOSPITALIST

## 2024-05-24 PROCEDURE — 85027 COMPLETE CBC AUTOMATED: CPT

## 2024-05-24 PROCEDURE — 80048 BASIC METABOLIC PNL TOTAL CA: CPT

## 2024-05-24 PROCEDURE — 97165 OT EVAL LOW COMPLEX 30 MIN: CPT

## 2024-05-24 PROCEDURE — 94760 N-INVAS EAR/PLS OXIMETRY 1: CPT

## 2024-05-24 PROCEDURE — 700111 HCHG RX REV CODE 636 W/ 250 OVERRIDE (IP): Mod: JZ | Performed by: INTERNAL MEDICINE

## 2024-05-24 PROCEDURE — 99232 SBSQ HOSP IP/OBS MODERATE 35: CPT | Performed by: INTERNAL MEDICINE

## 2024-05-24 PROCEDURE — 700102 HCHG RX REV CODE 250 W/ 637 OVERRIDE(OP): Performed by: INTERNAL MEDICINE

## 2024-05-24 PROCEDURE — 770001 HCHG ROOM/CARE - MED/SURG/GYN PRIV*

## 2024-05-24 RX ORDER — AMOXICILLIN AND CLAVULANATE POTASSIUM 875; 125 MG/1; MG/1
1 TABLET, FILM COATED ORAL EVERY 12 HOURS
Status: DISCONTINUED | OUTPATIENT
Start: 2024-05-24 | End: 2024-05-26 | Stop reason: HOSPADM

## 2024-05-24 RX ADMIN — POLYETHYLENE GLYCOL 3350 1 PACKET: 17 POWDER, FOR SOLUTION ORAL at 17:04

## 2024-05-24 RX ADMIN — CEFTRIAXONE SODIUM 1000 MG: 1 INJECTION, POWDER, FOR SOLUTION INTRAMUSCULAR; INTRAVENOUS at 08:20

## 2024-05-24 RX ADMIN — SENNOSIDES AND DOCUSATE SODIUM 2 TABLET: 50; 8.6 TABLET ORAL at 17:05

## 2024-05-24 RX ADMIN — AMOXICILLIN AND CLAVULANATE POTASSIUM 1 TABLET: 875; 125 TABLET, FILM COATED ORAL at 17:05

## 2024-05-24 RX ADMIN — ENOXAPARIN SODIUM 100 MG: 100 INJECTION SUBCUTANEOUS at 05:11

## 2024-05-24 ASSESSMENT — GAIT ASSESSMENTS: DISTANCE (FEET): 10

## 2024-05-24 ASSESSMENT — ENCOUNTER SYMPTOMS
CARDIOVASCULAR NEGATIVE: 1
MUSCULOSKELETAL NEGATIVE: 1
EYES NEGATIVE: 1
NEUROLOGICAL NEGATIVE: 1
ABDOMINAL PAIN: 0
PSYCHIATRIC NEGATIVE: 1
RESPIRATORY NEGATIVE: 1

## 2024-05-24 ASSESSMENT — COGNITIVE AND FUNCTIONAL STATUS - GENERAL
HELP NEEDED FOR BATHING: A LITTLE
DAILY ACTIVITIY SCORE: 23
SUGGESTED CMS G CODE MODIFIER DAILY ACTIVITY: CI

## 2024-05-24 ASSESSMENT — PATIENT HEALTH QUESTIONNAIRE - PHQ9
2. FEELING DOWN, DEPRESSED, IRRITABLE, OR HOPELESS: NOT AT ALL
1. LITTLE INTEREST OR PLEASURE IN DOING THINGS: NOT AT ALL
1. LITTLE INTEREST OR PLEASURE IN DOING THINGS: NOT AT ALL
SUM OF ALL RESPONSES TO PHQ9 QUESTIONS 1 AND 2: 0
SUM OF ALL RESPONSES TO PHQ9 QUESTIONS 1 AND 2: 0
2. FEELING DOWN, DEPRESSED, IRRITABLE, OR HOPELESS: NOT AT ALL

## 2024-05-24 ASSESSMENT — PAIN DESCRIPTION - PAIN TYPE
TYPE: ACUTE PAIN

## 2024-05-24 ASSESSMENT — ACTIVITIES OF DAILY LIVING (ADL): TOILETING: INDEPENDENT

## 2024-05-24 NOTE — PROGRESS NOTES
1435 - received report from Blanca OGDEN.  1449 - pt complained of nose bleed; asked Blanca OGDEN about it and said it was his 2nd time now. Given pt ice packs applied to his nasal area w/ towel.   1450 - informed Dr Sheehan via voalte and she called and stated he will need to discuss it with other physician on board.

## 2024-05-24 NOTE — CARE PLAN
The patient is Stable - Low risk of patient condition declining or worsening    Shift Goals  Clinical Goals: maintain at least light pink or yellow UOP via perera, pain controlled <6/10, no falls  Patient Goals: sleep  Family Goals: n/a    Progress made toward(s) clinical / shift goals:  UOP documented as per MAR, patient had no complaints of pain during shift, no falls.     Patient is not progressing towards the following goals:

## 2024-05-24 NOTE — PROGRESS NOTES
Hospital Medicine Daily Progress Note    Date of Service  5/24/2024    Chief Complaint  78-year-old male presented to the emergency room on 5/21/2024 with dysuria and hematuria.  He has a history of bladder cancer, s/p transurethral resection of bladder tumor and prostate on 5/7/2015.  He has a history of type 2 diabetes, COPD.     Hospital Course  CT abdomen and pelvis from admission shows anterior left bladder wall mass.  UA was positive for UTI.  Ceftriaxone was empirically started.    ER physician discussed case with urology.  CT chest was recommended.  Patient was found to have pulmonary embolism.  This was discussed with urology.  Heparin was not started as patient was planned for TURBT with possible placement of bilateral ureteral stents.    Patient underwent transurethral resection of bladder tumor on 5/21/2024. He was noted to have a massive bladder tumor involving the entire left side with papillary and sessile components.  Resection was incomplete due to the amount of tumor involvement.  Ureteral orifices were unable to be located on either side.  Continuous bladder irrigation was started.      1 out of 2 blood cultures from 5/21/2024 grew Bacillus species, possible contaminant, repeat blood cultures ordered.  Heparin drip was switched to therapeutic Lovenox due to loss of IV access.    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 Urology has been requested to provide recommendations for goals of care. Patient continues to have hematuria and has had nosebleeds today. Hemoglobin is stable.    Urine culture grew corynebacterium striatum.  Repeat blood cultures pending.    I have discussed this patient's plan of care and discharge plan at IDT rounds today with Case Management, Nursing, Nursing leadership, and other members of the IDT team.    Consultants/Specialty  urology    Code  Status  DNAR/DNI    Disposition  The patient is not medically cleared for discharge to home or a post-acute facility.      I have placed the appropriate orders for post-discharge needs.    Review of Systems  Review of Systems   Constitutional:  Positive for malaise/fatigue.   HENT: Negative.     Eyes: Negative.    Respiratory: Negative.     Cardiovascular: Negative.    Gastrointestinal:  Negative for abdominal pain.   Genitourinary:  Positive for hematuria.   Musculoskeletal: Negative.    Skin: Negative.    Neurological: Negative.    Endo/Heme/Allergies: Negative.    Psychiatric/Behavioral: Negative.          Physical Exam  Temp:  [36.3 °C (97.4 °F)-36.6 °C (97.9 °F)] 36.3 °C (97.4 °F)  Pulse:  [92-99] 92  Resp:  [17-18] 18  BP: (144-154)/(69-82) 144/74  SpO2:  [92 %-94 %] 93 %    Physical Exam  Constitutional:       Appearance: He is ill-appearing.   HENT:      Head: Normocephalic.      Nose: Nose normal.      Mouth/Throat:      Mouth: Mucous membranes are moist.   Eyes:      Pupils: Pupils are equal, round, and reactive to light.   Cardiovascular:      Rate and Rhythm: Normal rate.   Pulmonary:      Effort: Pulmonary effort is normal.   Abdominal:      Tenderness: There is no abdominal tenderness.   Musculoskeletal:      Cervical back: Normal range of motion.      Right lower leg: No edema.      Left lower leg: No edema.   Skin:     General: Skin is warm.   Neurological:      General: No focal deficit present.         Fluids    Intake/Output Summary (Last 24 hours) at 5/24/2024 1507  Last data filed at 5/24/2024 1345  Gross per 24 hour   Intake 360 ml   Output 2350 ml   Net -1990 ml       Laboratory  Recent Labs     05/22/24  0138 05/23/24  0456 05/24/24  0202   WBC 15.4* 11.1* 10.3   RBC 4.59* 4.46* 4.73   HEMOGLOBIN 13.0* 12.6* 13.4*   HEMATOCRIT 39.2* 37.9* 40.1*   MCV 85.4 85.0 84.8   MCH 28.3 28.3 28.3   MCHC 33.2 33.2 33.4   RDW 40.4 39.8 39.9   PLATELETCT 241 211 237   MPV 11.6 10.8 11.5     Recent Labs      05/22/24  0138 05/23/24  0456 05/24/24  0202   SODIUM 136 134* 135   POTASSIUM 4.3 4.6 4.1   CHLORIDE 101 101 103   CO2 22 23 21   GLUCOSE 171* 144* 138*   BUN 22 29* 23*   CREATININE 1.12 1.24 1.08   CALCIUM 8.4 8.2* 8.7     Recent Labs     05/22/24  1557   APTT 50.7*   INR 1.14*               Imaging  IR-US GUIDED PIV   Final Result    Ultrasound-guided PERIPHERAL IV INSERTION performed by    qualified nursing staff as above.      EC-ECHOCARDIOGRAM COMPLETE W/O CONT   Final Result      CT-CHEST (THORAX) WITH   Final Result         1.  Right upper lobe segmental and subsegmental pulmonary embolus   2.  Atherosclerosis and atherosclerotic coronary artery disease   3.  Low-density left thyroid nodule, follow-up thyroid sonography for further characterization due to nodule size recommended.   4.  Changes of hepatic steatosis   5.  Emphysema   6.  Hepatomegaly   7.  Bilateral pulmonary nodules, see nodule follow-up recommendations below.      Fleischner Society pulmonary nodule recommendations:   Low Risk: CT at 3-6 months, then consider CT at 18-24 months      High Risk: CT at 3-6 months, then at 18-24 months      Comments: Use most suspicious nodule as guide to management. Follow-up intervals may vary according to size and risk.      Low Risk - Minimal or absent history of smoking and of other known risk factors.      High Risk - History of smoking or of other known risk factors.      Note: These recommendations do not apply to lung cancer screening, patients with immunosuppression, or patients with known primary cancer.      Fleischner Society 2017 Guidelines for Management of Incidentally Detected Pulmonary Nodules in Adults      These findings were discussed with the patient's clinician, Dr. Harvey, on 5/21/2024 7:09 AM.      CT-ABDOMEN-PELVIS WITH   Final Result         1.  Posterior left bladder wall mass, should be considered neoplastic unless proven otherwise.   2.  Left hydronephrosis/hydroureter with  delayed left nephrogram, compatible with obstructive changes, likely due to obstructing bladder mass.   3.  Mild right hydronephrosis and hydroureter.   4.  Bilateral pelvic adenopathy, concerning for metastatic disease given associated bladder findings.   5.  Prostate enlargement, consider causes of prostate enlargement additional workup as clinically appropriate.   6.  Hepatomegaly   7.  Irregular hepatic contour, compatible changes of cirrhosis   8.  Atherosclerosis and atherosclerotic coronary artery disease   9.  Pulmonary nodules, see nodule follow-up recommendations below.      Fleischner Society pulmonary nodule recommendations:   Low Risk: CT at 3-6 months, then consider CT at 18-24 months      High Risk: CT at 3-6 months, then at 18-24 months      Comments: Use most suspicious nodule as guide to management. Follow-up intervals may vary according to size and risk.      Low Risk - Minimal or absent history of smoking and of other known risk factors.      High Risk - History of smoking or of other known risk factors.      Note: These recommendations do not apply to lung cancer screening, patients with immunosuppression, or patients with known primary cancer.      Fleischner Society 2017 Guidelines for Management of Incidentally Detected Pulmonary Nodules in Adults      These findings were discussed with the patient's clinician, Ed Witt, on 5/21/2024 5:01 AM.           Assessment/Plan  * Hematuria- (present on admission)  Assessment & Plan  Secondary to bladder cancer diagnosed in 2015.  Underwent TURBT on 5/21/2024. 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 Urology has been requested to provide recommendations for goals of care. Patient continues to have hematuria and has had nosebleeds today.     Other pulmonary embolism without acute cor pulmonale (HCC)  Assessment & Plan  CT chest showed right segmental and subsegmental PE.   Heparin was started on 5/22/2024 after surgery, continue Lovenox on 5/23/2024 due to loss of IV access.  Continues to have hematuria and nosebleeds.  I have reached out to urology to provide recommendations for goals of care.    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  Currently saturating 90-94% on 4 L/min nasal cannula    Type II diabetes mellitus with peripheral circulatory disorder (HCC)- (present on admission)  Assessment & Plan  Has been on Ozempic in the past.  Continue SSI with Accu-Cheks and hypoglycemia protocol    Acute UTI- (present on admission)  Assessment & Plan  UA was positive.  Cultures grew corynebacterium stratum.  On Augmentin.    ACP (advance care planning)  Assessment & Plan  DNR/DNI         VTE prophylaxis: SCDs

## 2024-05-24 NOTE — PROGRESS NOTES
Assumed care of pt at 1915, bedside report with ALIN Cheng. Pt is AAOx 4, resting comfortably in bed with NADN, pain currently 0/10.    Pt using call light appropriately and to get up with assistance. Discussed plan of care for the night with patient, bed in lowest position, call light in reach, personal belongings in reach. No complaints at this time.

## 2024-05-24 NOTE — CARE PLAN
The patient is Stable - Low risk of patient condition declining or worsening    Shift Goals  Clinical Goals: Monitor urine output  Patient Goals: go home  Family Goals: n/a    Progress made toward(s) clinical / shift goals:  Patient still has red/pink urine. Patient now having nose bleed. MD aware     Patient is not progressing towards the following goals:

## 2024-05-24 NOTE — PROGRESS NOTES
Bedside report received from ALIN Oglesby. Assumed care of patient. Daily plan of care discussed. Pt resting comfortably in bed with no signs of distress noted. Breathing even and unlabored. Patient reports no further needs at this time. Call light within reach. Bed locked in lowest position. Plan of care on going.

## 2024-05-25 LAB
ANION GAP SERPL CALC-SCNC: 12 MMOL/L (ref 7–16)
BUN SERPL-MCNC: 16 MG/DL (ref 8–22)
CALCIUM SERPL-MCNC: 8.6 MG/DL (ref 8.4–10.2)
CHLORIDE SERPL-SCNC: 102 MMOL/L (ref 96–112)
CO2 SERPL-SCNC: 22 MMOL/L (ref 20–33)
CREAT SERPL-MCNC: 1.12 MG/DL (ref 0.5–1.4)
ERYTHROCYTE [DISTWIDTH] IN BLOOD BY AUTOMATED COUNT: 39.5 FL (ref 35.9–50)
GFR SERPLBLD CREATININE-BSD FMLA CKD-EPI: 67 ML/MIN/1.73 M 2
GLUCOSE SERPL-MCNC: 144 MG/DL (ref 65–99)
HCT VFR BLD AUTO: 39 % (ref 42–52)
HGB BLD-MCNC: 13.2 G/DL (ref 14–18)
MCH RBC QN AUTO: 28.4 PG (ref 27–33)
MCHC RBC AUTO-ENTMCNC: 33.8 G/DL (ref 32.3–36.5)
MCV RBC AUTO: 84.1 FL (ref 81.4–97.8)
PLATELET # BLD AUTO: 224 K/UL (ref 164–446)
PMV BLD AUTO: 11.4 FL (ref 9–12.9)
POTASSIUM SERPL-SCNC: 4.2 MMOL/L (ref 3.6–5.5)
RBC # BLD AUTO: 4.64 M/UL (ref 4.7–6.1)
SODIUM SERPL-SCNC: 136 MMOL/L (ref 135–145)
WBC # BLD AUTO: 9.3 K/UL (ref 4.8–10.8)

## 2024-05-25 PROCEDURE — 36415 COLL VENOUS BLD VENIPUNCTURE: CPT

## 2024-05-25 PROCEDURE — 700111 HCHG RX REV CODE 636 W/ 250 OVERRIDE (IP): Mod: JZ | Performed by: INTERNAL MEDICINE

## 2024-05-25 PROCEDURE — 80048 BASIC METABOLIC PNL TOTAL CA: CPT

## 2024-05-25 PROCEDURE — 700102 HCHG RX REV CODE 250 W/ 637 OVERRIDE(OP): Performed by: INTERNAL MEDICINE

## 2024-05-25 PROCEDURE — 85027 COMPLETE CBC AUTOMATED: CPT

## 2024-05-25 PROCEDURE — A9270 NON-COVERED ITEM OR SERVICE: HCPCS | Performed by: INTERNAL MEDICINE

## 2024-05-25 PROCEDURE — 99232 SBSQ HOSP IP/OBS MODERATE 35: CPT | Performed by: INTERNAL MEDICINE

## 2024-05-25 PROCEDURE — 770001 HCHG ROOM/CARE - MED/SURG/GYN PRIV*

## 2024-05-25 RX ORDER — HYDROMORPHONE HYDROCHLORIDE 1 MG/ML
0.5 INJECTION, SOLUTION INTRAMUSCULAR; INTRAVENOUS; SUBCUTANEOUS EVERY 4 HOURS PRN
Status: DISCONTINUED | OUTPATIENT
Start: 2024-05-25 | End: 2024-05-26 | Stop reason: HOSPADM

## 2024-05-25 RX ADMIN — HYDROMORPHONE HYDROCHLORIDE 0.5 MG: 1 INJECTION, SOLUTION INTRAMUSCULAR; INTRAVENOUS; SUBCUTANEOUS at 14:00

## 2024-05-25 RX ADMIN — AMOXICILLIN AND CLAVULANATE POTASSIUM 1 TABLET: 875; 125 TABLET, FILM COATED ORAL at 17:40

## 2024-05-25 RX ADMIN — AMOXICILLIN AND CLAVULANATE POTASSIUM 1 TABLET: 875; 125 TABLET, FILM COATED ORAL at 05:15

## 2024-05-25 ASSESSMENT — ENCOUNTER SYMPTOMS
CARDIOVASCULAR NEGATIVE: 1
PSYCHIATRIC NEGATIVE: 1
ABDOMINAL PAIN: 0
NEUROLOGICAL NEGATIVE: 1
EYES NEGATIVE: 1
RESPIRATORY NEGATIVE: 1
MUSCULOSKELETAL NEGATIVE: 1

## 2024-05-25 ASSESSMENT — PAIN DESCRIPTION - PAIN TYPE
TYPE: ACUTE PAIN
TYPE: ACUTE PAIN

## 2024-05-25 NOTE — CARE PLAN
The patient is Stable - Low risk of patient condition declining or worsening    Shift Goals  Clinical Goals: monitor bleeding and UOP via perera, no falls  Patient Goals: go home  Family Goals: n/a    Progress made toward(s) clinical / shift goals:  patient having AUOP, perera output documented per MAR, no falls during the night    Patient is not progressing towards the following goals:

## 2024-05-25 NOTE — PROGRESS NOTES
Bedside report received from ALIN Oglesby. Assumed care of patient. Daily plan of care discussed. Pt resting comfortably in bed with no signs of distress noted. Breathing even and unlabored.Patient reports no further needs at this time. Call light within reach. Bed locked in lowest position. Plan of care on going.

## 2024-05-25 NOTE — PROGRESS NOTES
Assumed care of pt at 1915, bedside report with ALIN Rios. Pt is AAOx 4, resting comfortably in bed with NADN, pain currently 0/10. Best catheter in place, red/pink clear urine noted. Bag is below bladder and patent with no kinks in tubing, no dependent loops.    Pt using call light appropriately and to get up with assistance. Discussed plan of care for the night with patient, bed in lowest position, call light in reach, personal belongings in reach. No complaints at this time.

## 2024-05-25 NOTE — THERAPY
Occupational Therapy   Initial Evaluation     Patient Name: Enoch Mistry  Age:  78 y.o., Sex:  male  Medical Record #: [id number]  Today's Date: 5/24/2024    Assessment  Patient is 78 y.o. male with a diagnosis of hematuria. UTI. S/p TURBT POD 3. Hx of bladder cancer, COPD, DM Type 2. Best, O2 @ 4L NC in place. Pt demonstrates good balance, good UB strength/coordination, good safety in room. Lives with spouse, wears O2, minimal household mobility. Wife drives and cooks, pt does laundry. No further acute OT needs.      Plan  OT Eval only. Pt has no acute PT needs.  DC Equipment Recommendations: None  Discharge Recommendations: Anticipate that the patient will have no further occupational therapy needs after discharge from the hospital     Subjective  Agreeable     Objective   05/24/24 1403   Prior Living Situation   Prior Services None   Housing / Facility 1 Story Apartment / Condo   Steps Into Home 0   Steps In Home 0   Bathroom Set up Walk In Shower   Equipment Owned None   Lives with - Patient's Self Care Capacity Spouse   Prior Level of ADL Function   Self Feeding Independent   Grooming / Hygiene Independent   Bathing Independent   Dressing Independent   Toileting Independent   Prior Level of IADL Function   Medication Management Independent   Laundry Independent   Kitchen Mobility Independent   Finances Independent   Home Management Independent   Shopping Requires Assist   Prior Level Of Mobility Independent Without Device in Home   Driving / Transportation Relatives / Others Provide Transportation   Occupation (Pre-Hospital Vocational) Not Employed   Leisure Interests Unable To Determine At This Time   History of Falls   History of Falls No   Vitals   O2 (LPM) 4   O2 Delivery Device Nasal Cannula   Pain 0 - 10 Group   Location Generalized   Therapist Pain Assessment Post Activity Pain Same as Prior to Activity;Nurse Notified   Cognition    Cognition / Consciousness WDL   Level of Consciousness Alert    Passive ROM Upper Body   Passive ROM Upper Body WDL   Active ROM Upper Body   Active ROM Upper Body  WDL   Strength Upper Body   Upper Body Strength  WDL   Upper Body Muscle Tone   Upper Body Muscle Tone  WDL   Coordination Upper Body   Coordination WDL   Balance Assessment   Sitting Balance (Static) Good   Sitting Balance (Dynamic) Good   Standing Balance (Static) Good   Standing Balance (Dynamic) Fair +   Weight Shift Sitting Good   Weight Shift Standing Good   Bed Mobility    Supine to Sit Independent   Sit to Supine Independent   ADL Assessment   Eating Independent   Grooming Independent   Upper Body Dressing Supervision   Lower Body Dressing Supervision   Toileting   (perera)   How much help from another person does the patient currently need...   Putting on and taking off regular lower body clothing? 4   Bathing (including washing, rinsing, and drying)? 3   Toileting, which includes using a toilet, bedpan, or urinal? 4   Putting on and taking off regular upper body clothing? 4   Taking care of personal grooming such as brushing teeth? 4   Eating meals? 4   6 Clicks Daily Activity Score 23   Functional Mobility   Sit to Stand Independent   Bed, Chair, Wheelchair Transfer Supervised   Toilet Transfers Supervised   Transfer Method Stand Step   Distance (Feet) 10   # of Times Distance was Traveled 2   Comments household mobility   Activity Tolerance   Comments functional   Education Group   Role of Occupational Therapist Patient Response Patient;Acceptance;Explanation;Verbal Demonstration   Anticipated Discharge Equipment and Recommendations   DC Equipment Recommendations None   Discharge Recommendations Anticipate that the patient will have no further occupational therapy needs after discharge from the hospital   Interdisciplinary Plan of Care Collaboration   IDT Collaboration with  Nursing   Patient Position at End of Therapy Seated;Call Light within Reach;Tray Table within Reach;Phone within Reach    Collaboration Comments OT Eval 1x only. Pt has no acute PT needs.

## 2024-05-25 NOTE — PROGRESS NOTES
Hospital Medicine Daily Progress Note    Date of Service  5/25/2024    Chief Complaint  78-year-old male presented to the emergency room on 5/21/2024 with dysuria and hematuria.  He has a history of bladder cancer, s/p transurethral resection of bladder tumor and prostate on 5/7/2015.  He has a history of type 2 diabetes, COPD.     Hospital Course  CT abdomen and pelvis from admission shows anterior left bladder wall mass.  UA was positive for UTI.  Ceftriaxone was empirically started.    ER physician discussed case with urology.  CT chest showed pulmonary embolism. Discussed with urology.  Heparin was planned to start after TURBT with possible placement of bilateral ureteral stents.    CT abdomen and pelvis from 5/21/2024 showed posterior left bladder wall mass, bilateral pelvic adenopathy concerning for metastatic disease.     Patient underwent transurethral resection of bladder tumor on 5/21/2024. He was noted to have a massive bladder tumor involving the entire left side with papillary and sessile components.  Resection was incomplete due to the amount of tumor involvement.  Ureteral orifices were unable to be located on either side.  Continuous bladder irrigation was started.      1 out of 2 blood cultures from 5/21/2024 grew Bacillus species, possible contaminant, repeat blood cultures were negative.  Heparin drip was switched to therapeutic Lovenox due to loss of IV access. Lovenox stopped on 5/24/2024 due to persistent nose bleed and hematuria.    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 Urology has been requested to provide recommendations for goals of care.  Urology service were reconsulted and requested to provide recommendations for goals of care.     Urine culture grew corynebacterium striatum. On Augmentin.     Interval Problem Update  Continues to have nosebleeds and hematuria.  Awaiting to hear from urology regarding  pathology report and goals of care. Saturating 95% on 2 LPM nasal cannula.    I have discussed this patient's plan of care and discharge plan at IDT rounds today with Case Management, Nursing, Nursing leadership, and other members of the IDT team.    Consultants/Specialty  urology    Code Status  DNAR/DNI    Disposition  The patient is not medically cleared for discharge to home or a post-acute facility.  Anticipate discharge to: home with close outpatient follow-up    I have placed the appropriate orders for post-discharge needs.    Review of Systems  Review of Systems   Constitutional:  Positive for malaise/fatigue.   HENT: Negative.     Eyes: Negative.    Respiratory: Negative.     Cardiovascular: Negative.    Gastrointestinal:  Negative for abdominal pain.   Genitourinary:  Positive for hematuria.   Musculoskeletal: Negative.    Skin: Negative.    Neurological: Negative.    Endo/Heme/Allergies: Negative.    Psychiatric/Behavioral: Negative.          Physical Exam  Temp:  [36.2 °C (97.2 °F)-36.3 °C (97.3 °F)] 36.3 °C (97.3 °F)  Pulse:  [] 107  Resp:  [16-18] 16  BP: (129-163)/(84-89) 148/85  SpO2:  [93 %-98 %] 95 %    Physical Exam  Constitutional:       Appearance: He is ill-appearing.   HENT:      Head: Normocephalic.      Nose: Nose normal.      Mouth/Throat:      Mouth: Mucous membranes are moist.   Eyes:      Pupils: Pupils are equal, round, and reactive to light.   Cardiovascular:      Rate and Rhythm: Normal rate.   Pulmonary:      Effort: Pulmonary effort is normal.   Abdominal:      Tenderness: There is no abdominal tenderness.   Musculoskeletal:      Cervical back: Normal range of motion.      Right lower leg: No edema.      Left lower leg: No edema.   Skin:     General: Skin is warm.   Neurological:      General: No focal deficit present.         Fluids    Intake/Output Summary (Last 24 hours) at 5/25/2024 1319  Last data filed at 5/25/2024 1200  Gross per 24 hour   Intake 730 ml   Output 1850 ml    Net -1120 ml       Laboratory  Recent Labs     05/23/24  0456 05/24/24  0202 05/25/24  0249   WBC 11.1* 10.3 9.3   RBC 4.46* 4.73 4.64*   HEMOGLOBIN 12.6* 13.4* 13.2*   HEMATOCRIT 37.9* 40.1* 39.0*   MCV 85.0 84.8 84.1   MCH 28.3 28.3 28.4   MCHC 33.2 33.4 33.8   RDW 39.8 39.9 39.5   PLATELETCT 211 237 224   MPV 10.8 11.5 11.4     Recent Labs     05/23/24  0456 05/24/24  0202 05/25/24  0249   SODIUM 134* 135 136   POTASSIUM 4.6 4.1 4.2   CHLORIDE 101 103 102   CO2 23 21 22   GLUCOSE 144* 138* 144*   BUN 29* 23* 16   CREATININE 1.24 1.08 1.12   CALCIUM 8.2* 8.7 8.6     Recent Labs     05/22/24  1557   APTT 50.7*   INR 1.14*               Imaging  IR-US GUIDED PIV   Final Result    Ultrasound-guided PERIPHERAL IV INSERTION performed by    qualified nursing staff as above.      EC-ECHOCARDIOGRAM COMPLETE W/O CONT   Final Result      CT-CHEST (THORAX) WITH   Final Result         1.  Right upper lobe segmental and subsegmental pulmonary embolus   2.  Atherosclerosis and atherosclerotic coronary artery disease   3.  Low-density left thyroid nodule, follow-up thyroid sonography for further characterization due to nodule size recommended.   4.  Changes of hepatic steatosis   5.  Emphysema   6.  Hepatomegaly   7.  Bilateral pulmonary nodules, see nodule follow-up recommendations below.      Fleischner Society pulmonary nodule recommendations:   Low Risk: CT at 3-6 months, then consider CT at 18-24 months      High Risk: CT at 3-6 months, then at 18-24 months      Comments: Use most suspicious nodule as guide to management. Follow-up intervals may vary according to size and risk.      Low Risk - Minimal or absent history of smoking and of other known risk factors.      High Risk - History of smoking or of other known risk factors.      Note: These recommendations do not apply to lung cancer screening, patients with immunosuppression, or patients with known primary cancer.      Fleischner Society 2017 Guidelines for  Management of Incidentally Detected Pulmonary Nodules in Adults      These findings were discussed with the patient's clinician, Dr. Harvey, on 5/21/2024 7:09 AM.      CT-ABDOMEN-PELVIS WITH   Final Result         1.  Posterior left bladder wall mass, should be considered neoplastic unless proven otherwise.   2.  Left hydronephrosis/hydroureter with delayed left nephrogram, compatible with obstructive changes, likely due to obstructing bladder mass.   3.  Mild right hydronephrosis and hydroureter.   4.  Bilateral pelvic adenopathy, concerning for metastatic disease given associated bladder findings.   5.  Prostate enlargement, consider causes of prostate enlargement additional workup as clinically appropriate.   6.  Hepatomegaly   7.  Irregular hepatic contour, compatible changes of cirrhosis   8.  Atherosclerosis and atherosclerotic coronary artery disease   9.  Pulmonary nodules, see nodule follow-up recommendations below.      Fleischner Society pulmonary nodule recommendations:   Low Risk: CT at 3-6 months, then consider CT at 18-24 months      High Risk: CT at 3-6 months, then at 18-24 months      Comments: Use most suspicious nodule as guide to management. Follow-up intervals may vary according to size and risk.      Low Risk - Minimal or absent history of smoking and of other known risk factors.      High Risk - History of smoking or of other known risk factors.      Note: These recommendations do not apply to lung cancer screening, patients with immunosuppression, or patients with known primary cancer.      Fleischner Society 2017 Guidelines for Management of Incidentally Detected Pulmonary Nodules in Adults      These findings were discussed with the patient's clinician, Ed Witt, on 5/21/2024 5:01 AM.           Assessment/Plan  * Hematuria- (present on admission)  Assessment & Plan  Secondary to bladder cancer diagnosed in 2015.  Underwent TURBT on 5/21/2024. Pathology report shows high-grade  urothelial carcinoma invasive into the muscularis propria with lymphovascular invasion. Urology has been requested to provide recommendations for goals of care. Patient continues to have hematuria and has had nosebleeds today.     Other pulmonary embolism without acute cor pulmonale (HCC)  Assessment & Plan  CT chest showed right segmental and subsegmental PE.  Heparin was started on 5/22/2024 after surgery, continue Lovenox on 5/23/2024 due to loss of IV access.  Continues to have hematuria and nosebleeds.  I have reached out to urology to provide recommendations for goals of care.    Stage 2 moderate COPD by GOLD classification ([facility])- (present on admission)  Assessment & Plan  History of COPD and tobacco use.  Currently saturating 90-94% on 4 L/min nasal cannula    Type II diabetes mellitus with peripheral circulatory disorder ([facility])- (present on admission)  Assessment & Plan  Has been on Ozempic in the past.  Continue SSI with Accu-Cheks and hypoglycemia protocol    Acute UTI- (present on admission)  Assessment & Plan  UA was positive.  Cultures grew corynebacterium stratum.  On Augmentin.    ACP (advance care planning)  Assessment & Plan  DNR/DNI         VTE prophylaxis: SCDs

## 2024-05-26 ENCOUNTER — PHARMACY VISIT (OUTPATIENT)
Dept: PHARMACY | Facility: MEDICAL CENTER | Age: 79
End: 2024-05-26
Payer: COMMERCIAL

## 2024-05-26 ENCOUNTER — APPOINTMENT (OUTPATIENT)
Dept: RADIOLOGY | Facility: MEDICAL CENTER | Age: 79
DRG: 668 | End: 2024-05-26
Attending: INTERNAL MEDICINE
Payer: MEDICARE

## 2024-05-26 VITALS
TEMPERATURE: 97.3 F | HEART RATE: 95 BPM | BODY MASS INDEX: 31.86 KG/M2 | DIASTOLIC BLOOD PRESSURE: 74 MMHG | WEIGHT: 203 LBS | RESPIRATION RATE: 18 BRPM | HEIGHT: 67 IN | OXYGEN SATURATION: 98 % | SYSTOLIC BLOOD PRESSURE: 141 MMHG

## 2024-05-26 LAB
ANION GAP SERPL CALC-SCNC: 12 MMOL/L (ref 7–16)
BACTERIA BLD CULT: NORMAL
BUN SERPL-MCNC: 17 MG/DL (ref 8–22)
CALCIUM SERPL-MCNC: 8.5 MG/DL (ref 8.4–10.2)
CHLORIDE SERPL-SCNC: 101 MMOL/L (ref 96–112)
CO2 SERPL-SCNC: 23 MMOL/L (ref 20–33)
CREAT SERPL-MCNC: 1.13 MG/DL (ref 0.5–1.4)
ERYTHROCYTE [DISTWIDTH] IN BLOOD BY AUTOMATED COUNT: 41.1 FL (ref 35.9–50)
GFR SERPLBLD CREATININE-BSD FMLA CKD-EPI: 66 ML/MIN/1.73 M 2
GLUCOSE SERPL-MCNC: 180 MG/DL (ref 65–99)
HCT VFR BLD AUTO: 38.8 % (ref 42–52)
HGB BLD-MCNC: 12.6 G/DL (ref 14–18)
MCH RBC QN AUTO: 28.4 PG (ref 27–33)
MCHC RBC AUTO-ENTMCNC: 32.5 G/DL (ref 32.3–36.5)
MCV RBC AUTO: 87.4 FL (ref 81.4–97.8)
PLATELET # BLD AUTO: 191 K/UL (ref 164–446)
PMV BLD AUTO: 12 FL (ref 9–12.9)
POTASSIUM SERPL-SCNC: 3.9 MMOL/L (ref 3.6–5.5)
RBC # BLD AUTO: 4.44 M/UL (ref 4.7–6.1)
SIGNIFICANT IND 70042: NORMAL
SITE SITE: NORMAL
SODIUM SERPL-SCNC: 136 MMOL/L (ref 135–145)
SOURCE SOURCE: NORMAL
WBC # BLD AUTO: 9.6 K/UL (ref 4.8–10.8)

## 2024-05-26 PROCEDURE — 700102 HCHG RX REV CODE 250 W/ 637 OVERRIDE(OP): Performed by: INTERNAL MEDICINE

## 2024-05-26 PROCEDURE — 36415 COLL VENOUS BLD VENIPUNCTURE: CPT

## 2024-05-26 PROCEDURE — 94760 N-INVAS EAR/PLS OXIMETRY 1: CPT

## 2024-05-26 PROCEDURE — 85027 COMPLETE CBC AUTOMATED: CPT

## 2024-05-26 PROCEDURE — 93971 EXTREMITY STUDY: CPT | Mod: RT

## 2024-05-26 PROCEDURE — A9270 NON-COVERED ITEM OR SERVICE: HCPCS | Performed by: INTERNAL MEDICINE

## 2024-05-26 PROCEDURE — RXMED WILLOW AMBULATORY MEDICATION CHARGE: Performed by: INTERNAL MEDICINE

## 2024-05-26 PROCEDURE — 99239 HOSP IP/OBS DSCHRG MGMT >30: CPT | Performed by: INTERNAL MEDICINE

## 2024-05-26 PROCEDURE — 80048 BASIC METABOLIC PNL TOTAL CA: CPT

## 2024-05-26 RX ORDER — AMOXICILLIN AND CLAVULANATE POTASSIUM 875; 125 MG/1; MG/1
1 TABLET, FILM COATED ORAL EVERY 12 HOURS
Qty: 8 TABLET | Refills: 0 | Status: ACTIVE | OUTPATIENT
Start: 2024-05-26 | End: 2024-05-30

## 2024-05-26 RX ADMIN — AMOXICILLIN AND CLAVULANATE POTASSIUM 1 TABLET: 875; 125 TABLET, FILM COATED ORAL at 05:46

## 2024-05-26 RX ADMIN — AMOXICILLIN AND CLAVULANATE POTASSIUM 1 TABLET: 875; 125 TABLET, FILM COATED ORAL at 17:43

## 2024-05-26 ASSESSMENT — PAIN DESCRIPTION - PAIN TYPE: TYPE: ACUTE PAIN

## 2024-05-26 ASSESSMENT — PATIENT HEALTH QUESTIONNAIRE - PHQ9
SUM OF ALL RESPONSES TO PHQ9 QUESTIONS 1 AND 2: 0
1. LITTLE INTEREST OR PLEASURE IN DOING THINGS: NOT AT ALL
2. FEELING DOWN, DEPRESSED, IRRITABLE, OR HOPELESS: NOT AT ALL

## 2024-05-26 NOTE — PROGRESS NOTES
"Educated patient on discharge instructions, rx medications and follow up with Urology, PCP, and oncology,    Josafat Kerr, P.A.  [address]  [facility] [id number] [phone]    Schedule an appointment as soon as possible for a visit in 2 day(s)      OMARI Teresa  [address]  [place] [address] [phone]    Follow up      [facility]  [address] [place] [id number] [phone]  Follow up      patient voiced understanding . VS stable BP (!) 159/77 Comment: rn aware  Pulse (!) 106 Comment: rn aware  Temp 36.2 °C (97.2 °F) (Temporal)   Resp 18   Ht 1.702 m (5' 7\")   Wt 92.1 kg (203 lb)   SpO2 95%   BMI 31.79 kg/m²    Patient alert and appropriate. All questions and concerns addressed. No further questions or concerns at this time. Copy of discharge paperwork provided.  Patient out of department with UC and wife in stable condition.      Best care education provided.   "

## 2024-05-26 NOTE — CARE PLAN
The patient is Stable - Low risk of patient condition declining or worsening    Shift Goals  Clinical Goals: Monitor urine output/color  Patient Goals: Sleep comfortably    Progress made toward(s) clinical / shift goals:  Monitored pts urine output overnight, pts urine remained jean carlos in color overnight.    Patient is not progressing towards the following goals:

## 2024-05-26 NOTE — CARE PLAN
The patient is Stable - Low risk of patient condition declining or worsening    Shift Goals  Clinical Goals: Monitor urine output  Patient Goals: go home and shower  Family Goals: n/a    Progress made toward(s) clinical / shift goals:  Urine output now yellow/clear. No dysuria or hematuria     Patient is not progressing towards the following goals:

## 2024-05-26 NOTE — DISCHARGE SUMMARY
"Discharge Summary    CHIEF COMPLAINT ON ADMISSION  Chief Complaint   Patient presents with    UTI     PT BIBA for urinary symptoms x 2weeks. Pt noticed tonight he had blood in his urine. Pt does state it burns when he pees, but has not been seen for it.        Reason for Admission  EMS     Admission Date  5/21/2024    CODE STATUS  DNAR/DNI    HPI & HOSPITAL COURSE  78-year-old male presented to the emergency room on 5/21/2024 with dysuria and hematuria.  He has a history of bladder cancer, s/p transurethral resection of bladder tumor and prostate on 5/7/2015.  He has a history of type 2 diabetes, COPD.  He is on 2 L/min nasal cannula oxygen at home.     CT abdomen and pelvis from admission shows anterior left bladder wall mass.  UA was positive for UTI.  Ceftriaxone was empirically started.     ER physician discussed case with urology.  CT chest showed pulmonary embolism. Discussed with urology.  Heparin was planned to start after TURBT with possible placement of bilateral ureteral stents.     CT abdomen and pelvis from 5/21/2024 showed posterior left bladder wall mass, bilateral pelvic adenopathy concerning for metastatic disease.      Patient underwent transurethral resection of bladder tumor on 5/21/2024. He was noted to have a massive bladder tumor involving the entire left side with papillary and sessile components.  Resection was incomplete due to the amount of tumor involvement.  Ureteral orifices were unable to be located on either side.  Continuous bladder irrigation was started, then converted to regular Best catheter.       1 out of 2 blood cultures from 5/21/2024 grew Bacillus species, possible contaminant, repeat blood cultures were negative.  Heparin drip was switched to therapeutic Lovenox due to loss of IV access. Lovenox stopped on 5/24/2024 due to persistent nose bleed and hematuria.     Lower extremity ultrasound showed \"chronic nonocclusive DVT with recanalization of the distal right femoral " "vein and popliteal vein. No acute DVT\". Patient refused left lower extremity ultrasound. I discussed placement of IVC filter with patient, patient refused.      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 Urology were requested to provide recommendations for goals of care.  Urology recommended Oncology consultation. Patient refuses to have any chemotherapy or radiation at this time.     I briefly discussed patient with Oncology and IR. Patient refuses to transfer to [facility] for IVC filter placement. He wishes to go home. He understands he needs to follow-up with outpatient urology within the next few days for his Best catheter care and possible removal.     Urine culture grew corynebacterium striatum. On Augmentin until 5/20/2024.     Patient is saturating 95% on 3 L/min nasal cannula, which is his baseline. He has oxygen at home    Therefore, he is discharged in guarded and stable condition to home with close outpatient follow-up.      Discharge Date  5/26/2024    FOLLOW UP ITEMS POST DISCHARGE  Urology within next few days  Oncology within next few days  PCP in 1-2 days    DISCHARGE DIAGNOSES  Principal Problem:    Hematuria (POA: Yes)  Active Problems:    Acute UTI (POA: Yes)      Overview: Diagnois update 10/1/2016    Type II diabetes mellitus with peripheral circulatory disorder (HCC) (POA: Yes)    Stage 2 moderate COPD by GOLD classification (HCC) (POA: Yes)      Overview: 06/21/17 -- CAT Scoring in clinic 20 --> Class B      06/15/17 -- PFT showed FEV1 77%, FEV1/FVC 64% --> Stage 2    Other pulmonary embolism without acute cor pulmonale (HCC) (POA: Unknown)    ACP (advance care planning) (POA: Unknown)  Resolved Problems:    Former smoker (POA: Yes)      FOLLOW UP  Future Appointments   Date Time Provider Department Center   6/17/2024  2:20 PM Collin Schwab P.A.-C. [facility]     No follow-up provider " specified.    MEDICATIONS ON DISCHARGE     Medication List        START taking these medications        Instructions   amoxicillin-clavulanate 875-125 MG Tabs  Commonly known as: Augmentin   Take 1 Tablet by mouth every 12 hours for 4 days.  Dose: 1 Tablet            CONTINUE taking these medications        Instructions   BERBERINE CHLORIDE PO   Take 250 mg by mouth with dinner.  Dose: 250 mg     Blood Glucose Test Strips   Doctor's comments: Or per formulary preference. ICD-10 code: E11.9 Controlled type 2 Diabetes Mellitus  Use one Abbott Freestyle Lite strip to test blood sugar twice daily.     CHOLECALCIFEROL PO   Take 1 Capsule by mouth every day.  Dose: 1 Capsule     FreeStyle Lancets Misc   USE EVERY MORNING TO TEST FASTING BLOOD SUGAR     Home Care Oxygen   Inhale 2 L/min continuous.  Dose: 2 L/min     Magnacaps 100 MG Caps  Generic drug: Magnesium   Take 100 mg by mouth every day.  Dose: 100 mg     SUPER B COMPLEX PO   Take 1 Capsule by mouth every day.  Dose: 1 Capsule     ZINC SULFATE PO   Take 1 Capsule by mouth every day.  Dose: 1 Capsule            STOP taking these medications      furosemide 20 MG Tabs  Commonly known as: Lasix     Ozempic (0.25 or 0.5 MG/DOSE) 2 MG/1.5ML Sopn  Generic drug: Semaglutide(0.25 or 0.5MG/DOS)              Allergies  Allergies   Allergen Reactions    Bactrim Ds        DIET  Orders Placed This Encounter   Procedures    Diet Order Diet: Consistent CHO (Diabetic)     Standing Status:   Standing     Number of Occurrences:   1     Order Specific Question:   Diet:     Answer:   Consistent CHO (Diabetic) [4]       ACTIVITY  As tolerated.      CONSULTATIONS  Urology  IR    PROCEDURES  TURBT    LABORATORY  Lab Results   Component Value Date    SODIUM 136 05/26/2024    POTASSIUM 3.9 05/26/2024    CHLORIDE 101 05/26/2024    CO2 23 05/26/2024    GLUCOSE 180 (H) 05/26/2024    BUN 17 05/26/2024    CREATININE 1.13 05/26/2024        Lab Results   Component Value Date    WBC 9.6  05/26/2024    HEMOGLOBIN 12.6 (L) 05/26/2024    HEMATOCRIT 38.8 (L) 05/26/2024    PLATELETCT 191 05/26/2024        Total time of the discharge process exceeds 38 minutes.

## 2024-05-26 NOTE — PROGRESS NOTES
Received report from day shift nurse. Assumed pt care. Pt is sleeping comfortably in bed. No signs of SOB/respiratory distress. Fall precautions in place. Bed at lowest position. Call light and personal belongings within reach. No further needs at this time.

## 2024-05-26 NOTE — DISCHARGE INSTRUCTIONS
Take Augmentin 1 tablet every 12 hours for the next 4 days for urine infection.    Please follow-up with outpatient urology within 1 to 2 days for Best catheter evaluation and possible removal.      Please follow-up with outpatient oncology for treatment of bladder cancer    Please follow-up with your primary care physician within 1 to 2 days

## 2024-05-28 ENCOUNTER — PATIENT OUTREACH (OUTPATIENT)
Dept: MEDICAL GROUP | Facility: MEDICAL CENTER | Age: 79
End: 2024-05-28
Payer: MEDICARE

## 2024-05-29 NOTE — PROGRESS NOTES
Transitional Care Management  TCM Outreach Date and Time: Filed (5/28/2024  5:00 PM)    Discharge Questions  Actual Discharge Date: 05/26/24  Now that you are home, how are you feeling?: Fair (Catheter remains in place; urine flow is good wtihout bleeding present; able to perform ADLs as before; able to eat and drink normally.)  Did you receive any new prescriptions?: Yes  Were you able to get them filled?: Yes  Meds to Bed or Pharmacy filled?: Pharmacy  Do you have any questions about your current medications or new medications (Review Med Rec)?: No  Did you have any durable medical equipment ordered?: Yes (Home O2 prior to hospitalization; 2L cannula)  Did you receive it?: Yes  Do you have a follow up appointment scheduled with your PCP?: Yes  Appointment Date: 06/17/24 (This is a new patient visit as patient is changing PCPs; he does not have a TCM appointment scheduled)  Appointment Time: 1420  Any issues or paperwork you wish to discuss with your PCP?: No  Are you (patient) able to get to the appointment?: Yes  If Home Health was ordered, have they contacted you (Patient): Not Applicable  Did you have enough support after your last discharge?: Yes  Does this patient qualify for the CCM program?: Yes    Transitional Care  Number of attempts made to contact patient: 1  Current or previous attempts completed within two business days of discharge? : Yes  Provided education regarding treatment plan, medications, self-management, ADLs?: Yes (Med rec and review of S/S to report to ER)  Has patient completed an Advanced Directive?: No  Has the Care Manager's phone number provided?: No  Is there anything else I can help you with?: No    Discharge Summary  Chief Complaint: EMS to ER:  Blood in urine; Painful urination  Admitting Diagnosis: Dysuria; Hematuria; S/P TURP 5/21  Discharge Diagnosis: Hematuria; Right femoral non-occulusive DVT (declined IVF); Urothelial carcinoma (declined Oncology/Rad thx. at present)

## 2024-06-14 SDOH — HEALTH STABILITY: PHYSICAL HEALTH: ON AVERAGE, HOW MANY DAYS PER WEEK DO YOU ENGAGE IN MODERATE TO STRENUOUS EXERCISE (LIKE A BRISK WALK)?: 1 DAY

## 2024-06-14 SDOH — ECONOMIC STABILITY: FOOD INSECURITY: WITHIN THE PAST 12 MONTHS, YOU WORRIED THAT YOUR FOOD WOULD RUN OUT BEFORE YOU GOT MONEY TO BUY MORE.: NEVER TRUE

## 2024-06-14 SDOH — ECONOMIC STABILITY: FOOD INSECURITY: WITHIN THE PAST 12 MONTHS, THE FOOD YOU BOUGHT JUST DIDN'T LAST AND YOU DIDN'T HAVE MONEY TO GET MORE.: NEVER TRUE

## 2024-06-14 SDOH — ECONOMIC STABILITY: INCOME INSECURITY: IN THE LAST 12 MONTHS, WAS THERE A TIME WHEN YOU WERE NOT ABLE TO PAY THE MORTGAGE OR RENT ON TIME?: NO

## 2024-06-14 SDOH — HEALTH STABILITY: PHYSICAL HEALTH: ON AVERAGE, HOW MANY MINUTES DO YOU ENGAGE IN EXERCISE AT THIS LEVEL?: 20 MIN

## 2024-06-14 SDOH — ECONOMIC STABILITY: HOUSING INSECURITY

## 2024-06-14 ASSESSMENT — SOCIAL DETERMINANTS OF HEALTH (SDOH)
DO YOU BELONG TO ANY CLUBS OR ORGANIZATIONS SUCH AS CHURCH GROUPS UNIONS, FRATERNAL OR ATHLETIC GROUPS, OR SCHOOL GROUPS?: NO
HOW OFTEN DO YOU ATTEND CHURCH OR RELIGIOUS SERVICES?: NEVER
HOW OFTEN DO YOU GET TOGETHER WITH FRIENDS OR RELATIVES?: NEVER
HOW OFTEN DO YOU ATTEND CHURCH OR RELIGIOUS SERVICES?: NEVER
IN A TYPICAL WEEK, HOW MANY TIMES DO YOU TALK ON THE PHONE WITH FAMILY, FRIENDS, OR NEIGHBORS?: NEVER
HOW OFTEN DO YOU HAVE A DRINK CONTAINING ALCOHOL: NEVER
HOW MANY DRINKS CONTAINING ALCOHOL DO YOU HAVE ON A TYPICAL DAY WHEN YOU ARE DRINKING: PATIENT DOES NOT DRINK
IN THE PAST 12 MONTHS, HAS THE ELECTRIC, GAS, OIL, OR WATER COMPANY THREATENED TO SHUT OFF SERVICE IN YOUR HOME?: NO
HOW OFTEN DO YOU HAVE SIX OR MORE DRINKS ON ONE OCCASION: NEVER
WITHIN THE PAST 12 MONTHS, YOU WORRIED THAT YOUR FOOD WOULD RUN OUT BEFORE YOU GOT THE MONEY TO BUY MORE: NEVER TRUE
DO YOU BELONG TO ANY CLUBS OR ORGANIZATIONS SUCH AS CHURCH GROUPS UNIONS, FRATERNAL OR ATHLETIC GROUPS, OR SCHOOL GROUPS?: NO
IN A TYPICAL WEEK, HOW MANY TIMES DO YOU TALK ON THE PHONE WITH FAMILY, FRIENDS, OR NEIGHBORS?: NEVER
HOW OFTEN DO YOU GET TOGETHER WITH FRIENDS OR RELATIVES?: NEVER

## 2024-06-14 ASSESSMENT — LIFESTYLE VARIABLES
SKIP TO QUESTIONS 9-10: 1
HOW OFTEN DO YOU HAVE SIX OR MORE DRINKS ON ONE OCCASION: NEVER
AUDIT-C TOTAL SCORE: 0
HOW MANY STANDARD DRINKS CONTAINING ALCOHOL DO YOU HAVE ON A TYPICAL DAY: PATIENT DOES NOT DRINK
HOW OFTEN DO YOU HAVE A DRINK CONTAINING ALCOHOL: NEVER

## 2024-06-16 DIAGNOSIS — I10 ESSENTIAL HYPERTENSION: ICD-10-CM

## 2024-06-16 DIAGNOSIS — E55.9 VITAMIN D DEFICIENCY: ICD-10-CM

## 2024-06-16 DIAGNOSIS — E11.9 TYPE 2 DIABETES MELLITUS WITHOUT COMPLICATION, WITHOUT LONG-TERM CURRENT USE OF INSULIN (HCC): ICD-10-CM

## 2024-06-16 DIAGNOSIS — R80.9 PROTEINURIA, UNSPECIFIED TYPE: ICD-10-CM

## 2024-06-16 DIAGNOSIS — Z12.5 SCREENING FOR MALIGNANT NEOPLASM OF PROSTATE: ICD-10-CM

## 2024-06-16 DIAGNOSIS — E78.5 HYPERLIPIDEMIA LDL GOAL <100: ICD-10-CM

## 2024-06-16 DIAGNOSIS — R97.20 ELEVATED PSA: ICD-10-CM

## 2024-06-16 DIAGNOSIS — D50.8 IRON DEFICIENCY ANEMIA SECONDARY TO INADEQUATE DIETARY IRON INTAKE: ICD-10-CM

## 2024-06-17 ENCOUNTER — APPOINTMENT (OUTPATIENT)
Dept: MEDICAL GROUP | Facility: MEDICAL CENTER | Age: 79
End: 2024-06-17
Payer: MEDICARE

## 2024-06-17 VITALS
HEART RATE: 113 BPM | WEIGHT: 187.6 LBS | DIASTOLIC BLOOD PRESSURE: 60 MMHG | BODY MASS INDEX: 29.44 KG/M2 | OXYGEN SATURATION: 96 % | TEMPERATURE: 98.9 F | HEIGHT: 67 IN | RESPIRATION RATE: 16 BRPM | SYSTOLIC BLOOD PRESSURE: 138 MMHG

## 2024-06-17 DIAGNOSIS — E11.51 TYPE II DIABETES MELLITUS WITH PERIPHERAL CIRCULATORY DISORDER (HCC): ICD-10-CM

## 2024-06-17 DIAGNOSIS — J96.11 CHRONIC HYPOXEMIC RESPIRATORY FAILURE (HCC): ICD-10-CM

## 2024-06-17 DIAGNOSIS — J44.9 STAGE 2 MODERATE COPD BY GOLD CLASSIFICATION (HCC): ICD-10-CM

## 2024-06-17 DIAGNOSIS — C67.9 MALIGNANT NEOPLASM OF URINARY BLADDER, UNSPECIFIED SITE (HCC): ICD-10-CM

## 2024-06-17 DIAGNOSIS — C79.9 METASTATIC MALIGNANT NEOPLASM, UNSPECIFIED SITE (HCC): ICD-10-CM

## 2024-06-17 DIAGNOSIS — Z53.20 REFUSES TREATMENT: ICD-10-CM

## 2024-06-17 PROBLEM — K76.6 PORTOPULMONARY HYPERTENSION (HCC): Status: RESOLVED | Noted: 2023-06-19 | Resolved: 2024-06-17

## 2024-06-17 PROBLEM — I27.20 PORTOPULMONARY HYPERTENSION (HCC): Status: RESOLVED | Noted: 2023-06-19 | Resolved: 2024-06-17

## 2024-06-17 PROBLEM — R31.9 HEMATURIA: Status: RESOLVED | Noted: 2024-05-21 | Resolved: 2024-06-17

## 2024-06-17 PROBLEM — Z71.89 ACP (ADVANCE CARE PLANNING): Status: RESOLVED | Noted: 2024-05-21 | Resolved: 2024-06-17

## 2024-06-17 LAB
HBA1C MFR BLD: 7.7 % (ref ?–5.8)
POCT INT CON NEG: NEGATIVE
POCT INT CON POS: POSITIVE

## 2024-06-17 ASSESSMENT — FIBROSIS 4 INDEX: FIB4 SCORE: 1.81

## 2024-06-17 NOTE — ASSESSMENT & PLAN NOTE
This is a pleasant 78-year-old male here today to reestablish care.  Unfortunately he was recently dealing with hematuria and found to have bladder cancer.  He had bladder cancer previously.  The pathology report showed the following:    A. Bladder tumor: High-grade urothelial carcinoma, invasive into muscularis propria (at least pT2). Lymphovascular invasion present.     Surgery was performed by Dr. Alcantara.  He was supposed to have a follow-up appointment this week but the appointment was canceled.  I was not sure when he will be able to see urology.  CT scan showed the following:    IMPRESSION:     1.  Posterior left bladder wall mass, should be considered neoplastic unless proven otherwise.  2.  Left hydronephrosis/hydroureter with delayed left nephrogram, compatible with obstructive changes, likely due to obstructing bladder mass.  3.  Mild right hydronephrosis and hydroureter.  4.  Bilateral pelvic adenopathy, concerning for metastatic disease given associated bladder findings.  5.  Prostate enlargement, consider causes of prostate enlargement additional workup as clinically appropriate.  6.  Hepatomegaly  7.  Irregular hepatic contour, compatible changes of cirrhosis  8.  Atherosclerosis and atherosclerotic coronary artery disease  9.  Pulmonary nodules, see nodule follow-up recommendations below.    He is aware of the likely metastases and does not wish to pursue any treatment such as radiation or chemotherapy.  He does not wish to spend money on any treatment as well.  What ever money he has left he would like to hold onto to provide to his wife.

## 2024-06-17 NOTE — ASSESSMENT & PLAN NOTE
He is still on supplemental oxygen.  Currently at 2 L.  He is no longer able to obtain oxygen from preferred home health.  He believes now he must use he would also like a portable concentrator [facility] as long as is not on demand.

## 2024-06-17 NOTE — PROGRESS NOTES
Subjective:   Enoch Mistry is a 78 y.o. male here today for bladder cancer with likely metastatic disease and diabetes.    Bladder cancer (HCC)  This is a pleasant 78-year-old male here today to reestablish care.  Unfortunately he was recently dealing with hematuria and found to have bladder cancer.  He had bladder cancer previously.  The pathology report showed the following:    A. Bladder tumor: High-grade urothelial carcinoma, invasive into muscularis propria (at least pT2). Lymphovascular invasion present.     Surgery was performed by Dr. Alcantara.  He was supposed to have a follow-up appointment this week but the appointment was canceled.  I was not sure when he will be able to see urology.  CT scan showed the following:    IMPRESSION:     1.  Posterior left bladder wall mass, should be considered neoplastic unless proven otherwise.  2.  Left hydronephrosis/hydroureter with delayed left nephrogram, compatible with obstructive changes, likely due to obstructing bladder mass.  3.  Mild right hydronephrosis and hydroureter.  4.  Bilateral pelvic adenopathy, concerning for metastatic disease given associated bladder findings.  5.  Prostate enlargement, consider causes of prostate enlargement additional workup as clinically appropriate.  6.  Hepatomegaly  7.  Irregular hepatic contour, compatible changes of cirrhosis  8.  Atherosclerosis and atherosclerotic coronary artery disease  9.  Pulmonary nodules, see nodule follow-up recommendations below.    He is aware of the likely metastases and does not wish to pursue any treatment such as radiation or chemotherapy.  He does not wish to spend money on any treatment as well.  What ever money he has left he would like to hold onto to provide to his wife.    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  Currently at 2 L.  He is no longer able to obtain oxygen from preferred home health.  He believes now he must use he would also  "like a portable concentrator [facility] as long as is not on demand.       Current medicines (including changes today)  Current Outpatient Medications   Medication Sig Dispense Refill    BERBERINE CHLORIDE PO Take 250 mg by mouth with dinner.      CHOLECALCIFEROL PO Take 1 Capsule by mouth every day.      ZINC SULFATE PO Take 1 Capsule by mouth every day.      B Complex-C (SUPER B COMPLEX PO) Take 1 Capsule by mouth every day.      Magnesium (MAGNACAPS) 100 MG Cap Take 100 mg by mouth every day.      Blood Glucose Test Strips Use one Abbott Freestyle Lite strip to test blood sugar twice daily. 50 Strip 1    FreeStyle Lancets Misc USE EVERY MORNING TO TEST FASTING BLOOD SUGAR 100 Each 3    Home Care Oxygen Inhale 2 L/min continuous.       No current facility-administered medications for this visit.     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    Social History and Family History were reviewed and updated.    ROS   No chest pain, no abdominal pain and all other systems were reviewed and are negative.       Objective:     /60 (BP Location: Left arm, Patient Position: Sitting, BP Cuff Size: Adult)   Pulse (!) 113   Temp 37.2 °C (98.9 °F) (Temporal)   Resp 16   Ht 1.702 m (5' 7\")   Wt 85.1 kg (187 lb 9.6 oz)   SpO2 96%  Body mass index is 29.38 " kg/m².   Physical Exam:  Constitutional: Alert, no distress.  Skin: Warm, dry, good turgor, no rashes in visible areas.  Eye: Equal, round and reactive, conjunctiva clear, lids normal.  ENMT: Lips without lesions, good dentition, oropharynx clear.  Neck: Trachea midline, no masses.   Psych: Alert and oriented x3, normal affect and mood.        Assessment and Plan:   The following treatment plan was discussed    1. Malignant neoplasm of urinary bladder, unspecified site (HCC)  Chronic condition.  Uncontrolled.  Discussed being referred to oncology through [facility] but he declined.  Will try to follow-up with urology.  Referred to hospice given recent diagnosis.  Also contacted Shanika Soriano to see about an E consult.  - Referral to Hospice    2. Metastatic malignant neoplasm, unspecified site (HCC)  Chronic condition.  Reviewed recent CT results.  Pathology report also with lymphatic involvement.  Referred to hospice.  - Referral to Hospice    3. Refuses treatment  Discussed possibility of radiation or other cancer options for treatment but he declined.  Referred to hospice.  - Referral to Hospice    4. 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  Stable as long he is on oxygen supplement at 2 L.  Will try to send an order over to [facility].    5. Chronic hypoxemic respiratory failure (HCC)  Chronic use of oxygen secondary to COPD.  Order sent to [facility] for possible portable concentrator.    6. Type II diabetes mellitus with peripheral circulatory disorder (HCC)  Chronic condition.  Stable.  Recent A1c improved to 7.7.  I will see him in 6 weeks for follow-up.  - POCT A1C         Followup: Return in about 6 weeks (around 7/29/2024), or if symptoms worsen or fail to improve.    Please note that this dictation was created using voice recognition software. I have made every reasonable attempt to correct obvious errors, but I expect that there are errors of grammar and possibly content that I  did not discover before finalizing the note.

## 2024-06-18 ENCOUNTER — HOSPICE ADMISSION (OUTPATIENT)
Dept: HOSPICE | Facility: HOSPICE | Age: 79
End: 2024-06-18
Payer: MEDICARE

## 2024-06-18 ENCOUNTER — HOME CARE VISIT (OUTPATIENT)
Dept: HOSPICE | Facility: HOSPICE | Age: 79
End: 2024-06-18
Payer: MEDICARE

## 2024-06-21 ENCOUNTER — HOME CARE VISIT (OUTPATIENT)
Dept: HOSPICE | Facility: HOSPICE | Age: 79
End: 2024-06-21
Payer: MEDICARE

## 2024-06-21 ENCOUNTER — PHARMACY VISIT (OUTPATIENT)
Dept: PHARMACY | Facility: MEDICAL CENTER | Age: 79
End: 2024-06-21
Payer: COMMERCIAL

## 2024-06-21 VITALS — RESPIRATION RATE: 18 BRPM | SYSTOLIC BLOOD PRESSURE: 140 MMHG | DIASTOLIC BLOOD PRESSURE: 64 MMHG | HEART RATE: 88 BPM

## 2024-06-21 DIAGNOSIS — Z51.5 HOSPICE CARE: ICD-10-CM

## 2024-06-21 DIAGNOSIS — C79.10 METASTATIC UROTHELIAL CARCINOMA (HCC): Primary | ICD-10-CM

## 2024-06-21 PROCEDURE — RXMED WILLOW AMBULATORY MEDICATION CHARGE: Performed by: STUDENT IN AN ORGANIZED HEALTH CARE EDUCATION/TRAINING PROGRAM

## 2024-06-21 PROCEDURE — G0299 HHS/HOSPICE OF RN EA 15 MIN: HCPCS

## 2024-06-21 PROCEDURE — S9126 HOSPICE CARE, IN THE HOME, P: HCPCS

## 2024-06-21 PROCEDURE — 665036 HSPC NOTICE OF ELECTION NOE

## 2024-06-21 RX ORDER — ACETAMINOPHEN 500 MG
1000 TABLET ORAL EVERY 6 HOURS PRN
Qty: 30 TABLET | Refills: 10 | Status: SHIPPED | OUTPATIENT
Start: 2024-06-21 | End: 2025-06-21

## 2024-06-21 RX ORDER — LORAZEPAM 1 MG/1
.5-1 TABLET ORAL EVERY 4 HOURS PRN
Qty: 180 TABLET | Refills: 0 | Status: SHIPPED | OUTPATIENT
Start: 2024-06-21 | End: 2025-06-21

## 2024-06-21 RX ORDER — SENNA AND DOCUSATE SODIUM 50; 8.6 MG/1; MG/1
2 TABLET, FILM COATED ORAL 2 TIMES DAILY PRN
Qty: 28 TABLET | Refills: 10 | Status: SHIPPED | OUTPATIENT
Start: 2024-06-21 | End: 2025-06-21

## 2024-06-21 RX ORDER — MORPHINE SULFATE 15 MG/1
7.5-15 TABLET ORAL
Qty: 120 TABLET | Refills: 0 | Status: SHIPPED | OUTPATIENT
Start: 2024-06-21 | End: 2025-06-21

## 2024-06-21 RX ORDER — ACETAMINOPHEN 650 MG/1
650 SUPPOSITORY RECTAL EVERY 6 HOURS PRN
Qty: 5 SUPPOSITORY | Refills: 10 | Status: SHIPPED | OUTPATIENT
Start: 2024-06-21

## 2024-06-21 RX ORDER — BISACODYL 10 MG
10 SUPPOSITORY, RECTAL RECTAL PRN
Qty: 5 SUPPOSITORY | Refills: 10 | Status: SHIPPED | OUTPATIENT
Start: 2024-06-21 | End: 2025-06-21

## 2024-06-21 RX ORDER — ONDANSETRON 4 MG/1
4 TABLET, ORALLY DISINTEGRATING ORAL EVERY 6 HOURS PRN
Qty: 15 TABLET | Refills: 10 | Status: SHIPPED | OUTPATIENT
Start: 2024-06-21 | End: 2025-06-21

## 2024-06-21 SDOH — ECONOMIC STABILITY: HOUSING INSECURITY: EVIDENCE OF SMOKING MATERIAL: 0

## 2024-06-21 SDOH — ECONOMIC STABILITY: HOUSING INSECURITY: HOME SAFETY: O2 TANKS NOT IN RACK/NOT ON THEIR SIDE - RACK WILL BE DELIVERED WITH TANKS TODAY

## 2024-06-21 SDOH — ECONOMIC STABILITY: GENERAL

## 2024-06-21 ASSESSMENT — ENCOUNTER SYMPTOMS
LAST BOWEL MOVEMENT: 67011
FATIGUES EASILY: 1
DRY SKIN: 1
LOWEST PAIN SEVERITY IN PAST 24 HOURS: 2/10
HIGHEST PAIN SEVERITY IN PAST 24 HOURS: 2/10
PERSON REPORTING PAIN: PATIENT
STOOL FREQUENCY: DAILY
PAIN: 1
DYSPNEA ACTIVITY LEVEL: AFTER AMBULATING LESS THAN 10 FT
PAIN LOCATION - PAIN DURATION: WEEKS
BOWEL PATTERN NORMAL: 1
PAIN LOCATION - PAIN FREQUENCY: CONSTANT
DYSPNEA ACTIVITY LEVEL: WHILE SPEAKING
SUBJECTIVE PAIN PROGRESSION: UNCHANGED
LOSS OF SENSATION: 1
PAIN SEVERITY GOAL: 2/10
PAIN LOCATION - PAIN SEVERITY: 2/10
DYSPNEA ON EXERTION: 1
PAIN LOCATION - EXACERBATING FACTORS: MOVEMENT
PAIN LOCATION: PELVIC
SLEEP QUALITY: FAIR
SHORTNESS OF BREATH: 1

## 2024-06-21 ASSESSMENT — ACTIVITIES OF DAILY LIVING (ADL): MONEY MANAGEMENT (EXPENSES/BILLS): INDEPENDENT

## 2024-06-21 ASSESSMENT — SOCIAL DETERMINANTS OF HEALTH (SDOH)
ACTIVE STRESSOR - LOSS OF CONTROL: 1
EXPRESSED_FINANCIAL_NEED: 1
ACTIVE STRESSOR - HEALTH CHANGES: 1
FINANCIAL_CONCERNS: 1

## 2024-06-21 ASSESSMENT — PATIENT HEALTH QUESTIONNAIRE - PHQ9: CLINICAL INTERPRETATION OF PHQ2 SCORE: 0

## 2024-06-21 ASSESSMENT — COPD QUESTIONNAIRES: COPD: 1

## 2024-06-22 ENCOUNTER — HOME CARE VISIT (OUTPATIENT)
Dept: HOSPICE | Facility: HOSPICE | Age: 79
End: 2024-06-22
Payer: MEDICARE

## 2024-06-22 PROCEDURE — S9126 HOSPICE CARE, IN THE HOME, P: HCPCS

## 2024-06-23 PROCEDURE — S9126 HOSPICE CARE, IN THE HOME, P: HCPCS

## 2024-06-24 ENCOUNTER — HOME CARE VISIT (OUTPATIENT)
Dept: HOSPICE | Facility: HOSPICE | Age: 79
End: 2024-06-24
Payer: MEDICARE

## 2024-06-24 PROCEDURE — S9126 HOSPICE CARE, IN THE HOME, P: HCPCS

## 2024-06-25 ENCOUNTER — HOME CARE VISIT (OUTPATIENT)
Dept: HOSPICE | Facility: HOSPICE | Age: 79
End: 2024-06-25
Payer: MEDICARE

## 2024-06-25 PROCEDURE — G0155 HHCP-SVS OF CSW,EA 15 MIN: HCPCS

## 2024-06-25 PROCEDURE — S9126 HOSPICE CARE, IN THE HOME, P: HCPCS

## 2024-06-25 SDOH — HEALTH STABILITY: MENTAL HEALTH
STRESS FACTORS COMMENTS: INITIAL HOSPICE PCP CONSULTATION.    PATIENT IS A PRIVATE INDIVIDUAL.  FRUSTRATED WITH LACK OF MEDICAL INFORMATION FROM PCP AND SPECIALISTS

## 2024-06-25 ASSESSMENT — ENCOUNTER SYMPTOMS
AGITATION: 1
SLEEP QUALITY: POOR

## 2024-06-25 ASSESSMENT — ACTIVITIES OF DAILY LIVING (ADL)
SHOPPING_REQUIRES_ASSISTANCE: 1
LAUNDRY_REQUIRES_ASSISTANCE: 1

## 2024-06-25 ASSESSMENT — SOCIAL DETERMINANTS OF HEALTH (SDOH)
ACTIVE STRESSOR - HEALTH CHANGES: 1
ACTIVE STRESSOR - LOSS OF CONTROL: 1
FINANCIAL_CONCERNS: 1

## 2024-06-26 ENCOUNTER — HOME CARE VISIT (OUTPATIENT)
Dept: HOSPICE | Facility: HOSPICE | Age: 79
End: 2024-06-26
Payer: MEDICARE

## 2024-06-26 VITALS — HEART RATE: 96 BPM | SYSTOLIC BLOOD PRESSURE: 144 MMHG | DIASTOLIC BLOOD PRESSURE: 60 MMHG | RESPIRATION RATE: 12 BRPM

## 2024-06-26 PROCEDURE — G0299 HHS/HOSPICE OF RN EA 15 MIN: HCPCS

## 2024-06-26 PROCEDURE — S9126 HOSPICE CARE, IN THE HOME, P: HCPCS

## 2024-06-26 SDOH — ECONOMIC STABILITY: HOUSING INSECURITY: EVIDENCE OF SMOKING MATERIAL: 0

## 2024-06-26 ASSESSMENT — ENCOUNTER SYMPTOMS
FATIGUES EASILY: 1
LAST BOWEL MOVEMENT: 67017
FATIGUE: 1
BOWEL PATTERN NORMAL: 1
SHORTNESS OF BREATH: 1
DENIES PAIN: 1
LOWER EXTREMITY EDEMA: 1
DYSPNEA ACTIVITY LEVEL: AFTER AMBULATING 10 - 20 FT
LOSS OF SENSATION: 1
PERSON REPORTING PAIN: PATIENT
SLEEP QUALITY: FAIR
ARTHRALGIAS: 1
DYSPNEA ON EXERTION: 1
FORGETFULNESS: 1
STOOL FREQUENCY: LESS THAN DAILY
DRY SKIN: 1

## 2024-06-26 ASSESSMENT — SOCIAL DETERMINANTS OF HEALTH (SDOH): ACTIVE STRESSOR - NO STRESS FACTORS: 1

## 2024-06-27 PROCEDURE — S9126 HOSPICE CARE, IN THE HOME, P: HCPCS

## 2024-06-28 PROCEDURE — S9126 HOSPICE CARE, IN THE HOME, P: HCPCS

## 2024-06-29 PROCEDURE — S9126 HOSPICE CARE, IN THE HOME, P: HCPCS

## 2024-06-30 PROCEDURE — S9126 HOSPICE CARE, IN THE HOME, P: HCPCS

## 2024-07-01 PROCEDURE — S9126 HOSPICE CARE, IN THE HOME, P: HCPCS

## 2024-07-02 PROCEDURE — S9126 HOSPICE CARE, IN THE HOME, P: HCPCS

## 2024-07-03 PROCEDURE — S9126 HOSPICE CARE, IN THE HOME, P: HCPCS

## 2024-07-04 ENCOUNTER — HOME CARE VISIT (OUTPATIENT)
Dept: HOSPICE | Facility: HOSPICE | Age: 79
End: 2024-07-04
Payer: MEDICARE

## 2024-07-04 PROCEDURE — S9126 HOSPICE CARE, IN THE HOME, P: HCPCS

## 2024-07-05 PROCEDURE — S9126 HOSPICE CARE, IN THE HOME, P: HCPCS

## 2024-07-06 PROCEDURE — S9126 HOSPICE CARE, IN THE HOME, P: HCPCS

## 2024-07-07 PROCEDURE — S9126 HOSPICE CARE, IN THE HOME, P: HCPCS

## 2024-07-08 PROCEDURE — S9126 HOSPICE CARE, IN THE HOME, P: HCPCS

## 2024-07-09 PROCEDURE — S9126 HOSPICE CARE, IN THE HOME, P: HCPCS

## 2024-07-10 ENCOUNTER — HOME CARE VISIT (OUTPATIENT)
Dept: HOSPICE | Facility: HOSPICE | Age: 79
End: 2024-07-10
Payer: MEDICARE

## 2024-07-10 VITALS — HEART RATE: 96 BPM | RESPIRATION RATE: 16 BRPM | DIASTOLIC BLOOD PRESSURE: 58 MMHG | SYSTOLIC BLOOD PRESSURE: 142 MMHG

## 2024-07-10 PROCEDURE — S9126 HOSPICE CARE, IN THE HOME, P: HCPCS

## 2024-07-10 PROCEDURE — G0299 HHS/HOSPICE OF RN EA 15 MIN: HCPCS

## 2024-07-10 SDOH — ECONOMIC STABILITY: HOUSING INSECURITY: EVIDENCE OF SMOKING MATERIAL: 0

## 2024-07-10 ASSESSMENT — ENCOUNTER SYMPTOMS
FATIGUE: 1
DYSPNEA ON EXERTION: 1
DYSPNEA ACTIVITY LEVEL: AFTER AMBULATING MORE THAN 20 FT
PERSON REPORTING PAIN: PATIENT
DRY SKIN: 1
PAIN: 1
SHORTNESS OF BREATH: 1
ARTHRALGIAS: 1
BOWEL PATTERN NORMAL: 1
SLEEP QUALITY: ADEQUATE
STOOL FREQUENCY: LESS THAN DAILY
FATIGUES EASILY: 1
FORGETFULNESS: 1
LAST BOWEL MOVEMENT: 67030
LOWER EXTREMITY EDEMA: 1

## 2024-07-10 ASSESSMENT — SOCIAL DETERMINANTS OF HEALTH (SDOH): ACTIVE STRESSOR - HEALTH CHANGES: 1

## 2024-07-11 PROCEDURE — S9126 HOSPICE CARE, IN THE HOME, P: HCPCS

## 2024-07-12 PROCEDURE — S9126 HOSPICE CARE, IN THE HOME, P: HCPCS

## 2024-07-13 PROCEDURE — S9126 HOSPICE CARE, IN THE HOME, P: HCPCS

## 2024-07-14 PROCEDURE — S9126 HOSPICE CARE, IN THE HOME, P: HCPCS

## 2024-07-15 PROCEDURE — S9126 HOSPICE CARE, IN THE HOME, P: HCPCS

## 2024-07-16 PROCEDURE — S9126 HOSPICE CARE, IN THE HOME, P: HCPCS

## 2024-07-17 PROCEDURE — S9126 HOSPICE CARE, IN THE HOME, P: HCPCS

## 2024-07-18 ENCOUNTER — HOME CARE VISIT (OUTPATIENT)
Dept: HOSPICE | Facility: HOSPICE | Age: 79
End: 2024-07-18
Payer: MEDICARE

## 2024-07-18 PROCEDURE — S9126 HOSPICE CARE, IN THE HOME, P: HCPCS

## 2024-07-19 PROCEDURE — S9126 HOSPICE CARE, IN THE HOME, P: HCPCS

## 2024-07-20 PROCEDURE — S9126 HOSPICE CARE, IN THE HOME, P: HCPCS

## 2024-07-21 PROCEDURE — S9126 HOSPICE CARE, IN THE HOME, P: HCPCS

## 2024-07-22 PROCEDURE — S9126 HOSPICE CARE, IN THE HOME, P: HCPCS

## 2024-07-23 PROCEDURE — S9126 HOSPICE CARE, IN THE HOME, P: HCPCS

## 2024-07-24 PROCEDURE — S9126 HOSPICE CARE, IN THE HOME, P: HCPCS

## 2024-07-25 ENCOUNTER — HOME CARE VISIT (OUTPATIENT)
Dept: HOSPICE | Facility: HOSPICE | Age: 79
End: 2024-07-25
Payer: MEDICARE

## 2024-07-25 VITALS
HEART RATE: 100 BPM | SYSTOLIC BLOOD PRESSURE: 144 MMHG | RESPIRATION RATE: 20 BRPM | BODY MASS INDEX: 28.19 KG/M2 | WEIGHT: 180 LBS | DIASTOLIC BLOOD PRESSURE: 60 MMHG

## 2024-07-25 PROCEDURE — G0299 HHS/HOSPICE OF RN EA 15 MIN: HCPCS

## 2024-07-25 PROCEDURE — S9126 HOSPICE CARE, IN THE HOME, P: HCPCS

## 2024-07-25 SDOH — ECONOMIC STABILITY: HOUSING INSECURITY: EVIDENCE OF SMOKING MATERIAL: 0

## 2024-07-25 ASSESSMENT — ENCOUNTER SYMPTOMS
FORGETFULNESS: 1
SHORTNESS OF BREATH: 1
BOWEL PATTERN NORMAL: 1
LAST BOWEL MOVEMENT: 67046
DEPRESSED MOOD: 1
DRY SKIN: 1
STOOL FREQUENCY: LESS THAN DAILY
ARTHRALGIAS: 1
SLEEP QUALITY: FAIR
PERSON REPORTING PAIN: PATIENT
DYSPNEA ACTIVITY LEVEL: AFTER AMBULATING 10 - 20 FT
MUSCLE WEAKNESS: 1
DYSPNEA ON EXERTION: 1
FATIGUE: 1
LOWER EXTREMITY EDEMA: 1
PAIN: 1
FATIGUES EASILY: 1

## 2024-07-25 ASSESSMENT — FIBROSIS 4 INDEX: FIB4 SCORE: 1.81

## 2024-07-25 ASSESSMENT — SOCIAL DETERMINANTS OF HEALTH (SDOH): ACTIVE STRESSOR - HEALTH CHANGES: 1

## 2024-07-26 PROCEDURE — S9126 HOSPICE CARE, IN THE HOME, P: HCPCS

## 2024-07-27 PROCEDURE — S9126 HOSPICE CARE, IN THE HOME, P: HCPCS

## 2024-07-28 PROCEDURE — S9126 HOSPICE CARE, IN THE HOME, P: HCPCS

## 2024-07-29 PROCEDURE — S9126 HOSPICE CARE, IN THE HOME, P: HCPCS

## 2024-07-30 PROCEDURE — S9126 HOSPICE CARE, IN THE HOME, P: HCPCS

## 2024-07-31 PROCEDURE — S9126 HOSPICE CARE, IN THE HOME, P: HCPCS

## 2024-08-01 ENCOUNTER — HOME CARE VISIT (OUTPATIENT)
Dept: HOSPICE | Facility: HOSPICE | Age: 79
End: 2024-08-01
Payer: MEDICARE

## 2024-08-01 VITALS — RESPIRATION RATE: 12 BRPM | HEART RATE: 104 BPM

## 2024-08-01 PROCEDURE — G0299 HHS/HOSPICE OF RN EA 15 MIN: HCPCS

## 2024-08-01 SDOH — ECONOMIC STABILITY: HOUSING INSECURITY: EVIDENCE OF SMOKING MATERIAL: 0

## 2024-08-01 ASSESSMENT — ENCOUNTER SYMPTOMS
PAIN: 1
PERSON REPORTING PAIN: PATIENT
DYSPNEA ON EXERTION: 1
STOOL FREQUENCY: LESS THAN DAILY
FORGETFULNESS: 1
FATIGUES EASILY: 1
MUSCLE WEAKNESS: 1
ARTHRALGIAS: 1
DRY SKIN: 1
LAST BOWEL MOVEMENT: 67052
SHORTNESS OF BREATH: 1
BOWEL PATTERN NORMAL: 1
LOWER EXTREMITY EDEMA: 1
SLEEP QUALITY: FAIR
FATIGUE: 1

## 2024-08-01 ASSESSMENT — SOCIAL DETERMINANTS OF HEALTH (SDOH): ACTIVE STRESSOR - HEALTH CHANGES: 1

## 2024-08-07 ENCOUNTER — HOME CARE VISIT (OUTPATIENT)
Dept: HOSPICE | Facility: HOSPICE | Age: 79
End: 2024-08-07
Payer: MEDICARE

## 2024-08-08 ENCOUNTER — HOME CARE VISIT (OUTPATIENT)
Dept: HOSPICE | Facility: HOSPICE | Age: 79
End: 2024-08-08
Payer: MEDICARE

## 2024-08-08 VITALS
RESPIRATION RATE: 16 BRPM | BODY MASS INDEX: 27.41 KG/M2 | SYSTOLIC BLOOD PRESSURE: 130 MMHG | WEIGHT: 175 LBS | DIASTOLIC BLOOD PRESSURE: 72 MMHG | HEART RATE: 92 BPM

## 2024-08-08 PROCEDURE — G0299 HHS/HOSPICE OF RN EA 15 MIN: HCPCS

## 2024-08-08 SDOH — ECONOMIC STABILITY: HOUSING INSECURITY: EVIDENCE OF SMOKING MATERIAL: 0

## 2024-08-08 ASSESSMENT — ENCOUNTER SYMPTOMS
FATIGUES EASILY: 1
LIMITED RANGE OF MOTION: 1
ARTHRALGIAS: 1
FORGETFULNESS: 1
DENIES PAIN: 1
LAST BOWEL MOVEMENT: 67059
SLEEP QUALITY: FAIR
PERSON REPORTING PAIN: PATIENT
LOWER EXTREMITY EDEMA: 1
BOWEL PATTERN NORMAL: 1
DEPRESSED MOOD: 1
DRY SKIN: 1
FATIGUE: 1
STOOL FREQUENCY: LESS THAN DAILY

## 2024-08-08 ASSESSMENT — FIBROSIS 4 INDEX: FIB4 SCORE: 1.81

## 2024-08-08 ASSESSMENT — SOCIAL DETERMINANTS OF HEALTH (SDOH): ACTIVE STRESSOR - HEALTH CHANGES: 1

## 2024-08-15 ENCOUNTER — HOME CARE VISIT (OUTPATIENT)
Dept: HOSPICE | Facility: HOSPICE | Age: 79
End: 2024-08-15
Payer: MEDICARE

## 2024-08-15 VITALS — SYSTOLIC BLOOD PRESSURE: 130 MMHG | RESPIRATION RATE: 16 BRPM | DIASTOLIC BLOOD PRESSURE: 70 MMHG | HEART RATE: 103 BPM

## 2024-08-15 PROCEDURE — G0299 HHS/HOSPICE OF RN EA 15 MIN: HCPCS

## 2024-08-15 SDOH — ECONOMIC STABILITY: HOUSING INSECURITY: EVIDENCE OF SMOKING MATERIAL: 0

## 2024-08-15 ASSESSMENT — ENCOUNTER SYMPTOMS
PAIN LOCATION - PAIN SEVERITY: 6/10
PAIN LOCATION - PAIN DURATION: CONSTANT
PAIN LOCATION - PAIN DURATION: COMES AND GOES
PAIN LOCATION - RELIEVING FACTORS: TYLENOL
PAIN: 1
PAIN LOCATION - PAIN QUALITY: DULL ACHE
PAIN LOCATION - EXACERBATING FACTORS: ACTIVITY
PAIN LOCATION - PAIN FREQUENCY: INTERMITTENT
PAIN LOCATION: BLADDER
SKIN LESIONS: 1
LAST BOWEL MOVEMENT: 67067
DRY SKIN: 1
PERSON REPORTING PAIN: PATIENT
LOWEST PAIN SEVERITY IN PAST 24 HOURS: 2/10
PAIN LOCATION - PAIN FREQUENCY: FREQUENT
HIGHEST PAIN SEVERITY IN PAST 24 HOURS: 6/10
PAIN SEVERITY GOAL: 2/10
SLEEP QUALITY: POOR
BOWEL PATTERN NORMAL: 1
SUBJECTIVE PAIN PROGRESSION: UNCHANGED
PAIN LOCATION - PAIN SEVERITY: 4/10

## 2024-08-15 ASSESSMENT — SOCIAL DETERMINANTS OF HEALTH (SDOH): ACTIVE STRESSOR - NO STRESS FACTORS: 1

## 2024-08-22 ENCOUNTER — HOME CARE VISIT (OUTPATIENT)
Dept: HOSPICE | Facility: HOSPICE | Age: 79
End: 2024-08-22
Payer: MEDICARE

## 2024-08-22 VITALS — RESPIRATION RATE: 16 BRPM | HEART RATE: 88 BPM

## 2024-08-22 PROCEDURE — G0299 HHS/HOSPICE OF RN EA 15 MIN: HCPCS

## 2024-08-22 SDOH — ECONOMIC STABILITY: HOUSING INSECURITY: EVIDENCE OF SMOKING MATERIAL: 0

## 2024-08-22 ASSESSMENT — ENCOUNTER SYMPTOMS
LIMITED RANGE OF MOTION: 1
ARTHRALGIAS: 1
FATIGUE: 1
DESCRIPTION OF MEMORY LOSS: SHORT TERM
FORGETFULNESS: 1
LOWEST PAIN SEVERITY IN PAST 24 HOURS: 0/10
FATIGUES EASILY: 1
LAST BOWEL MOVEMENT: 67074
LOWER EXTREMITY EDEMA: 1
STOOL FREQUENCY: LESS THAN DAILY
PAIN SEVERITY GOAL: 3/10
DENIES PAIN: 1
DRY SKIN: 1
BOWEL PATTERN NORMAL: 1
PERSON REPORTING PAIN: PATIENT
SLEEP QUALITY: ADEQUATE
HIGHEST PAIN SEVERITY IN PAST 24 HOURS: 2/10

## 2024-08-22 ASSESSMENT — SOCIAL DETERMINANTS OF HEALTH (SDOH): ACTIVE STRESSOR - HEALTH CHANGES: 1

## 2024-08-29 ENCOUNTER — HOME CARE VISIT (OUTPATIENT)
Dept: HOSPICE | Facility: HOSPICE | Age: 79
End: 2024-08-29
Payer: MEDICARE

## 2024-08-29 PROCEDURE — G0299 HHS/HOSPICE OF RN EA 15 MIN: HCPCS

## 2024-08-29 SDOH — ECONOMIC STABILITY: HOUSING INSECURITY: HOME SAFETY: PATIENT DECLINES PUTTING UP A OXYGEN SIGN AS IT MAY BE SEEN IN A NEGATIVE LIGHT.

## 2024-08-29 SDOH — ECONOMIC STABILITY: GENERAL

## 2024-08-29 SDOH — ECONOMIC STABILITY: HOUSING INSECURITY: EVIDENCE OF SMOKING MATERIAL: 0

## 2024-08-29 ASSESSMENT — ENCOUNTER SYMPTOMS
PAIN LOCATION - PAIN QUALITY: THROB
DYSPNEA ON EXERTION: 1
HIGHEST PAIN SEVERITY IN PAST 24 HOURS: 7/10
LOWER EXTREMITY EDEMA: 1
SHORTNESS OF BREATH: 1
FATIGUES EASILY: 1
PAIN LOCATION - EXACERBATING FACTORS: SINCE SURGERY
PERSON REPORTING PAIN: PATIENT
COUGH: 1
PAIN LOCATION - RELIEVING FACTORS: TYLENOL
MUSCLE WEAKNESS: 1
PAIN LOCATION - PAIN QUALITY: ACHE
PAIN: 1
DRY SKIN: 1
LAST BOWEL MOVEMENT: 67081
NAUSEA: 1
COUGH CHARACTERISTICS: MOIST
PAIN LOCATION - RELIEVING FACTORS: TYLENOL
PAIN LOCATION - PAIN SEVERITY: 3/10
PAIN LOCATION: LEGS

## 2024-08-29 ASSESSMENT — ACTIVITIES OF DAILY LIVING (ADL): MONEY MANAGEMENT (EXPENSES/BILLS): INDEPENDENT

## 2024-08-30 ENCOUNTER — HOME CARE VISIT (OUTPATIENT)
Dept: HOSPICE | Facility: HOSPICE | Age: 79
End: 2024-08-30
Payer: MEDICARE

## 2024-09-05 ENCOUNTER — HOME CARE VISIT (OUTPATIENT)
Dept: HOSPICE | Facility: HOSPICE | Age: 79
End: 2024-09-05
Payer: MEDICARE

## 2024-09-05 ENCOUNTER — PHARMACY VISIT (OUTPATIENT)
Dept: PHARMACY | Facility: MEDICAL CENTER | Age: 79
End: 2024-09-05
Payer: COMMERCIAL

## 2024-09-05 PROCEDURE — G0299 HHS/HOSPICE OF RN EA 15 MIN: HCPCS

## 2024-09-05 PROCEDURE — RXMED WILLOW AMBULATORY MEDICATION CHARGE: Performed by: STUDENT IN AN ORGANIZED HEALTH CARE EDUCATION/TRAINING PROGRAM

## 2024-09-06 ENCOUNTER — HOME CARE VISIT (OUTPATIENT)
Dept: HOSPICE | Facility: HOSPICE | Age: 79
End: 2024-09-06
Payer: MEDICARE

## 2024-09-06 VITALS — HEART RATE: 84 BPM

## 2024-09-06 SDOH — ECONOMIC STABILITY: GENERAL

## 2024-09-06 SDOH — ECONOMIC STABILITY: HOUSING INSECURITY: HOME SAFETY: OXYGEN TANKS IN A RACK

## 2024-09-06 SDOH — ECONOMIC STABILITY: HOUSING INSECURITY: EVIDENCE OF SMOKING MATERIAL: 0

## 2024-09-06 ASSESSMENT — ENCOUNTER SYMPTOMS
SHORTNESS OF BREATH: 1
NAUSEA: 1
PERSON REPORTING PAIN: PATIENT
FATIGUE: 1
HIGHEST PAIN SEVERITY IN PAST 24 HOURS: 5/10
PAIN LOCATION - PAIN SEVERITY: 3/10
COUGH CHARACTERISTICS: MOIST
PAIN LOCATION - PAIN FREQUENCY: CONSTANT
LOWER EXTREMITY EDEMA: 1
PAIN: 1
FATIGUES EASILY: 1
COUGH: 1
BOWEL PATTERN NORMAL: 1
LAST BOWEL MOVEMENT: 67088
FLATUS: 1
MUSCLE WEAKNESS: 1
DYSPNEA ACTIVITY LEVEL: AFTER AMBULATING 10 - 20 FT
PAIN LOCATION - RELIEVING FACTORS: TYLENOL
DRY SKIN: 1
DYSPNEA ON EXERTION: 1

## 2024-09-06 ASSESSMENT — ACTIVITIES OF DAILY LIVING (ADL): MONEY MANAGEMENT (EXPENSES/BILLS): INDEPENDENT

## 2024-09-07 ENCOUNTER — HOME CARE VISIT (OUTPATIENT)
Dept: HOSPICE | Facility: HOSPICE | Age: 79
End: 2024-09-07
Payer: MEDICARE
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
## 2024-09-10 ENCOUNTER — HOME CARE VISIT (OUTPATIENT)
Dept: HOSPICE | Facility: HOSPICE | Age: 79
End: 2024-09-10
Payer: MEDICARE

## 2024-09-11 ENCOUNTER — HOME CARE VISIT (OUTPATIENT)
Dept: HOSPICE | Facility: HOSPICE | Age: 79
End: 2024-09-11
Payer: MEDICARE

## 2024-09-19 ENCOUNTER — PHARMACY VISIT (OUTPATIENT)
Dept: PHARMACY | Facility: MEDICAL CENTER | Age: 79
End: 2024-09-19
Payer: COMMERCIAL

## 2024-09-19 ENCOUNTER — HOME CARE VISIT (OUTPATIENT)
Dept: HOSPICE | Facility: HOSPICE | Age: 79
End: 2024-09-19
Payer: MEDICARE

## 2024-09-19 PROCEDURE — RXMED WILLOW AMBULATORY MEDICATION CHARGE: Performed by: STUDENT IN AN ORGANIZED HEALTH CARE EDUCATION/TRAINING PROGRAM

## 2024-09-19 PROCEDURE — G0299 HHS/HOSPICE OF RN EA 15 MIN: HCPCS

## 2024-09-19 RX ORDER — TAMSULOSIN HYDROCHLORIDE 0.4 MG/1
0.4 CAPSULE ORAL 2 TIMES DAILY
Qty: 60 CAPSULE | Refills: 11 | Status: SHIPPED | OUTPATIENT
Start: 2024-09-19

## 2024-09-20 SDOH — ECONOMIC STABILITY: HOUSING INSECURITY: EVIDENCE OF SMOKING MATERIAL: 0

## 2024-09-20 ASSESSMENT — ENCOUNTER SYMPTOMS
SHORTNESS OF BREATH: 1
FATIGUE: 1
DRY SKIN: 1
PAIN LOCATION - PAIN SEVERITY: 2/10
COUGH CHARACTERISTICS: MOIST
MUSCLE WEAKNESS: 1
DYSPNEA ACTIVITY LEVEL: AFTER AMBULATING LESS THAN 10 FT
LOWEST PAIN SEVERITY IN PAST 24 HOURS: 2/10
HIGHEST PAIN SEVERITY IN PAST 24 HOURS: 8/10
PAIN: 1
LAST BOWEL MOVEMENT: 67101
BOWEL PATTERN NORMAL: 1
PAIN LOCATION - PAIN SEVERITY: 4/10
LOWER EXTREMITY EDEMA: 1
DYSPNEA ON EXERTION: 1
PERSON REPORTING PAIN: PATIENT
COUGH: 1
FATIGUES EASILY: 1
LIMITED RANGE OF MOTION: 1

## 2024-09-26 ENCOUNTER — HOME CARE VISIT (OUTPATIENT)
Dept: HOSPICE | Facility: HOSPICE | Age: 79
End: 2024-09-26
Payer: MEDICARE

## 2024-09-26 ENCOUNTER — PHARMACY VISIT (OUTPATIENT)
Dept: PHARMACY | Facility: MEDICAL CENTER | Age: 79
End: 2024-09-26
Payer: COMMERCIAL

## 2024-09-26 DIAGNOSIS — C79.10 METASTATIC UROTHELIAL CARCINOMA (HCC): Primary | ICD-10-CM

## 2024-09-26 DIAGNOSIS — Z51.5 HOSPICE CARE: ICD-10-CM

## 2024-09-26 PROCEDURE — G0299 HHS/HOSPICE OF RN EA 15 MIN: HCPCS

## 2024-09-26 PROCEDURE — RXMED WILLOW AMBULATORY MEDICATION CHARGE: Performed by: STUDENT IN AN ORGANIZED HEALTH CARE EDUCATION/TRAINING PROGRAM

## 2024-09-26 RX ORDER — LORAZEPAM 2 MG/ML
1-2 CONCENTRATE ORAL
Qty: 360 ML | Refills: 0 | Status: SHIPPED | OUTPATIENT
Start: 2024-09-26 | End: 2024-09-30

## 2024-09-26 RX ORDER — MORPHINE SULFATE 100 MG/5ML
10-20 SOLUTION ORAL
Qty: 240 ML | Refills: 0 | Status: SHIPPED | OUTPATIENT
Start: 2024-09-26 | End: 2025-09-26

## 2024-09-26 RX ORDER — PROMETHAZINE HYDROCHLORIDE 25 MG/1
25 TABLET ORAL EVERY 6 HOURS PRN
Qty: 30 TABLET | Refills: 11 | Status: SHIPPED | OUTPATIENT
Start: 2024-09-26

## 2024-09-26 ASSESSMENT — ENCOUNTER SYMPTOMS
LAST BOWEL MOVEMENT: 67108
PAIN: 1
PAIN LOCATION: BACK
PAIN LOCATION: BLADDER
PAIN LOCATION: BILATERAL LEGS

## 2024-09-27 ENCOUNTER — HOME CARE VISIT (OUTPATIENT)
Dept: HOSPICE | Facility: HOSPICE | Age: 79
End: 2024-09-27
Payer: MEDICARE

## 2024-09-27 PROCEDURE — G0299 HHS/HOSPICE OF RN EA 15 MIN: HCPCS

## 2024-09-29 ASSESSMENT — ENCOUNTER SYMPTOMS
HIGHEST PAIN SEVERITY IN PAST 24 HOURS: 9/10
LOWEST PAIN SEVERITY IN PAST 24 HOURS: 4/10
PAIN SEVERITY GOAL: 2/10
PAIN LOCATION - PAIN FREQUENCY: CONSTANT
PAIN: 1
PAIN LOCATION: BLADDER AREA
LOWEST PAIN SEVERITY IN PAST 24 HOURS: 4/10
PAIN LOCATION - PAIN SEVERITY: 4/10
PAIN LOCATION - RELIEVING FACTORS: MORPHINE
PAIN LOCATION - PAIN QUALITY: STAB
SUBJECTIVE PAIN PROGRESSION: GRADUALLY IMPROVING
HIGHEST PAIN SEVERITY IN PAST 24 HOURS: 9/10
SUBJECTIVE PAIN PROGRESSION: GRADUALLY WORSENING
PAIN SEVERITY GOAL: 4/10
NAUSEA: 1

## 2024-10-03 ENCOUNTER — HOME CARE VISIT (OUTPATIENT)
Dept: HOSPICE | Facility: HOSPICE | Age: 79
End: 2024-10-03
Payer: MEDICARE

## 2024-10-03 ENCOUNTER — PHARMACY VISIT (OUTPATIENT)
Dept: PHARMACY | Facility: MEDICAL CENTER | Age: 79
End: 2024-10-03
Payer: COMMERCIAL

## 2024-10-03 VITALS — BODY MASS INDEX: 25.84 KG/M2 | WEIGHT: 165 LBS

## 2024-10-03 DIAGNOSIS — C67.9 MALIGNANT NEOPLASM OF URINARY BLADDER, UNSPECIFIED SITE (HCC): ICD-10-CM

## 2024-10-03 PROCEDURE — G0299 HHS/HOSPICE OF RN EA 15 MIN: HCPCS

## 2024-10-03 PROCEDURE — RXMED WILLOW AMBULATORY MEDICATION CHARGE: Performed by: REHABILITATION PRACTITIONER

## 2024-10-03 RX ORDER — OXYCODONE HYDROCHLORIDE 5 MG/1
5-10 TABLET ORAL
Qty: 300 TABLET | Refills: 0 | Status: SHIPPED | OUTPATIENT
Start: 2024-10-03 | End: 2024-10-31 | Stop reason: SDUPTHER

## 2024-10-03 SDOH — ECONOMIC STABILITY: GENERAL

## 2024-10-03 ASSESSMENT — ENCOUNTER SYMPTOMS
DRY SKIN: 1
PAIN LOCATION - PAIN SEVERITY: 3/10
DYSPNEA ACTIVITY LEVEL: AFTER AMBULATING LESS THAN 10 FT
FATIGUES EASILY: 1
PAIN LOCATION: BLADDER AREA
MUSCLE WEAKNESS: 1
NAUSEA: 1
LIMITED RANGE OF MOTION: 1
LAST BOWEL MOVEMENT: 67115
HIGHEST PAIN SEVERITY IN PAST 24 HOURS: 7/10
SHORTNESS OF BREATH: 1
SUBJECTIVE PAIN PROGRESSION: UNCHANGED
LOWER EXTREMITY EDEMA: 1
DYSPNEA ON EXERTION: 1
LOWEST PAIN SEVERITY IN PAST 24 HOURS: 3/10
PAIN: 1

## 2024-10-03 ASSESSMENT — FIBROSIS 4 INDEX: FIB4 SCORE: 1.81

## 2024-10-03 ASSESSMENT — ACTIVITIES OF DAILY LIVING (ADL): MONEY MANAGEMENT (EXPENSES/BILLS): INDEPENDENT

## 2024-10-06 SDOH — ECONOMIC STABILITY: HOUSING INSECURITY: EVIDENCE OF SMOKING MATERIAL: 0

## 2024-10-06 ASSESSMENT — ENCOUNTER SYMPTOMS: PAIN SEVERITY GOAL: 4/10

## 2024-10-10 ENCOUNTER — PHARMACY VISIT (OUTPATIENT)
Dept: PHARMACY | Facility: MEDICAL CENTER | Age: 79
End: 2024-10-10
Payer: COMMERCIAL

## 2024-10-10 ENCOUNTER — HOME CARE VISIT (OUTPATIENT)
Dept: HOSPICE | Facility: HOSPICE | Age: 79
End: 2024-10-10
Payer: MEDICARE

## 2024-10-10 PROCEDURE — RXMED WILLOW AMBULATORY MEDICATION CHARGE: Performed by: STUDENT IN AN ORGANIZED HEALTH CARE EDUCATION/TRAINING PROGRAM

## 2024-10-10 PROCEDURE — G0299 HHS/HOSPICE OF RN EA 15 MIN: HCPCS

## 2024-10-10 PROCEDURE — RXMED WILLOW AMBULATORY MEDICATION CHARGE: Performed by: REHABILITATION PRACTITIONER

## 2024-10-11 SDOH — ECONOMIC STABILITY: HOUSING INSECURITY: EVIDENCE OF SMOKING MATERIAL: 0

## 2024-10-11 ASSESSMENT — ENCOUNTER SYMPTOMS
LOWER EXTREMITY EDEMA: 1
LOWEST PAIN SEVERITY IN PAST 24 HOURS: 2/10
PAIN: 1
LAST BOWEL MOVEMENT: 67122
SUBJECTIVE PAIN PROGRESSION: UNCHANGED
DRY SKIN: 1
SHORTNESS OF BREATH: 1
FATIGUES EASILY: 1
HIGHEST PAIN SEVERITY IN PAST 24 HOURS: 6/10
DYSPNEA ON EXERTION: 1
PAIN SEVERITY GOAL: 2/10
LIMITED RANGE OF MOTION: 1
BOWEL PATTERN NORMAL: 1
PAIN LOCATION - PAIN SEVERITY: 2/10
DYSPNEA ACTIVITY LEVEL: AFTER AMBULATING LESS THAN 10 FT
PAIN LOCATION: ABDOMEN
MUSCLE WEAKNESS: 1

## 2024-10-16 ENCOUNTER — HOME CARE VISIT (OUTPATIENT)
Dept: HOSPICE | Facility: HOSPICE | Age: 79
End: 2024-10-16
Payer: MEDICARE

## 2024-10-17 ENCOUNTER — PHARMACY VISIT (OUTPATIENT)
Dept: PHARMACY | Facility: MEDICAL CENTER | Age: 79
End: 2024-10-17
Payer: COMMERCIAL

## 2024-10-17 ENCOUNTER — HOME CARE VISIT (OUTPATIENT)
Dept: HOSPICE | Facility: HOSPICE | Age: 79
End: 2024-10-17
Payer: MEDICARE

## 2024-10-17 VITALS — RESPIRATION RATE: 16 BRPM | HEART RATE: 92 BPM

## 2024-10-17 DIAGNOSIS — C79.10 METASTATIC UROTHELIAL CARCINOMA (HCC): ICD-10-CM

## 2024-10-17 DIAGNOSIS — Z51.5 HOSPICE CARE: ICD-10-CM

## 2024-10-17 PROCEDURE — RXMED WILLOW AMBULATORY MEDICATION CHARGE: Performed by: REHABILITATION PRACTITIONER

## 2024-10-17 PROCEDURE — G0299 HHS/HOSPICE OF RN EA 15 MIN: HCPCS

## 2024-10-17 RX ORDER — TRAZODONE HYDROCHLORIDE 50 MG/1
50-100 TABLET, FILM COATED ORAL NIGHTLY
Qty: 30 TABLET | Refills: 11 | Status: SHIPPED | OUTPATIENT
Start: 2024-10-17 | End: 2025-10-17

## 2024-10-17 RX ORDER — PROMETHAZINE HYDROCHLORIDE 25 MG/1
25 TABLET ORAL EVERY 6 HOURS PRN
Qty: 30 TABLET | Refills: 11 | Status: SHIPPED | OUTPATIENT
Start: 2024-10-17 | End: 2025-10-17

## 2024-10-17 RX ORDER — SCOLOPAMINE TRANSDERMAL SYSTEM 1 MG/1
1 PATCH, EXTENDED RELEASE TRANSDERMAL
Qty: 4 PATCH | Refills: 3 | Status: SHIPPED | OUTPATIENT
Start: 2024-10-17 | End: 2024-10-28 | Stop reason: SDUPTHER

## 2024-10-17 SDOH — ECONOMIC STABILITY: HOUSING INSECURITY: EVIDENCE OF SMOKING MATERIAL: 0

## 2024-10-17 ASSESSMENT — ENCOUNTER SYMPTOMS
LOWEST PAIN SEVERITY IN PAST 24 HOURS: 2/10
HIGHEST PAIN SEVERITY IN PAST 24 HOURS: 8/10
DRY SKIN: 1
PAIN SEVERITY GOAL: 4/10
FATIGUE: 1
LOWER EXTREMITY EDEMA: 1
SLEEP QUALITY: FAIR
MUSCLE WEAKNESS: 1
VOMITING: 1
LAST BOWEL MOVEMENT: 67130
FATIGUES EASILY: 1
SUBJECTIVE PAIN PROGRESSION: WAXING AND WANING
LIMITED RANGE OF MOTION: 1
PAIN LOCATION - PAIN QUALITY: PRESSURE
BOWEL PATTERN NORMAL: 1
PAIN LOCATION: MID ABDOMEN
STOOL FREQUENCY: LESS THAN DAILY
DYSPNEA ACTIVITY LEVEL: AFTER AMBULATING LESS THAN 10 FT
NAUSEA: 1
ABDOMINAL PAIN: 1
DYSPNEA ON EXERTION: 1
PAIN LOCATION - PAIN SEVERITY: 4/10
PAIN: 1
SHORTNESS OF BREATH: 1

## 2024-10-17 ASSESSMENT — SOCIAL DETERMINANTS OF HEALTH (SDOH)
ACTIVE STRESSOR - LOSS OF CONTROL: 1
ACTIVE STRESSOR - EXPRESSED EMOTIONAL NEED: 1
ACTIVE STRESSOR - EXHAUSTION: 1
ACTIVE STRESSOR - HEALTH CHANGES: 1

## 2024-10-18 ENCOUNTER — HOME CARE VISIT (OUTPATIENT)
Dept: HOSPICE | Facility: HOSPICE | Age: 79
End: 2024-10-18
Payer: MEDICARE

## 2024-10-21 ENCOUNTER — HOME CARE VISIT (OUTPATIENT)
Dept: HOSPICE | Facility: HOSPICE | Age: 79
End: 2024-10-21
Payer: MEDICARE

## 2024-10-21 PROBLEM — C68.9 UROTHELIAL CARCINOMA (HCC): Status: ACTIVE | Noted: 2024-10-21

## 2024-10-21 RX ORDER — ACETAMINOPHEN 650 MG/1
650 SUPPOSITORY RECTAL EVERY 4 HOURS PRN
Status: DISCONTINUED | OUTPATIENT
Start: 2024-10-21 | End: 2024-10-27 | Stop reason: HOSPADM

## 2024-10-21 RX ORDER — CARBOXYMETHYLCELLULOSE SODIUM 5 MG/ML
1 SOLUTION/ DROPS OPHTHALMIC PRN
Status: DISCONTINUED | OUTPATIENT
Start: 2024-10-21 | End: 2024-10-27 | Stop reason: HOSPADM

## 2024-10-21 RX ORDER — LORAZEPAM 2 MG/ML
2 CONCENTRATE ORAL
Status: DISCONTINUED | OUTPATIENT
Start: 2024-10-21 | End: 2024-10-27 | Stop reason: HOSPADM

## 2024-10-21 RX ORDER — OXYCODONE HYDROCHLORIDE 5 MG/1
5-10 TABLET ORAL
Status: DISCONTINUED | OUTPATIENT
Start: 2024-10-21 | End: 2024-10-27 | Stop reason: HOSPADM

## 2024-10-21 RX ORDER — POLYETHYLENE GLYCOL 3350 17 G/17G
1 POWDER, FOR SOLUTION ORAL 2 TIMES DAILY PRN
Status: DISCONTINUED | OUTPATIENT
Start: 2024-10-21 | End: 2024-10-27 | Stop reason: HOSPADM

## 2024-10-21 RX ORDER — MORPHINE SULFATE 100 MG/5ML
10-20 SOLUTION ORAL
Status: DISCONTINUED | OUTPATIENT
Start: 2024-10-21 | End: 2024-10-27 | Stop reason: HOSPADM

## 2024-10-21 RX ORDER — ACETAMINOPHEN 325 MG/1
650 TABLET ORAL EVERY 4 HOURS PRN
Status: DISCONTINUED | OUTPATIENT
Start: 2024-10-21 | End: 2024-10-27 | Stop reason: HOSPADM

## 2024-10-21 RX ORDER — GLYCOPYRROLATE 1 MG/1
1 TABLET ORAL 3 TIMES DAILY PRN
Status: DISCONTINUED | OUTPATIENT
Start: 2024-10-21 | End: 2024-10-27 | Stop reason: HOSPADM

## 2024-10-21 RX ORDER — BISACODYL 10 MG
10 SUPPOSITORY, RECTAL RECTAL
Status: DISCONTINUED | OUTPATIENT
Start: 2024-10-21 | End: 2024-10-27 | Stop reason: HOSPADM

## 2024-10-21 RX ORDER — SCOLOPAMINE TRANSDERMAL SYSTEM 1 MG/1
1 PATCH, EXTENDED RELEASE TRANSDERMAL
Status: DISCONTINUED | OUTPATIENT
Start: 2024-10-21 | End: 2024-10-27 | Stop reason: HOSPADM

## 2024-10-21 RX ORDER — LACTULOSE 10 G/15ML
10 SOLUTION ORAL
Status: DISCONTINUED | OUTPATIENT
Start: 2024-10-21 | End: 2024-10-27 | Stop reason: HOSPADM

## 2024-10-21 RX ORDER — TRAZODONE HYDROCHLORIDE 50 MG/1
50 TABLET, FILM COATED ORAL
Status: DISCONTINUED | OUTPATIENT
Start: 2024-10-21 | End: 2024-10-23

## 2024-10-21 RX ORDER — DOCUSATE SODIUM 100 MG/1
100 CAPSULE, LIQUID FILLED ORAL 2 TIMES DAILY PRN
Status: DISCONTINUED | OUTPATIENT
Start: 2024-10-21 | End: 2024-10-27 | Stop reason: HOSPADM

## 2024-10-21 RX ORDER — PROMETHAZINE HYDROCHLORIDE 25 MG/1
25-50 TABLET ORAL EVERY 6 HOURS PRN
Status: DISCONTINUED | OUTPATIENT
Start: 2024-10-21 | End: 2024-10-27 | Stop reason: HOSPADM

## 2024-10-22 ENCOUNTER — HOSPITAL ENCOUNTER (INPATIENT)
Facility: MEDICAL CENTER | Age: 79
LOS: 5 days | DRG: 951 | End: 2024-10-27
Attending: STUDENT IN AN ORGANIZED HEALTH CARE EDUCATION/TRAINING PROGRAM | Admitting: STUDENT IN AN ORGANIZED HEALTH CARE EDUCATION/TRAINING PROGRAM
Payer: COMMERCIAL

## 2024-10-22 ENCOUNTER — HOME CARE VISIT (OUTPATIENT)
Dept: HOSPICE | Facility: HOSPICE | Age: 79
End: 2024-10-22
Payer: MEDICARE

## 2024-10-22 PROCEDURE — 700102 HCHG RX REV CODE 250 W/ 637 OVERRIDE(OP): Performed by: REHABILITATION PRACTITIONER

## 2024-10-22 PROCEDURE — A9270 NON-COVERED ITEM OR SERVICE: HCPCS | Performed by: REHABILITATION PRACTITIONER

## 2024-10-22 PROCEDURE — 94760 N-INVAS EAR/PLS OXIMETRY 1: CPT

## 2024-10-22 PROCEDURE — 770001 HCHG ROOM/CARE - MED/SURG/GYN PRIV*

## 2024-10-22 RX ADMIN — PROMETHAZINE HYDROCHLORIDE 25 MG: 25 TABLET ORAL at 15:49

## 2024-10-22 RX ADMIN — TRAZODONE HYDROCHLORIDE 50 MG: 50 TABLET ORAL at 20:46

## 2024-10-22 RX ADMIN — OXYCODONE 10 MG: 5 TABLET ORAL at 15:48

## 2024-10-22 RX ADMIN — OXYCODONE 10 MG: 5 TABLET ORAL at 20:47

## 2024-10-22 RX ADMIN — TRAZODONE HYDROCHLORIDE 50 MG: 50 TABLET ORAL at 15:49

## 2024-10-22 ASSESSMENT — LIFESTYLE VARIABLES
ALCOHOL_USE: NO
TOTAL SCORE: 0
HOW MANY TIMES IN THE PAST YEAR HAVE YOU HAD 5 OR MORE DRINKS IN A DAY: 0
TOTAL SCORE: 0
EVER HAD A DRINK FIRST THING IN THE MORNING TO STEADY YOUR NERVES TO GET RID OF A HANGOVER: NO
HAVE PEOPLE ANNOYED YOU BY CRITICIZING YOUR DRINKING: NO
CONSUMPTION TOTAL: NEGATIVE
DOES PATIENT WANT TO STOP DRINKING: NO
ON A TYPICAL DAY WHEN YOU DRINK ALCOHOL HOW MANY DRINKS DO YOU HAVE: 0
HAVE YOU EVER FELT YOU SHOULD CUT DOWN ON YOUR DRINKING: NO
AVERAGE NUMBER OF DAYS PER WEEK YOU HAVE A DRINK CONTAINING ALCOHOL: 0
TOTAL SCORE: 0
EVER FELT BAD OR GUILTY ABOUT YOUR DRINKING: NO

## 2024-10-22 ASSESSMENT — COGNITIVE AND FUNCTIONAL STATUS - GENERAL
DAILY ACTIVITIY SCORE: 24
SUGGESTED CMS G CODE MODIFIER DAILY ACTIVITY: CH
SUGGESTED CMS G CODE MODIFIER MOBILITY: CH
MOBILITY SCORE: 24

## 2024-10-22 ASSESSMENT — PAIN DESCRIPTION - PAIN TYPE
TYPE: CHRONIC PAIN
TYPE: CHRONIC PAIN

## 2024-10-22 ASSESSMENT — SOCIAL DETERMINANTS OF HEALTH (SDOH)
WITHIN THE LAST YEAR, HAVE YOU BEEN KICKED, HIT, SLAPPED, OR OTHERWISE PHYSICALLY HURT BY YOUR PARTNER OR EX-PARTNER?: NO
WITHIN THE LAST YEAR, HAVE YOU BEEN AFRAID OF YOUR PARTNER OR EX-PARTNER?: NO
WITHIN THE LAST YEAR, HAVE TO BEEN RAPED OR FORCED TO HAVE ANY KIND OF SEXUAL ACTIVITY BY YOUR PARTNER OR EX-PARTNER?: NO
WITHIN THE LAST YEAR, HAVE YOU BEEN HUMILIATED OR EMOTIONALLY ABUSED IN OTHER WAYS BY YOUR PARTNER OR EX-PARTNER?: NO

## 2024-10-22 ASSESSMENT — PATIENT HEALTH QUESTIONNAIRE - PHQ9
SUM OF ALL RESPONSES TO PHQ9 QUESTIONS 1 AND 2: 0
2. FEELING DOWN, DEPRESSED, IRRITABLE, OR HOPELESS: NOT AT ALL
SUM OF ALL RESPONSES TO PHQ9 QUESTIONS 1 AND 2: 0
1. LITTLE INTEREST OR PLEASURE IN DOING THINGS: NOT AT ALL
1. LITTLE INTEREST OR PLEASURE IN DOING THINGS: NOT AT ALL
SUM OF ALL RESPONSES TO PHQ9 QUESTIONS 1 AND 2: 0
1. LITTLE INTEREST OR PLEASURE IN DOING THINGS: NOT AT ALL
2. FEELING DOWN, DEPRESSED, IRRITABLE, OR HOPELESS: NOT AT ALL

## 2024-10-23 PROCEDURE — A9270 NON-COVERED ITEM OR SERVICE: HCPCS | Performed by: REHABILITATION PRACTITIONER

## 2024-10-23 PROCEDURE — 700102 HCHG RX REV CODE 250 W/ 637 OVERRIDE(OP): Performed by: REHABILITATION PRACTITIONER

## 2024-10-23 PROCEDURE — 770001 HCHG ROOM/CARE - MED/SURG/GYN PRIV*

## 2024-10-23 RX ORDER — TRAZODONE HYDROCHLORIDE 50 MG/1
100 TABLET, FILM COATED ORAL
Status: DISCONTINUED | OUTPATIENT
Start: 2024-10-23 | End: 2024-10-27 | Stop reason: HOSPADM

## 2024-10-23 RX ADMIN — TRAZODONE HYDROCHLORIDE 100 MG: 50 TABLET ORAL at 21:02

## 2024-10-23 RX ADMIN — OXYCODONE 10 MG: 5 TABLET ORAL at 03:33

## 2024-10-23 RX ADMIN — OXYCODONE 5 MG: 5 TABLET ORAL at 21:01

## 2024-10-23 ASSESSMENT — PAIN DESCRIPTION - PAIN TYPE
TYPE: CHRONIC PAIN

## 2024-10-23 ASSESSMENT — FIBROSIS 4 INDEX
FIB4 SCORE: 1.83
FIB4 SCORE: 1.83

## 2024-10-24 ENCOUNTER — HOME CARE VISIT (OUTPATIENT)
Dept: HOSPICE | Facility: HOSPICE | Age: 79
End: 2024-10-24
Payer: MEDICARE

## 2024-10-24 PROCEDURE — A9270 NON-COVERED ITEM OR SERVICE: HCPCS | Performed by: REHABILITATION PRACTITIONER

## 2024-10-24 PROCEDURE — 700102 HCHG RX REV CODE 250 W/ 637 OVERRIDE(OP): Performed by: REHABILITATION PRACTITIONER

## 2024-10-24 PROCEDURE — G0299 HHS/HOSPICE OF RN EA 15 MIN: HCPCS

## 2024-10-24 PROCEDURE — 770001 HCHG ROOM/CARE - MED/SURG/GYN PRIV*

## 2024-10-24 RX ADMIN — OXYCODONE 5 MG: 5 TABLET ORAL at 05:45

## 2024-10-24 RX ADMIN — PROMETHAZINE HYDROCHLORIDE 50 MG: 25 TABLET ORAL at 20:27

## 2024-10-24 ASSESSMENT — PAIN DESCRIPTION - PAIN TYPE
TYPE: CHRONIC PAIN;ACUTE PAIN
TYPE: CHRONIC PAIN
TYPE: CHRONIC PAIN
TYPE: CHRONIC PAIN;ACUTE PAIN

## 2024-10-24 ASSESSMENT — ENCOUNTER SYMPTOMS
PAIN LOCATION - PAIN QUALITY: SORE
PAIN LOCATION - PAIN SEVERITY: 4/10
SUBJECTIVE PAIN PROGRESSION: UNCHANGED
HIGHEST PAIN SEVERITY IN PAST 24 HOURS: 4/10
LOWEST PAIN SEVERITY IN PAST 24 HOURS: 3/10
PAIN: 1
PAIN SEVERITY GOAL: 3/10

## 2024-10-25 ENCOUNTER — HOME CARE VISIT (OUTPATIENT)
Dept: HOSPICE | Facility: HOSPICE | Age: 79
End: 2024-10-25
Payer: MEDICARE

## 2024-10-25 PROCEDURE — 770001 HCHG ROOM/CARE - MED/SURG/GYN PRIV*

## 2024-10-25 PROCEDURE — 700102 HCHG RX REV CODE 250 W/ 637 OVERRIDE(OP): Performed by: REHABILITATION PRACTITIONER

## 2024-10-25 PROCEDURE — A9270 NON-COVERED ITEM OR SERVICE: HCPCS | Performed by: REHABILITATION PRACTITIONER

## 2024-10-25 RX ADMIN — DOCUSATE SODIUM 100 MG: 100 CAPSULE, LIQUID FILLED ORAL at 10:18

## 2024-10-25 RX ADMIN — OXYCODONE 10 MG: 5 TABLET ORAL at 10:09

## 2024-10-25 RX ADMIN — OXYCODONE 10 MG: 5 TABLET ORAL at 18:18

## 2024-10-25 ASSESSMENT — PAIN DESCRIPTION - PAIN TYPE
TYPE: CHRONIC PAIN
TYPE: CHRONIC PAIN

## 2024-10-26 PROCEDURE — A9270 NON-COVERED ITEM OR SERVICE: HCPCS | Performed by: REHABILITATION PRACTITIONER

## 2024-10-26 PROCEDURE — 700102 HCHG RX REV CODE 250 W/ 637 OVERRIDE(OP): Performed by: REHABILITATION PRACTITIONER

## 2024-10-26 PROCEDURE — 770001 HCHG ROOM/CARE - MED/SURG/GYN PRIV*

## 2024-10-26 RX ADMIN — OXYCODONE 10 MG: 5 TABLET ORAL at 15:08

## 2024-10-26 RX ADMIN — OXYCODONE 10 MG: 5 TABLET ORAL at 02:23

## 2024-10-26 RX ADMIN — OXYCODONE 10 MG: 5 TABLET ORAL at 08:54

## 2024-10-26 ASSESSMENT — PAIN DESCRIPTION - PAIN TYPE
TYPE: CHRONIC PAIN

## 2024-10-26 ASSESSMENT — PATIENT HEALTH QUESTIONNAIRE - PHQ9
SUM OF ALL RESPONSES TO PHQ9 QUESTIONS 1 AND 2: 0
1. LITTLE INTEREST OR PLEASURE IN DOING THINGS: NOT AT ALL

## 2024-10-27 ENCOUNTER — HOME CARE VISIT (OUTPATIENT)
Dept: HOSPICE | Facility: HOSPICE | Age: 79
End: 2024-10-27
Payer: MEDICARE

## 2024-10-27 VITALS
RESPIRATION RATE: 18 BRPM | HEIGHT: 68 IN | BODY MASS INDEX: 24.96 KG/M2 | WEIGHT: 164.68 LBS | DIASTOLIC BLOOD PRESSURE: 56 MMHG | OXYGEN SATURATION: 92 % | HEART RATE: 94 BPM | TEMPERATURE: 97.9 F | SYSTOLIC BLOOD PRESSURE: 114 MMHG

## 2024-10-27 VITALS — DIASTOLIC BLOOD PRESSURE: 58 MMHG | RESPIRATION RATE: 20 BRPM | SYSTOLIC BLOOD PRESSURE: 130 MMHG | HEART RATE: 88 BPM

## 2024-10-27 PROCEDURE — 700102 HCHG RX REV CODE 250 W/ 637 OVERRIDE(OP): Performed by: REHABILITATION PRACTITIONER

## 2024-10-27 PROCEDURE — G0299 HHS/HOSPICE OF RN EA 15 MIN: HCPCS

## 2024-10-27 PROCEDURE — A9270 NON-COVERED ITEM OR SERVICE: HCPCS | Performed by: REHABILITATION PRACTITIONER

## 2024-10-27 RX ADMIN — OXYCODONE 10 MG: 5 TABLET ORAL at 04:22

## 2024-10-27 RX ADMIN — OXYCODONE 10 MG: 5 TABLET ORAL at 08:47

## 2024-10-27 SDOH — ECONOMIC STABILITY: HOUSING INSECURITY: EVIDENCE OF SMOKING MATERIAL: 0

## 2024-10-27 ASSESSMENT — ENCOUNTER SYMPTOMS
HIGHEST PAIN SEVERITY IN PAST 24 HOURS: 6/10
DYSPNEA ACTIVITY LEVEL: WHILE SPEAKING
PAIN SEVERITY GOAL: 2/10
PAIN LOCATION - EXACERBATING FACTORS: ACTIVITY
PAIN LOCATION - PAIN DURATION: MONTHS
DYSPNEA ON EXERTION: 1
PAIN: 1
SHORTNESS OF BREATH: 1
BOWEL PATTERN NORMAL: 1
NAUSEA: 1
LAST BOWEL MOVEMENT: 67139
PAIN LOCATION - PAIN SEVERITY: 3/10
LOWEST PAIN SEVERITY IN PAST 24 HOURS: 2/10
ABDOMINAL PAIN: 1
PAIN LOCATION - PAIN FREQUENCY: CONSTANT
FATIGUES EASILY: 1
SLEEP QUALITY: POOR
STOOL FREQUENCY: LESS THAN DAILY
SUBJECTIVE PAIN PROGRESSION: WAXING AND WANING
DRY SKIN: 1

## 2024-10-27 ASSESSMENT — PAIN DESCRIPTION - PAIN TYPE
TYPE: CHRONIC PAIN

## 2024-10-28 ENCOUNTER — PHARMACY VISIT (OUTPATIENT)
Dept: PHARMACY | Facility: MEDICAL CENTER | Age: 79
End: 2024-10-28
Payer: COMMERCIAL

## 2024-10-28 PROCEDURE — RXMED WILLOW AMBULATORY MEDICATION CHARGE: Performed by: STUDENT IN AN ORGANIZED HEALTH CARE EDUCATION/TRAINING PROGRAM

## 2024-10-28 RX ORDER — SCOLOPAMINE TRANSDERMAL SYSTEM 1 MG/1
1 PATCH, EXTENDED RELEASE TRANSDERMAL
Qty: 10 PATCH | Refills: 11 | Status: SHIPPED | OUTPATIENT
Start: 2024-10-28 | End: 2024-10-31

## 2024-10-31 ENCOUNTER — HOME CARE VISIT (OUTPATIENT)
Dept: HOSPICE | Facility: HOSPICE | Age: 79
End: 2024-10-31
Payer: MEDICARE

## 2024-10-31 ENCOUNTER — PHARMACY VISIT (OUTPATIENT)
Dept: PHARMACY | Facility: MEDICAL CENTER | Age: 79
End: 2024-10-31
Payer: COMMERCIAL

## 2024-10-31 VITALS — RESPIRATION RATE: 20 BRPM | HEART RATE: 96 BPM

## 2024-10-31 DIAGNOSIS — C67.9 MALIGNANT NEOPLASM OF URINARY BLADDER, UNSPECIFIED SITE (HCC): Primary | ICD-10-CM

## 2024-10-31 PROCEDURE — G0299 HHS/HOSPICE OF RN EA 15 MIN: HCPCS

## 2024-10-31 PROCEDURE — RXMED WILLOW AMBULATORY MEDICATION CHARGE: Performed by: REHABILITATION PRACTITIONER

## 2024-10-31 RX ORDER — METHYLPHENIDATE HYDROCHLORIDE 5 MG/1
5 TABLET ORAL 2 TIMES DAILY
Qty: 120 TABLET | Refills: 0 | Status: SHIPPED | OUTPATIENT
Start: 2024-10-31 | End: 2025-10-31

## 2024-10-31 RX ORDER — OXYCODONE HYDROCHLORIDE 5 MG/1
5-10 TABLET ORAL
Qty: 400 TABLET | Refills: 0 | Status: SHIPPED | OUTPATIENT
Start: 2024-10-31 | End: 2025-10-31

## 2024-10-31 SDOH — ECONOMIC STABILITY: HOUSING INSECURITY: EVIDENCE OF SMOKING MATERIAL: 0

## 2024-10-31 ASSESSMENT — SOCIAL DETERMINANTS OF HEALTH (SDOH): ACTIVE STRESSOR - HEALTH CHANGES: 1

## 2024-10-31 ASSESSMENT — ENCOUNTER SYMPTOMS
PAIN: 1
SHORTNESS OF BREATH: 1
STOOL FREQUENCY: LESS THAN DAILY
SUBJECTIVE PAIN PROGRESSION: WAXING AND WANING
PAIN LOCATION - PAIN QUALITY: DULL
FATIGUES EASILY: 1
PAIN LOCATION: LOWER ABDOMEN
FATIGUE: 1
BOWEL PATTERN NORMAL: 1
FORGETFULNESS: 1
DYSPNEA ACTIVITY LEVEL: AT REST
MUSCLE WEAKNESS: 1
DYSPNEA ON EXERTION: 1
LOWEST PAIN SEVERITY IN PAST 24 HOURS: 3/10
LIMITED RANGE OF MOTION: 1
HIGHEST PAIN SEVERITY IN PAST 24 HOURS: 3/10
PAIN SEVERITY GOAL: 3/10
PAIN LOCATION - PAIN SEVERITY: 3/10
LAST BOWEL MOVEMENT: 67142
SLEEP QUALITY: ADEQUATE
LOWER EXTREMITY EDEMA: 1
DRY SKIN: 1

## 2024-11-07 ENCOUNTER — HOME CARE VISIT (OUTPATIENT)
Dept: HOSPICE | Facility: HOSPICE | Age: 79
End: 2024-11-07
Payer: MEDICARE

## 2024-11-08 ENCOUNTER — PHARMACY VISIT (OUTPATIENT)
Dept: PHARMACY | Facility: MEDICAL CENTER | Age: 79
End: 2024-11-08
Payer: COMMERCIAL

## 2024-11-08 ENCOUNTER — HOME CARE VISIT (OUTPATIENT)
Dept: HOSPICE | Facility: HOSPICE | Age: 79
End: 2024-11-08
Payer: MEDICARE

## 2024-11-08 DIAGNOSIS — Z51.5 HOSPICE CARE: ICD-10-CM

## 2024-11-08 DIAGNOSIS — C79.10 METASTATIC UROTHELIAL CARCINOMA (HCC): ICD-10-CM

## 2024-11-08 PROCEDURE — G0299 HHS/HOSPICE OF RN EA 15 MIN: HCPCS

## 2024-11-08 PROCEDURE — RXMED WILLOW AMBULATORY MEDICATION CHARGE: Performed by: REHABILITATION PRACTITIONER

## 2024-11-08 RX ORDER — FUROSEMIDE 20 MG/1
20 TABLET ORAL EVERY MORNING
Qty: 30 TABLET | Refills: 11 | Status: SHIPPED | OUTPATIENT
Start: 2024-11-08 | End: 2024-11-21 | Stop reason: SDUPTHER

## 2024-11-08 RX ORDER — ACETAMINOPHEN 500 MG
1000 TABLET ORAL EVERY 6 HOURS PRN
Qty: 60 TABLET | Refills: 10 | Status: SHIPPED | OUTPATIENT
Start: 2024-11-08 | End: 2025-11-08

## 2024-11-08 NOTE — PROGRESS NOTES
"Patient discussed with Kaylyn Silverio RN. Patient has BLE edema 2-3+. He has taken furosemide in the past and requests to start again. He continues to eat although appetite has decreased. He continues to have pain but requests to not take narcotics and states that tylenol TID is \"too much.\" He would like to take it PRN. Medications ordered for delivery today.  "

## 2024-11-09 SDOH — ECONOMIC STABILITY: HOUSING INSECURITY: EVIDENCE OF SMOKING MATERIAL: 0

## 2024-11-09 ASSESSMENT — ENCOUNTER SYMPTOMS
PAIN LOCATION - PAIN QUALITY: SORE
LIMITED RANGE OF MOTION: 1
LAST BOWEL MOVEMENT: 67150
SUBJECTIVE PAIN PROGRESSION: UNCHANGED
LOWER EXTREMITY EDEMA: 1
FATIGUES EASILY: 1
SHORTNESS OF BREATH: 1
DRY SKIN: 1
MUSCLE WEAKNESS: 1
PAIN LOCATION - PAIN SEVERITY: 3/10
PAIN: 1
DYSPNEA ON EXERTION: 1
DYSPNEA ACTIVITY LEVEL: AT REST
NAUSEA: 1
PAIN LOCATION: BLADDER AREA

## 2024-11-09 ASSESSMENT — ACTIVITIES OF DAILY LIVING (ADL): MONEY MANAGEMENT (EXPENSES/BILLS): INDEPENDENT

## 2024-11-14 ENCOUNTER — HOME CARE VISIT (OUTPATIENT)
Dept: HOSPICE | Facility: HOSPICE | Age: 79
End: 2024-11-14
Payer: MEDICARE

## 2024-11-14 VITALS — RESPIRATION RATE: 16 BRPM | BODY MASS INDEX: 24.33 KG/M2 | WEIGHT: 160 LBS | HEART RATE: 88 BPM

## 2024-11-14 PROCEDURE — G0299 HHS/HOSPICE OF RN EA 15 MIN: HCPCS

## 2024-11-14 SDOH — ECONOMIC STABILITY: HOUSING INSECURITY: EVIDENCE OF SMOKING MATERIAL: 0

## 2024-11-14 ASSESSMENT — ENCOUNTER SYMPTOMS
DYSPNEA ON EXERTION: 1
DRY SKIN: 1
FATIGUES EASILY: 1
PAIN LOCATION - PAIN QUALITY: SORE
SUBJECTIVE PAIN PROGRESSION: UNCHANGED
PAIN SEVERITY GOAL: 3/10
MUSCLE WEAKNESS: 1
HIGHEST PAIN SEVERITY IN PAST 24 HOURS: 3/10
PAIN LOCATION: ABDOMEN
LOWER EXTREMITY EDEMA: 1
LAST BOWEL MOVEMENT: 67157
NAUSEA: 1
LOWEST PAIN SEVERITY IN PAST 24 HOURS: 3/10
PAIN LOCATION - PAIN SEVERITY: 3/10
DYSPNEA ACTIVITY LEVEL: AT REST
SHORTNESS OF BREATH: 1
LIMITED RANGE OF MOTION: 1
FATIGUE: 1
PAIN: 1

## 2024-11-14 ASSESSMENT — FIBROSIS 4 INDEX: FIB4 SCORE: 1.83

## 2024-11-20 ENCOUNTER — HOME CARE VISIT (OUTPATIENT)
Dept: HOSPICE | Facility: HOSPICE | Age: 79
End: 2024-11-20
Payer: MEDICARE

## 2024-11-21 ENCOUNTER — PHARMACY VISIT (OUTPATIENT)
Dept: PHARMACY | Facility: MEDICAL CENTER | Age: 79
End: 2024-11-21
Payer: COMMERCIAL

## 2024-11-21 ENCOUNTER — HOME CARE VISIT (OUTPATIENT)
Dept: HOSPICE | Facility: HOSPICE | Age: 79
End: 2024-11-21
Payer: MEDICARE

## 2024-11-21 PROCEDURE — G0299 HHS/HOSPICE OF RN EA 15 MIN: HCPCS

## 2024-11-21 PROCEDURE — RXMED WILLOW AMBULATORY MEDICATION CHARGE: Performed by: REHABILITATION PRACTITIONER

## 2024-11-21 PROCEDURE — RXMED WILLOW AMBULATORY MEDICATION CHARGE: Performed by: STUDENT IN AN ORGANIZED HEALTH CARE EDUCATION/TRAINING PROGRAM

## 2024-11-21 RX ORDER — FUROSEMIDE 40 MG/1
40 TABLET ORAL EVERY MORNING
Qty: 14 TABLET | Refills: 10 | OUTPATIENT
Start: 2024-11-21

## 2024-11-21 SDOH — ECONOMIC STABILITY: HOUSING INSECURITY: EVIDENCE OF SMOKING MATERIAL: 0

## 2024-11-21 ASSESSMENT — ENCOUNTER SYMPTOMS
SUBJECTIVE PAIN PROGRESSION: GRADUALLY IMPROVING
LAST BOWEL MOVEMENT: 67165
MUSCLE WEAKNESS: 1
LOWER EXTREMITY EDEMA: 1
PAIN LOCATION - PAIN SEVERITY: 3/10
PAIN SEVERITY GOAL: 4/10
FATIGUES EASILY: 1
LIMITED RANGE OF MOTION: 1
LOWEST PAIN SEVERITY IN PAST 24 HOURS: 3/10
HIGHEST PAIN SEVERITY IN PAST 24 HOURS: 4/10
SHORTNESS OF BREATH: 1
DYSPNEA ACTIVITY LEVEL: AT REST
DYSPNEA ON EXERTION: 1
PAIN LOCATION: BLADDER AREA
DRY SKIN: 1
PAIN: 1

## 2024-11-28 ENCOUNTER — HOME CARE VISIT (OUTPATIENT)
Dept: HOSPICE | Facility: HOSPICE | Age: 79
End: 2024-11-28
Payer: MEDICARE

## 2024-12-05 ENCOUNTER — HOME CARE VISIT (OUTPATIENT)
Dept: HOSPICE | Facility: HOSPICE | Age: 79
End: 2024-12-05
Payer: MEDICARE

## 2024-12-05 ENCOUNTER — PHARMACY VISIT (OUTPATIENT)
Dept: PHARMACY | Facility: MEDICAL CENTER | Age: 79
End: 2024-12-05
Payer: COMMERCIAL

## 2024-12-05 DIAGNOSIS — Z51.5 HOSPICE CARE: ICD-10-CM

## 2024-12-05 DIAGNOSIS — C79.10 METASTATIC UROTHELIAL CARCINOMA (HCC): Primary | ICD-10-CM

## 2024-12-05 PROCEDURE — G0299 HHS/HOSPICE OF RN EA 15 MIN: HCPCS

## 2024-12-05 PROCEDURE — RXMED WILLOW AMBULATORY MEDICATION CHARGE: Performed by: STUDENT IN AN ORGANIZED HEALTH CARE EDUCATION/TRAINING PROGRAM

## 2024-12-05 RX ORDER — IBUPROFEN 200 MG
200-400 TABLET ORAL EVERY 6 HOURS PRN
Qty: 30 TABLET | Refills: 11 | Status: SHIPPED | OUTPATIENT
Start: 2024-12-05 | End: 2024-12-12 | Stop reason: SDUPTHER

## 2024-12-05 RX ORDER — SPIRONOLACTONE 25 MG/1
25 TABLET ORAL DAILY
Qty: 30 TABLET | Refills: 11 | Status: SHIPPED | OUTPATIENT
Start: 2024-12-05

## 2024-12-05 ASSESSMENT — FIBROSIS 4 INDEX: FIB4 SCORE: 1.83

## 2024-12-08 VITALS — BODY MASS INDEX: 24.33 KG/M2 | WEIGHT: 160 LBS

## 2024-12-08 SDOH — ECONOMIC STABILITY: HOUSING INSECURITY: EVIDENCE OF SMOKING MATERIAL: 0

## 2024-12-08 ASSESSMENT — ENCOUNTER SYMPTOMS
HIGHEST PAIN SEVERITY IN PAST 24 HOURS: 5/10
DYSPNEA ACTIVITY LEVEL: AT REST
DYSPNEA ON EXERTION: 1
LAST BOWEL MOVEMENT: 67179
PAIN: 1
LOWEST PAIN SEVERITY IN PAST 24 HOURS: 0/10
PAIN SEVERITY GOAL: 3/10
SHORTNESS OF BREATH: 1
LIMITED RANGE OF MOTION: 1
MUSCLE WEAKNESS: 1
SUBJECTIVE PAIN PROGRESSION: UNCHANGED
PAIN LOCATION - PAIN SEVERITY: 0/10
FATIGUE: 1
FATIGUES EASILY: 1

## 2024-12-08 ASSESSMENT — ACTIVITIES OF DAILY LIVING (ADL): MONEY MANAGEMENT (EXPENSES/BILLS): NEEDS ASSISTANCE

## 2024-12-12 ENCOUNTER — PHARMACY VISIT (OUTPATIENT)
Dept: PHARMACY | Facility: MEDICAL CENTER | Age: 79
End: 2024-12-12
Payer: COMMERCIAL

## 2024-12-12 ENCOUNTER — HOME CARE VISIT (OUTPATIENT)
Dept: HOSPICE | Facility: HOSPICE | Age: 79
End: 2024-12-12
Payer: MEDICARE

## 2024-12-12 DIAGNOSIS — C79.10 METASTATIC UROTHELIAL CARCINOMA (HCC): ICD-10-CM

## 2024-12-12 DIAGNOSIS — Z51.5 HOSPICE CARE: ICD-10-CM

## 2024-12-12 PROCEDURE — RXMED WILLOW AMBULATORY MEDICATION CHARGE: Performed by: REHABILITATION PRACTITIONER

## 2024-12-12 PROCEDURE — G0299 HHS/HOSPICE OF RN EA 15 MIN: HCPCS

## 2024-12-12 RX ORDER — CEPHALEXIN 500 MG/1
1000 CAPSULE ORAL 2 TIMES DAILY
Qty: 40 CAPSULE | Refills: 0 | Status: SHIPPED | OUTPATIENT
Start: 2024-12-12 | End: 2024-12-30

## 2024-12-12 RX ORDER — LIDOCAINE HYDROCHLORIDE 20 MG/ML
1 JELLY TOPICAL
Qty: 12 ML | Refills: 3 | Status: SHIPPED | OUTPATIENT
Start: 2024-12-12

## 2024-12-12 RX ORDER — IBUPROFEN 200 MG
200-600 TABLET ORAL 3 TIMES DAILY PRN
Qty: 100 TABLET | Refills: 0 | Status: SHIPPED | OUTPATIENT
Start: 2024-12-12 | End: 2025-12-12

## 2024-12-12 ASSESSMENT — PAIN SCALES - PAIN ASSESSMENT IN ADVANCED DEMENTIA (PAINAD)
BODYLANGUAGE: 0 - RELAXED.
CONSOLABILITY: 0 - NO NEED TO CONSOLE.
FACIALEXPRESSION: 0 - SMILING OR INEXPRESSIVE.
NEGVOCALIZATION: 0 - NONE.
TOTALSCORE: 0

## 2024-12-12 NOTE — PROGRESS NOTES
Patient visit made for F2F for Medicare. Please see note for more information.  New order today for keflex for cellulities to RLE. Uro-jet for perera placement when/if patient agrees.

## 2024-12-13 ENCOUNTER — PHARMACY VISIT (OUTPATIENT)
Dept: PHARMACY | Facility: MEDICAL CENTER | Age: 79
End: 2024-12-13

## 2024-12-19 ENCOUNTER — PHARMACY VISIT (OUTPATIENT)
Dept: PHARMACY | Facility: MEDICAL CENTER | Age: 79
End: 2024-12-19
Payer: COMMERCIAL

## 2024-12-19 ENCOUNTER — HOME CARE VISIT (OUTPATIENT)
Dept: HOSPICE | Facility: HOSPICE | Age: 79
End: 2024-12-19
Payer: MEDICARE

## 2024-12-19 PROCEDURE — RXMED WILLOW AMBULATORY MEDICATION CHARGE: Performed by: REHABILITATION PRACTITIONER

## 2024-12-19 PROCEDURE — G0299 HHS/HOSPICE OF RN EA 15 MIN: HCPCS

## 2024-12-19 RX ORDER — SCOLOPAMINE TRANSDERMAL SYSTEM 1 MG/1
1 PATCH, EXTENDED RELEASE TRANSDERMAL
Qty: 4 PATCH | Refills: 22 | Status: SHIPPED | OUTPATIENT
Start: 2024-12-19 | End: 2024-12-27

## 2024-12-19 RX ADMIN — SCOLOPAMINE TRANSDERMAL SYSTEM 1 PATCH: 1 PATCH, EXTENDED RELEASE TRANSDERMAL at 12:30

## 2024-12-20 SDOH — ECONOMIC STABILITY: HOUSING INSECURITY: EVIDENCE OF SMOKING MATERIAL: 0

## 2024-12-20 SDOH — ECONOMIC STABILITY: HOUSING INSECURITY: HOME SAFETY: ACCELLENCE AT RN DEPARTURE TO DELIVER E TANKS AND CHECK OXYGEN CONCENTRATOR.

## 2024-12-20 ASSESSMENT — ENCOUNTER SYMPTOMS
SUBJECTIVE PAIN PROGRESSION: UNCHANGED
HIGHEST PAIN SEVERITY IN PAST 24 HOURS: 8/10
MUSCLE WEAKNESS: 1
PAIN LOCATION - PAIN QUALITY: ACHE
NAUSEA: 1
FATIGUES EASILY: 1
LOWEST PAIN SEVERITY IN PAST 24 HOURS: 2/10
FATIGUE: 1
PAIN: 1
DYSPNEA ACTIVITY LEVEL: AT REST
LIMITED RANGE OF MOTION: 1
PAIN SEVERITY GOAL: 3/10
LAST BOWEL MOVEMENT: 67191
PAIN LOCATION - PAIN SEVERITY: 3/10
PAIN LOCATION: LOW ABDOMEN
VOMITING: 1
DYSPNEA ON EXERTION: 1
LOWER EXTREMITY EDEMA: 1
SHORTNESS OF BREATH: 1

## 2024-12-21 ENCOUNTER — HOME CARE VISIT (OUTPATIENT)
Dept: HOSPICE | Facility: HOSPICE | Age: 79
End: 2024-12-21
Payer: MEDICARE

## 2024-12-21 VITALS — RESPIRATION RATE: 18 BRPM

## 2024-12-21 PROCEDURE — G0299 HHS/HOSPICE OF RN EA 15 MIN: HCPCS

## 2024-12-21 ASSESSMENT — ENCOUNTER SYMPTOMS
PAIN LOCATION - PAIN SEVERITY: 3/10
PAIN SEVERITY GOAL: 3/10
PAIN LOCATION - PAIN FREQUENCY: FREQUENT
PAIN: 1
PAIN LOCATION: GENERALIZED
LOWEST PAIN SEVERITY IN PAST 24 HOURS: 3/10
SUBJECTIVE PAIN PROGRESSION: UNCHANGED
PAIN LOCATION - PAIN QUALITY: DULL
HIGHEST PAIN SEVERITY IN PAST 24 HOURS: 3/10

## 2024-12-23 ENCOUNTER — HOME CARE VISIT (OUTPATIENT)
Dept: HOSPICE | Facility: HOSPICE | Age: 79
End: 2024-12-23
Payer: MEDICARE

## 2024-12-23 VITALS — SYSTOLIC BLOOD PRESSURE: 124 MMHG | RESPIRATION RATE: 24 BRPM | DIASTOLIC BLOOD PRESSURE: 64 MMHG | HEART RATE: 110 BPM

## 2024-12-23 PROCEDURE — G0299 HHS/HOSPICE OF RN EA 15 MIN: HCPCS

## 2024-12-23 SDOH — ECONOMIC STABILITY: HOUSING INSECURITY: EVIDENCE OF SMOKING MATERIAL: 0

## 2024-12-23 ASSESSMENT — ENCOUNTER SYMPTOMS
DYSPNEA ACTIVITY LEVEL: AT REST
PAIN LOCATION - PAIN SEVERITY: 5/10
PAIN LOCATION - PAIN DURATION: CHRONIC
DYSPNEA ON EXERTION: 1
FATIGUE: 1
LOWER EXTREMITY EDEMA: 1
PAIN SEVERITY GOAL: 2/10
SHORTNESS OF BREATH: 1
SUBJECTIVE PAIN PROGRESSION: WAXING AND WANING
HIGHEST PAIN SEVERITY IN PAST 24 HOURS: 7/10
NAUSEA: 1
MUSCLE WEAKNESS: 1
SLEEP QUALITY: FAIR
LIMITED RANGE OF MOTION: 1
PAIN LOCATION - PAIN QUALITY: ACHE
PAIN LOCATION - PAIN FREQUENCY: INTERMITTENT
LOWEST PAIN SEVERITY IN PAST 24 HOURS: 0/10
PAIN: 1
FATIGUES EASILY: 1
LAST BOWEL MOVEMENT: 67197
PAIN LOCATION: ABDOMEN

## 2024-12-23 ASSESSMENT — SOCIAL DETERMINANTS OF HEALTH (SDOH): ACTIVE STRESSOR - HEALTH CHANGES: 1

## 2024-12-26 ENCOUNTER — HOME CARE VISIT (OUTPATIENT)
Dept: HOSPICE | Facility: HOSPICE | Age: 79
End: 2024-12-26
Payer: MEDICARE

## 2024-12-26 PROCEDURE — G0299 HHS/HOSPICE OF RN EA 15 MIN: HCPCS

## 2024-12-26 ASSESSMENT — PAIN SCALES - PAIN ASSESSMENT IN ADVANCED DEMENTIA (PAINAD)
TOTALSCORE: 5
BODYLANGUAGE: 1 - TENSE. DISTRESSED PACING. FIDGETING.
NEGVOCALIZATION: 1 - OCCASIONAL MOAN OR GROAN. LOW-LEVEL SPEECH WITH A NEGATIVE OR DISAPPROVING QUALITY.
FACIALEXPRESSION: 2 - FACIAL GRIMACING.
CONSOLABILITY: 1 - DISTRACTED OR REASSURED BY VOICE OR TOUCH.

## 2024-12-26 ASSESSMENT — ENCOUNTER SYMPTOMS: FATIGUE: 1

## 2024-12-27 ENCOUNTER — HOME CARE VISIT (OUTPATIENT)
Dept: HOSPICE | Facility: HOSPICE | Age: 79
End: 2024-12-27
Payer: MEDICARE

## 2024-12-27 ENCOUNTER — PHARMACY VISIT (OUTPATIENT)
Dept: PHARMACY | Facility: MEDICAL CENTER | Age: 79
End: 2024-12-27
Payer: COMMERCIAL

## 2024-12-27 DIAGNOSIS — C79.10 METASTATIC UROTHELIAL CARCINOMA (HCC): Primary | ICD-10-CM

## 2024-12-27 DIAGNOSIS — Z51.5 HOSPICE CARE: ICD-10-CM

## 2024-12-27 PROCEDURE — RXMED WILLOW AMBULATORY MEDICATION CHARGE: Performed by: STUDENT IN AN ORGANIZED HEALTH CARE EDUCATION/TRAINING PROGRAM

## 2024-12-27 RX ORDER — HALOPERIDOL 2 MG/1
2-4 TABLET ORAL EVERY 6 HOURS PRN
Qty: 30 TABLET | Refills: 11 | Status: SHIPPED | OUTPATIENT
Start: 2024-12-27

## 2024-12-28 ENCOUNTER — HOME CARE VISIT (OUTPATIENT)
Dept: HOSPICE | Facility: HOSPICE | Age: 79
End: 2024-12-28
Payer: MEDICARE

## 2024-12-30 ENCOUNTER — HOME CARE VISIT (OUTPATIENT)
Dept: HOSPICE | Facility: HOSPICE | Age: 79
End: 2024-12-30
Payer: MEDICARE

## 2024-12-30 ENCOUNTER — PHARMACY VISIT (OUTPATIENT)
Dept: PHARMACY | Facility: MEDICAL CENTER | Age: 79
End: 2024-12-30
Payer: COMMERCIAL

## 2024-12-30 PROCEDURE — RXMED WILLOW AMBULATORY MEDICATION CHARGE: Performed by: REHABILITATION PRACTITIONER

## 2024-12-30 PROCEDURE — G0299 HHS/HOSPICE OF RN EA 15 MIN: HCPCS

## 2024-12-30 RX ORDER — GUAIFENESIN/DEXTROMETHORPHAN 100-10MG/5
10 SYRUP ORAL EVERY 4 HOURS PRN
Qty: 118 ML | Refills: 10 | Status: SHIPPED | OUTPATIENT
Start: 2024-12-30 | End: 2025-12-30

## 2024-12-31 ENCOUNTER — HOME CARE VISIT (OUTPATIENT)
Dept: HOSPICE | Facility: HOSPICE | Age: 79
End: 2024-12-31
Payer: MEDICARE

## 2024-12-31 VITALS — RESPIRATION RATE: 24 BRPM | DIASTOLIC BLOOD PRESSURE: 72 MMHG | SYSTOLIC BLOOD PRESSURE: 102 MMHG | HEART RATE: 104 BPM

## 2024-12-31 PROCEDURE — G0299 HHS/HOSPICE OF RN EA 15 MIN: HCPCS

## 2024-12-31 SDOH — ECONOMIC STABILITY: HOUSING INSECURITY: EVIDENCE OF SMOKING MATERIAL: 0

## 2024-12-31 ASSESSMENT — ENCOUNTER SYMPTOMS
LOWEST PAIN SEVERITY IN PAST 24 HOURS: 3/10
HIGHEST PAIN SEVERITY IN PAST 24 HOURS: 7/10
FATIGUE: 1
PAIN: 1
NAUSEA: 1
NAUSEA: 1
SUBJECTIVE PAIN PROGRESSION: WAXING AND WANING
PAIN LOCATION: GENERALIZED
SHORTNESS OF BREATH: 1
FATIGUES EASILY: 1
PAIN LOCATION - PAIN FREQUENCY: CONSTANT
SUBJECTIVE PAIN PROGRESSION: WAXING AND WANING
LOWEST PAIN SEVERITY IN PAST 24 HOURS: 4/10
PAIN LOCATION - EXACERBATING FACTORS: MOVEMENT
LIMITED RANGE OF MOTION: 1
DRY SKIN: 1
DYSPNEA ACTIVITY LEVEL: AT REST
PAIN LOCATION - PAIN DURATION: CHRONIC
PAIN LOCATION - RELIEVING FACTORS: POSITIONING
PAIN SEVERITY GOAL: 4/10
PAIN LOCATION - PAIN SEVERITY: 4/10
PAIN LOCATION - PAIN QUALITY: ACHE
SLEEP QUALITY: FAIR
PAIN LOCATION: GENERALIZED
MUSCLE WEAKNESS: 1
PAIN SEVERITY GOAL: 4/10
PAIN: 1
LAST BOWEL MOVEMENT: 67204
HIGHEST PAIN SEVERITY IN PAST 24 HOURS: 7/10
SLEEP QUALITY: ADEQUATE
STOOL FREQUENCY: LESS THAN DAILY

## 2024-12-31 ASSESSMENT — SOCIAL DETERMINANTS OF HEALTH (SDOH)
ACTIVE STRESSOR - EXHAUSTION: 1
ACTIVE STRESSOR - HEALTH CHANGES: 1
ACTIVE STRESSOR - HEALTH CHANGES: 1

## 2025-01-01 ENCOUNTER — HOME CARE VISIT (OUTPATIENT)
Dept: HOSPICE | Facility: HOSPICE | Age: 80
End: 2025-01-01
Payer: MEDICARE

## 2025-01-01 ENCOUNTER — PHARMACY VISIT (OUTPATIENT)
Dept: PHARMACY | Facility: MEDICAL CENTER | Age: 80
End: 2025-01-01
Payer: COMMERCIAL

## 2025-01-01 VITALS — RESPIRATION RATE: 24 BRPM | HEART RATE: 100 BPM

## 2025-01-01 DIAGNOSIS — C79.10 METASTATIC UROTHELIAL CARCINOMA (HCC): ICD-10-CM

## 2025-01-01 PROCEDURE — G0299 HHS/HOSPICE OF RN EA 15 MIN: HCPCS

## 2025-01-01 PROCEDURE — RXMED WILLOW AMBULATORY MEDICATION CHARGE: Performed by: REHABILITATION PRACTITIONER

## 2025-01-01 RX ORDER — SCOPOLAMINE 1 MG/3D
1 PATCH, EXTENDED RELEASE TRANSDERMAL
Qty: 4 PATCH | Refills: 3 | Status: SHIPPED | OUTPATIENT
Start: 2025-01-01

## 2025-01-01 RX ORDER — HALOPERIDOL 2 MG/ML
2-4 SOLUTION ORAL EVERY 4 HOURS PRN
Qty: 30 ML | Refills: 11 | Status: SHIPPED | OUTPATIENT
Start: 2025-01-01 | End: 2026-01-02

## 2025-01-01 RX ORDER — OXYCODONE HYDROCHLORIDE 100 MG/5ML
10-20 SOLUTION ORAL
Qty: 240 ML | Refills: 0 | Status: SHIPPED | OUTPATIENT
Start: 2025-01-01 | End: 2026-01-02

## 2025-01-01 RX ADMIN — SCOPOLAMINE 1 PATCH: 1 PATCH, EXTENDED RELEASE TRANSDERMAL at 11:30

## 2025-01-01 RX ADMIN — OXYCODONE HYDROCHLORIDE 10 MG: 100 SOLUTION ORAL at 11:15

## 2025-01-01 ASSESSMENT — PAIN SCALES - PAIN ASSESSMENT IN ADVANCED DEMENTIA (PAINAD)
CONSOLABILITY: 0 - NO NEED TO CONSOLE.
BODYLANGUAGE: 1 - TENSE. DISTRESSED PACING. FIDGETING.
CONSOLABILITY: 0 - NO NEED TO CONSOLE.
FACIALEXPRESSION: 1 - SAD. FRIGHTENED. FROWN.
TOTALSCORE: 2
CONSOLABILITY: 0 - NO NEED TO CONSOLE.
NEGVOCALIZATION: 0 - NONE.
BODYLANGUAGE: 1 - TENSE. DISTRESSED PACING. FIDGETING.
NEGVOCALIZATION: 0 - NONE.
COMMENTS: 2/10
FACIALEXPRESSION: 2 - FACIAL GRIMACING.
NEGVOCALIZATION: 1 - OCCASIONAL MOAN OR GROAN. LOW-LEVEL SPEECH WITH A NEGATIVE OR DISAPPROVING QUALITY.
BODYLANGUAGE: 1 - TENSE. DISTRESSED PACING. FIDGETING.
COMMENTS: 4/10
TOTALSCORE: 2
FACIALEXPRESSION: 1 - SAD. FRIGHTENED. FROWN.
TOTALSCORE: 4

## 2025-01-01 ASSESSMENT — SOCIAL DETERMINANTS OF HEALTH (SDOH)
ACTIVE STRESSOR - LOSS OF CONTROL: 1
ACTIVE STRESSOR - EXHAUSTION: 1
ACTIVE STRESSOR - HEALTH CHANGES: 1

## 2025-01-01 ASSESSMENT — ENCOUNTER SYMPTOMS
PAIN: 1
COUGH CHARACTERISTICS: DRY
COUGH CHARACTERISTICS: NON-PRODUCTIVE
SUBJECTIVE PAIN PROGRESSION: WAXING AND WANING
PAIN LOCATION: LOWER LEGS
DRY SKIN: 1
DYSPNEA ACTIVITY LEVEL: AT REST
PAIN LOCATION - PAIN DURATION: WEEKS
LOSS OF SENSATION: 1
PAIN SEVERITY GOAL: 2/10
SLEEP QUALITY: FAIR
SHORTNESS OF BREATH: 1
SKIN LESIONS: 1
DEPRESSED MOOD: 1
HIGHEST PAIN SEVERITY IN PAST 24 HOURS: 8/10
PAIN LOCATION - PAIN QUALITY: ACHE/SHARP
COUGH: 1
PAIN LOCATION - PAIN FREQUENCY: CONSTANT
DYSPNEA ACTIVITY LEVEL: WHILE SPEAKING
LOWEST PAIN SEVERITY IN PAST 24 HOURS: 5/10
PAIN LOCATION - PAIN SEVERITY: 5/10

## 2025-01-01 ASSESSMENT — ACTIVITIES OF DAILY LIVING (ADL): MONEY MANAGEMENT (EXPENSES/BILLS): NEEDS ASSISTANCE

## 2025-01-08 ENCOUNTER — HOME CARE VISIT (OUTPATIENT)
Dept: HOSPICE | Facility: HOSPICE | Age: 80
End: 2025-01-08
Payer: MEDICARE

## (undated) DEVICE — SODIUM CHL IRRIGATION 0.9% 1000ML (12EA/CA)

## (undated) DEVICE — ELECTRODE DUAL RETURN W/ CORD - (50/PK)

## (undated) DEVICE — GLOVE BIOGEL ECLIPSE  PF LATEX SIZE 6.5 (50PR/BX)

## (undated) DEVICE — SUCTION INSTRUMENT YANKAUER BULBOUS TIP W/O VENT (50EA/CA)

## (undated) DEVICE — CANISTER SUCTION RIGID RED 1500CC (40EA/CA)

## (undated) DEVICE — SYRINGE DISP. 50CC LS - (40/BX)

## (undated) DEVICE — WATER IRRIGATION STERILE 1000ML (12EA/CA)

## (undated) DEVICE — SUTURE 0 VICRYL PLUS UR-6 - 27 INCH (36/BX)

## (undated) DEVICE — KIT  I.V. START (100EA/CA)

## (undated) DEVICE — TUBE CONNECTING SUCTION - CLEAR PLASTIC STERILE 72 IN (50EA/CA)

## (undated) DEVICE — GOWN WARMING STANDARD FLEX - (30/CA)

## (undated) DEVICE — GOWN SURGEONS X-LARGE - DISP. (30/CA)

## (undated) DEVICE — BAG DRAINAGE URINARY CLOSED 2000ML (20EA/CA)

## (undated) DEVICE — SENSOR SPO2 ADULT LNCS ADTX (20/BX) ORDER ITEM #19593

## (undated) DEVICE — CHLORAPREP 26 ML APPLICATOR - ORANGE TINT(25/CA)

## (undated) DEVICE — GLOVE, LITE (PAIR)

## (undated) DEVICE — PROTECTOR ULNA NERVE - (36PR/CA)

## (undated) DEVICE — GOLD PROBE 7FR (5/BX)

## (undated) DEVICE — SYRINGE SAFETY 3 ML 18 GA X 1 1/2 BLUNT LL (100/BX 8BX/CA)

## (undated) DEVICE — GLOVE BIOGEL SZ 7.5 SURGICAL PF LTX - (50PR/BX 4BX/CA)

## (undated) DEVICE — SYRINGE SAFETY 5 ML 18 GA X 1-1/2 BLUNT LL (100/BX 4BX/CA)

## (undated) DEVICE — MASK AIRWAY SIZE 4 UNIQUE SILICON (10EA/BX)

## (undated) DEVICE — SYRINGE SAFETY 10 ML 18 GA X 1 1/2 BLUNT LL (100/BX 4BX/CA)

## (undated) DEVICE — CATHETER IV SAFETY 20 GA X 1-1/4 (50/BX)

## (undated) DEVICE — BAG RETRIEVAL 10ML (10EA/BX)

## (undated) DEVICE — SENSOR SPO2 NEO LNCS ADHESIVE (20/BX) SEE USER NOTES

## (undated) DEVICE — PACK SINGLE BASIN - (6/CA)

## (undated) DEVICE — INTRODUCER STENT NAVIFLEX 10FR

## (undated) DEVICE — GUIDE JAGWIRE 035 STRAIGHT (2EA/BX)

## (undated) DEVICE — WATER IRRIG. STER 3000 ML - (4/CA)

## (undated) DEVICE — ZIPWIRE STIFF .035 STRAIGHT TIP (5EA/BX)

## (undated) DEVICE — SET EXTENSION WITH 2 PORTS (48EA/CA) ***PART #2C8610 IS A SUBSTITUTE*****

## (undated) DEVICE — ELECTRODE 850 FOAM ADHESIVE - HYDROGEL RADIOTRNSPRNT (50/PK)

## (undated) DEVICE — SPONGE GAUZESTER 4 X 4 4PLY - (128PK/CA)

## (undated) DEVICE — TUBE SUCTION YANKAUER  1/4 X 6FT (20EA/CA)"

## (undated) DEVICE — CORD RESECTO DISP ACT DAC-1 FOR USA RESECTOSCOPE (12EA/BX)

## (undated) DEVICE — SYRINGE 30 ML LL (56/BX)

## (undated) DEVICE — CATHETER URETHRAL FOLEY LATEX 22 FR 30 CC 3-WAY (12/CA)

## (undated) DEVICE — SET IRRIGATION CYSTOSCOPY Y-TYPE L81 IN (20EA/CA)

## (undated) DEVICE — HEAD HOLDER JUNIOR/ADULT

## (undated) DEVICE — ELECTRODE BIPOLAR SMALL CUTTING LOOP URO 24/26 FR (6EA/PK)

## (undated) DEVICE — KIT ROOM DECONTAMINATION

## (undated) DEVICE — EXTRACTOR PRO XL 9-12 MM ABOVE

## (undated) DEVICE — NEPTUNE 4 PORT MANIFOLD - (20/PK)

## (undated) DEVICE — BITE BLOCK ADULT 60FR (100EA/CA)

## (undated) DEVICE — BAG, SPONGE COUNT 50600

## (undated) DEVICE — BAG SPONGE COUNT 10.25 X 32 - BLUE (250/CA)

## (undated) DEVICE — CATHETER URETHRAL FOLEY LATEX 24 FR 30 CC 3-WAY (12/CA)

## (undated) DEVICE — DRAPE X LONG COILED INSTRUMENT ORGANIZER EUS (20EA/CA)

## (undated) DEVICE — TUBE E-T HI-LO CUFF 7.0MM (10EA/PK)

## (undated) DEVICE — SUTURE GENERAL

## (undated) DEVICE — LACTATED RINGERS INJ 1000 ML - (14EA/CA 60CA/PF)

## (undated) DEVICE — GLOVE BIOGEL INDICATOR SZ 7.5 SURGICAL PF LTX - (50PR/BX 4BX/CA)

## (undated) DEVICE — SPONGE GAUZE NON-STERILE 4X4 - (2000/CA 10PK/CA)

## (undated) DEVICE — Device

## (undated) DEVICE — HUMID-VENT HEAT AND MOISTURE EXCHANGE- (50/BX)

## (undated) DEVICE — TROCAR Z THREAD11MM OPTICAL - NON BLADED(6/BX)

## (undated) DEVICE — BAG URODRAIN WITH TUBING - (20/CA)

## (undated) DEVICE — ELECTRODE ROLLERBALL 24FR - (10/BX) OLD # RE-24

## (undated) DEVICE — CANNULA W/SEAL 5X100 Z-THRE - ADED KII (12/BX)

## (undated) DEVICE — CATHETER URETHRAL FOLEY SILICONE 22 FR 30 ML 3-WAY

## (undated) DEVICE — BASIN EMESIS DISP. - (250/CA)

## (undated) DEVICE — TOWEL STOP TIMEOUT SAFETY FLAG (40EA/CA)

## (undated) DEVICE — SODIUM CHL. IRRIGATION 0.9% 3000ML (4EA/CA 65CA/PF)

## (undated) DEVICE — MASK ANESTHESIA ADULT  - (100/CA)

## (undated) DEVICE — KIT ANESTHESIA W/CIRCUIT & 3/LT BAG W/FILTER (20EA/CA)

## (undated) DEVICE — SPHINCTEROTOME CLEVER CUT

## (undated) DEVICE — PACK CYSTOSCOPY III - (8/CA)

## (undated) DEVICE — CANNULA W/ SUPPLY TUBING O2 - (50/CA)

## (undated) DEVICE — TROCAR 5X100 BLADED Z-THREAD - KII (6/BX)

## (undated) DEVICE — CLIP MED LG INTNL HRZN TI ESCP - (20/BX)

## (undated) DEVICE — TUBING INSUFFLATION - (10/BX)

## (undated) DEVICE — TUBING CLEARLINK DUO-VENT - C-FLO (48EA/CA)

## (undated) DEVICE — TUBE E-T HI-LO CUFF 7.5MM (10EA/PK)

## (undated) DEVICE — GLOVE BIOGEL INDICATOR SZ 8 SURGICAL PF LTX - (50/BX 4BX/CA)

## (undated) DEVICE — SUTURE 4-0 MONOCRYL PLUS PS-2 - 27 INCH (36/BX)

## (undated) DEVICE — GOWN SURGEONS LARGE - (32/CA)

## (undated) DEVICE — COVER FOOT UNIVERSAL DISP. - (25EA/CA)

## (undated) DEVICE — SENSOR OXIMETER ADULT SPO2 RD SET (20EA/BX)

## (undated) DEVICE — SYRINGE TOOMEY (50EA/CA)

## (undated) DEVICE — ELECTRODE 5MM LHK LAPSCP STERILE DISP- MEGADYNE  (5/CA)

## (undated) DEVICE — EVACUATOR BLADDER ELLIK - (10/BX)

## (undated) DEVICE — SET SUCTION/IRRIGATION WITH DISPOSABLE TIP (6/CA )PART #0250-070-520 IS A SUB

## (undated) DEVICE — DRESSING TRANSPARENT FILM TEGADERM 2.375 X 2.75"  (100EA/BX)"

## (undated) DEVICE — CATHERTER CLEAR SINGLE USE INJECTION THERAPY NEEDLE 25GA X 4MM  2.3MM X 240CM (5EA/BX)

## (undated) DEVICE — GLOVE BIOGEL PI ORTHO SZ 8 PF LF (40PR/BX)

## (undated) DEVICE — CAPTIVATOR II-15MM ROUND STIFF

## (undated) DEVICE — GLOVE BIOGEL SZ 8 SURGICAL PF LTX - (50PR/BX 4BX/CA)